# Patient Record
Sex: FEMALE | Race: WHITE | NOT HISPANIC OR LATINO | Employment: OTHER | URBAN - NONMETROPOLITAN AREA
[De-identification: names, ages, dates, MRNs, and addresses within clinical notes are randomized per-mention and may not be internally consistent; named-entity substitution may affect disease eponyms.]

---

## 2017-01-03 ENCOUNTER — OFFICE VISIT (OUTPATIENT)
Dept: INTERNAL MEDICINE | Facility: OTHER | Age: 47
End: 2017-01-03
Attending: NURSE PRACTITIONER
Payer: COMMERCIAL

## 2017-01-03 VITALS
RESPIRATION RATE: 18 BRPM | WEIGHT: 225 LBS | HEART RATE: 106 BPM | HEIGHT: 67 IN | SYSTOLIC BLOOD PRESSURE: 102 MMHG | DIASTOLIC BLOOD PRESSURE: 62 MMHG | OXYGEN SATURATION: 96 % | TEMPERATURE: 97 F | BODY MASS INDEX: 35.31 KG/M2

## 2017-01-03 DIAGNOSIS — F34.1 DYSTHYMIA: ICD-10-CM

## 2017-01-03 DIAGNOSIS — M25.50 PAIN IN JOINT, MULTIPLE SITES: Primary | ICD-10-CM

## 2017-01-03 LAB
CRP SERPL-MCNC: 7.2 MG/L (ref 0–8)
ERYTHROCYTE [SEDIMENTATION RATE] IN BLOOD BY WESTERGREN METHOD: 11 MM/H (ref 0–20)

## 2017-01-03 PROCEDURE — 86140 C-REACTIVE PROTEIN: CPT | Performed by: NURSE PRACTITIONER

## 2017-01-03 PROCEDURE — 99000 SPECIMEN HANDLING OFFICE-LAB: CPT | Performed by: NURSE PRACTITIONER

## 2017-01-03 PROCEDURE — 85652 RBC SED RATE AUTOMATED: CPT | Performed by: NURSE PRACTITIONER

## 2017-01-03 PROCEDURE — 99213 OFFICE O/P EST LOW 20 MIN: CPT | Performed by: NURSE PRACTITIONER

## 2017-01-03 PROCEDURE — 86431 RHEUMATOID FACTOR QUANT: CPT | Mod: 90 | Performed by: NURSE PRACTITIONER

## 2017-01-03 PROCEDURE — 36415 COLL VENOUS BLD VENIPUNCTURE: CPT | Performed by: NURSE PRACTITIONER

## 2017-01-03 RX ORDER — BUPROPION HCL 150 MG
TABLET,SUSTAINED-RELEASE 12 HR ORAL
Qty: 90 TABLET | Refills: 3 | Status: SHIPPED | OUTPATIENT
Start: 2017-01-03 | End: 2017-05-01

## 2017-01-03 RX ORDER — BUPROPION HCL 150 MG
150 TABLET,SUSTAINED-RELEASE 12 HR ORAL 2 TIMES DAILY
Qty: 60 TABLET
Start: 2017-01-03 | End: 2017-01-03

## 2017-01-03 ASSESSMENT — PAIN SCALES - GENERAL: PAINLEVEL: NO PAIN (0)

## 2017-01-03 NOTE — Clinical Note
[facility] [place]  [address] [facility] [address]  [phone]             January 9, 2017    Annie Fan Radha  [birth year]  [address] [facility] [address]              Dear Annie,    The results of your recent tests were normal.  Enclosed is a copy of the results.  It was a pleasure to see you at your last appointment.  Results for orders placed or performed in visit on 01/03/17   CRP, inflammation   Result Value Ref Range    CRP Inflammation 7.2 0.0 - 8.0 mg/L   ESR: Erythrocyte sedimentation rate   Result Value Ref Range    Sed Rate 11 0 - 20 mm/h   Rheumatoid factor   Result Value Ref Range    Rheumatoid Factor <20 <20 IU/mL       If you have any questions or concerns, please call myself or my nurse at [phone].      Sincerely,      Todd HEAD

## 2017-01-03 NOTE — MR AVS SNAPSHOT
"              After Visit Summary   1/3/2017    Annie Garcia    MRN: [id number]           Patient Information     Date Of Birth          1970        Visit Information        Provider Department      1/3/2017 9:00 AM Todd Torres NP [facility]        Today's Diagnoses     Pain in joint, multiple sites    -  1     Leukocytosis, unspecified type         Dysthymia           Care Instructions    1. Wellbutrin 1`50mg  -3 pills =450mg a day.            Follow-ups after your visit        Your next 10 appointments already scheduled     Jan 09, 2017  8:00 AM   Treatment with Randi Barnhart PT   [facility] ([facility] )    [address] [address] [phone]            Jan 13, 2017  8:40 AM   (Arrive by 8:20 AM)   Return Visit with Houston Aguilar MD    ORTHOPEDICS ([facility])    [address] [address] [phone]              Who to contact     If you have questions or need follow up information about today's clinic visit or your schedule please contact [facility] directly at [phone].  Normal or non-critical lab and imaging results will be communicated to you by MyChart, letter or phone within 4 business days after the clinic has received the results. If you do not hear from us within 7 days, please contact the clinic through MyChart or phone. If you have a critical or abnormal lab result, we will notify you by phone as soon as possible.  Submit refill requests through [employer] or call your pharmacy and they will forward the refill request to us. Please allow 3 business days for your refill to be completed.          Additional Information About Your Visit        Your Vitals Were     Pulse Temperature Respirations Height BMI (Body Mass Index) Pulse Oximetry    106 97  F (36.1  C) (Tympanic) 18 5' 7\" (1.702 m) 35.23 kg/m2 96%       Blood Pressure from Last 3 Encounters:   01/03/17 102/62   11/18/16 " 100/59   09/23/16 132/80    Weight from Last 3 Encounters:   01/03/17 225 lb (102.059 kg)   11/18/16 228 lb (103.42 kg)   09/23/16 230 lb (104.327 kg)              We Performed the Following     CRP, inflammation     ESR: Erythrocyte sedimentation rate     Rheumatoid factor          Today's Medication Changes          These changes are accurate as of: 1/3/17  9:49 AM.  If you have any questions, ask your nurse or doctor.               Start taking these medicines.        Dose/Directions    WELLBUTRIN  MG 12 hr tablet   Used for:  Dysthymia   Generic drug:  buPROPion   Replaces:  buPROPion 150 MG 12 hr tablet   Started by:  Todd Torres NP        Take  Wellbutrin 450mg (3 pills) a day   Quantity:  90 tablet   Refills:  3         Stop taking these medicines if you haven't already. Please contact your care team if you have questions.     buPROPion 150 MG 12 hr tablet   Commonly known as:  WELLBUTRIN SR   Replaced by:  WELLBUTRIN  MG 12 hr tablet   Stopped by:  Todd Torres NP           levofloxacin 500 MG tablet   Commonly known as:  LEVAQUIN   Stopped by:  Todd Torres NP                Where to get your medicines      [facility]  [address] [facility] [address]     Phone:  [phone]    - WELLBUTRIN  MG 12 hr tablet             Primary Care Provider Office Phone # Fax #    Todd Torres [phone]       [facility] [address] [facility] [address]        Thank you!     Thank you for choosing [facility]  for your care. Our goal is always to provide you with excellent care. Hearing back from our patients is one way we can continue to improve our services. Please take a few minutes to complete the written survey that you may receive in the mail after your visit with us. Thank you!             Your Updated Medication List - Protect others around you: Learn how to safely  use, store and throw away your medicines at www.disposemymeds.org.          This list is accurate as of: 1/3/17  9:49 AM.  Always use your most recent med list.                   Brand Name Dispense Instructions for use    acetaminophen-codeine 300-30 MG per tablet    TYLENOL #3    90 tablet    TAKE 1 TABLET BY MOUTH EVERY 4 HOURS AS NEEDED FOR MILD PAIN, TAKE FOR COUGHING PAIN.       BROVANA 15 MCG/2ML Nebu neb solution   Generic drug:  arformoterol     120 mL    USE 1 VIAL IN NEBULIZER 2 TIMES A DAY AS NEEDED       budesonide 1 MG/2ML Susp neb solution    PULMICORT    60 ampule    Take 2 mLs (1 mg) by nebulization daily Take 1 mg by nebulization daily       clotrimazole 10 MG Lozg lozenge    MYCELEX    70 each    PLACE 1 LOZENGE INSIDE CHEEK EVERY 6 HOURS AS NEEDED       DEXILANT 60 MG Cpdr CR capsule   Generic drug:  dexlansoprazole      Take 1 capsule by mouth daily as needed       diclofenac 1 % Gel topical gel    VOLTAREN    100 g    Apply 1 gram four times daily to right medial ankle using enclosed dosing card.       ERYTHROMYCIN BASE PO      Take 500 mg by mouth 3 times daily (with meals)       fluconazole 100 MG tablet    DIFLUCAN    30 tablet    TAKE 1 TABLET BY MOUTH DAILY AS NEEDED       hydrOXYzine 50 MG capsule    VISTARIL    120 capsule    Take 1-2 tabs  Every 6 hours as needed for itching.       levalbuterol 0.31 MG/3ML neb solution    XOPENEX    225 mL    Take 3 mLs (0.31 mg) by nebulization every 6 hours as needed for shortness of breath / dyspnea       nystatin 312833 UNIT/GM Powd    MYCOSTATIN    60 g    Apply 1 g topically 3 times daily as needed       PULMOZYME 1 MG/ML neb solution   Generic drug:  dornase alpha     75 mL    USE 1 VIAL IN NEBULIZER DAILY AS NEEDED       sucralfate 1 GM tablet    CARAFATE    120 tablet    Take 1 tablet (1 g) by mouth 4 times daily as needed       VALIUM PO      Take 10 mg by mouth every 6 hours as needed for anxiety       WELLBUTRIN  MG 12 hr tablet    Generic drug:  buPROPion     90 tablet    Take  Wellbutrin 450mg (3 pills) a day       zolpidem 10 MG tablet    AMBIEN    30 tablet    Take 1 tablet (10 mg) by mouth nightly as needed for sleep

## 2017-01-03 NOTE — NURSING NOTE
"Chief Complaint   Patient presents with     Recheck Medication     follow up       Initial /62 mmHg  Pulse 106  Temp(Src) 97  F (36.1  C) (Tympanic)  Resp 18  Ht 5' 7\" (1.702 m)  Wt 225 lb (102.059 kg)  BMI 35.23 kg/m2  SpO2 96% Estimated body mass index is 35.23 kg/(m^2) as calculated from the following:    Height as of this encounter: 5' 7\" (1.702 m).    Weight as of this encounter: 225 lb (102.059 kg).  BP completed using cuff size: martina Cantu      "

## 2017-01-04 LAB — RHEUMATOID FACT SER NEPH-ACNC: <20 IU/ML (ref 0–20)

## 2017-01-05 ENCOUNTER — HOSPITAL ENCOUNTER (OUTPATIENT)
Dept: PHYSICAL THERAPY | Facility: HOSPITAL | Age: 47
Setting detail: THERAPIES SERIES
End: 2017-01-05
Attending: ORTHOPAEDIC SURGERY
Payer: COMMERCIAL

## 2017-01-05 ENCOUNTER — TRANSFERRED RECORDS (OUTPATIENT)
Dept: HEALTH INFORMATION MANAGEMENT | Facility: HOSPITAL | Age: 47
End: 2017-01-05

## 2017-01-05 PROCEDURE — 97140 MANUAL THERAPY 1/> REGIONS: CPT | Mod: GP | Performed by: PHYSICAL THERAPIST

## 2017-01-05 NOTE — PROGRESS NOTES
SUBJECTIVE:  Annie Garcia, a 46 year old female scheduled an appointment to discuss the following issues:  Cytstic fibrosis-Sees Dr. Herman-Is off Levaquin and on Erythromycin.  250mg a day for 21 days.  . As preventative  Uses vest.   Pain in joint, multiple sites-sure has inflammation. Wants lab for inflammation.  Right ankle hurts Has been having ultrasound.treatments. Will be doing an Ultrasound.  Needs sticker for car-as some days can barely walk on the right ankle.    Has right groin pain -would like a nerve block.     Dysthymia Was wondering if can increase Well butrin-now that sun is hardly out-feels more sad.  Needs brand specific.  Insomnia-needs Ambien to be brand specific.       Medical, social, surgical, and family histories reviewed.    Current Outpatient Prescriptions   Medication     ERYTHROMYCIN BASE PO     WELLBUTRIN  MG 12 hr tablet     PULMOZYME 1 MG/ML nebulizer solution     diclofenac (VOLTAREN) 1 % GEL     zolpidem (AMBIEN) 10 MG tablet     DiazePAM (VALIUM PO)     acetaminophen-codeine (TYLENOL #3) 300-30 MG per tablet     fluconazole (DIFLUCAN) 100 MG tablet     sucralfate (CARAFATE) 1 GM tablet     budesonide (PULMICORT) 1 MG/2ML SUSP nebulizer solution     BROVANA 15 MCG/2ML NEBU     clotrimazole (MYCELEX) 10 MG LOZG     levalbuterol (XOPENEX) 0.31 MG/3ML nebulizer solution     hydrOXYzine (VISTARIL) 50 MG capsule     nystatin (MYCOSTATIN) 011900 UNIT/GM POWD     dexlansoprazole (DEXILANT) 60 MG CPDR     No current facility-administered medications for this visit.       ROS:  C: NEGATIVE for fever, chills, sore throat, earache  E: NEGATIVE for vision changes   R: NEGATIVE for  cough , SOB  CV: NEGATIVE for chest pain, palpitations   GI: NEGATIVE for nausea, abdominal pain, heartburn, or constipation, diarrhea.   : NEGATIVE for frequency, dysuria, or hematuria  M: POSITIVE  for significant arthralgias or myalgia-right ankle pain, right groin pain    N: NEGATIVE for  "weakness,  Paresthesias, dizziness  or headache    OBJECTIVE:  /62 mmHg  Pulse 106  Temp(Src) 97  F (36.1  C) (Tympanic)  Resp 18  Ht 5' 7\" (1.702 m)  Wt 225 lb (102.059 kg)  BMI 35.23 kg/m2  SpO2 96%  EXAM:  GENERAL APPEARANCE:  alert and no distress  EYES: EOMI,  PERRL   NECK: Supple, no lymphadenopathy, no thyromegaly, no carotid bruits, No JVD  RESP: lungs clear to auscultation anteriorly and posteriorly - no rales, rhonchi or wheezes  CV: regular rates and rhythm, normal S1 S2, no S3 or S4 and no murmur, no click or rub -  ABDOMEN:  soft, nontender, no hepatomegaly, no splenomegaly,  or masses,  bowel sounds normal  MS: No edema.       Results for orders placed or performed in visit on 01/03/17   CRP, inflammation   Result Value Ref Range    CRP Inflammation 7.2 0.0 - 8.0 mg/L   ESR: Erythrocyte sedimentation rate   Result Value Ref Range    Sed Rate 11 0 - 20 mm/h   Rheumatoid factor   Result Value Ref Range    Rheumatoid Factor <20 <20 IU/mL       :    ASSESSMENT / PLAN:  (M25.50) Pain in joint, multiple sites  (primary encounter diagnosis)  Comment:All indicators are negative.    Results for orders placed or performed in visit on 01/03/17   CRP, inflammation   Result Value Ref Range    CRP Inflammation 7.2 0.0 - 8.0 mg/L   ESR: Erythrocyte sedimentation rate   Result Value Ref Range    Sed Rate 11 0 - 20 mm/h   Rheumatoid factor   Result Value Ref Range    Rheumatoid Factor <20 <20 IU/mL       Plan: CRP, inflammation, ESR: Erythrocyte         sedimentation rate, Rheumatoid factor         (F34.1) Dysthymia  Comment: Will increase Wellburin to 450mg a day.  Wants brand specific.    Plan: WELLBUTRIN  MG 12 hr tablet,             "

## 2017-01-05 NOTE — PROGRESS NOTES
Outpatient Physical Therapy Progress Note     Patient: Annie Garcia  : 1970    Beginning/End Dates of Reporting Period:  11/3/2016 to 2017    Referring Provider: Dr Aguilar    Therapy Diagnosis: R ankle pain/post tib tendinopathy     Client Self Report: Reports that her foot is feeling 95% improved compared to when we first started working together on 11/3/2016.  She has had a long struggle with this foot issue, but is feeling that it is near resolved.  She has been able to go up and down stairs at home and at the hockey arena more easily, although she still sometimes turns sideways.  She feels her ankle is moving very well, but still feels slight stiffness/locked sensation with walking.  She notes much improvement in pain, occasional mild swelling/warmth and she will still get occasional pain along the medial aspect of her ankle, but feels this is still much better than it has been in months.  She is no longer walking with an AD, has been able to wear boots and any type of footwear she pleases without difficulty.  She rates her worst PL to be a 5-6/10 and at best 0-1/10.  Overall, she has done great in therapy the past couple of months and has an appointment with Dr Aguilar tomorrow to verify this.  She also sees a physician in [place] for an US of her lower leg today.    Objective Measurements:  Objective Measure: Ankle ROM, strength, pain, treatment, HEP  Details: R ankle ROM: DF 20, PF 42, INV 30, Ever 15 - grossly WNL and non-painful except for INV actively.  Strength: DF/PF 5/5, Ever 5-/5, INV 4/5 + pain. Weakness and pain noted with posterior tib testing and still mildly tender along tendon and muscle belly. Ligamentously she is stable and non-painful. She reports mild warmth on her medial ankle today, although I did not find it to be particularly warm.  No increased swelling or other visible deformity noted today. Ambulates well without AD in boots and without shoes.  I have  worked on myofascial release and soft tissue mobilization of her medial gastroc/soleus, release of her plantaris to allow for end range PF and I have mobilized her fibular head and calcaneus to promote normal ankle motion. We have worked on LE strengthening on the machines, posterior tib muscular re-education, stair negotiation in normal/reciprocal pattern and hip stretching and strengthening. She has done very well these past couple of weeks and I feel she is ready to DC from PT with plans to continue her HEP and get back into activities a the gym to lose weight she has gained due to her long/complex history of ailments the past couple years. She stated she will come up with a plan with Dr Aguilar and go from there     Goals:    Goal Identifier LTG 3   Goal Description Patient will be able to tolerate prolonged sitting while driving or riding without restriction from pain or tightness in R LE.    Target Date 06/24/15 (drives in and out of town frequently)   Date Met  01/05/17   Progress:     Goal Identifier LTG 4    Goal Description Patient will be able to perform community distance ambulation consistently without use of AD.    Target Date 06/10/15   Date Met   12/1/2016   Progress:     Goal Identifier short   Goal Description Client will be able to ambulate with no device 15 minutes with fucntional gaot pattern with equal heel strike.   Target Date 05/24/16   Date Met   12/1/2016   Progress:     Goal Identifier long   Goal Description Client will have pain decreased 0-2/10 with gait with community dstances,   Target Date 06/21/16   Date Met   1/5/2017   Progress:     Goal Identifier long   Goal Description Client strength anterior ankle 5/5 and ROM DF 10 degrees at ankle.   Target Date 06/21/16   Date Met      Progress: Improving to 4/5 with INV and near full all others       Progress Toward Goals:   Progress this reporting period: Patient has met most goals, except for strength, but this is expected to  continue to improve as she works on her HEP      Plan:  Other: Patient has made excellent gains the past few weeks and much of her pain has resolved.  She still has weakness that she needs to address with continued exercise.  I discussed discharging her from PT with encouragement to return to the gym and perform all daily activities normally. She indicated that she will discuss her progress with Dr Aguilar and come up with a plan.  From a PT standpoint - she has met most of her goals and is ready to DC at this time.    Discharge: Pending physician recommendation

## 2017-01-06 ENCOUNTER — OFFICE VISIT (OUTPATIENT)
Dept: ORTHOPEDICS | Facility: OTHER | Age: 47
End: 2017-01-06
Attending: ORTHOPAEDIC SURGERY
Payer: COMMERCIAL

## 2017-01-06 VITALS
SYSTOLIC BLOOD PRESSURE: 106 MMHG | RESPIRATION RATE: 18 BRPM | DIASTOLIC BLOOD PRESSURE: 70 MMHG | WEIGHT: 226 LBS | HEIGHT: 67 IN | TEMPERATURE: 97.9 F | OXYGEN SATURATION: 98 % | BODY MASS INDEX: 35.47 KG/M2 | HEART RATE: 83 BPM

## 2017-01-06 DIAGNOSIS — M66.871 NONTRAUMATIC RUPTURE OF TENDONS OF RIGHT FOOT AND ANKLE: Primary | ICD-10-CM

## 2017-01-06 PROCEDURE — 99213 OFFICE O/P EST LOW 20 MIN: CPT | Performed by: ORTHOPAEDIC SURGERY

## 2017-01-06 ASSESSMENT — PAIN SCALES - GENERAL: PAINLEVEL: NO PAIN (0)

## 2017-01-06 NOTE — PROGRESS NOTES
Chief complaint:  #1.  Bilateral flatfeet  #2.  Posterior tibial tendinitis right ankle    Subjective: This 46-year-old female with cystic fibrosis is frequently on antibiotics including fluoroquinolones, as well as periodic bursts of steroids.  She has been treated through this office for pain and tenderness and swelling along the posterior tibial tendon of the right ankle.  An MRI of 7/21/16 showed fluid around the tendon but no structural abnormality.  She has had multiple orthotics which did not provide lasting pain relief.  She has been using topical Voltaren gel as well.  She has been getting physical therapy for her ankle for several months.    Since seen last she had an ultrasound performed on her right ankle.  It was done by Dr. Barrera in [place].  He called me yesterday to discuss the results.  He said there is a 5 cm long longitudinal tear of the posterior tibial tendon beginning above the medial malleolus and ending close to the navicular.    The patient reports ongoing pain and tenderness along the path of her posterior tibial tendon, as well as local swelling.  She states that she cannot go on like this.    Her musculoskeletal and neurologic review of systems are otherwise unchanged since seen last.    Examination: Overweight but otherwise healthy-appearing female with appropriate mood and affect.  The skin around the ankle is intact.  There is mild swelling and local tenderness along the posterior tibial tendon from the medial malleolus to the navicular.  The pain is aggravated by resisted plantar flexion of the ankle, but is not provoked by motion.  Capillary refill is intact in the right foot.  Light touch sensation is intact in the toes of the right foot.    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   She asked that her therapy be extended, at least until she sees the specialist because the size components keeps muscle spasms to a minimum.  She was referred to Dr. Cid.  She'll return here as needed.

## 2017-01-06 NOTE — MR AVS SNAPSHOT
After Visit Summary   1/6/2017    Annie Garcia    MRN: [id number]           Patient Information     Date Of Birth          1970        Visit Information        Provider Department      1/6/2017 8:40 AM Houston Aguilar MD  [facility]        Today's Diagnoses     Nontraumatic rupture of tendons of right foot and ankle    -  1       Care Instructions    We will refer you to Dr Cid for surgical consideration        Follow-ups after your visit        Additional Services     ORTHOPEDICS ADULT REFERRAL       Your provider has referred you to: Dr Cid [facility] in [place]    Please be aware that coverage of these services is subject to the terms and limitations of your health insurance plan.  Call member services at your health plan with any benefit or coverage questions.      Please bring the following to your appointment:    >>   Any x-rays, CTs or MRIs which have been performed.  Contact the facility where they were done to arrange for  prior to your scheduled appointment.    >>   List of current medications   >>   This referral request   >>   Any documents/labs given to you for this referral            PHYSICAL THERAPY REFERRAL       *This therapy referral will be filtered to a centralized scheduling office at [facility] and the patient will receive a call to schedule an appointment at a [place] location most convenient for them. *     [facility] provides Physical Therapy evaluation and treatment and many specialty services across the [place] system.  If requesting a specialty program, please choose from the list below.    If you have not heard from the scheduling office within 2 business days, please call [phone] for all locations, with the exception of [facility], please call [phone].  Treatment: Evaluation & Treatment  Special Instructions/Modalities: Continue massage component of her right calf rehab, until she sees   "[facility]  Special Programs:     Please be aware that coverage of these services is subject to the terms and limitations of your health insurance plan.  Call member services at your health plan with any benefit or coverage questions.      **Note to Provider:  If you are referring outside of [place] for the therapy appointment, please list the name of the location in the  special instructions  above, print the referral and give to the patient to schedule the appointment.                  Follow-up notes from your care team     Return if symptoms worsen or fail to improve.      Your next 10 appointments already scheduled     Jan 09, 2017  8:00 AM   Treatment with Randi Barnhart, PT   [facility] ([facility] )    [address] [place] [id number]   [phone]              Who to contact     If you have questions or need follow up information about today's clinic visit or your schedule please contact  ORTHOPEDICS directly at [phone].  Normal or non-critical lab and imaging results will be communicated to you by MyChart, letter or phone within 4 business days after the clinic has received the results. If you do not hear from us within 7 days, please contact the clinic through MyChart or phone. If you have a critical or abnormal lab result, we will notify you by phone as soon as possible.  Submit refill requests through [employer] or call your pharmacy and they will forward the refill request to us. Please allow 3 business days for your refill to be completed.          Additional Information About Your Visit        Care EveryWhere ID     This is your Care EveryWhere ID. This could be used by other organizations to access your [place] medical records  [id number]        Your Vitals Were     Pulse Temperature Respirations Height BMI (Body Mass Index) Pulse Oximetry    83 97.9  F (36.6  C) (Tympanic) 18 5' 7\" (1.702 m) 35.39 kg/m2 98%       Blood Pressure from Last 3 Encounters: "   01/06/17 106/70   01/03/17 102/62   11/18/16 100/59    Weight from Last 3 Encounters:   01/06/17 226 lb (102.513 kg)   01/03/17 225 lb (102.059 kg)   11/18/16 228 lb (103.42 kg)              We Performed the Following     ORTHOPEDICS ADULT REFERRAL     PHYSICAL THERAPY REFERRAL        Primary Care Provider Office Phone # Fax #    Todd NG DAVIN Torres [phone]       [place] [facility] [place] [address]  [place] [address]        Thank you!     Thank you for choosing  [facility]  for your care. Our goal is always to provide you with excellent care. Hearing back from our patients is one way we can continue to improve our services. Please take a few minutes to complete the written survey that you may receive in the mail after your visit with us. Thank you!             Your Updated Medication List - Protect others around you: Learn how to safely use, store and throw away your medicines at www.disposemymeds.org.          This list is accurate as of: 1/6/17  8:55 AM.  Always use your most recent med list.                   Brand Name Dispense Instructions for use    acetaminophen-codeine 300-30 MG per tablet    TYLENOL #3    90 tablet    TAKE 1 TABLET BY MOUTH EVERY 4 HOURS AS NEEDED FOR MILD PAIN, TAKE FOR COUGHING PAIN.       BROVANA 15 MCG/2ML Nebu neb solution   Generic drug:  arformoterol     120 mL    USE 1 VIAL IN NEBULIZER 2 TIMES A DAY AS NEEDED       budesonide 1 MG/2ML Susp neb solution    PULMICORT    60 ampule    Take 2 mLs (1 mg) by nebulization daily Take 1 mg by nebulization daily       clotrimazole 10 MG Lozg lozenge    MYCELEX    70 each    PLACE 1 LOZENGE INSIDE CHEEK EVERY 6 HOURS AS NEEDED       DEXILANT 60 MG Cpdr CR capsule   Generic drug:  dexlansoprazole      Take 1 capsule by mouth daily as needed       diclofenac 1 % Gel topical gel    VOLTAREN    100 g    Apply 1 gram four times daily to right medial ankle using enclosed dosing card.       ERYTHROMYCIN BASE PO      Take 500 mg  by mouth 3 times daily (with meals)       fluconazole 100 MG tablet    DIFLUCAN    30 tablet    TAKE 1 TABLET BY MOUTH DAILY AS NEEDED       hydrOXYzine 50 MG capsule    VISTARIL    120 capsule    Take 1-2 tabs  Every 6 hours as needed for itching.       levalbuterol 0.31 MG/3ML neb solution    XOPENEX    225 mL    Take 3 mLs (0.31 mg) by nebulization every 6 hours as needed for shortness of breath / dyspnea       nystatin 252204 UNIT/GM Powd    MYCOSTATIN    60 g    Apply 1 g topically 3 times daily as needed       PULMOZYME 1 MG/ML neb solution   Generic drug:  dornase alpha     75 mL    USE 1 VIAL IN NEBULIZER DAILY AS NEEDED       sucralfate 1 GM tablet    CARAFATE    120 tablet    Take 1 tablet (1 g) by mouth 4 times daily as needed       VALIUM PO      Take 10 mg by mouth every 6 hours as needed for anxiety       WELLBUTRIN  MG 12 hr tablet   Generic drug:  buPROPion     90 tablet    Take  Wellbutrin 450mg (3 pills) a day       zolpidem 10 MG tablet    AMBIEN    30 tablet    Take 1 tablet (10 mg) by mouth nightly as needed for sleep

## 2017-01-06 NOTE — NURSING NOTE
"Chief Complaint   Patient presents with     Musculoskeletal Problem     right ankle pain still       Initial /70 mmHg  Pulse 83  Temp(Src) 97.9  F (36.6  C) (Tympanic)  Resp 18  Ht 5' 7\" (1.702 m)  Wt 226 lb (102.513 kg)  BMI 35.39 kg/m2  SpO2 98% Estimated body mass index is 35.39 kg/(m^2) as calculated from the following:    Height as of this encounter: 5' 7\" (1.702 m).    Weight as of this encounter: 226 lb (102.513 kg).  BP completed using cuff size: martina Madden LPN      "

## 2017-01-09 ENCOUNTER — HOSPITAL ENCOUNTER (OUTPATIENT)
Dept: PHYSICAL THERAPY | Facility: HOSPITAL | Age: 47
Setting detail: THERAPIES SERIES
End: 2017-01-09
Attending: NURSE PRACTITIONER
Payer: COMMERCIAL

## 2017-01-09 ENCOUNTER — TRANSFERRED RECORDS (OUTPATIENT)
Dept: HEALTH INFORMATION MANAGEMENT | Facility: HOSPITAL | Age: 47
End: 2017-01-09

## 2017-01-09 DIAGNOSIS — M66.871 NONTRAUMATIC RUPTURE OF TENDONS OF RIGHT FOOT AND ANKLE: Primary | ICD-10-CM

## 2017-01-09 PROCEDURE — 97140 MANUAL THERAPY 1/> REGIONS: CPT | Mod: GP | Performed by: PHYSICAL THERAPIST

## 2017-01-12 ENCOUNTER — TELEPHONE (OUTPATIENT)
Dept: INTERNAL MEDICINE | Facility: OTHER | Age: 47
End: 2017-01-12

## 2017-01-12 ENCOUNTER — HOSPITAL ENCOUNTER (OUTPATIENT)
Dept: PHYSICAL THERAPY | Facility: HOSPITAL | Age: 47
Setting detail: THERAPIES SERIES
End: 2017-01-12
Attending: ORTHOPAEDIC SURGERY
Payer: COMMERCIAL

## 2017-01-12 DIAGNOSIS — M25.571 RIGHT ANKLE PAIN, UNSPECIFIED CHRONICITY: Primary | ICD-10-CM

## 2017-01-12 PROCEDURE — 97140 MANUAL THERAPY 1/> REGIONS: CPT | Mod: GP | Performed by: PHYSICAL THERAPIST

## 2017-01-12 NOTE — TELEPHONE ENCOUNTER
8:35 AM    Reason for Call: Phone Call    Description:Pt would like MRI ordered. She needs it to be for the inside of Rt ankle. It is for a second referral to a provider in the [facility]. She will call back with the providers name.    Was an appointment offered for this call? No    Preferred method for responding to this message: Telephone Call    If we cannot reach you directly, may we leave a detailed response at the number you provided? Yes    Can this message wait until your PCP/provider returns, if unavailable today? YES, she can wait for call back until KEHINDE Torres is in on 1-13-16    Margaux Ponce

## 2017-01-18 ENCOUNTER — HOSPITAL ENCOUNTER (OUTPATIENT)
Dept: MRI IMAGING | Facility: HOSPITAL | Age: 47
Discharge: HOME | End: 2017-01-18
Attending: NURSE PRACTITIONER | Admitting: NURSE PRACTITIONER
Payer: COMMERCIAL

## 2017-01-18 DIAGNOSIS — M25.571 PAIN IN JOINT, ANKLE AND FOOT, RIGHT: Primary | ICD-10-CM

## 2017-01-18 DIAGNOSIS — M25.571 RIGHT ANKLE PAIN: ICD-10-CM

## 2017-01-18 PROCEDURE — 73721 MRI JNT OF LWR EXTRE W/O DYE: CPT | Mod: TC,RT

## 2017-01-20 ENCOUNTER — HOSPITAL ENCOUNTER (OUTPATIENT)
Dept: PHYSICAL THERAPY | Facility: HOSPITAL | Age: 47
Setting detail: THERAPIES SERIES
End: 2017-01-20
Attending: ORTHOPAEDIC SURGERY
Payer: COMMERCIAL

## 2017-01-20 PROCEDURE — 97140 MANUAL THERAPY 1/> REGIONS: CPT | Mod: GP | Performed by: PHYSICAL THERAPIST

## 2017-01-20 PROCEDURE — 97110 THERAPEUTIC EXERCISES: CPT | Mod: GP | Performed by: PHYSICAL THERAPIST

## 2017-01-30 ENCOUNTER — HOSPITAL ENCOUNTER (OUTPATIENT)
Dept: PHYSICAL THERAPY | Facility: HOSPITAL | Age: 47
Setting detail: THERAPIES SERIES
End: 2017-01-30
Attending: NURSE PRACTITIONER
Payer: COMMERCIAL

## 2017-01-30 PROCEDURE — 97140 MANUAL THERAPY 1/> REGIONS: CPT | Mod: GP | Performed by: PHYSICAL THERAPIST

## 2017-02-10 DIAGNOSIS — E84.9 CYSTIC FIBROSIS (H): Primary | ICD-10-CM

## 2017-02-14 ENCOUNTER — HOSPITAL ENCOUNTER (OUTPATIENT)
Dept: PHYSICAL THERAPY | Facility: HOSPITAL | Age: 47
Setting detail: THERAPIES SERIES
End: 2017-02-14
Attending: ORTHOPAEDIC SURGERY
Payer: COMMERCIAL

## 2017-02-14 PROCEDURE — 97140 MANUAL THERAPY 1/> REGIONS: CPT | Mod: GP | Performed by: PHYSICAL THERAPIST

## 2017-02-14 NOTE — TELEPHONE ENCOUNTER
Called to follow up with the patient.  She received a call from the refill line that her order for a wrist pulse oximeter was faxed to [facility].

## 2017-03-01 ENCOUNTER — HOSPITAL ENCOUNTER (EMERGENCY)
Facility: HOSPITAL | Age: 47
Discharge: HOME | End: 2017-03-01
Attending: PHYSICIAN ASSISTANT | Admitting: PHYSICIAN ASSISTANT
Payer: COMMERCIAL

## 2017-03-01 VITALS
HEART RATE: 106 BPM | OXYGEN SATURATION: 96 % | DIASTOLIC BLOOD PRESSURE: 65 MMHG | TEMPERATURE: 98 F | RESPIRATION RATE: 16 BRPM | SYSTOLIC BLOOD PRESSURE: 122 MMHG

## 2017-03-01 DIAGNOSIS — E84.0 CYSTIC FIBROSIS WITH PULMONARY EXACERBATION (H): ICD-10-CM

## 2017-03-01 LAB
ANION GAP SERPL CALCULATED.3IONS-SCNC: 7 MMOL/L (ref 3–14)
BASOPHILS # BLD AUTO: 0 10E9/L (ref 0–0.2)
BASOPHILS NFR BLD AUTO: 0.1 %
BUN SERPL-MCNC: 14 MG/DL (ref 7–30)
CALCIUM SERPL-MCNC: 8.7 MG/DL (ref 8.5–10.1)
CHLORIDE SERPL-SCNC: 105 MMOL/L (ref 94–109)
CO2 SERPL-SCNC: 26 MMOL/L (ref 20–32)
CREAT SERPL-MCNC: 0.99 MG/DL (ref 0.52–1.04)
DIFFERENTIAL METHOD BLD: ABNORMAL
EOSINOPHIL # BLD AUTO: 0.1 10E9/L (ref 0–0.7)
EOSINOPHIL NFR BLD AUTO: 0.4 %
ERYTHROCYTE [DISTWIDTH] IN BLOOD BY AUTOMATED COUNT: 14 % (ref 10–15)
FLUAV+FLUBV AG SPEC QL: NEGATIVE
FLUAV+FLUBV AG SPEC QL: NORMAL
GFR SERPL CREATININE-BSD FRML MDRD: 60 ML/MIN/1.7M2
GLUCOSE SERPL-MCNC: 87 MG/DL (ref 70–99)
GRAM STN SPEC: ABNORMAL
HCT VFR BLD AUTO: 39.1 % (ref 35–47)
HGB BLD-MCNC: 13.1 G/DL (ref 11.7–15.7)
IMM GRANULOCYTES # BLD: 0.1 10E9/L (ref 0–0.4)
IMM GRANULOCYTES NFR BLD: 0.5 %
LYMPHOCYTES # BLD AUTO: 1.5 10E9/L (ref 0.8–5.3)
LYMPHOCYTES NFR BLD AUTO: 8.8 %
MCH RBC QN AUTO: 27.5 PG (ref 26.5–33)
MCHC RBC AUTO-ENTMCNC: 33.5 G/DL (ref 31.5–36.5)
MCV RBC AUTO: 82 FL (ref 78–100)
MICRO REPORT STATUS: ABNORMAL
MONOCYTES # BLD AUTO: 1 10E9/L (ref 0–1.3)
MONOCYTES NFR BLD AUTO: 5.8 %
NEUTROPHILS # BLD AUTO: 14.2 10E9/L (ref 1.6–8.3)
NEUTROPHILS NFR BLD AUTO: 84.4 %
NRBC # BLD AUTO: 0 10*3/UL
NRBC BLD AUTO-RTO: 0 /100
PLATELET # BLD AUTO: 229 10E9/L (ref 150–450)
POTASSIUM SERPL-SCNC: 4.2 MMOL/L (ref 3.4–5.3)
RBC # BLD AUTO: 4.76 10E12/L (ref 3.8–5.2)
SODIUM SERPL-SCNC: 138 MMOL/L (ref 133–144)
SPECIMEN SOURCE: ABNORMAL
SPECIMEN SOURCE: NORMAL
WBC # BLD AUTO: 16.8 10E9/L (ref 4–11)

## 2017-03-01 PROCEDURE — 25000132 ZZH RX MED GY IP 250 OP 250 PS 637: Performed by: PHYSICIAN ASSISTANT

## 2017-03-01 PROCEDURE — 94640 AIRWAY INHALATION TREATMENT: CPT

## 2017-03-01 PROCEDURE — 85025 COMPLETE CBC W/AUTO DIFF WBC: CPT | Performed by: PHYSICIAN ASSISTANT

## 2017-03-01 PROCEDURE — 99284 EMERGENCY DEPT VISIT MOD MDM: CPT | Mod: 25

## 2017-03-01 PROCEDURE — 99284 EMERGENCY DEPT VISIT MOD MDM: CPT | Performed by: PHYSICIAN ASSISTANT

## 2017-03-01 PROCEDURE — 25000125 ZZHC RX 250: Performed by: PHYSICIAN ASSISTANT

## 2017-03-01 PROCEDURE — 87077 CULTURE AEROBIC IDENTIFY: CPT | Performed by: PHYSICIAN ASSISTANT

## 2017-03-01 PROCEDURE — 87181 SC STD AGAR DILUTION PER AGT: CPT | Performed by: PHYSICIAN ASSISTANT

## 2017-03-01 PROCEDURE — 87070 CULTURE OTHR SPECIMN AEROBIC: CPT | Performed by: PHYSICIAN ASSISTANT

## 2017-03-01 PROCEDURE — 87205 SMEAR GRAM STAIN: CPT | Performed by: PHYSICIAN ASSISTANT

## 2017-03-01 PROCEDURE — 87804 INFLUENZA ASSAY W/OPTIC: CPT | Mod: 59 | Performed by: FAMILY MEDICINE

## 2017-03-01 PROCEDURE — 87185 SC STD ENZYME DETCJ PER NZM: CPT | Performed by: PHYSICIAN ASSISTANT

## 2017-03-01 PROCEDURE — 71020 ZZHC CHEST TWO VIEWS, FRONT/LAT: CPT | Mod: TC

## 2017-03-01 PROCEDURE — 36415 COLL VENOUS BLD VENIPUNCTURE: CPT | Performed by: PHYSICIAN ASSISTANT

## 2017-03-01 PROCEDURE — 80048 BASIC METABOLIC PNL TOTAL CA: CPT | Performed by: PHYSICIAN ASSISTANT

## 2017-03-01 PROCEDURE — 40000275 ZZH STATISTIC RCP TIME EA 10 MIN

## 2017-03-01 RX ORDER — DOXYCYCLINE 100 MG/1
100 CAPSULE ORAL 2 TIMES DAILY
Qty: 20 CAPSULE | Refills: 0 | Status: ON HOLD | OUTPATIENT
Start: 2017-03-01 | End: 2017-03-09

## 2017-03-01 RX ORDER — PREDNISONE 20 MG/1
60 TABLET ORAL ONCE
Status: COMPLETED | OUTPATIENT
Start: 2017-03-01 | End: 2017-03-01

## 2017-03-01 RX ORDER — BUDESONIDE 0.5 MG/2ML
0.5 INHALANT ORAL ONCE
Status: COMPLETED | OUTPATIENT
Start: 2017-03-01 | End: 2017-03-01

## 2017-03-01 RX ORDER — PREDNISONE 10 MG/1
TABLET ORAL
Qty: 32 TABLET | Refills: 0 | Status: ON HOLD | OUTPATIENT
Start: 2017-03-01 | End: 2017-03-09

## 2017-03-01 RX ORDER — IPRATROPIUM BROMIDE AND ALBUTEROL SULFATE 2.5; .5 MG/3ML; MG/3ML
3 SOLUTION RESPIRATORY (INHALATION) ONCE
Status: COMPLETED | OUTPATIENT
Start: 2017-03-01 | End: 2017-03-01

## 2017-03-01 RX ADMIN — ACETAMINOPHEN AND CODEINE PHOSPHATE 1 TABLET: 300; 30 TABLET ORAL at 10:55

## 2017-03-01 RX ADMIN — IPRATROPIUM BROMIDE AND ALBUTEROL SULFATE 3 ML: .5; 3 SOLUTION RESPIRATORY (INHALATION) at 10:44

## 2017-03-01 RX ADMIN — PREDNISONE 60 MG: 20 TABLET ORAL at 11:16

## 2017-03-01 RX ADMIN — BUDESONIDE 0.5 MG: 0.5 INHALANT RESPIRATORY (INHALATION) at 10:44

## 2017-03-01 ASSESSMENT — ENCOUNTER SYMPTOMS
VOMITING: 0
LIGHT-HEADEDNESS: 1
COUGH: 1
FATIGUE: 1
APPETITE CHANGE: 1
WEAKNESS: 1
NAUSEA: 0
TROUBLE SWALLOWING: 0
SORE THROAT: 1
MUSCULOSKELETAL NEGATIVE: 1
FEVER: 0
ABDOMINAL PAIN: 0
ACTIVITY CHANGE: 1
WHEEZING: 1
DIZZINESS: 0
CHILLS: 0
SHORTNESS OF BREATH: 1

## 2017-03-01 NOTE — ED PROVIDER NOTES
"  History     Chief Complaint   Patient presents with     Cough     c/o worsening cough and fever/chills     The history is provided by the patient.     Annie Garcia is a 46 year old female who presented to the ED ambulatory for evaluation of cough and dyspnea.  Annie has a PMH of CF and has had a cough for an unknown amount of time.  I could not really pinpoint when this current exacerbation started.  Tells me that she was recently on a \"short and low dose\" course of prednisone, but I could not see any evidence of this in her chart.  She takes chronic erythromycin for her CF.  She has not taken any of her home nebs today.  Her sats on arrival were 96-97%.  Has increased sputum production and purulence.      In reviewing her previous office visits, Annie has chronic tachycardia.     I have reviewed the Medications, Allergies, Past Medical and Surgical History, and Social History in the Epic system.    Review of Systems   Constitutional: Positive for activity change, appetite change and fatigue. Negative for chills and fever.   HENT: Positive for sore throat. Negative for congestion and trouble swallowing.    Respiratory: Positive for cough, shortness of breath and wheezing.    Gastrointestinal: Negative for abdominal pain, nausea and vomiting.   Genitourinary: Negative.    Musculoskeletal: Negative.    Neurological: Positive for weakness and light-headedness. Negative for dizziness.       Physical Exam   BP: 144/72  Heart Rate: 110  Temp: 98.3  F (36.8  C)  Resp: 18  SpO2: 94 %  Physical Exam   Constitutional: She is oriented to person, place, and time. She appears well-developed and well-nourished. No distress.   Cardiovascular: Regular rhythm.    Mild tachycardia    Pulmonary/Chest: Effort normal and breath sounds normal. No respiratory distress.   No wheezing    Neurological: She is alert and oriented to person, place, and time.   Skin: Skin is warm and dry.   Psychiatric: She has a normal mood and " affect.   Nursing note and vitals reviewed.      ED Course     ED Course     Procedures        Medications   budesonide (PULMICORT) neb solution 0.5 mg (0.5 mg Nebulization Given 3/1/17 1044)   ipratropium - albuterol 0.5 mg/2.5 mg/3 mL (DUONEB) neb solution 3 mL (3 mLs Nebulization Given 3/1/17 1044)   acetaminophen-codeine (TYLENOL #3) 300-30 MG per tablet 2 tablet (1 tablet Oral Given 3/1/17 1055)   predniSONE (DELTASONE) tablet 60 mg (60 mg Oral Given 3/1/17 1116)     Results for orders placed or performed during the hospital encounter of 03/01/17 (from the past 24 hour(s))   Influenza A/B antigen   Result Value Ref [facility]    Influenza A/B Agn Specimen Nares     Influenza A Negative NEG    Influenza B  NEG     Negative   Test results must be correlated with clinical data. If necessary, results   should be confirmed by a molecular assay or viral culture.     CBC with platelets differential   Result Value Ref [facility]    WBC 16.8 (H) 4.0 - 11.0 10e9/L    RBC Count 4.76 3.8 - 5.2 10e12/L    Hemoglobin 13.1 11.7 - 15.7 g/dL    Hematocrit 39.1 35.0 - 47.0 %    MCV 82 78 - 100 fl    MCH 27.5 26.5 - 33.0 pg    MCHC 33.5 31.5 - 36.5 g/dL    RDW 14.0 10.0 - 15.0 %    Platelet Count 229 150 - 450 10e9/L    Diff Method Automated Method     % Neutrophils 84.4 %    % Lymphocytes 8.8 %    % Monocytes 5.8 %    % Eosinophils 0.4 %    % Basophils 0.1 %    % Immature Granulocytes 0.5 %    Nucleated RBCs 0 0 /100    Absolute Neutrophil 14.2 (H) 1.6 - 8.3 10e9/L    Absolute Lymphocytes 1.5 0.8 - 5.3 10e9/L    Absolute Monocytes 1.0 0.0 - 1.3 10e9/L    Absolute Eosinophils 0.1 0.0 - 0.7 10e9/L    Absolute Basophils 0.0 0.0 - 0.2 10e9/L    Abs Immature Granulocytes 0.1 0 - 0.4 10e9/L    Absolute Nucleated RBC 0.0    Basic metabolic panel   Result Value Ref [facility]    Sodium 138 133 - 144 mmol/L    Potassium 4.2 3.4 - 5.3 mmol/L    Chloride 105 94 - 109 mmol/L    Carbon Dioxide 26 20 - 32 mmol/L    Anion Gap 7 3 - 14 mmol/L    Glucose 87 70  - 99 mg/dL    Urea Nitrogen 14 7 - 30 mg/dL    Creatinine 0.99 0.52 - 1.04 mg/dL    GFR Estimate 60 (L) >60 mL/min/1.7m2    GFR Estimate If Black 73 >60 mL/min/1.7m2    Calcium 8.7 8.5 - 10.1 mg/dL   XR Chest 2 Views    Narrative    CHEST PA AND LATERAL VIEWS    COMPARISON:  Today's study is compared to prior examinations which are  dated April 18, 2016 and April 4, 2016 and chest CT which is dated  November 1, 2016.    FINDINGS:  There is some chronic lingular opacity, likely reflecting  the area of bronchial impaction and downstream atelectasis seen on the  prior chest CT.  No new remote consolidation is seen.  No effusion or  pneumothorax is present.  The cardiomediastinal silhouette is stable.    IMPRESSION:  CHRONIC LINGULAR OPACITY RELATED TO BRONCHIAL IMPACTION.  CONSIDER REPEAT CHEST CT.  CONSIDER PULMONOLOGY CONSULT.  Exam Date: Mar 01, 2017 11:27:45 AM  Author: JOEL BARRERA  This report is preliminary and transcribed          Critical Care time:  none               Labs Ordered and Resulted from Time of ED Arrival Up to the Time of Departure from the ED   CBC WITH PLATELETS DIFFERENTIAL - Abnormal; Notable for the following:        Result Value    WBC 16.8 (*)     Absolute Neutrophil 14.2 (*)     All other components within normal limits   BASIC METABOLIC PANEL - Abnormal; Notable for the following:     GFR Estimate 60 (*)     All other components within normal limits   INFLUENZA A/B ANTIGEN   SPUTUM CULTURE AEROBIC BACTERIAL   GRAM STAIN       Assessments & Plan (with Medical Decision Making)   Work-up as above.  Consistent with CF exacerbation.  We will trial steroids and doxycycline with close follow-up.  Room air sats are at baseline.  No new findings on CXR.  Has multiple home nebulizers.  Return HERE for any worsening.  Follow-up in the clinic over the next 3-5 days.  Ms. Meng Bishop voiced complete understanding and was agreeable.     I have reviewed the nursing notes.    I have reviewed  the findings, diagnosis, plan and need for follow up with the patient.    Discharge Medication List as of 3/1/2017 11:54 AM      START taking these medications    Details   predniSONE (DELTASONE) 10 MG tablet Take 4 tablets daily for 5 days,  take 2 tablets daily for 3 days, take 1 tablet daily for 3 days, take half a tablet for 3 days., Disp-32 tablet, R-0, E-Prescribe      doxycycline (VIBRAMYCIN) 100 MG capsule Take 1 capsule (100 mg) by mouth 2 times daily for 10 days, Disp-20 capsule, R-0, E-Prescribe             Final diagnoses:   Cystic fibrosis with pulmonary exacerbation (H)       3/1/2017   [facility]     Nava Granado PA-C  03/01/17 [id number]

## 2017-03-01 NOTE — ED NOTES
Patient presents to the emergency room with worsening cough and pain.  Patient states she has a history of Cystic Fibrosis and feels like this is an exacerbation.  Patient is awake/alert and interactive.  C/O feeling SOB and requesting oxygen via mask with humidification.  Gown is on.  Patient declining IV at this time.  Call light within reach.

## 2017-03-01 NOTE — ED AVS SNAPSHOT
[facility]    [address] [place] [address]    Phone:  [phone]                                       Annie Garcia   MRN: [id number]    Department:  [facility]   Date of Visit:  3/1/2017           After Visit Summary Signature Page     I have received my discharge instructions, and my questions have been answered. I have discussed any challenges I see with this plan with the nurse or doctor.    ..........................................................................................................................................  Patient/Patient Representative Signature      ..........................................................................................................................................  Patient Representative Print Name and Relationship to Patient    ..................................................               ................................................  Date                                            Time    ..........................................................................................................................................  Reviewed by Signature/Title    ...................................................              ..............................................  Date                                                            Time

## 2017-03-01 NOTE — ED AVS SNAPSHOT
[facility]    [address]    [place] [address]    Phone:  [phone]                                       Annie Garcia   MRN: [id number]    Department:  [facility]   Date of Visit:  3/1/2017           Patient Information     Date Of Birth          1970        Your diagnoses for this visit were:     Cystic fibrosis with pulmonary exacerbation (H)        You were seen by Nava Granado PA-C.      Follow-up Information     Schedule an appointment as soon as possible for a visit with Todd Torres NP.    Specialty:  Internal Medicine    Contact information:    [facility]  [address]  [place] [address] [phone]          Follow up with [facility].    Specialty:  EMERGENCY MEDICINE    Why:  If symptoms worsen    Contact information:    [address] [address] [phone]    Additional information:    From [place]: Take [address]. Turn left at [address]. Turn left at the first stoplight on [address]. At the first stop sign, take a right onto [address]. Take a left into the parking lot and continue through until you reach the North enterance of the building.       From [place]: Take [address]. Take the [address] ramp towards [place]. Turn left onto [address]. Take a slight right onto [address]. Turn left at the first stoplight on [address]. At the first stop sign, take a right onto [address]. Take a left into the parking lot and continue through until you reach the North enterance of the building.       From [place]: Take [address]. Take a right at [address]. At the first stop sign, take a right onto [address]. Take a left into the parking lot and continue through until you reach the North enterance of the building.         Discharge Instructions       I hope you start to feel better.     Your chest  x-ray looked OK today.     Your white blood cell count is slightly elevated.  This could be come a multitude of reasons.      Rest and continue your home nebs.    Please follow-up in the clinic for a recheck in the next 3-5 days.    Please return HERE for ANY worsening symptoms whatsoever.         Review of your medicines      START taking        Dose / Directions Last dose taken    doxycycline 100 MG capsule   Commonly known as:  VIBRAMYCIN   Dose:  100 mg   Quantity:  20 capsule        Take 1 capsule (100 mg) by mouth 2 times daily for 10 days   Refills:  0        predniSONE 10 MG tablet   Commonly known as:  DELTASONE   Quantity:  32 tablet        Take 4 tablets daily for 5 days,  take 2 tablets daily for 3 days, take 1 tablet daily for 3 days, take half a tablet for 3 days.   Refills:  0          Our records show that you are taking the medicines listed below. If these are incorrect, please call your family doctor or clinic.        Dose / Directions Last dose taken    acetaminophen-codeine 300-30 MG per tablet   Commonly known as:  TYLENOL #3   Quantity:  90 tablet        TAKE 1 TABLET BY MOUTH EVERY 4 HOURS AS NEEDED FOR MILD PAIN, TAKE FOR COUGHING PAIN.   Refills:  0        AMBIEN 10 MG tablet   Quantity:  30 tablet   Generic drug:  zolpidem        TAKE 1 TABLET BY MOUTH NIGHTLY AS NEEDED FOR SLEEP   Refills:  5        BROVANA 15 MCG/2ML Nebu neb solution   Quantity:  120 mL   Generic drug:  arformoterol        USE 1 VIAL IN NEBULIZER 2 TIMES A DAY AS NEEDED   Refills:  0        budesonide 1 MG/2ML Susp neb solution   Commonly known as:  PULMICORT   Dose:  1 mg   Quantity:  60 ampule        Take 2 mLs (1 mg) by nebulization daily Take 1 mg by nebulization daily   Refills:  1        clotrimazole 10 MG Lozg lozenge   Commonly known as:  MYCELEX   Quantity:  70 each        PLACE 1 LOZENGE INSIDE CHEEK EVERY 6 HOURS AS NEEDED   Refills:  0        DEXILANT 60 MG Cpdr CR capsule   Dose:  1 capsule   Generic drug:   dexlansoprazole        Take 1 capsule by mouth daily as needed   Refills:  0        diclofenac 1 % Gel topical gel   Commonly known as:  VOLTAREN   Quantity:  100 g        Apply 1 gram four times daily to right medial ankle using enclosed dosing card.   Refills:  1        ERYTHROMYCIN BASE PO   Dose:  500 mg        Take 500 mg by mouth 3 times daily (with meals)   Refills:  0        fluconazole 100 MG tablet   Commonly known as:  DIFLUCAN   Quantity:  30 tablet        TAKE 1 TABLET BY MOUTH DAILY AS NEEDED   Refills:  2        hydrOXYzine 50 MG capsule   Commonly known as:  VISTARIL   Quantity:  120 capsule        Take 1-2 tabs  Every 6 hours as needed for itching.   Refills:  0        levalbuterol 0.31 MG/3ML neb solution   Commonly known as:  XOPENEX   Dose:  1 ampule   Quantity:  225 mL        Take 3 mLs (0.31 mg) by nebulization every 6 hours as needed for shortness of breath / dyspnea   Refills:  0        nystatin 305108 UNIT/GM Powd   Commonly known as:  MYCOSTATIN   Dose:  1 g   Quantity:  60 g        Apply 1 g topically 3 times daily as needed   Refills:  1        * order for DME   Quantity:  1 unit marking on an U-100 insulin syringe        Equipment being ordered: Pulse oximeter for wrist.   Re: Cystic fibrosis   Refills:  0        * order for DME   Quantity:  1 Units        Equipment being ordered:  Wrist pulse oximeter Dx Cystic fibrosis (H)   Refills:  0        PULMOZYME 1 MG/ML neb solution   Quantity:  75 mL   Generic drug:  dornase alpha        USE 1 VIAL IN NEBULIZER DAILY AS NEEDED   Refills:  0        sucralfate 1 GM tablet   Commonly known as:  CARAFATE   Dose:  1 g   Quantity:  120 tablet        Take 1 tablet (1 g) by mouth 4 times daily as needed   Refills:  0        VALIUM PO   Dose:  10 mg        Take 10 mg by mouth every 6 hours as needed for anxiety   Refills:  0        WELLBUTRIN  MG 12 hr tablet   Quantity:  90 tablet   Generic drug:  buPROPion        Take  Wellbutrin 450mg (3  pills) a day   Refills:  3        * Notice:  This list has 2 medication(s) that are the same as other medications prescribed for you. Read the directions carefully, and ask your doctor or other care provider to review them with you.            Prescriptions were sent or printed at these locations (2 Prescriptions)                   [facility] [place] - [address]   [address] [address]    Telephone:  [phone]   Fax:  [phone]   Hours:                  E-Prescribed (2 of 2)         predniSONE (DELTASONE) 10 MG tablet               doxycycline (VIBRAMYCIN) 100 MG capsule                Procedures and tests performed during your visit     Basic metabolic panel    CBC with platelets differential    Influenza A/B antigen    XR Chest 2 Views      Orders Needing Specimen Collection     Ordered          03/01/17 1150  Sputum Culture Aerobic Bacterial - STAT, Prio: STAT, Needs to be Collected     Scheduled Task Status   03/01/17 1151 Collect Sputum Culture Aerobic Bacterial Open   Order Class:  PCU Collect                03/01/17 1150  Gram stain - STAT, Prio: STAT, Needs to be Collected     Scheduled Task Status   03/01/17 1151 Collect Gram stain Open   Order Class:  PCU Collect                  Pending Results     Date and Time Order Name Status Description    3/1/2017 1042 XR Chest 2 Views In process             Pending Culture Results     No orders found from 2/27/2017 to 3/2/2017.            Thank you for choosing [facility]       Thank you for choosing [facility] for your care. Our goal is always to provide you with excellent care. Hearing back from our patients is one way we can continue to improve our services. Please take a few minutes to complete the written survey that you may receive in the mail after you visit with us. Thank you!        Care EveryWhere ID     This is your Care EveryWhere ID. This could be used by other organizations to access your [facility] medical  records  [id number]        After Visit Summary       This is your record. Keep this with you and show to your community pharmacist(s) and doctor(s) at your next visit.

## 2017-03-01 NOTE — DISCHARGE INSTRUCTIONS
I hope you start to feel better.     Your chest x-ray looked OK today.     Your white blood cell count is slightly elevated.  This could be come a multitude of reasons.      Rest and continue your home nebs.    Please follow-up in the clinic for a recheck in the next 3-5 days.    Please return HERE for ANY worsening symptoms whatsoever.

## 2017-03-01 NOTE — PROGRESS NOTES
Chest x-ray results routed to PCP Todd Torres NP,IMPRESSION:  CHRONIC LINGULAR OPACITY RELATED TO BRONCHIAL IMPACTION. CONSIDER REPEAT CHEST CT.   Reviewed results with [facility], no new orders received. Pt to follow up with PCP in 3-5 days as per discharge instructions.

## 2017-03-01 NOTE — ED NOTES
Discharge instructions reviewed with patient, and Rx sent to [facility]. Pt verbalized understanding. Pt ambulated with a steady gait to the exit.

## 2017-03-03 ENCOUNTER — HOSPITAL ENCOUNTER (INPATIENT)
Facility: HOSPITAL | Age: 47
LOS: 5 days | Discharge: HOME | DRG: 190 | End: 2017-03-09
Attending: PHYSICIAN ASSISTANT | Admitting: INTERNAL MEDICINE
Payer: COMMERCIAL

## 2017-03-03 DIAGNOSIS — E84.0 CYSTIC FIBROSIS WITH PULMONARY EXACERBATION (H): ICD-10-CM

## 2017-03-03 DIAGNOSIS — R21 RASH AND OTHER NONSPECIFIC SKIN ERUPTION: ICD-10-CM

## 2017-03-03 DIAGNOSIS — J96.01 ACUTE RESPIRATORY FAILURE WITH HYPOXIA (H): ICD-10-CM

## 2017-03-03 DIAGNOSIS — M25.551 HIP PAIN, RIGHT: ICD-10-CM

## 2017-03-03 DIAGNOSIS — J14 PNEUMONIA OF BOTH LUNGS DUE TO HAEMOPHILUS INFLUENZAE, UNSPECIFIED PART OF LUNG (H): Primary | ICD-10-CM

## 2017-03-03 PROBLEM — J20.9 ACUTE BRONCHITIS: Status: ACTIVE | Noted: 2017-03-03

## 2017-03-03 LAB
ALBUMIN SERPL-MCNC: 3.6 G/DL (ref 3.4–5)
ALP SERPL-CCNC: 127 U/L (ref 40–150)
ALT SERPL W P-5'-P-CCNC: 37 U/L (ref 0–50)
ANION GAP SERPL CALCULATED.3IONS-SCNC: 12 MMOL/L (ref 3–14)
AST SERPL W P-5'-P-CCNC: 19 U/L (ref 0–45)
BASE DEFICIT BLDA-SCNC: 0.1 MMOL/L
BASOPHILS # BLD AUTO: 0 10E9/L (ref 0–0.2)
BASOPHILS NFR BLD AUTO: 0.2 %
BILIRUB SERPL-MCNC: 0.5 MG/DL (ref 0.2–1.3)
BUN SERPL-MCNC: 20 MG/DL (ref 7–30)
CALCIUM SERPL-MCNC: 9.5 MG/DL (ref 8.5–10.1)
CHLORIDE SERPL-SCNC: 103 MMOL/L (ref 94–109)
CO2 SERPL-SCNC: 25 MMOL/L (ref 20–32)
CREAT SERPL-MCNC: 0.97 MG/DL (ref 0.52–1.04)
DIFFERENTIAL METHOD BLD: ABNORMAL
EOSINOPHIL # BLD AUTO: 0 10E9/L (ref 0–0.7)
EOSINOPHIL NFR BLD AUTO: 0.1 %
ERYTHROCYTE [DISTWIDTH] IN BLOOD BY AUTOMATED COUNT: 13.7 % (ref 10–15)
GFR SERPL CREATININE-BSD FRML MDRD: 62 ML/MIN/1.7M2
GLUCOSE SERPL-MCNC: 100 MG/DL (ref 70–99)
GRAM STN SPEC: ABNORMAL
HCO3 BLD-SCNC: 23 MMOL/L (ref 21–28)
HCT VFR BLD AUTO: 41.6 % (ref 35–47)
HGB BLD-MCNC: 14.1 G/DL (ref 11.7–15.7)
IMM GRANULOCYTES # BLD: 0.1 10E9/L (ref 0–0.4)
IMM GRANULOCYTES NFR BLD: 0.4 %
LACTATE SERPL-SCNC: 1.3 MMOL/L (ref 0.4–2)
LACTATE SERPL-SCNC: 3.6 MMOL/L (ref 0.4–2)
LYMPHOCYTES # BLD AUTO: 1.4 10E9/L (ref 0.8–5.3)
LYMPHOCYTES NFR BLD AUTO: 5.9 %
MCH RBC QN AUTO: 27.7 PG (ref 26.5–33)
MCHC RBC AUTO-ENTMCNC: 33.9 G/DL (ref 31.5–36.5)
MCV RBC AUTO: 82 FL (ref 78–100)
MICRO REPORT STATUS: ABNORMAL
MONOCYTES # BLD AUTO: 1.5 10E9/L (ref 0–1.3)
MONOCYTES NFR BLD AUTO: 6.6 %
NEUTROPHILS # BLD AUTO: 20.3 10E9/L (ref 1.6–8.3)
NEUTROPHILS NFR BLD AUTO: 86.8 %
NRBC # BLD AUTO: 0 10*3/UL
NRBC BLD AUTO-RTO: 0 /100
O2/TOTAL GAS SETTING VFR VENT: ABNORMAL %
OXYHGB MFR BLD: 92 % (ref 92–100)
PCO2 BLD: 34 MM HG (ref 35–45)
PH BLD: 7.45 PH (ref 7.35–7.45)
PLATELET # BLD AUTO: 340 10E9/L (ref 150–450)
PO2 BLD: 62 MM HG (ref 80–105)
POTASSIUM SERPL-SCNC: 3.7 MMOL/L (ref 3.4–5.3)
PROT SERPL-MCNC: 8.5 G/DL (ref 6.8–8.8)
RBC # BLD AUTO: 5.09 10E12/L (ref 3.8–5.2)
SODIUM SERPL-SCNC: 140 MMOL/L (ref 133–144)
SPECIMEN SOURCE: ABNORMAL
WBC # BLD AUTO: 23.4 10E9/L (ref 4–11)

## 2017-03-03 PROCEDURE — 40000275 ZZH STATISTIC RCP TIME EA 10 MIN

## 2017-03-03 PROCEDURE — 36415 COLL VENOUS BLD VENIPUNCTURE: CPT | Performed by: INTERNAL MEDICINE

## 2017-03-03 PROCEDURE — 87205 SMEAR GRAM STAIN: CPT | Performed by: PHYSICIAN ASSISTANT

## 2017-03-03 PROCEDURE — 85025 COMPLETE CBC W/AUTO DIFF WBC: CPT | Performed by: FAMILY MEDICINE

## 2017-03-03 PROCEDURE — 80053 COMPREHEN METABOLIC PANEL: CPT | Performed by: FAMILY MEDICINE

## 2017-03-03 PROCEDURE — 87070 CULTURE OTHR SPECIMN AEROBIC: CPT | Performed by: PHYSICIAN ASSISTANT

## 2017-03-03 PROCEDURE — 96365 THER/PROPH/DIAG IV INF INIT: CPT

## 2017-03-03 PROCEDURE — 25000125 ZZHC RX 250: Performed by: PHYSICIAN ASSISTANT

## 2017-03-03 PROCEDURE — 99222 1ST HOSP IP/OBS MODERATE 55: CPT | Performed by: INTERNAL MEDICINE

## 2017-03-03 PROCEDURE — 96375 TX/PRO/DX INJ NEW DRUG ADDON: CPT

## 2017-03-03 PROCEDURE — 83605 ASSAY OF LACTIC ACID: CPT | Performed by: FAMILY MEDICINE

## 2017-03-03 PROCEDURE — 94640 AIRWAY INHALATION TREATMENT: CPT

## 2017-03-03 PROCEDURE — 36600 WITHDRAWAL OF ARTERIAL BLOOD: CPT

## 2017-03-03 PROCEDURE — 87040 BLOOD CULTURE FOR BACTERIA: CPT | Performed by: PHYSICIAN ASSISTANT

## 2017-03-03 PROCEDURE — 82805 BLOOD GASES W/O2 SATURATION: CPT | Performed by: PHYSICIAN ASSISTANT

## 2017-03-03 PROCEDURE — 96376 TX/PRO/DX INJ SAME DRUG ADON: CPT

## 2017-03-03 PROCEDURE — 99285 EMERGENCY DEPT VISIT HI MDM: CPT | Mod: 25

## 2017-03-03 PROCEDURE — 25000132 ZZH RX MED GY IP 250 OP 250 PS 637: Performed by: INTERNAL MEDICINE

## 2017-03-03 PROCEDURE — 25000128 H RX IP 250 OP 636: Performed by: PHYSICIAN ASSISTANT

## 2017-03-03 PROCEDURE — 36415 COLL VENOUS BLD VENIPUNCTURE: CPT | Performed by: PHYSICIAN ASSISTANT

## 2017-03-03 PROCEDURE — 25000128 H RX IP 250 OP 636: Performed by: INTERNAL MEDICINE

## 2017-03-03 PROCEDURE — 25000125 ZZHC RX 250: Performed by: INTERNAL MEDICINE

## 2017-03-03 PROCEDURE — 94640 AIRWAY INHALATION TREATMENT: CPT | Mod: 76

## 2017-03-03 PROCEDURE — 71020 ZZHC CHEST TWO VIEWS, FRONT/LAT: CPT | Mod: TC

## 2017-03-03 PROCEDURE — 99285 EMERGENCY DEPT VISIT HI MDM: CPT | Performed by: PHYSICIAN ASSISTANT

## 2017-03-03 PROCEDURE — 83605 ASSAY OF LACTIC ACID: CPT | Performed by: INTERNAL MEDICINE

## 2017-03-03 PROCEDURE — G0378 HOSPITAL OBSERVATION PER HR: HCPCS

## 2017-03-03 RX ORDER — BUPROPION HYDROCHLORIDE 150 MG/1
450 TABLET, EXTENDED RELEASE ORAL 2 TIMES DAILY
Status: DISCONTINUED | OUTPATIENT
Start: 2017-03-03 | End: 2017-03-03

## 2017-03-03 RX ORDER — BISACODYL 10 MG
10 SUPPOSITORY, RECTAL RECTAL DAILY PRN
Status: DISCONTINUED | OUTPATIENT
Start: 2017-03-03 | End: 2017-03-09 | Stop reason: HOSPADM

## 2017-03-03 RX ORDER — BUPROPION HYDROCHLORIDE 150 MG/1
450 TABLET, EXTENDED RELEASE ORAL DAILY
Status: DISCONTINUED | OUTPATIENT
Start: 2017-03-04 | End: 2017-03-09 | Stop reason: HOSPADM

## 2017-03-03 RX ORDER — HYDROXYZINE PAMOATE 50 MG/1
50 CAPSULE ORAL EVERY 6 HOURS PRN
Status: DISCONTINUED | OUTPATIENT
Start: 2017-03-03 | End: 2017-03-09 | Stop reason: HOSPADM

## 2017-03-03 RX ORDER — AMOXICILLIN 250 MG
1-2 CAPSULE ORAL 2 TIMES DAILY PRN
Status: DISCONTINUED | OUTPATIENT
Start: 2017-03-03 | End: 2017-03-09 | Stop reason: HOSPADM

## 2017-03-03 RX ORDER — LEVALBUTEROL INHALATION SOLUTION 0.63 MG/3ML
0.31 SOLUTION RESPIRATORY (INHALATION) EVERY 6 HOURS PRN
Status: DISCONTINUED | OUTPATIENT
Start: 2017-03-03 | End: 2017-03-09 | Stop reason: HOSPADM

## 2017-03-03 RX ORDER — BUDESONIDE 0.5 MG/2ML
1 INHALANT ORAL DAILY
Status: DISCONTINUED | OUTPATIENT
Start: 2017-03-04 | End: 2017-03-09 | Stop reason: HOSPADM

## 2017-03-03 RX ORDER — METHYLPREDNISOLONE SODIUM SUCCINATE 125 MG/2ML
125 INJECTION, POWDER, LYOPHILIZED, FOR SOLUTION INTRAMUSCULAR; INTRAVENOUS ONCE
Status: COMPLETED | OUTPATIENT
Start: 2017-03-03 | End: 2017-03-03

## 2017-03-03 RX ORDER — SUCRALFATE 1 G/1
1 TABLET ORAL
Status: DISCONTINUED | OUTPATIENT
Start: 2017-03-03 | End: 2017-03-09 | Stop reason: HOSPADM

## 2017-03-03 RX ORDER — IPRATROPIUM BROMIDE AND ALBUTEROL SULFATE 2.5; .5 MG/3ML; MG/3ML
3 SOLUTION RESPIRATORY (INHALATION) ONCE
Status: COMPLETED | OUTPATIENT
Start: 2017-03-03 | End: 2017-03-03

## 2017-03-03 RX ORDER — ONDANSETRON 2 MG/ML
4 INJECTION INTRAMUSCULAR; INTRAVENOUS EVERY 6 HOURS PRN
Status: DISCONTINUED | OUTPATIENT
Start: 2017-03-03 | End: 2017-03-09 | Stop reason: HOSPADM

## 2017-03-03 RX ORDER — PROCHLORPERAZINE 25 MG
25 SUPPOSITORY, RECTAL RECTAL EVERY 12 HOURS PRN
Status: DISCONTINUED | OUTPATIENT
Start: 2017-03-03 | End: 2017-03-09 | Stop reason: HOSPADM

## 2017-03-03 RX ORDER — NALOXONE HYDROCHLORIDE 0.4 MG/ML
.1-.4 INJECTION, SOLUTION INTRAMUSCULAR; INTRAVENOUS; SUBCUTANEOUS
Status: DISCONTINUED | OUTPATIENT
Start: 2017-03-03 | End: 2017-03-09 | Stop reason: HOSPADM

## 2017-03-03 RX ORDER — PREDNISONE 20 MG/1
60 TABLET ORAL DAILY
Status: DISCONTINUED | OUTPATIENT
Start: 2017-03-03 | End: 2017-03-03

## 2017-03-03 RX ORDER — ZOLPIDEM TARTRATE 10 MG/1
10 TABLET ORAL
Status: DISCONTINUED | OUTPATIENT
Start: 2017-03-03 | End: 2017-03-09 | Stop reason: HOSPADM

## 2017-03-03 RX ORDER — PROCHLORPERAZINE MALEATE 5 MG
5-10 TABLET ORAL EVERY 6 HOURS PRN
Status: DISCONTINUED | OUTPATIENT
Start: 2017-03-03 | End: 2017-03-09 | Stop reason: HOSPADM

## 2017-03-03 RX ORDER — CEFTRIAXONE SODIUM 1 G/50ML
1 INJECTION, SOLUTION INTRAVENOUS EVERY 24 HOURS
Status: DISCONTINUED | OUTPATIENT
Start: 2017-03-04 | End: 2017-03-08

## 2017-03-03 RX ORDER — ACETAMINOPHEN 325 MG/1
650 TABLET ORAL EVERY 4 HOURS PRN
Status: DISCONTINUED | OUTPATIENT
Start: 2017-03-03 | End: 2017-03-09 | Stop reason: HOSPADM

## 2017-03-03 RX ORDER — IPRATROPIUM BROMIDE AND ALBUTEROL SULFATE 2.5; .5 MG/3ML; MG/3ML
3 SOLUTION RESPIRATORY (INHALATION) EVERY 4 HOURS PRN
Status: DISCONTINUED | OUTPATIENT
Start: 2017-03-03 | End: 2017-03-09 | Stop reason: HOSPADM

## 2017-03-03 RX ORDER — CEFTRIAXONE SODIUM 2 G/50ML
2 INJECTION, SOLUTION INTRAVENOUS ONCE
Status: COMPLETED | OUTPATIENT
Start: 2017-03-03 | End: 2017-03-03

## 2017-03-03 RX ORDER — ONDANSETRON 4 MG/1
4 TABLET, ORALLY DISINTEGRATING ORAL EVERY 6 HOURS PRN
Status: DISCONTINUED | OUTPATIENT
Start: 2017-03-03 | End: 2017-03-09 | Stop reason: HOSPADM

## 2017-03-03 RX ORDER — NALOXONE HYDROCHLORIDE 0.4 MG/ML
.1-.4 INJECTION, SOLUTION INTRAMUSCULAR; INTRAVENOUS; SUBCUTANEOUS
Status: DISCONTINUED | OUTPATIENT
Start: 2017-03-03 | End: 2017-03-03

## 2017-03-03 RX ORDER — PANTOPRAZOLE SODIUM 40 MG/1
40 TABLET, DELAYED RELEASE ORAL
Status: DISCONTINUED | OUTPATIENT
Start: 2017-03-04 | End: 2017-03-09 | Stop reason: HOSPADM

## 2017-03-03 RX ORDER — METHYLPREDNISOLONE SODIUM SUCCINATE 125 MG/2ML
125 INJECTION, POWDER, LYOPHILIZED, FOR SOLUTION INTRAMUSCULAR; INTRAVENOUS EVERY 6 HOURS
Status: DISCONTINUED | OUTPATIENT
Start: 2017-03-03 | End: 2017-03-04

## 2017-03-03 RX ADMIN — IPRATROPIUM BROMIDE AND ALBUTEROL SULFATE 3 ML: .5; 3 SOLUTION RESPIRATORY (INHALATION) at 14:03

## 2017-03-03 RX ADMIN — ACETAMINOPHEN AND CODEINE PHOSPHATE 2 TABLET: 300; 30 TABLET ORAL at 17:00

## 2017-03-03 RX ADMIN — SODIUM CHLORIDE 1000 ML: 9 INJECTION, SOLUTION INTRAVENOUS at 14:02

## 2017-03-03 RX ADMIN — AZITHROMYCIN MONOHYDRATE 500 MG: 500 INJECTION, POWDER, LYOPHILIZED, FOR SOLUTION INTRAVENOUS at 15:20

## 2017-03-03 RX ADMIN — CEFTRIAXONE SODIUM 2 G: 2 INJECTION, SOLUTION INTRAVENOUS at 14:41

## 2017-03-03 RX ADMIN — IPRATROPIUM BROMIDE AND ALBUTEROL SULFATE 3 ML: .5; 3 SOLUTION RESPIRATORY (INHALATION) at 17:59

## 2017-03-03 RX ADMIN — METHYLPREDNISOLONE SODIUM SUCCINATE 125 MG: 125 INJECTION, POWDER, FOR SOLUTION INTRAMUSCULAR; INTRAVENOUS at 20:06

## 2017-03-03 RX ADMIN — ZOLPIDEM TARTRATE 10 MG: 10 TABLET, FILM COATED ORAL at 20:06

## 2017-03-03 RX ADMIN — ACETAMINOPHEN AND CODEINE PHOSPHATE 2 TABLET: 300; 30 TABLET ORAL at 23:31

## 2017-03-03 RX ADMIN — METHYLPREDNISOLONE SODIUM SUCCINATE 125 MG: 125 INJECTION, POWDER, FOR SOLUTION INTRAMUSCULAR; INTRAVENOUS at 14:02

## 2017-03-03 ASSESSMENT — ENCOUNTER SYMPTOMS
NECK PAIN: 0
NEUROLOGICAL NEGATIVE: 1
STRIDOR: 0
CHILLS: 1
SORE THROAT: 0
CHEST TIGHTNESS: 0
SHORTNESS OF BREATH: 1
COUGH: 1
BACK PAIN: 0
NECK STIFFNESS: 0
PALPITATIONS: 0
WHEEZING: 0
FEVER: 1
NAUSEA: 0
ABDOMINAL PAIN: 0
VOMITING: 0

## 2017-03-03 NOTE — PLAN OF CARE
Problem: Patient Goal: Rt Focus  Goal: 1. Patient Goal: RT Focus  Pt to discharge at baseline respiratory status.

## 2017-03-03 NOTE — ED PROVIDER NOTES
History     Chief Complaint   Patient presents with     Cough     productive, green sputum, hx of CF     Fever     last couple days     HPI  Annie Garcia is a 46 year old female with h/o CF who presents back to the ED with worsening cough, dyspnea, and fevers/chills. She was seen in the ED on 3/1/17 and prescribed doxycycline and a prednisone dose pack. She has not been able to fill the prescriptions thus far. She had prednisone at home and has been taking 60mg daily for the last few days, did not take today. Has albuterol nebulizers at home which she has been taking today. Had a fever of 100 in the ambulance in [facility] for the last two weeks her SpO2 has been low, between 80-86%. Her normal is around 96%RA.     I have reviewed the Medications, Allergies, Past Medical and Surgical History, and Social History in the Epic system.    Review of Systems   Constitutional: Positive for chills and fever.   HENT: Negative for congestion and sore throat.    Respiratory: Positive for cough and shortness of breath. Negative for chest tightness, wheezing and stridor.    Cardiovascular: Negative for chest pain, palpitations and leg swelling.   Gastrointestinal: Negative for abdominal pain, nausea and vomiting.   Genitourinary: Negative.    Musculoskeletal: Negative for back pain, neck pain and neck stiffness.   Skin: Negative.    Neurological: Negative.    All other systems reviewed and are negative.     Past Medical History:   Past Medical History   Diagnosis Date     Cystic fibrosis (H)      Depression      Tear of right acetabular labrum        Past Surgical History   Procedure Laterality Date     Breast surgery       Esophagoscopy, gastroscopy, duodenoscopy (egd), combined  1/10/2014     Procedure: COMBINED ESOPHAGOSCOPY, GASTROSCOPY, DUODENOSCOPY (EGD);  UPPER ENDOSCOPY with Biopsy;  Surgeon: Levi Hinkle MD;  Location: [facility]     Gyn surgery  2008     ablation     Orthopedic surgery  1994     left knee  arthroscopy     Orthopedic surgery  1994     right shoulder     Orthopedic surgery  2014     left hip replacement     Endoscopy upper, colonoscopy, combined N/A 11/18/2016     Procedure: COMBINED ENDOSCOPY UPPER, COLONOSCOPY;  Surgeon: Levi Hinkle MD;  Location: [facility]       Social History     Social History     Marital status:      Spouse name: N/A     Number of children: N/A     Years of education: N/A     Occupational History     Not on file.     Social History Main Topics     Smoking status: Never Smoker     Smokeless tobacco: Never Used     Alcohol use Yes      Comment: rare     Drug use: No     Sexual activity: Not on file     Other Topics Concern     Not on file     Social History Narrative       Patient's Medications   New Prescriptions    No medications on file   Previous Medications    ACETAMINOPHEN-CODEINE (TYLENOL #3) 300-30 MG PER TABLET    TAKE 1 TABLET BY MOUTH EVERY 4 HOURS AS NEEDED FOR MILD PAIN, TAKE FOR COUGHING PAIN.    AMBIEN 10 MG TABLET    TAKE 1 TABLET BY MOUTH NIGHTLY AS NEEDED FOR SLEEP    BROVANA 15 MCG/2ML NEBU    USE 1 VIAL IN NEBULIZER 2 TIMES A DAY AS NEEDED    BUDESONIDE (PULMICORT) 1 MG/2ML SUSP NEBULIZER SOLUTION    Take 2 mLs (1 mg) by nebulization daily Take 1 mg by nebulization daily    CLOTRIMAZOLE (MYCELEX) 10 MG LOZG    PLACE 1 LOZENGE INSIDE CHEEK EVERY 6 HOURS AS NEEDED    DEXLANSOPRAZOLE (DEXILANT) 60 MG CPDR    Take 1 capsule by mouth daily as needed    DIAZEPAM (VALIUM PO)    Take 10 mg by mouth every 6 hours as needed for anxiety    DOXYCYCLINE (VIBRAMYCIN) 100 MG CAPSULE    Take 1 capsule (100 mg) by mouth 2 times daily for 10 days    ERYTHROMYCIN BASE PO    Take 500 mg by mouth 3 times daily (with meals)    FLUCONAZOLE (DIFLUCAN) 100 MG TABLET    TAKE 1 TABLET BY MOUTH DAILY AS NEEDED    HYDROXYZINE (VISTARIL) 50 MG CAPSULE    Take 1-2 tabs  Every 6 hours as needed for itching.    LEVALBUTEROL (XOPENEX) 0.31 MG/3ML NEBULIZER SOLUTION    Take 3 mLs (0.31 mg) by  nebulization every 6 hours as needed for shortness of breath / dyspnea    NYSTATIN (MYCOSTATIN) 891471 UNIT/GM POWD    Apply 1 g topically 3 times daily as needed    ORDER FOR DME    Equipment being ordered: Pulse oximeter for wrist.    Re: Cystic fibrosis    ORDER FOR DME    Equipment being ordered:  Wrist pulse oximeter Dx Cystic fibrosis (H)    PREDNISONE (DELTASONE) 10 MG TABLET    Take 4 tablets daily for 5 days,  take 2 tablets daily for 3 days, take 1 tablet daily for 3 days, take half a tablet for 3 days.    PULMOZYME 1 MG/ML NEBULIZER SOLUTION    USE 1 VIAL IN NEBULIZER DAILY AS NEEDED    SUCRALFATE (CARAFATE) 1 GM TABLET    Take 1 tablet (1 g) by mouth 4 times daily as needed    WELLBUTRIN  MG 12 HR TABLET    Take  Wellbutrin 450mg (3 pills) a day   Modified Medications    No medications on file   Discontinued Medications    DICLOFENAC (VOLTAREN) 1 % GEL    Apply 1 gram four times daily to right medial ankle using enclosed dosing card.       Allergies: Sulfa drugs      Physical Exam   BP: 115/92  Pulse: 120  Heart Rate: 117  Temp: 99  F (37.2  C)  Resp: 26  SpO2: 93 %  Physical Exam   Constitutional: She is oriented to person, place, and time. She appears well-developed and well-nourished.  Non-toxic appearance. She does not have a sickly appearance. She appears ill. No distress.   HENT:   Head: Normocephalic and atraumatic.   Right Ear: External ear normal.   Left Ear: External ear normal.   Nose: Nose normal.   Mouth/Throat: Oropharynx is clear and moist. No oropharyngeal exudate.   Eyes: Conjunctivae and EOM are normal. Pupils are equal, round, and reactive to light. Right eye exhibits no discharge. Left eye exhibits no discharge. No scleral icterus.   Neck: Normal range of motion. Neck supple.   Cardiovascular: Regular rhythm, normal heart sounds and intact distal pulses.  Tachycardia present.  Exam reveals no gallop and no friction rub.    No murmur heard.  Pulmonary/Chest: Effort normal.  Tachypnea noted. No respiratory distress. She has decreased breath sounds (Throughout. ). She has no wheezes. She has no rales. She exhibits no tenderness.   Abdominal: Soft. Bowel sounds are normal. There is no tenderness.   Musculoskeletal: She exhibits no edema.   Lymphadenopathy:     She has no cervical adenopathy.   Neurological: She is alert and oriented to person, place, and time. No cranial nerve deficit. Coordination normal.   Skin: Skin is warm and dry. No rash noted. She is not diaphoretic. No erythema. No pallor.   Psychiatric: She has a normal mood and affect. Her behavior is normal. Judgment and thought content normal.   Nursing note and vitals reviewed.      ED Course     ED Course     Procedures          Labs Ordered and Resulted from Time of ED Arrival Up to the Time of Departure from the ED   COMPREHENSIVE METABOLIC PANEL - Abnormal; Notable for the following:        Result Value    Glucose 100 (*)     All other components within normal limits   CBC WITH PLATELETS DIFFERENTIAL - Abnormal; Notable for the following:     WBC 23.4 (*)     Absolute Neutrophil 20.3 (*)     Absolute Monocytes 1.5 (*)     All other components within normal limits   LACTIC ACID - Abnormal; Notable for the following:     Lactic Acid 3.6 (*)     All other components within normal limits   BLOOD GAS ARTERIAL AND OXYHGB - Abnormal; Notable for the following:     pCO2 Arterial 34 (*)     pO2 Arterial 62 (*)     All other components within normal limits   PERIPHERAL IV CATHETER   PULSE OXIMETRY NURSING   CARDIAC CONTINUOUS MONITORING   VITAL SIGNS   PULSE OXIMETRY NURSING   PERIPHERAL IV CATHETER   BLOOD CULTURE   BLOOD CULTURE   SPUTUM CULTURE AEROBIC BACTERIAL   GRAM STAIN       Assessments & Plan (with Medical Decision Making)   Annie presents with tachypnea and hypoxic at  86%RA. She was placed on 3 liters of nasal O2 which brought her SpO2 up to 93%. She is no longer tachypnic. CXR is unchanged, no obvious infiltrate.  ABG's shows PH o 7.45, pO2 low at 62.  Azithromycin 500mg and Rocephin 2g IV was started following blood cultures x 2 and sputum culture. Solumedrol 125mg IV also given as she did not take today's dose of Prednisone. She was kindly accepted for admission by Dr. Pat to the med/surg floor.     I have reviewed the nursing notes.    I have reviewed the findings, diagnosis, plan and need for follow up with the patient.    New Prescriptions    No medications on file       Final diagnoses:   Cystic fibrosis with pulmonary exacerbation (H)   Acute respiratory failure with hypoxia (H)       3/3/2017   [facility]     Sirena Montes PA-C  03/03/17 1209

## 2017-03-03 NOTE — ED NOTES
"Pt arrives via [facility]. Pt states that she has been sick for the last 2 weeks with cough, intermittent fevers and shortness of breath. Pt does have hx of cystic fibrosis. Pt states that she monitors O2 at home and normally sats are 94%, but for the last 2 weeks they have been \"between 80-89%. Pt started with productive cough this past week. Fevers at home. Pt is noted be short of breath and tachypneic. Pt into gown, monitors placed and assessment as charted.   "

## 2017-03-03 NOTE — IP AVS SNAPSHOT
[facility]    [address] [place] [address]    Phone:  [phone]    Fax:  [phone]                                       After Visit Summary   3/3/2017    Annie Garcia    MRN: [id number]           After Visit Summary Signature Page     I have received my discharge instructions, and my questions have been answered. I have discussed any challenges I see with this plan with the nurse or doctor.    ..........................................................................................................................................  Patient/Patient Representative Signature      ..........................................................................................................................................  Patient Representative Print Name and Relationship to Patient    ..................................................               ................................................  Date                                            Time    ..........................................................................................................................................  Reviewed by Signature/Title    ...................................................              ..............................................  Date                                                            Time

## 2017-03-03 NOTE — PLAN OF CARE
Problem: Patient Goal: Rt Focus  Goal: 1. Patient Goal: RT Focus  [facility] - Respiratory Clinical Assessment     Current Patient History:    Respiratory History: CF    Smoking History: none    Oxygen dependency: No,    Oxygen prescribed: N/A     3/3/2017 5:02 PM Patient Initial Assessment:     Level of Consciousness: alert , cooperative    Skin color: flushed    Lung sounds:coarse           Respirations:  Normal with easy respirations and no use of accessory muscles to breathe    Respiratory symptoms: non-productive cough    Cough/Sputum:  dry    Current oxygenation status: 93% on 4L n/c

## 2017-03-03 NOTE — IP AVS SNAPSHOT
MRN:[id number]                      After Visit Summary   3/3/2017    Annie Garcia    MRN: [id number]           Thank you!     Thank you for choosing [facility] for your care. Our goal is always to provide you with excellent care. Hearing back from our patients is one way we can continue to improve our services. Please take a few minutes to complete the written survey that you may receive in the mail after you visit with us. Thank you!        Patient Information     Date Of Birth          1970        About your hospital stay     You were admitted on:  March 3, 2017 You last received care in the:  [facility]    You were discharged on:  March 9, 2017       Who to Call     For medical emergencies, please call 911.  For non-urgent questions about your medical care, please call your primary care provider or clinic, [phone]          Attending Provider     Provider Specialty    Sirena Montes PA-C Emergency Medicine    Danita Pat MD Internal Medicine    Houston Mitchell MD Internal Medicine    Keila Junior MD Internal Medicine    Jomar Golden MD Internal Medicine    Berlin Jefferson MD Internal Medicine       Primary Care Provider Office Phone # Fax #    Todd NG DAVIN Torres [phone]       [facility] [place] [address]  [place] [address]        After Care Instructions     Activity       Your activity upon discharge: activity as tolerated            Diet       Follow this diet upon discharge: Orders Placed This Encounter      Regular Diet Adult                  Follow-up Appointments     Follow-up and recommended labs and tests        Follow up with Dr. Herman [facility] within 2 weeks  for hospital follow- up. No follow up labs or test are needed.                  Further instructions from your care team       What to expect when you have contrast    During your exam, we will inject  contrast  into your vein or artery.  (Contrast is a clear liquid with iodine in it. It shows up on X-rays.)    You may feel warm or hot. You may have a metal taste in your mouth and a slight upset stomach. You may also feel pressure near the kidneys and bladder. These effects will last about 1 to 3 minutes.    Please tell us if you have:    Sneezing     Itching    Hives     Swelling in the face    A hoarse voice    Breathing problems    Other new symptoms    Serious problems are rare.  They may include:    Irregular heartbeat     Seizures    Kidney failure              Tissue damage    Shock      Death    If you have any problems during the exam, we  will treat them right away.    When you get home    Call your hospital if you have any new symptoms in the next 2 days, like hives or swelling. (Phone numbers are at the bottom of this page.) Or call your family doctor.     If you have wheezing or trouble breathing, call 911.    Self-care  -Drink at least 4 extra glasses of water today.   This reduces the stress on your kidneys.  -Keep taking your regular medicines.    The contrast will pass out of your body in your  Urine(pee). This will happen in the next 24 hours. You  will not feel this. Your urine will not  change color.    If you have kidney problems or take metformin    Drink 4 to 8 large glasses of water for the next  2 days, if you are not on a fluid restriction.    ?If you take metformin (Glucophage or Glucovance) for diabetes, keep taking it.      ?Your kidney function tests are abnormal.  If you take Metformin, do not take it for 48 hours. Please go to your clinic for a blood test within 3 days after your exam before the restarting this medicine.     (Note to provider:please give patient prescription for lab tests.)    ?Special instructions: ***    I have read and understand the above information.    Patient Sign Here:______________________________________Date:________Time:______    Staff Sign  "Here:________________________________________Date:_______Time:______      Radiology Departments:     ?[facility]: [phone] ?[facility]: [phone]     ?[place]: [phone] ?[facility]:[phone]      ?[facility]: [phone]  ?[facility]: [phone]  ?[facility]:[phone]    ?[facility]:[phone]  ?[facility]:[phone]    You will need to schedule a follow up appointment to see Dr. Herman within 2 weeks of your discharge from the hospital. Please call [phone] to schedule your appointment.      Pending Results     No orders found from 3/1/2017 to 3/4/2017.            Statement of Approval     Ordered          03/09/17 1415  I have reviewed and agree with all the recommendations and orders detailed in this document.  EFFECTIVE NOW     Approved and electronically signed by:  Berlin Jefferson MD             Admission Information     Date & Time Provider Department Dept. Phone    3/3/2017 Berlin Jefferson MD [facility] [phone]      Your Vitals Were     Blood Pressure Pulse Temperature Respirations Height Weight    149/75 100 98.4  F (36.9  C) (Tympanic) 16 1.676 m (5' 6\") 105.9 kg (233 lb 7.5 oz)    Pulse Oximetry BMI (Body Mass Index)                94% 37.68 kg/m2          Care EveryWhere ID     This is your Care EveryWhere ID. This could be used by other organizations to access your [facility] medical records  [id number]           Review of your medicines      START taking        Dose / Directions    cefUROXime 250 MG tablet   Commonly known as:  CEFTIN   Indication:  Community Acquired Pneumonia   Used for:  Pneumonia of both lungs due to Haemophilus influenzae, unspecified part of lung (H)        Dose:  250 mg   Take 1 tablet (250 mg) by mouth 2 times daily (with meals) for 8 days   Quantity:  16 tablet   Refills:  0         CONTINUE these medicines which may have CHANGED, or have new prescriptions. If we are uncertain of the size of tablets/capsules you have at home, " strength may be listed as something that might have changed.        Dose / Directions    acetaminophen-codeine 300-30 MG per tablet   Commonly known as:  TYLENOL #3   This may have changed:  See the new instructions.   Used for:  Hip pain, right        TAKE 1 TABLET BY MOUTH EVERY 4 HOURS AS NEEDED FOR MILD PAIN, TAKE FOR COUGHING PAIN.   Quantity:  90 tablet   Refills:  0       * order for DME   This may have changed:  Another medication with the same name was added. Make sure you understand how and when to take each.   Used for:  Cystic fibrosis (H)        Equipment being ordered: Pulse oximeter for wrist.   Re: Cystic fibrosis   Quantity:  1 unit marking on an U-100 insulin syringe   Refills:  0       * order for DME   This may have changed:  Another medication with the same name was added. Make sure you understand how and when to take each.   Used for:  Cystic fibrosis (H)        Equipment being ordered:  Wrist pulse oximeter Dx Cystic fibrosis (H)   Quantity:  1 Units   Refills:  0       * order for DME   This may have changed:  You were already taking a medication with the same name, and this prescription was added. Make sure you understand how and when to take each.   Used for:  Cystic fibrosis with pulmonary exacerbation (H)        Equipment being ordered: Hand held  percussion massager-with flat head like car buffer-not with round knobs.   Quantity:  1 Units   Refills:  0       predniSONE 10 MG tablet   Commonly known as:  DELTASONE   This may have changed:  additional instructions   Used for:  Cystic fibrosis with pulmonary exacerbation (H)        Take 40 mg daily for 2 days then 30 mg daily for 3 days then 20 mg daily for 3 days then 10 mg daily for 3 days then stop   Quantity:  26 tablet   Refills:  0       * Notice:  This list has 3 medication(s) that are the same as other medications prescribed for you. Read the directions carefully, and ask your doctor or other care provider to review them with you.       CONTINUE these medicines which have NOT CHANGED        Dose / Directions    AMBIEN 10 MG tablet   Used for:  Primary insomnia   Generic drug:  zolpidem        TAKE 1 TABLET BY MOUTH NIGHTLY AS NEEDED FOR SLEEP   Quantity:  30 tablet   Refills:  5       BROVANA 15 MCG/2ML Nebu neb solution   Used for:  Chronic bronchitis, unspecified chronic bronchitis type (H)   Generic drug:  arformoterol        USE 1 VIAL IN NEBULIZER 2 TIMES A DAY AS NEEDED   Quantity:  120 mL   Refills:  0       budesonide 1 MG/2ML Susp neb solution   Commonly known as:  PULMICORT   Used for:  Bronchitis        Dose:  1 mg   Take 2 mLs (1 mg) by nebulization daily Take 1 mg by nebulization daily   Quantity:  60 ampule   Refills:  1       DEXILANT 60 MG Cpdr CR capsule   Generic drug:  dexlansoprazole        Dose:  1 capsule   Take 1 capsule by mouth daily as needed   Refills:  0       fluconazole 100 MG tablet   Commonly known as:  DIFLUCAN   Used for:  Rash and other nonspecific skin eruption        TAKE 1 TABLET BY MOUTH DAILY AS NEEDED   Quantity:  30 tablet   Refills:  0       hydrOXYzine 50 MG capsule   Commonly known as:  VISTARIL   Used for:  Itching        Take 1-2 tabs  Every 6 hours as needed for itching.   Quantity:  120 capsule   Refills:  0       levalbuterol 0.31 MG/3ML neb solution   Commonly known as:  XOPENEX        Dose:  1 ampule   Take 3 mLs (0.31 mg) by nebulization every 6 hours as needed for shortness of breath / dyspnea   Quantity:  225 mL   Refills:  0       nystatin 584761 UNIT/GM Powd   Commonly known as:  MYCOSTATIN   Used for:  Rash        Dose:  1 g   Apply 1 g topically 3 times daily as needed   Quantity:  60 g   Refills:  1       PULMOZYME 1 MG/ML neb solution   Used for:  Chronic bronchitis, unspecified chronic bronchitis type (H)   Generic drug:  dornase alpha        USE 1 VIAL IN NEBULIZER DAILY AS NEEDED   Quantity:  75 mL   Refills:  0       sucralfate 1 GM tablet   Commonly known as:  CARAFATE   Used  for:  Esophageal reflux        Dose:  1 g   Take 1 tablet (1 g) by mouth 4 times daily as needed   Quantity:  120 tablet   Refills:  0       VALIUM PO        Dose:  10 mg   Take 10 mg by mouth every 6 hours as needed for anxiety   Refills:  0       WELLBUTRIN  MG 12 hr tablet   Used for:  Dysthymia   Generic drug:  buPROPion        Take  Wellbutrin 450mg (3 pills) a day   Quantity:  90 tablet   Refills:  3         STOP taking     doxycycline 100 MG capsule   Commonly known as:  VIBRAMYCIN           ERYTHROMYCIN BASE PO                Where to get your medicines      These medications were sent to [facility] - [address]  [address] [place] [address]     Phone:  [phone]     cefUROXime 250 MG tablet    fluconazole 100 MG tablet    predniSONE 10 MG tablet         Some of these will need a paper prescription and others can be bought over the counter. Ask your nurse if you have questions.     Bring a paper prescription for each of these medications     acetaminophen-codeine 300-30 MG per tablet    order for DME                Protect others around you: Learn how to safely use, store and throw away your medicines at www.disposemymeds.org.             Medication List: This is a list of all your medications and when to take them. Check marks below indicate your daily home schedule. Keep this list as a reference.      Medications           Morning Afternoon Evening Bedtime As Needed    acetaminophen-codeine 300-30 MG per tablet   Commonly known as:  TYLENOL #3   TAKE 1 TABLET BY MOUTH EVERY 4 HOURS AS NEEDED FOR MILD PAIN, TAKE FOR COUGHING PAIN.   Last time this was given:  1 tablet on 3/9/2017  4:38 PM                                AMBIEN 10 MG tablet   TAKE 1 TABLET BY MOUTH NIGHTLY AS NEEDED FOR SLEEP   Last time this was given:  10 mg on 3/8/2017 10:26 PM   Generic drug:  zolpidem                                BROVANA 15 MCG/2ML Nebu neb solution   USE 1 VIAL IN  NEBULIZER 2 TIMES A DAY AS NEEDED   Generic drug:  arformoterol                                budesonide 1 MG/2ML Susp neb solution   Commonly known as:  PULMICORT   Take 2 mLs (1 mg) by nebulization daily Take 1 mg by nebulization daily                                cefUROXime 250 MG tablet   Commonly known as:  CEFTIN   Take 1 tablet (250 mg) by mouth 2 times daily (with meals) for 8 days   Last time this was given:  250 mg on 3/9/2017  4:38 PM                                DEXILANT 60 MG Cpdr CR capsule   Take 1 capsule by mouth daily as needed   Generic drug:  dexlansoprazole                                fluconazole 100 MG tablet   Commonly known as:  DIFLUCAN   TAKE 1 TABLET BY MOUTH DAILY AS NEEDED   Last time this was given:  100 mg on 3/9/2017  8:40 AM                                hydrOXYzine 50 MG capsule   Commonly known as:  VISTARIL   Take 1-2 tabs  Every 6 hours as needed for itching.                                levalbuterol 0.31 MG/3ML neb solution   Commonly known as:  XOPENEX   Take 3 mLs (0.31 mg) by nebulization every 6 hours as needed for shortness of breath / dyspnea                                nystatin [id number] UNIT/GM Powd   Commonly known as:  MYCOSTATIN   Apply 1 g topically 3 times daily as needed                                * order for DME   Equipment being ordered: Pulse oximeter for wrist.   Re: Cystic fibrosis                                * order for DME   Equipment being ordered:  Wrist pulse oximeter Dx Cystic fibrosis (H)                                * order for DME   Equipment being ordered: Hand held  percussion massager-with flat head like car buffer-not with round knobs.                                predniSONE 10 MG tablet   Commonly known as:  DELTASONE   Take 40 mg daily for 2 days then 30 mg daily for 3 days then 20 mg daily for 3 days then 10 mg daily for 3 days then stop   Last time this was given:  40 mg on 3/9/2017 12:15 PM                                 PULMOZYME 1 MG/ML neb solution   USE 1 VIAL IN NEBULIZER DAILY AS NEEDED   Generic drug:  dornase alpha                                sucralfate 1 GM tablet   Commonly known as:  CARAFATE   Take 1 tablet (1 g) by mouth 4 times daily as needed   Last time this was given:  1 g on 3/9/2017  4:41 PM                                VALIUM PO   Take 10 mg by mouth every 6 hours as needed for anxiety                                WELLBUTRIN  MG 12 hr tablet   Take  Wellbutrin 450mg (3 pills) a day   Last time this was given:  450 mg on 3/9/2017  8:40 AM   Generic drug:  buPROPion                                * Notice:  This list has 3 medication(s) that are the same as other medications prescribed for you. Read the directions carefully, and ask your doctor or other care provider to review them with you.              More Information        Discharge Instructions for Cystic Fibrosis  Your child has been hospitalized with cystic fibrosis. It is a hereditary disease that affects the lungs. Cystic fibrosis is an inherited long-term lung condition. It affects the body in different ways. It affects the lungs and digestive system most often. Researchers have found a defective gene that causes your body to make sticky mucus. The mucus clogs the lungs and blocks the release of enzymes from the pancreas. This may cause serious lung infections and problems with digesting and absorbing food.  There is no cure for cystic fibrosis. But there are treatments that can help your child have fewer lung infections and digestive problems and improve his or her life overall.  Preventing infection    Help keep your child s lungs clear of extra mucus. Learn how to do chest physical therapy, including postural drainage and percussion on your child to help with this. Ask your child's healthcare provider for instructions.    Remind your child to wash his or her hands often, and correctly.    He or she should use soap and water and a lot of  rubbing.    Make sure you have alcohol-based hand  when soap and water aren't available.    Teach your child to keep his or her hands away from the face. Germs often get into the nose and mouth and then into the lungs this way.    Ask your child's healthcare provider about a yearly flu shot and other vaccinations.    Avoid crowds, especially in the winter, when more people have colds and the flu.  Aiding digestion    Learn about the special dietary needs of your child. Your child may need pancreatic enzymes to help with digestion.    If prescribed, make sure your child takes pancreatic enzymes exactly as instructed.    A nutritionist or dietitian can help you and your child. Talk with your child's healthcare provider about a referral.    Some children have problems growing and gaining weight. Talk with your child's healthcare provider about nutritional supplements.  Other home care    Encourage your child to exercise and be physically active.    Your child should see his or her healthcare provider at least every 3 months.    Make sure you talk with your child about the dangers of smoking. He or she should not smoke and should stay away from others who do.  Follow-up  Make all follow-up appointments as soon as possible after leaving the hospital. Contact your child's healthcare provider sooner, if you have any questions or concerns.  Ask about a Cystic Fibrosis Center nearby. These centers specialize in the care of children and adults with cystic fibrosis. You can check the Cystic Fibrosis Foundation website: http://www.cff.org. Or call 800-FIGHT CF (749-7279).   When to call your child's healthcare provider  Call the healthcare provider right away if your child has any of the following:    Severe constipation    Severe diarrhea    Abdominal pain    Vomiting    Decreased appetite    More mucus than usual, or mucus that is bloody or dark in color    Difficulty taking part in daily activities    More tired than  usual    Fever    Worsening shortness of breath  Since each child is different, make sure you understand when to call the healthcare provider about your child's specific symptoms.     3140-4171 [employer]. [address] [place] [address], [place] [address]. All rights reserved. This information is not intended as a substitute for professional medical care. Always follow your healthcare professional's instructions.                Patient Education    Cefuroxime Axetil Oral suspension    Cefuroxime Axetil Oral tablet    Cefuroxime Sodium Solution for injection  Cefuroxime Axetil Oral tablet  What is this medicine?  CEFUROXIME (se fyoor OX eem) is a cephalosporin antibiotic. It is used to treat certain kinds of bacterial infections. It will not work for colds, flu, or other viral infections.  This medicine may be used for other purposes; ask your health care provider or pharmacist if you have questions.  What should I tell my health care provider before I take this medicine?  They need to know if you have any of these conditions:    bleeding problems    bowel disease, like colitis    kidney disease    liver disease    an unusual or allergic reaction to cefuroxime, other antibiotics or medicines, foods, dyes or preservatives    pregnant or trying to get pregnant    breast-feeding  How should I use this medicine?  Take this medicine by mouth with a full glass of water. Follow the directions on the prescription label. Do not crush or chew. This medicine works best if you take it with food. Take your medicine at regular intervals. Do not take your medicine more often than directed. Take all of your medicine as directed even if you think your are better. Do not skip doses or stop your medicine early.  Talk to your pediatrician regarding the use of this medicine in children. Special care may be needed. While this drug may be prescribed for children as young as 3 months of age for selected conditions, precautions do  apply.  Overdosage: If you think you have taken too much of this medicine contact a poison control center or emergency room at once.  NOTE: This medicine is only for you. Do not share this medicine with others.  What if I miss a dose?  If you miss a dose, take it as soon as you can. If it is almost time for your next dose, take only that dose. Do not take double or extra doses.  What may interact with this medicine?  This medicine may interact with the following medications:    antacids    birth control pills    certain medicines for infection like amikacin, gentamicin, tobramycin    diuretics    probenecid    warfarin  This list may not describe all possible interactions. Give your health care provider a list of all the medicines, herbs, non-prescription drugs, or dietary supplements you use. Also tell them if you smoke, drink alcohol, or use illegal drugs. Some items may interact with your medicine.  What should I watch for while using this medicine?  Tell your doctor or health care professional if your symptoms do not improve or if you get new symptoms.  Do not treat diarrhea with over the counter products. Contact your doctor if you have diarrhea that lasts more than 2 days or if it is severe and watery.  This medicine can interfere with some urine glucose tests. If you use such tests, talk with your health care professional.  If you are being treated for a sexually transmitted disease, avoid sexual contact until you have finished your treatment. Your sexual partner may also need treatment.  What side effects may I notice from receiving this medicine?  Side effects that you should report to your doctor or health care professional as soon as possible:    allergic reactions like skin rash, itching or hives, swelling of the face, lips, or tongue    dark urine    difficulty breathing    fever    irregular heartbeat or chest pain    redness, blistering, peeling or loosening of the skin, including inside the  mouth    seizures    unusual bleeding or bruising    unusually weak or tired    white patches or sores in the mouth  Side effects that usually do not require medical attention (report to your doctor or health care professional if they continue or are bothersome):    diarrhea    gas or heartburn    headache    nausea, vomiting    vaginal itching  This list may not describe all possible side effects. Call your doctor for medical advice about side effects. You may report side effects to FDA at 9-762-QOZ-8477.  Where should I keep my medicine?  Keep out of the reach of children.  Store at room temperature between 15 and 30 degrees C (59 and 86 degrees F). Keep container tightly closed. Protect from moisture. Throw away any unused medicine after the expiration date.  NOTE:This sheet is a summary. It may not cover all possible information. If you have questions about this medicine, talk to your doctor, pharmacist, or health care provider. Copyright  2016 [employer]

## 2017-03-03 NOTE — PLAN OF CARE
[facility] Inpatient Admission Note:    Patient admitted to 3116 /3116-1 at approximately 1530 via cart accompanied by transport tech from emergency room . Report received from Teresa RN in SBAR format at 1510 via telephone. Patient ambulated to bed via self.. Patient is alert and oriented X 3, reports pain; rates at 4 on 0-10 scale.  Patient oriented to room, unit, hourly rounding, and plan of care. Explained admission packet and patient handbook with patient bill of rights brochure. Will continue to monitor and document as needed.     Inpatient Nursing criteria listed below was met:      Health care directives status obtained and documented: Yes      Care Everywhere authorization obtained No      MRSA swab completed for patient 65 years and older: N/A      Patient identifies a surrogate decision maker: Yes If yes, who: Giovani Garcia. Refused to give me phone # but states to call him on hospital pager Contact Information:      Core Measure diagnosis present:No. If yes, state diagnosis: na       If initial lactic acid >2.0, repeat lactic acid drawn within one hour of arrival to unit: Yes. If no, state reason:       Vaccination assessment and education completed: Yes   Vaccinations received prior to admission: Pneumovax yes  Influenza(seasonal)  YES   Vaccination(s) ordered: pt up to date      Clergy visit ordered if patient requests: N/A      Skin issues/needs documented: Yes      Isolation Patient: no Education given, correct sign in place and documentation row added to PCS:  na      Fall Prevention No: Care plan updated, education given and documented, sticker and magnet in place: N/A      Care Plan initiated: Yes      Education Documented (including assessment): Yes      Patient has discharge needs : No If yes, please explain:

## 2017-03-04 PROBLEM — J18.9 PNEUMONIA DUE TO INFECTIOUS ORGANISM, UNSPECIFIED LATERALITY, UNSPECIFIED PART OF LUNG: Status: ACTIVE | Noted: 2017-03-04

## 2017-03-04 LAB
ANION GAP SERPL CALCULATED.3IONS-SCNC: 8 MMOL/L (ref 3–14)
BUN SERPL-MCNC: 16 MG/DL (ref 7–30)
CALCIUM SERPL-MCNC: 9.1 MG/DL (ref 8.5–10.1)
CHLORIDE SERPL-SCNC: 108 MMOL/L (ref 94–109)
CO2 SERPL-SCNC: 25 MMOL/L (ref 20–32)
CREAT SERPL-MCNC: 0.76 MG/DL (ref 0.52–1.04)
ERYTHROCYTE [DISTWIDTH] IN BLOOD BY AUTOMATED COUNT: 13.5 % (ref 10–15)
GFR SERPL CREATININE-BSD FRML MDRD: 81 ML/MIN/1.7M2
GLUCOSE SERPL-MCNC: 153 MG/DL (ref 70–99)
HCT VFR BLD AUTO: 38.9 % (ref 35–47)
HGB BLD-MCNC: 13 G/DL (ref 11.7–15.7)
MCH RBC QN AUTO: 27.4 PG (ref 26.5–33)
MCHC RBC AUTO-ENTMCNC: 33.4 G/DL (ref 31.5–36.5)
MCV RBC AUTO: 82 FL (ref 78–100)
PLATELET # BLD AUTO: 326 10E9/L (ref 150–450)
POTASSIUM SERPL-SCNC: 4.3 MMOL/L (ref 3.4–5.3)
RBC # BLD AUTO: 4.75 10E12/L (ref 3.8–5.2)
SODIUM SERPL-SCNC: 141 MMOL/L (ref 133–144)
WBC # BLD AUTO: 21.9 10E9/L (ref 4–11)

## 2017-03-04 PROCEDURE — 12000000 ZZH R&B MED SURG/OB

## 2017-03-04 PROCEDURE — 25000128 H RX IP 250 OP 636: Performed by: INTERNAL MEDICINE

## 2017-03-04 PROCEDURE — 25000125 ZZHC RX 250: Performed by: INTERNAL MEDICINE

## 2017-03-04 PROCEDURE — G0378 HOSPITAL OBSERVATION PER HR: HCPCS

## 2017-03-04 PROCEDURE — 25000132 ZZH RX MED GY IP 250 OP 250 PS 637: Performed by: INTERNAL MEDICINE

## 2017-03-04 PROCEDURE — 94640 AIRWAY INHALATION TREATMENT: CPT | Mod: 76

## 2017-03-04 PROCEDURE — 80048 BASIC METABOLIC PNL TOTAL CA: CPT | Performed by: INTERNAL MEDICINE

## 2017-03-04 PROCEDURE — 96375 TX/PRO/DX INJ NEW DRUG ADDON: CPT

## 2017-03-04 PROCEDURE — 94668 MNPJ CHEST WALL SBSQ: CPT

## 2017-03-04 PROCEDURE — 94669 MECHANICAL CHEST WALL OSCILL: CPT

## 2017-03-04 PROCEDURE — 94640 AIRWAY INHALATION TREATMENT: CPT

## 2017-03-04 PROCEDURE — 99233 SBSQ HOSP IP/OBS HIGH 50: CPT | Performed by: INTERNAL MEDICINE

## 2017-03-04 PROCEDURE — 25000125 ZZHC RX 250

## 2017-03-04 PROCEDURE — 40000275 ZZH STATISTIC RCP TIME EA 10 MIN

## 2017-03-04 PROCEDURE — 36415 COLL VENOUS BLD VENIPUNCTURE: CPT | Performed by: INTERNAL MEDICINE

## 2017-03-04 PROCEDURE — 96376 TX/PRO/DX INJ SAME DRUG ADON: CPT

## 2017-03-04 PROCEDURE — 94667 MNPJ CHEST WALL 1ST: CPT

## 2017-03-04 PROCEDURE — 85027 COMPLETE CBC AUTOMATED: CPT | Performed by: INTERNAL MEDICINE

## 2017-03-04 RX ORDER — BUDESONIDE 0.5 MG/2ML
INHALANT ORAL
Status: COMPLETED
Start: 2017-03-04 | End: 2017-03-04

## 2017-03-04 RX ORDER — NAPROXEN 250 MG/1
250 TABLET ORAL 2 TIMES DAILY WITH MEALS
Status: DISCONTINUED | OUTPATIENT
Start: 2017-03-04 | End: 2017-03-09 | Stop reason: HOSPADM

## 2017-03-04 RX ORDER — PREDNISONE 20 MG/1
40 TABLET ORAL DAILY
Status: DISCONTINUED | OUTPATIENT
Start: 2017-03-04 | End: 2017-03-06

## 2017-03-04 RX ORDER — BUDESONIDE 0.5 MG/2ML
INHALANT ORAL
Status: DISPENSED
Start: 2017-03-04 | End: 2017-03-04

## 2017-03-04 RX ADMIN — IPRATROPIUM BROMIDE AND ALBUTEROL SULFATE 3 ML: .5; 3 SOLUTION RESPIRATORY (INHALATION) at 07:53

## 2017-03-04 RX ADMIN — PANTOPRAZOLE SODIUM 40 MG: 40 TABLET, DELAYED RELEASE ORAL at 06:51

## 2017-03-04 RX ADMIN — METHYLPREDNISOLONE SODIUM SUCCINATE 125 MG: 125 INJECTION, POWDER, FOR SOLUTION INTRAMUSCULAR; INTRAVENOUS at 08:20

## 2017-03-04 RX ADMIN — SUCRALFATE 1 G: 1 TABLET ORAL at 16:58

## 2017-03-04 RX ADMIN — IPRATROPIUM BROMIDE AND ALBUTEROL SULFATE 3 ML: .5; 3 SOLUTION RESPIRATORY (INHALATION) at 11:15

## 2017-03-04 RX ADMIN — ACETAMINOPHEN AND CODEINE PHOSPHATE 2 TABLET: 300; 30 TABLET ORAL at 17:04

## 2017-03-04 RX ADMIN — ACETAMINOPHEN AND CODEINE PHOSPHATE 2 TABLET: 300; 30 TABLET ORAL at 12:34

## 2017-03-04 RX ADMIN — BUDESONIDE 1 MG: 0.5 INHALANT RESPIRATORY (INHALATION) at 07:59

## 2017-03-04 RX ADMIN — NAPROXEN 250 MG: 250 TABLET ORAL at 13:57

## 2017-03-04 RX ADMIN — SUCRALFATE 1 G: 1 TABLET ORAL at 11:41

## 2017-03-04 RX ADMIN — ZOLPIDEM TARTRATE 10 MG: 10 TABLET, FILM COATED ORAL at 20:51

## 2017-03-04 RX ADMIN — METHYLPREDNISOLONE SODIUM SUCCINATE 125 MG: 125 INJECTION, POWDER, FOR SOLUTION INTRAMUSCULAR; INTRAVENOUS at 13:56

## 2017-03-04 RX ADMIN — AZITHROMYCIN MONOHYDRATE 500 MG: 500 INJECTION, POWDER, LYOPHILIZED, FOR SOLUTION INTRAVENOUS at 15:06

## 2017-03-04 RX ADMIN — ACETAMINOPHEN AND CODEINE PHOSPHATE 2 TABLET: 300; 30 TABLET ORAL at 08:24

## 2017-03-04 RX ADMIN — IPRATROPIUM BROMIDE AND ALBUTEROL SULFATE 3 ML: .5; 3 SOLUTION RESPIRATORY (INHALATION) at 17:12

## 2017-03-04 RX ADMIN — CEFTRIAXONE SODIUM 1 G: 1 INJECTION, SOLUTION INTRAVENOUS at 14:40

## 2017-03-04 RX ADMIN — METHYLPREDNISOLONE SODIUM SUCCINATE 125 MG: 125 INJECTION, POWDER, FOR SOLUTION INTRAMUSCULAR; INTRAVENOUS at 02:11

## 2017-03-04 RX ADMIN — PREDNISONE 40 MG: 20 TABLET ORAL at 20:51

## 2017-03-04 RX ADMIN — BUPROPION HYDROCHLORIDE 450 MG: 150 TABLET, FILM COATED, EXTENDED RELEASE ORAL at 08:23

## 2017-03-04 RX ADMIN — SUCRALFATE 1 G: 1 TABLET ORAL at 06:50

## 2017-03-04 ASSESSMENT — PAIN DESCRIPTION - DESCRIPTORS
DESCRIPTORS: ACHING
DESCRIPTORS: ACHING

## 2017-03-04 NOTE — PLAN OF CARE
Problem: Goal Outcome Summary  Goal: Goal Outcome Summary  Outcome: No Change  Mid shift assessment reveals VSS with slight tachycardia r/t cough. Medicated with 2 tylenol 3 for c/o throat pain. Up ind in room. Monitors own O2 prn.

## 2017-03-04 NOTE — PROGRESS NOTES
[facility]    Hospitalist Progress Note    Date of Service (when I saw the patient): 03/04/2017      Assessment & Plan   Annie Garcia is a 46 year old female who was admitted on 3/3/2017.       Acute broncitis- Rx as CAP with Rocephin and Azithromycin.     Cystic Fibrosis/ Bronchiectasis with exacerbation- Uses Tylenol #3. Continue. Nebs, O2, steroids.     Leukocytosis: True infection with PNA vs steroid use. Ct to monitor CBC    HI Px with PPI for gut protection and PUD- Continue carafate as well.     Insomnia- Continue Eryhtromycin.     Anxiety/depression- Wellbutrin.         DVT Prophylaxis: Enoxaprain (Lovenox) SQ  Code Status: Full Code     Disposition: Expected discharge in 1-2 days once off O2 and back to baseline.  Houston Mitchell      Interval History   Pt had a poor night with regard to sleep. This is her [facility] admission for CF related PNA. HAd fever at home and temp here are better. No hemoptysis but cough present.     -Data reviewed today: I reviewed all new labs and imaging results over the last 24 hours. I personally reviewed no images or EKG's today.    Physical Exam   Temp: 97.5  F (36.4  C) Temp src: Tympanic BP: 124/64 Pulse: 100 Heart Rate: 91 Resp: 18 SpO2: (!) 88 % O2 Device: Nasal cannula Oxygen Delivery: 4 LPM  Vitals:    03/03/17 2059   Weight: 102.2 kg (225 lb 5 oz)     Vital Signs with Ranges  Temp:  [97.5  F (36.4  C)-99.1  F (37.3  C)] 97.5  F (36.4  C)  Pulse:  [100-120] 100  Heart Rate:  [] 91  Resp:  [18-26] 18  BP: (113-144)/(54-92) 124/64  SpO2:  [84 %-94 %] 88 %  I/O last 3 completed shifts:  In: 480 [P.O.:480]  Out: -      Awake, alert, in minimal distress due to cough but lying in bed.  HEENT: Pupils equal, conjugate. No icterus. Oral mucosa moist. No facial asymmetry.   Neck: Supple, jugular veins not elevated. Trachea midline   Chest: No chest wall movement asymmetry. Aeration globally equal but crackles appreciated in both sides but minimal use of  accessory muscles.    Cardiac: RRR. S1, S2 unremarkable. No murmurs, rubs or gallops  Abdomen: Soft. No palpation or percussion tenderness. No distention. Normoactive bowel sounds. Liver and spleen not increased in size. No bruits, masses, or pulsations.   Extremities: No lower extremity edema. Extremities warm distally.  Brisk capillary refill. No eccymoses, clubbing.   Neurologic: Mental state above. Motor 5/5 and bilaterally equal. Tone preserved. Sensation intact to light touch.    Peripheral IV 03/03/17 Left Lower forearm (Active)   Site Assessment WDL 3/4/2017  8:39 AM   Line Status Infusing 3/4/2017  8:39 AM   Phlebitis Scale 0-->no symptoms 3/4/2017  8:39 AM   Infiltration Scale 0 3/4/2017  8:39 AM   Number of days:1       Medications        budesonide         budesonide  1 mg Nebulization Daily     pantoprazole  40 mg Oral QAM AC     cefTRIAXone  1 g Intravenous Q24H     azithromycin  500 mg Intravenous Q24H     methylPREDNISolone  125 mg Intravenous Q6H     sucralfate  1 g Oral 4x Daily AC & HS     buPROPion  450 mg Oral Daily       Data     Recent Labs  Lab 03/04/17  0530 03/03/17  1330 03/01/17  1044   WBC 21.9* 23.4* 16.8*   HGB 13.0 14.1 13.1   MCV 82 82 82    340 229    140 138   POTASSIUM 4.3 3.7 4.2   CHLORIDE 108 103 105   CO2 25 25 26   BUN 16 20 14   CR 0.76 0.97 0.99   ANIONGAP 8 12 7   NALDO 9.1 9.5 8.7   * 100* 87   ALBUMIN  --  3.6  --    PROTTOTAL  --  8.5  --    BILITOTAL  --  0.5  --    ALKPHOS  --  127  --    ALT  --  37  --    AST  --  19  --        Recent Results (from the past 24 hour(s))   XR Chest 2 Views    Narrative    CHEST TWO VIEWS    REPORT: There are some patchy areas of increased density seen  laterally in the right lung and in the left lung base.  This has  worsened as compared to March 1, 2017.  Heart is normal in size.    IMPRESSION: WORSENING BILATERAL PULMONARY PARENCHYMAL OPACITIES  SUSPICIOUS FOR PNEUMONIA.  Exam Date: Mar 03, 2017 02:24:00  PM  Author: BUCKY PHOENIX  This report is final and signed

## 2017-03-04 NOTE — PLAN OF CARE
Problem: Goal Outcome Summary  Goal: Goal Outcome Summary  Outcome: No Change  A&O. VSS, except HR tachy, afebrile. O2 sats  90-91% on 4LPM. Pt reports slight sob. LS with crackles, frequent cough noted with small green colored sputum production. C/o back and chest pain from cough, Tylenol 3 administered at shift change report with other offer declined by the Pt. IVF tko to prevent IV from clotting. Administered Solu-medrol this shift. WBC decreased from 23.4 to 21.9. Pt sleep most of the shift. Independent and steady on feet in room. No falls or injuries this shift.      Face to face report given with opportunity to observe patient.    Report given to ARNAV Maravilla   3/4/2017  7:52 AM

## 2017-03-04 NOTE — PLAN OF CARE
"Problem: Goal Outcome Summary  Goal: Goal Outcome Summary  Outcome: No Change  End of shift note. VSS. Still with c/o throat pain; med with 2 tylenol 3 with stated relief. Requests \"midol\" for \"hip nerve pain\" Orders received from MD for naproxen. Given as scheduled.   Face to face report given with opportunity to observe patient.     Report given to Mayra JOSÉ.     Taiwo Griffiths   3/4/2017  4:20 PM             "

## 2017-03-04 NOTE — PLAN OF CARE
Problem: Goal Outcome Summary  Goal: Goal Outcome Summary  Outcome: No Change  Pt A&O, Max temp 99.1, tachy at 100, O2 SATS 90% on 3 LPM.  Lungs dim, crackles with frequent , productive at times cough and takes Tylenol 3 with codiene for cough.  Received Solumedrol, IV antbtx. WBC 23.4.  Up independently to BR and stable on feet. Good appetite.  Call light in reach.     Face to face report given with opportunity to observe patient.    Report given to Fawn Hall   3/3/2017  11:11 PM

## 2017-03-04 NOTE — PLAN OF CARE
Problem: Patient Goal: Rt Focus  Goal: 1. Patient Goal: RT Focus  Outcome: No Change  Pt using 4-5 L oxygen.  Three nebs and three chest physio therapy done.    Pt with coarse cough with occasional green thick secretions.

## 2017-03-04 NOTE — PLAN OF CARE
Problem: Patient Goal: Rt Focus  Goal: 1. Patient Goal: RT Focus  No nebs given this shift. Pt states that she doesn't take any nebs after 6pm and stated she did not want to be woken up through the night.

## 2017-03-04 NOTE — H&P
"[facility]    History and Physical  Hospitalist       Date of Admission:  3/3/2017  Date of Service (when I saw the patient): 03/03/17    Assessment & Plan         Acute broncitis- Rx as CAP with Rocephin and Azithromycin.     CF with exacerbation- Uses Tylenol #3. Continue. Nebs, O2, steroids.     Uses PPI for gut protection and PUD- Continue carafate as well.     Dehydration- IVFs.     Insomnia- Continue Eryhtromycin.     Anxiety/depression- Wellbutrin.     Active Problems:    Acute bronchitis    DVT Prophylaxis: Enoxaprain (Lovenox) SQ  Code Status: Full Code    Disposition: Expected discharge in 1-2 days once off O2 and back to baseline.    Keila Nugenthar    Primary Care Physician   Todd Torres    Chief Complaint   SOB    History is obtained from the patient    History of Present Illness   Annie Garcia is a 46 year old female who presents with SOB, cough, phlegm, fever and chills. She has a history of \"atypical CF\" and sees a pulmonlogist, last seen in December 2016 and was told she's doing fine. She has lung scarring and her CXR is always abnormal. She's on chronic steroids at baseline, but not on O2. Ambulatory but doesn't do stairs. She has been told that she is NOT colonized with Pseudomonas. She is on Erythromycin for ABx prophylaxis (sulfa allergy and can't take Levaquin due to allergy/intolerance).     She first started with symptoms about 3 days ago and reported to the ED and was discharged on 3/1 on Doxy and Prednisone. She never took the Doxy but did increase her home Prednisone from 10mg to 60mg daily which she does when she gets a flare of her CF. This hasn't helped her and she continues with her symptoms reporting to the ED. Her CXR is unchanged from Nov 2016. She has bronchiectasis from CF and her symptoms include a chronic cough with phlegm. Her phlegm has changed color from whitish to grayish.     Past Medical History    I have reviewed this patient's medical history and " updated it with pertinent information if needed.   Past Medical History   Diagnosis Date     Cystic fibrosis (H)      Depression      Tear of right acetabular labrum        Past Surgical History   I have reviewed this patient's surgical history and updated it with pertinent information if needed.  Past Surgical History   Procedure Laterality Date     Breast surgery       Esophagoscopy, gastroscopy, duodenoscopy (egd), combined  1/10/2014     Procedure: COMBINED ESOPHAGOSCOPY, GASTROSCOPY, DUODENOSCOPY (EGD);  UPPER ENDOSCOPY with Biopsy;  Surgeon: Levi Hinkle MD;  Location: [facility] OR     Gyn surgery  2008     ablation     Orthopedic surgery  1994     left knee arthroscopy     Orthopedic surgery  1994     right shoulder     Orthopedic surgery  2014     left hip replacement     Endoscopy upper, colonoscopy, combined N/A 11/18/2016     Procedure: COMBINED ENDOSCOPY UPPER, COLONOSCOPY;  Surgeon: Levi Hinkle MD;  Location: [facility] OR       Prior to Admission Medications   Prior to Admission Medications   Prescriptions Last Dose Informant Patient Reported? Taking?   AMBIEN 10 MG tablet 3/2/2017 at Unknown time  No Yes   Sig: TAKE 1 TABLET BY MOUTH NIGHTLY AS NEEDED FOR SLEEP   BROVANA 15 MCG/2ML NEBU 3/3/2017 at Unknown time  No Yes   Sig: USE 1 VIAL IN NEBULIZER 2 TIMES A DAY AS NEEDED   DiazePAM (VALIUM PO) More than a month at Unknown time  Yes No   Sig: Take 10 mg by mouth every 6 hours as needed for anxiety   ERYTHROMYCIN BASE PO Past Week at Unknown time  Yes Yes   Sig: Take 500 mg by mouth 3 times daily (with meals)   PULMOZYME 1 MG/ML nebulizer solution 3/3/2017 at Unknown time  No Yes   Sig: USE 1 VIAL IN NEBULIZER DAILY AS NEEDED   WELLBUTRIN  MG 12 hr tablet Past Week at Unknown time  No Yes   Sig: Take  Wellbutrin 450mg (3 pills) a day   acetaminophen-codeine (TYLENOL #3) 300-30 MG per tablet 3/3/2017 at 1215  No Yes   Sig: TAKE 1 TABLET BY MOUTH EVERY 4 HOURS AS NEEDED FOR MILD PAIN, TAKE FOR COUGHING PAIN.  "  budesonide (PULMICORT) 1 MG/2ML SUSP nebulizer solution 3/3/2017 at Unknown time  No Yes   Sig: Take 2 mLs (1 mg) by nebulization daily Take 1 mg by nebulization daily   dexlansoprazole (DEXILANT) 60 MG CPDR 3/2/2017 at Unknown time  Yes Yes   Sig: Take 1 capsule by mouth daily as needed   doxycycline (VIBRAMYCIN) 100 MG capsule   No No   Sig: Take 1 capsule (100 mg) by mouth 2 times daily for 10 days   fluconazole (DIFLUCAN) 100 MG tablet Past Week at Unknown time  No Yes   Sig: TAKE 1 TABLET BY MOUTH DAILY AS NEEDED   hydrOXYzine (VISTARIL) 50 MG capsule More than a month at Unknown time  No No   Sig: Take 1-2 tabs  Every 6 hours as needed for itching.   levalbuterol (XOPENEX) 0.31 MG/3ML nebulizer solution 3/3/2017 at Unknown time  No Yes   Sig: Take 3 mLs (0.31 mg) by nebulization every 6 hours as needed for shortness of breath / dyspnea   nystatin (MYCOSTATIN) 048224 UNIT/GM POWD Past Month at Unknown time  No Yes   Sig: Apply 1 g topically 3 times daily as needed   order for DME   No Yes   Sig: Equipment being ordered: Pulse oximeter for wrist.    Re: Cystic fibrosis   order for DME   No Yes   Sig: Equipment being ordered:  Wrist pulse oximeter Dx Cystic fibrosis (H)   predniSONE (DELTASONE) 10 MG tablet 3/3/2017 at Unknown time  No Yes   Sig: Take 4 tablets daily for 5 days,  take 2 tablets daily for 3 days, take 1 tablet daily for 3 days, take half a tablet for 3 days.   sucralfate (CARAFATE) 1 GM tablet 3/2/2017 at Unknown time  No Yes   Sig: Take 1 tablet (1 g) by mouth 4 times daily as needed      Facility-Administered Medications: None     Allergies   Allergies   Allergen Reactions     Sulfa Drugs Rash     \"gets purple dots on skin\"       Social History   I have reviewed this patient's social history and updated it with pertinent information if needed. Annie KEY Meng Garcia  reports that she has never smoked. She has never used smokeless tobacco. She reports that she drinks alcohol. She reports that " she does not use illicit drugs.    Family History   I have reviewed this patient's family history and updated it with pertinent information if needed.   Family History   Problem Relation Age of Onset     DIABETES Mother      Hypertension Mother      Thyroid Disease Mother      Thyroid Disease Sister      Dementia Sister        Review of Systems   The 10 point Review of Systems is negative other than noted in the HPI or here.     Physical Exam   Temp: 98.9  F (37.2  C) Temp src: Oral BP: 125/67 Pulse: 100 Heart Rate: 100 Resp: 20 SpO2: (!) 90 % O2 Device: Nasal cannula Oxygen Delivery: 4 LPM  Vital Signs with Ranges  Temp:  [98.4  F (36.9  C)-99.1  F (37.3  C)] 98.9  F (37.2  C)  Pulse:  [100-120] 100  Heart Rate:  [100-118] 100  Resp:  [20-26] 20  BP: (115-144)/(64-92) 125/67  SpO2:  [88 %-94 %] 90 %  0 lbs 0 oz    Constitutional: In NAD.  Eyes: Unremarkable.   HEENT: no JVD. Dry MM.  Respiratory: Poor A/E but no wheezes bilaterally.  Cardiovascular: S1 + S2. RRR. NOT tachycardic.   GI: Obese, soft, NT, ND, BS +  Skin: No acute rashes or lesions.  Musculoskeletal:No pedal edema.   Neurologic: Grossly normal.   Psychiatric: Normal affect.     Data   Data reviewed today:  I personally reviewed all labs below.     Recent Labs  Lab 03/03/17  1330 03/01/17  1044   WBC 23.4* 16.8*   HGB 14.1 13.1   MCV 82 82    229    138   POTASSIUM 3.7 4.2   CHLORIDE 103 105   CO2 25 26   BUN 20 14   CR 0.97 0.99   ANIONGAP 12 7   NALDO 9.5 8.7   * 87   ALBUMIN 3.6  --    PROTTOTAL 8.5  --    BILITOTAL 0.5  --    ALKPHOS 127  --    ALT 37  --    AST 19  --        Lactic Acid   Date Value Ref Range Status   03/03/2017 1.3 0.4 - 2.0 mmol/L Final   03/03/2017 3.6 (H) 0.4 - 2.0 mmol/L Final       Recent Results (from the past 24 hour(s))   XR Chest 2 Views    Narrative    CHEST TWO VIEWS    REPORT: There are some patchy areas of increased density seen  laterally in the right lung and in the left lung base.  This  has  worsened as compared to March 1, 2017.  Heart is normal in size.    IMPRESSION: WORSENING BILATERAL PULMONARY PARENCHYMAL OPACITIES  SUSPICIOUS FOR PNEUMONIA.  Exam Date: Mar 03, 2017 02:24:00 PM  Author: BUCKY PHOENIX  This report is final and signed

## 2017-03-05 LAB
ANION GAP SERPL CALCULATED.3IONS-SCNC: 10 MMOL/L (ref 3–14)
BASOPHILS # BLD AUTO: 0 10E9/L (ref 0–0.2)
BASOPHILS NFR BLD AUTO: 0.1 %
BUN SERPL-MCNC: 18 MG/DL (ref 7–30)
CALCIUM SERPL-MCNC: 9.1 MG/DL (ref 8.5–10.1)
CHLORIDE SERPL-SCNC: 109 MMOL/L (ref 94–109)
CO2 SERPL-SCNC: 24 MMOL/L (ref 20–32)
CREAT SERPL-MCNC: 0.8 MG/DL (ref 0.52–1.04)
DIFFERENTIAL METHOD BLD: ABNORMAL
EOSINOPHIL # BLD AUTO: 0 10E9/L (ref 0–0.7)
EOSINOPHIL NFR BLD AUTO: 0 %
ERYTHROCYTE [DISTWIDTH] IN BLOOD BY AUTOMATED COUNT: 13.8 % (ref 10–15)
GFR SERPL CREATININE-BSD FRML MDRD: 77 ML/MIN/1.7M2
GLUCOSE SERPL-MCNC: 101 MG/DL (ref 70–99)
HCT VFR BLD AUTO: 37.6 % (ref 35–47)
HGB BLD-MCNC: 12.9 G/DL (ref 11.7–15.7)
IMM GRANULOCYTES # BLD: 0.6 10E9/L (ref 0–0.4)
IMM GRANULOCYTES NFR BLD: 2.1 %
LYMPHOCYTES # BLD AUTO: 1.3 10E9/L (ref 0.8–5.3)
LYMPHOCYTES NFR BLD AUTO: 4.6 %
MCH RBC QN AUTO: 27.7 PG (ref 26.5–33)
MCHC RBC AUTO-ENTMCNC: 34.3 G/DL (ref 31.5–36.5)
MCV RBC AUTO: 81 FL (ref 78–100)
MONOCYTES # BLD AUTO: 1.6 10E9/L (ref 0–1.3)
MONOCYTES NFR BLD AUTO: 5.5 %
NEUTROPHILS # BLD AUTO: 25.4 10E9/L (ref 1.6–8.3)
NEUTROPHILS NFR BLD AUTO: 87.7 %
NRBC # BLD AUTO: 0 10*3/UL
NRBC BLD AUTO-RTO: 0 /100
PLATELET # BLD AUTO: 367 10E9/L (ref 150–450)
POTASSIUM SERPL-SCNC: 3.6 MMOL/L (ref 3.4–5.3)
RBC # BLD AUTO: 4.65 10E12/L (ref 3.8–5.2)
SODIUM SERPL-SCNC: 143 MMOL/L (ref 133–144)
WBC # BLD AUTO: 29 10E9/L (ref 4–11)

## 2017-03-05 PROCEDURE — 25000125 ZZHC RX 250: Performed by: INTERNAL MEDICINE

## 2017-03-05 PROCEDURE — 25000128 H RX IP 250 OP 636: Performed by: INTERNAL MEDICINE

## 2017-03-05 PROCEDURE — 94640 AIRWAY INHALATION TREATMENT: CPT

## 2017-03-05 PROCEDURE — 99233 SBSQ HOSP IP/OBS HIGH 50: CPT | Performed by: INTERNAL MEDICINE

## 2017-03-05 PROCEDURE — 85025 COMPLETE CBC W/AUTO DIFF WBC: CPT | Performed by: INTERNAL MEDICINE

## 2017-03-05 PROCEDURE — 94640 AIRWAY INHALATION TREATMENT: CPT | Mod: 76

## 2017-03-05 PROCEDURE — 80048 BASIC METABOLIC PNL TOTAL CA: CPT | Performed by: INTERNAL MEDICINE

## 2017-03-05 PROCEDURE — 25000132 ZZH RX MED GY IP 250 OP 250 PS 637: Performed by: INTERNAL MEDICINE

## 2017-03-05 PROCEDURE — 94669 MECHANICAL CHEST WALL OSCILL: CPT

## 2017-03-05 PROCEDURE — 12000000 ZZH R&B MED SURG/OB

## 2017-03-05 RX ORDER — POLYETHYLENE GLYCOL 3350 17 G/17G
17 POWDER, FOR SOLUTION ORAL DAILY PRN
Status: DISCONTINUED | OUTPATIENT
Start: 2017-03-05 | End: 2017-03-09 | Stop reason: HOSPADM

## 2017-03-05 RX ADMIN — IPRATROPIUM BROMIDE AND ALBUTEROL SULFATE 3 ML: .5; 3 SOLUTION RESPIRATORY (INHALATION) at 09:58

## 2017-03-05 RX ADMIN — ACETAMINOPHEN AND CODEINE PHOSPHATE 2 TABLET: 300; 30 TABLET ORAL at 00:04

## 2017-03-05 RX ADMIN — PANTOPRAZOLE SODIUM 40 MG: 40 TABLET, DELAYED RELEASE ORAL at 08:02

## 2017-03-05 RX ADMIN — ACETAMINOPHEN AND CODEINE PHOSPHATE 2 TABLET: 300; 30 TABLET ORAL at 19:25

## 2017-03-05 RX ADMIN — ZOLPIDEM TARTRATE 10 MG: 10 TABLET, FILM COATED ORAL at 21:21

## 2017-03-05 RX ADMIN — BUPROPION HYDROCHLORIDE 450 MG: 150 TABLET, FILM COATED, EXTENDED RELEASE ORAL at 08:12

## 2017-03-05 RX ADMIN — ACETAMINOPHEN AND CODEINE PHOSPHATE 2 TABLET: 300; 30 TABLET ORAL at 14:46

## 2017-03-05 RX ADMIN — NAPROXEN 250 MG: 250 TABLET ORAL at 17:10

## 2017-03-05 RX ADMIN — POLYETHYLENE GLYCOL 3350 17 G: 17 POWDER, FOR SOLUTION ORAL at 19:25

## 2017-03-05 RX ADMIN — IPRATROPIUM BROMIDE AND ALBUTEROL SULFATE 3 ML: .5; 3 SOLUTION RESPIRATORY (INHALATION) at 19:12

## 2017-03-05 RX ADMIN — CEFTRIAXONE SODIUM 1 G: 1 INJECTION, SOLUTION INTRAVENOUS at 14:49

## 2017-03-05 RX ADMIN — ACETAMINOPHEN AND CODEINE PHOSPHATE 2 TABLET: 300; 30 TABLET ORAL at 09:36

## 2017-03-05 RX ADMIN — ACETAMINOPHEN AND CODEINE PHOSPHATE 2 TABLET: 300; 30 TABLET ORAL at 23:51

## 2017-03-05 RX ADMIN — SUCRALFATE 1 G: 1 TABLET ORAL at 11:36

## 2017-03-05 RX ADMIN — PREDNISONE 40 MG: 20 TABLET ORAL at 08:12

## 2017-03-05 RX ADMIN — IPRATROPIUM BROMIDE AND ALBUTEROL SULFATE 3 ML: .5; 3 SOLUTION RESPIRATORY (INHALATION) at 14:24

## 2017-03-05 RX ADMIN — AZITHROMYCIN MONOHYDRATE 500 MG: 500 INJECTION, POWDER, LYOPHILIZED, FOR SOLUTION INTRAVENOUS at 15:45

## 2017-03-05 RX ADMIN — SUCRALFATE 1 G: 1 TABLET ORAL at 08:02

## 2017-03-05 RX ADMIN — SUCRALFATE 1 G: 1 TABLET ORAL at 17:07

## 2017-03-05 RX ADMIN — BUDESONIDE 1 MG: 0.5 INHALANT RESPIRATORY (INHALATION) at 10:04

## 2017-03-05 NOTE — PROVIDER NOTIFICATION
Notified MD of pts IV being painful and removed.  Pt does not want an IV replaced.  MD to change antbtx and solumedrol to oral with no new IV started

## 2017-03-05 NOTE — PLAN OF CARE
Problem: Goal Outcome Summary  Goal: Goal Outcome Summary  Outcome: No Change  Pt A&O x3. Pt takes tylenol #3 for throat pain and cough, with relief. She is sating 90-91% on 2.5 LPM NC and lung sounds are diminished with crackles. New IV placed and pt tolerated well. IV antibiotics continued. Pt up independently in room. Call light within reach and pt calls appropriately.      Face to face report given with opportunity to observe patient.  Report given to ARNAV Nunez.     Judith Gray  3/5/2017, 3:42 PM

## 2017-03-05 NOTE — PLAN OF CARE
Problem: Patient Goal: Rt Focus  Goal: 1. Patient Goal: RT Focus  Outcome: Improving  Pt on and off 2.5L n/c.  88% on room air.  91% on 2.5L  Chest physio therapy done x2 today for 20 mins each.  Two nebs given also.  Pt requests nebs/CPT three times per day as at home.  Encouraged cough and deep breathing.

## 2017-03-05 NOTE — PLAN OF CARE
Problem: Goal Outcome Summary  Goal: Goal Outcome Summary  Outcome: Improving  Pt VS as charted.  Weaned O2 from 5 LPM to 3 LPM with SATS 92%.  Ambulated in vazquez without O2 with SAT decrease to 86%.  Lungs coarse with crackles, receiving nebs and vest therapy. Has frequent non-productive cough and taking Tylenol 3 with codeine for relief of cough.  Infused IV antbtx with pain at IV site.  IV removed and pt requested no new IV.  MD ok'd and plans to change to oral antbtx and prednisone.  Call light in reach, makes needs known    Face to face report given with opportunity to observe patient.    Report given to Cady Hall   3/4/2017  11:45 PM

## 2017-03-05 NOTE — PROVIDER NOTIFICATION
Notified provider about pt requesting order for Midol, she does not want naproxen. She also was requesting for why lab were not drawn this AM.

## 2017-03-05 NOTE — PLAN OF CARE
Face to face report given with opportunity to observe patient.  Report given to Teresa JOSÉ.    Latonya Thomas  3/5/2017, 7:31 AM

## 2017-03-05 NOTE — PLAN OF CARE
Problem: Goal Outcome Summary  Goal: Goal Outcome Summary  Outcome: No Change  Complaints of SOB and cough with initial assessment. Chest sounds coarse.  Medicated with Tylenol 3 - 2 tabs po per pt request and slept well through the night thereafter. Wearing O2 at 2.5LNC with sats at 93%. Temp 98.5T. No IV- apparently infiltrated and refused restart. VSS.

## 2017-03-05 NOTE — PROGRESS NOTES
[facility]    Hospitalist Progress Note    Date of Service (when I saw the patient): 03/05/2017      Assessment & Plan   Annie Garcia is a 46 year old female who was admitted on 3/3/2017.       Acute broncitis- Rx as CAP with Rocephin and Azithromycin.     Cystic Fibrosis/ Bronchiectasis with exacerbation- Uses Tylenol #3. Continue. Nebs, O2, steroids.     Leukocytosis: True infection with PNA vs steroid use. Ct to monitor CBC and cultures    GI Px with PPI for gut protection and PUD- Continue carafate as well.     Anxiety/depression- Wellbutrin.         DVT Prophylaxis: Enoxaprain (Lovenox) SQ  Code Status: Full Code     Disposition: Expected discharge in 1-2 days once off O2 and back to baseline.  Houston Mitchell      Interval History   Pt lost IV access after getting IV Abx. No hemoptysis but cough present.     -Data reviewed today: I reviewed all new labs and imaging results over the last 24 hours.    Physical Exam   Temp: 97.4  F (36.3  C) Temp src: Tympanic BP: 123/57   Heart Rate: 96 Resp: 20 SpO2: (!) 90 % O2 Device: None (Room air) Oxygen Delivery: 2.5 LPM  Vitals:    03/03/17 2059   Weight: 102.2 kg (225 lb 5 oz)     Vital Signs with Ranges  Temp:  [97.3  F (36.3  C)-98.5  F (36.9  C)] 97.4  F (36.3  C)  Heart Rate:  [84-96] 96  Resp:  [18-20] 20  BP: (111-135)/(50-67) 123/57  SpO2:  [86 %-93 %] 90 %  I/O last 3 completed shifts:  In: 501 [P.O.:120; I.V.:381]  Out: -      Awake, alert, in minimal distress due to cough but lying in bed.  HEENT: Pupils equal, conjugate. No icterus. Oral mucosa moist. No facial asymmetry.   Neck: Supple, jugular veins not elevated. Trachea midline   Chest: No chest wall movement asymmetry. Aeration globally equal but crackles improving but minimal use of accessory muscles.    Cardiac: RRR. S1, S2 unremarkable. No murmurs, rubs or gallops  Abdomen: Soft. No palpation or percussion tenderness. No distention. Normoactive bowel sounds. Liver and spleen not  increased in size. No bruits, masses, or pulsations.   Extremities: No lower extremity edema. Extremities warm distally.  Brisk capillary refill. No eccymoses, clubbing.   Neurologic: Mental state above. Motor 5/5 and bilaterally equal. Tone preserved. Sensation intact to light touch.    Peripheral IV 03/05/17 Left Lower forearm (Active)   Site Assessment WDL 3/5/2017 12:00 PM   Line Status Saline locked 3/5/2017 12:00 PM   Phlebitis Scale 0-->no symptoms 3/5/2017 12:00 PM   Infiltration Scale 0 3/5/2017 12:00 PM   Number of days:0       Medications        naproxen  250 mg Oral BID w/meals     predniSONE  40 mg Oral Daily     budesonide  1 mg Nebulization Daily     pantoprazole  40 mg Oral QAM AC     cefTRIAXone  1 g Intravenous Q24H     azithromycin  500 mg Intravenous Q24H     sucralfate  1 g Oral 4x Daily AC & HS     buPROPion  450 mg Oral Daily       Data    Sputum C/S reveals gram stain of GNB in rods    Recent Labs  Lab 03/05/17  1157 03/04/17  0530 03/03/17  1330   WBC 29.0* 21.9* 23.4*   HGB 12.9 13.0 14.1   MCV 81 82 82    326 340    141 140   POTASSIUM 3.6 4.3 3.7   CHLORIDE 109 108 103   CO2 24 25 25   BUN 18 16 20   CR 0.80 0.76 0.97   ANIONGAP 10 8 12   NALDO 9.1 9.1 9.5   * 153* 100*   ALBUMIN  --   --  3.6   PROTTOTAL  --   --  8.5   BILITOTAL  --   --  0.5   ALKPHOS  --   --  127   ALT  --   --  37   AST  --   --  19       No results found for this or any previous visit (from the past 24 hour(s)).

## 2017-03-05 NOTE — PLAN OF CARE
Problem: Patient Goal: Social Work Focus  Goal: 1. Patient Goal: Social Work Focus  Met with patient, assessment completed see flowsheet.     Patient lives with her  and 3 kids. Her PCP is Todd Torres, and sees a pulmonologist (Dr. Herman) . She shared that she saw Dr. Herman two weeks ago. She does have a healthcare directive at home. She is independent with all ADLs, she is currently on oxygen but is hoping not to go home with oxygen. It was also shared that she uses [facility] and not to have generic medications.     At this time she has no concerns, will remain available for any discharge planning needs.

## 2017-03-06 LAB
ANION GAP SERPL CALCULATED.3IONS-SCNC: 8 MMOL/L (ref 3–14)
BACTERIA SPEC CULT: ABNORMAL
BASOPHILS # BLD AUTO: 0 10E9/L (ref 0–0.2)
BASOPHILS NFR BLD AUTO: 0.1 %
BUN SERPL-MCNC: 17 MG/DL (ref 7–30)
CALCIUM SERPL-MCNC: 8.7 MG/DL (ref 8.5–10.1)
CHLORIDE SERPL-SCNC: 107 MMOL/L (ref 94–109)
CO2 SERPL-SCNC: 28 MMOL/L (ref 20–32)
CREAT SERPL-MCNC: 0.86 MG/DL (ref 0.52–1.04)
DIFFERENTIAL METHOD BLD: ABNORMAL
EOSINOPHIL # BLD AUTO: 0.1 10E9/L (ref 0–0.7)
EOSINOPHIL NFR BLD AUTO: 0.3 %
ERYTHROCYTE [DISTWIDTH] IN BLOOD BY AUTOMATED COUNT: 13.9 % (ref 10–15)
GFR SERPL CREATININE-BSD FRML MDRD: 70 ML/MIN/1.7M2
GLUCOSE SERPL-MCNC: 78 MG/DL (ref 70–99)
HCT VFR BLD AUTO: 36 % (ref 35–47)
HGB BLD-MCNC: 12.2 G/DL (ref 11.7–15.7)
IMM GRANULOCYTES # BLD: 0.5 10E9/L (ref 0–0.4)
IMM GRANULOCYTES NFR BLD: 2.3 %
LYMPHOCYTES # BLD AUTO: 3.2 10E9/L (ref 0.8–5.3)
LYMPHOCYTES NFR BLD AUTO: 14 %
MCH RBC QN AUTO: 28 PG (ref 26.5–33)
MCHC RBC AUTO-ENTMCNC: 33.9 G/DL (ref 31.5–36.5)
MCV RBC AUTO: 83 FL (ref 78–100)
MICRO REPORT STATUS: ABNORMAL
MICROORGANISM SPEC CULT: ABNORMAL
MONOCYTES # BLD AUTO: 1.3 10E9/L (ref 0–1.3)
MONOCYTES NFR BLD AUTO: 5.4 %
NEUTROPHILS # BLD AUTO: 17.9 10E9/L (ref 1.6–8.3)
NEUTROPHILS NFR BLD AUTO: 77.9 %
NRBC # BLD AUTO: 0 10*3/UL
NRBC BLD AUTO-RTO: 0 /100
PLATELET # BLD AUTO: 334 10E9/L (ref 150–450)
POTASSIUM SERPL-SCNC: 3.6 MMOL/L (ref 3.4–5.3)
RBC # BLD AUTO: 4.36 10E12/L (ref 3.8–5.2)
SODIUM SERPL-SCNC: 143 MMOL/L (ref 133–144)
SPECIMEN SOURCE: ABNORMAL
WBC # BLD AUTO: 23 10E9/L (ref 4–11)

## 2017-03-06 PROCEDURE — 25000125 ZZHC RX 250: Performed by: INTERNAL MEDICINE

## 2017-03-06 PROCEDURE — 25000128 H RX IP 250 OP 636: Performed by: INTERNAL MEDICINE

## 2017-03-06 PROCEDURE — 25000132 ZZH RX MED GY IP 250 OP 250 PS 637: Performed by: INTERNAL MEDICINE

## 2017-03-06 PROCEDURE — 80048 BASIC METABOLIC PNL TOTAL CA: CPT | Performed by: INTERNAL MEDICINE

## 2017-03-06 PROCEDURE — 40000275 ZZH STATISTIC RCP TIME EA 10 MIN

## 2017-03-06 PROCEDURE — 12000000 ZZH R&B MED SURG/OB

## 2017-03-06 PROCEDURE — 36415 COLL VENOUS BLD VENIPUNCTURE: CPT | Performed by: INTERNAL MEDICINE

## 2017-03-06 PROCEDURE — 94669 MECHANICAL CHEST WALL OSCILL: CPT

## 2017-03-06 PROCEDURE — 94799 UNLISTED PULMONARY SVC/PX: CPT

## 2017-03-06 PROCEDURE — 99232 SBSQ HOSP IP/OBS MODERATE 35: CPT | Performed by: INTERNAL MEDICINE

## 2017-03-06 PROCEDURE — 71260 CT THORAX DX C+: CPT | Mod: TC

## 2017-03-06 PROCEDURE — 94640 AIRWAY INHALATION TREATMENT: CPT | Mod: 76

## 2017-03-06 PROCEDURE — 94640 AIRWAY INHALATION TREATMENT: CPT

## 2017-03-06 PROCEDURE — 85025 COMPLETE CBC W/AUTO DIFF WBC: CPT | Performed by: INTERNAL MEDICINE

## 2017-03-06 RX ORDER — IOPAMIDOL 612 MG/ML
100 INJECTION, SOLUTION INTRAVASCULAR ONCE
Status: COMPLETED | OUTPATIENT
Start: 2017-03-06 | End: 2017-03-06

## 2017-03-06 RX ORDER — LIDOCAINE HYDROCHLORIDE 10 MG/ML
INJECTION, SOLUTION EPIDURAL; INFILTRATION; INTRACAUDAL; PERINEURAL
Status: DISPENSED
Start: 2017-03-06 | End: 2017-03-07

## 2017-03-06 RX ORDER — METHYLPREDNISOLONE SODIUM SUCCINATE 125 MG/2ML
125 INJECTION, POWDER, LYOPHILIZED, FOR SOLUTION INTRAMUSCULAR; INTRAVENOUS EVERY 6 HOURS
Status: DISCONTINUED | OUTPATIENT
Start: 2017-03-06 | End: 2017-03-08

## 2017-03-06 RX ADMIN — BUDESONIDE 0.5 MG: 0.5 INHALANT RESPIRATORY (INHALATION) at 07:15

## 2017-03-06 RX ADMIN — METHYLPREDNISOLONE SODIUM SUCCINATE 125 MG: 125 INJECTION, POWDER, FOR SOLUTION INTRAMUSCULAR; INTRAVENOUS at 11:08

## 2017-03-06 RX ADMIN — IPRATROPIUM BROMIDE AND ALBUTEROL SULFATE 3 ML: .5; 3 SOLUTION RESPIRATORY (INHALATION) at 11:18

## 2017-03-06 RX ADMIN — NAPROXEN 250 MG: 250 TABLET ORAL at 17:07

## 2017-03-06 RX ADMIN — IPRATROPIUM BROMIDE AND ALBUTEROL SULFATE 3 ML: .5; 3 SOLUTION RESPIRATORY (INHALATION) at 19:18

## 2017-03-06 RX ADMIN — IPRATROPIUM BROMIDE AND ALBUTEROL SULFATE 3 ML: .5; 3 SOLUTION RESPIRATORY (INHALATION) at 07:15

## 2017-03-06 RX ADMIN — BUPROPION HYDROCHLORIDE 450 MG: 150 TABLET, FILM COATED, EXTENDED RELEASE ORAL at 09:31

## 2017-03-06 RX ADMIN — PANTOPRAZOLE SODIUM 40 MG: 40 TABLET, DELAYED RELEASE ORAL at 06:50

## 2017-03-06 RX ADMIN — ACETAMINOPHEN AND CODEINE PHOSPHATE 1 TABLET: 300; 30 TABLET ORAL at 11:18

## 2017-03-06 RX ADMIN — METHYLPREDNISOLONE SODIUM SUCCINATE 125 MG: 125 INJECTION, POWDER, FOR SOLUTION INTRAMUSCULAR; INTRAVENOUS at 15:38

## 2017-03-06 RX ADMIN — AZITHROMYCIN MONOHYDRATE 500 MG: 500 INJECTION, POWDER, LYOPHILIZED, FOR SOLUTION INTRAVENOUS at 15:37

## 2017-03-06 RX ADMIN — IPRATROPIUM BROMIDE AND ALBUTEROL SULFATE 3 ML: .5; 3 SOLUTION RESPIRATORY (INHALATION) at 15:04

## 2017-03-06 RX ADMIN — ACETAMINOPHEN AND CODEINE PHOSPHATE 1 TABLET: 300; 30 TABLET ORAL at 17:07

## 2017-03-06 RX ADMIN — SUCRALFATE 1 G: 1 TABLET ORAL at 17:07

## 2017-03-06 RX ADMIN — METHYLPREDNISOLONE SODIUM SUCCINATE 125 MG: 125 INJECTION, POWDER, FOR SOLUTION INTRAMUSCULAR; INTRAVENOUS at 22:22

## 2017-03-06 RX ADMIN — IOPAMIDOL 100 ML: 612 INJECTION, SOLUTION INTRAVASCULAR at 10:02

## 2017-03-06 RX ADMIN — NAPROXEN 250 MG: 250 TABLET ORAL at 09:31

## 2017-03-06 RX ADMIN — SUCRALFATE 1 G: 1 TABLET ORAL at 06:52

## 2017-03-06 RX ADMIN — CEFTRIAXONE SODIUM 1 G: 1 INJECTION, SOLUTION INTRAVENOUS at 14:51

## 2017-03-06 RX ADMIN — ACETAMINOPHEN AND CODEINE PHOSPHATE 2 TABLET: 300; 30 TABLET ORAL at 20:23

## 2017-03-06 RX ADMIN — ZOLPIDEM TARTRATE 10 MG: 10 TABLET, FILM COATED ORAL at 22:22

## 2017-03-06 RX ADMIN — SUCRALFATE 1 G: 1 TABLET ORAL at 11:08

## 2017-03-06 RX ADMIN — POLYETHYLENE GLYCOL 3350 17 G: 17 POWDER, FOR SOLUTION ORAL at 22:23

## 2017-03-06 RX ADMIN — ACETAMINOPHEN AND CODEINE PHOSPHATE 1 TABLET: 300; 30 TABLET ORAL at 09:30

## 2017-03-06 NOTE — PLAN OF CARE
Face to face report given with opportunity to observe patient.    Report given to ARNAV Fish   3/6/2017  4:18 PM

## 2017-03-06 NOTE — PLAN OF CARE
Problem: Patient Goal: Rt Focus  Goal: 1. Patient Goal: RT Focus  Outcome: Improving  Pt had one nebulizer this shift and 20 minutes chest physiotherapy done. SpO2 89-94% on room air. BS crackles RLL and LLL otherwise clear.

## 2017-03-06 NOTE — PLAN OF CARE
Problem: Goal Outcome Summary  Goal: Goal Outcome Summary  Outcome: Improving  Pt states feeling better than admission  Chest less congested and dyspneic. Medicated with percocet 1 tab c/o low back pain. Also used ice pack .Has been from bed to chair in which pt is presently sleeping. Wearing O2 at 2LNC with sats at 97%. Less coughing. Voided 4x throughout night. Sepsis alert popped up- temp 99.0,  /60. Lactic ordered . IVF infusing.

## 2017-03-06 NOTE — PLAN OF CARE
"Problem: Goal Outcome Summary  Goal: Goal Outcome Summary  Outcome: No Change  Pt continues to cough at beginning of shift. Requested and received Tylenol #3 tabs 2. Stated cough pain feels like spasms and cough medicine \"doesn't work for CF\" when offered. Takes O2 on and off per self- room air sat 92%. Monitors sats with own monitor. IVF at TKO. Afebrile. VSS. Up per self with steady gait.       "

## 2017-03-06 NOTE — PLAN OF CARE
Problem: Goal Outcome Summary  Goal: Goal Outcome Summary  Outcome: Improving  Pt VSS as charted.  On 2.5 LPM O2 with SATs up to 93%.  Pt ambulates in vazquez independently on room air with SATS up to 93%.  Lungs have crackles and has productive cough with sputum turning from green to brown per pt, also states secretions are thinning with vest therapy.  Pt receiving IV antbtx and Tylenol 3 with codeine for cough.  Call light in reach.     Face to face report given with opportunity to observe patient.    Report given to Cady Hall   3/5/2017  11:17 PM

## 2017-03-06 NOTE — PLAN OF CARE
Face to face report given with opportunity to observe patient.  Report given to Clary JOSÉ.    Latonya Thomas  3/6/2017, 7:48 AM

## 2017-03-06 NOTE — PROGRESS NOTES
[facility]    Hospitalist Progress Note    Date of Service (when I saw the patient): 03/06/2017      Assessment & Plan   Annie Garcia is a 46 year old female who was admitted on 3/3/2017.       Acute broncitis- Rx as CAP with Rocephin and Azithromycin. For 3 days, she is NOT GETTING BETTER. I think there is mild mucus plugging. The pt is used to a certain type of vest therapy she's not getting. Left a note for RT to discuss her needs with her so she can clear her secretions. She is also not getting manual percussion. She has underlying bronchiectasis and needs these measures. I think this is the major stumbling block in her not getting better. Will discuss in rounds as well today. D/w pt in detail that she really appreciated. I think it's also important to get a CT of her chest. 72 hours on Abxs, no fevers but no clinical improvement. Sputum culture oddly growing Proteus S to B lactam PCNs so Rocephin should work here. Penetration is the issue as is expectoration, that is the problem is mechanical.     Cystic Fibrosis/ Bronchiectasis with exacerbation- Uses Tylenol #3. Continue. Nebs, O2, steroids.     Leukocytosis: True infection with PNA vs steroid use. Ct to monitor CBC and cultures    GI Px with PPI for gut protection and PUD- Continue carafate as well.     Anxiety/depression- Wellbutrin.         DVT Prophylaxis: Enoxaprain (Lovenox) SQ  Code Status: Full Code     Disposition: Undecided. Pt not improving clinically.   Anwar Sandi      Interval History   Pt lost IV access after getting IV Abx. No hemoptysis but cough present.     -Data reviewed today: I reviewed all new labs and imaging results over the last 24 hours.    Physical Exam   Temp: 97.6  F (36.4  C) Temp src: Tympanic BP: 126/62   Heart Rate: 98 Resp: 20 SpO2: 92 % O2 Device: None (Room air) Oxygen Delivery: 2.5 LPM  Vitals:    03/03/17 2059   Weight: 102.2 kg (225 lb 5 oz)     Vital Signs with Ranges  Temp:  [97.4  F (36.3   C)-98.2  F (36.8  C)] 97.6  F (36.4  C)  Heart Rate:  [90-98] 98  Resp:  [20] 20  BP: (123-126)/(57-62) 126/62  SpO2:  [90 %-93 %] 92 %  I/O last 3 completed shifts:  In: 464 [P.O.:120; I.V.:344]  Out: -      Awake, alert, in minimal distress due to cough but lying in bed.  HEENT: Pupils equal, conjugate. No icterus. Oral mucosa moist. No facial asymmetry.   Neck: Supple, jugular veins not elevated. Trachea midline   Chest: No chest wall movement asymmetry. Bibiasilar rales.   Cardiac: RRR. S1, S2 unremarkable. No murmurs, rubs or gallops  Abdomen: Soft. No palpation or percussion tenderness. No distention. Normoactive bowel sounds. Liver and spleen not increased in size. No bruits, masses, or pulsations.   Extremities: No lower extremity edema. Extremities warm distally.  Brisk capillary refill. No eccymoses, clubbing.   Neurologic: Mental state above. Motor 5/5 and bilaterally equal. Tone preserved. Sensation intact to light touch.    Peripheral IV 03/05/17 Left Lower forearm (Active)   Site Assessment WDL 3/6/2017 12:00 AM   Line Status Infusing 3/6/2017 12:00 AM   Phlebitis Scale 0-->no symptoms 3/6/2017 12:00 AM   Infiltration Scale 0 3/6/2017 12:00 AM   Number of days:1       Medications        naproxen  250 mg Oral BID w/meals     predniSONE  40 mg Oral Daily     budesonide  1 mg Nebulization Daily     pantoprazole  40 mg Oral QAM AC     cefTRIAXone  1 g Intravenous Q24H     azithromycin  500 mg Intravenous Q24H     sucralfate  1 g Oral 4x Daily AC & HS     buPROPion  450 mg Oral Daily       Data    Sputum C/S reveals gram stain of GNB in rods    Recent Labs  Lab 03/05/17  1157 03/04/17  0530 03/03/17  1330   WBC 29.0* 21.9* 23.4*   HGB 12.9 13.0 14.1   MCV 81 82 82    326 340    141 140   POTASSIUM 3.6 4.3 3.7   CHLORIDE 109 108 103   CO2 24 25 25   BUN 18 16 20   CR 0.80 0.76 0.97   ANIONGAP 10 8 12   NALDO 9.1 9.1 9.5   * 153* 100*   ALBUMIN  --   --  3.6   PROTTOTAL  --   --  8.5    BILITOTAL  --   --  0.5   ALKPHOS  --   --  127   ALT  --   --  37   AST  --   --  19       No results found for this or any previous visit (from the past 24 hour(s)).

## 2017-03-06 NOTE — PLAN OF CARE
Problem: Goal Outcome Summary  Goal: Goal Outcome Summary  Outcome: Improving  VSS, afebrile this shift.  Has been up AMB in vazquez X2 this shift for a total of 4 laps around the third floor.  Oxygen has been at 1.5 LPM with at 91%.  Pt monitors O2 saturations and oxygen and changes oxygen prn.  Gave Tylenol #3 x2 this shift for chest and back soreness from coughing.  Had CT of chest with contrast today.  Is up in room independently.  Eating and voiding in adequate amounts.

## 2017-03-07 LAB
ANION GAP SERPL CALCULATED.3IONS-SCNC: 7 MMOL/L (ref 3–14)
BACTERIA SPEC CULT: ABNORMAL
BASOPHILS # BLD AUTO: 0 10E9/L (ref 0–0.2)
BASOPHILS NFR BLD AUTO: 0.2 %
BUN SERPL-MCNC: 12 MG/DL (ref 7–30)
CALCIUM SERPL-MCNC: 9.2 MG/DL (ref 8.5–10.1)
CHLORIDE SERPL-SCNC: 106 MMOL/L (ref 94–109)
CO2 SERPL-SCNC: 29 MMOL/L (ref 20–32)
CREAT SERPL-MCNC: 0.78 MG/DL (ref 0.52–1.04)
DIFFERENTIAL METHOD BLD: ABNORMAL
EOSINOPHIL # BLD AUTO: 0 10E9/L (ref 0–0.7)
EOSINOPHIL NFR BLD AUTO: 0 %
ERYTHROCYTE [DISTWIDTH] IN BLOOD BY AUTOMATED COUNT: 13.7 % (ref 10–15)
GFR SERPL CREATININE-BSD FRML MDRD: 79 ML/MIN/1.7M2
GLUCOSE SERPL-MCNC: 159 MG/DL (ref 70–99)
HCT VFR BLD AUTO: 35.4 % (ref 35–47)
HGB BLD-MCNC: 12.2 G/DL (ref 11.7–15.7)
IMM GRANULOCYTES # BLD: 0.6 10E9/L (ref 0–0.4)
IMM GRANULOCYTES NFR BLD: 3 %
LYMPHOCYTES # BLD AUTO: 1.4 10E9/L (ref 0.8–5.3)
LYMPHOCYTES NFR BLD AUTO: 7.3 %
MCH RBC QN AUTO: 27.9 PG (ref 26.5–33)
MCHC RBC AUTO-ENTMCNC: 34.5 G/DL (ref 31.5–36.5)
MCV RBC AUTO: 81 FL (ref 78–100)
MICRO REPORT STATUS: ABNORMAL
MONOCYTES # BLD AUTO: 0.5 10E9/L (ref 0–1.3)
MONOCYTES NFR BLD AUTO: 2.4 %
NEUTROPHILS # BLD AUTO: 16.8 10E9/L (ref 1.6–8.3)
NEUTROPHILS NFR BLD AUTO: 87.1 %
NRBC # BLD AUTO: 0 10*3/UL
NRBC BLD AUTO-RTO: 0 /100
PLATELET # BLD AUTO: 371 10E9/L (ref 150–450)
POTASSIUM SERPL-SCNC: 4.8 MMOL/L (ref 3.4–5.3)
RBC # BLD AUTO: 4.37 10E12/L (ref 3.8–5.2)
SODIUM SERPL-SCNC: 142 MMOL/L (ref 133–144)
SPECIMEN SOURCE: ABNORMAL
WBC # BLD AUTO: 19.3 10E9/L (ref 4–11)

## 2017-03-07 PROCEDURE — 25000132 ZZH RX MED GY IP 250 OP 250 PS 637: Performed by: INTERNAL MEDICINE

## 2017-03-07 PROCEDURE — 25000125 ZZHC RX 250: Performed by: INTERNAL MEDICINE

## 2017-03-07 PROCEDURE — 94668 MNPJ CHEST WALL SBSQ: CPT

## 2017-03-07 PROCEDURE — 94640 AIRWAY INHALATION TREATMENT: CPT | Mod: 76

## 2017-03-07 PROCEDURE — 85025 COMPLETE CBC W/AUTO DIFF WBC: CPT | Performed by: INTERNAL MEDICINE

## 2017-03-07 PROCEDURE — 94799 UNLISTED PULMONARY SVC/PX: CPT

## 2017-03-07 PROCEDURE — 99232 SBSQ HOSP IP/OBS MODERATE 35: CPT | Performed by: HOSPITALIST

## 2017-03-07 PROCEDURE — 40000275 ZZH STATISTIC RCP TIME EA 10 MIN

## 2017-03-07 PROCEDURE — 94640 AIRWAY INHALATION TREATMENT: CPT

## 2017-03-07 PROCEDURE — 94667 MNPJ CHEST WALL 1ST: CPT

## 2017-03-07 PROCEDURE — 80048 BASIC METABOLIC PNL TOTAL CA: CPT | Performed by: INTERNAL MEDICINE

## 2017-03-07 PROCEDURE — 12000000 ZZH R&B MED SURG/OB

## 2017-03-07 PROCEDURE — 25000128 H RX IP 250 OP 636: Performed by: INTERNAL MEDICINE

## 2017-03-07 PROCEDURE — 40000893 ZZH STATISTIC PT IP EVAL DEFER: Performed by: PHYSICAL THERAPIST

## 2017-03-07 PROCEDURE — 36415 COLL VENOUS BLD VENIPUNCTURE: CPT | Performed by: INTERNAL MEDICINE

## 2017-03-07 RX ADMIN — NAPROXEN 250 MG: 250 TABLET ORAL at 17:03

## 2017-03-07 RX ADMIN — SUCRALFATE 1 G: 1 TABLET ORAL at 06:57

## 2017-03-07 RX ADMIN — ACETAMINOPHEN AND CODEINE PHOSPHATE 2 TABLET: 300; 30 TABLET ORAL at 01:43

## 2017-03-07 RX ADMIN — IPRATROPIUM BROMIDE AND ALBUTEROL SULFATE 3 ML: .5; 3 SOLUTION RESPIRATORY (INHALATION) at 07:10

## 2017-03-07 RX ADMIN — BUPROPION HYDROCHLORIDE 450 MG: 150 TABLET, FILM COATED, EXTENDED RELEASE ORAL at 09:55

## 2017-03-07 RX ADMIN — BUDESONIDE 0.5 MG: 0.5 INHALANT RESPIRATORY (INHALATION) at 07:08

## 2017-03-07 RX ADMIN — METHYLPREDNISOLONE SODIUM SUCCINATE 125 MG: 125 INJECTION, POWDER, FOR SOLUTION INTRAMUSCULAR; INTRAVENOUS at 17:04

## 2017-03-07 RX ADMIN — SUCRALFATE 1 G: 1 TABLET ORAL at 17:03

## 2017-03-07 RX ADMIN — IPRATROPIUM BROMIDE AND ALBUTEROL SULFATE 3 ML: .5; 3 SOLUTION RESPIRATORY (INHALATION) at 19:21

## 2017-03-07 RX ADMIN — CEFTRIAXONE SODIUM 1 G: 1 INJECTION, SOLUTION INTRAVENOUS at 15:11

## 2017-03-07 RX ADMIN — PANTOPRAZOLE SODIUM 40 MG: 40 TABLET, DELAYED RELEASE ORAL at 06:57

## 2017-03-07 RX ADMIN — ACETAMINOPHEN AND CODEINE PHOSPHATE 1 TABLET: 300; 30 TABLET ORAL at 18:39

## 2017-03-07 RX ADMIN — BUDESONIDE 0.5 MG: 0.5 INHALANT RESPIRATORY (INHALATION) at 07:09

## 2017-03-07 RX ADMIN — ACETAMINOPHEN AND CODEINE PHOSPHATE 1 TABLET: 300; 30 TABLET ORAL at 09:55

## 2017-03-07 RX ADMIN — METHYLPREDNISOLONE SODIUM SUCCINATE 125 MG: 125 INJECTION, POWDER, FOR SOLUTION INTRAMUSCULAR; INTRAVENOUS at 11:30

## 2017-03-07 RX ADMIN — METHYLPREDNISOLONE SODIUM SUCCINATE 125 MG: 125 INJECTION, POWDER, FOR SOLUTION INTRAMUSCULAR; INTRAVENOUS at 22:42

## 2017-03-07 RX ADMIN — AZITHROMYCIN MONOHYDRATE 500 MG: 500 INJECTION, POWDER, LYOPHILIZED, FOR SOLUTION INTRAVENOUS at 15:49

## 2017-03-07 RX ADMIN — IPRATROPIUM BROMIDE AND ALBUTEROL SULFATE 3 ML: .5; 3 SOLUTION RESPIRATORY (INHALATION) at 15:00

## 2017-03-07 RX ADMIN — IPRATROPIUM BROMIDE AND ALBUTEROL SULFATE 3 ML: .5; 3 SOLUTION RESPIRATORY (INHALATION) at 11:03

## 2017-03-07 RX ADMIN — METHYLPREDNISOLONE SODIUM SUCCINATE 125 MG: 125 INJECTION, POWDER, FOR SOLUTION INTRAMUSCULAR; INTRAVENOUS at 04:29

## 2017-03-07 RX ADMIN — ZOLPIDEM TARTRATE 10 MG: 10 TABLET, FILM COATED ORAL at 22:42

## 2017-03-07 RX ADMIN — SUCRALFATE 1 G: 1 TABLET ORAL at 11:36

## 2017-03-07 RX ADMIN — NAPROXEN 250 MG: 250 TABLET ORAL at 09:55

## 2017-03-07 ASSESSMENT — PAIN DESCRIPTION - DESCRIPTORS: DESCRIPTORS: SORE

## 2017-03-07 NOTE — PLAN OF CARE
Problem: Goal Outcome Summary  Goal: Goal Outcome Summary  Outcome: Improving  Pt tachy, all other VSS as charted.  On 2.5 LPM O2 with SATS up to 93%, lungs are diminished.  Getting VEST therapy, nebs, IV antbtx.  Ambulates in vazquez independently and stable on feet. Gets SOB after lengthy conversations with family and friends and encouraged to voice rest at times and let family know she needs to rest.  Pt c/o IV burning and requested IV be removed, IV removed and replaced.  Taking Tylenol #3 with codeine to relieve cough and chest pain from cough.  Call light in reach.  Appetite fair.      Face to face report given with opportunity to observe patient.    Report given to Gustavo Hall   3/6/2017  11:35 PM

## 2017-03-07 NOTE — PLAN OF CARE
Problem: Goal Outcome Summary  Goal: Goal Outcome Summary  Outcome: No Change  Order received for PT for pt requesting foam roller for calf tightness.  Pt is up ambulating hallways independently and states she is independently doing her stretches and exercises.  Discussed with pt that she could have family bring in a rolling pin (which is what she has been doing at home on her own).  Discussed that we are not able to bring our equipment up from outpatient PT as it is needed daily in outpatient setting.  Spoke with pt's primary PT Randi Barnhart who agrees that pt should continue to ambulate and work on stretching in her room and follow up as needed in outpatient PT.  Discussed all this information with pt and pt in agreement to continue mobilizing on her own.  PT eval deferred as not indicated at this time.

## 2017-03-07 NOTE — PLAN OF CARE
Problem: Patient Goal: Rt Focus  Goal: 1. Patient Goal: RT Focus  Outcome: Improving  Nebs given as ordered- vest therapy done- flutter added - Pt. Wears O2 as needed BS- dim with crackles in left base

## 2017-03-07 NOTE — PROGRESS NOTES
[facility]    Hospitalist Progress Note    Date of Service (when I saw the patient): 03/07/2017      Assessment & Plan   Annie Garcia is a 46 year old female who was admitted on 3/3/2017. She is feeling better today.      Acute broncitis- Rx as CAP with Rocephin and Azithromycin. : Day #4 of Ceftriaxone and Zithromax.    She has requested for Manual PNPD and that has worked better for her, does not want to use vest.    Cystic Fibrosis/ Bronchiectasis with exacerbation- Uses Tylenol #3. Continue. Nebs, O2, steroids.     Leukocytosis: True infection with PNA vs steroid use. Ct to monitor CBC and cultures    GI Px with PPI for gut protection and PUD- Continue carafate as well.     Anxiety/depression- Wellbutrin.         DVT Prophylaxis: Enoxaprain (Lovenox) SQ  Code Status: Full Code     Disposition: Probably in 1-2 days, today is the first day that she states she feels better.    Joamr Golden    -Data reviewed today: I reviewed all new labs and imaging results over the last 24 hours.    Physical Exam   Temp: 98.5  F (36.9  C) Temp src: Tympanic BP: 123/77   Heart Rate: 89 Resp: 20 SpO2: 94 % O2 Device: None (Room air) Oxygen Delivery: 2 LPM  Vitals:    03/03/17 2059 03/07/17 0157   Weight: 102.2 kg (225 lb 5 oz) 105.9 kg (233 lb 7.5 oz)     Vital Signs with Ranges  Temp:  [97.7  F (36.5  C)-98.5  F (36.9  C)] 98.5  F (36.9  C)  Heart Rate:  [] 89  Resp:  [18-20] 20  BP: (123-145)/(65-88) 123/77  SpO2:  [93 %-96 %] 94 %  I/O last 3 completed shifts:  In: 609 [P.O.:480; I.V.:129]  Out: -      Awake, alert, in minimal distress due to cough but lying in bed.  HEENT: Pupils equal, conjugate. No icterus. Oral mucosa moist. No facial asymmetry.   Neck: Supple, jugular veins not elevated. Trachea midline   Chest: No chest wall movement asymmetry.Scattered rales mostly on the left side.  Cardiac: RRR. S1, S2 unremarkable. No murmurs, rubs or gallops  Abdomen: Soft. No palpation or percussion  tenderness. No distention. Normoactive bowel sounds. Liver and spleen not increased in size. No bruits, masses, or pulsations.   Extremities: No lower extremity edema. Extremities warm distally.  Brisk capillary refill. No eccymoses, clubbing.   Neurologic: Mental state above. Motor 5/5 and bilaterally equal. Tone preserved. Sensation intact to light touch.    Peripheral IV 03/06/17 Left Upper arm (Active)   Site Assessment WDL 3/7/2017  9:27 AM   Line Status Infusing 3/7/2017  9:27 AM   Phlebitis Scale 0-->no symptoms 3/7/2017  9:27 AM   Infiltration Scale 0 3/7/2017  9:27 AM   Number of days:1       Medications        methylPREDNISolone  125 mg Intravenous Q6H     naproxen  250 mg Oral BID w/meals     budesonide  1 mg Nebulization Daily     pantoprazole  40 mg Oral QAM AC     cefTRIAXone  1 g Intravenous Q24H     azithromycin  500 mg Intravenous Q24H     sucralfate  1 g Oral 4x Daily AC & HS     buPROPion  450 mg Oral Daily       Data    Sputum C/S reveals gram stain of GNB in rods    Recent Labs  Lab 03/07/17  0520 03/06/17  0612 03/05/17  1157  03/03/17  1330   WBC 19.3* 23.0* 29.0*  < > 23.4*   HGB 12.2 12.2 12.9  < > 14.1   MCV 81 83 81  < > 82    334 367  < > 340    143 143  < > 140   POTASSIUM 4.8 3.6 3.6  < > 3.7   CHLORIDE 106 107 109  < > 103   CO2 29 28 24  < > 25   BUN 12 17 18  < > 20   CR 0.78 0.86 0.80  < > 0.97   ANIONGAP 7 8 10  < > 12   NALDO 9.2 8.7 9.1  < > 9.5   * 78 101*  < > 100*   ALBUMIN  --   --   --   --  3.6   PROTTOTAL  --   --   --   --  8.5   BILITOTAL  --   --   --   --  0.5   ALKPHOS  --   --   --   --  127   ALT  --   --   --   --  37   AST  --   --   --   --  19   < > = values in this interval not displayed.    No results found for this or any previous visit (from the past 24 hour(s)).

## 2017-03-07 NOTE — PLAN OF CARE
Problem: Goal Outcome Summary  Goal: Goal Outcome Summary  Outcome: No Change  Pt alert and oriented. Complains of pain from coughing in chest anterior and posterior. Requests tylenol 3 x1. Pt remains on 1.5L O2 sating mid 90's, pt taking independent walks on the unit. Pt lung sounds dim and fine crackles in bases. Face to face report given with opportunity to observe patient.     Report given to Teresa Padilla   3/7/2017  8:23 AM           Problem: Infection, Risk/Actual (Adult)  Goal: Identify Related Risk Factors and Signs and Symptoms  Related risk factors and signs and symptoms are identified upon initiation of Human Response Clinical Practice Guideline (CPG)   Outcome: No Change    03/06/17 2149   Infection, Risk/Actual   Infection, Risk/Actual: Related Risk Factors chronic illness/condition   Signs and Symptoms (Infection, Risk/Actual) heart rate increase;lab value changes       Goal: Infection Prevention/Resolution  Patient will demonstrate the desired outcomes by discharge/transition of care.   Outcome: No Change    03/07/17 0819   Infection, Risk/Actual (Adult)   Infection Prevention/Resolution making progress toward outcome

## 2017-03-07 NOTE — PLAN OF CARE
Problem: Goal Outcome Summary  Goal: Goal Outcome Summary  Outcome: No Change  Pt A&O x3. Pt up independently in room. She is on and off 1.5 LPM NC throughout the day. Sating from 88-92% on room air with movement and at rest with 1.5 LPM NC sating 90-91%. She has been up ambulating in hallway throughout the shift. Lung sounds are clear and diminished. She is coughing infrequently with yellow sputum with some green spots, per pt's report. IV running TKO and IV salu medrol continued. Call light within reach throughout shift and pt calls appropriately.      Face to face report given with opportunity to observe patient.  Report given to ARNAV Nunez.     Judith Gray  3/7/2017, 3:29 PM

## 2017-03-07 NOTE — PLAN OF CARE
Problem: Patient Goal: Rt Focus  Goal: 1. Patient Goal: RT Focus  Pt. Given nebs as ordered- pt. Asked to change to percussor instead of vest- MD aware-tolerates well- flutter also used

## 2017-03-07 NOTE — PLAN OF CARE
Sepsis alert came but after increased activity induced HR.  No lactic ordered, have continued to monitor and HR decreased

## 2017-03-08 PROCEDURE — 99232 SBSQ HOSP IP/OBS MODERATE 35: CPT | Performed by: INTERNAL MEDICINE

## 2017-03-08 PROCEDURE — 94640 AIRWAY INHALATION TREATMENT: CPT | Mod: 76

## 2017-03-08 PROCEDURE — 94668 MNPJ CHEST WALL SBSQ: CPT

## 2017-03-08 PROCEDURE — 40000275 ZZH STATISTIC RCP TIME EA 10 MIN

## 2017-03-08 PROCEDURE — 25000125 ZZHC RX 250: Performed by: INTERNAL MEDICINE

## 2017-03-08 PROCEDURE — 12000000 ZZH R&B MED SURG/OB

## 2017-03-08 PROCEDURE — 25000132 ZZH RX MED GY IP 250 OP 250 PS 637: Performed by: INTERNAL MEDICINE

## 2017-03-08 PROCEDURE — 94640 AIRWAY INHALATION TREATMENT: CPT

## 2017-03-08 PROCEDURE — 25000128 H RX IP 250 OP 636: Performed by: INTERNAL MEDICINE

## 2017-03-08 PROCEDURE — 94669 MECHANICAL CHEST WALL OSCILL: CPT

## 2017-03-08 RX ORDER — PREDNISONE 20 MG/1
40 TABLET ORAL DAILY
Status: DISCONTINUED | OUTPATIENT
Start: 2017-03-09 | End: 2017-03-08

## 2017-03-08 RX ORDER — PREDNISONE 20 MG/1
40 TABLET ORAL
Status: DISCONTINUED | OUTPATIENT
Start: 2017-03-08 | End: 2017-03-09 | Stop reason: HOSPADM

## 2017-03-08 RX ORDER — CEFUROXIME AXETIL 250 MG/1
250 TABLET ORAL 2 TIMES DAILY WITH MEALS
Status: DISCONTINUED | OUTPATIENT
Start: 2017-03-08 | End: 2017-03-09 | Stop reason: HOSPADM

## 2017-03-08 RX ORDER — METHYLPREDNISOLONE SODIUM SUCCINATE 125 MG/2ML
60 INJECTION, POWDER, LYOPHILIZED, FOR SOLUTION INTRAMUSCULAR; INTRAVENOUS EVERY 12 HOURS
Status: DISCONTINUED | OUTPATIENT
Start: 2017-03-08 | End: 2017-03-08

## 2017-03-08 RX ORDER — AZITHROMYCIN 250 MG/1
250 TABLET, FILM COATED ORAL DAILY
Status: DISCONTINUED | OUTPATIENT
Start: 2017-03-08 | End: 2017-03-09 | Stop reason: HOSPADM

## 2017-03-08 RX ADMIN — IPRATROPIUM BROMIDE AND ALBUTEROL SULFATE 3 ML: .5; 3 SOLUTION RESPIRATORY (INHALATION) at 09:03

## 2017-03-08 RX ADMIN — SUCRALFATE 1 G: 1 TABLET ORAL at 07:06

## 2017-03-08 RX ADMIN — ZOLPIDEM TARTRATE 10 MG: 10 TABLET, FILM COATED ORAL at 22:26

## 2017-03-08 RX ADMIN — AZITHROMYCIN 250 MG: 250 TABLET, FILM COATED ORAL at 08:19

## 2017-03-08 RX ADMIN — POLYETHYLENE GLYCOL 3350 17 G: 17 POWDER, FOR SOLUTION ORAL at 01:34

## 2017-03-08 RX ADMIN — CEFUROXIME AXETIL 250 MG: 250 TABLET ORAL at 17:02

## 2017-03-08 RX ADMIN — METHYLPREDNISOLONE SODIUM SUCCINATE 125 MG: 125 INJECTION, POWDER, FOR SOLUTION INTRAMUSCULAR; INTRAVENOUS at 05:34

## 2017-03-08 RX ADMIN — SUCRALFATE 1 G: 1 TABLET ORAL at 17:02

## 2017-03-08 RX ADMIN — PREDNISONE 40 MG: 20 TABLET ORAL at 11:10

## 2017-03-08 RX ADMIN — ACETAMINOPHEN AND CODEINE PHOSPHATE 1 TABLET: 300; 30 TABLET ORAL at 13:57

## 2017-03-08 RX ADMIN — IPRATROPIUM BROMIDE AND ALBUTEROL SULFATE 3 ML: .5; 3 SOLUTION RESPIRATORY (INHALATION) at 20:21

## 2017-03-08 RX ADMIN — ACETAMINOPHEN AND CODEINE PHOSPHATE 1 TABLET: 300; 30 TABLET ORAL at 01:12

## 2017-03-08 RX ADMIN — SUCRALFATE 1 G: 1 TABLET ORAL at 11:10

## 2017-03-08 RX ADMIN — BUPROPION HYDROCHLORIDE 450 MG: 150 TABLET, FILM COATED, EXTENDED RELEASE ORAL at 08:19

## 2017-03-08 RX ADMIN — PANTOPRAZOLE SODIUM 40 MG: 40 TABLET, DELAYED RELEASE ORAL at 07:06

## 2017-03-08 RX ADMIN — IPRATROPIUM BROMIDE AND ALBUTEROL SULFATE 3 ML: .5; 3 SOLUTION RESPIRATORY (INHALATION) at 14:54

## 2017-03-08 RX ADMIN — ACETAMINOPHEN AND CODEINE PHOSPHATE 1 TABLET: 300; 30 TABLET ORAL at 19:56

## 2017-03-08 RX ADMIN — CEFUROXIME AXETIL 250 MG: 250 TABLET ORAL at 08:18

## 2017-03-08 RX ADMIN — BUDESONIDE 1 MG: 0.5 INHALANT RESPIRATORY (INHALATION) at 09:02

## 2017-03-08 ASSESSMENT — PAIN DESCRIPTION - DESCRIPTORS: DESCRIPTORS: SORE

## 2017-03-08 NOTE — PLAN OF CARE
Problem: Goal Outcome Summary  Goal: Goal Outcome Summary  Outcome: Improving  Pt VSS as charted, lungs are clear but dim and on 1.5 LPM O@ with SATS 93% and 88% on room air.  Pt ambulates in vazquez independently. IV antbtx infused and getting IV Solumedrol, nebs, inhalers and Percussion therapy.  Takes Tylenol 3 with Codeine for cough.  Call light in reach.     Face to face report given with opportunity to observe patient.    Report given to Silvia Hall   3/7/2017  11:59 PM

## 2017-03-08 NOTE — PROGRESS NOTES
[facility]    Hospitalist Progress Note    Date of Service (when I saw the patient):     1. Cystic Fibrosis patient with H. Influenza pneumonia:  Hospital day 5, is doing better.  No fevers  Is on Room air.  Feels alot better  Has received pummeling yesterday and able to clear more secretions. Is on Rocephin and azithromycin both which would cover the H flu.  Is still on a lot  solumedrol 125 every 6 hours.  Will changeover to oral abx today azithromycin and Ceftin.  Cut back on the solumedrol and put her on oral prednisone  Tomorrow  Continue with the pummeling and if all goes well maybe home in am .    2.  Cardiac:  Vitals stable no issues with BP or HR.    3.  Depression :  continuing her antidepressants no issues currently..    3.  Gi:  Is on Protonix and Carafate having no complaints      DVT Prophylaxis: Pneumatic Compression Devices  Code Status: Full Code    Disposition: Expected discharge in 1-2 days once on oral agents and stable pulmonary naqvi..    [place]    Interval History   Feels better this am  Pummeling helping a lot.  Is less sob  On room air most of time now.  No fevers. No GI issues  Eating fine.  Will likely be able to go home tomorrow.    -Data reviewed today: I reviewed all new labs and imaging results over the last 24 hours. I personally reviewed no images or EKG's today.    Physical Exam   Temp: 98.2  F (36.8  C) Temp src: Tympanic BP: 135/68   Heart Rate: 88 Resp: 18 SpO2: 92 % O2 Device: None (Room air) Oxygen Delivery: 1.5 LPM  Vitals:    03/03/17 2059 03/07/17 0157   Weight: 102.2 kg (225 lb 5 oz) 105.9 kg (233 lb 7.5 oz)     Vital Signs with Ranges  Temp:  [98  F (36.7  C)-98.5  F (36.9  C)] 98.2  F (36.8  C)  Heart Rate:  [88-93] 88  Resp:  [18-20] 18  BP: (123-135)/(68-77) 135/68  SpO2:  [92 %-94 %] 92 %  I/O last 3 completed shifts:  In: 600 [P.O.:600]  Out: -     Peripheral IV 03/06/17 Left Upper arm (Active)   Site Assessment WDL 3/8/2017  1:16 AM   Line Status  Infusing;Checked every 1-2 hour 3/8/2017  1:16 AM   Phlebitis Scale 0-->no symptoms 3/8/2017  1:16 AM   Infiltration Scale 0 3/8/2017  1:16 AM   Number of days:2     No line/device    Constitutional: Alert and oriented x3. No distress    HEENT: NC/AT, PERRL, EOMI, mouth moist, neck supple, no LN.     Cardiovascular: RRR. no Murmur, no  rubs, or gallops.  JVD no.  Bruits no.  Pulses 2+    Respiratory:  Rare crackle bases left> right.    Abdomen: Soft, NTND, no organomegaly. Bowel sounds present    Extremities: Warm/dry. No edema    Neuro:   Non focal, cranial nerves intact, Moves all extremities.    Medications        azithromycin  250 mg Oral Daily     cefUROXime  250 mg Oral BID w/meals     methylPREDNISolone  62.5 mg Intravenous Q12H     [START ON 3/9/2017] predniSONE  40 mg Oral Daily     naproxen  250 mg Oral BID w/meals     budesonide  1 mg Nebulization Daily     pantoprazole  40 mg Oral QAM AC     sucralfate  1 g Oral 4x Daily AC & HS     buPROPion  450 mg Oral Daily       Data     Recent Labs  Lab 03/07/17  0520 03/06/17  0612 03/05/17  1157  03/03/17  1330   WBC 19.3* 23.0* 29.0*  < > 23.4*   HGB 12.2 12.2 12.9  < > 14.1   MCV 81 83 81  < > 82    334 367  < > 340    143 143  < > 140   POTASSIUM 4.8 3.6 3.6  < > 3.7   CHLORIDE 106 107 109  < > 103   CO2 29 28 24  < > 25   BUN 12 17 18  < > 20   CR 0.78 0.86 0.80  < > 0.97   ANIONGAP 7 8 10  < > 12   NALDO 9.2 8.7 9.1  < > 9.5   * 78 101*  < > 100*   ALBUMIN  --   --   --   --  3.6   PROTTOTAL  --   --   --   --  8.5   BILITOTAL  --   --   --   --  0.5   ALKPHOS  --   --   --   --  127   ALT  --   --   --   --  37   AST  --   --   --   --  19   < > = values in this interval not displayed.  Lactic Acid   Date Value Ref Range Status   03/03/2017 1.3 0.4 - 2.0 mmol/L Final   03/03/2017 3.6 (H) 0.4 - 2.0 mmol/L Final       No results found for this or any previous visit (from the past 24 hour(s)).

## 2017-03-08 NOTE — PLAN OF CARE
Face to face report given with opportunity to observe patient.    Report given to Catie Laurent   3/8/2017  7:19 AM

## 2017-03-08 NOTE — PLAN OF CARE
Problem: Goal Outcome Summary  Goal: Goal Outcome Summary  Outcome: Improving  IV dc'd  & switched to oral  meds, BBS decreased with a dry cough ( states sm amt sl yellowish production ), RA sat = 93%, is OOB independently to ambulate in halls, commented on how hospital vest more effective than one used at home. VSS-afebrile. 10:35  dc'd OBS goals/status. 13:57 sl anxious, talking non-stop-O2 sat checked=95% on 1LNP. Given one tylenol #3 for C/O #5 coughing discomforts per pt request. Filled RX obtained from pharmacy-pt signed for them & given back to her to take home as she was very concerned they would be lost.

## 2017-03-09 VITALS
TEMPERATURE: 98.9 F | BODY MASS INDEX: 37.52 KG/M2 | WEIGHT: 233.47 LBS | SYSTOLIC BLOOD PRESSURE: 141 MMHG | HEIGHT: 66 IN | RESPIRATION RATE: 20 BRPM | DIASTOLIC BLOOD PRESSURE: 81 MMHG | HEART RATE: 100 BPM | OXYGEN SATURATION: 93 %

## 2017-03-09 DIAGNOSIS — E84.0 CYSTIC FIBROSIS WITH PULMONARY EXACERBATION (H): Primary | ICD-10-CM

## 2017-03-09 LAB
ANION GAP SERPL CALCULATED.3IONS-SCNC: 5 MMOL/L (ref 3–14)
BACTERIA SPEC CULT: NORMAL
BACTERIA SPEC CULT: NORMAL
BASOPHILS # BLD AUTO: 0 10E9/L (ref 0–0.2)
BASOPHILS NFR BLD AUTO: 0.3 %
BUN SERPL-MCNC: 19 MG/DL (ref 7–30)
CALCIUM SERPL-MCNC: 8.3 MG/DL (ref 8.5–10.1)
CHLORIDE SERPL-SCNC: 107 MMOL/L (ref 94–109)
CO2 SERPL-SCNC: 31 MMOL/L (ref 20–32)
CREAT SERPL-MCNC: 0.8 MG/DL (ref 0.52–1.04)
DIFFERENTIAL METHOD BLD: ABNORMAL
EOSINOPHIL # BLD AUTO: 0 10E9/L (ref 0–0.7)
EOSINOPHIL NFR BLD AUTO: 0.3 %
ERYTHROCYTE [DISTWIDTH] IN BLOOD BY AUTOMATED COUNT: 13.9 % (ref 10–15)
GFR SERPL CREATININE-BSD FRML MDRD: 76 ML/MIN/1.7M2
GLUCOSE SERPL-MCNC: 91 MG/DL (ref 70–99)
HCT VFR BLD AUTO: 33.8 % (ref 35–47)
HGB BLD-MCNC: 11.2 G/DL (ref 11.7–15.7)
IMM GRANULOCYTES # BLD: 0.7 10E9/L (ref 0–0.4)
IMM GRANULOCYTES NFR BLD: 4.9 %
LYMPHOCYTES # BLD AUTO: 3.1 10E9/L (ref 0.8–5.3)
LYMPHOCYTES NFR BLD AUTO: 20.6 %
Lab: NORMAL
Lab: NORMAL
MCH RBC QN AUTO: 27.4 PG (ref 26.5–33)
MCHC RBC AUTO-ENTMCNC: 33.1 G/DL (ref 31.5–36.5)
MCV RBC AUTO: 83 FL (ref 78–100)
MICRO REPORT STATUS: NORMAL
MICRO REPORT STATUS: NORMAL
MONOCYTES # BLD AUTO: 1 10E9/L (ref 0–1.3)
MONOCYTES NFR BLD AUTO: 6.5 %
NEUTROPHILS # BLD AUTO: 10.3 10E9/L (ref 1.6–8.3)
NEUTROPHILS NFR BLD AUTO: 67.4 %
NRBC # BLD AUTO: 0 10*3/UL
NRBC BLD AUTO-RTO: 0 /100
PLATELET # BLD AUTO: 316 10E9/L (ref 150–450)
POTASSIUM SERPL-SCNC: 3.8 MMOL/L (ref 3.4–5.3)
RBC # BLD AUTO: 4.09 10E12/L (ref 3.8–5.2)
SODIUM SERPL-SCNC: 143 MMOL/L (ref 133–144)
SPECIMEN SOURCE: NORMAL
SPECIMEN SOURCE: NORMAL
WBC # BLD AUTO: 15.2 10E9/L (ref 4–11)

## 2017-03-09 PROCEDURE — 99238 HOSP IP/OBS DSCHRG MGMT 30/<: CPT | Performed by: INTERNAL MEDICINE

## 2017-03-09 PROCEDURE — 94640 AIRWAY INHALATION TREATMENT: CPT

## 2017-03-09 PROCEDURE — 25000132 ZZH RX MED GY IP 250 OP 250 PS 637: Performed by: INTERNAL MEDICINE

## 2017-03-09 PROCEDURE — 40000275 ZZH STATISTIC RCP TIME EA 10 MIN

## 2017-03-09 PROCEDURE — 94640 AIRWAY INHALATION TREATMENT: CPT | Mod: 76

## 2017-03-09 PROCEDURE — 80048 BASIC METABOLIC PNL TOTAL CA: CPT | Performed by: INTERNAL MEDICINE

## 2017-03-09 PROCEDURE — 25000125 ZZHC RX 250: Performed by: INTERNAL MEDICINE

## 2017-03-09 PROCEDURE — 85025 COMPLETE CBC W/AUTO DIFF WBC: CPT | Performed by: INTERNAL MEDICINE

## 2017-03-09 PROCEDURE — 36415 COLL VENOUS BLD VENIPUNCTURE: CPT | Performed by: INTERNAL MEDICINE

## 2017-03-09 PROCEDURE — 94668 MNPJ CHEST WALL SBSQ: CPT

## 2017-03-09 RX ORDER — FLUCONAZOLE 100 MG/1
TABLET ORAL
Qty: 30 TABLET | Refills: 0 | Status: SHIPPED | OUTPATIENT
Start: 2017-03-09 | End: 2017-07-14

## 2017-03-09 RX ORDER — FLUCONAZOLE 100 MG/1
100 TABLET ORAL DAILY
Status: DISCONTINUED | OUTPATIENT
Start: 2017-03-09 | End: 2017-03-09 | Stop reason: HOSPADM

## 2017-03-09 RX ORDER — CEFUROXIME AXETIL 250 MG/1
250 TABLET ORAL 2 TIMES DAILY WITH MEALS
Qty: 16 TABLET | Refills: 0 | Status: SHIPPED | OUTPATIENT
Start: 2017-03-09 | End: 2017-03-17

## 2017-03-09 RX ORDER — PREDNISONE 10 MG/1
TABLET ORAL
Qty: 26 TABLET | Refills: 0 | Status: SHIPPED | OUTPATIENT
Start: 2017-03-09 | End: 2017-08-07

## 2017-03-09 RX ADMIN — IPRATROPIUM BROMIDE AND ALBUTEROL SULFATE 3 ML: .5; 3 SOLUTION RESPIRATORY (INHALATION) at 07:37

## 2017-03-09 RX ADMIN — CEFUROXIME AXETIL 250 MG: 250 TABLET ORAL at 16:38

## 2017-03-09 RX ADMIN — ACETAMINOPHEN AND CODEINE PHOSPHATE 1 TABLET: 300; 30 TABLET ORAL at 16:38

## 2017-03-09 RX ADMIN — SUCRALFATE 1 G: 1 TABLET ORAL at 06:52

## 2017-03-09 RX ADMIN — PREDNISONE 40 MG: 20 TABLET ORAL at 12:15

## 2017-03-09 RX ADMIN — CEFUROXIME AXETIL 250 MG: 250 TABLET ORAL at 08:40

## 2017-03-09 RX ADMIN — ACETAMINOPHEN AND CODEINE PHOSPHATE 1 TABLET: 300; 30 TABLET ORAL at 00:16

## 2017-03-09 RX ADMIN — BUDESONIDE 1 MG: 0.5 INHALANT RESPIRATORY (INHALATION) at 07:35

## 2017-03-09 RX ADMIN — ACETAMINOPHEN AND CODEINE PHOSPHATE 2 TABLET: 300; 30 TABLET ORAL at 12:18

## 2017-03-09 RX ADMIN — PANTOPRAZOLE SODIUM 40 MG: 40 TABLET, DELAYED RELEASE ORAL at 06:52

## 2017-03-09 RX ADMIN — SUCRALFATE 1 G: 1 TABLET ORAL at 12:16

## 2017-03-09 RX ADMIN — IPRATROPIUM BROMIDE AND ALBUTEROL SULFATE 3 ML: .5; 3 SOLUTION RESPIRATORY (INHALATION) at 14:04

## 2017-03-09 RX ADMIN — BUPROPION HYDROCHLORIDE 450 MG: 150 TABLET, FILM COATED, EXTENDED RELEASE ORAL at 08:40

## 2017-03-09 RX ADMIN — AZITHROMYCIN 250 MG: 250 TABLET, FILM COATED ORAL at 08:40

## 2017-03-09 RX ADMIN — SUCRALFATE 1 G: 1 TABLET ORAL at 16:41

## 2017-03-09 RX ADMIN — FLUCONAZOLE 100 MG: 100 TABLET ORAL at 08:40

## 2017-03-09 ASSESSMENT — PAIN DESCRIPTION - DESCRIPTORS: DESCRIPTORS: SORE

## 2017-03-09 NOTE — PLAN OF CARE
Face to face report given with opportunity to observe patient.    Report given to Silvia RAMÍREZ RN.    Catie Costa RN.  3/8/2017  11:13 PM

## 2017-03-09 NOTE — PLAN OF CARE
Pt wishes to make own follow up appt, Alaina to bring up pt Ceftin before DC.   Pt aware.    Face to face report given with opportunity to observe patient.    Report given to ARNAV Jamison   3/9/2017  3:23 PM

## 2017-03-09 NOTE — PLAN OF CARE
Problem: Goal Outcome Summary  Goal: Goal Outcome Summary  Outcome: Improving  Temp: 98.4  F (36.9  C) Temp src: Tympanic BP: 145/76   Heart Rate: 86 Resp: 18 SpO2: 95 % O2 Device: None (Room air)   Pt A&O x4, c/o pain with coughing. Tylenol 3 given x1 with relief. LS clear, diminished. BS active. Pt independent in room, makes needs known. Safety checks and rounding complete, call light within reach, no falls or injury this shift.     Problem: Infection, Risk/Actual (Adult)  Goal: Identify Related Risk Factors and Signs and Symptoms  Related risk factors and signs and symptoms are identified upon initiation of Human Response Clinical Practice Guideline (CPG)   Outcome: Improving    03/09/17 0546   Infection, Risk/Actual   Infection, Risk/Actual: Related Risk Factors chronic illness/condition   Signs and Symptoms (Infection, Risk/Actual) lab value changes       Goal: Infection Prevention/Resolution  Patient will demonstrate the desired outcomes by discharge/transition of care.   Outcome: Improving    03/09/17 0546   Infection, Risk/Actual (Adult)   Infection Prevention/Resolution making progress toward outcome

## 2017-03-09 NOTE — PLAN OF CARE
Problem: Goal Outcome Summary  Goal: Goal Outcome Summary  Outcome: Improving  Remains independent in activity, required one tylenol #3 for #5 coughing soreness with relief, fan on in room for comfort, VSS, BBS decreased, spouse in to visit at end of shift.

## 2017-03-09 NOTE — PLAN OF CARE
Face to face report given with opportunity to observe patient.    Report given to Deja Laurent   3/9/2017  7:08 AM

## 2017-03-09 NOTE — PROGRESS NOTES
Annie is up in her chair. Plans to discharge home around 6:00 this evening. Her  will transport. She feels they have adequate resources for necessities. Expressed some concern about insurance coverage for this stay; she was invited to call work with our patient financial counselors on this (she did not want to talk to them now/today). No other needs voiced at this time.

## 2017-03-09 NOTE — DISCHARGE SUMMARY
DATE OF ADMISSION:  03/03/2017      DATE OF DISCHARGE:  03/09/2017      ADMISSION DIAGNOSIS:  Cystic fibrosis exacerbation.      DISCHARGE DIAGNOSES:   1.  Cystic fibrosis with exacerbation.   2.  Haemophilus influenza pneumonia.   3.  Acute respiratory failure with hypoxia.      HOSPITAL COURSE:  Annie Garcia is a 46-year-old female who carries a diagnosis of atypical cystic fibrosis.  She is followed by Dr. Herman of Pulmonology.  She has been on sort of alternating rounds of antibiotic therapy, erythromycin and I believe doxycycline.  Occasional rounds of some prednisone also.  Right around 03/01 she developed sort of some symptoms, some cough and congestion, came to the ER, was put round of some steroids and some doxycycline.  Went home, over the next couple days seemingly got worse with increasing shortness of breath.  Her O2 sats at home were down in mid 80% range.  She came into our ER for evaluation.  She was hypoxic, was placed on O2 and 4 liters kept her sats above 90%.  She had no fevers.  White count was elevated at 23,000.  She had arterial blood gas done:  A pH of 7.45, pCO2 of 34, pO2 of 62, bicarbonate of 23, 92% sat on 36% FiO2.  Electrolytes were normal.  Creatinine was 0.97.  Hemoglobin was 14.1.  She was hemodynamically stable at the time.  She had blood cultures performed.  She was started on IV Solu-Medrol 125 q.4 hours and azithromycin and Rocephin for community-acquired pneumonia.  Over the next days during her hospital stay, she slowly improved initially using a vest, but then had switched over to some pummeling which really seemed to clear out her secretions a lot better in terms of her reports.  After about 4 days of high-dose steroids, she was switched over to prednisone 40 mg daily.  Her cultures done and sputum that she had done in the ER prior to admission showed Haemophilus influenza, which was sensitive to Augmentin, ceftriaxone and levofloxacin.  She has not been able to  tolerate levofloxacin recently because of some Achilles tendinitis.  So she was kept on those two, the azithromycin and Rocephin,  which she has sensitive to and she has seemed to do well.  Her white count gradually reduced as we cut down her steroids.  She was able to get off of oxygen and with the pummeling and nebs and using Tylenol #3 for cough, she did improve to the point now where she is on room air with O2 sats that have been in the 93% to 95% range.  Feels a little fatigued, had a little GI distress, but has not been back on her proton pump inhibitor and the Carafate and has been doing well.  She has had no peripheral edema.  No fevers at all.  Her white count at this point has come back down, has declined further.  Today it was down to 15,000 with hemoglobin of 11.2, blood sugar was 91.  BUN and creatinine were 19 and 0.8 respectively.  We switched her over to the oral azithromycin and oral Ceftin and she continues to do well.  She had been on oral prednisone now.  We have started her on some fluconazole also because she does easily get yeast infections.  At this point, she is up ambulating.  She feels fatigued and tired, but is actually doing very well, so she is ready for discharge today.  As I said, she did grow hemophilus influenza from her sputum.  We will be sending her home on the following medications:  If she has gotten definitely a full course of azithromycin and we will not continue it that at this point.      DISCHARGE MEDICATIONS:     1.  Ceftin 250 p.o. b.i.d. for the next 8 days.     2.  She will be on a prednisone taper.  She will take 40 mg for 2 more days, 30 mg for 3 days, 20 mg for 3 days, 10 mg for 3 days, then discontinue.     3.  Fluconazole 100 mg daily while she is on the antibiotics.     4.  She has Tylenol #3 takes 1-2 every 4 hours p.r.n. for her cough.   5.  Ambien 10 mg at bedtime p.r.n. sleep problems.   6.  Brovana nebs b.i.d.   7.  Pulmicort nebs daily.   8.  Dexilant 50  mg, takes 1 capsule daily.   9.  Hydroxyzine 1-2 tabs every 6 hours for itching.   10.  Xopenex nebs every 6 hours p.r.n. shortness of breath.     11.  Pulmozyme 1 vial daily nebulizer as needed.   12.  Carafate 1 gram 4 times a day.   13.  Valium 10 mg every 6 hours p.r.n. anxiety.    14.  Wellbutrin- mg, she takes 3 pills a day or 150 mg.     15.  While she is on the Ceftin, she will stop the doxycycline and erythromycin.        DISCHARGE INSTRUCTIONS:  I would like to have her follow up with Dr. Herman within the next 2 weeks, sooner if there are other problems, Todd Torres after she sees Dr. Herman.  She is a full code.  Activity is as tolerated.  Diet is a regular diet.         ENZO CHO MD             D: 2017 14:33   T: 2017 15:05   MT: KLEVER      Name:     DOLORES CARTWRIGHT   MRN:      [id number]        Account:        [id number]   :      1970           Admit Date:                                       Discharge Date:       Document: [id number]       cc: Todd Herman MD

## 2017-03-09 NOTE — DISCHARGE INSTRUCTIONS
What to expect when you have contrast    During your exam, we will inject  contrast  into your vein or artery. (Contrast is a clear liquid with iodine in it. It shows up on X-rays.)    You may feel warm or hot. You may have a metal taste in your mouth and a slight upset stomach. You may also feel pressure near the kidneys and bladder. These effects will last about 1 to 3 minutes.    Please tell us if you have:    Sneezing     Itching    Hives     Swelling in the face    A hoarse voice    Breathing problems    Other new symptoms    Serious problems are rare.  They may include:    Irregular heartbeat     Seizures    Kidney failure              Tissue damage    Shock      Death    If you have any problems during the exam, we  will treat them right away.    When you get home    Call your hospital if you have any new symptoms in the next 2 days, like hives or swelling. (Phone numbers are at the bottom of this page.) Or call your family doctor.     If you have wheezing or trouble breathing, call 911.    Self-care  -Drink at least 4 extra glasses of water today.   This reduces the stress on your kidneys.  -Keep taking your regular medicines.    The contrast will pass out of your body in your  Urine(pee). This will happen in the next 24 hours. You  will not feel this. Your urine will not  change color.    If you have kidney problems or take metformin    Drink 4 to 8 large glasses of water for the next  2 days, if you are not on a fluid restriction.    ?If you take metformin (Glucophage or Glucovance) for diabetes, keep taking it.      ?Your kidney function tests are abnormal.  If you take Metformin, do not take it for 48 hours. Please go to your clinic for a blood test within 3 days after your exam before the restarting this medicine.     (Note to provider:please give patient prescription for lab tests.)    ?Special instructions: ***    I have read and understand the above information.    Patient Sign  Here:______________________________________Date:________Time:______    Staff Sign Here:________________________________________Date:_______Time:______      Radiology Departments:     ?[facility]: [phone] ?[place]: [phone]     ?[place]: [phone] ?[facility]:[phone]      ?[place]: [phone]  ?[place]: [phone]  ?[place]:[phone]    ?[facility]:[phone]  ?[facility]:[phone]    You will need to schedule a follow up appointment to see Dr. Herman within 2 weeks of your discharge from the hospital. Please call [phone] to schedule your appointment.

## 2017-03-09 NOTE — PLAN OF CARE
"Problem: Pneumonia (Adult)  Goal: Signs and Symptoms of Listed Potential Problems Will be Absent or Manageable (Pneumonia)  Signs and symptoms of listed potential problems will be absent or manageable by discharge/transition of care (reference Pneumonia (Adult) CPG).   Outcome: Improving  /75  Pulse 100  Temp 98.4  F (36.9  C) (Tympanic)  Resp 16  Ht 1.676 m (5' 6\")  Wt 105.9 kg (233 lb 7.5 oz)  SpO2 94%  BMI 37.68 kg/m2     Pt alert and oriented upon initial assessment. C/O pain in chest/ribs from coughing. Satting low 90s on RA. Frequent cough, only productive post pulmonary percussion.  Lungs are dim JOCELYN. HR tachy, baseline for pt. BS active. IND in room, walking halls, tolerating well. No IV in place. Very good appetite. No falls or injuries this shift. Will continue to monitor.       "

## 2017-03-10 ENCOUNTER — TELEPHONE (OUTPATIENT)
Dept: CASE MANAGEMENT | Facility: HOSPITAL | Age: 47
End: 2017-03-10

## 2017-03-10 NOTE — PLAN OF CARE
Patient discharged at 6:00PM via wheel chair accompanied by  staff. Prescriptions sent to patients preferred pharmacy - hard -signed copy of Tylenol #3 given to pt.. All belongings sent with patient. Yes.    Discharge instructions reviewed with pt. Listed belongings gathered and returned to patient. Yes.    Patient discharged to N/A..   Report called to N/A.    Core Measures and Vaccines  Core Measures applicable during stay: Yes. If yes, state diagnosis: pneumonia.  Pneumonia and Influenza given prior to discharge, if indicated: N/A    Surgical Patient   Surgical Procedures during stay: N/A.  Did patient receive discharge instruction on wound care and recognition of infection symptoms? N/A    MISC  Follow up appointment made:  Pt wants to make own due to busy home life schedule.  Home and hospital aquired medications returned to patient: Yes  Patient reports pain was well managed at discharge: Yes-denies pain upon discharge.

## 2017-03-10 NOTE — PLAN OF CARE
Problem: Goal Outcome Summary  Goal: Goal Outcome Summary  Outcome: Adequate for Discharge Date Met:  03/09/17  States she has adequate lung function & feels safe / comfortable to go home. VSS-afebrile & sats WNL. Discharged to home via whch with belongings, RX slip & discharge papers.

## 2017-03-11 ENCOUNTER — TELEPHONE (OUTPATIENT)
Dept: CASE MANAGEMENT | Facility: HOSPITAL | Age: 47
End: 2017-03-11

## 2017-03-20 DIAGNOSIS — E84.9 CF (CYSTIC FIBROSIS) (H): Primary | ICD-10-CM

## 2017-03-20 LAB
ALBUMIN SERPL-MCNC: 3.4 G/DL (ref 3.4–5)
ALP SERPL-CCNC: 59 U/L (ref 40–150)
ALT SERPL W P-5'-P-CCNC: 30 U/L (ref 0–50)
ANION GAP SERPL CALCULATED.3IONS-SCNC: 8 MMOL/L (ref 3–14)
AST SERPL W P-5'-P-CCNC: 9 U/L (ref 0–45)
BASOPHILS # BLD AUTO: 0.1 10E9/L (ref 0–0.2)
BASOPHILS NFR BLD AUTO: 0.3 %
BILIRUB SERPL-MCNC: 0.2 MG/DL (ref 0.2–1.3)
BUN SERPL-MCNC: 16 MG/DL (ref 7–30)
CALCIUM SERPL-MCNC: 8.7 MG/DL (ref 8.5–10.1)
CHLORIDE SERPL-SCNC: 106 MMOL/L (ref 94–109)
CO2 SERPL-SCNC: 28 MMOL/L (ref 20–32)
CREAT SERPL-MCNC: 0.96 MG/DL (ref 0.52–1.04)
CRP SERPL-MCNC: <2.9 MG/L (ref 0–8)
DIFFERENTIAL METHOD BLD: ABNORMAL
EOSINOPHIL # BLD AUTO: 0.2 10E9/L (ref 0–0.7)
EOSINOPHIL NFR BLD AUTO: 1 %
ERYTHROCYTE [DISTWIDTH] IN BLOOD BY AUTOMATED COUNT: 14.5 % (ref 10–15)
ERYTHROCYTE [SEDIMENTATION RATE] IN BLOOD BY WESTERGREN METHOD: 7 MM/H (ref 0–20)
GFR SERPL CREATININE-BSD FRML MDRD: 62 ML/MIN/1.7M2
GLUCOSE SERPL-MCNC: 113 MG/DL (ref 70–99)
HCT VFR BLD AUTO: 40.4 % (ref 35–47)
HGB BLD-MCNC: 13.5 G/DL (ref 11.7–15.7)
IMM GRANULOCYTES # BLD: 0.1 10E9/L (ref 0–0.4)
IMM GRANULOCYTES NFR BLD: 0.6 %
LYMPHOCYTES # BLD AUTO: 3 10E9/L (ref 0.8–5.3)
LYMPHOCYTES NFR BLD AUTO: 19.9 %
MCH RBC QN AUTO: 28.4 PG (ref 26.5–33)
MCHC RBC AUTO-ENTMCNC: 33.4 G/DL (ref 31.5–36.5)
MCV RBC AUTO: 85 FL (ref 78–100)
MONOCYTES # BLD AUTO: 0.7 10E9/L (ref 0–1.3)
MONOCYTES NFR BLD AUTO: 4.7 %
NEUTROPHILS # BLD AUTO: 11 10E9/L (ref 1.6–8.3)
NEUTROPHILS NFR BLD AUTO: 73.5 %
NRBC # BLD AUTO: 0 10*3/UL
NRBC BLD AUTO-RTO: 0 /100
PLATELET # BLD AUTO: 281 10E9/L (ref 150–450)
POTASSIUM SERPL-SCNC: 3.7 MMOL/L (ref 3.4–5.3)
PROT SERPL-MCNC: 6.6 G/DL (ref 6.8–8.8)
RBC # BLD AUTO: 4.76 10E12/L (ref 3.8–5.2)
SODIUM SERPL-SCNC: 142 MMOL/L (ref 133–144)
WBC # BLD AUTO: 15 10E9/L (ref 4–11)

## 2017-03-20 PROCEDURE — 86140 C-REACTIVE PROTEIN: CPT | Performed by: INTERNAL MEDICINE

## 2017-03-20 PROCEDURE — 85652 RBC SED RATE AUTOMATED: CPT | Performed by: INTERNAL MEDICINE

## 2017-03-20 PROCEDURE — 85025 COMPLETE CBC W/AUTO DIFF WBC: CPT | Performed by: INTERNAL MEDICINE

## 2017-03-20 PROCEDURE — 36415 COLL VENOUS BLD VENIPUNCTURE: CPT | Performed by: INTERNAL MEDICINE

## 2017-03-20 PROCEDURE — 80053 COMPREHEN METABOLIC PANEL: CPT | Performed by: INTERNAL MEDICINE

## 2017-03-21 ENCOUNTER — TRANSFERRED RECORDS (OUTPATIENT)
Dept: HEALTH INFORMATION MANAGEMENT | Facility: HOSPITAL | Age: 47
End: 2017-03-21

## 2017-03-30 ENCOUNTER — HOSPITAL ENCOUNTER (OUTPATIENT)
Dept: PHYSICAL THERAPY | Facility: HOSPITAL | Age: 47
Setting detail: THERAPIES SERIES
End: 2017-03-30
Attending: ORTHOPAEDIC SURGERY
Payer: COMMERCIAL

## 2017-03-30 PROCEDURE — 97140 MANUAL THERAPY 1/> REGIONS: CPT | Mod: GP | Performed by: PHYSICAL THERAPIST

## 2017-04-18 ENCOUNTER — HOSPITAL ENCOUNTER (OUTPATIENT)
Dept: PHYSICAL THERAPY | Facility: HOSPITAL | Age: 47
Setting detail: THERAPIES SERIES
End: 2017-04-18
Attending: ORTHOPAEDIC SURGERY
Payer: COMMERCIAL

## 2017-04-18 DIAGNOSIS — M79.605 BILATERAL LEG PAIN: Primary | ICD-10-CM

## 2017-04-18 DIAGNOSIS — M79.604 BILATERAL LEG PAIN: Primary | ICD-10-CM

## 2017-04-18 PROCEDURE — 97140 MANUAL THERAPY 1/> REGIONS: CPT | Mod: GP | Performed by: PHYSICAL THERAPIST

## 2017-04-18 NOTE — PROGRESS NOTES
Outpatient Physical Therapy Discharge Note     Patient: Annie Garcia  : 1970    Beginning/End Dates of Reporting Period:  2017 to 2017    Referring Provider: Dr Aguilar    Therapy Diagnosis: L achilles, R ankle     Client Self Report: Annie was seen from 8am-8:25 am. She reports she saw the foot/ankle specialist who discussed her pathology and explained her condition well to her. She is planning to proceed with surgery on her R foot/ankle in August. At this time, it has been advised for her to continue methods that reduce her pain and improve her function. We discussed discharging her current episode and starting a new episode to address bilateral LE pain and tissue mobility deficits pending new orders from her PCP or referring physician. She is in agreement with this POC.    Objective Measurements:  Objective Measure: Still noted soft tissue restrictions in R calf, posterior tib tendon and muscle belly and pain and swelling into R medial ankle that has improved a lot since late last year, but is still lingering. She also has complaints of continued L achilles pain and stiffness into her calfs. We discussed getting new orders to address her current state to re-assess all aspects of her lower extremity pain on both sides. She is in agreement with this plan       Goals:    Goal Identifier LTG 3   Goal Description Patient will be able to tolerate prolonged sitting while driving or riding without restriction from pain or tightness in R LE.    Target Date 06/24/15   Date Met   (Goal not met, DC)   Progress:     Goal Identifier LTG 4    Goal Description Patient will be able to perform community distance ambulation consistently without use of AD.    Target Date 06/10/15   Date Met      Progress:     Goal Identifier short   Goal Description Client will be able to ambulate with no device 15 minutes with fucntional gaot pattern with equal heel strike.   Target Date 16   Date Met       Progress: Not met     Goal Identifier long   Goal Description Client will have pain decreased 0-2/10 with gait with community dstances,   Target Date 06/21/16   Date Met      Progress: Not met     Goal Identifier long   Goal Description Client strength anterior ankle 5/5 and ROM DF 10 degrees at ankle.   Target Date 06/21/16   Date Met      Progress:Improving       Progress Toward Goals:   Progress this reporting period: Patient has made some progress, but some of her goals are not met      Plan:  Discharge from therapy.    Discharge:    Reason for Discharge: Discharging this current episode to assess with new orders and new examination of B LEs    Equipment Issued: None    Discharge Plan: Patient to continue home program.

## 2017-04-26 ENCOUNTER — HOSPITAL ENCOUNTER (OUTPATIENT)
Dept: PHYSICAL THERAPY | Facility: HOSPITAL | Age: 47
Setting detail: THERAPIES SERIES
End: 2017-04-26
Attending: NURSE PRACTITIONER
Payer: COMMERCIAL

## 2017-04-26 PROCEDURE — 97140 MANUAL THERAPY 1/> REGIONS: CPT | Mod: GP | Performed by: PHYSICAL THERAPIST

## 2017-04-26 PROCEDURE — 97162 PT EVAL MOD COMPLEX 30 MIN: CPT | Mod: GP | Performed by: PHYSICAL THERAPIST

## 2017-04-26 NOTE — PROGRESS NOTES
04/26/17 0900   General Information   Type of Visit Initial OP Ortho PT Evaluation   Start of Care Date 04/26/17   Referring Physician Todd Torres   Patient/Family Goals Statement pain relief, good mobility and good flexibility leading up to surgery   Orders Evaluate and Treat   Date of Order 04/18/17   Insurance Type (PF 1)   Medical Diagnosis B leg pain, foot ankle pain   Surgical/Medical history reviewed Yes   Precautions/Limitations no known precautions/limitations   General Information Comments CF   Body Part(s)   Body Part(s) Ankle/Foot   Presentation and Etiology   Pertinent history of current problem (include personal factors and/or comorbidities that impact the POC) Patient has been extensively in physical therapy the past year for complaints of achilles pain, foot/ankle pain and she has been in consultation with physicians and orthopaedic surgeons on her condition. She recently saw a foot/ankle specialist in the [facility] who performed imaging and testing and indicated that surgical intervention may be necesary to address her ongoing pain/weakness/difficulty walking.  Todd Torres placed new orders for us to address her complaints as bilateral foot/ankle pain and stiffness leading up to her surgery that she anticipates having in August.  The primary goal is to decrease pain, improve function and prepare herself for her upcoming surgery   Impairments A. Pain;C. Swelling;D. Decreased ROM;E. Decreased flexibility;F. Decreased strength and endurance   Functional Limitations perform activities of daily living   Symptom Location B foot/ankles, achilles and calfs   How/Where did it occur From insidious onset   Onset date of current episode/exacerbation 04/18/17  (physician order date)   Chronicity Chronic   Pain rating (0-10 point scale) Best (/10);Worst (/10)   Best (/10) 1-2/10   Worst (/10) 8/10   Pain quality A. Sharp;B. Dull;C. Aching;D. Burning   Frequency of pain/symptoms B. Intermittent   Pain/symptoms are:  The same all the time   Pain/symptoms exacerbated by B. Walking   Pain/symptoms eased by C. Rest   Progression of symptoms since onset: Improved   Current / Previous Interventions   Diagnostic Tests: (MRIs, xrays)   Prior Level of Function   Prior Level of Function-Mobility IND   Prior Level of Function-ADLs IND   Current Level of Function   Patient role/employment history C. Homemaker   [facility]   Current equipment-Gait/Locomotion None   Current equipment-ADL None   Fall Risk Screen   Fall screen completed by PT   Per patient - Fall 2 or more times in past year? No   Per patient - Fall with injury in past year? No   Is patient a fall risk? No   System Outcome Measures   Outcome Measures (LEFS)   Ankle/Foot Objective Findings   Side (if bilateral, select both right and left) Right;Left   Observation no acute distress, mild swelling noted on R medial ankle today   Integumentary no issues   Gait/Locomotion Slightly antalgic today with overpronation and decreased time in stance on R   Foot Position In Standing pes planus   Left DF (Knee Ext) AROM lacks 2 degrees   Left PF AROM WNL + cramping in foot   Left Calcanceal Inversion AROM WNL   Left Calcaneal Eversion AROM WNL   Left Great Toe Flexion AROM min abarca   Left DF/Inversion Strength 5/5   Left DF/Eversion Strength 5-/5   Left PF/Inversion Strength 5-/5   Left PF/Eversion Strength 5-/5   Left PF Strength 5-/5   Left Gastroc (in WB) Flexibility hypo   Left Soleus (in WB) Flexibility hypo   Palpation TTP throughout B calfs, achilles, medial ankles and post tib tendons   Accessory Motion/Joint Mobility Hypo B cuboids and min 1st ray B   Right DF (Knee Ext) AROM lacks 4 degrees   Right PF AROM WNL + cramping in foot   Right Calcanceal Inversion AROM WNL   Right Calcaneal Eversion AROM WNL   Right Great Toe Flexion AROM WNL   Right DF/Inversion Strength 5/5   Right DF/Eversion Strength 5-/5   Right PF/Inversion Strength 5-/5   Right PF/Eversion  Strength 5-/5   Right PF Strength 5-/5   Right Gastroc (in WB) Flexibility hypo   Right Soleus (in WB) Flexibility hypo   Balance/ Proprioception (Single Leg Stance) Impaired B   Planned Therapy Interventions   Planned Therapy Interventions joint mobilization;manual therapy;neuromuscular re-education;ROM;strengthening;stretching   Planned Modality Interventions   Planned Modality Interventions Comments as needed   Clinical Impression   Criteria for Skilled Therapeutic Interventions Met yes, treatment indicated   PT Diagnosis B foot/ankle pain and weakness, pre-op   Influenced by the following impairments pain and weakness   Functional limitations due to impairments difficulty walking and doing stairs   Clinical Presentation Evolving/Changing   Clinical Presentation Rationale due to chronicity of symptoms and other co-morbidities impacting patient progress   Clinical Decision Making (Complexity) Moderate complexity   Therapy Frequency 1 time/week   Predicted Duration of Therapy Intervention (days/wks) up to 10 visits   Risk & Benefits of therapy have been explained Yes   Patient, Family & other staff in agreement with plan of care Yes   Clinical Impression Comments Presentation is consistent with B foot/ankle pain and weakness that would benefit from physical therapy services to prepare her for upcoming surgery   Education Assessment   Preferred Learning Style Demonstration   Barriers to Learning Emotional   ORTHO GOALS   PT Ortho Eval Goals 1;2;3   Ortho Goal 1   Goal Identifier STG 1   Goal Description Patient will demonstrate HEP stretching and rolling techniques with good form in order to make progress at home   Target Date 05/24/17   Ortho Goal 2   Goal Identifier LTG 1   Goal Description Patient will report decreased worst PL in B feet/ankles in general to 6/10 or less in order to ambulate safely   Target Date 08/30/17   Ortho Goal 3   Goal Identifier LTG 2   Goal Description Patient will ambulate up and down  stairs with normal mechanics in order to be safe at home and in the community   Target Date 08/30/17   Total Evaluation Time   Total Evaluation Time 25 eval, 25 treat

## 2017-05-01 ENCOUNTER — HOSPITAL ENCOUNTER (OUTPATIENT)
Dept: PHYSICAL THERAPY | Facility: HOSPITAL | Age: 47
Setting detail: THERAPIES SERIES
End: 2017-05-01
Attending: NURSE PRACTITIONER
Payer: COMMERCIAL

## 2017-05-01 DIAGNOSIS — F34.1 DYSTHYMIA: ICD-10-CM

## 2017-05-01 PROCEDURE — 97140 MANUAL THERAPY 1/> REGIONS: CPT | Mod: GP | Performed by: PHYSICAL THERAPIST

## 2017-05-04 ENCOUNTER — HOSPITAL ENCOUNTER (OUTPATIENT)
Dept: PHYSICAL THERAPY | Facility: HOSPITAL | Age: 47
Setting detail: THERAPIES SERIES
End: 2017-05-04
Attending: NURSE PRACTITIONER
Payer: COMMERCIAL

## 2017-05-04 PROCEDURE — 97140 MANUAL THERAPY 1/> REGIONS: CPT | Mod: GP | Performed by: PHYSICAL THERAPIST

## 2017-05-04 RX ORDER — BUPROPION HCL 150 MG
TABLET,SUSTAINED-RELEASE 12 HR ORAL
Qty: 30 TABLET | Refills: 0 | Status: SHIPPED | OUTPATIENT
Start: 2017-05-04 | End: 2017-05-05

## 2017-05-05 ENCOUNTER — TELEPHONE (OUTPATIENT)
Dept: INTERNAL MEDICINE | Facility: OTHER | Age: 47
End: 2017-05-05

## 2017-05-05 DIAGNOSIS — F34.1 DYSTHYMIA: ICD-10-CM

## 2017-05-05 RX ORDER — BUPROPION HCL 150 MG
TABLET,SUSTAINED-RELEASE 12 HR ORAL
Qty: 90 TABLET | Refills: 0 | Status: SHIPPED | OUTPATIENT
Start: 2017-05-05 | End: 2017-06-02

## 2017-05-10 ENCOUNTER — HOSPITAL ENCOUNTER (OUTPATIENT)
Dept: PHYSICAL THERAPY | Facility: HOSPITAL | Age: 47
Setting detail: THERAPIES SERIES
End: 2017-05-10
Attending: NURSE PRACTITIONER
Payer: COMMERCIAL

## 2017-05-10 PROCEDURE — 97140 MANUAL THERAPY 1/> REGIONS: CPT | Mod: GP | Performed by: PHYSICAL THERAPIST

## 2017-06-01 ENCOUNTER — HOSPITAL ENCOUNTER (OUTPATIENT)
Dept: PHYSICAL THERAPY | Facility: HOSPITAL | Age: 47
Setting detail: THERAPIES SERIES
End: 2017-06-01
Attending: NURSE PRACTITIONER
Payer: COMMERCIAL

## 2017-06-01 PROCEDURE — 97140 MANUAL THERAPY 1/> REGIONS: CPT | Mod: GP | Performed by: PHYSICAL THERAPIST

## 2017-06-02 DIAGNOSIS — F34.1 DYSTHYMIA: ICD-10-CM

## 2017-06-03 ENCOUNTER — HEALTH MAINTENANCE LETTER (OUTPATIENT)
Age: 47
End: 2017-06-03

## 2017-06-06 RX ORDER — BUPROPION HCL 150 MG
TABLET,SUSTAINED-RELEASE 12 HR ORAL
Qty: 90 TABLET | Refills: 0 | Status: SHIPPED | OUTPATIENT
Start: 2017-06-06 | End: 2017-06-28

## 2017-06-08 ENCOUNTER — HOSPITAL ENCOUNTER (OUTPATIENT)
Dept: PHYSICAL THERAPY | Facility: HOSPITAL | Age: 47
Setting detail: THERAPIES SERIES
End: 2017-06-08
Attending: NURSE PRACTITIONER
Payer: COMMERCIAL

## 2017-06-08 PROCEDURE — 97140 MANUAL THERAPY 1/> REGIONS: CPT | Mod: GP | Performed by: PHYSICAL THERAPIST

## 2017-06-27 DIAGNOSIS — F51.01 PRIMARY INSOMNIA: ICD-10-CM

## 2017-06-28 ENCOUNTER — OFFICE VISIT (OUTPATIENT)
Dept: PSYCHIATRY | Facility: OTHER | Age: 47
End: 2017-06-28
Attending: PSYCHIATRY & NEUROLOGY
Payer: COMMERCIAL

## 2017-06-28 VITALS
HEIGHT: 68 IN | SYSTOLIC BLOOD PRESSURE: 138 MMHG | WEIGHT: 240 LBS | TEMPERATURE: 97.3 F | BODY MASS INDEX: 36.37 KG/M2 | HEART RATE: 95 BPM | DIASTOLIC BLOOD PRESSURE: 72 MMHG

## 2017-06-28 DIAGNOSIS — F34.1 DYSTHYMIA: ICD-10-CM

## 2017-06-28 PROCEDURE — 99214 OFFICE O/P EST MOD 30 MIN: CPT | Performed by: PSYCHIATRY & NEUROLOGY

## 2017-06-28 RX ORDER — BUPROPION HCL 150 MG
TABLET,SUSTAINED-RELEASE 12 HR ORAL
Qty: 90 TABLET | Refills: 6 | Status: SHIPPED | OUTPATIENT
Start: 2017-06-28 | End: 2018-01-08

## 2017-06-28 RX ORDER — ZOLPIDEM TARTRATE 10 MG/1
TABLET, FILM COATED ORAL
Qty: 30 TABLET | Refills: 5 | Status: SHIPPED | OUTPATIENT
Start: 2017-06-28 | End: 2017-12-04

## 2017-06-28 ASSESSMENT — ANXIETY QUESTIONNAIRES
5. BEING SO RESTLESS THAT IT IS HARD TO SIT STILL: MORE THAN HALF THE DAYS
GAD7 TOTAL SCORE: 11
IF YOU CHECKED OFF ANY PROBLEMS ON THIS QUESTIONNAIRE, HOW DIFFICULT HAVE THESE PROBLEMS MADE IT FOR YOU TO DO YOUR WORK, TAKE CARE OF THINGS AT HOME, OR GET ALONG WITH OTHER PEOPLE: SOMEWHAT DIFFICULT
6. BECOMING EASILY ANNOYED OR IRRITABLE: MORE THAN HALF THE DAYS
2. NOT BEING ABLE TO STOP OR CONTROL WORRYING: MORE THAN HALF THE DAYS
1. FEELING NERVOUS, ANXIOUS, OR ON EDGE: SEVERAL DAYS
7. FEELING AFRAID AS IF SOMETHING AWFUL MIGHT HAPPEN: NOT AT ALL
3. WORRYING TOO MUCH ABOUT DIFFERENT THINGS: MORE THAN HALF THE DAYS

## 2017-06-28 ASSESSMENT — PAIN SCALES - GENERAL: PAINLEVEL: NO PAIN (0)

## 2017-06-28 ASSESSMENT — PATIENT HEALTH QUESTIONNAIRE - PHQ9: 5. POOR APPETITE OR OVEREATING: MORE THAN HALF THE DAYS

## 2017-06-28 NOTE — NURSING NOTE
"Chief Complaint   Patient presents with     RECHECK     Mental health.       Initial /72 (BP Location: Right arm, Patient Position: Sitting, Cuff Size: Adult Regular)  Pulse 95  Temp 97.3  F (36.3  C) (Tympanic)  Ht 5' 8\" (1.727 m)  Wt 240 lb (108.9 kg)  BMI 36.49 kg/m2 Estimated body mass index is 36.49 kg/(m^2) as calculated from the following:    Height as of this encounter: 5' 8\" (1.727 m).    Weight as of this encounter: 240 lb (108.9 kg).  Medication Reconciliation: complete     AXEL ARTHUR      "

## 2017-06-28 NOTE — PROGRESS NOTES
PSYCHIATRY CLINIC PROGRESS NOTE   20 minute medication management, more than 50% of time spent counseling patient on medications, medication side effects, symptom history and management   SUBJECTIVE / INTERIM HISTORY                                                                       .  Social- Been here for ~3 years, they moved from [place].  Children-  19 yo Rajiv, 15 Lionel and 13 Axel  Last visit fall '16:  Continue Wellbutrin  mg bid, Continue Ambien  SAMEER 10 mg HS prn  - has to have ankle surgery  - feels tradename Ambien works way better AND when sleeps better her CF sx tend to stay more in check  - Been here for ~3 years, they moved from [place]. Hard time finding new group of friends / being accepted into the area  - cystic fibrosis (wasn't diagnosed until 39)  -  Was very active including cycling and working out and these helped her deal with stress. Taught cycling class in past    SUBSTANCE USE- no issues    SYMPTOMS-  excessive worry, nervous, edgy, tense and restless. depressed mood, anhedonia, insomnia , appetite change, weight gain /loss, low energy, feeling worthless and psychomotor agitation observed by others  MEDICAL ROS-  achilles pain, hip pain (had hip replacement). Respiratory issues (CF) and gets sinus issues (CF)  MEDICAL / SURGICAL HISTORY                pregnant [if applicable]--no     Patient Active Problem List   Diagnosis     Cystic fibrosis (H)     Depression     Tear of right acetabular labrum     Dyspepsia and other specified disorders of function of stomach     GERD (gastroesophageal reflux disease)     Facial rash     Hyperlipidemia with target LDL less than 130     ACP (advance care planning)     Mixed anxiety depressive disorder     Insomnia     Osteoarthritis of hip     Acute bronchitis     Pneumonia due to infectious organism, unspecified laterality, unspecified part of lung     ALLERGY   Sulfa drugs  MEDICATIONS                                                                                              Current Outpatient Prescriptions   Medication Sig     AMBIEN 10 MG tablet TAKE 1 TABLET BY MOUTH NIGHTLY AS NEEDED FOR SLEEP     WELLBUTRIN  MG 12 hr tablet TAKE 3 TABLETS BY MOUTH ONCE A DAY     acetaminophen-codeine (TYLENOL #3) 300-30 MG per tablet TAKE 1 TABLET BY MOUTH EVERY 4 HOURS AS NEEDED FOR MILD PAIN, TAKE FOR COUGHING PAIN.     PULMOZYME 1 MG/ML neb solution USE 1 VIAL IN NEBULIZER DAILY AS NEEDED     order for DME Equipment being ordered: Hand held  percussion massager-with flat head like car buffer-not with round knobs.     fluconazole (DIFLUCAN) 100 MG tablet TAKE 1 TABLET BY MOUTH DAILY AS NEEDED     predniSONE (DELTASONE) 10 MG tablet Take 40 mg daily for 2 days then 30 mg daily for 3 days then 20 mg daily for 3 days then 10 mg daily for 3 days then stop     order for DME Equipment being ordered:  Wrist pulse oximeter Dx Cystic fibrosis (H)     order for DME Equipment being ordered: Pulse oximeter for wrist.    Re: Cystic fibrosis     DiazePAM (VALIUM PO) Take 10 mg by mouth every 6 hours as needed for anxiety     sucralfate (CARAFATE) 1 GM tablet Take 1 tablet (1 g) by mouth 4 times daily as needed     budesonide (PULMICORT) 1 MG/2ML SUSP nebulizer solution Take 2 mLs (1 mg) by nebulization daily Take 1 mg by nebulization daily     BROVANA 15 MCG/2ML NEBU USE 1 VIAL IN NEBULIZER 2 TIMES A DAY AS NEEDED     levalbuterol (XOPENEX) 0.31 MG/3ML nebulizer solution Take 3 mLs (0.31 mg) by nebulization every 6 hours as needed for shortness of breath / dyspnea     hydrOXYzine (VISTARIL) 50 MG capsule Take 1-2 tabs  Every 6 hours as needed for itching.     nystatin (MYCOSTATIN) 331659 UNIT/GM POWD Apply 1 g topically 3 times daily as needed     dexlansoprazole (DEXILANT) 60 MG CPDR Take 1 capsule by mouth daily as needed     No current facility-administered medications for this visit.        PAST MEDICATION TRIALS    Prozac (fluoxetine), Zoloft (sertraline), Lexapro  "(escitalopram) and Wellbutrin, Zyban, Aplenzin (bupropion). Prozac: heightened senses. Zoloft she felt like a zombie   no older antidepressants.   no benzos, ?? Buspar in past.   Ambien (zolpidem).no other sleep meds in past       VITALS   /72 (BP Location: Right arm, Patient Position: Sitting, Cuff Size: Adult Regular)  Pulse 95  Temp 97.3  F (36.3  C) (Tympanic)  Ht 5' 8\" (1.727 m)  Wt 240 lb (108.9 kg)  BMI 36.49 kg/m2     PHQ9                     [unfilled]  LABS                                                                                                                         Last Basic Metabolic Panel:  NA      139   7/23/2016   POTASSIUM      4.0   7/23/2016  CHLORIDE      107   7/23/2016  NALDO      8.9   7/23/2016  CO2       27   7/23/2016  BUN       14   7/23/2016  CR     0.89   7/23/2016  GLC       90   7/23/2016    Liver Function Studies -   Recent Labs   Lab Test  07/23/16   1210   PROTTOTAL  8.0   ALBUMIN  4.0   BILITOTAL  0.2   ALKPHOS  88   AST  8   ALT  21       WBC     12.9   7/23/2016  RBC     5.07   7/23/2016  HGB     14.1   7/23/2016  HCT     41.5   7/23/2016  No components found with this name: mct  MCV       82   7/23/2016  MCH     27.8   7/23/2016  MCHC     34.0   7/23/2016  RDW     14.0   7/23/2016  PLT      325   7/23/2016   MENTAL STATUS EXAM                                                                                        Alert. Oriented to person, place, and date / time. Well groomed, calm, cooperative with good eye contact. No problems with speech or psychomotor behavior. Mood was described as \"stressed\" and affect was congruent to speech content and full range. Thought process, including associations, was unremarkable and thought content was devoid of suicidal and homicidal ideation and psychotic thought. No hallucinations. Insight was good. Judgment was intact and adequate for safety. Fund of knowledge was intact. Pt demonstrates no obvious problems with " attention, concentration, language, recent or remote memory although these were not formally tested.     ASSESSMENT                                                                                                      HISTORICAL:  Initial psych note 6/7/16          NOTES:    Annie Garcia is a 46 yo , dx with CF at 39 and difficult time adjusting to living here and with her sons getting older also struggles with parental role changing. 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 Today we agreed on keeping Wellbutrin for now as she feels is helping but we are going to have her try splitting the dose 2 tabs am and 1 tab afternoon.  We will also keep Ambien for insomnia but I changed it to SAMEER as she is having issues when the formulation (generic / company it's coming from) changes -> ends up with morbid , vivid nightmares. She is sensitive to meds so any changes in future we will start out with lower doses and titrate up slowly.       TREATMENT RISK STATEMENT:  The risks, benefits, alternatives and potential adverse effects have been explained and are understood by the pt.  The pt agrees to the treatment plan with the ability to do so.   The pt knows to call the clinic for any problems or access emergency care if needed.        DIAGNOSES                 (Use of Axes system will continue, even though absent from DSM 5)         Axis I   - STEVE                 MDD, recurrent, mild to mod  Axis II  - no dx  Axis III - CF, s/p hip replacement  Axis IV-  Psychosocial Stressors include: mod  Axis V - Global Assessment of Functioning current: 60    PLAN                                                                                                                    1)  MEDICATIONS:         -- We are going to switch from 3 tabs of 150 mg all at once daily to 2 tabs  am and 1 tab afternoon.    Continue Ambien  SAMEER 10 mg HS prn    2)  THERAPY:  No Change    3)  LABS:  None    4)  PT MONITOR [call for probs]:  Worsening symptoms, SI/HI, SEs from meds    5)  REFERRALS [CD, medical, other]:  None    6)  RTC:  Sept..

## 2017-06-28 NOTE — MR AVS SNAPSHOT
"              After Visit Summary   2017    Annie Garcia    MRN: [id number]           Patient Information     Date Of Birth          1970        Visit Information        Provider Department      2017 11:00 AM Mary Marley MD [facility]        Today's Diagnoses     Dysthymia           Follow-ups after your visit        Who to contact     If you have questions or need follow up information about today's clinic visit or your schedule please contact [facility] directly at [phone].  Normal or non-critical lab and imaging results will be communicated to you by MyChart, letter or phone within 4 business days after the clinic has received the results. If you do not hear from us within 7 days, please contact the clinic through [employer] or phone. If you have a critical or abnormal lab result, we will notify you by phone as soon as possible.  Submit refill requests through [employer] or call your pharmacy and they will forward the refill request to us. Please allow 3 business days for your refill to be completed.          Additional Information About Your Visit        MyChart Information     TriggerMail lets you send messages to your doctor, view your test results, renew your prescriptions, schedule appointments and more. To sign up, go to [email or web address] . Click on \"Log in\" on the left side of the screen, which will take you to the Welcome page. Then click on \"Sign up Now\" on the right side of the page.     You will be asked to enter the access code listed below, as well as some personal information. Please follow the directions to create your username and password.     Your access code is: [email or web address]  Expires: 2017 11:52 AM     Your access code will  in 90 days. If you need help or a new code, please call your [facility] or [phone].        Care EveryWhere ID     This is your Care EveryWhere ID. This could be used by other organizations to " "access your [facility] medical records  [id number]        Your Vitals Were     Pulse Temperature Height BMI (Body Mass Index)          95 97.3  F (36.3  C) (Tympanic) 5' 8\" (1.727 m) 36.49 kg/m2         Blood Pressure from Last 3 Encounters:   06/28/17 138/72   03/09/17 141/81   03/01/17 122/65    Weight from Last 3 Encounters:   06/28/17 240 lb (108.9 kg)   03/07/17 233 lb 7.5 oz (105.9 kg)   01/06/17 226 lb (102.5 kg)              Today, you had the following     No orders found for display         Today's Medication Changes          These changes are accurate as of: 6/28/17 11:52 AM.  If you have any questions, ask your nurse or doctor.               These medicines have changed or have updated prescriptions.        Dose/Directions    WELLBUTRIN  MG 12 hr tablet   This may have changed:  See the new instructions.   Used for:  Dysthymia   Generic drug:  buPROPion   Changed by:  Mary Marley MD        Take 2 tabs am and 1 tab afternoon   Quantity:  90 tablet   Refills:  6            Where to get your medicines      These medications were sent to [facility] [place] [place]  [address]  [address] [address]     Phone:  [phone]     WELLBUTRIN  MG 12 hr tablet                Primary Care Provider Office Phone # Fax #    Todd NG DAVIN Torres [phone]       [facility] [address] [place] [address]        Equal Access to Services     [facility] : Hadii caitlin ku hadasho Soomaali, waaxda luqadaha, qaybta kaalmada adeegyada, leanna idiin hayaan adeeg kharash la'aan . So [facility] [phone].    ATENCIÓN: Si habla español, tiene a seals disposición servicios gratuitos de asistencia lingüística. Llame al [phone].    We comply with applicable federal civil rights laws and [place] laws. We do not discriminate on the basis of race, color, national origin, age, disability sex, sexual orientation or gender identity.            Thank you!     " Thank you for choosing [facility]  [facility] your [facility]. Our goal is always to provide you with excellent care. Hearing back from our patients is one way we can continue to improve our services. Please take a few minutes to complete the written survey that you may receive in the mail after your visit with us. Thank you!             Your Updated Medication List - Protect others around you: Learn how to safely use, store and throw away your medicines at www.disposemymeds.org.          This list is accurate as of: 6/28/17 11:52 AM.  Always use your most recent med list.                   Brand Name Dispense Instructions for use Diagnosis    acetaminophen-codeine 300-30 MG per tablet    TYLENOL #3    90 tablet    TAKE 1 TABLET BY MOUTH EVERY 4 HOURS AS NEEDED FOR MILD PAIN, TAKE FOR COUGHING PAIN.    Hip pain, right       AMBIEN 10 MG tablet   Generic drug:  zolpidem     30 tablet    TAKE 1 TABLET BY MOUTH NIGHTLY AS NEEDED FOR SLEEP    Primary insomnia       BROVANA 15 MCG/2ML Nebu neb solution   Generic drug:  arformoterol     120 mL    USE 1 VIAL IN NEBULIZER 2 TIMES A DAY AS NEEDED    Chronic bronchitis, unspecified chronic bronchitis type (H)       budesonide 1 MG/2ML Susp neb solution    PULMICORT    60 ampule    Take 2 mLs (1 mg) by nebulization daily Take 1 mg by nebulization daily    Bronchitis       DEXILANT 60 MG Cpdr CR capsule   Generic drug:  dexlansoprazole      Take 1 capsule by mouth daily as needed        fluconazole 100 MG tablet    DIFLUCAN    30 tablet    TAKE 1 TABLET BY MOUTH DAILY AS NEEDED    Rash and other nonspecific skin eruption       hydrOXYzine 50 MG capsule    VISTARIL    120 capsule    Take 1-2 tabs  Every 6 hours as needed for itching.    Itching       levalbuterol 0.31 MG/3ML neb solution    XOPENEX    225 mL    Take 3 mLs (0.31 mg) by nebulization every 6 hours as needed for shortness of breath / dyspnea        nystatin [id number] UNIT/GM Powd    MYCOSTATIN    60 g    Apply 1 g  topically 3 times daily as needed    Rash       * order for DME     1 unit marking on an U-100 insulin syringe    Equipment being ordered: Pulse oximeter for wrist.   Re: Cystic fibrosis    Cystic fibrosis (H)       * order for DME     1 Units    Equipment being ordered:  Wrist pulse oximeter Dx Cystic fibrosis (H)    Cystic fibrosis (H)       * order for DME     1 Units    Equipment being ordered: Hand held  percussion massager-with flat head like car buffer-not with round knobs.    Cystic fibrosis with pulmonary exacerbation (H)       predniSONE 10 MG tablet    DELTASONE    26 tablet    Take 40 mg daily for 2 days then 30 mg daily for 3 days then 20 mg daily for 3 days then 10 mg daily for 3 days then stop    Cystic fibrosis with pulmonary exacerbation (H)       PULMOZYME 1 MG/ML neb solution   Generic drug:  dornase alpha     75 mL    USE 1 VIAL IN NEBULIZER DAILY AS NEEDED    Chronic bronchitis, unspecified chronic bronchitis type (H)       sucralfate 1 GM tablet    CARAFATE    120 tablet    Take 1 tablet (1 g) by mouth 4 times daily as needed    Esophageal reflux       VALIUM PO      Take 10 mg by mouth every 6 hours as needed for anxiety        WELLBUTRIN  MG 12 hr tablet   Generic drug:  buPROPion     90 tablet    Take 2 tabs am and 1 tab afternoon    Dysthymia       * Notice:  This list has 3 medication(s) that are the same as other medications prescribed for you. Read the directions carefully, and ask your doctor or other care provider to review them with you.

## 2017-06-29 ASSESSMENT — PATIENT HEALTH QUESTIONNAIRE - PHQ9: SUM OF ALL RESPONSES TO PHQ QUESTIONS 1-9: 11

## 2017-06-29 ASSESSMENT — ANXIETY QUESTIONNAIRES: GAD7 TOTAL SCORE: 11

## 2017-07-14 DIAGNOSIS — R21 RASH AND OTHER NONSPECIFIC SKIN ERUPTION: ICD-10-CM

## 2017-07-18 RX ORDER — FLUCONAZOLE 100 MG/1
TABLET ORAL
Qty: 30 TABLET | Refills: 0 | Status: SHIPPED | OUTPATIENT
Start: 2017-07-18 | End: 2017-08-31

## 2017-07-18 NOTE — TELEPHONE ENCOUNTER
Diflucan       Last Written Prescription Date: 3/9/17  Last Fill Quantity: 30,  # refills: 0   Last Office Visit with G, P or [facility] prescribing provider: 1/3/17

## 2017-07-21 DIAGNOSIS — K21.9 ESOPHAGEAL REFLUX: ICD-10-CM

## 2017-07-21 NOTE — TELEPHONE ENCOUNTER
Carafate       Last Written Prescription Date: 12/16/2015  Last Fill Quantity: 120,  # refills: 0   Last Office Visit with G, [facility] or [facility] prescribing provider: 1/03/2017

## 2017-07-24 RX ORDER — SUCRALFATE 1 G/1
TABLET ORAL
Qty: 120 TABLET | Refills: 3 | Status: SHIPPED | OUTPATIENT
Start: 2017-07-24 | End: 2020-09-10

## 2017-07-27 ENCOUNTER — HOSPITAL ENCOUNTER (OUTPATIENT)
Dept: PHYSICAL THERAPY | Facility: HOSPITAL | Age: 47
Setting detail: THERAPIES SERIES
End: 2017-07-27
Attending: NURSE PRACTITIONER
Payer: COMMERCIAL

## 2017-07-27 PROCEDURE — 97140 MANUAL THERAPY 1/> REGIONS: CPT | Mod: GP | Performed by: PHYSICAL THERAPIST

## 2017-08-07 ENCOUNTER — OFFICE VISIT (OUTPATIENT)
Dept: INTERNAL MEDICINE | Facility: OTHER | Age: 47
End: 2017-08-07
Attending: NURSE PRACTITIONER
Payer: COMMERCIAL

## 2017-08-07 VITALS
HEIGHT: 67 IN | WEIGHT: 238 LBS | OXYGEN SATURATION: 96 % | HEART RATE: 106 BPM | RESPIRATION RATE: 20 BRPM | BODY MASS INDEX: 37.35 KG/M2 | SYSTOLIC BLOOD PRESSURE: 120 MMHG | DIASTOLIC BLOOD PRESSURE: 70 MMHG | TEMPERATURE: 98.2 F

## 2017-08-07 DIAGNOSIS — M25.551 HIP PAIN, RIGHT: ICD-10-CM

## 2017-08-07 DIAGNOSIS — R53.83 FATIGUE, UNSPECIFIED TYPE: Primary | ICD-10-CM

## 2017-08-07 PROBLEM — E66.01 MORBID OBESITY (H): Status: ACTIVE | Noted: 2017-08-07

## 2017-08-07 LAB — MAGNESIUM SERPL-MCNC: 2.3 MG/DL (ref 1.6–2.3)

## 2017-08-07 PROCEDURE — 99000 SPECIMEN HANDLING OFFICE-LAB: CPT | Performed by: NURSE PRACTITIONER

## 2017-08-07 PROCEDURE — 84443 ASSAY THYROID STIM HORMONE: CPT | Performed by: NURSE PRACTITIONER

## 2017-08-07 PROCEDURE — 83735 ASSAY OF MAGNESIUM: CPT | Performed by: NURSE PRACTITIONER

## 2017-08-07 PROCEDURE — 84207 ASSAY OF VITAMIN B-6: CPT | Mod: 90 | Performed by: NURSE PRACTITIONER

## 2017-08-07 PROCEDURE — 36415 COLL VENOUS BLD VENIPUNCTURE: CPT | Performed by: NURSE PRACTITIONER

## 2017-08-07 PROCEDURE — 84252 ASSAY OF VITAMIN B-2: CPT | Mod: 90 | Performed by: NURSE PRACTITIONER

## 2017-08-07 PROCEDURE — 84425 ASSAY OF VITAMIN B-1: CPT | Mod: 90 | Performed by: NURSE PRACTITIONER

## 2017-08-07 PROCEDURE — 82607 VITAMIN B-12: CPT | Mod: 90 | Performed by: NURSE PRACTITIONER

## 2017-08-07 PROCEDURE — 99213 OFFICE O/P EST LOW 20 MIN: CPT | Performed by: NURSE PRACTITIONER

## 2017-08-07 ASSESSMENT — ANXIETY QUESTIONNAIRES
7. FEELING AFRAID AS IF SOMETHING AWFUL MIGHT HAPPEN: SEVERAL DAYS
1. FEELING NERVOUS, ANXIOUS, OR ON EDGE: MORE THAN HALF THE DAYS
3. WORRYING TOO MUCH ABOUT DIFFERENT THINGS: MORE THAN HALF THE DAYS
2. NOT BEING ABLE TO STOP OR CONTROL WORRYING: SEVERAL DAYS
IF YOU CHECKED OFF ANY PROBLEMS ON THIS QUESTIONNAIRE, HOW DIFFICULT HAVE THESE PROBLEMS MADE IT FOR YOU TO DO YOUR WORK, TAKE CARE OF THINGS AT HOME, OR GET ALONG WITH OTHER PEOPLE: SOMEWHAT DIFFICULT
GAD7 TOTAL SCORE: 11
5. BEING SO RESTLESS THAT IT IS HARD TO SIT STILL: SEVERAL DAYS
6. BECOMING EASILY ANNOYED OR IRRITABLE: SEVERAL DAYS

## 2017-08-07 ASSESSMENT — PATIENT HEALTH QUESTIONNAIRE - PHQ9
5. POOR APPETITE OR OVEREATING: NEARLY EVERY DAY
SUM OF ALL RESPONSES TO PHQ QUESTIONS 1-9: 13

## 2017-08-07 ASSESSMENT — PAIN SCALES - GENERAL: PAINLEVEL: NO PAIN (0)

## 2017-08-07 NOTE — NURSING NOTE
"Chief Complaint   Patient presents with     Recheck Medication     refills on Tylonal #3 for her cough     Vaginal Problem     refills on Diflucan       Initial /70 (BP Location: Left arm, Patient Position: Chair, Cuff Size: Adult Large)  Pulse 106  Temp 98.2  F (36.8  C) (Tympanic)  Resp 20  Ht 5' 7\" (1.702 m)  Wt 238 lb (108 kg)  SpO2 96%  BMI 37.28 kg/m2 Estimated body mass index is 37.28 kg/(m^2) as calculated from the following:    Height as of this encounter: 5' 7\" (1.702 m).    Weight as of this encounter: 238 lb (108 kg).  Medication Reconciliation: complete   Kasie Cantu    "

## 2017-08-07 NOTE — PROGRESS NOTES
SUBJECTIVE:  Annie Garcia, a 47 year old female scheduled an appointment to discuss the following issues:  Cystic Fibrosis - Breathing controlled.now-Was hospitalized   For pneumonia in March.  Sees Dr. Herman.   On Tylenol #3 to control cough      Fatigue_ has felt very fatigued.  Would like B vitamins checked.    Left  Heel Pain-Has tear in posterior tibialis  Tendon and deltoid ligament, left ankle.   Depression: Sees Dr. Marley-On Wellbutrin   Stress; Has had a lot of stress lately in life-Has had rash reappear around lips. Is on diflucan, and has been using Bacitracin.       Medical, social, surgical, and family histories reviewed.    Current Outpatient Prescriptions   Medication     ERYTHROMYCIN BASE PO     sucralfate (CARAFATE) 1 GM tablet     fluconazole (DIFLUCAN) 100 MG tablet     AMBIEN 10 MG tablet     WELLBUTRIN  MG 12 hr tablet     acetaminophen-codeine (TYLENOL #3) 300-30 MG per tablet     PULMOZYME 1 MG/ML neb solution     budesonide (PULMICORT) 1 MG/2ML SUSP nebulizer solution     BROVANA 15 MCG/2ML NEBU     levalbuterol (XOPENEX) 0.31 MG/3ML nebulizer solution     nystatin (MYCOSTATIN) [id number] UNIT/GM POWD     dexlansoprazole (DEXILANT) 60 MG CPDR     No current facility-administered medications for this visit.        ROS:  C: NEGATIVE for fever, chills, sore throat, earache Lips very chapped, peeling.    E: NEGATIVE for vision changes   R: NEGATIVE for  cough , SOB  Has cystic fibrosis.    CV: NEGATIVE for chest pain, palpitations   GI: NEGATIVE for nausea, abdominal pain,has  Some   heartburn, or constipation, diarrhea. Takes Carafate occas.    : NEGATIVE for frequency, dysuria, or hematuria  M: NEGATIVE for significant arthralgias or myalgia  N: NEGATIVE for weakness,  Paresthesias, dizziness  or headache -Down to 2 Wellbutrin. A day       OBJECTIVE:  /70 (BP Location: Left arm, Patient Position: Chair, Cuff Size: Adult Large)  Pulse 106  Temp 98.2  F (36.8  C)  "(Tympanic)  Resp 20  Ht 5' 7\" (1.702 m)  Wt 238 lb (108 kg)  SpO2 96%  BMI 37.28 kg/m2  EXAM:  GENERAL APPEARANCE:  alert and no distress  EYES: EOMI,  PERRL  HENT:EARS: TM's pearly gray, good lite reflex, NOSE: Nares red, moist  very swollen, THROAT: Oropharynx pink  Tongue midline, no fasciculations. Lips look dry, not red today.    NECK: Supple, no lymphadenopathy, no thyromegaly, no carotid bruits, No JVD  RESP: lungs very decreased.   to auscultation anteriorly and posteriorly - no rales, rhonchi or wheezes  CV: regular rates and rhythm, normal S1 S2, no S3 or S4 and no murmur, no click or rub -  ABDOMEN:  soft, nontender, no hepatomegaly, no splenomegaly,  or masses,  bowel sounds normal  MS: No edema  NEURO: No focal deficits.       Results for orders placed or performed in visit on 08/07/17   Magnesium   Result Value Ref Range    Magnesium 2.3 1.6 - 2.3 mg/dL       ASSESSMENT / PLAN:  (R53.83) Fatigue, unspecified type  (primary encounter diagnosis)  Comment: Will check B vitamins.  Magnesium=2.3, Epic pushed TSH to future-Discussed with lab  Will get it done.    Plan: Vitamin B12, Magnesium, Vitamin B6, Vitamin B2,        TSH with free T4 reflex, Vitamin B1 whole         blood,            (M25.551) Hip pain, right  Comment: Tylenol #3 for hip pain and cough of Cystic fibrosis.    Plan: acetaminophen-codeine (TYLENOL #3) 300-30 MG         per tablet            "

## 2017-08-07 NOTE — MR AVS SNAPSHOT
"              After Visit Summary   8/7/2017    Annie Garcia    MRN: [id number]           Patient Information     Date Of Birth          1970        Visit Information        Provider Department      8/7/2017 2:00 PM Todd Torres NP [facility]        Today's Diagnoses     Fatigue, unspecified type    -  1    Hip pain, right          Care Instructions    Will check B vitamin Levels, and thyroid            Follow-ups after your visit        Future tests that were ordered for you today     Open Future Orders        Priority Expected Expires Ordered    TSH with free T4 reflex Routine  8/7/2018 8/7/2017            Who to contact     If you have questions or need follow up information about today's clinic visit or your schedule please contact [facility] directly at [phone].  Normal or non-critical lab and imaging results will be communicated to you by Cl[employer]Cas[employer], letter or phone within 4 business days after the clinic has received the results. If you do not hear from us within 7 days, please contact the clinic through Cl[employer]Casth[employer] or phone. If you have a critical or abnormal lab result, we will notify you by phone as soon as possible.  Submit refill requests through [employer] or call your pharmacy and they will forward the refill request to us. Please allow 3 business days for your refill to be completed.          Additional Information About Your Visit        [employer] Information     [employer] lets you send messages to your doctor, view your test results, renew your prescriptions, schedule appointments and more. To sign up, go to [email or web address] . Click on \"Log in\" on the left side of the screen, which will take you to the Welcome page. Then click on \"Sign up Now\" on the right side of the page.     You will be asked to enter the access code listed below, as well as some personal information. Please follow the directions to create your username and password.     Your access " "code is: [email or web address]  Expires: 2017 11:52 AM     Your access code will  in 90 days. If you need help or a new code, please call your [place] clinic or [phone].        Care EveryWhere ID     This is your Care EveryWhere ID. This could be used by other organizations to access your [place] medical records  [id number]        Your Vitals Were     Pulse Temperature Respirations Height Pulse Oximetry BMI (Body Mass Index)    106 98.2  F (36.8  C) (Tympanic) 20 5' 7\" (1.702 m) 96% 37.28 kg/m2       Blood Pressure from Last 3 Encounters:   17 120/70   17 138/72   17 141/81    Weight from Last 3 Encounters:   17 238 lb (108 kg)   17 240 lb (108.9 kg)   17 233 lb 7.5 oz (105.9 kg)              We Performed the Following     HCL VITAMIN B-1/THIAMINE     Magnesium     Vitamin B12     Vitamin B2     Vitamin B6          Today's Medication Changes          These changes are accurate as of: 17  2:57 PM.  If you have any questions, ask your nurse or doctor.               These medicines have changed or have updated prescriptions.        Dose/Directions    acetaminophen-codeine 300-30 MG per tablet   Commonly known as:  TYLENOL #3   This may have changed:  See the new instructions.   Used for:  Hip pain, right   Changed by:  Todd Torres NP        Dose:  1 tablet   Take 1 tablet by mouth every 4 hours as needed for moderate pain   Quantity:  90 tablet   Refills:  0         Stop taking these medicines if you haven't already. Please contact your care team if you have questions.     hydrOXYzine 50 MG capsule   Commonly known as:  VISTARIL   Stopped by:  Todd Torres NP           order for DME   Stopped by:  Todd Torres NP           predniSONE 10 MG tablet   Commonly known as:  DELTASONE   Stopped by:  Todd Torres NP           VALIUM PO   Stopped by:  Todd Torres NP                Where to get your medicines      Some of these will need a paper prescription and " others can be bought over the counter.  Ask your nurse if you have questions.     Bring a paper prescription for each of these medications     acetaminophen-codeine 300-30 MG per tablet                Primary Care Provider Office Phone # Fax #    Todd Torres [phone]       [facility] [place] [address]  [facility] [address]        Equal Access to Services     [facility] : Hadii aad ku hadasho Soomaali, waaxda luqadaha, qaybta kaalmada adeegyada, waxay idiin hayaan adeeg kharash la'aan ah. So [facility] [phone].    ATENCIÓN: Si habla español, tiene a seals disposición servicios gratuitos de asistencia lingüística. Llame al [phone].    We comply with applicable federal civil rights laws and [place] laws. We do not discriminate on the basis of race, color, national origin, age, disability sex, sexual orientation or gender identity.            Thank you!     Thank you for choosing [facility]  for your care. Our goal is always to provide you with excellent care. Hearing back from our patients is one way we can continue to improve our services. Please take a few minutes to complete the written survey that you may receive in the mail after your visit with us. Thank you!             Your Updated Medication List - Protect others around you: Learn how to safely use, store and throw away your medicines at www.disposemymeds.org.          This list is accurate as of: 8/7/17  2:57 PM.  Always use your most recent med list.                   Brand Name Dispense Instructions for use Diagnosis    acetaminophen-codeine 300-30 MG per tablet    TYLENOL #3    90 tablet    Take 1 tablet by mouth every 4 hours as needed for moderate pain    Hip pain, right       AMBIEN 10 MG tablet   Generic drug:  zolpidem     30 tablet    TAKE 1 TABLET BY MOUTH NIGHTLY AS NEEDED FOR SLEEP    Primary insomnia       BROVANA 15 MCG/2ML Nebu neb solution   Generic drug:  arformoterol     120 mL    USE 1 VIAL  IN NEBULIZER 2 TIMES A DAY AS NEEDED    Chronic bronchitis, unspecified chronic bronchitis type (H)       budesonide 1 MG/2ML Susp neb solution    PULMICORT    60 ampule    Take 2 mLs (1 mg) by nebulization daily Take 1 mg by nebulization daily    Bronchitis       DEXILANT 60 MG Cpdr CR capsule   Generic drug:  dexlansoprazole      Take 1 capsule by mouth daily as needed        ERYTHROMYCIN BASE PO      Take 500 mg by mouth 3 times daily (with meals) Takes 1 500 mg table TID for 21 days every other month        fluconazole 100 MG tablet    DIFLUCAN    30 tablet    TAKE 1 TABLET BY MOUTH DAILY AS NEEDED    Rash and other nonspecific skin eruption       levalbuterol 0.31 MG/3ML neb solution    XOPENEX    225 mL    Take 3 mLs (0.31 mg) by nebulization every 6 hours as needed for shortness of breath / dyspnea        nystatin 847045 UNIT/GM Powd    MYCOSTATIN    60 g    Apply 1 g topically 3 times daily as needed    Rash       PULMOZYME 1 MG/ML neb solution   Generic drug:  dornase alpha     75 mL    USE 1 VIAL IN NEBULIZER DAILY AS NEEDED    Chronic bronchitis, unspecified chronic bronchitis type (H)       sucralfate 1 GM tablet    CARAFATE    120 tablet    TAKE 1 TABLET BY MOUTH FOUR TIMES A DAY AS NEEDED    Esophageal reflux       WELLBUTRIN  MG 12 hr tablet   Generic drug:  buPROPion     90 tablet    Take 2 tabs am and 1 tab afternoon    Dysthymia

## 2017-08-08 LAB
TSH SERPL DL<=0.005 MIU/L-ACNC: 1.22 MU/L (ref 0.4–4)
VIT B12 SERPL-MCNC: 394 PG/ML (ref 193–986)

## 2017-08-08 ASSESSMENT — ANXIETY QUESTIONNAIRES: GAD7 TOTAL SCORE: 11

## 2017-08-10 LAB
VIT B1 BLD-MCNC: 157 UG/DL
VIT B2 SERPL-MCNC: 4 UG/DL

## 2017-08-11 LAB — VIT B6 SERPL-MCNC: 130.3 NG/ML

## 2017-10-12 ENCOUNTER — TRANSFERRED RECORDS (OUTPATIENT)
Dept: HEALTH INFORMATION MANAGEMENT | Facility: HOSPITAL | Age: 47
End: 2017-10-12

## 2017-10-13 NOTE — PROGRESS NOTES
Outpatient Physical Therapy Discharge Note     Patient: Annie Garcia  : 1970    Beginning/End Dates of Reporting Period:  2017 to 10/13/2017    Referring Provider: Todd Go Diagnosis: B foot/ankle pain     Client Self Report: Patient was seen from 1:35-2pm. She reports she has been doing well independently managing her pain with foam rolling and stretching on her own. She notes small bumps on her ankle and states since she got those she hasn't had as much pain, however she can't explain exactly why it may be feeling better, but she has also been wearing sandals with more of a heel and maybe that has been helpful.      Objective Measurements:  Objective Measure: R achilles and calf mobility  Details: Improved overall, however still medial gastroc trigger point noted with referral along posterior tibialis.  Overall improved gait mechanics, however she states she cannot go up and down stairs normally     Goals:  Goal Identifier STG 1   Goal Description Patient will demonstrate HEP stretching and rolling techniques with good form in order to make progress at home   Target Date 17   Date Met  17   Progress:     Goal Identifier LTG 1   Goal Description Patient will report decreased worst PL in B feet/ankles in general to 6/10 or less in order to ambulate safely   Target Date 17   Date Met   (DC - proceeding to surgery)   Progress:     Goal Identifier LTG 2   Goal Description Patient will ambulate up and down stairs with normal mechanics in order to be safe at home and in the community   Target Date 17   Date Met   (DC - proceeding to surgery)   Progress:         Progress Toward Goals:   Progress this reporting period: Patient has met some goals, but did not achieve completion of all goals      Plan:  Discharge from therapy.    Discharge:    Reason for Discharge: No further expectation of progress.    Equipment Issued: None    Discharge Plan: Patient to continue  home program.

## 2017-11-13 DIAGNOSIS — R21 RASH AND OTHER NONSPECIFIC SKIN ERUPTION: ICD-10-CM

## 2017-11-14 NOTE — TELEPHONE ENCOUNTER
Diflucan       Last Written Prescription Date: 8/31/2017  Last Fill Quantity: 90,  # refills: 0   Last Office Visit with G, [facility] or [facility] prescribing provider: 8/07/2017

## 2017-11-15 RX ORDER — FLUCONAZOLE 100 MG/1
TABLET ORAL
Qty: 90 TABLET | Refills: 1 | Status: SHIPPED | OUTPATIENT
Start: 2017-11-15 | End: 2018-04-10

## 2017-11-17 ENCOUNTER — HOSPITAL ENCOUNTER (OUTPATIENT)
Dept: CT IMAGING | Facility: HOSPITAL | Age: 47
Discharge: HOME | End: 2017-11-17
Attending: INTERNAL MEDICINE | Admitting: INTERNAL MEDICINE
Payer: COMMERCIAL

## 2017-11-17 DIAGNOSIS — E84.9 CF (CYSTIC FIBROSIS) (H): ICD-10-CM

## 2017-11-17 PROCEDURE — 71250 CT THORAX DX C-: CPT | Mod: TC

## 2017-11-28 ENCOUNTER — HOSPITAL ENCOUNTER (OUTPATIENT)
Dept: RESPIRATORY THERAPY | Facility: HOSPITAL | Age: 47
Discharge: HOME | End: 2017-11-28
Attending: INTERNAL MEDICINE | Admitting: INTERNAL MEDICINE
Payer: COMMERCIAL

## 2017-11-28 PROCEDURE — 94010 BREATHING CAPACITY TEST: CPT

## 2017-11-28 PROCEDURE — 94726 PLETHYSMOGRAPHY LUNG VOLUMES: CPT

## 2017-11-28 PROCEDURE — 94726 PLETHYSMOGRAPHY LUNG VOLUMES: CPT | Mod: 26 | Performed by: INTERNAL MEDICINE

## 2017-11-28 PROCEDURE — 94010 BREATHING CAPACITY TEST: CPT | Mod: 26 | Performed by: INTERNAL MEDICINE

## 2017-11-28 PROCEDURE — 94729 DIFFUSING CAPACITY: CPT | Mod: 26 | Performed by: INTERNAL MEDICINE

## 2017-11-28 PROCEDURE — 94729 DIFFUSING CAPACITY: CPT

## 2017-11-28 RX ORDER — ALBUTEROL SULFATE 0.83 MG/ML
2.5 SOLUTION RESPIRATORY (INHALATION)
Status: DISCONTINUED | OUTPATIENT
Start: 2017-11-28 | End: 2017-11-29 | Stop reason: HOSPADM

## 2018-01-08 ENCOUNTER — TELEPHONE (OUTPATIENT)
Dept: INTERNAL MEDICINE | Facility: OTHER | Age: 48
End: 2018-01-08

## 2018-01-08 DIAGNOSIS — F34.1 DYSTHYMIA: ICD-10-CM

## 2018-01-08 DIAGNOSIS — J01.00 SUBACUTE MAXILLARY SINUSITIS: Primary | ICD-10-CM

## 2018-01-08 NOTE — TELEPHONE ENCOUNTER
9:43 AM    Reason for Call: Phone Call    Description: Pt called and states that she would like to see if she could get a MRI test put in for her nose fungus that she has     Was an appointment offered for this call? No  If yes : Appointment type              Date    Preferred method for responding to this message: Telephone Call  What is your phone number ?    If we cannot reach you directly, may we leave a detailed response at the number you provided? Yes    Can this message wait until your PCP/provider returns, if available today? Not applicable, PCP is in     [place]

## 2018-01-09 NOTE — TELEPHONE ENCOUNTER
Annie called back this am.  She would like to have a CT scan of her sinus's, specifically ordered to look for fungal infection.  She has had issues with her sinuses x 4-6 weeks and in the past week she had noted smelly drainage. She states that she has had this type of infection in the past.

## 2018-01-09 NOTE — TELEPHONE ENCOUNTER
Can put that in -but they won't be able to tell if fungal or not-only if has sinus inflammation or tumor.  Todd

## 2018-01-10 ENCOUNTER — HOSPITAL ENCOUNTER (OUTPATIENT)
Dept: CT IMAGING | Facility: HOSPITAL | Age: 48
Discharge: HOME | End: 2018-01-10
Attending: NURSE PRACTITIONER | Admitting: NURSE PRACTITIONER
Payer: COMMERCIAL

## 2018-01-10 DIAGNOSIS — J01.00 SUBACUTE MAXILLARY SINUSITIS: ICD-10-CM

## 2018-01-10 PROCEDURE — 70487 CT MAXILLOFACIAL W/DYE: CPT | Mod: TC

## 2018-01-10 PROCEDURE — 25000128 H RX IP 250 OP 636: Performed by: RADIOLOGY

## 2018-01-10 RX ORDER — IOPAMIDOL 612 MG/ML
100 INJECTION, SOLUTION INTRAVASCULAR ONCE
Status: COMPLETED | OUTPATIENT
Start: 2018-01-10 | End: 2018-01-10

## 2018-01-10 RX ORDER — BUPROPION HCL 150 MG
TABLET,SUSTAINED-RELEASE 12 HR ORAL
Qty: 90 TABLET | Refills: 6 | Status: SHIPPED | OUTPATIENT
Start: 2018-01-10 | End: 2018-08-30

## 2018-01-10 RX ADMIN — IOPAMIDOL 100 ML: 612 INJECTION, SOLUTION INTRAVENOUS at 09:11

## 2018-01-10 NOTE — TELEPHONE ENCOUNTER
Pharmacy is questioning if this is current Wellbutrin dosing? See below for last office visit. Thank you

## 2018-01-10 NOTE — TELEPHONE ENCOUNTER
wellbutrin      Last Written Prescription Date:  6/28/17  Last Fill Quantity: 90,   # refills: 6  Last Office Visit: 8/7/17  Future Office visit:

## 2018-01-11 ENCOUNTER — TELEPHONE (OUTPATIENT)
Dept: INTERNAL MEDICINE | Facility: OTHER | Age: 48
End: 2018-01-11

## 2018-01-11 NOTE — TELEPHONE ENCOUNTER
3:34 PM    Reason for Call: Phone Call    Description: Pt called and states that she would like to see if she could get a call back on her results from her catscan     Was an appointment offered for this call? No  If yes : Appointment type              Date    Preferred method for responding to this message: Telephone Call  What is your phone number ?    If we cannot reach you directly, may we leave a detailed response at the number you provided? Yes    Can this message wait until your PCP/provider returns, if available today? Not applicable, PCP  Is in     [facility] [place]

## 2018-01-12 ENCOUNTER — TELEPHONE (OUTPATIENT)
Dept: INTERNAL MEDICINE | Facility: OTHER | Age: 48
End: 2018-01-12

## 2018-01-12 DIAGNOSIS — B49 FUNGUS INFECTION: Primary | ICD-10-CM

## 2018-01-12 RX ORDER — VORICONAZOLE 200 MG/1
200 TABLET, FILM COATED ORAL 2 TIMES DAILY
Qty: 60 TABLET | Refills: 0 | Status: SHIPPED | OUTPATIENT
Start: 2018-01-12 | End: 2018-02-02

## 2018-01-12 NOTE — TELEPHONE ENCOUNTER
1/12/2018 - received PA request from [employer] for Vfend.  Filled out form for Todd Torres to sign.  Will Fax in to Insurance.  Waiting for response.  Jocelin Ness, HIS Specialist.

## 2018-01-15 NOTE — TELEPHONE ENCOUNTER
DENIAL - 1/15/2018 - received DENIAL from [employer] for voriconazole.  Coverage criteria not met.  Criteria for coverage of voriconazole requires all of the following:  Prescription must be prescribed by or in consultation with an infectious disease doctor; Specific type of fungal infection must be identified and provided; and Depending on type of infection, trial and failure of fluconazole AND itraconazole may be required.  You may request reconsideration of this determination in writing within 180 days of 1/12/2018.  If you wish to speak with [employer]:  [phone].  Pharmacy advised.  Forms scanned to Epic.  Jocelin Ness, HIS Specialist.

## 2018-01-16 ENCOUNTER — ALLIED HEALTH/NURSE VISIT (OUTPATIENT)
Dept: PEDIATRICS | Facility: OTHER | Age: 48
End: 2018-01-16
Attending: NURSE PRACTITIONER
Payer: COMMERCIAL

## 2018-01-16 DIAGNOSIS — B49 FUNGUS INFECTION: ICD-10-CM

## 2018-01-16 DIAGNOSIS — J01.90 ACUTE INFECTION OF NASAL SINUS: Primary | ICD-10-CM

## 2018-01-16 DIAGNOSIS — R09.82 POST-NASAL DISCHARGE: Primary | ICD-10-CM

## 2018-01-16 PROCEDURE — 87102 FUNGUS ISOLATION CULTURE: CPT | Mod: 90 | Performed by: NURSE PRACTITIONER

## 2018-01-16 PROCEDURE — 87070 CULTURE OTHR SPECIMN AEROBIC: CPT | Performed by: NURSE PRACTITIONER

## 2018-01-17 ENCOUNTER — TELEPHONE (OUTPATIENT)
Dept: INTERNAL MEDICINE | Facility: OTHER | Age: 48
End: 2018-01-17

## 2018-01-17 NOTE — TELEPHONE ENCOUNTER
Pt wondering if results are back yet and if she was swabbed for the flu. Was notified not swabbed for the flu and results are not back yet. Nisha Singer LPN

## 2018-01-18 LAB
FUNGUS SPEC CULT: NORMAL
SPECIMEN SOURCE: NORMAL
SPECIMEN SOURCE: NORMAL

## 2018-01-20 LAB
BACTERIA SPEC CULT: NORMAL
SPECIMEN SOURCE: NORMAL

## 2018-01-26 DIAGNOSIS — B49 FUNGAL INFECTION: Primary | ICD-10-CM

## 2018-01-30 ENCOUNTER — TELEPHONE (OUTPATIENT)
Dept: OTOLARYNGOLOGY | Facility: OTHER | Age: 48
End: 2018-01-30

## 2018-01-30 ENCOUNTER — OFFICE VISIT (OUTPATIENT)
Dept: OTOLARYNGOLOGY | Facility: OTHER | Age: 48
End: 2018-01-30
Attending: NURSE PRACTITIONER
Payer: COMMERCIAL

## 2018-01-30 VITALS
TEMPERATURE: 97.5 F | WEIGHT: 238 LBS | BODY MASS INDEX: 37.35 KG/M2 | DIASTOLIC BLOOD PRESSURE: 74 MMHG | HEART RATE: 89 BPM | SYSTOLIC BLOOD PRESSURE: 126 MMHG | HEIGHT: 67 IN

## 2018-01-30 DIAGNOSIS — J30.2 CHRONIC SEASONAL ALLERGIC RHINITIS, UNSPECIFIED TRIGGER: ICD-10-CM

## 2018-01-30 DIAGNOSIS — B49 FUNGUS BALL: ICD-10-CM

## 2018-01-30 DIAGNOSIS — J32.0 LEFT MAXILLARY SINUSITIS: Primary | ICD-10-CM

## 2018-01-30 DIAGNOSIS — Z98.890 HISTORY OF SINUS SURGERY: ICD-10-CM

## 2018-01-30 DIAGNOSIS — E84.9 CYSTIC FIBROSIS (H): ICD-10-CM

## 2018-01-30 DIAGNOSIS — J32.9 CHRONIC SINUSITIS, UNSPECIFIED LOCATION: ICD-10-CM

## 2018-01-30 LAB
BACTERIA SPEC CULT: NORMAL
FUNGUS SPEC CULT: NORMAL
GRAM STN SPEC: NORMAL
SPECIMEN SOURCE: NORMAL

## 2018-01-30 PROCEDURE — 31231 NASAL ENDOSCOPY DX: CPT | Performed by: PHYSICIAN ASSISTANT

## 2018-01-30 PROCEDURE — 99214 OFFICE O/P EST MOD 30 MIN: CPT | Mod: 25 | Performed by: PHYSICIAN ASSISTANT

## 2018-01-30 ASSESSMENT — PAIN SCALES - GENERAL: PAINLEVEL: NO PAIN (0)

## 2018-01-30 NOTE — LETTER
2018         RE: Annie Garcia  [address] [facility] [address]        Dear Colleague,    Thank you for referring your patient, Annie Garcia, to the [facility]. Please see a copy of my visit note below.    Scope #: [id number]            Otolaryngology Consultation    Patient: Annie Garcia  : 1970    Patient presents with:  Consult: Fungal Infection; Referred by Todd Torres      HPI:  Annie Garcia is a 47 year old female seen today for sinusitis. Annie presents to ENT for concerns of Sinuitis, left. Symptoms have developed over the last 4+ weeks. Left facial pressure, drainage from maxillary into frontal ethmoid regions. She had complete Amoxil for 5 days w/ mild relief. She is on daily Doxy and increased it to BID for several days w/o significant improved.   Her PCP had her collect culture for fungal, however patient inserted a swab into her nares. Not sure where sample was collected from, etc. Culture were negative.   She has a bad smell from her nares well. CT was completed.     Annie does use Asaf med rinses daily. Declines nasal spray due to 'flower scent.'     Annie has a hx of sinusitis. Seen by Dr. Paz 7 years ago and underwent FESS. There was a right maxillary fungal sinus ball. Aspergillus was cultured. She was treated with irrigations of Tobramycin and made symptoms worse. Care was managed by ID in [place]. Since, she had no sinus complaints until recently./ She was evaluated by ENT in [place], [facility], Dr. Mcadams who recommended surgery, but is not able to arrange surgery on those days.     Patient does have CF. Extensive hx of Sinus, allergies throughout her life. Hx of SCIT.    Currently, her lungs are clear w/o concerns.   Current Outpatient Rx   Medication Sig Dispense Refill     voriconazole (VFEND) 200 MG tablet Take 1 tablet (200 mg) by mouth 2 times daily 60 tablet 0     acetaminophen-codeine (TYLENOL #3) 300-30 MG per  tablet TAKE 1 TABLET BY MOUTH EVERY 4 HOURS AS NEEDED FOR MILD PAIN, TAKE FOR COUGHING PAIN. 90 tablet 0     WELLBUTRIN  MG 12 hr tablet TAKE 2 TABLETS BY MOUTH EVERY MORNING AND 1 TABLET EVERY AFTERNOON 90 tablet 6     AMBIEN 10 MG tablet Take 1 tablet (10 mg) by mouth nightly as needed 30 tablet 5     fluconazole (DIFLUCAN) 100 MG tablet TAKE 1 TABLET BY MOUTH DAILY AS NEEDED 90 tablet 1     ERYTHROMYCIN BASE PO Take 500 mg by mouth 3 times daily (with meals) Takes 1 500 mg table TID for 21 days every other month       sucralfate (CARAFATE) 1 GM tablet TAKE 1 TABLET BY MOUTH FOUR TIMES A DAY AS NEEDED 120 tablet 3     PULMOZYME 1 MG/ML neb solution USE 1 VIAL IN NEBULIZER DAILY AS NEEDED 75 mL 0     budesonide (PULMICORT) 1 MG/2ML SUSP nebulizer solution Take 2 mLs (1 mg) by nebulization daily Take 1 mg by nebulization daily 60 ampule 1     BROVANA 15 MCG/2ML NEBU USE 1 VIAL IN NEBULIZER 2 TIMES A DAY AS NEEDED 120 mL 0     levalbuterol (XOPENEX) 0.31 MG/3ML nebulizer solution Take 3 mLs (0.31 mg) by nebulization every 6 hours as needed for shortness of breath / dyspnea 225 mL 0     nystatin (MYCOSTATIN) 554507 UNIT/GM POWD Apply 1 g topically 3 times daily as needed 60 g 1     dexlansoprazole (DEXILANT) 60 MG CPDR Take 1 capsule by mouth daily as needed         Allergies: Seasonal allergies and Sulfa drugs     Past Medical History:   Diagnosis Date     Cystic fibrosis (H)      Depression      Tear of right acetabular labrum        Past Surgical History:   Procedure Laterality Date     BREAST SURGERY       ENDOSCOPY UPPER, COLONOSCOPY, COMBINED N/A 11/18/2016    Procedure: COMBINED ENDOSCOPY UPPER, COLONOSCOPY;  Surgeon: Levi Hinkle MD;  Location: [facility] OR     ESOPHAGOSCOPY, GASTROSCOPY, DUODENOSCOPY (EGD), COMBINED  1/10/2014    Procedure: COMBINED ESOPHAGOSCOPY, GASTROSCOPY, DUODENOSCOPY (EGD);  UPPER ENDOSCOPY with Biopsy;  Surgeon: Levi Hinkle MD;  Location: [facility] OR     GYN SURGERY  2008    ablation      "ORTHOPEDIC SURGERY  1994    left knee arthroscopy     ORTHOPEDIC SURGERY  1994    right shoulder     ORTHOPEDIC SURGERY  2014    left hip replacement       ENT family history reviewed    Social History   Substance Use Topics     Smoking status: Never Smoker     Smokeless tobacco: Never Used     Alcohol use Yes      Comment: rare       Review of Systems  ROS: 10 point ROS neg other than the symptoms noted above in the HPI     Physical Exam  /74 (Cuff Size: Adult Large)  Pulse 89  Temp 97.5  F (36.4  C) (Tympanic)  Ht 5' 7\" (1.702 m)  Wt 238 lb (108 kg)  BMI 37.28 kg/m2  General - The patient is well nourished and well developed, and appears to have good nutritional status.  Alert and oriented to person and place, answers questions and cooperates with examination appropriately.   Head and Face - Normocephalic and atraumatic, with no gross asymmetry noted.  The facial nerve is intact, with strong symmetric movements.  Voice and Breathing - The patient was breathing comfortably without the use of accessory muscles. There was no wheezing, stridor, or stertor.  The patients voice was clear and strong, and had appropriate pitch and quality.  Ears -The external auditory canals are patent, the tympanic membranes are intact without effusion, retraction or mass.  Bony landmarks are intact.  Eyes - Extraocular movements intact, and the pupils were reactive to light.  Sclera were not icteric or injected, conjunctiva were pink and moist.  Mouth - Examination of the oral cavity showed pink, healthy oral mucosa. No lesions or ulcerations noted.  The tongue was mobile and midline, and the dentition were in good condition.    Throat - The walls of the oropharynx were smooth, pink, moist, symmetric, and had no lesions or ulcerations.  The tonsillar pillars and soft palate were symmetric.  The uvula was midline on elevation.    Neck - Normal midline excursion of the laryngotracheal complex during swallowing.  Full range of " motion on passive movement.  Palpation of the occipital, submental, submandibular, internal jugular chain, and supraclavicular nodes did not demonstrate any abnormal lymph nodes or masses.  Palpation of the thyroid was soft and smooth, with no nodules or goiter appreciated.  The trachea was mobile and midline.  Nose - External contour is symmetric, no gross deflection or scars.  Nasal mucosa is pink and moist with no abnormal mucus.  The septum and turbinates were evaluated: congestion.       To further evaluate the nasal cavity, I performed rigid nasal endoscopy.    I first sprayed the nasal cavity bilaterally with a mix of lidocaine and neosynephrine.  I then began on the left side using a 2.7mm, 30 degree rigid nasal endoscope.  The septum was midline.  The left middle turbinate and middle meatus were clearly visualized and edematous in appearance with a thick cloudy secretion coming from the ethmoid infundibulum.      I then turned my attention to the right side. The right middle turbinate and middle meatus were clearly visualized and  Non edematous. No polypoid changes. No secretions. Normal post ESS changes, maxillary ethmoid.     Attempted to suction/ debride left ethmoid, MM. Patient has limited tolerability to exam. Suctioning was completed, to obtain sample. Patient tolerable to scant suctioning. Limited quantitiy sent for testing.         ASSESSMENT:    ICD-10-CM    1. Left maxillary sinusitis J32.0    2. Chronic sinusitis, unspecified location J32.9    3. History of right maxillary fungal ball removal B49    4. Chronic seasonal allergic rhinitis, unspecified trigger J30.2    5. History of sinus surgery Z98.890    6. Cystic fibrosis (H) E84.9      Discussed options with patient, she had similar symptoms prior to her initial sinus surgery and wishes to pursue those options.   She will f/u with Dr. Gonzalez for repeat exam/ schedule surgery.   Start Nei med rinse BID. Use Budesonide rinse BID for 2  weeks.     Culture is pending. Limited collection today, but hopefully we will obtain reports.     She agrees with this plan.   Follow up arranged.     Consider MQT, consider trial of Qnasl? Less smell?       Karen Mccallum PA-C  [facility] [place]  [phone]      Again, thank you for allowing me to participate in the care of your patient.        Sincerely,        Karen Mccallum PA-C

## 2018-01-30 NOTE — PATIENT INSTRUCTIONS
Start Rinses.   Use Asaf med rinses. Add budesonide rinses twice a day for 2-4 weeks.   Sinus Culture is pending.   Will arrange f/u with Dr. Gonzalez.     Thank you for allowing [facility] and our ENT team to participate in your care.  If your medications are too expensive, please give the nurse a call.  We can possibly change this medication.  If you have a scheduling or an appointment question please contact [facility] Coordinator at their direct line [phone].   ALL nursing questions or concerns can be directed to your ENT nurse at: [phone] [place]

## 2018-01-30 NOTE — TELEPHONE ENCOUNTER
Fiona from Microbiology called and states that the culture that was sent over today, did not have enough of a sample to test.  This order was cancelled.

## 2018-01-30 NOTE — MR AVS SNAPSHOT
After Visit Summary   1/30/2018    Annie Garcia    MRN: [id number]           Patient Information     Date Of Birth          1970        Visit Information        Provider Department      1/30/2018 10:30 AM Karen Mccallum PA-C [facility]        Today's Diagnoses     Fungal infection          Care Instructions    Start Rinses.   Use Asaf med rinses. Add budesonide rinses twice a day for 2-4 weeks.   Sinus Culture is pending.   Will arrange f/u with Dr. Gonzalez.     Thank you for allowing [facility] Perdue and our ENT team to participate in your care.  If your medications are too expensive, please give the nurse a call.  We can possibly change this medication.  If you have a scheduling or an appointment question please contact [facility] Unit Coordinator at their direct line [phone].   ALL nursing questions or concerns can be directed to your ENT nurse at: [phone] [place]              Follow-ups after your visit        Who to contact     If you have questions or need follow up information about today's clinic visit or your schedule please contact [facility] directly at [phone].  Normal or non-critical lab and imaging results will be communicated to you by MyChart, letter or phone within 4 business days after the clinic has received the results. If you do not hear from us within 7 days, please contact the clinic through [employer] or phone. If you have a critical or abnormal lab result, we will notify you by phone as soon as possible.  Submit refill requests through [employer] or call your pharmacy and they will forward the refill request to us. Please allow 3 business days for your refill to be completed.          Additional Information About Your Visit        MyChart Information     [employer] lets you send messages to your doctor, view your test results, renew your prescriptions, schedule appointments and more. To sign up, go to [email or web address]  "Click on \"Log in\" on the left side of the screen, which will take you to the Welcome page. Then click on \"Sign up Now\" on the right side of the page.     You will be asked to enter the access code listed below, as well as some personal information. Please follow the directions to create your username and password.     Your access code is: [email or web address]  Expires: 2018  4:49 PM     Your access code will  in 90 days. If you need help or a new code, please call your [facility] clinic or [phone].        Care EveryWhere ID     This is your Care EveryWhere ID. This could be used by other organizations to access your [facility] medical records  [id number]        Your Vitals Were     Pulse Temperature Height BMI (Body Mass Index)          89 97.5  F (36.4  C) (Tympanic) 5' 7\" (1.702 m) 37.28 kg/m2         Blood Pressure from Last 3 Encounters:   18 126/74   17 120/70   17 138/72    Weight from Last 3 Encounters:   18 238 lb (108 kg)   17 238 lb (108 kg)   17 240 lb (108.9 kg)              Today, you had the following     No orders found for display       Primary Care Provider Office Phone # Fax #    Todd BERENICE Torres [phone]       [place] [facility] [place] [address]  [place] [address]        Equal Access to Services     [facility] AH: Hadii aad ku hadasho Soomaali, waaxda luqadaha, qaybta kaalmada adeegyada, waxay idiin haygibson [place] . So [facility] [phone].    ATENCIÓN: Si habla español, tiene a seals disposición servicios gratuitos de asistencia lingüística. Llame al [phone].    We comply with applicable federal civil rights laws and [place] laws. We do not discriminate on the basis of race, color, national origin, age, disability, sex, sexual orientation, or gender identity.            Thank you!     Thank you for choosing [facility]  for your care. Our goal is always to provide you with excellent care. Hearing back from " our patients is one way we can continue to improve our services. Please take a few minutes to complete the written survey that you may receive in the mail after your visit with us. Thank you!             Your Updated Medication List - Protect others around you: Learn how to safely use, store and throw away your medicines at www.disposemymeds.org.          This list is accurate as of 1/30/18 11:05 AM.  Always use your most recent med list.                   Brand Name Dispense Instructions for use Diagnosis    acetaminophen-codeine 300-30 MG per tablet    TYLENOL #3    90 tablet    TAKE 1 TABLET BY MOUTH EVERY 4 HOURS AS NEEDED FOR MILD PAIN, TAKE FOR COUGHING PAIN.    Hip pain, right       AMBIEN 10 MG tablet   Generic drug:  zolpidem     30 tablet    Take 1 tablet (10 mg) by mouth nightly as needed    Primary insomnia       BROVANA 15 MCG/2ML Nebu neb solution   Generic drug:  arformoterol     120 mL    USE 1 VIAL IN NEBULIZER 2 TIMES A DAY AS NEEDED    Chronic bronchitis, unspecified chronic bronchitis type (H)       budesonide 1 MG/2ML Susp neb solution    PULMICORT    60 ampule    Take 2 mLs (1 mg) by nebulization daily Take 1 mg by nebulization daily    Bronchitis       DEXILANT 60 MG Cpdr CR capsule   Generic drug:  dexlansoprazole      Take 1 capsule by mouth daily as needed        ERYTHROMYCIN BASE PO      Take 500 mg by mouth 3 times daily (with meals) Takes 1 500 mg table TID for 21 days every other month        fluconazole 100 MG tablet    DIFLUCAN    90 tablet    TAKE 1 TABLET BY MOUTH DAILY AS NEEDED    Rash and other nonspecific skin eruption       levalbuterol 0.31 MG/3ML neb solution    XOPENEX    225 mL    Take 3 mLs (0.31 mg) by nebulization every 6 hours as needed for shortness of breath / dyspnea        nystatin 954904 UNIT/GM Powd    MYCOSTATIN    60 g    Apply 1 g topically 3 times daily as needed    Rash       PULMOZYME 1 MG/ML neb solution   Generic drug:  dornase alpha     75 mL    USE 1  VIAL IN NEBULIZER DAILY AS NEEDED    Chronic bronchitis, unspecified chronic bronchitis type (H)       sucralfate 1 GM tablet    CARAFATE    120 tablet    TAKE 1 TABLET BY MOUTH FOUR TIMES A DAY AS NEEDED    Esophageal reflux       voriconazole 200 MG tablet    VFEND    60 tablet    Take 1 tablet (200 mg) by mouth 2 times daily    Fungus infection       WELLBUTRIN  MG 12 hr tablet   Generic drug:  buPROPion     90 tablet    TAKE 2 TABLETS BY MOUTH EVERY MORNING AND 1 TABLET EVERY AFTERNOON    Dysthymia

## 2018-01-30 NOTE — NURSING NOTE
"Chief Complaint   Patient presents with     Consult     Fungal Infection; Referred by Todd Torres       Initial /74 (Cuff Size: Adult Large)  Pulse 89  Temp 97.5  F (36.4  C) (Tympanic)  Ht 5' 7\" (1.702 m)  Wt 238 lb (108 kg)  BMI 37.28 kg/m2 Estimated body mass index is 37.28 kg/(m^2) as calculated from the following:    Height as of this encounter: 5' 7\" (1.702 m).    Weight as of this encounter: 238 lb (108 kg).  Medication Reconciliation: complete   Sarahi Bain      "

## 2018-01-30 NOTE — PROGRESS NOTES
Otolaryngology Consultation    Patient: Annie Garcia  : 1970    Patient presents with:  Consult: Fungal Infection; Referred by Todd Torres      HPI:  Annie Garcia is a 47 year old female seen today for sinusitis. Annie presents to ENT for concerns of Sinuitis, left. Symptoms have developed over the last 4+ weeks. Left facial pressure, drainage from maxillary into frontal ethmoid regions. She had complete Amoxil for 5 days w/ mild relief. She is on daily Doxy and increased it to BID for several days w/o significant improved.   Her PCP had her collect culture for fungal, however patient inserted a swab into her nares. Not sure where sample was collected from, etc. Culture were negative.   She has a bad smell from her nares well. CT was completed.     Annie does use Asaf med rinses daily. Declines nasal spray due to 'flower scent.'     Annie has a hx of sinusitis. Seen by Dr. Paz 7 years ago and underwent FESS. There was a right maxillary fungal sinus ball. Aspergillus was cultured. She was treated with irrigations of Tobramycin and made symptoms worse. Care was managed by ID in [place]. Since, she had no sinus complaints until recently./ She was evaluated by ENT in [place], [facility], Dr. Mcadams who recommended surgery, but is not able to arrange surgery on those days.     Patient does have CF. Extensive hx of Sinus, allergies throughout her life. Hx of SCIT.    Currently, her lungs are clear w/o concerns.   Current Outpatient Rx   Medication Sig Dispense Refill     voriconazole (VFEND) 200 MG tablet Take 1 tablet (200 mg) by mouth 2 times daily 60 tablet 0     acetaminophen-codeine (TYLENOL #3) 300-30 MG per tablet TAKE 1 TABLET BY MOUTH EVERY 4 HOURS AS NEEDED FOR MILD PAIN, TAKE FOR COUGHING PAIN. 90 tablet 0     WELLBUTRIN  MG 12 hr tablet TAKE 2 TABLETS BY MOUTH EVERY MORNING AND 1 TABLET EVERY AFTERNOON 90 tablet 6     AMBIEN 10 MG tablet Take 1 tablet (10 mg) by mouth nightly  as needed 30 tablet 5     fluconazole (DIFLUCAN) 100 MG tablet TAKE 1 TABLET BY MOUTH DAILY AS NEEDED 90 tablet 1     ERYTHROMYCIN BASE PO Take 500 mg by mouth 3 times daily (with meals) Takes 1 500 mg table TID for 21 days every other month       sucralfate (CARAFATE) 1 GM tablet TAKE 1 TABLET BY MOUTH FOUR TIMES A DAY AS NEEDED 120 tablet 3     PULMOZYME 1 MG/ML neb solution USE 1 VIAL IN NEBULIZER DAILY AS NEEDED 75 mL 0     budesonide (PULMICORT) 1 MG/2ML SUSP nebulizer solution Take 2 mLs (1 mg) by nebulization daily Take 1 mg by nebulization daily 60 ampule 1     BROVANA 15 MCG/2ML NEBU USE 1 VIAL IN NEBULIZER 2 TIMES A DAY AS NEEDED 120 mL 0     levalbuterol (XOPENEX) 0.31 MG/3ML nebulizer solution Take 3 mLs (0.31 mg) by nebulization every 6 hours as needed for shortness of breath / dyspnea 225 mL 0     nystatin (MYCOSTATIN) 340804 UNIT/GM POWD Apply 1 g topically 3 times daily as needed 60 g 1     dexlansoprazole (DEXILANT) 60 MG CPDR Take 1 capsule by mouth daily as needed         Allergies: Seasonal allergies and Sulfa drugs     Past Medical History:   Diagnosis Date     Cystic fibrosis (H)      Depression      Tear of right acetabular labrum        Past Surgical History:   Procedure Laterality Date     BREAST SURGERY       ENDOSCOPY UPPER, COLONOSCOPY, COMBINED N/A 11/18/2016    Procedure: COMBINED ENDOSCOPY UPPER, COLONOSCOPY;  Surgeon: Levi Hinkle MD;  Location: [facility] OR     ESOPHAGOSCOPY, GASTROSCOPY, DUODENOSCOPY (EGD), COMBINED  1/10/2014    Procedure: COMBINED ESOPHAGOSCOPY, GASTROSCOPY, DUODENOSCOPY (EGD);  UPPER ENDOSCOPY with Biopsy;  Surgeon: Levi Hinkle MD;  Location: [facility] OR     GYN SURGERY  2008    ablation     ORTHOPEDIC SURGERY  1994    left knee arthroscopy     ORTHOPEDIC SURGERY  1994    right shoulder     ORTHOPEDIC SURGERY  2014    left hip replacement       ENT family history reviewed    Social History   Substance Use Topics     Smoking status: Never Smoker     Smokeless tobacco: Never  "Used     Alcohol use Yes      Comment: rare       Review of Systems  ROS: 10 point ROS neg other than the symptoms noted above in the HPI     Physical Exam  /74 (Cuff Size: Adult Large)  Pulse 89  Temp 97.5  F (36.4  C) (Tympanic)  Ht 5' 7\" (1.702 m)  Wt 238 lb (108 kg)  BMI 37.28 kg/m2  General - The patient is well nourished and well developed, and appears to have good nutritional status.  Alert and oriented to person and place, answers questions and cooperates with examination appropriately.   Head and Face - Normocephalic and atraumatic, with no gross asymmetry noted.  The facial nerve is intact, with strong symmetric movements.  Voice and Breathing - The patient was breathing comfortably without the use of accessory muscles. There was no wheezing, stridor, or stertor.  The patients voice was clear and strong, and had appropriate pitch and quality.  Ears -The external auditory canals are patent, the tympanic membranes are intact without effusion, retraction or mass.  Bony landmarks are intact.  Eyes - Extraocular movements intact, and the pupils were reactive to light.  Sclera were not icteric or injected, conjunctiva were pink and moist.  Mouth - Examination of the oral cavity showed pink, healthy oral mucosa. No lesions or ulcerations noted.  The tongue was mobile and midline, and the dentition were in good condition.    Throat - The walls of the oropharynx were smooth, pink, moist, symmetric, and had no lesions or ulcerations.  The tonsillar pillars and soft palate were symmetric.  The uvula was midline on elevation.    Neck - Normal midline excursion of the laryngotracheal complex during swallowing.  Full range of motion on passive movement.  Palpation of the occipital, submental, submandibular, internal jugular chain, and supraclavicular nodes did not demonstrate any abnormal lymph nodes or masses.  Palpation of the thyroid was soft and smooth, with no nodules or goiter appreciated.  The trachea " was mobile and midline.  Nose - External contour is symmetric, no gross deflection or scars.  Nasal mucosa is pink and moist with no abnormal mucus.  The septum and turbinates were evaluated: congestion.       To further evaluate the nasal cavity, I performed rigid nasal endoscopy.    I first sprayed the nasal cavity bilaterally with a mix of lidocaine and neosynephrine.  I then began on the left side using a 2.7mm, 30 degree rigid nasal endoscope.  The septum was midline.  The left middle turbinate and middle meatus were clearly visualized and edematous in appearance with a thick cloudy secretion coming from the ethmoid infundibulum.      I then turned my attention to the right side. The right middle turbinate and middle meatus were clearly visualized and  Non edematous. No polypoid changes. No secretions. Normal post ESS changes, maxillary ethmoid.     Attempted to suction/ debride left ethmoid, MM. Patient has limited tolerability to exam. Suctioning was completed, to obtain sample. Patient tolerable to scant suctioning. Limited quantitiy sent for testing.         ASSESSMENT:    ICD-10-CM    1. Left maxillary sinusitis J32.0    2. Chronic sinusitis, unspecified location J32.9    3. History of right maxillary fungal ball removal B49    4. Chronic seasonal allergic rhinitis, unspecified trigger J30.2    5. History of sinus surgery Z98.890    6. Cystic fibrosis (H) E84.9      Discussed options with patient, she had similar symptoms prior to her initial sinus surgery and wishes to pursue those options.   She will f/u with Dr. Gonzalez for repeat exam/ schedule surgery.   Start Nei med rinse BID. Use Budesonide rinse BID for 2 weeks.     Culture is pending. Limited collection today, but hopefully we will obtain reports.     She agrees with this plan.   Follow up arranged.     Consider MQT, consider trial of Qnasl? Less smell?       Karen Mccallum PA-C  ENT  [facility], [place]  [phone]

## 2018-01-31 ENCOUNTER — OFFICE VISIT (OUTPATIENT)
Dept: OTOLARYNGOLOGY | Facility: OTHER | Age: 48
End: 2018-01-31
Attending: OTOLARYNGOLOGY
Payer: COMMERCIAL

## 2018-01-31 VITALS
DIASTOLIC BLOOD PRESSURE: 76 MMHG | OXYGEN SATURATION: 98 % | WEIGHT: 238 LBS | BODY MASS INDEX: 37.35 KG/M2 | SYSTOLIC BLOOD PRESSURE: 121 MMHG | HEIGHT: 67 IN | HEART RATE: 83 BPM | TEMPERATURE: 98.1 F

## 2018-01-31 DIAGNOSIS — E84.9 CYSTIC FIBROSIS (H): ICD-10-CM

## 2018-01-31 DIAGNOSIS — J34.3 NASAL TURBINATE HYPERTROPHY: ICD-10-CM

## 2018-01-31 DIAGNOSIS — B49 FUNGUS BALL: ICD-10-CM

## 2018-01-31 DIAGNOSIS — J34.2 DNS (DEVIATED NASAL SEPTUM): ICD-10-CM

## 2018-01-31 DIAGNOSIS — J32.4 CHRONIC PANSINUSITIS: Primary | ICD-10-CM

## 2018-01-31 LAB
SPECIMEN SOURCE: NORMAL
SPECIMEN SOURCE: NORMAL
YEAST SPEC QL CULT: NORMAL
YEAST SPEC QL CULT: NORMAL

## 2018-01-31 PROCEDURE — 31231 NASAL ENDOSCOPY DX: CPT | Performed by: OTOLARYNGOLOGY

## 2018-01-31 PROCEDURE — 99214 OFFICE O/P EST MOD 30 MIN: CPT | Mod: 25 | Performed by: OTOLARYNGOLOGY

## 2018-01-31 RX ORDER — PREDNISONE 20 MG/1
TABLET ORAL
Qty: 20 TABLET | Refills: 1 | Status: SHIPPED | OUTPATIENT
Start: 2018-02-18 | End: 2019-01-30

## 2018-01-31 RX ORDER — MOMETASONE FUROATE MONOHYDRATE 50 UG/1
2 SPRAY, METERED NASAL DAILY
Qty: 3 BOX | Refills: 11 | Status: SHIPPED | OUTPATIENT
Start: 2018-01-31 | End: 2021-11-04

## 2018-01-31 ASSESSMENT — PAIN SCALES - GENERAL: PAINLEVEL: NO PAIN (0)

## 2018-01-31 NOTE — LETTER
"    1/31/2018         RE: Annie Garcia  [address] [address]        Dear Colleague,    Thank you for referring your patient, Annie Garcia, to the [facility]. Please see a copy of my visit note below.    Otolaryngology Progress Note      History of Present Illness       Annie Garcia is a 47 year old female  with a history of a cystic fibrosis and a right maxillary sinus mycetoma (reportedly aspergillosis), post distant sinus surgery 7 years ago by Dr. Paz, who presents with over 1 month of chronic left facial pressure, congestion, and malodorous rhinorrhea.  She has frequent bilateral congestion.  No shortness of breath or cough.  States she had 'bone filed down' from her right sinus during the last surgery to remove the fungal ball.    She has a history of cystic fibrosis without any current lung disease, no prior difficulties with anesthesia.    She has been seen by ID in [place] in the past.  She has been treated with bid Doxy, nasal saline and prior tobramycin irrigation without improvement.   Karen saw her on 1/30 and obtained culture from purulent material left middle meatus but limited exam and she was started on budesonide irrigations.  Insufficient material for culture.   She was placed on voriconazole 200 mg by Todd Torres NP and doxycycline daily as preventative maintenance for her CF.  She is managed by Dr. Herman for CF, pneumonia in March 2017, but has been well controled since that time.     Hx of perennial allergic rhinitis post completed subcutaneous immunotherapy in past    She states she does not like her nose manipulated  Did not tolerate flonase in past due to scent    Physical Exam  /76 (BP Location: Right arm, Cuff Size: Adult Large)  Pulse 83  Temp 98.1  F (36.7  C) (Tympanic)  Ht 5' 7\" (1.702 m)  Wt 238 lb (108 kg)  SpO2 98%  BMI 37.28 kg/m2    General - The patient is well nourished and well developed, and " appears to have good nutritional status.  Alert and oriented to person and place, interactive.  Head and Face - Normocephalic and atraumatic, with no gross asymmetry noted of the contour of the facial features.  The facial nerve is intact, with strong symmetric movements.  Neck-no palpable lymphadenopathy or thyroid mass.  Trachea is midline.  Eyes - Extraocular movements intact.   Ears- External auditory canals are patent, tympanic membranes are intact without effusion or worrisome retractions   Nose - Nasal mucosa is pink and moist with no abnormal mucus.  The septum was deviated left 70% obstruction, bilateral ITH Mouth - Examination of the oral cavity shows pink, healthy, moist mucosa.  No lesions or ulceration noted.  The dentition are in good repair.  The tongue is mobile and midline.  Throat - The walls of the oropharynx were smooth, pink, moist, symmetric, and had no lesions or ulcerations.  The tonsillar pillars and soft palate were symmetric.  The uvula was midline on elevation.      To evaluate the nose and sinuses in the post operative state, I performed rigid nasal endoscopy. The LPN had previously sprayed both nares with lidocaine and neosynephrine.    I began with the LEFT side using a 0 degree rigid nasal endoscope, and then similarly examined the RIGHT side    Findings:  Inferior turbinates:  edematous  Middle turbinate and middle meatus:  LEFT purulence, no obvious polyposis, does not allow debridmement  Mucosa is edematous  Throughout left middle meatus  Right middle meatus clear  DNS left with 70% obstruction, narrows middle meatus  Nasopharynx unable to be viz  The patient tolerated the procedure well    CT sinsues dated 10/10/18 was reviewed with Annie:  Cecilia 8/24 with LEFT sided opacificaiton a/p ethmomids, osteomeatal complex , max and mucoperiosteal thickening left frontals, left sphenoid clear    Keros 3  DNS left, bilateral  inferior turbinate hypertrophy     Right antrostomy  patent  Right sinuses clear    Impression/Plan  Annie Garcia is a 47 year old female    ICD-10-CM    1. LEFT chronic pansinusitis J32.4    2. History of maxillary sinus mycetoma B49    3. DNS (deviated nasal septum) J34.2    4. Nasal turbinate hypertrophy J34.3    5. Cystic fibrosis (H) E84.9      The risks and complications of LEFT Endoscopic Sinus Surgery (ESS), septoplasty, bilateral turbinate reduction   were openly discussed.  The potential risks include bleeding, general anesthesia, infection, scar formation, need for additional surgery, septal perforation, and rarely injury to the eye with the possibility of blindness,  injury to the brain, meningitis or other intracranial complications.  Will include total left without sphenoids, will use propels  Septoplasty will be performed to better allow post op nasal steroid maintenance irrigations and post op visits as well as to improve breathing, d/w her  No indication for systemic antifungals or topical antifungals for sinusitis, d/w her today.  Maintenance CF medication with doxy and voriconazole per Todd Torres.    Appropriate treatment with hypertonic nasal saline irrigations, budesonide irrigations and nasal steroid were discussed.  She will require a prednisone taper pre and post op  Risks of oral steroid use were discussed and include psychiatric/mood changes, insomnia, stomach ulcers and potential GI bleeding, blood sugar elevation/worsening diabetes, hip/bone necrosis or bone demineralization.        Nonsurgical options were discussed including prolonged antibioitics and/or oral and topical steroids.   Sinus anatomy and sinus disease were reviewed.  CT sinus was reviewed with the patient.  All questions were answered.           Francoise Gonzalez D.O.  Otolaryngology/Head and Neck Surgery  Allergy            Again, thank you for allowing me to participate in the care of your patient.        Sincerely,        Francoise Gonzalez MD

## 2018-01-31 NOTE — PROGRESS NOTES
"Otolaryngology Progress Note      History of Present Illness       Annie Garcia is a 47 year old female  with a history of a cystic fibrosis and a right maxillary sinus mycetoma (reportedly aspergillosis), post distant sinus surgery 7 years ago by Dr. Paz, who presents with over 1 month of chronic left facial pressure, congestion, and malodorous rhinorrhea.  She has frequent bilateral congestion.  No shortness of breath or cough.  States she had 'bone filed down' from her right sinus during the last surgery to remove the fungal ball.    She has a history of cystic fibrosis without any current lung disease, no prior difficulties with anesthesia.    She has been seen by ID in [place] in the past.  She has been treated with bid Doxy, nasal saline and prior tobramycin irrigation without improvement.   Karen saw her on 1/30 and obtained culture from purulent material left middle meatus but limited exam and she was started on budesonide irrigations.  Insufficient material for culture.   She was placed on voriconazole 200 mg by Todd Torres NP and doxycycline daily as preventative maintenance for her CF.  She is managed by Dr. Herman for CF, pneumonia in March 2017, but has been well controled since that time.     Hx of perennial allergic rhinitis post completed subcutaneous immunotherapy in past    She states she does not like her nose manipulated  Did not tolerate flonase in past due to scent    Physical Exam  /76 (BP Location: Right arm, Cuff Size: Adult Large)  Pulse 83  Temp 98.1  F (36.7  C) (Tympanic)  Ht 5' 7\" (1.702 m)  Wt 238 lb (108 kg)  SpO2 98%  BMI 37.28 kg/m2    General - The patient is well nourished and well developed, and appears to have good nutritional status.  Alert and oriented to person and place, interactive.  Head and Face - Normocephalic and atraumatic, with no gross asymmetry noted of the contour of the facial features.  The facial nerve is intact, with strong " symmetric movements.  Neck-no palpable lymphadenopathy or thyroid mass.  Trachea is midline.  Eyes - Extraocular movements intact.   Ears- External auditory canals are patent, tympanic membranes are intact without effusion or worrisome retractions   Nose - Nasal mucosa is pink and moist with no abnormal mucus.  The septum was deviated left 70% obstruction, bilateral ITH Mouth - Examination of the oral cavity shows pink, healthy, moist mucosa.  No lesions or ulceration noted.  The dentition are in good repair.  The tongue is mobile and midline.  Throat - The walls of the oropharynx were smooth, pink, moist, symmetric, and had no lesions or ulcerations.  The tonsillar pillars and soft palate were symmetric.  The uvula was midline on elevation.      To evaluate the nose and sinuses in the post operative state, I performed rigid nasal endoscopy. The LPN had previously sprayed both nares with lidocaine and neosynephrine.    I began with the LEFT side using a 0 degree rigid nasal endoscope, and then similarly examined the RIGHT side    Findings:  Inferior turbinates:  edematous  Middle turbinate and middle meatus:  LEFT purulence, no obvious polyposis, does not allow debridmement  Mucosa is edematous  Throughout left middle meatus  Right middle meatus clear  DNS left with 70% obstruction, narrows middle meatus  Nasopharynx unable to be viz  The patient tolerated the procedure well    CT sinsues dated 10/10/18 was reviewed with Annie:  Cecilia 8/24 with LEFT sided opacificaiton a/p ethmomids, osteomeatal complex , max and mucoperiosteal thickening left frontals, left sphenoid clear    Keros 3  DNS left, bilateral  inferior turbinate hypertrophy     Right antrostomy patent  Right sinuses clear    Impression/Plan  Annie YESI Garcia is a 47 year old female    ICD-10-CM    1. LEFT chronic pansinusitis J32.4    2. History of maxillary sinus mycetoma B49    3. DNS (deviated nasal septum) J34.2    4. Nasal turbinate  hypertrophy J34.3    5. Cystic fibrosis (H) E84.9      The risks and complications of LEFT Endoscopic Sinus Surgery (ESS), septoplasty, bilateral turbinate reduction   were openly discussed.  The potential risks include bleeding, general anesthesia, infection, scar formation, need for additional surgery, septal perforation, and rarely injury to the eye with the possibility of blindness,  injury to the brain, meningitis or other intracranial complications.  Will include total left without sphenoids, will use propels  Septoplasty will be performed to better allow post op nasal steroid maintenance irrigations and post op visits as well as to improve breathing, d/w her  No indication for systemic antifungals or topical antifungals for sinusitis, d/w her today.  Maintenance CF medication with doxy and voriconazole per Todd Torres.    Appropriate treatment with hypertonic nasal saline irrigations, budesonide irrigations and nasal steroid were discussed.  She will require a prednisone taper pre and post op  Risks of oral steroid use were discussed and include psychiatric/mood changes, insomnia, stomach ulcers and potential GI bleeding, blood sugar elevation/worsening diabetes, hip/bone necrosis or bone demineralization.        Nonsurgical options were discussed including prolonged antibioitics and/or oral and topical steroids.   Sinus anatomy and sinus disease were reviewed.  CT sinus was reviewed with the patient.  All questions were answered.           Francoise Gonzalez D.O.  Otolaryngology/Head and Neck Surgery  Allergy

## 2018-01-31 NOTE — NURSING NOTE
"Chief Complaint   Patient presents with     RECHECK     l maxillary sinusitis, chronic sinusitis, history of fungal ball removal, chronic seasonal allergic rhinitis       Initial /76 (BP Location: Right arm, Cuff Size: Adult Large)  Pulse 83  Temp 98.1  F (36.7  C) (Tympanic)  Ht 5' 7\" (1.702 m)  Wt 238 lb (108 kg)  SpO2 98%  BMI 37.28 kg/m2 Estimated body mass index is 37.28 kg/(m^2) as calculated from the following:    Height as of this encounter: 5' 7\" (1.702 m).    Weight as of this encounter: 238 lb (108 kg).  Medication Reconciliation: shelley Marcos      "

## 2018-01-31 NOTE — PATIENT INSTRUCTIONS
Thank you for allowing Dr. Gonzalez and our ENT team to participate in your care.  If your medications are too expensive, please give the nurse a call.  We can possibly change this medication.  If you have a scheduling or an appointment question please contact [facility] our [facility] Coordinator at their direct line [phone].   ALL nursing questions or concerns can be directed to your ENT nurse at: [phone] - [facility]    Start Prednisone Taper 5 days before surgery and use 5 days after surgery  Continue Budesonide Twice Daily  Start Nasonex 2 sprays to each nostril once daily    Instructions for Sinus Surgery    Recovery - Everyone recovers differently from a general anesthetic.  Symptoms such as fatigue, nausea, light-headedness, and sometimes a low grade fever (up to 100 degrees) are not unusual.  As your body removes the anesthetic drugs from circulation, these symptoms will resolve.  Your nose will be sore after surgery, and you may even have symptoms similar to a sinus infection with headache, congestion, and pressure.  These will resolve with healing.  For several days you may experience bloody drainage from the nose, please use the drip pad as necessary for this.  If there is persistent bleeding, please call the office during business hours or the on call ENT physician after hours.  There are no diet restrictions after sinus surgery, and you can resume your home medications.      Please do not blow your nose until two weeks after surgery.  At 2 weeks you may gently blow your nose, unless otherwise indicated by Dr. Gonzalez.     Limit your activity to no strenuous activities until I see you for the first follow-up visit in approximately 2 weeks.      Medications - You were sent home with narcotic pain medication.  If you can tolerate the discomfort during your recovery by using just plain Tylenol or ibuprofen (advil), please do so.  However, do not hesitate to use the stronger pain medication if  needed.  If you were sent home with an antibiotic, it is primarily used to help the healing process.  If it causes loose bowel movements or other signs of intolerance, it is appropriate to discontinue it.  By far the most important measure you can take to speed recovery, and maximize the chances of long term success of sinus surgery is using the sinus rinses at least three time per day for the first month after surgery.       Start Amanda Med saline irrigation tonight and use at least 5 times daily.     1.  You will need to purchase 2 amanda med bottles.  Make each bottle the same way, using warm distilled water filled up to the 240 ml airam, dissolve 2 packets of salt and mix into each bottle    2.  In one bottle, add the budesonide (steroid), shake gently to dissolve, and irrigate each nostril using the entire bottle divided between nostrils.  Each day you will use two entire bottle of the budesonide solution and will remake this the next day.     3.  In the other amanda med bottle use only the saline solution, and irrigate with this at least 3 additional times daily.    Perform gentle irrigation for the first week.  Starting 1 week after surgery, you should increase the volume of amanda med saline irrigation to each nostril, continuing to use the rinses in an alternating fashion at least 5 times daily.  You cannot use too much of the amanda med saline, but limit budesonide rinses to twice daily.    At 2 weeks after surgery, you may also restart nasal steroids (flonase, nasonex, etc).        Complications - Problems related to sinus surgery almost always are detected during the operation, and special instruction will be given in that situation.  However, unexpected things can happen, and are all related to the structures around the sinus cavities.  Symptoms that should alert you to a possible problem include: severe eye pain or eye swelling, persistent heavy bleeding from the nose, and high fevers with headache and neck  pain.  Any of these symptoms should be called into my office or to the on call ENT if after hours.  The most common non-emergency complication of sinus surgery is the formation of scar tissue which can re-block the sinuses.  This is addressed below.    Follow-up -  As you have noted, there are quite a few follow-up visits after sinus surgery.  This is done to aggressively manage the most common complication of this technique, which is scar tissue blocking the sinuses.  These visits will require the examination of your nose and possibly removal of crusts of dry mucous and blood, with possible removal of early scar tissue.  Please prepare for these visits by using your sinus rinses.    If there are any questions or issues with the above, or if there are other issues that concern you, always feel free to call the clinic and I am happy to speak with you as soon as I can.    Francoise Gonzalez D.O.  Otolaryngology/Head and Neck Surgery  Allergy    [phone] office        HOW TO PREPARE-      You need to have a scheduled Pre-Op with your primary care physician within 30 days of your scheduled surgery. You should be set up with this before you leave today.       You need a friend or family member available to drive you home AND stay with you for 24 hours after you leave the hospital. You will not be allowed to drive yourself. IF you need to take a taxi or the bus you MUST have a responsible person to ride with you. YOUR PROCEDURE WILL BE CANCELLED IF YOU DO NOT HAVE A RESPONSIBLE ADULT TO DRIVE YOU HOME.       You CANNOT have anything to eat or drink after midnight the night before your surgery, including water and coffee. Your stomach needs to be completely empty. Do NOT chew gum, suck on hard candy, or smoke. You can brush your teeth the morning of surgery.       [facility] Nurs[facility] will call you  1-2 weeks prior to your surgery date at  [phone] or toll free [phone]. Please have your medication  and allergy lists ready.      Stop your aspirin or other NSAIDs(Ibuprofen, Motrin, Aleve, Celebrex, Naproxen, etc...) 7 days before your surgery.      Hospital admitting will call you the day before your surgery with your exact arrival time.       Please call your primary care physician if you should become ill within 24 hours of scheduled surgery. (ex.vomiting, diarrhea, fever)          You will need to wash the night before AND the morning of you procedure with a liquid antibacterial soap, like Dial.

## 2018-01-31 NOTE — MR AVS SNAPSHOT
After Visit Summary   1/31/2018    Annie Garcia    MRN: [id number]           Patient Information     Date Of Birth          1970        Visit Information        Provider Department      1/31/2018 1:15 PM Francoise Gonzalez MD [facility]        Today's Diagnoses     LEFT chronic pansinusitis    -  1    History of maxillary sinus mycetoma        DNS (deviated nasal septum)        Nasal turbinate hypertrophy        Cystic fibrosis (H)          Care Instructions    Thank you for allowing Dr. Gonzalez and our ENT team to participate in your care.  If your medications are too expensive, please give the nurse a call.  We can possibly change this medication.  If you have a scheduling or an appointment question please contact [facility] Coordinator at their direct line [phone].   ALL nursing questions or concerns can be directed to your ENT nurse at: [phone] - Sarahi    Start Prednisone Taper 5 days before surgery and use 5 days after surgery  Continue Budesonide Twice Daily  Start Nasonex 2 sprays to each nostril once daily    Instructions for Sinus Surgery    Recovery - Everyone recovers differently from a general anesthetic.  Symptoms such as fatigue, nausea, light-headedness, and sometimes a low grade fever (up to 100 degrees) are not unusual.  As your body removes the anesthetic drugs from circulation, these symptoms will resolve.  Your nose will be sore after surgery, and you may even have symptoms similar to a sinus infection with headache, congestion, and pressure.  These will resolve with healing.  For several days you may experience bloody drainage from the nose, please use the drip pad as necessary for this.  If there is persistent bleeding, please call the office during business hours or the on call ENT physician after hours.  There are no diet restrictions after sinus surgery, and you can resume your home medications.      Please do not blow  your nose until two weeks after surgery.  At 2 weeks you may gently blow your nose, unless otherwise indicated by Dr. Gonzalez.     Limit your activity to no strenuous activities until I see you for the first follow-up visit in approximately 2 weeks.      Medications - You were sent home with narcotic pain medication.  If you can tolerate the discomfort during your recovery by using just plain Tylenol or ibuprofen (advil), please do so.  However, do not hesitate to use the stronger pain medication if needed.  If you were sent home with an antibiotic, it is primarily used to help the healing process.  If it causes loose bowel movements or other signs of intolerance, it is appropriate to discontinue it.  By far the most important measure you can take to speed recovery, and maximize the chances of long term success of sinus surgery is using the sinus rinses at least three time per day for the first month after surgery.       Start Amanda Med saline irrigation tonight and use at least 5 times daily.     1.  You will need to purchase 2 amanda med bottles.  Make each bottle the same way, using warm distilled water filled up to the 240 ml airam, dissolve 2 packets of salt and mix into each bottle    2.  In one bottle, add the budesonide (steroid), shake gently to dissolve, and irrigate each nostril using the entire bottle divided between nostrils.  Each day you will use two entire bottle of the budesonide solution and will remake this the next day.     3.  In the other amanda med bottle use only the saline solution, and irrigate with this at least 3 additional times daily.    Perform gentle irrigation for the first week.  Starting 1 week after surgery, you should increase the volume of amanda med saline irrigation to each nostril, continuing to use the rinses in an alternating fashion at least 5 times daily.  You cannot use too much of the amanda med saline, but limit budesonide rinses to twice daily.    At 2 weeks after surgery,  you may also restart nasal steroids (flonase, nasonex, etc).        Complications - Problems related to sinus surgery almost always are detected during the operation, and special instruction will be given in that situation.  However, unexpected things can happen, and are all related to the structures around the sinus cavities.  Symptoms that should alert you to a possible problem include: severe eye pain or eye swelling, persistent heavy bleeding from the nose, and high fevers with headache and neck pain.  Any of these symptoms should be called into my office or to the on call ENT if after hours.  The most common non-emergency complication of sinus surgery is the formation of scar tissue which can re-block the sinuses.  This is addressed below.    Follow-up -  As you have noted, there are quite a few follow-up visits after sinus surgery.  This is done to aggressively manage the most common complication of this technique, which is scar tissue blocking the sinuses.  These visits will require the examination of your nose and possibly removal of crusts of dry mucous and blood, with possible removal of early scar tissue.  Please prepare for these visits by using your sinus rinses.    If there are any questions or issues with the above, or if there are other issues that concern you, always feel free to call the clinic and I am happy to speak with you as soon as I can.    Francoise Gonzalez D.O.  Otolaryngology/Head and Neck Surgery  Allergy    [phone] office        HOW TO PREPARE-      You need to have a scheduled Pre-Op with your primary care physician within 30 days of your scheduled surgery. You should be set up with this before you leave today.       You need a friend or family member available to drive you home AND stay with you for 24 hours after you leave the hospital. You will not be allowed to drive yourself. IF you need to take a taxi or the bus you MUST have a responsible person to ride with you. YOUR  PROCEDURE WILL BE CANCELLED IF YOU DO NOT HAVE A RESPONSIBLE ADULT TO DRIVE YOU HOME.       You CANNOT have anything to eat or drink after midnight the night before your surgery, including water and coffee. Your stomach needs to be completely empty. Do NOT chew gum, suck on hard candy, or smoke. You can brush your teeth the morning of surgery.       [facility] will call you  1-2 weeks prior to your surgery date at  [phone] or toll free [phone]. Please have your medication and allergy lists ready.      Stop your aspirin or other NSAIDs(Ibuprofen, Motrin, Aleve, Celebrex, Naproxen, etc...) 7 days before your surgery.      Hospital admitting will call you the day before your surgery with your exact arrival time.       Please call your primary care physician if you should become ill within 24 hours of scheduled surgery. (ex.vomiting, diarrhea, fever)          You will need to wash the night before AND the morning of you procedure with a liquid antibacterial soap, like Dial.           Follow-ups after your visit        Who to contact     If you have questions or need follow up information about today's clinic visit or your schedule please contact [facility] directly at [phone].  Normal or non-critical lab and imaging results will be communicated to you by MyChart, letter or phone within 4 business days after the clinic has received the results. If you do not hear from us within 7 days, please contact the clinic through MyChart or phone. If you have a critical or abnormal lab result, we will notify you by phone as soon as possible.  Submit refill requests through [employer] or call your pharmacy and they will forward the refill request to us. Please allow 3 business days for your refill to be completed.          Additional Information About Your Visit        [employer] Information     [employer] lets you send messages to your doctor, view your test results, renew your prescriptions,  "schedule appointments and more. To sign up, go to [email or web address] . Click on \"Log in\" on the left side of the screen, which will take you to the Welcome page. Then click on \"Sign up Now\" on the right side of the page.     You will be asked to enter the access code listed below, as well as some personal information. Please follow the directions to create your username and password.     Your access code is: [email or web address]  Expires: 2018  4:49 PM     Your access code will  in 90 days. If you need help or a new code, please call your [facility] clinic or [phone].        Care EveryWhere ID     This is your Care EveryWhere ID. This could be used by other organizations to access your [facility] medical records  [id number]        Your Vitals Were     Pulse Temperature Height Pulse Oximetry BMI (Body Mass Index)       83 98.1  F (36.7  C) (Tympanic) 5' 7\" (1.702 m) 98% 37.28 kg/m2        Blood Pressure from Last 3 Encounters:   18 121/76   18 126/74   17 120/70    Weight from Last 3 Encounters:   18 238 lb (108 kg)   18 238 lb (108 kg)   17 238 lb (108 kg)              Today, you had the following     No orders found for display       Primary Care Provider Office Phone # Fax #    Todd Torres [phone]       [place] [facility] [place] [address]  [place] [address]        Equal Access to Services     [facility]: Hadii aad ku hadasho Soomaali, waaxda luqadaha, qaybta kaalmada adeegyada, leanna langford . So [facility] [phone].    ATENCIÓN: Si habla español, tiene a seals disposición servicios gratuitos de asistencia lingüística. Llame al [phone].    We comply with applicable federal civil rights laws and [place] laws. We do not discriminate on the basis of race, color, national origin, age, disability, sex, sexual orientation, or gender identity.            Thank you!     Thank you for choosing [facility] " [place]  for your care. Our goal is always to provide you with excellent care. Hearing back from our patients is one way we can continue to improve our services. Please take a few minutes to complete the written survey that you may receive in the mail after your visit with us. Thank you!             Your Updated Medication List - Protect others around you: Learn how to safely use, store and throw away your medicines at www.disposemymeds.org.          This list is accurate as of 1/31/18  1:45 PM.  Always use your most recent med list.                   Brand Name Dispense Instructions for use Diagnosis    acetaminophen-codeine 300-30 MG per tablet    TYLENOL #3    90 tablet    TAKE 1 TABLET BY MOUTH EVERY 4 HOURS AS NEEDED FOR MILD PAIN, TAKE FOR COUGHING PAIN.    Hip pain, right       AMBIEN 10 MG tablet   Generic drug:  zolpidem     30 tablet    Take 1 tablet (10 mg) by mouth nightly as needed    Primary insomnia       BROVANA 15 MCG/2ML Nebu neb solution   Generic drug:  arformoterol     120 mL    USE 1 VIAL IN NEBULIZER 2 TIMES A DAY AS NEEDED    Chronic bronchitis, unspecified chronic bronchitis type (H)       budesonide 1 MG/2ML Susp neb solution    PULMICORT    60 ampule    Take 2 mLs (1 mg) by nebulization daily Take 1 mg by nebulization daily    Bronchitis       DEXILANT 60 MG Cpdr CR capsule   Generic drug:  dexlansoprazole      Take 1 capsule by mouth daily as needed        ERYTHROMYCIN BASE PO      Take 500 mg by mouth 3 times daily (with meals) Takes 1 500 mg table TID for 21 days every other month        fluconazole 100 MG tablet    DIFLUCAN    90 tablet    TAKE 1 TABLET BY MOUTH DAILY AS NEEDED    Rash and other nonspecific skin eruption       levalbuterol 0.31 MG/3ML neb solution    XOPENEX    225 mL    Take 3 mLs (0.31 mg) by nebulization every 6 hours as needed for shortness of breath / dyspnea        nystatin 061060 UNIT/GM Powd    MYCOSTATIN    60 g    Apply 1 g topically 3 times daily as needed     Rash       PULMOZYME 1 MG/ML neb solution   Generic drug:  dornase alpha     75 mL    USE 1 VIAL IN NEBULIZER DAILY AS NEEDED    Chronic bronchitis, unspecified chronic bronchitis type (H)       sucralfate 1 GM tablet    CARAFATE    120 tablet    TAKE 1 TABLET BY MOUTH FOUR TIMES A DAY AS NEEDED    Esophageal reflux       voriconazole 200 MG tablet    VFEND    60 tablet    Take 1 tablet (200 mg) by mouth 2 times daily    Fungus infection       WELLBUTRIN  MG 12 hr tablet   Generic drug:  buPROPion     90 tablet    TAKE 2 TABLETS BY MOUTH EVERY MORNING AND 1 TABLET EVERY AFTERNOON    Dysthymia

## 2018-02-02 ENCOUNTER — OFFICE VISIT (OUTPATIENT)
Dept: INTERNAL MEDICINE | Facility: OTHER | Age: 48
End: 2018-02-02
Attending: NURSE PRACTITIONER
Payer: COMMERCIAL

## 2018-02-02 ENCOUNTER — RADIANT APPOINTMENT (OUTPATIENT)
Dept: GENERAL RADIOLOGY | Facility: OTHER | Age: 48
End: 2018-02-02
Attending: NURSE PRACTITIONER
Payer: COMMERCIAL

## 2018-02-02 VITALS
DIASTOLIC BLOOD PRESSURE: 72 MMHG | BODY MASS INDEX: 37.67 KG/M2 | TEMPERATURE: 98.7 F | RESPIRATION RATE: 20 BRPM | OXYGEN SATURATION: 98 % | HEIGHT: 67 IN | SYSTOLIC BLOOD PRESSURE: 118 MMHG | WEIGHT: 240 LBS | HEART RATE: 95 BPM

## 2018-02-02 DIAGNOSIS — K21.9 GASTROESOPHAGEAL REFLUX DISEASE WITHOUT ESOPHAGITIS: ICD-10-CM

## 2018-02-02 DIAGNOSIS — Z01.818 PRE-OPERATIVE GENERAL PHYSICAL EXAMINATION: Primary | ICD-10-CM

## 2018-02-02 DIAGNOSIS — G47.00 PERSISTENT INSOMNIA: ICD-10-CM

## 2018-02-02 DIAGNOSIS — E84.9 CYSTIC FIBROSIS (H): ICD-10-CM

## 2018-02-02 DIAGNOSIS — Z01.818 PRE-OPERATIVE GENERAL PHYSICAL EXAMINATION: ICD-10-CM

## 2018-02-02 LAB
ALBUMIN SERPL-MCNC: 3.7 G/DL (ref 3.4–5)
ALP SERPL-CCNC: 78 U/L (ref 40–150)
ALT SERPL W P-5'-P-CCNC: 29 U/L (ref 0–50)
ANION GAP SERPL CALCULATED.3IONS-SCNC: 8 MMOL/L (ref 3–14)
AST SERPL W P-5'-P-CCNC: 11 U/L (ref 0–45)
BASOPHILS # BLD AUTO: 0 10E9/L (ref 0–0.2)
BASOPHILS NFR BLD AUTO: 0.3 %
BILIRUB SERPL-MCNC: 0.2 MG/DL (ref 0.2–1.3)
BUN SERPL-MCNC: 15 MG/DL (ref 7–30)
CALCIUM SERPL-MCNC: 8.7 MG/DL (ref 8.5–10.1)
CHLORIDE SERPL-SCNC: 106 MMOL/L (ref 94–109)
CO2 SERPL-SCNC: 27 MMOL/L (ref 20–32)
CREAT SERPL-MCNC: 1.21 MG/DL (ref 0.52–1.04)
DIFFERENTIAL METHOD BLD: ABNORMAL
EOSINOPHIL # BLD AUTO: 0.2 10E9/L (ref 0–0.7)
EOSINOPHIL NFR BLD AUTO: 1.5 %
ERYTHROCYTE [DISTWIDTH] IN BLOOD BY AUTOMATED COUNT: 14.2 % (ref 10–15)
GFR SERPL CREATININE-BSD FRML MDRD: 47 ML/MIN/1.7M2
GLUCOSE SERPL-MCNC: 102 MG/DL (ref 70–99)
HCT VFR BLD AUTO: 40.6 % (ref 35–47)
HGB BLD-MCNC: 13.5 G/DL (ref 11.7–15.7)
IMM GRANULOCYTES # BLD: 0.1 10E9/L (ref 0–0.4)
IMM GRANULOCYTES NFR BLD: 0.4 %
LYMPHOCYTES # BLD AUTO: 2.4 10E9/L (ref 0.8–5.3)
LYMPHOCYTES NFR BLD AUTO: 19.5 %
MCH RBC QN AUTO: 27.4 PG (ref 26.5–33)
MCHC RBC AUTO-ENTMCNC: 33.3 G/DL (ref 31.5–36.5)
MCV RBC AUTO: 83 FL (ref 78–100)
MONOCYTES # BLD AUTO: 0.8 10E9/L (ref 0–1.3)
MONOCYTES NFR BLD AUTO: 6.2 %
NEUTROPHILS # BLD AUTO: 8.9 10E9/L (ref 1.6–8.3)
NEUTROPHILS NFR BLD AUTO: 72.1 %
NRBC # BLD AUTO: 0 10*3/UL
NRBC BLD AUTO-RTO: 0 /100
PLATELET # BLD AUTO: 292 10E9/L (ref 150–450)
POTASSIUM SERPL-SCNC: 4.1 MMOL/L (ref 3.4–5.3)
PROT SERPL-MCNC: 7.4 G/DL (ref 6.8–8.8)
RBC # BLD AUTO: 4.92 10E12/L (ref 3.8–5.2)
SODIUM SERPL-SCNC: 141 MMOL/L (ref 133–144)
WBC # BLD AUTO: 12.3 10E9/L (ref 4–11)

## 2018-02-02 PROCEDURE — 85025 COMPLETE CBC W/AUTO DIFF WBC: CPT | Performed by: NURSE PRACTITIONER

## 2018-02-02 PROCEDURE — 71046 X-RAY EXAM CHEST 2 VIEWS: CPT | Mod: TC

## 2018-02-02 PROCEDURE — 80053 COMPREHEN METABOLIC PANEL: CPT | Performed by: NURSE PRACTITIONER

## 2018-02-02 PROCEDURE — 36415 COLL VENOUS BLD VENIPUNCTURE: CPT | Performed by: NURSE PRACTITIONER

## 2018-02-02 PROCEDURE — 99214 OFFICE O/P EST MOD 30 MIN: CPT | Mod: 25 | Performed by: NURSE PRACTITIONER

## 2018-02-02 PROCEDURE — 93000 ELECTROCARDIOGRAM COMPLETE: CPT | Performed by: INTERNAL MEDICINE

## 2018-02-02 ASSESSMENT — ANXIETY QUESTIONNAIRES
1. FEELING NERVOUS, ANXIOUS, OR ON EDGE: SEVERAL DAYS
IF YOU CHECKED OFF ANY PROBLEMS ON THIS QUESTIONNAIRE, HOW DIFFICULT HAVE THESE PROBLEMS MADE IT FOR YOU TO DO YOUR WORK, TAKE CARE OF THINGS AT HOME, OR GET ALONG WITH OTHER PEOPLE: NOT DIFFICULT AT ALL
3. WORRYING TOO MUCH ABOUT DIFFERENT THINGS: SEVERAL DAYS
2. NOT BEING ABLE TO STOP OR CONTROL WORRYING: SEVERAL DAYS
5. BEING SO RESTLESS THAT IT IS HARD TO SIT STILL: MORE THAN HALF THE DAYS
6. BECOMING EASILY ANNOYED OR IRRITABLE: MORE THAN HALF THE DAYS

## 2018-02-02 ASSESSMENT — PATIENT HEALTH QUESTIONNAIRE - PHQ9: 5. POOR APPETITE OR OVEREATING: MORE THAN HALF THE DAYS

## 2018-02-02 ASSESSMENT — PAIN SCALES - GENERAL: PAINLEVEL: NO PAIN (0)

## 2018-02-02 NOTE — PROGRESS NOTES
"CC:  Pre-operative Evaluation for left Endoscopic sinus surgery, Septoplasty, and Bilateral Turbinate Reduction.   , with Dr. Gonzalez  , at [facility]  , on 2/21/18. .    History of Present Illness: has ongoing turbinate swelling and odor from nares.      Past Medical History:   Diagnosis Date     Cystic fibrosis (H)      Depression      Tear of right acetabular labrum        Past Surgical History:   Procedure Laterality Date     BREAST SURGERY       ENDOSCOPY UPPER, COLONOSCOPY, COMBINED N/A 11/18/2016    Procedure: COMBINED ENDOSCOPY UPPER, COLONOSCOPY;  Surgeon: Levi Hinkle MD;  Location: [facility] OR     ESOPHAGOSCOPY, GASTROSCOPY, DUODENOSCOPY (EGD), COMBINED  1/10/2014    Procedure: COMBINED ESOPHAGOSCOPY, GASTROSCOPY, DUODENOSCOPY (EGD);  UPPER ENDOSCOPY with Biopsy;  Surgeon: Levi Hinkle MD;  Location: [facility] OR     GYN SURGERY  2008    ablation     ORTHOPEDIC SURGERY  1994    left knee arthroscopy     ORTHOPEDIC SURGERY  1994    right shoulder     ORTHOPEDIC SURGERY  2014    left hip replacement       Current Outpatient Prescriptions   Medication     [START ON 2/18/2018] predniSONE (DELTASONE) 20 MG tablet     mometasone (NASONEX) 50 MCG/ACT spray     acetaminophen-codeine (TYLENOL #3) 300-30 MG per tablet     WELLBUTRIN  MG 12 hr tablet     AMBIEN 10 MG tablet     fluconazole (DIFLUCAN) 100 MG tablet     ERYTHROMYCIN BASE PO     sucralfate (CARAFATE) 1 GM tablet     PULMOZYME 1 MG/ML neb solution     budesonide (PULMICORT) 1 MG/2ML SUSP nebulizer solution     BROVANA 15 MCG/2ML NEBU     levalbuterol (XOPENEX) 0.31 MG/3ML nebulizer solution     nystatin (MYCOSTATIN) 439675 UNIT/GM POWD     dexlansoprazole (DEXILANT) 60 MG CPDR     No current facility-administered medications for this visit.        Allergies   Allergen Reactions     Seasonal Allergies Itching     Sulfa Drugs Rash     \"gets purple dots on skin\"       Family History   Problem Relation Age of Onset     DIABETES Mother      Hypertension Mother      " "Thyroid Disease Mother      Thyroid Disease Sister      Dementia Sister        Social History     Social History     Marital status:      Spouse name: N/A     Number of children: N/A     Years of education: N/A     Occupational History     Not on file.     Social History Main Topics     Smoking status: Never Smoker     Smokeless tobacco: Never Used     Alcohol use Yes      Comment: rare     Drug use: No     Sexual activity: Not on file     Other Topics Concern     Not on file     Social History Narrative       Review Of Systems  Skin: negative for, rash, itching  Eyes: negative for, visual blurring, eye pain  Ears/Nose/Throat: EARS: no pain or discharge, Nose: no runny nose or no real   bloody nose, +postnasal drip. Throat. No sore throat, difficulty chewing or swallowing. Teeth firm in gums   Respiratory: No shortness of breath, dyspnea on exertion, cough, or hemoptysis   Hx Cystic Fibrosis.  Sees Dr. Herman    Cardiovascular: negative for, palpitations and chest pain  Gastrointestinal: negative for, nausea, heartburn, abdominal pain, constipation and diarrhea  Genitourinary: negative for, dysuria, frequency and urgency  Musculoskeletal: negative for, back pain, neck pain and joint pain  ,Right   foot pain. Has tendon rupture.     Neurologic: negative for  headaches, dizziness  Psychiatric: negative for, depression, + anxiety.  On Wellbutrin SR   150mg   Hematologic/Lymphatic/Immunologic: negative for, allergies, chills and fever  Endocrine: negative for, cold intolerance, heat intolerance and night sweats      Exam:  /72 (BP Location: Left arm, Patient Position: Chair, Cuff Size: Adult Large)  Pulse 95  Temp 98.7  F (37.1  C) (Tympanic)  Resp 20  Ht 5' 7\" (1.702 m)  Wt 240 lb (108.9 kg)  SpO2 98%  BMI 37.59 kg/m2    Constitutional:  alert and no distress  Head: Normocephalic. No masses, lesions, tenderness or abnormalities. Eyes: PERRLA, EOMI,   CN 2-12 intact.   ENT: EARS: bilateral TM's " pearly gray, good lite reflex. NOSE: Nares red, nonswollen, no exudate. Throat: Oropharynx pink, no exudates. Tongue midline. No fasciculations.  no neck nodes or sinus tenderness.  NECK: supple, no lymphadenopathy, no thyromegaly, no carotid bruits. Trachea midline. No JVD  Cardiovascular: S1S2, no S3, S4  Regular Rhythm  .  Point of Maximum Impulse  normal. No lifts, heaves, or thrills.  No murmurs, clicks, gallops, or rub. No Jugular venous distention    Respiratory: Good diaphragmatic excursion. Lungs clear anteriorly and posteriorly.  Gastrointestinal: Abdomen soft, non-tender. BS normal. No masses, no organomegaly  : Deferred  Musculoskeletal: extremities- no gross deformities noted, gait normal and normal muscle tone  Skin: no suspicious lesions or rashes  Neurologic:  Reflexes normal and symmetric. Sensation grossly WNL.  Psychiatric: mentation appears normal and affect normal/bright  Hematologic/Lymphatic/Immunologic: normal ant/post cervical, supraclavicular and inguinal nodes    Labs:  Results for orders placed or performed in visit on 02/02/18   CBC with platelets and differential   Result Value Ref Range    WBC 12.3 (H) 4.0 - 11.0 10e9/L    RBC Count 4.92 3.8 - 5.2 10e12/L    Hemoglobin 13.5 11.7 - 15.7 g/dL    Hematocrit 40.6 35.0 - 47.0 %    MCV 83 78 - 100 fl    MCH 27.4 26.5 - 33.0 pg    MCHC 33.3 31.5 - 36.5 g/dL    RDW 14.2 10.0 - 15.0 %    Platelet Count 292 150 - 450 10e9/L    Diff Method Automated Method     % Neutrophils 72.1 %    % Lymphocytes 19.5 %    % Monocytes 6.2 %    % Eosinophils 1.5 %    % Basophils 0.3 %    % Immature Granulocytes 0.4 %    Nucleated RBCs 0 0 /100    Absolute Neutrophil 8.9 (H) 1.6 - 8.3 10e9/L    Absolute Lymphocytes 2.4 0.8 - 5.3 10e9/L    Absolute Monocytes 0.8 0.0 - 1.3 10e9/L    Absolute Eosinophils 0.2 0.0 - 0.7 10e9/L    Absolute Basophils 0.0 0.0 - 0.2 10e9/L    Abs Immature Granulocytes 0.1 0 - 0.4 10e9/L    Absolute Nucleated RBC 0.0    Comprehensive  metabolic panel (BMP + Alb, Alk Phos, ALT, AST, Total. Bili, TP)   Result Value Ref Range    Sodium 141 133 - 144 mmol/L    Potassium 4.1 3.4 - 5.3 mmol/L    Chloride 106 94 - 109 mmol/L    Carbon Dioxide 27 20 - 32 mmol/L    Anion Gap 8 3 - 14 mmol/L    Glucose 102 (H) 70 - 99 mg/dL    Urea Nitrogen 15 7 - 30 mg/dL    Creatinine 1.21 (H) 0.52 - 1.04 mg/dL    GFR Estimate 47 (L) >60 mL/min/1.7m2    GFR Estimate If Black 57 (L) >60 mL/min/1.7m2    Calcium 8.7 8.5 - 10.1 mg/dL    Bilirubin Total 0.2 0.2 - 1.3 mg/dL    Albumin 3.7 3.4 - 5.0 g/dL    Protein Total 7.4 6.8 - 8.8 g/dL    Alkaline Phosphatase 78 40 - 150 U/L    ALT 29 0 - 50 U/L    AST 11 0 - 45 U/L            EKG Interpretation:        Symptoms at time of EKG: None   Rhythm: Normal sinus   Rate: Normal  Comparison to prior: Unchanged  Clinical Impression: no acute changes    XRAY:  Chest 2 views:Study Result   PROCEDURE:  XR CHEST 2 VW     HISTORY:  preop Hx cystic fibrosis; Pre-operative general physical  examination.      COMPARISON:  3/3/2017, CT chest 11/17/2017     FINDINGS:   The cardiac silhouette is normal in size. The pulmonary vasculature is  normal.  There is mild bronchiectasis in both lungs, similar to prior  studies, consistent with history of cystic fibrosis. No acute  infiltrates are seen. No pleural effusion or pneumothorax.         IMPRESSION:  No acute change.       JOSE CHAO MD         Assessment:  Pre-operative evaluation    Plan:   Preop History and Physical for Left Endoscopic Sinus Surgery, Septoplasty, Bilateral Turbinate reduction.  ,  with Dr. Gonzalez , at [facility], on 2/21/18. Will Use Nebulizers  AM of surgery, and hold any ibuprofen and Naprosyn  til after surgery.  Has had no problems with anesthesia in past. Is cleared cardiac wise, and respiratory wise for anesthesia.     ASSESSMENT / PLAN:  (Z01.818) Pre-operative general physical examination  (primary encounter diagnosis)  Comment:WBC slightly elevated, as is  absolute neutrophils.  Has been on steroid course.  Alternates every other month with Azithromycin course.    WBC   Date Value Ref Range Status   02/02/2018 12.3 (H) 4.0 - 11.0 10e9/L Final   ]    Hemoglobin   Date Value Ref Range Status   02/02/2018 13.5 11.7 - 15.7 g/dL Final   ]  Potassium   Date Value Ref Range Status   02/02/2018 4.1 3.4 - 5.3 mmol/L Final   ]    Plan: EKG 12-lead complete w/read - (Clinic         Performed), XR CHEST 2 VW (Clinic Performed),         CBC with platelets and differential,         Comprehensive metabolic panel (BMP + Alb, Alk         Phos, ALT, AST, Total. Bili, TP)           (E84.9) Cystic fibrosis (H)  Comment:Uses Nebulizers Brovana 2 times daily,  Pulmicort 1 time daily  , Xopenex as needed. Pulmozyme Daily as needed.  Uses Tylenol #3 for cough.    Plan: Take Inhalers AM of surgery.      (K21.9) Gastroesophageal reflux disease without esophagitis  Comment:On Dexilant  60mg   Creatinine   Date Value Ref Range Status   02/02/2018 1.21 (H) 0.52 - 1.04 mg/dL Final   ]  Plan: Will followup on creatinine. Make sure drinking 6 glasses water a day.        (G47.00) Persistent insomnia  Comment:Takes Ambien 10mg    Plan: Sabino.         Todd Torres CNP

## 2018-02-02 NOTE — MR AVS SNAPSHOT
After Visit Summary   2/2/2018    Annie Garcia    MRN: [id number]           Patient Information     Date Of Birth          1970        Visit Information        Provider Department      2/2/2018 4:00 PM Todd Torres NP [facility]        Today's Diagnoses     Pre-operative general physical examination    -  1      Care Instructions    1. Do nebs AM of surgery.   .2 Hold any ibuprfen and Naprosyn.            Follow-ups after your visit        Your next 10 appointments already scheduled     Feb 21, 2018   Procedure with Francoise Gonzalez MD   [facility] ([facility] )    [address] [place] [id number] [phone]              Future tests that were ordered for you today     Open Future Orders        Priority Expected Expires Ordered    XR CHEST 2 VW (Clinic Performed) Routine 2/2/2018 2/2/2019 2/2/2018            Who to contact     If you have questions or need follow up information about today's clinic visit or your schedule please contact [facility] directly at [phone].  Normal or non-critical lab and imaging results will be communicated to you by MyChart, letter or phone within 4 business days after the clinic has received the results. If you do not hear from us within 7 days, please contact the clinic through MyChart or phone. If you have a critical or abnormal lab result, we will notify you by phone as soon as possible.  Submit refill requests through [employer] or call your pharmacy and they will forward the refill request to us. Please allow 3 business days for your refill to be completed.          Additional Information About Your Visit        Care EveryWhere ID     This is your Care EveryWhere ID. This could be used by other organizations to access your [place] medical records  [id number]        Your Vitals Were     Pulse Temperature Respirations Height Pulse Oximetry BMI (Body Mass Index)    95 98.7  F (37.1  " C) (Tympanic) 20 5' 7\" (1.702 m) 98% 37.59 kg/m2       Blood Pressure from Last 3 Encounters:   02/02/18 118/72   01/31/18 121/76   01/30/18 126/74    Weight from Last 3 Encounters:   02/02/18 240 lb (108.9 kg)   01/31/18 238 lb (108 kg)   01/30/18 238 lb (108 kg)              We Performed the Following     CBC with platelets and differential     Comprehensive metabolic panel (BMP + Alb, Alk Phos, ALT, AST, Total. Bili, TP)     EKG 12-lead complete w/read - (Clinic Performed)          Today's Medication Changes          These changes are accurate as of 2/2/18  4:29 PM.  If you have any questions, ask your nurse or doctor.               Stop taking these medicines if you haven't already. Please contact your care team if you have questions.     voriconazole 200 MG tablet   Commonly known as:  VFEND   Stopped by:  Todd Torres NP                    Primary Care Provider Office Phone # Fax #    Todd Torres [phone]       [facility] [place] [address] [facility] [address]        Equal Access to Services     [facility]: Hadii aad ku hadasho Soomaali, waaxda luqadaha, qaybta kaalmada adeegyada, waxay barney barreran celeste nunez. So [facility] [phone].    ATENCIÓN: Si habla español, tiene a seals disposición servicios gratuitos de asistencia lingüística. [facility] [phone].    We comply with applicable federal civil rights laws and [place] laws. We do not discriminate on the basis of race, color, national origin, age, disability, sex, sexual orientation, or gender identity.            Thank you!     Thank you for choosing [facility]  for your care. Our goal is always to provide you with excellent care. Hearing back from our patients is one way we can continue to improve our services. Please take a few minutes to complete the written survey that you may receive in the mail after your visit with us. Thank you!             Your Updated Medication List - Protect " others around you: Learn how to safely use, store and throw away your medicines at www.disposemymeds.org.          This list is accurate as of 2/2/18  4:29 PM.  Always use your most recent med list.                   Brand Name Dispense Instructions for use Diagnosis    acetaminophen-codeine 300-30 MG per tablet    TYLENOL #3    90 tablet    TAKE 1 TABLET BY MOUTH EVERY 4 HOURS AS NEEDED FOR MILD PAIN, TAKE FOR COUGHING PAIN.    Hip pain, right       AMBIEN 10 MG tablet   Generic drug:  zolpidem     30 tablet    Take 1 tablet (10 mg) by mouth nightly as needed    Primary insomnia       BROVANA 15 MCG/2ML Nebu neb solution   Generic drug:  arformoterol     120 mL    USE 1 VIAL IN NEBULIZER 2 TIMES A DAY AS NEEDED    Chronic bronchitis, unspecified chronic bronchitis type (H)       budesonide 1 MG/2ML Susp neb solution    PULMICORT    60 ampule    Take 2 mLs (1 mg) by nebulization daily Take 1 mg by nebulization daily    Bronchitis       DEXILANT 60 MG Cpdr CR capsule   Generic drug:  dexlansoprazole      Take 1 capsule by mouth daily as needed        ERYTHROMYCIN BASE PO      Take 500 mg by mouth 3 times daily (with meals) Takes 1 500 mg table TID for 21 days every other month        fluconazole 100 MG tablet    DIFLUCAN    90 tablet    TAKE 1 TABLET BY MOUTH DAILY AS NEEDED    Rash and other nonspecific skin eruption       levalbuterol 0.31 MG/3ML neb solution    XOPENEX    225 mL    Take 3 mLs (0.31 mg) by nebulization every 6 hours as needed for shortness of breath / dyspnea        mometasone 50 MCG/ACT spray    NASONEX    3 Box    Spray 2 sprays into both nostrils daily    Chronic pansinusitis       nystatin 292675 UNIT/GM Powd    MYCOSTATIN    60 g    Apply 1 g topically 3 times daily as needed    Rash       predniSONE 20 MG tablet   Start taking on:  2/18/2018    DELTASONE    20 tablet    Take 3 tabs after breakfast 2/18 and 2/19, 2 tabs 2/20-2/24, 1 tab 2/24-2/26    Chronic pansinusitis       PULMOZYME 1 MG/ML  neb solution   Generic drug:  dornase alpha     75 mL    USE 1 VIAL IN NEBULIZER DAILY AS NEEDED    Chronic bronchitis, unspecified chronic bronchitis type (H)       sucralfate 1 GM tablet    CARAFATE    120 tablet    TAKE 1 TABLET BY MOUTH FOUR TIMES A DAY AS NEEDED    Esophageal reflux       WELLBUTRIN  MG 12 hr tablet   Generic drug:  buPROPion     90 tablet    TAKE 2 TABLETS BY MOUTH EVERY MORNING AND 1 TABLET EVERY AFTERNOON    Dysthymia

## 2018-02-02 NOTE — NURSING NOTE
"Chief Complaint   Patient presents with     Pre-Op Exam     The patient is here for cardiac clearance for Septoplasty,Endoscopic Sinus Surgery with Dr. Gonzalez on 2/21/2018 at [facility]       Initial /72 (BP Location: Left arm, Patient Position: Chair, Cuff Size: Adult Large)  Pulse 95  Temp 98.7  F (37.1  C) (Tympanic)  Resp 20  Ht 5' 7\" (1.702 m)  Wt 240 lb (108.9 kg)  SpO2 98%  BMI 37.59 kg/m2 Estimated body mass index is 37.59 kg/(m^2) as calculated from the following:    Height as of this encounter: 5' 7\" (1.702 m).    Weight as of this encounter: 240 lb (108.9 kg).  Medication Reconciliation: complete   Kasie Cantu    "

## 2018-02-03 ASSESSMENT — PATIENT HEALTH QUESTIONNAIRE - PHQ9: SUM OF ALL RESPONSES TO PHQ QUESTIONS 1-9: 11

## 2018-02-05 DIAGNOSIS — R79.89 ELEVATED SERUM CREATININE: Primary | ICD-10-CM

## 2018-02-06 NOTE — H&P (VIEW-ONLY)
"CC:  Pre-operative Evaluation for left Endoscopic sinus surgery, Septoplasty, and Bilateral Turbinate Reduction.   , with Dr. Gonzalez  , at [facility]  , on 2/21/18. .    History of Present Illness: has ongoing turbinate swelling and odor from nares.      Past Medical History:   Diagnosis Date     Cystic fibrosis (H)      Depression      Tear of right acetabular labrum        Past Surgical History:   Procedure Laterality Date     BREAST SURGERY       ENDOSCOPY UPPER, COLONOSCOPY, COMBINED N/A 11/18/2016    Procedure: COMBINED ENDOSCOPY UPPER, COLONOSCOPY;  Surgeon: Levi Hinkle MD;  Location: [facility]     ESOPHAGOSCOPY, GASTROSCOPY, DUODENOSCOPY (EGD), COMBINED  1/10/2014    Procedure: COMBINED ESOPHAGOSCOPY, GASTROSCOPY, DUODENOSCOPY (EGD);  UPPER ENDOSCOPY with Biopsy;  Surgeon: Levi Hinkle MD;  Location: [facility]     GYN SURGERY  2008    ablation     ORTHOPEDIC SURGERY  1994    left knee arthroscopy     ORTHOPEDIC SURGERY  1994    right shoulder     ORTHOPEDIC SURGERY  2014    left hip replacement       Current Outpatient Prescriptions   Medication     [START ON 2/18/2018] predniSONE (DELTASONE) 20 MG tablet     mometasone (NASONEX) 50 MCG/ACT spray     acetaminophen-codeine (TYLENOL #3) 300-30 MG per tablet     WELLBUTRIN  MG 12 hr tablet     AMBIEN 10 MG tablet     fluconazole (DIFLUCAN) 100 MG tablet     ERYTHROMYCIN BASE PO     sucralfate (CARAFATE) 1 GM tablet     PULMOZYME 1 MG/ML neb solution     budesonide (PULMICORT) 1 MG/2ML SUSP nebulizer solution     BROVANA 15 MCG/2ML NEBU     levalbuterol (XOPENEX) 0.31 MG/3ML nebulizer solution     nystatin (MYCOSTATIN) 780435 UNIT/GM POWD     dexlansoprazole (DEXILANT) 60 MG CPDR     No current facility-administered medications for this visit.        Allergies   Allergen Reactions     Seasonal Allergies Itching     Sulfa Drugs Rash     \"gets purple dots on skin\"       Family History   Problem Relation Age of Onset     DIABETES Mother      Hypertension Mother      " "Thyroid Disease Mother      Thyroid Disease Sister      Dementia Sister        Social History     Social History     Marital status:      Spouse name: N/A     Number of children: N/A     Years of education: N/A     Occupational History     Not on file.     Social History Main Topics     Smoking status: Never Smoker     Smokeless tobacco: Never Used     Alcohol use Yes      Comment: rare     Drug use: No     Sexual activity: Not on file     Other Topics Concern     Not on file     Social History Narrative       Review Of Systems  Skin: negative for, rash, itching  Eyes: negative for, visual blurring, eye pain  Ears/Nose/Throat: EARS: no pain or discharge, Nose: no runny nose or no real   bloody nose, +postnasal drip. Throat. No sore throat, difficulty chewing or swallowing. Teeth firm in gums   Respiratory: No shortness of breath, dyspnea on exertion, cough, or hemoptysis   Hx Cystic Fibrosis.  Sees Dr. Herman    Cardiovascular: negative for, palpitations and chest pain  Gastrointestinal: negative for, nausea, heartburn, abdominal pain, constipation and diarrhea  Genitourinary: negative for, dysuria, frequency and urgency  Musculoskeletal: negative for, back pain, neck pain and joint pain  ,Right   foot pain. Has tendon rupture.     Neurologic: negative for  headaches, dizziness  Psychiatric: negative for, depression, + anxiety.  On Wellbutrin SR   150mg   Hematologic/Lymphatic/Immunologic: negative for, allergies, chills and fever  Endocrine: negative for, cold intolerance, heat intolerance and night sweats      Exam:  /72 (BP Location: Left arm, Patient Position: Chair, Cuff Size: Adult Large)  Pulse 95  Temp 98.7  F (37.1  C) (Tympanic)  Resp 20  Ht 5' 7\" (1.702 m)  Wt 240 lb (108.9 kg)  SpO2 98%  BMI 37.59 kg/m2    Constitutional:  alert and no distress  Head: Normocephalic. No masses, lesions, tenderness or abnormalities. Eyes: PERRLA, EOMI,   CN 2-12 intact.   ENT: EARS: bilateral TM's " pearly gray, good lite reflex. NOSE: Nares red, nonswollen, no exudate. Throat: Oropharynx pink, no exudates. Tongue midline. No fasciculations.  no neck nodes or sinus tenderness.  NECK: supple, no lymphadenopathy, no thyromegaly, no carotid bruits. Trachea midline. No JVD  Cardiovascular: S1S2, no S3, S4  Regular Rhythm  .  Point of Maximum Impulse  normal. No lifts, heaves, or thrills.  No murmurs, clicks, gallops, or rub. No Jugular venous distention    Respiratory: Good diaphragmatic excursion. Lungs clear anteriorly and posteriorly.  Gastrointestinal: Abdomen soft, non-tender. BS normal. No masses, no organomegaly  : Deferred  Musculoskeletal: extremities- no gross deformities noted, gait normal and normal muscle tone  Skin: no suspicious lesions or rashes  Neurologic:  Reflexes normal and symmetric. Sensation grossly WNL.  Psychiatric: mentation appears normal and affect normal/bright  Hematologic/Lymphatic/Immunologic: normal ant/post cervical, supraclavicular and inguinal nodes    Labs:  Results for orders placed or performed in visit on 02/02/18   CBC with platelets and differential   Result Value Ref Range    WBC 12.3 (H) 4.0 - 11.0 10e9/L    RBC Count 4.92 3.8 - 5.2 10e12/L    Hemoglobin 13.5 11.7 - 15.7 g/dL    Hematocrit 40.6 35.0 - 47.0 %    MCV 83 78 - 100 fl    MCH 27.4 26.5 - 33.0 pg    MCHC 33.3 31.5 - 36.5 g/dL    RDW 14.2 10.0 - 15.0 %    Platelet Count 292 150 - 450 10e9/L    Diff Method Automated Method     % Neutrophils 72.1 %    % Lymphocytes 19.5 %    % Monocytes 6.2 %    % Eosinophils 1.5 %    % Basophils 0.3 %    % Immature Granulocytes 0.4 %    Nucleated RBCs 0 0 /100    Absolute Neutrophil 8.9 (H) 1.6 - 8.3 10e9/L    Absolute Lymphocytes 2.4 0.8 - 5.3 10e9/L    Absolute Monocytes 0.8 0.0 - 1.3 10e9/L    Absolute Eosinophils 0.2 0.0 - 0.7 10e9/L    Absolute Basophils 0.0 0.0 - 0.2 10e9/L    Abs Immature Granulocytes 0.1 0 - 0.4 10e9/L    Absolute Nucleated RBC 0.0    Comprehensive  metabolic panel (BMP + Alb, Alk Phos, ALT, AST, Total. Bili, TP)   Result Value Ref Range    Sodium 141 133 - 144 mmol/L    Potassium 4.1 3.4 - 5.3 mmol/L    Chloride 106 94 - 109 mmol/L    Carbon Dioxide 27 20 - 32 mmol/L    Anion Gap 8 3 - 14 mmol/L    Glucose 102 (H) 70 - 99 mg/dL    Urea Nitrogen 15 7 - 30 mg/dL    Creatinine 1.21 (H) 0.52 - 1.04 mg/dL    GFR Estimate 47 (L) >60 mL/min/1.7m2    GFR Estimate If Black 57 (L) >60 mL/min/1.7m2    Calcium 8.7 8.5 - 10.1 mg/dL    Bilirubin Total 0.2 0.2 - 1.3 mg/dL    Albumin 3.7 3.4 - 5.0 g/dL    Protein Total 7.4 6.8 - 8.8 g/dL    Alkaline Phosphatase 78 40 - 150 U/L    ALT 29 0 - 50 U/L    AST 11 0 - 45 U/L            EKG Interpretation:        Symptoms at time of EKG: None   Rhythm: Normal sinus   Rate: Normal  Comparison to prior: Unchanged  Clinical Impression: no acute changes    XRAY:  Chest 2 views:Study Result   PROCEDURE:  XR CHEST 2 VW     HISTORY:  preop Hx cystic fibrosis; Pre-operative general physical  examination.      COMPARISON:  3/3/2017, CT chest 11/17/2017     FINDINGS:   The cardiac silhouette is normal in size. The pulmonary vasculature is  normal.  There is mild bronchiectasis in both lungs, similar to prior  studies, consistent with history of cystic fibrosis. No acute  infiltrates are seen. No pleural effusion or pneumothorax.         IMPRESSION:  No acute change.       JOSE CHAO MD         Assessment:  Pre-operative evaluation    Plan:   Preop History and Physical for Left Endoscopic Sinus Surgery, Septoplasty, Bilateral Turbinate reduction.  ,  with Dr. Gonzalez , at [facility], on 2/21/18. Will Use Nebulizers  AM of surgery, and hold any ibuprofen and Naprosyn  til after surgery.  Has had no problems with anesthesia in past. Is cleared cardiac wise, and respiratory wise for anesthesia.     ASSESSMENT / PLAN:  (Z01.818) Pre-operative general physical examination  (primary encounter diagnosis)  Comment:WBC slightly elevated, as is  absolute neutrophils.  Has been on steroid course.  Alternates every other month with Azithromycin course.    WBC   Date Value Ref Range Status   02/02/2018 12.3 (H) 4.0 - 11.0 10e9/L Final   ]    Hemoglobin   Date Value Ref Range Status   02/02/2018 13.5 11.7 - 15.7 g/dL Final   ]  Potassium   Date Value Ref Range Status   02/02/2018 4.1 3.4 - 5.3 mmol/L Final   ]    Plan: EKG 12-lead complete w/read - (Clinic         Performed), XR CHEST 2 VW (Clinic Performed),         CBC with platelets and differential,         Comprehensive metabolic panel (BMP + Alb, Alk         Phos, ALT, AST, Total. Bili, TP)           (E84.9) Cystic fibrosis (H)  Comment:Uses Nebulizers Brovana 2 times daily,  Pulmicort 1 time daily  , Xopenex as needed. Pulmozyme Daily as needed.  Uses Tylenol #3 for cough.    Plan: Take Inhalers AM of surgery.      (K21.9) Gastroesophageal reflux disease without esophagitis  Comment:On Dexilant  60mg   Creatinine   Date Value Ref Range Status   02/02/2018 1.21 (H) 0.52 - 1.04 mg/dL Final   ]  Plan: Will followup on creatinine. Make sure drinking 6 glasses water a day.        (G47.00) Persistent insomnia  Comment:Takes Ambien 10mg    Plan: Sabino.         Todd Torres CNP

## 2018-02-16 DIAGNOSIS — R79.89 ELEVATED SERUM CREATININE: ICD-10-CM

## 2018-02-16 LAB
ALBUMIN SERPL-MCNC: 4.1 G/DL (ref 3.4–5)
ALP SERPL-CCNC: 89 U/L (ref 40–150)
ALT SERPL W P-5'-P-CCNC: 28 U/L (ref 0–50)
ANION GAP SERPL CALCULATED.3IONS-SCNC: 10 MMOL/L (ref 3–14)
AST SERPL W P-5'-P-CCNC: 13 U/L (ref 0–45)
BILIRUB SERPL-MCNC: 0.2 MG/DL (ref 0.2–1.3)
BUN SERPL-MCNC: 18 MG/DL (ref 7–30)
CALCIUM SERPL-MCNC: 9.5 MG/DL (ref 8.5–10.1)
CHLORIDE SERPL-SCNC: 103 MMOL/L (ref 94–109)
CO2 SERPL-SCNC: 25 MMOL/L (ref 20–32)
CREAT SERPL-MCNC: 1.19 MG/DL (ref 0.52–1.04)
GFR SERPL CREATININE-BSD FRML MDRD: 48 ML/MIN/1.7M2
GLUCOSE SERPL-MCNC: 111 MG/DL (ref 70–99)
POTASSIUM SERPL-SCNC: 4.3 MMOL/L (ref 3.4–5.3)
PROT SERPL-MCNC: 8.4 G/DL (ref 6.8–8.8)
SODIUM SERPL-SCNC: 138 MMOL/L (ref 133–144)

## 2018-02-16 PROCEDURE — 80053 COMPREHEN METABOLIC PANEL: CPT | Performed by: NURSE PRACTITIONER

## 2018-02-16 PROCEDURE — 36415 COLL VENOUS BLD VENIPUNCTURE: CPT | Performed by: NURSE PRACTITIONER

## 2018-02-16 RX ORDER — ACETYLCYSTEINE 200 MG/ML
SOLUTION ORAL; RESPIRATORY (INHALATION) EVERY 8 HOURS
COMMUNITY

## 2018-02-16 RX ORDER — CLOTRIMAZOLE 10 MG/1
LOZENGE ORAL
COMMUNITY
End: 2019-05-14

## 2018-02-16 RX ORDER — DOXYCYCLINE HYCLATE 100 MG
100 TABLET ORAL 2 TIMES DAILY
COMMUNITY
End: 2019-04-09 | Stop reason: SINTOL

## 2018-02-16 NOTE — TELEPHONE ENCOUNTER
Called the patient. She stated that she is resistant to Erythromycin and that she is taking Doxycycline on a daily basis. Patient will take the Doxy until surgery date.

## 2018-02-16 NOTE — TELEPHONE ENCOUNTER
Tried to call Annie-Can you call her and see if on Erythromycin this month. If not have her take til her surgery. Todd

## 2018-02-16 NOTE — TELEPHONE ENCOUNTER
----- Message from Francoise Gonzalez MD sent at 2/13/2018  2:41 PM CST -----  Regarding: pre op abx  Hi Todd  Does Annie require preop abx (sinus surgery) due to CF?   If so, Karen please order so she receives them in SDS .  Her surgery is on 2/21.   Thanks!  Francoise

## 2018-02-21 ENCOUNTER — ANESTHESIA (OUTPATIENT)
Dept: SURGERY | Facility: HOSPITAL | Age: 48
End: 2018-02-21
Payer: COMMERCIAL

## 2018-02-21 ENCOUNTER — ANESTHESIA EVENT (OUTPATIENT)
Dept: SURGERY | Facility: HOSPITAL | Age: 48
End: 2018-02-21
Payer: COMMERCIAL

## 2018-02-21 ENCOUNTER — APPOINTMENT (OUTPATIENT)
Dept: CT IMAGING | Facility: HOSPITAL | Age: 48
End: 2018-02-21
Attending: OTOLARYNGOLOGY
Payer: COMMERCIAL

## 2018-02-21 ENCOUNTER — HOSPITAL ENCOUNTER (OUTPATIENT)
Facility: HOSPITAL | Age: 48
Discharge: HOME | End: 2018-02-21
Attending: OTOLARYNGOLOGY | Admitting: OTOLARYNGOLOGY
Payer: COMMERCIAL

## 2018-02-21 ENCOUNTER — APPOINTMENT (OUTPATIENT)
Dept: LAB | Facility: HOSPITAL | Age: 48
End: 2018-02-21
Attending: OTOLARYNGOLOGY
Payer: COMMERCIAL

## 2018-02-21 VITALS
RESPIRATION RATE: 15 BRPM | TEMPERATURE: 97.2 F | WEIGHT: 240 LBS | SYSTOLIC BLOOD PRESSURE: 140 MMHG | HEIGHT: 67 IN | OXYGEN SATURATION: 95 % | HEART RATE: 92 BPM | BODY MASS INDEX: 37.67 KG/M2 | DIASTOLIC BLOOD PRESSURE: 74 MMHG

## 2018-02-21 DIAGNOSIS — Z98.890 S/P FESS (FUNCTIONAL ENDOSCOPIC SINUS SURGERY): Primary | ICD-10-CM

## 2018-02-21 LAB
BACTERIA SPEC CULT: NORMAL
BACTERIA SPEC CULT: NORMAL
SPECIMEN SOURCE: NORMAL

## 2018-02-21 PROCEDURE — 25000125 ZZHC RX 250: Performed by: ANESTHESIOLOGY

## 2018-02-21 PROCEDURE — 87186 SC STD MICRODIL/AGAR DIL: CPT | Performed by: OTOLARYNGOLOGY

## 2018-02-21 PROCEDURE — 01999 UNLISTED ANES PROCEDURE: CPT | Performed by: NURSE ANESTHETIST, CERTIFIED REGISTERED

## 2018-02-21 PROCEDURE — 88304 TISSUE EXAM BY PATHOLOGIST: CPT | Mod: TC | Performed by: OTOLARYNGOLOGY

## 2018-02-21 PROCEDURE — 27210995 ZZH RX 272: Performed by: OTOLARYNGOLOGY

## 2018-02-21 PROCEDURE — 30520 REPAIR OF NASAL SEPTUM: CPT | Performed by: OTOLARYNGOLOGY

## 2018-02-21 PROCEDURE — 87205 SMEAR GRAM STAIN: CPT | Performed by: OTOLARYNGOLOGY

## 2018-02-21 PROCEDURE — 87070 CULTURE OTHR SPECIMN AEROBIC: CPT | Performed by: OTOLARYNGOLOGY

## 2018-02-21 PROCEDURE — 27210794 ZZH OR GENERAL SUPPLY STERILE: Performed by: OTOLARYNGOLOGY

## 2018-02-21 PROCEDURE — 70486 CT MAXILLOFACIAL W/O DYE: CPT | Mod: TC

## 2018-02-21 PROCEDURE — 36000063 ZZH SURGERY LEVEL 4 EA 15 ADDTL MIN: Performed by: OTOLARYNGOLOGY

## 2018-02-21 PROCEDURE — 71000014 ZZH RECOVERY PHASE 1 LEVEL 2 FIRST HR: Performed by: OTOLARYNGOLOGY

## 2018-02-21 PROCEDURE — 31253 NSL/SINS NDSC TOTAL: CPT | Performed by: OTOLARYNGOLOGY

## 2018-02-21 PROCEDURE — 31255 NSL/SINS NDSC W/TOT ETHMDCT: CPT | Performed by: ANESTHESIOLOGY

## 2018-02-21 PROCEDURE — 25000128 H RX IP 250 OP 636: Performed by: OTOLARYNGOLOGY

## 2018-02-21 PROCEDURE — 25000125 ZZHC RX 250: Performed by: NURSE ANESTHETIST, CERTIFIED REGISTERED

## 2018-02-21 PROCEDURE — 25000128 H RX IP 250 OP 636: Performed by: ANESTHESIOLOGY

## 2018-02-21 PROCEDURE — 37000009 ZZH ANESTHESIA TECHNICAL FEE, EACH ADDTL 15 MIN: Performed by: OTOLARYNGOLOGY

## 2018-02-21 PROCEDURE — 87075 CULTR BACTERIA EXCEPT BLOOD: CPT | Performed by: OTOLARYNGOLOGY

## 2018-02-21 PROCEDURE — 87102 FUNGUS ISOLATION CULTURE: CPT | Performed by: OTOLARYNGOLOGY

## 2018-02-21 PROCEDURE — 25000132 ZZH RX MED GY IP 250 OP 250 PS 637: Performed by: OTOLARYNGOLOGY

## 2018-02-21 PROCEDURE — 71000027 ZZH RECOVERY PHASE 2 EACH 15 MINS: Performed by: OTOLARYNGOLOGY

## 2018-02-21 PROCEDURE — 31267 ENDOSCOPY MAXILLARY SINUS: CPT | Performed by: OTOLARYNGOLOGY

## 2018-02-21 PROCEDURE — 25000125 ZZHC RX 250: Performed by: OTOLARYNGOLOGY

## 2018-02-21 PROCEDURE — 71000015 ZZH RECOVERY PHASE 1 LEVEL 2 EA ADDTL HR: Performed by: OTOLARYNGOLOGY

## 2018-02-21 PROCEDURE — 61782 SCAN PROC CRANIAL EXTRA: CPT | Performed by: OTOLARYNGOLOGY

## 2018-02-21 PROCEDURE — 87077 CULTURE AEROBIC IDENTIFY: CPT | Performed by: OTOLARYNGOLOGY

## 2018-02-21 PROCEDURE — 40000306 ZZH STATISTIC PRE PROC ASSESS II: Performed by: OTOLARYNGOLOGY

## 2018-02-21 PROCEDURE — 36000093 ZZH SURGERY LEVEL 4 1ST 30 MIN: Performed by: OTOLARYNGOLOGY

## 2018-02-21 PROCEDURE — 27110028 ZZH OR GENERAL SUPPLY NON-STERILE: Performed by: OTOLARYNGOLOGY

## 2018-02-21 PROCEDURE — C1726 CATH, BAL DIL, NON-VASCULAR: HCPCS | Performed by: OTOLARYNGOLOGY

## 2018-02-21 PROCEDURE — 37000008 ZZH ANESTHESIA TECHNICAL FEE, 1ST 30 MIN: Performed by: OTOLARYNGOLOGY

## 2018-02-21 PROCEDURE — 25000128 H RX IP 250 OP 636: Performed by: NURSE ANESTHETIST, CERTIFIED REGISTERED

## 2018-02-21 PROCEDURE — S1090 MOMETASONE SINUS IMPLANT: HCPCS | Performed by: OTOLARYNGOLOGY

## 2018-02-21 PROCEDURE — 30930 THER FX NASAL INF TURBINATE: CPT | Performed by: OTOLARYNGOLOGY

## 2018-02-21 DEVICE — PROPEL-CONTOUR DISSOLVABLE: Type: IMPLANTABLE DEVICE | Site: SINUS | Status: FUNCTIONAL

## 2018-02-21 DEVICE — PROPEL-LARGE DISSOLVABLE: Type: IMPLANTABLE DEVICE | Site: SINUS | Status: FUNCTIONAL

## 2018-02-21 RX ORDER — OXYCODONE HYDROCHLORIDE 5 MG/1
5 TABLET ORAL EVERY 4 HOURS PRN
Status: DISCONTINUED | OUTPATIENT
Start: 2018-02-21 | End: 2018-02-21 | Stop reason: HOSPADM

## 2018-02-21 RX ORDER — ALBUTEROL SULFATE 0.83 MG/ML
2.5 SOLUTION RESPIRATORY (INHALATION) EVERY 4 HOURS PRN
Status: DISCONTINUED | OUTPATIENT
Start: 2018-02-21 | End: 2018-02-21 | Stop reason: HOSPADM

## 2018-02-21 RX ORDER — HYDROMORPHONE HYDROCHLORIDE 1 MG/ML
.3-.5 INJECTION, SOLUTION INTRAMUSCULAR; INTRAVENOUS; SUBCUTANEOUS EVERY 10 MIN PRN
Status: DISCONTINUED | OUTPATIENT
Start: 2018-02-21 | End: 2018-02-21 | Stop reason: HOSPADM

## 2018-02-21 RX ORDER — PROMETHAZINE HYDROCHLORIDE 25 MG/ML
12.5 INJECTION, SOLUTION INTRAMUSCULAR; INTRAVENOUS
Status: DISCONTINUED | OUTPATIENT
Start: 2018-02-21 | End: 2018-02-21 | Stop reason: HOSPADM

## 2018-02-21 RX ORDER — PROPOFOL 10 MG/ML
INJECTION, EMULSION INTRAVENOUS PRN
Status: DISCONTINUED | OUTPATIENT
Start: 2018-02-21 | End: 2018-02-21

## 2018-02-21 RX ORDER — LABETALOL HYDROCHLORIDE 5 MG/ML
10 INJECTION, SOLUTION INTRAVENOUS
Status: DISCONTINUED | OUTPATIENT
Start: 2018-02-21 | End: 2018-02-21 | Stop reason: HOSPADM

## 2018-02-21 RX ORDER — HYDROCODONE BITARTRATE AND ACETAMINOPHEN 7.5; 325 MG/1; MG/1
TABLET ORAL
Status: DISCONTINUED
Start: 2018-02-21 | End: 2018-02-21 | Stop reason: HOSPADM

## 2018-02-21 RX ORDER — ONDANSETRON 2 MG/ML
INJECTION INTRAMUSCULAR; INTRAVENOUS PRN
Status: DISCONTINUED | OUTPATIENT
Start: 2018-02-21 | End: 2018-02-21

## 2018-02-21 RX ORDER — FENTANYL CITRATE 50 UG/ML
25-50 INJECTION, SOLUTION INTRAMUSCULAR; INTRAVENOUS
Status: DISCONTINUED | OUTPATIENT
Start: 2018-02-21 | End: 2018-02-21 | Stop reason: HOSPADM

## 2018-02-21 RX ORDER — OXYMETAZOLINE HYDROCHLORIDE 0.05 G/100ML
3 SPRAY NASAL
Status: COMPLETED | OUTPATIENT
Start: 2018-02-21 | End: 2018-02-21

## 2018-02-21 RX ORDER — ONDANSETRON 2 MG/ML
4 INJECTION INTRAMUSCULAR; INTRAVENOUS EVERY 30 MIN PRN
Status: DISCONTINUED | OUTPATIENT
Start: 2018-02-21 | End: 2018-02-21 | Stop reason: HOSPADM

## 2018-02-21 RX ORDER — LIDOCAINE HYDROCHLORIDE 20 MG/ML
INJECTION, SOLUTION INFILTRATION; PERINEURAL PRN
Status: DISCONTINUED | OUTPATIENT
Start: 2018-02-21 | End: 2018-02-21

## 2018-02-21 RX ORDER — SCOLOPAMINE TRANSDERMAL SYSTEM 1 MG/1
1 PATCH, EXTENDED RELEASE TRANSDERMAL ONCE
Status: COMPLETED | OUTPATIENT
Start: 2018-02-21 | End: 2018-02-21

## 2018-02-21 RX ORDER — SODIUM CHLORIDE, SODIUM LACTATE, POTASSIUM CHLORIDE, CALCIUM CHLORIDE 600; 310; 30; 20 MG/100ML; MG/100ML; MG/100ML; MG/100ML
INJECTION, SOLUTION INTRAVENOUS CONTINUOUS
Status: DISCONTINUED | OUTPATIENT
Start: 2018-02-21 | End: 2018-02-21 | Stop reason: HOSPADM

## 2018-02-21 RX ORDER — ONDANSETRON 4 MG/1
4 TABLET, ORALLY DISINTEGRATING ORAL EVERY 30 MIN PRN
Status: DISCONTINUED | OUTPATIENT
Start: 2018-02-21 | End: 2018-02-21 | Stop reason: HOSPADM

## 2018-02-21 RX ORDER — LIDOCAINE HYDROCHLORIDE AND EPINEPHRINE 10; 10 MG/ML; UG/ML
INJECTION, SOLUTION INFILTRATION; PERINEURAL PRN
Status: DISCONTINUED | OUTPATIENT
Start: 2018-02-21 | End: 2018-02-21 | Stop reason: HOSPADM

## 2018-02-21 RX ORDER — HYDROCODONE BITARTRATE AND ACETAMINOPHEN 7.5; 325 MG/1; MG/1
1 TABLET ORAL EVERY 6 HOURS PRN
Qty: 10 TABLET | Refills: 0 | Status: SHIPPED | OUTPATIENT
Start: 2018-02-21 | End: 2019-01-30

## 2018-02-21 RX ORDER — HYDRALAZINE HYDROCHLORIDE 20 MG/ML
2.5-5 INJECTION INTRAMUSCULAR; INTRAVENOUS EVERY 10 MIN PRN
Status: DISCONTINUED | OUTPATIENT
Start: 2018-02-21 | End: 2018-02-21 | Stop reason: HOSPADM

## 2018-02-21 RX ORDER — ALBUTEROL SULFATE 0.83 MG/ML
2.5 SOLUTION RESPIRATORY (INHALATION) ONCE
Status: DISCONTINUED | OUTPATIENT
Start: 2018-02-21 | End: 2018-02-21 | Stop reason: HOSPADM

## 2018-02-21 RX ORDER — PREDNISONE 20 MG/1
TABLET ORAL
Qty: 6 TABLET | Refills: 1 | Status: SHIPPED | OUTPATIENT
Start: 2018-02-21 | End: 2018-03-12

## 2018-02-21 RX ORDER — FENTANYL CITRATE 50 UG/ML
INJECTION, SOLUTION INTRAMUSCULAR; INTRAVENOUS PRN
Status: DISCONTINUED | OUTPATIENT
Start: 2018-02-21 | End: 2018-02-21

## 2018-02-21 RX ORDER — DEXAMETHASONE SODIUM PHOSPHATE 10 MG/ML
INJECTION, SOLUTION INTRAMUSCULAR; INTRAVENOUS PRN
Status: DISCONTINUED | OUTPATIENT
Start: 2018-02-21 | End: 2018-02-21

## 2018-02-21 RX ORDER — MEPERIDINE HYDROCHLORIDE 25 MG/ML
12.5 INJECTION INTRAMUSCULAR; INTRAVENOUS; SUBCUTANEOUS
Status: DISCONTINUED | OUTPATIENT
Start: 2018-02-21 | End: 2018-02-21 | Stop reason: HOSPADM

## 2018-02-21 RX ORDER — DEXAMETHASONE SODIUM PHOSPHATE 4 MG/ML
4 INJECTION, SOLUTION INTRA-ARTICULAR; INTRALESIONAL; INTRAMUSCULAR; INTRAVENOUS; SOFT TISSUE EVERY 10 MIN PRN
Status: DISCONTINUED | OUTPATIENT
Start: 2018-02-21 | End: 2018-02-21 | Stop reason: HOSPADM

## 2018-02-21 RX ORDER — HYDROCODONE BITARTRATE AND ACETAMINOPHEN 7.5; 325 MG/1; MG/1
1 TABLET ORAL EVERY 6 HOURS PRN
Status: DISCONTINUED | OUTPATIENT
Start: 2018-02-21 | End: 2018-02-21 | Stop reason: HOSPADM

## 2018-02-21 RX ORDER — NALOXONE HYDROCHLORIDE 0.4 MG/ML
.1-.4 INJECTION, SOLUTION INTRAMUSCULAR; INTRAVENOUS; SUBCUTANEOUS
Status: DISCONTINUED | OUTPATIENT
Start: 2018-02-21 | End: 2018-02-21 | Stop reason: HOSPADM

## 2018-02-21 RX ORDER — KETOROLAC TROMETHAMINE 30 MG/ML
30 INJECTION, SOLUTION INTRAMUSCULAR; INTRAVENOUS EVERY 6 HOURS PRN
Status: DISCONTINUED | OUTPATIENT
Start: 2018-02-21 | End: 2018-02-21 | Stop reason: HOSPADM

## 2018-02-21 RX ADMIN — OXYMETAZOLINE HYDROCHLORIDE 3 SPRAY: 5 SPRAY NASAL at 11:04

## 2018-02-21 RX ADMIN — PROPOFOL 200 MG: 10 INJECTION, EMULSION INTRAVENOUS at 12:47

## 2018-02-21 RX ADMIN — OXYMETAZOLINE HYDROCHLORIDE 3 SPRAY: 5 SPRAY NASAL at 10:43

## 2018-02-21 RX ADMIN — OXYMETAZOLINE HYDROCHLORIDE 3 SPRAY: 5 SPRAY NASAL at 11:18

## 2018-02-21 RX ADMIN — DEXAMETHASONE SODIUM PHOSPHATE 12 MG: 10 INJECTION, SOLUTION INTRAMUSCULAR; INTRAVENOUS at 12:50

## 2018-02-21 RX ADMIN — LIDOCAINE HYDROCHLORIDE 40 MG: 20 INJECTION, SOLUTION INFILTRATION; PERINEURAL at 12:47

## 2018-02-21 RX ADMIN — SCOPALAMINE 1 PATCH: 1 PATCH, EXTENDED RELEASE TRANSDERMAL at 10:43

## 2018-02-21 RX ADMIN — SODIUM CHLORIDE, POTASSIUM CHLORIDE, SODIUM LACTATE AND CALCIUM CHLORIDE: 600; 310; 30; 20 INJECTION, SOLUTION INTRAVENOUS at 13:24

## 2018-02-21 RX ADMIN — SODIUM CHLORIDE, POTASSIUM CHLORIDE, SODIUM LACTATE AND CALCIUM CHLORIDE: 600; 310; 30; 20 INJECTION, SOLUTION INTRAVENOUS at 10:43

## 2018-02-21 RX ADMIN — SODIUM CHLORIDE, POTASSIUM CHLORIDE, SODIUM LACTATE AND CALCIUM CHLORIDE: 600; 310; 30; 20 INJECTION, SOLUTION INTRAVENOUS at 11:30

## 2018-02-21 RX ADMIN — FENTANYL CITRATE 100 MCG: 50 INJECTION, SOLUTION INTRAMUSCULAR; INTRAVENOUS at 12:43

## 2018-02-21 RX ADMIN — ONDANSETRON 4 MG: 2 INJECTION INTRAMUSCULAR; INTRAVENOUS at 14:27

## 2018-02-21 RX ADMIN — FENTANYL CITRATE 50 MCG: 50 INJECTION, SOLUTION INTRAMUSCULAR; INTRAVENOUS at 13:17

## 2018-02-21 RX ADMIN — FENTANYL CITRATE 50 MCG: 50 INJECTION, SOLUTION INTRAMUSCULAR; INTRAVENOUS at 13:07

## 2018-02-21 RX ADMIN — FENTANYL CITRATE 100 MCG: 50 INJECTION, SOLUTION INTRAMUSCULAR; INTRAVENOUS at 12:45

## 2018-02-21 RX ADMIN — HYDROCODONE BITARTRATE AND ACETAMINOPHEN 1 TABLET: 7.5; 325 TABLET ORAL at 16:29

## 2018-02-21 RX ADMIN — ROCURONIUM BROMIDE 50 MG: 10 INJECTION INTRAVENOUS at 12:47

## 2018-02-21 RX ADMIN — FENTANYL CITRATE 50 MCG: 50 INJECTION, SOLUTION INTRAMUSCULAR; INTRAVENOUS at 15:15

## 2018-02-21 NOTE — BRIEF OP NOTE
Brief Operative Note     Pre-op Diagnosis: left chronic pansinusitis, deviated nasal septum, turbinate hypertrophy  Post-op Diagnosis:  same  Procedure:  1.  Left total ethmoidectomy  2.  Left frontal sinusotomy  3.  Left maxillary antrostomy with tissue removal  4.  Septoplasty with cartilage reinsertion  5 . Bilateral submucosal reduction inferior turbinates  Procedures performed using urturn navigation  Surgeon:  Francoise Gonzalez D.O.  Anesthesia:  General endotracheal  EBL:  Minimal, <5ml  Findings: inspissated thick purulent secretions left maxillary and anterior ethmoids  Severe polypoid mucosal degeneration throughout the left maxillary sinus, mild polypoid changes to the left anterior ethmoids  Right maxillary and anterior ethmoid cavity clear    Complications:  none  Condition:  stable     Full dictation # [id number]

## 2018-02-21 NOTE — IP AVS SNAPSHOT
[address] [address]    Phone:  [phone]                                       After Visit Summary   2/21/2018    Annie Garcia    MRN: [id number]           After Visit Summary Signature Page     I have received my discharge instructions, and my questions have been answered. I have discussed any challenges I see with this plan with the nurse or doctor.    ..........................................................................................................................................  Patient/Patient Representative Signature      ..........................................................................................................................................  Patient Representative Print Name and Relationship to Patient    ..................................................               ................................................  Date                                            Time    ..........................................................................................................................................  Reviewed by Signature/Title    ...................................................              ..............................................  Date                                                            Time

## 2018-02-21 NOTE — ANESTHESIA PREPROCEDURE EVALUATION
Anesthesia Evaluation     . Pt has had prior anesthetic.     History of anesthetic complications   - PONV        ROS/MED HX    ENT/Pulmonary:     (+)MYRANDA risk factors (BMI: 37.67) obese, allergic rhinitis, other ENT- NASAL TURBINATE HYPERTROPHY, DNS, LEFT CHRONIC PANSINUSITIS, , cystic fibrosis . .   (-) Other pulmonary disease   Neurologic:  - neg neurologic ROS     Cardiovascular:  - neg cardiovascular ROS   (+) ----. : . . . :. . Previous cardiac testing date:results:date: results:ECG reviewed date:2/2/2018 results:NSR@69, OWN date: results:          METS/Exercise Tolerance:     Hematologic:  - neg hematologic  ROS       Musculoskeletal:   (+) , , other musculoskeletal- TMJ      GI/Hepatic:     (+) GERD       Renal/Genitourinary:     (+) chronic renal disease (Stage 3 (moderate)), type: CRI,       Endo:     (+) Obesity, .      Psychiatric:     (+) psychiatric history depression      Infectious Disease:  - neg infectious disease ROS       Malignancy:      - no malignancy   Other:    (+) No chance of pregnancy                    Physical Exam  Normal systems: dental    Airway   Mallampati: IV  TM distance: >3 FB  Neck ROM: full  Comment: Limited jaw opening     Dental     Cardiovascular   Rhythm and rate: regular and normal      Pulmonary    breath sounds clear to auscultation                    Anesthesia Plan      History & Physical Review  History and physical reviewed and following examination; no interval change.    ASA Status:  4 .    NPO Status:  > 8 hours    Plan for General and ETT with Intravenous and Propofol induction. Maintenance will be Balanced.    PONV prophylaxis:  Ondansetron (or other 5HT-3), Scopolamine patch and Dexamethasone or Solumedrol  -22 Modifier: Substantial additional work required secondary to Airway/Field avoidance at 180 degrees  Will provide hypotensive anesthesia per surgeon's request      Postoperative Care  Postoperative pain management:  Oral pain medications and IV  analgesics.      Consents  Anesthetic plan, risks, benefits and alternatives discussed with:  Patient..                          .

## 2018-02-21 NOTE — DISCHARGE INSTRUCTIONS
Remove the scopolamine patch behind your left  ear after 24 hours after application.   After removing the patch, wash your hands and the area behind your ear thoroughly with soap and water.   The patch will still contain some medicine after use.   To avoid accidental contact or ingestion by children or pets, fold the used patch in half with the sticky side together and throw away in the trash out of the reach of children and pets.         Post-Anesthesia Patient Instructions    IMMEDIATELY FOLLOWING SURGERY:  Do not drive or operate machinery for the first twenty four hours after surgery.  Do not make any important decisions for twenty four hours after surgery or while taking narcotic pain medications or sedatives.  If you develop intractable nausea and vomiting or a severe headache please notify your doctor immediately.    FOLLOW-UP:  Please make an appointment with your surgeon as instructed. You do not need to follow up with anesthesia unless specifically instructed to do so.    WOUND CARE INSTRUCTIONS (if applicable):  Keep a dry clean dressing on the anesthesia/puncture wound site if there is drainage.  Once the wound has quit draining you may leave it open to air.  Generally you should leave the bandage intact for twenty four hours unless there is drainage.  If the epidural site drains for more than 36-48 hours please call the anesthesia department.    QUESTIONS?:  Please feel free to call your physician or the hospital  if you have any questions, and they will be happy to assist you.   Instructions for Sinus Surgery    Recovery - Everyone recovers differently from a general anesthetic.  Symptoms such as fatigue, nausea, light-headedness, and sometimes a low grade fever (up to 100 degrees) are not unusual.  As your body removes the anesthetic drugs from circulation, these symptoms will resolve.  Your nose will be sore after surgery, and you may even have symptoms similar to a sinus infection with  headache, congestion, and pressure.  These will resolve with healing.  For several days you may experience bloody drainage from the nose, please use the drip pad as necessary for this.  If there is persistent bleeding, please call the office during business hours or the on call ENT physician after hours.  There are no diet restrictions after sinus surgery, and you can resume your home medications.      Please do not blow your nose until two weeks after surgery.  At 2 weeks you may gently blow your nose, unless otherwise indicated by Dr. Gonzalez.     Limit your activity to no strenuous activities until I see you for the first follow-up visit in approximately 2 weeks.      Medications - You were sent home with narcotic pain medication.  If you can tolerate the discomfort during your recovery by using just plain Tylenol or ibuprofen (advil), please do so.  However, do not hesitate to use the stronger pain medication if needed.  If you were sent home with an antibiotic, it is primarily used to help the healing process.  If it causes loose bowel movements or other signs of intolerance, it is appropriate to discontinue it.  By far the most important measure you can take to speed recovery, and maximize the chances of long term success of sinus surgery is using the sinus rinses at least three time per day for the first month after surgery.       Start Amanda Med saline irrigation tonight and use at least 5 times daily.     1.  You will need to purchase 2 amanda med bottles.  Make each bottle the same way, using warm distilled water filled up to the 240 ml airam, dissolve 2 packets of salt and mix into each bottle    2.  In one bottle, add the budesonide (steroid), shake gently to dissolve, and irrigate each nostril using the entire bottle divided between nostrils.  Each day you will use two entire bottle of the budesonide solution and will remake this the next day.     3.  In the other amanda med bottle use only the saline  solution, and irrigate with this at least 3 additional times daily.    Perform gentle irrigation for the first week.  Starting 1 week after surgery, you should increase the volume of amanda med saline irrigation to each nostril, continuing to use the rinses in an alternating fashion at least 5 times daily.  You cannot use too much of the amanda med saline, but limit budesonide rinses to twice daily.    At 2 weeks after surgery, you may also restart nasal steroids (flonase, nasonex, etc).        Complications - Problems related to sinus surgery almost always are detected during the operation, and special instruction will be given in that situation.  However, unexpected things can happen, and are all related to the structures around the sinus cavities.  Symptoms that should alert you to a possible problem include: severe eye pain or eye swelling, persistent heavy bleeding from the nose, and high fevers with headache and neck pain.  Any of these symptoms should be called into my office or to the on call ENT if after hours.  The most common non-emergency complication of sinus surgery is the formation of scar tissue which can re-block the sinuses.  This is addressed below.    Follow-up -  As you have noted, there are quite a few follow-up visits after sinus surgery.  This is done to aggressively manage the most common complication of this technique, which is scar tissue blocking the sinuses.  These visits will require the examination of your nose and possibly removal of crusts of dry mucous and blood, with possible removal of early scar tissue.  Please prepare for these visits by using your sinus rinses.    See Karen Mccallum ENT PA in 2 weeks, see Dr. Gonzalez in 4 weeks.     If there are any questions or issues with the above, or if there are other issues that concern you, always feel free to call the clinic and I am happy to speak with you as soon as I can.    Francoise Gonzalez D.O.  Otolaryngology/Head and Neck  Surgery  Allergy    [phone] office

## 2018-02-21 NOTE — IP AVS SNAPSHOT
MRN:[id number]                      After Visit Summary   2/21/2018    Annie Garcia    MRN: [id number]           Thank you!     Thank you for choosing [facility] for your care. Our goal is always to provide you with excellent care. Hearing back from our patients is one way we can continue to improve our services. Please take a few minutes to complete the written survey that you may receive in the mail after you visit with us. Thank you!        Patient Information     Date Of Birth          1970        About your hospital stay     You were admitted on:  February 21, 2018 You last received care in the:  [facility]    You were discharged on:  February 21, 2018       Who to Call     For medical emergencies, please call 911.  For non-urgent questions about your medical care, please call your primary care provider or clinic, [phone]  For questions related to your surgery, please call your surgery clinic        Attending Provider     Provider Specialty    Francoise Gonzalez MD Otolaryngology       Primary Care Provider Office Phone # Fax #    Todd Torres [phone]      Your next 10 appointments already scheduled     Feb 28, 2018  8:15 AM CST   (Arrive by 8:00 AM)   Post Op with Karen Mccallum PA-C   [facility] ([facility] )    [address] [place] [address] [place] [address]   [phone]              Further instructions from your care team       Remove the scopolamine patch behind your left  ear after 24 hours after application.   After removing the patch, wash your hands and the area behind your ear thoroughly with soap and water.   The patch will still contain some medicine after use.   To avoid accidental contact or ingestion by children or pets, fold the used patch in half with the sticky side together and throw away in the trash out of the reach of children and pets.         Post-Anesthesia Patient Instructions    IMMEDIATELY FOLLOWING  SURGERY:  Do not drive or operate machinery for the first twenty four hours after surgery.  Do not make any important decisions for twenty four hours after surgery or while taking narcotic pain medications or sedatives.  If you develop intractable nausea and vomiting or a severe headache please notify your doctor immediately.    FOLLOW-UP:  Please make an appointment with your surgeon as instructed. You do not need to follow up with anesthesia unless specifically instructed to do so.    WOUND CARE INSTRUCTIONS (if applicable):  Keep a dry clean dressing on the anesthesia/puncture wound site if there is drainage.  Once the wound has quit draining you may leave it open to air.  Generally you should leave the bandage intact for twenty four hours unless there is drainage.  If the epidural site drains for more than 36-48 hours please call the anesthesia department.    QUESTIONS?:  Please feel free to call your physician or the hospital  if you have any questions, and they will be happy to assist you.   Instructions for Sinus Surgery    Recovery - Everyone recovers differently from a general anesthetic.  Symptoms such as fatigue, nausea, light-headedness, and sometimes a low grade fever (up to 100 degrees) are not unusual.  As your body removes the anesthetic drugs from circulation, these symptoms will resolve.  Your nose will be sore after surgery, and you may even have symptoms similar to a sinus infection with headache, congestion, and pressure.  These will resolve with healing.  For several days you may experience bloody drainage from the nose, please use the drip pad as necessary for this.  If there is persistent bleeding, please call the office during business hours or the on call ENT physician after hours.  There are no diet restrictions after sinus surgery, and you can resume your home medications.      Please do not blow your nose until two weeks after surgery.  At 2 weeks you may gently blow your nose,  unless otherwise indicated by Dr. Gonzalez.     Limit your activity to no strenuous activities until I see you for the first follow-up visit in approximately 2 weeks.      Medications - You were sent home with narcotic pain medication.  If you can tolerate the discomfort during your recovery by using just plain Tylenol or ibuprofen (advil), please do so.  However, do not hesitate to use the stronger pain medication if needed.  If you were sent home with an antibiotic, it is primarily used to help the healing process.  If it causes loose bowel movements or other signs of intolerance, it is appropriate to discontinue it.  By far the most important measure you can take to speed recovery, and maximize the chances of long term success of sinus surgery is using the sinus rinses at least three time per day for the first month after surgery.       Start Amanda Med saline irrigation tonight and use at least 5 times daily.     1.  You will need to purchase 2 amanda med bottles.  Make each bottle the same way, using warm distilled water filled up to the 240 ml airam, dissolve 2 packets of salt and mix into each bottle    2.  In one bottle, add the budesonide (steroid), shake gently to dissolve, and irrigate each nostril using the entire bottle divided between nostrils.  Each day you will use two entire bottle of the budesonide solution and will remake this the next day.     3.  In the other amanda med bottle use only the saline solution, and irrigate with this at least 3 additional times daily.    Perform gentle irrigation for the first week.  Starting 1 week after surgery, you should increase the volume of amanda med saline irrigation to each nostril, continuing to use the rinses in an alternating fashion at least 5 times daily.  You cannot use too much of the amanda med saline, but limit budesonide rinses to twice daily.    At 2 weeks after surgery, you may also restart nasal steroids (flonase, nasonex, etc).        Complications -  Problems related to sinus surgery almost always are detected during the operation, and special instruction will be given in that situation.  However, unexpected things can happen, and are all related to the structures around the sinus cavities.  Symptoms that should alert you to a possible problem include: severe eye pain or eye swelling, persistent heavy bleeding from the nose, and high fevers with headache and neck pain.  Any of these symptoms should be called into my office or to the on call ENT if after hours.  The most common non-emergency complication of sinus surgery is the formation of scar tissue which can re-block the sinuses.  This is addressed below.    Follow-up -  As you have noted, there are quite a few follow-up visits after sinus surgery.  This is done to aggressively manage the most common complication of this technique, which is scar tissue blocking the sinuses.  These visits will require the examination of your nose and possibly removal of crusts of dry mucous and blood, with possible removal of early scar tissue.  Please prepare for these visits by using your sinus rinses.    See ALBINO Perdue in 2 weeks, see Dr. Gonzalez in 4 weeks.     If there are any questions or issues with the above, or if there are other issues that concern you, always feel free to call the clinic and I am happy to speak with you as soon as I can.    Francoise Gonzalez D.O.  Otolaryngology/Head and Neck Surgery  Allergy    [phone] office        Pending Results     Date and Time Order Name Status Description    2/21/2018 1357 Gram stain In process     2/21/2018 1357 Fungus Culture, non-blood In process     2/21/2018 1357 Anaerobic bacterial culture In process     2/21/2018 1351 Sinus Culture Aerobic Bacterial In process             Admission Information     Date & Time Provider Department Dept. Phone    2/21/2018 Francoise Gonzalez MD [facility] [phone]      Your Vitals Were     Blood Pressure Pulse  "Temperature Respirations Height Weight    121/94 92 97.2  F (36.2  C) (Temporal) 16 1.702 m (5' 7\") 108.9 kg (240 lb)    Pulse Oximetry BMI (Body Mass Index)                96% 37.59 kg/m2          Care EveryWhere ID     This is your Care EveryWhere ID. This could be used by other organizations to access your [facility] medical records  [id number]        Equal Access to Services     ANTONINO SANCHEZ : Hadii caitlin ku hadstanleyo Soomaali, waaxda luqadaha, qaybta kaalmada adeegyada, leanna corley brucekevin alonso nate laKayodegibson . So [facility] [phone].    ATENCIÓN: Si habla jailene, tiene a seals disposición servicios gratuitos de asistencia lingüística. Oliviaame al [phone].    We comply with applicable federal civil rights laws and [place] laws. We do not discriminate on the basis of race, color, national origin, age, disability, sex, sexual orientation, or gender identity.               Review of your medicines      START taking        Dose / Directions    HYDROcodone-acetaminophen 7.5-325 MG per tablet   Commonly known as:  NORCO   Used for:  S/P FESS (functional endoscopic sinus surgery)        Dose:  1 tablet   Take 1 tablet by mouth every 6 hours as needed for moderate to severe pain   Quantity:  10 tablet   Refills:  0         CONTINUE these medicines which may have CHANGED, or have new prescriptions. If we are uncertain of the size of tablets/capsules you have at home, strength may be listed as something that might have changed.        Dose / Directions    * predniSONE 20 MG tablet   Commonly known as:  DELTASONE   This may have changed:  Another medication with the same name was added. Make sure you understand how and when to take each.   Used for:  Chronic pansinusitis        Take 3 tabs after breakfast 2/18 and 2/19, 2 tabs 2/20-2/24, 1 tab 2/24-2/26   Quantity:  20 tablet   Refills:  1       * predniSONE 20 MG tablet   Commonly known as:  DELTASONE   This may have changed:  You were already taking a medication with the " same name, and this prescription was added. Make sure you understand how and when to take each.   Used for:  S/P FESS (functional endoscopic sinus surgery)        1 tab after breakfast for 5 days, 1/2 tab after breakfast for 2 days, stop   Quantity:  6 tablet   Refills:  1       * Notice:  This list has 2 medication(s) that are the same as other medications prescribed for you. Read the directions carefully, and ask your doctor or other care provider to review them with you.      CONTINUE these medicines which have NOT CHANGED        Dose / Directions    acetaminophen-codeine 300-30 MG per tablet   Commonly known as:  TYLENOL #3   Used for:  Hip pain, right        TAKE 1 TABLET BY MOUTH EVERY 4 HOURS AS NEEDED FOR MILD PAIN, TAKE FOR COUGHING PAIN.   Quantity:  90 tablet   Refills:  0       acetylcysteine 20 % nebulizer solution   Commonly known as:  MUCOMYST        Take by nebulization every 8 hours 5-10 ml inhalation every 8 hours   Refills:  0       AMBIEN 10 MG tablet   Used for:  Primary insomnia   Generic drug:  zolpidem        Dose:  10 mg   Take 1 tablet (10 mg) by mouth nightly as needed   Quantity:  30 tablet   Refills:  5       BROVANA 15 MCG/2ML Nebu neb solution   Used for:  Chronic bronchitis, unspecified chronic bronchitis type (H)   Generic drug:  arformoterol        USE 1 VIAL IN NEBULIZER 2 TIMES A DAY AS NEEDED   Quantity:  120 mL   Refills:  0       budesonide 1 MG/2ML Susp neb solution   Commonly known as:  PULMICORT   Used for:  Bronchitis        Dose:  1 mg   Take 2 mLs (1 mg) by nebulization daily Take 1 mg by nebulization daily   Quantity:  60 ampule   Refills:  1       clotrimazole 10 MG behzad        1 behzad Mouth/Throat 3 times a day   Refills:  0       DEXILANT 60 MG Cpdr CR capsule   Generic drug:  dexlansoprazole        Dose:  1 capsule   Take 1 capsule by mouth daily as needed   Refills:  0       doxycycline 100 MG tablet   Commonly known as:  VIBRA-TABS        Dose:  100 mg   Take  100 mg by mouth 2 times daily Takes one tablet daily prophylaxis.   Refills:  0       fluconazole 100 MG tablet   Commonly known as:  DIFLUCAN   Used for:  Rash and other nonspecific skin eruption        TAKE 1 TABLET BY MOUTH DAILY AS NEEDED   Quantity:  90 tablet   Refills:  1       levalbuterol 0.31 MG/3ML neb solution   Commonly known as:  XOPENEX        Dose:  1 ampule   Take 3 mLs (0.31 mg) by nebulization every 6 hours as needed for shortness of breath / dyspnea   Quantity:  225 mL   Refills:  0       mometasone 50 MCG/ACT spray   Commonly known as:  NASONEX   Used for:  Chronic pansinusitis        Dose:  2 spray   Spray 2 sprays into both nostrils daily   Quantity:  3 Box   Refills:  11       nystatin 605694 UNIT/GM Powd   Commonly known as:  MYCOSTATIN   Used for:  Rash        Dose:  1 g   Apply 1 g topically 3 times daily as needed   Quantity:  60 g   Refills:  1       PULMOZYME 1 MG/ML neb solution   Used for:  Chronic bronchitis, unspecified chronic bronchitis type (H)   Generic drug:  dornase alpha        USE 1 VIAL IN NEBULIZER DAILY AS NEEDED   Quantity:  75 mL   Refills:  0       sucralfate 1 GM tablet   Commonly known as:  CARAFATE   Used for:  Esophageal reflux        TAKE 1 TABLET BY MOUTH FOUR TIMES A DAY AS NEEDED   Quantity:  120 tablet   Refills:  3       WELLBUTRIN  MG 12 hr tablet   Used for:  Dysthymia   Generic drug:  buPROPion        TAKE 2 TABLETS BY MOUTH EVERY MORNING AND 1 TABLET EVERY AFTERNOON   Quantity:  90 tablet   Refills:  6            Where to get your medicines      These medications were sent to [facility] - [address]  [address] [address]     Phone:  [phone]     predniSONE 20 MG tablet         Some of these will need a paper prescription and others can be bought over the counter. Ask your nurse if you have questions.     Bring a paper prescription for each of these medications     HYDROcodone-acetaminophen 7.5-325  MG per tablet                Protect others around you: Learn how to safely use, store and throw away your medicines at www.disposemymeds.org.        ANTIBIOTIC INSTRUCTION     You've Been Prescribed an Antibiotic - Now What?  Your healthcare team thinks that you or your loved one might have an infection. Some infections can be treated with antibiotics, which are powerful, life-saving drugs. Like all medications, antibiotics have side effects and should only be used when necessary. There are some important things you should know about your antibiotic treatment.      Your healthcare team may run tests before you start taking an antibiotic.    Your team may take samples (e.g., from your blood, urine or other areas) to run tests to look for bacteria. These test can be important to determine if you need an antibiotic at all and, if you do, which antibiotic will work best.      Within a few days, your healthcare team might change or even stop your antibiotic.    Your team may start you on an antibiotic while they are working to find out what is making you sick.    Your team might change your antibiotic because test results show that a different antibiotic would be better to treat your infection.    In some cases, once your team has more information, they learn that you do not need an antibiotic at all. They may find out that you don't have an infection, or that the antibiotic you're taking won't work against your infection. For example, an infection caused by a virus can't be treated with antibiotics. Staying on an antibiotic when you don't need it is more likely to be harmful than helpful.      You may experience side effects from your antibiotic.    Like all medications, antibiotics have side effects. Some of these can be serious.    Let you healthcare team know if you have any known allergies when you are admitted to the hospital.    One significant side effect of nearly all antibiotics is the risk of severe and  sometimes deadly diarrhea caused by Clostridium difficile (C. Difficile). This occurs when a person takes antibiotics because some good germs are destroyed. Antibiotic use allows C. diificile to take over, putting patients at high risk for this serious infection.    As a patient or caregiver, it is important to understand your or your loved one's antibiotic treatment. It is especially important for caregivers to speak up when patients can't speak for themselves. Here are some important questions to ask your healthcare team.    What infection is this antibiotic treating and how do you know I have that infection?    What side effects might occur from this antibiotic?    How long will I need to take this antibiotic?    Is it safe to take this antibiotic with other medications or supplements (e.g., vitamins) that I am taking?     Are there any special directions I need to know about taking this antibiotic? For example, should I take it with food?    How will I be monitored to know whether my infection is responding to the antibiotic?    What tests may help to make sure the right antibiotic is prescribed for me?      Information provided by:  www.cdc.gov/getsmart  U.S. Department of Health and Human Services  Centers for disease Control and Prevention  National Center for Emerging and Zoonotic Infectious Diseases  Division of Healthcare Quality Promotion        Information about OPIOIDS     PRESCRIPTION OPIOIDS: WHAT YOU NEED TO KNOW    Prescription opioids can be used to help relieve moderate to severe pain and are often prescribed following a surgery or injury, or for certain health conditions. These medications can be an important part of treatment but also come with serious risks. It is important to work with your health care provider to make sure you are getting the safest, most effective care.    WHAT ARE THE RISKS AND SIDE EFFECTS OF OPIOID USE?  Prescription opioids carry serious risks of addiction and overdose,  especially with prolonged use. An opioid overdose, often marked by slowed breathing can cause sudden death. The use of prescription opioids can have a number of side effects as well, even when taken as directed:      Tolerance - meaning you might need to take more of a medication for the same pain relief    Physical dependence - meaning you have symptoms of withdrawal when a medication is stopped    Increased sensitivity to pain    Constipation    Nausea, vomiting, and dry mouth    Sleepiness and dizziness    Confusion    Depression    Low levels of testosterone that can result in lower sex drive, energy, and strength    Itching and sweating    RISKS ARE GREATER WITH:    History of drug misuse, substance use disorder, or overdose    Mental health conditions (such as depression or anxiety)    Sleep apnea    Older age (65 years or older)    Pregnancy    Avoid alcohol while taking prescription opioids.   Also, unless specifically advised by your health care provider, medications to avoid include:    Benzodiazepines (such as Xanax or Valium)    Muscle relaxants (such as Soma or Flexeril)    Hypnotics (such as Ambien or Lunesta)    Other prescription opioids    KNOW YOUR OPTIONS:  Talk to your health care provider about ways to manage your pain that do not involve prescription opioids. Some of these options may actually work better and have fewer risks and side effects:    Pain relievers such as acetaminophen, ibuprofen, and naproxen    Some medications that are also used for depression or seizures    Physical therapy and exercise    Cognitive behavioral therapy, a psychological, goal-directed approach, in which patients learn how to modify physical, behavioral, and emotional triggers of pain and stress    IF YOU ARE PRESCRIBED OPIOIDS FOR PAIN:    Never take opioids in greater amounts or more often than prescribed    Follow up with your primary health care provider and work together to create a plan on how to manage  your pain.    Talk about ways to help manage your pain that do not involve prescription opioids    Talk about all concerns and side effects    Help prevent misuse and abuse    Never sell or share prescription opioids    Never use another person's prescription opioids    Store prescription opioids in a secure place and out of reach of others (this may include visitors, children, friends, and family)    Visit www.cdc.gov/drugoverdose to learn about risks of opioid abuse and overdose    If you believe you may be struggling with addiction, tell your health care provider and ask for guidance or call [facility]'s National Helpline at 7-639-152-HELP    LEARN MORE / www.cdc.gov/drugoverdose/prescribing/guideline.html    Safely dispose of unused prescription opioids: Find your local drug take-back programs and more information about the importance of safe disposal at www.doseofreality.[place].gov             Medication List: This is a list of all your medications and when to take them. Check marks below indicate your daily home schedule. Keep this list as a reference.      Medications           Morning Afternoon Evening Bedtime As Needed    acetaminophen-codeine 300-30 MG per tablet   Commonly known as:  TYLENOL #3   TAKE 1 TABLET BY MOUTH EVERY 4 HOURS AS NEEDED FOR MILD PAIN, TAKE FOR COUGHING PAIN.                                acetylcysteine 20 % nebulizer solution   Commonly known as:  MUCOMYST   Take by nebulization every 8 hours 5-10 ml inhalation every 8 hours                                AMBIEN 10 MG tablet   Take 1 tablet (10 mg) by mouth nightly as needed   Generic drug:  zolpidem                                BROVANA 15 MCG/2ML Nebu neb solution   USE 1 VIAL IN NEBULIZER 2 TIMES A DAY AS NEEDED   Generic drug:  arformoterol                                budesonide 1 MG/2ML Susp neb solution   Commonly known as:  PULMICORT   Take 2 mLs (1 mg) by nebulization daily Take 1 mg by nebulization daily                                 clotrimazole 10 MG behzad   1 behzad Mouth/Throat 3 times a day                                DEXILANT 60 MG Cpdr CR capsule   Take 1 capsule by mouth daily as needed   Generic drug:  dexlansoprazole                                doxycycline 100 MG tablet   Commonly known as:  VIBRA-TABS   Take 100 mg by mouth 2 times daily Takes one tablet daily prophylaxis.                                fluconazole 100 MG tablet   Commonly known as:  DIFLUCAN   TAKE 1 TABLET BY MOUTH DAILY AS NEEDED                                HYDROcodone-acetaminophen 7.5-325 MG per tablet   Commonly known as:  NORCO   Take 1 tablet by mouth every 6 hours as needed for moderate to severe pain                                levalbuterol 0.31 MG/3ML neb solution   Commonly known as:  XOPENEX   Take 3 mLs (0.31 mg) by nebulization every 6 hours as needed for shortness of breath / dyspnea                                mometasone 50 MCG/ACT spray   Commonly known as:  NASONEX   Spray 2 sprays into both nostrils daily                                nystatin [id number] UNIT/GM Powd   Commonly known as:  MYCOSTATIN   Apply 1 g topically 3 times daily as needed                                * predniSONE 20 MG tablet   Commonly known as:  DELTASONE   Take 3 tabs after breakfast 2/18 and 2/19, 2 tabs 2/20-2/24, 1 tab 2/24-2/26                                * predniSONE 20 MG tablet   Commonly known as:  DELTASONE   1 tab after breakfast for 5 days, 1/2 tab after breakfast for 2 days, stop                                PULMOZYME 1 MG/ML neb solution   USE 1 VIAL IN NEBULIZER DAILY AS NEEDED   Generic drug:  dornase alpha                                sucralfate 1 GM tablet   Commonly known as:  CARAFATE   TAKE 1 TABLET BY MOUTH FOUR TIMES A DAY AS NEEDED                                WELLBUTRIN  MG 12 hr tablet   TAKE 2 TABLETS BY MOUTH EVERY MORNING AND 1 TABLET EVERY AFTERNOON   Generic drug:  buPROPion                                 * Notice:  This list has 2 medication(s) that are the same as other medications prescribed for you. Read the directions carefully, and ask your doctor or other care provider to review them with you.

## 2018-02-21 NOTE — OP NOTE
Procedure Date: 02/21/2018      PREOPERATIVE DIAGNOSES:   1.  Left chronic pansinusitis.   2.  Deviated nasal septum.   3.  Turbinate hypertrophy.      POSTOPERATIVE DIAGNOSES:   1.  Left chronic pansinusitis.   2.  Deviated nasal septum.   3.  Turbinate hypertrophy.      PROCEDURES:   1.  Left total ethmoidectomy.   2.  Left frontal sinusotomy.   3.  Left maxillary antrostomy with tissue removal.   4.  Septoplasty with cartilage reinsertion.   5.  Bilateral submucosal reduction inferior turbinates.   6.  Procedures performed using [employer] navigation.        SURGEON:  Francoise Gonzalez DO      ANESTHESIA:  General endotracheal.      ESTIMATED BLOOD LOSS:  Minimal, less than 5 mL.      COMPLICATIONS:  None.      SURGICAL FINDINGS:   1.  Inspissated thick purulent secretions in the left maxillary and anterior ethmoid sinuses sent for culture.   2.  Severe polypoid mucosal degeneration throughout the left maxillary sinus with mild polypoid changes of the left anterior ethmoid.   3.  Right maxillary and anterior ethmoid cavity were clear.      DESCRIPTION OF PROCEDURE:  After a surgical consent was obtained, the patient was brought back to the operating room, laid in a comfortable and supine position.  She was administered general anesthetic by a member of Anesthesia.  The table turned 180 degrees.  The [employer] navigation system was calibrated and found to be in normal working condition.   The patient's head was placed in the 45-degree reverse Trendelenburg position.  The lateral nasal wall on the left was anesthetized with 1% lidocaine with 1:100,000 of epinephrine as well as the septum and inferior turbinates bilaterally.  Cocaine pledgets were placed.  A timeout was taken.  The patient had been draped in normal sterile fashion.  The cocaine pledgets were removed.  Attention was first turned to the septoplasty.  A columellar clamp and double ball retractor were placed.  A right hemitransfixion incision was  made.  A [place] was used to elevate a mucoperichondrial flap over the caudal septum.  Tenotomy scissors were used to separate the caudal septum from the premaxilla.  A left anterior/ inferior and anterior/posterior tunnel was elevated.  Evidence of prior septoplasty was evident.  Preserving over 1.5 cm of intact dorsal caudal strut, an incision was made in the posterior quadrangular cartilage, the contralateral mucoperichondrial and periosteal flaps were elevated.  Next, the obstructing superior portion of the perpendicular plate of the ethmoid and the bony septum were removed with a V chisel swivel blade and Blakesley forceps.  The removed cartilage was morcellized and placed back in the midline pocket.  A spur along the floor was removed with a D knife and Blakesley.  The mucosal flaps were reapproximated.  The septum is intact and midline.  After I ensured that the morcellized cartilage was in the midline position, the mucoperichondrial flaps were secured with a running 4-0 Vicryl horizontal mattress suture.  The hemitransfixion incision was closed with 4-0 chromic in interrupted fashion.        Attention was turned to the turbinate reduction.  The Coblation turbinate blade was placed in the left inferior turbinate.  Coblation at a setting of 5 was used to perform submucosal reduction first at the proximal and then the distal demarcation sites with each site held 10 seconds.  Hemostasis was achieved with the coagulation setting.  The turbinate was outfractured using a [place].  This was done bilaterally with good lateralization of the turbinates.  Next, a 30-degree endoscope was placed.  I examined the right sinuses.  The right maxillary antrostomy is patent and healthy.  There is no purulence.  The mucosa is flat and healthy.  The right ethmoid cavity is clear.  Attention was then turned to the left side.  The left maxillary sinus was completely filled with inspissated purulent secretions.  These were removed  with suction and collected on a low T-tube for culture.  The former antrostomy was narrowed by mucosal edema and polypoid degeneration of the mucosa.  The orbit was identified.  I dissected along the lamina papyracea removing polypoid tissue with the microdebrider blade.  I enlarged the antrostomy similarly removing polypoid tissue with Blakesley forceps.  The sinus was copiously irrigated with Ancef saline and is patent.  There is severe polypoid mucosal degeneration throughout the maxillary sinus, but there is no purulence or obstructing polyps.  I then continued dissecting along the lamina papyracea, using [employer] navigation for verification of anatomy.  The anterior ethmoid had been partially opened in prior surgery.  There are purulent secretions which were similarly suctioned and debrided for culture.  I dissected along the ground lamella and penetrated the ground lamella inferior and medial with a straight suction.  I used a 4 mm microdebrider blade after identifying the skull base and operated from posterior to inferior removing inspissated secretions and bony septations.  The posterior ethmoid cavity mucosa was healthy.  The anterior ethmoid cavity mucosa was polypoid.  I removed the anterior superior ethmoid cells with upbiting Sandra.  I opened the Agger nasi cell in a similar fashion.  The frontal recess is mildly polypoid.  The Relieva guide and guidewire was placed into the frontal recess.  The guidewire was placed into the left frontal sinus.  The balloon was threaded over the guidewire, and the balloon was inflated to 12 mm atmospheric pressure.   The inflation was held for 10-15 seconds and released.  Three serial dilations were performed from superior to inferior.  The balloon was deflated.  The sinus was irrigated with Ancef saline.  The ethmoid sinuses and maxillary sinuses were similarly irrigated.  Hemostasis was adequate.  The ethmoid cavity is clear.  The maxillary sinus was clear.  A  contour implant was placed into the left frontal, ethmoid and maxillary sinus.  No packing was necessary.  Suction cautery at a setting of 10 was used to achieve hemostasis along the inferior turbinate on the right side and at a setting of 20 along oozing at the posterior edge of the inferior turbinate.  Hemostasis was adequate.  The patient was handed back over to Anesthesia, was awakened and brought to the recovery room in stable condition.         JONI PANIAGUA DO             D: 2018   T: 2018   MT: MD      Name:     DOLORES CARTWRIGHT   MRN:      [id number]        Account:        [id number]   :      1970           Procedure Date: 2018      Document: [id number]       cc: Todd Torres NP

## 2018-02-21 NOTE — ANESTHESIA CARE TRANSFER NOTE
Patient: Annie Garcia    Procedure(s):  LEFT ENDOSCOPIC SINUS SURGERY, SEPTOPLASTY, BILATERAL TURBINATE REDUCTION, PROPEL IMPLANTS - Wound Class: II-Clean Contaminated   - Wound Class: II-Clean Contaminated    Diagnosis: CYSTIC FIBROSIS, NASAL TURBINATE HYPERTROPHY, DNS, LEFT CHRONIC PANSINUSITIS  Diagnosis Additional Information: No value filed.    Anesthesia Type:   General, ETT     Note:  Airway :Face Mask  Patient transferred to:PACU  Handoff Report: Identifed the Patient, Identified the Reponsible Provider, Reviewed the pertinent medical history, Discussed the surgical course, Reviewed Intra-OP anesthesia mangement and issues during anesthesia, Set expectations for post-procedure period and Allowed opportunity for questions and acknowledgement of understanding      Vitals: (Last set prior to Anesthesia Care Transfer)    CRNA VITALS  2/21/2018 1417 - 2/21/2018 1517      2/21/2018             Resp Rate (set): 8                Electronically Signed By: VALENTINO Hernandez CRNA  February 21, 2018  3:23 PM

## 2018-02-21 NOTE — OR NURSING
Pateint discharged to home.  Noah score 19. Pain level 2/10.  Discharged from unit via wheelchair .

## 2018-02-22 LAB
GRAM STN SPEC: ABNORMAL
GRAM STN SPEC: ABNORMAL
SPECIMEN SOURCE: ABNORMAL

## 2018-02-22 NOTE — ANESTHESIA POSTPROCEDURE EVALUATION
Patient: Annie Garcia    Procedure(s):  LEFT ENDOSCOPIC SINUS SURGERY, SEPTOPLASTY, BILATERAL TURBINATE REDUCTION, PROPEL IMPLANTS - Wound Class: II-Clean Contaminated   - Wound Class: II-Clean Contaminated    Diagnosis:CYSTIC FIBROSIS, NASAL TURBINATE HYPERTROPHY, DNS, LEFT CHRONIC PANSINUSITIS  Diagnosis Additional Information: No value filed.    Anesthesia Type:  General, ETT    Note:  Anesthesia Post Evaluation    Patient location during evaluation: PACU, Phase 2 and Bedside  Patient participation: Able to fully participate in evaluation  Level of consciousness: awake and alert  Pain management: adequate  Airway patency: patent  Cardiovascular status: acceptable  Respiratory status: acceptable  Hydration status: stable  PONV: none     Anesthetic complications: None          Last vitals:  Vitals:    02/21/18 1615 02/21/18 1630 02/21/18 1645   BP: 138/83 132/92 140/74   Pulse:      Resp:      Temp:      SpO2: 94% 95% 95%         Electronically Signed By: David Steiner MD  February 22, 2018  9:35 AM

## 2018-02-23 DIAGNOSIS — J32.4 CHRONIC PANSINUSITIS: Primary | ICD-10-CM

## 2018-02-23 LAB
BACTERIA SPEC CULT: ABNORMAL
SPECIMEN SOURCE: ABNORMAL

## 2018-02-23 RX ORDER — SULFAMETHOXAZOLE/TRIMETHOPRIM 800-160 MG
1 TABLET ORAL 2 TIMES DAILY
Qty: 28 TABLET | Refills: 0 | Status: SHIPPED | OUTPATIENT
Start: 2018-02-23 | End: 2018-03-07

## 2018-02-24 LAB
BACTERIA SPEC CULT: NORMAL
SPECIMEN SOURCE: NORMAL

## 2018-02-26 LAB — COPATH REPORT: NORMAL

## 2018-02-28 ENCOUNTER — OFFICE VISIT (OUTPATIENT)
Dept: OTOLARYNGOLOGY | Facility: OTHER | Age: 48
End: 2018-02-28
Attending: PHYSICIAN ASSISTANT
Payer: COMMERCIAL

## 2018-02-28 VITALS
SYSTOLIC BLOOD PRESSURE: 136 MMHG | HEIGHT: 67 IN | TEMPERATURE: 98.6 F | HEART RATE: 86 BPM | BODY MASS INDEX: 37.67 KG/M2 | DIASTOLIC BLOOD PRESSURE: 74 MMHG | WEIGHT: 240 LBS

## 2018-02-28 DIAGNOSIS — J32.4 CHRONIC PANSINUSITIS: ICD-10-CM

## 2018-02-28 DIAGNOSIS — B49 FUNGUS BALL: ICD-10-CM

## 2018-02-28 DIAGNOSIS — Z98.890 S/P FESS (FUNCTIONAL ENDOSCOPIC SINUS SURGERY): Primary | ICD-10-CM

## 2018-02-28 PROCEDURE — 31237 NSL/SINS NDSC SURG BX POLYPC: CPT | Mod: 79 | Performed by: PHYSICIAN ASSISTANT

## 2018-02-28 PROCEDURE — 99024 POSTOP FOLLOW-UP VISIT: CPT | Performed by: PHYSICIAN ASSISTANT

## 2018-02-28 ASSESSMENT — PAIN SCALES - GENERAL: PAINLEVEL: MILD PAIN (2)

## 2018-02-28 NOTE — NURSING NOTE
"Scope #: [id number]      Chief Complaint   Patient presents with     Surgical Followup     S/P FESS, Septo, TR on 2/21/18       Initial /74 (Cuff Size: Adult Large)  Pulse 86  Temp 98.6  F (37  C) (Tympanic)  Ht 5' 7\" (1.702 m)  Wt 240 lb (108.9 kg)  BMI 37.59 kg/m2 Estimated body mass index is 37.59 kg/(m^2) as calculated from the following:    Height as of this encounter: 5' 7\" (1.702 m).    Weight as of this encounter: 240 lb (108.9 kg).  Medication Reconciliation: complete   Sarahi Bain      "

## 2018-02-28 NOTE — PROGRESS NOTES
"Chief Complaint   Patient presents with     Surgical Followup     S/P FESS, Septo, TR on 2/21/18     [facility] - Annie Garcia is here for their first postoperative visit, status post endoscopic sinus surgery (with septoplasty and turbinate reduction) performed on 2/21/18.  There was the expected amount of congestion which has now mostly resolved, and no bleeding has occurred.  The generalized facial pressure has been resolving, and there have been no fevers or chills since surgery. The sinus rinses are continuing three times per day, and are being tolerated well.  No visual changes.      PROCEDURES:   1.  Left total ethmoidectomy.   2.  Left frontal sinusotomy.   3.  Left maxillary antrostomy with tissue removal.   4.  Septoplasty with cartilage reinsertion.   5.  Bilateral submucosal reduction inferior turbinates.   6.  Procedures performed using [employer] navigation.         SURGEON:  Francoise Gonzalez DO       ANESTHESIA:  General endotracheal.       ESTIMATED BLOOD LOSS:  Minimal, less than 5 mL.       COMPLICATIONS:  None.   Culture reviewed-  Heavy Growth SA. She was on Doxy for maintenance therapy. However, culture shows resistance to Tetra. She was started on po Bactrim. Informed her to use benadryl PRN rashes.   Hx of tendon rupture with use of Levaquin.     Past Medical History:   Diagnosis Date     Cystic fibrosis (H)      Depression      Tear of right acetabular labrum         Allergies   Allergen Reactions     Seasonal Allergies Itching     Sulfa Drugs Rash     \"gets purple dots on skin\"     Current Outpatient Prescriptions   Medication     sulfamethoxazole-trimethoprim (BACTRIM DS/SEPTRA DS) 800-160 MG per tablet     predniSONE (DELTASONE) 20 MG tablet     clotrimazole 10 MG behzad     acetylcysteine (MUCOMYST) 20 % nebulizer solution     doxycycline (VIBRA-TABS) 100 MG tablet     mometasone (NASONEX) 50 MCG/ACT spray     acetaminophen-codeine (TYLENOL #3) 300-30 MG per tablet     WELLBUTRIN SR " "150 MG 12 hr tablet     AMBIEN 10 MG tablet     fluconazole (DIFLUCAN) 100 MG tablet     sucralfate (CARAFATE) 1 GM tablet     PULMOZYME 1 MG/ML neb solution     budesonide (PULMICORT) 1 MG/2ML SUSP nebulizer solution     BROVANA 15 MCG/2ML NEBU     levalbuterol (XOPENEX) 0.31 MG/3ML nebulizer solution     nystatin (MYCOSTATIN) 826612 UNIT/GM POWD     dexlansoprazole (DEXILANT) 60 MG CPDR     No current facility-administered medications for this visit.       ROS: 10 point ROS neg other than the symptoms noted above in the HPI.  /74 (Cuff Size: Adult Large)  Pulse 86  Temp 98.6  F (37  C) (Tympanic)  Ht 5' 7\" (1.702 m)  Wt 240 lb (108.9 kg)  BMI 37.59 kg/m2      General - The patient is awake and alert, and answers questions appropriately during the history and physical.  The vocal quality is hypernasal, but there is no dyspnea or stridor noted.  Eyes - The EOMI, there is no conjuncitval or scleral injection.  Pupils are equally round and reactive to light.  Oral - The oral mucosa is pink and moist.  The tongue is mobile and midline on protrusion, no edema noted.  Nasal - The nasal examination was done with a rigid nasal endoscope today for the purposes of bilateral endoscopically assisted debridement of the sinuses.  I began by spraying both sides with lidocaine and neosynephrine. I began on the left side.  The middle meatus was noted to obstructed with a dark crust, this was gently dislodged from the lateral wall with a number 7 suction, I was then able to visualize the left maxillary sinusotomy, it is healing well and open.  No purulence noted.  I then moved further back, and debrided some dark crusts. Limited exam. Patient tolerated majority of removal. Small clot remaining along MT/ MM. I turned my attention to the right side.  Once again some dark crust was dislodged from IT site. Right nares is clear. Did trim with iris debris from sutures. Sutures do remain intact.   Bialterally, no early synechiae " or touch points were noted.      ASSESSMENT:    ICD-10-CM    1. S/P FESS (functional endoscopic sinus surgery) Z98.890    2. LEFT chronic pansinusitis J32.4    3. History of maxillary sinus mycetoma B49           Annie Garcia is status post endoscopic nasal surgery and does not show any signs of complications  Dr. Gonzalez will see patient in the next few weeks.   Continue with twice a day Budesonide rinses.   Continue with Asaf med rinse 3 times daily.   Complete oral Bactrim. Use Benadryl as needed for rash.   Culture was heavy growth Staph. A.     Follow up with Dr. Gonzalez for repeat exam. Take oral lortab before appointment, have a .     Karen Mccallum PA-C  ENT  [facility], [place]  [phone]

## 2018-02-28 NOTE — PATIENT INSTRUCTIONS
Continue with twice a day Budesonide rinses.   Continue with Asaf med rinse 3 times daily.   Complete oral Bactrim. Use Benadryl as needed for rash.   Culture was heavy growth Staph. A- Treated with Bactrim.     Follow up with Dr. Gonzalez for repeat exam in 2-3 weeks. Take oral lortab before appointment, have a .     Thank you for allowing [facility] and our ENT team to participate in your care.  If your medications are too expensive, please give the nurse a call.  We can possibly change this medication.  If you have a scheduling or an appointment question please contact [facility] Coordinator at their direct line [phone].   ALL nursing questions or concerns can be directed to your ENT nurse at: [phone] [place]

## 2018-02-28 NOTE — LETTER
"    2/28/2018         RE: Annie Garcia  [address] [address]        Dear Colleague,    Thank you for referring your patient, Annie Garcia, to the [facility]. Please see a copy of my visit note below.    Chief Complaint   Patient presents with     Surgical Followup     S/P FESS, Septo, TR on 2/21/18     HPI - Annie Garcia is here for their first postoperative visit, status post endoscopic sinus surgery (with septoplasty and turbinate reduction) performed on 2/21/18.  There was the expected amount of congestion which has now mostly resolved, and no bleeding has occurred.  The generalized facial pressure has been resolving, and there have been no fevers or chills since surgery. The sinus rinses are continuing three times per day, and are being tolerated well.  No visual changes.      PROCEDURES:   1.  Left total ethmoidectomy.   2.  Left frontal sinusotomy.   3.  Left maxillary antrostomy with tissue removal.   4.  Septoplasty with cartilage reinsertion.   5.  Bilateral submucosal reduction inferior turbinates.   6.  Procedures performed using [employer] navigation.         SURGEON:  Francoise Gonzalez DO       ANESTHESIA:  General endotracheal.       ESTIMATED BLOOD LOSS:  Minimal, less than 5 mL.       COMPLICATIONS:  None.    Culture reviewed-  Heavy Growth SA. She was on Doxy for maintenance therapy. However, culture shows resistance to Tetra. She was started on po Bactrim. Informed her to use benadryl PRN rashes.   Hx of tendon rupture with use of Levaquin.     Past Medical History:   Diagnosis Date     Cystic fibrosis (H)      Depression      Tear of right acetabular labrum         Allergies   Allergen Reactions     Seasonal Allergies Itching     Sulfa Drugs Rash     \"gets purple dots on skin\"     Current Outpatient Prescriptions   Medication     sulfamethoxazole-trimethoprim (BACTRIM DS/SEPTRA DS) 800-160 MG per tablet     predniSONE (DELTASONE) 20 MG " "tablet     clotrimazole 10 MG behzad     acetylcysteine (MUCOMYST) 20 % nebulizer solution     doxycycline (VIBRA-TABS) 100 MG tablet     mometasone (NASONEX) 50 MCG/ACT spray     acetaminophen-codeine (TYLENOL #3) 300-30 MG per tablet     WELLBUTRIN  MG 12 hr tablet     AMBIEN 10 MG tablet     fluconazole (DIFLUCAN) 100 MG tablet     sucralfate (CARAFATE) 1 GM tablet     PULMOZYME 1 MG/ML neb solution     budesonide (PULMICORT) 1 MG/2ML SUSP nebulizer solution     BROVANA 15 MCG/2ML NEBU     levalbuterol (XOPENEX) 0.31 MG/3ML nebulizer solution     nystatin (MYCOSTATIN) 996935 UNIT/GM POWD     dexlansoprazole (DEXILANT) 60 MG CPDR     No current facility-administered medications for this visit.       ROS: 10 point ROS neg other than the symptoms noted above in the HPI.  /74 (Cuff Size: Adult Large)  Pulse 86  Temp 98.6  F (37  C) (Tympanic)  Ht 5' 7\" (1.702 m)  Wt 240 lb (108.9 kg)  BMI 37.59 kg/m2      General - The patient is awake and alert, and answers questions appropriately during the history and physical.  The vocal quality is hypernasal, but there is no dyspnea or stridor noted.  Eyes - The EOMI, there is no conjuncitval or scleral injection.  Pupils are equally round and reactive to light.  Oral - The oral mucosa is pink and moist.  The tongue is mobile and midline on protrusion, no edema noted.  Nasal - The nasal examination was done with a rigid nasal endoscope today for the purposes of bilateral endoscopically assisted debridement of the sinuses.  I began by spraying both sides with lidocaine and neosynephrine. I began on the left side.  The middle meatus was noted to obstructed with a dark crust, this was gently dislodged from the lateral wall with a number 7 suction, I was then able to visualize the left maxillary sinusotomy, it is healing well and open.  No purulence noted.  I then moved further back, and debrided some dark crusts. Limited exam. Patient tolerated majority of removal. " Small clot remaining along MT/ MM. I turned my attention to the right side.  Once again some dark crust was dislodged from IT site. Right nares is clear. Did trim with iris debris from sutures. Sutures do remain intact.   Bialterally, no early synechiae or touch points were noted.      ASSESSMENT:    ICD-10-CM    1. S/P FESS (functional endoscopic sinus surgery) Z98.890    2. LEFT chronic pansinusitis J32.4    3. History of maxillary sinus mycetoma B49           Annie Garcia is status post endoscopic nasal surgery and does not show any signs of complications  Dr. Gonzalez will see patient in the next few weeks.   Continue with twice a day Budesonide rinses.   Continue with Asaf med rinse 3 times daily.   Complete oral Bactrim. Use Benadryl as needed for rash.   Culture was heavy growth Staph. A.     Follow up with Dr. Gonzalez for repeat exam. Take oral lortab before appointment, have a .     Karen Mccallum PA-C  ENT  [facility]  [phone]      Again, thank you for allowing me to participate in the care of your patient.        Sincerely,        Karen Mccallum PA-C

## 2018-03-07 ENCOUNTER — TELEPHONE (OUTPATIENT)
Dept: OTOLARYNGOLOGY | Facility: OTHER | Age: 48
End: 2018-03-07

## 2018-03-07 DIAGNOSIS — J32.4 CHRONIC PANSINUSITIS: ICD-10-CM

## 2018-03-07 RX ORDER — SULFAMETHOXAZOLE/TRIMETHOPRIM 800-160 MG
1 TABLET ORAL 2 TIMES DAILY
Qty: 10 TABLET | Refills: 0 | Status: SHIPPED | OUTPATIENT
Start: 2018-03-07 | End: 2019-01-30

## 2018-03-07 NOTE — TELEPHONE ENCOUNTER
This patient calls today with c/o yellow, thick nasal drainage. She also has a foul odor coming from nose. She is using medications and rinses as prescribed, but is wondering if she should be placed on more antibiotics due to her CF. She does have an appointment on Monday with Dr. Gonzalez. Please advise.

## 2018-03-07 NOTE — TELEPHONE ENCOUNTER
Normal post op symptoms. Smell is common. Due to her CF, will extend her Bactrim for an additional 5 days. Maintain appointment for suctioning/ debridement in office. SANTI

## 2018-03-12 ENCOUNTER — OFFICE VISIT (OUTPATIENT)
Dept: OTOLARYNGOLOGY | Facility: OTHER | Age: 48
End: 2018-03-12
Attending: OTOLARYNGOLOGY
Payer: COMMERCIAL

## 2018-03-12 VITALS
BODY MASS INDEX: 37.67 KG/M2 | HEIGHT: 67 IN | DIASTOLIC BLOOD PRESSURE: 68 MMHG | HEART RATE: 93 BPM | WEIGHT: 240 LBS | TEMPERATURE: 97.3 F | OXYGEN SATURATION: 98 % | SYSTOLIC BLOOD PRESSURE: 130 MMHG | RESPIRATION RATE: 20 BRPM

## 2018-03-12 DIAGNOSIS — E84.9 CYSTIC FIBROSIS (H): ICD-10-CM

## 2018-03-12 DIAGNOSIS — Z98.890 S/P FESS (FUNCTIONAL ENDOSCOPIC SINUS SURGERY): Primary | ICD-10-CM

## 2018-03-12 DIAGNOSIS — D72.10 EOSINOPHILIA: ICD-10-CM

## 2018-03-12 PROCEDURE — 99024 POSTOP FOLLOW-UP VISIT: CPT | Performed by: OTOLARYNGOLOGY

## 2018-03-12 PROCEDURE — 31237 NSL/SINS NDSC SURG BX POLYPC: CPT | Mod: 79 | Performed by: OTOLARYNGOLOGY

## 2018-03-12 ASSESSMENT — PAIN SCALES - GENERAL: PAINLEVEL: MILD PAIN (2)

## 2018-03-12 NOTE — LETTER
"    3/12/2018         RE: Annie Garcia  [address] [facility] [address]        Dear Colleague,    Thank you for referring your patient, Annie Garcia, to the [facility]. Please see a copy of my visit note below.    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 She was on Doxy for maintenance therapy. However, culture shows resistance to Tetra. She was started on po Bactrim. Informed her to use benadryl PRN rashes.  She tolerated bactrim without rash  Hx of tendon rupture with use of Levaquin.     No wheezing, no shortness of breath      Physical Exam  /68  Pulse 93  Temp 97.3  F (36.3  C) (Tympanic)  Resp 20  Ht 5' 7\" (1.702 m)  Wt 240 lb (108.9 kg)  SpO2 98%  BMI 37.59 kg/m2  General - The patient is well nourished and well developed, and appears to have good nutritional status.  Alert and oriented to person and place, interactive.  Very nervous, hot urticaria from situational anxiety which resolved after procedure.   Head and Face - Normocephalic and atraumatic, with no gross asymmetry noted of the contour of the facial features.  The facial nerve is intact, with strong symmetric movements.  Neck-no palpable lymphadenopathy or thyroid mass.  Trachea is midline.  Eyes - Extraocular movements intact.   Nose - Nasal mucosa is pink and moist with thick post surgical secretions  Mouth - " Examination of the oral cavity shows pink, healthy, moist mucosa.  No lesions or ulceration noted.  The dentition are in good repair.  The tongue is mobile and midline.  Throat - The walls of the oropharynx were smooth, pink, moist, symmetric, and had no lesions or ulcerations.  The tonsillar pillars and soft palate were symmetric.  The uvula was midline on elevation.      To evaluate the nose and sinuses in the post operative state, I performed rigid nasal endoscopy. The LPN had previously sprayed both nares with lidocaine and neosynephrine.    I began with the LEFT side using a 0 degree rigid nasal endoscope, and then similarly examined the RIGHT side    Findings:  Inferior turbinates: Reduced bilaterally I divided secretions from the inferior meatus and overlying the turbinates bilaterally  Middle turbinate and middle meatus:  Clear on right  LEFT: #8 and 10 suction as well as Blakesleys were used to remove secretions and the propel implants no purulence, no polyposis, no synechiae  Antrostomy widely patent on the left   Ethmoid cavity clear  Mucosa is currently healthy throughout without polyps nor polypoid degeneration    Impression/Plan  Annie Garcia is a 47 year old female  1.  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 cystic fibrosis      Follow up with GWEN Perdue in 1 month  Follow up with Dr. Gonzalez in 3-4 months  Continue all current medications  Continue Budesonide Rinses  Pathology was discussed she is at risk of recurrent sinusitis due to her cystic fibrosis with a ciliary disease as well as eosinophilia however her sinuses are now open and with chronic use of nasal saline irrigations which she will do control her disease medically  Annie is thankful      Francoise Gonzalez D.O.  Otolaryngology/Head and Neck Surgery  Allergy            Again, thank you for allowing me to participate in  the care of your patient.        Sincerely,        Francoise Gonzalez MD

## 2018-03-12 NOTE — PROGRESS NOTES
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 She was on Doxy for maintenance therapy. However, culture shows resistance to Tetra. She was started on po Bactrim. Informed her to use benadryl PRN rashes.  She tolerated bactrim without rash  Hx of tendon rupture with use of Levaquin.     No wheezing, no shortness of breath      Physical Exam  /68  Pulse 93  Temp 97.3  F (36.3  C) (Tympanic)  Resp 20  Ht 5' 7\" (1.702 m)  Wt 240 lb (108.9 kg)  SpO2 98%  BMI 37.59 kg/m2  General - The patient is well nourished and well developed, and appears to have good nutritional status.  Alert and oriented to person and place, interactive.  Very nervous, hot urticaria from situational anxiety which resolved after procedure.   Head and Face - Normocephalic and atraumatic, with no gross asymmetry noted of the contour of the facial features.  The facial nerve is intact, with strong symmetric movements.  Neck-no palpable lymphadenopathy or thyroid mass.  Trachea is midline.  Eyes - Extraocular movements intact.   Nose - Nasal mucosa is pink and moist with thick post surgical secretions  Mouth - Examination of the oral cavity shows pink, healthy, moist mucosa.  No lesions or ulceration noted.  The dentition are in good repair.  The tongue is mobile and midline.  Throat - The walls of the oropharynx were smooth, pink, moist, symmetric, and had no lesions " or ulcerations.  The tonsillar pillars and soft palate were symmetric.  The uvula was midline on elevation.      To evaluate the nose and sinuses in the post operative state, I performed rigid nasal endoscopy. The LPN had previously sprayed both nares with lidocaine and neosynephrine.    I began with the LEFT side using a 0 degree rigid nasal endoscope, and then similarly examined the RIGHT side    Findings:  Inferior turbinates: Reduced bilaterally I divided secretions from the inferior meatus and overlying the turbinates bilaterally  Middle turbinate and middle meatus:  Clear on right  LEFT: #8 and 10 suction as well as Blakesleys were used to remove secretions and the propel implants no purulence, no polyposis, no synechiae  Antrostomy widely patent on the left   Ethmoid cavity clear  Mucosa is currently healthy throughout without polyps nor polypoid degeneration    Impression/Plan  Annie Garcia is a 47 year old female  1.  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 cystic fibrosis      Follow up with GWEN Perdue in 1 month  Follow up with Dr. Gonzalez in 3-4 months  Continue all current medications  Continue Budesonide Rinses  Pathology was discussed she is at risk of recurrent sinusitis due to her cystic fibrosis with a ciliary disease as well as eosinophilia however her sinuses are now open and with chronic use of nasal saline irrigations which she will do control her disease medically  Annie is thankful      Francoise Gonzalez D.O.  Otolaryngology/Head and Neck Surgery  Allergy

## 2018-03-12 NOTE — PATIENT INSTRUCTIONS
Thank you for allowing Dr. Gonzalez and our ENT team to participate in your care.  If your medications are too expensive, please give the nurse a call.  We can possibly change this medication.  If you have a scheduling or an appointment question please contact [facility] Coordinator at their direct line [phone].   ALL nursing questions or concerns can be directed to your ENT nurse at: [phone] - Sarahi    Follow up with GWEN Perdue in 1 month  Follow up with Dr. Gonzalez in 3-4 months  Continue all current medications  Continue Budesonide Rinses

## 2018-03-12 NOTE — NURSING NOTE
"Chief Complaint   Patient presents with     Surgical Followup     S/P FESS, [place], TR 02/21/2018       Initial /68  Pulse 93  Temp 97.3  F (36.3  C) (Tympanic)  Resp 20  Ht 5' 7\" (1.702 m)  Wt 240 lb (108.9 kg)  SpO2 98%  BMI 37.59 kg/m2 Estimated body mass index is 37.59 kg/(m^2) as calculated from the following:    Height as of this encounter: 5' 7\" (1.702 m).    Weight as of this encounter: 240 lb (108.9 kg).  Medication Reconciliation: complete   Judith Bain      "

## 2018-03-12 NOTE — MR AVS SNAPSHOT
After Visit Summary   3/12/2018    Annie Garcia    MRN: [id number]           Patient Information     Date Of Birth          1970        Visit Information        Provider Department      3/12/2018 3:30 PM Francoise Gonzalez MD [facility]        Care Instructions    Thank you for allowing Dr. Gonzalez and our ENT team to participate in your care.  If your medications are too expensive, please give the nurse a call.  We can possibly change this medication.  If you have a scheduling or an appointment question please contact [facility] Unit Coordinator at their direct line [phone].   ALL nursing questions or concerns can be directed to your ENT nurse at: [phone] - Sarahi    Follow up with GWEN Perdue in 1 month  Follow up with Dr. Gonzalez in 3-4 months  Continue all current medications  Continue Budesonide Rinses          Follow-ups after your visit        Follow-up notes from your care team     Return in about 4 weeks (around 4/9/2018).      Who to contact     If you have questions or need follow up information about today's clinic visit or your schedule please contact [facility] directly at [phone].  Normal or non-critical lab and imaging results will be communicated to you by MyChart, letter or phone within 4 business days after the clinic has received the results. If you do not hear from us within 7 days, please contact the clinic through MyChart or phone. If you have a critical or abnormal lab result, we will notify you by phone as soon as possible.  Submit refill requests through [employer] or call your pharmacy and they will forward the refill request to us. Please allow 3 business days for your refill to be completed.          Additional Information About Your Visit        Care EveryWhere ID     This is your Care EveryWhere ID. This could be used by other organizations to access your [place] medical records  [id number]       "  Your Vitals Were     Pulse Temperature Respirations Height Pulse Oximetry BMI (Body Mass Index)    93 97.3  F (36.3  C) (Tympanic) 20 5' 7\" (1.702 m) 98% 37.59 kg/m2       Blood Pressure from Last 3 Encounters:   03/12/18 130/68   02/28/18 136/74   02/21/18 140/74    Weight from Last 3 Encounters:   03/12/18 240 lb (108.9 kg)   02/28/18 240 lb (108.9 kg)   02/21/18 240 lb (108.9 kg)              Today, you had the following     No orders found for display         Today's Medication Changes          These changes are accurate as of 3/12/18  3:57 PM.  If you have any questions, ask your nurse or doctor.               Stop taking these medicines if you haven't already. Please contact your care team if you have questions.     predniSONE 20 MG tablet   Commonly known as:  DELTASONE   Stopped by:  Francoise Gonzalez MD                    Primary Care Provider Office Phone # Fax #    Todd Torres [phone]       [facility] [address] [facility] [address]        Equal Access to Services     [facility] AH: Hadii aad ku hadasho Soomaali, waaxda luqadaha, qaybta kaalmada adeegyada, waxay maricelin hayaan celeste sullivan lamarcelina ah. So [facility] [phone].    ATENCIÓN: Si habla español, tiene a seals disposición servicios gratuitos de asistencia lingüística. Llame al [phone].    We comply with applicable federal civil rights laws and [place] laws. We do not discriminate on the basis of race, color, national origin, age, disability, sex, sexual orientation, or gender identity.            Thank you!     Thank you for choosing [facility]  for your care. Our goal is always to provide you with excellent care. Hearing back from our patients is one way we can continue to improve our services. Please take a few minutes to complete the written survey that you may receive in the mail after your visit with us. Thank you!             Your Updated Medication List - Protect others around " you: Learn how to safely use, store and throw away your medicines at www.disposemymeds.org.          This list is accurate as of 3/12/18  3:57 PM.  Always use your most recent med list.                   Brand Name Dispense Instructions for use Diagnosis    acetaminophen-codeine 300-30 MG per tablet    TYLENOL #3    90 tablet    TAKE 1 TABLET BY MOUTH EVERY 4 HOURS AS NEEDED FOR MILD PAIN, TAKE FOR COUGHING PAIN.    Hip pain, right       acetylcysteine 20 % nebulizer solution    MUCOMYST     Take by nebulization every 8 hours 5-10 ml inhalation every 8 hours        AMBIEN 10 MG tablet   Generic drug:  zolpidem     30 tablet    Take 1 tablet (10 mg) by mouth nightly as needed    Primary insomnia       BROVANA 15 MCG/2ML Nebu neb solution   Generic drug:  arformoterol     120 mL    USE 1 VIAL IN NEBULIZER 2 TIMES A DAY AS NEEDED    Chronic bronchitis, unspecified chronic bronchitis type (H)       budesonide 1 MG/2ML Susp neb solution    PULMICORT    60 ampule    Take 2 mLs (1 mg) by nebulization daily Take 1 mg by nebulization daily    Bronchitis       clotrimazole 10 MG behzad      1 behzad Mouth/Throat 3 times a day        DEXILANT 60 MG Cpdr CR capsule   Generic drug:  dexlansoprazole      Take 1 capsule by mouth daily as needed        doxycycline 100 MG tablet    VIBRA-TABS     Take 100 mg by mouth 2 times daily Takes one tablet daily prophylaxis.        fluconazole 100 MG tablet    DIFLUCAN    90 tablet    TAKE 1 TABLET BY MOUTH DAILY AS NEEDED    Rash and other nonspecific skin eruption       levalbuterol 0.31 MG/3ML neb solution    XOPENEX    225 mL    Take 3 mLs (0.31 mg) by nebulization every 6 hours as needed for shortness of breath / dyspnea        mometasone 50 MCG/ACT spray    NASONEX    3 Box    Spray 2 sprays into both nostrils daily    Chronic pansinusitis       nystatin 189439 UNIT/GM Powd    MYCOSTATIN    60 g    Apply 1 g topically 3 times daily as needed    Rash       PULMOZYME 1 MG/ML neb solution    Generic drug:  dornase alpha     75 mL    USE 1 VIAL IN NEBULIZER DAILY AS NEEDED    Chronic bronchitis, unspecified chronic bronchitis type (H)       sucralfate 1 GM tablet    CARAFATE    120 tablet    TAKE 1 TABLET BY MOUTH FOUR TIMES A DAY AS NEEDED    Esophageal reflux       sulfamethoxazole-trimethoprim 800-160 MG per tablet    BACTRIM DS/SEPTRA DS    10 tablet    Take 1 tablet by mouth 2 times daily for 5 days Take benadryl if rash develops    Chronic pansinusitis       WELLBUTRIN  MG 12 hr tablet   Generic drug:  buPROPion     90 tablet    TAKE 2 TABLETS BY MOUTH EVERY MORNING AND 1 TABLET EVERY AFTERNOON    Dysthymia

## 2018-03-22 LAB
FUNGUS SPEC CULT: NORMAL
SPECIMEN SOURCE: NORMAL

## 2018-03-26 ENCOUNTER — TELEPHONE (OUTPATIENT)
Dept: FAMILY MEDICINE | Facility: OTHER | Age: 48
End: 2018-03-26

## 2018-03-26 NOTE — TELEPHONE ENCOUNTER
Patient states it is personal and would like Dr Fatuma Philip to call her back please at [phone].  Thank you

## 2018-04-02 NOTE — TELEPHONE ENCOUNTER
Please call the patient, let her know I have been on vacation the last week and see what this is about. She isn't my patient so if it is something related to the clinic Dorie Max can also contact her. Otherwise I won't be back until April 3.

## 2018-04-03 ENCOUNTER — OFFICE VISIT (OUTPATIENT)
Dept: OTOLARYNGOLOGY | Facility: OTHER | Age: 48
End: 2018-04-03
Attending: PHYSICIAN ASSISTANT
Payer: COMMERCIAL

## 2018-04-03 VITALS
BODY MASS INDEX: 37.67 KG/M2 | WEIGHT: 240 LBS | SYSTOLIC BLOOD PRESSURE: 128 MMHG | HEIGHT: 67 IN | TEMPERATURE: 97.2 F | RESPIRATION RATE: 16 BRPM | OXYGEN SATURATION: 97 % | HEART RATE: 98 BPM | DIASTOLIC BLOOD PRESSURE: 72 MMHG

## 2018-04-03 DIAGNOSIS — D72.10 EOSINOPHILIA: ICD-10-CM

## 2018-04-03 DIAGNOSIS — Z98.890 S/P FESS (FUNCTIONAL ENDOSCOPIC SINUS SURGERY): Primary | ICD-10-CM

## 2018-04-03 DIAGNOSIS — J32.4 CHRONIC PANSINUSITIS: ICD-10-CM

## 2018-04-03 DIAGNOSIS — E84.9 CYSTIC FIBROSIS (H): ICD-10-CM

## 2018-04-03 LAB
GRAM STN SPEC: ABNORMAL
GRAM STN SPEC: ABNORMAL
SPECIMEN SOURCE: ABNORMAL

## 2018-04-03 PROCEDURE — 87077 CULTURE AEROBIC IDENTIFY: CPT | Performed by: PHYSICIAN ASSISTANT

## 2018-04-03 PROCEDURE — 87070 CULTURE OTHR SPECIMN AEROBIC: CPT | Performed by: PHYSICIAN ASSISTANT

## 2018-04-03 PROCEDURE — 99000 SPECIMEN HANDLING OFFICE-LAB: CPT | Performed by: PHYSICIAN ASSISTANT

## 2018-04-03 PROCEDURE — 31237 NSL/SINS NDSC SURG BX POLYPC: CPT | Mod: 79 | Performed by: PHYSICIAN ASSISTANT

## 2018-04-03 PROCEDURE — 99024 POSTOP FOLLOW-UP VISIT: CPT | Performed by: PHYSICIAN ASSISTANT

## 2018-04-03 PROCEDURE — 87102 FUNGUS ISOLATION CULTURE: CPT | Mod: 90 | Performed by: PHYSICIAN ASSISTANT

## 2018-04-03 PROCEDURE — 87186 SC STD MICRODIL/AGAR DIL: CPT | Performed by: PHYSICIAN ASSISTANT

## 2018-04-03 PROCEDURE — 87205 SMEAR GRAM STAIN: CPT | Performed by: PHYSICIAN ASSISTANT

## 2018-04-03 RX ORDER — SULFAMETHOXAZOLE/TRIMETHOPRIM 800-160 MG
1 TABLET ORAL 2 TIMES DAILY
Qty: 20 TABLET | Refills: 0 | Status: SHIPPED | OUTPATIENT
Start: 2018-04-03 | End: 2018-04-05

## 2018-04-03 ASSESSMENT — PAIN SCALES - GENERAL: PAINLEVEL: MILD PAIN (2)

## 2018-04-03 NOTE — NURSING NOTE
"Scope number [id number].  Chief Complaint   Patient presents with     Surgical Followup     S/P FESS, septo 02/21/2018. Last seen 03/12/2018 by KF. Having some breathing problems in the last week.        Initial /72  Pulse 98  Temp 97.2  F (36.2  C)  Resp 16  Ht 5' 7\" (1.702 m)  Wt 240 lb (108.9 kg)  SpO2 97%  BMI 37.59 kg/m2 Estimated body mass index is 37.59 kg/(m^2) as calculated from the following:    Height as of this encounter: 5' 7\" (1.702 m).    Weight as of this encounter: 240 lb (108.9 kg).  Medication Reconciliation: complete   Judith Bain      "

## 2018-04-03 NOTE — PATIENT INSTRUCTIONS
Maintain rinses.   Continue with Budesonide rinse twice a day.   Continue with Nasonex 2 sprays to each nostrils daily  Start bactrim twice a day for 10 days.   Culture completed. Will call with results  F/u recheck in 1 week. If no improvement- may need visit with Dr. Gonzalez for possible revision/ debridement.     Thank you for allowing GWEN Perdue and our ENT team to participate in your care.  If your medications are too expensive, please give the nurse a call.  We can possibly change this medication.  If you have a scheduling or an appointment question please contact [facility] Health Unit Coordinator at their direct line [phone].   ALL nursing questions or concerns can be directed to your ENT nurse at: [phone] [place]

## 2018-04-03 NOTE — MR AVS SNAPSHOT
After Visit Summary   4/3/2018    Annie Garcia    MRN: [id number]           Patient Information     Date Of Birth          1970        Visit Information        Provider Department      4/3/2018 9:45 AM Karen Mccallum PA-C [facility] [place]        Today's Diagnoses     S/P FESS (functional endoscopic sinus surgery)    -  1    Cystic fibrosis (H)        Eosinophilia        Chronic pansinusitis          Care Instructions    Maintain rinses.   Continue with Budesonide rinse twice a day.   Continue with Nasonex 2 sprays to each nostrils daily  Start bactrim twice a day for 10 days.   Culture completed. Will call with results  F/u recheck in 1 week. If no improvement- may need visit with Dr. Gonzalez for possible revision/ debridement.     Thank you for allowing [facility] and our ENT team to participate in your care.  If your medications are too expensive, please give the nurse a call.  We can possibly change this medication.  If you have a scheduling or an appointment question please contact [facility] Unit Coordinator at their direct line [phone].   ALL nursing questions or concerns can be directed to your ENT nurse at: [phone] [facility]               Follow-ups after your visit        Your next 10 appointments already scheduled     Apr 10, 2018  9:00 AM CDT   (Arrive by 8:45 AM)   SHORT with Todd Torres NP   [facility] ([facility] )    [address] [place] [address]   [phone]            Apr 11, 2018  8:15 AM CDT   (Arrive by 8:00 AM)   Return Visit with Karen Mccallum PA-C   [facility] ([facility] )    [address] [place] [address]   [phone]            Jul 12, 2018 11:00 AM CDT   (Arrive by 10:45 AM)   Return Visit with Francoise Gonzalez MD   [facility] ([facility] )    [address] [place] [address]   [phone]              Who to  "contact     If you have questions or need follow up information about today's clinic visit or your schedule please contact [facility] directly at [phone].  Normal or non-critical lab and imaging results will be communicated to you by MyChart, letter or phone within 4 business days after the clinic has received the results. If you do not hear from us within 7 days, please contact the clinic through MyChart or phone. If you have a critical or abnormal lab result, we will notify you by phone as soon as possible.  Submit refill requests through [employer] or call your pharmacy and they will forward the refill request to us. Please allow 3 business days for your refill to be completed.          Additional Information About Your Visit        Care EveryWhere ID     This is your Care EveryWhere ID. This could be used by other organizations to access your [facility] medical records  [id number]        Your Vitals Were     Pulse Temperature Respirations Height Pulse Oximetry BMI (Body Mass Index)    98 97.2  F (36.2  C) 16 5' 7\" (1.702 m) 97% 37.59 kg/m2       Blood Pressure from Last 3 Encounters:   04/03/18 128/72   03/12/18 130/68   02/28/18 136/74    Weight from Last 3 Encounters:   04/03/18 240 lb (108.9 kg)   03/12/18 240 lb (108.9 kg)   02/28/18 240 lb (108.9 kg)              Today, you had the following     No orders found for display         Today's Medication Changes          These changes are accurate as of 4/3/18 10:06 AM.  If you have any questions, ask your nurse or doctor.               Start taking these medicines.        Dose/Directions    sulfamethoxazole-trimethoprim 800-160 MG per tablet   Commonly known as:  BACTRIM DS/SEPTRA DS   Used for:  S/P FESS (functional endoscopic sinus surgery), Chronic pansinusitis   Started by:  Karen Mccallum PA-C        Dose:  1 tablet   Take 1 tablet by mouth 2 times daily   Quantity:  20 tablet   Refills:  0            Where to get your medicines      These " medications were sent to [facility] - [place], [place] - [address]  [address], [facility] [place] [address]     Phone:  [phone]     sulfamethoxazole-trimethoprim 800-160 MG per tablet                Primary Care Provider Office Phone # Fax #    Todd Torres [phone]       [facility] [address]  [facility] [place] [address]        Equal Access to Services     [facility] : Hadii aad ku hadasho Soomaali, waaxda luqadaha, qaybta kaalmada adeegyada, waxay idiin hayaan adeeg kharash la'aan . So [facility] [phone].    ATENCIÓN: Si habla español, tiene a seals disposición servicios gratuitos de asistencia lingüística. [facility] [phone].    We comply with applicable federal civil rights laws and [place] laws. We do not discriminate on the basis of race, color, national origin, age, disability, sex, sexual orientation, or gender identity.            Thank you!     Thank you for choosing [facility]  for your care. Our goal is always to provide you with excellent care. Hearing back from our patients is one way we can continue to improve our services. Please take a few minutes to complete the written survey that you may receive in the mail after your visit with us. Thank you!             Your Updated Medication List - Protect others around you: Learn how to safely use, store and throw away your medicines at www.disposemymeds.org.          This list is accurate as of 4/3/18 10:06 AM.  Always use your most recent med list.                   Brand Name Dispense Instructions for use Diagnosis    acetaminophen-codeine 300-30 MG per tablet    TYLENOL #3    90 tablet    TAKE 1 TABLET BY MOUTH EVERY 4 HOURS AS NEEDED FOR MILD PAIN, TAKE FOR COUGHING PAIN.    Hip pain, right       acetylcysteine 20 % nebulizer solution    MUCOMYST     Take by nebulization every 8 hours 5-10 ml inhalation every 8 hours        AMBIEN 10 MG tablet   Generic drug:  zolpidem     30 tablet    Take  1 tablet (10 mg) by mouth nightly as needed    Primary insomnia       BROVANA 15 MCG/2ML Nebu neb solution   Generic drug:  arformoterol     120 mL    USE 1 VIAL IN NEBULIZER 2 TIMES A DAY AS NEEDED    Chronic bronchitis, unspecified chronic bronchitis type (H)       budesonide 1 MG/2ML Susp neb solution    PULMICORT    60 ampule    Take 2 mLs (1 mg) by nebulization daily Take 1 mg by nebulization daily    Bronchitis       clotrimazole 10 MG behzad      1 behzad Mouth/Throat 3 times a day        DEXILANT 60 MG Cpdr CR capsule   Generic drug:  dexlansoprazole      Take 1 capsule by mouth daily as needed        doxycycline 100 MG tablet    VIBRA-TABS     Take 100 mg by mouth 2 times daily Takes one tablet daily prophylaxis.        fluconazole 100 MG tablet    DIFLUCAN    90 tablet    TAKE 1 TABLET BY MOUTH DAILY AS NEEDED    Rash and other nonspecific skin eruption       levalbuterol 0.31 MG/3ML neb solution    XOPENEX    225 mL    Take 3 mLs (0.31 mg) by nebulization every 6 hours as needed for shortness of breath / dyspnea        mometasone 50 MCG/ACT spray    NASONEX    3 Box    Spray 2 sprays into both nostrils daily    Chronic pansinusitis       nystatin 207737 UNIT/GM Powd    MYCOSTATIN    60 g    Apply 1 g topically 3 times daily as needed    Rash       PULMOZYME 1 MG/ML neb solution   Generic drug:  dornase alpha     75 mL    USE 1 VIAL IN NEBULIZER DAILY AS NEEDED    Chronic bronchitis, unspecified chronic bronchitis type (H)       sucralfate 1 GM tablet    CARAFATE    120 tablet    TAKE 1 TABLET BY MOUTH FOUR TIMES A DAY AS NEEDED    Esophageal reflux       sulfamethoxazole-trimethoprim 800-160 MG per tablet    BACTRIM DS/SEPTRA DS    20 tablet    Take 1 tablet by mouth 2 times daily    S/P FESS (functional endoscopic sinus surgery), Chronic pansinusitis       WELLBUTRIN  MG 12 hr tablet   Generic drug:  buPROPion     90 tablet    TAKE 2 TABLETS BY MOUTH EVERY MORNING AND 1 TABLET EVERY AFTERNOON     Dysthymia

## 2018-04-03 NOTE — PROGRESS NOTES
Chief Complaint   Patient presents with     Surgical Followup     S/P FESS, septo 02/21/2018. Last seen 03/12/2018 by KF. Having some breathing problems in the last week.      Annie returns for repeat exam. She was last seen on 3/12/18 by Dr. Gonzalez, at that visit Propels removed. She noted improvement after. However, she has noticed increase in symptoms the last week. Increase in nasal congestion, dryness, pressure. She has been using rinses 4+ times daily without relief. She does have some relief, but short term. Using Nasonex, amanda med and Budesonide rinse.   FINAL DIAGNOSIS:   Sinuses, FESS   - Inflammatory nasal polyps with approximately 100 eosinophils/hpf   - Severe chronic sinusitis      She has been using her budesonide irrigations     She still notices a malodorous scent from both nare     Heavy Growth SA. She was on Doxy for maintenance therapy. However, culture shows resistance to Tetra. She was started on po Bactrim. Informed her to use benadryl PRN rashes.  She tolerated bactrim without rash  Hx of tendon rupture with use of Levaquin.      No wheezing, no shortness of breath       Past Medical History:   Diagnosis Date     Cystic fibrosis (H)      Depression      Tear of right acetabular labrum         Allergies   Allergen Reactions     Seasonal Allergies Itching     Current Outpatient Prescriptions   Medication     PULMOZYME 1 MG/ML neb solution     clotrimazole 10 MG behzad     acetylcysteine (MUCOMYST) 20 % nebulizer solution     doxycycline (VIBRA-TABS) 100 MG tablet     mometasone (NASONEX) 50 MCG/ACT spray     acetaminophen-codeine (TYLENOL #3) 300-30 MG per tablet     WELLBUTRIN  MG 12 hr tablet     AMBIEN 10 MG tablet     fluconazole (DIFLUCAN) 100 MG tablet     sucralfate (CARAFATE) 1 GM tablet     budesonide (PULMICORT) 1 MG/2ML SUSP nebulizer solution     BROVANA 15 MCG/2ML NEBU     levalbuterol (XOPENEX) 0.31 MG/3ML nebulizer solution     nystatin (MYCOSTATIN) 904631 UNIT/GM  "POWD     dexlansoprazole (DEXILANT) 60 MG CPDR     No current facility-administered medications for this visit.       ROS: 10 point ROS neg other than the symptoms noted above in the HPI.  /72  Pulse 98  Temp 97.2  F (36.2  C)  Resp 16  Ht 5' 7\" (1.702 m)  Wt 240 lb (108.9 kg)  SpO2 97%  BMI 37.59 kg/m2  General - The patient is well nourished and well developed, and appears to have good nutritional status.  Alert and oriented to person and place, interactive.  Very nervous, hot urticaria from situational anxiety which resolved after procedure.   Head and Face - Normocephalic and atraumatic, with no gross asymmetry noted of the contour of the facial features.  The facial nerve is intact, with strong symmetric movements.  Neck-no palpable lymphadenopathy or thyroid mass.  Trachea is midline.  Eyes - Extraocular movements intact.   Nose - Nasal mucosa is pink and moist with thick post surgical secretions  Mouth - Examination of the oral cavity shows pink, healthy, moist mucosa.  No lesions or ulceration noted.  The dentition are in good repair.  The tongue is mobile and midline.  Throat - The walls of the oropharynx were smooth, pink, moist, symmetric, and had no lesions or ulcerations.  The tonsillar pillars and soft palate were symmetric.  The uvula was midline on elevation.       To evaluate the nose and sinuses in the post operative state, I performed rigid nasal endoscopy. I sprayed both nares with lidocaine and neosynephrine.     I began with the LEFT side using a 0 degree rigid nasal endoscope, and then similarly examined the RIGHT side     Findings:  Inferior turbinates: Reduced bilaterally I divided secretions from the inferior meatus and overlying the turbinates bilaterally  Middle turbinate and middle meatus:  Clear on right  LEFT: #7 and 10 suction as well as Blakesleys were used to remove secretions and dried crusting. Her entire left nares was obstructed w/secretions. Culture completed. "   Antrostomy widely patent on the left   Ethmoid cavity clear after above. Culture obtained.   Mucosa is currently healthy throughout. Scant polypoid tissue changes along anterior ethmoid.       ASSESSMENT:    ICD-10-CM    1. S/P FESS (functional endoscopic sinus surgery) Z98.890 sulfamethoxazole-trimethoprim (BACTRIM DS/SEPTRA DS) 800-160 MG per tablet   2. Cystic fibrosis (H) E84.9    3. Eosinophilia D72.1 Sinus Culture Aerobic Bacterial     Fungus Culture, non-blood     Gram stain   4. Chronic pansinusitis J32.4 sulfamethoxazole-trimethoprim (BACTRIM DS/SEPTRA DS) 800-160 MG per tablet     Sinus Culture Aerobic Bacterial     Fungus Culture, non-blood     Gram stain       Maintain rinses.   Continue with Budesonide rinse twice a day.   Continue with Nasonex 2 sprays to each nostrils daily  Start bactrim twice a day for 10 days.   Culture completed. Will call with results  F/u recheck in 1 week. If no improvement- may need visit with Dr. Gonzalez for possible revision/ debridement.   Pathology was discussed she is at risk of recurrent sinusitis due to her cystic fibrosis with a ciliary disease as well as eosinophilia however her sinuses are now open and with chronic use of nasal saline irrigations which she will do control her disease medically  Annie is thankful and breathing better after cleaning.   Return in 1 week. If no significant improvement, she may require exam under sedation for repeat debridement.     Karen Mccallum PA-C  ENT  [facility], [place]  [phone]

## 2018-04-03 NOTE — TELEPHONE ENCOUNTER
I did call patient back and she wasn't made aware that you were on vacation last week, I did apologize for that as we would've taken care of the phone call sooner, she was understanding. States this isn't related to her health or nothing she just wants to speak with you about a personal issue.

## 2018-04-03 NOTE — LETTER
4/3/2018         RE: Annie Garcia  [address] [address]        Dear Colleague,    Thank you for referring your patient, Annie Garcia, to the [facility]. Please see a copy of my visit note below.    Chief Complaint   Patient presents with     Surgical Followup     S/P FESS, septo 02/21/2018. Last seen 03/12/2018 by KF. Having some breathing problems in the last week.      Annie returns for repeat exam. She was last seen on 3/12/18 by Dr. Gonzalez, at that visit Propels removed. She noted improvement after. However, she has noticed increase in symptoms the last week. Increase in nasal congestion, dryness, pressure. She has been using rinses 4+ times daily without relief. She does have some relief, but short term. Using Nasonex, amanda med and Budesonide rinse.   FINAL DIAGNOSIS:   Sinuses, FESS   - Inflammatory nasal polyps with approximately 100 eosinophils/hpf   - Severe chronic sinusitis      She has been using her budesonide irrigations     She still notices a malodorous scent from both nare     Heavy Growth SA. She was on Doxy for maintenance therapy. However, culture shows resistance to Tetra. She was started on po Bactrim. Informed her to use benadryl PRN rashes.  She tolerated bactrim without rash  Hx of tendon rupture with use of Levaquin.      No wheezing, no shortness of breath       Past Medical History:   Diagnosis Date     Cystic fibrosis (H)      Depression      Tear of right acetabular labrum         Allergies   Allergen Reactions     Seasonal Allergies Itching     Current Outpatient Prescriptions   Medication     PULMOZYME 1 MG/ML neb solution     clotrimazole 10 MG behzad     acetylcysteine (MUCOMYST) 20 % nebulizer solution     doxycycline (VIBRA-TABS) 100 MG tablet     mometasone (NASONEX) 50 MCG/ACT spray     acetaminophen-codeine (TYLENOL #3) 300-30 MG per tablet     WELLBUTRIN  MG 12 hr tablet     AMBIEN 10 MG tablet     fluconazole  "(DIFLUCAN) 100 MG tablet     sucralfate (CARAFATE) 1 GM tablet     budesonide (PULMICORT) 1 MG/2ML SUSP nebulizer solution     BROVANA 15 MCG/2ML NEBU     levalbuterol (XOPENEX) 0.31 MG/3ML nebulizer solution     nystatin (MYCOSTATIN) 478629 UNIT/GM POWD     dexlansoprazole (DEXILANT) 60 MG CPDR     No current facility-administered medications for this visit.       ROS: 10 point ROS neg other than the symptoms noted above in the HPI.  /72  Pulse 98  Temp 97.2  F (36.2  C)  Resp 16  Ht 5' 7\" (1.702 m)  Wt 240 lb (108.9 kg)  SpO2 97%  BMI 37.59 kg/m2  General - The patient is well nourished and well developed, and appears to have good nutritional status.  Alert and oriented to person and place, interactive.  Very nervous, hot urticaria from situational anxiety which resolved after procedure.   Head and Face - Normocephalic and atraumatic, with no gross asymmetry noted of the contour of the facial features.  The facial nerve is intact, with strong symmetric movements.  Neck-no palpable lymphadenopathy or thyroid mass.  Trachea is midline.  Eyes - Extraocular movements intact.   Nose - Nasal mucosa is pink and moist with thick post surgical secretions  Mouth - Examination of the oral cavity shows pink, healthy, moist mucosa.  No lesions or ulceration noted.  The dentition are in good repair.  The tongue is mobile and midline.  Throat - The walls of the oropharynx were smooth, pink, moist, symmetric, and had no lesions or ulcerations.  The tonsillar pillars and soft palate were symmetric.  The uvula was midline on elevation.       To evaluate the nose and sinuses in the post operative state, I performed rigid nasal endoscopy. I sprayed both nares with lidocaine and neosynephrine.     I began with the LEFT side using a 0 degree rigid nasal endoscope, and then similarly examined the RIGHT side     Findings:  Inferior turbinates: Reduced bilaterally I divided secretions from the inferior meatus and overlying " the turbinates bilaterally  Middle turbinate and middle meatus:  Clear on right  LEFT: #7 and 10 suction as well as Blakesleys were used to remove secretions and dried crusting. Her entire left nares was obstructed w/secretions. Culture completed.   Antrostomy widely patent on the left   Ethmoid cavity clear after above. Culture obtained.   Mucosa is currently healthy throughout. Scant polypoid tissue changes along anterior ethmoid.       ASSESSMENT:    ICD-10-CM    1. S/P FESS (functional endoscopic sinus surgery) Z98.890 sulfamethoxazole-trimethoprim (BACTRIM DS/SEPTRA DS) 800-160 MG per tablet   2. Cystic fibrosis (H) E84.9    3. Eosinophilia D72.1 Sinus Culture Aerobic Bacterial     Fungus Culture, non-blood     Gram stain   4. Chronic pansinusitis J32.4 sulfamethoxazole-trimethoprim (BACTRIM DS/SEPTRA DS) 800-160 MG per tablet     Sinus Culture Aerobic Bacterial     Fungus Culture, non-blood     Gram stain       Maintain rinses.   Continue with Budesonide rinse twice a day.   Continue with Nasonex 2 sprays to each nostrils daily  Start bactrim twice a day for 10 days.   Culture completed. Will call with results  F/u recheck in 1 week. If no improvement- may need visit with Dr. Gonzalez for possible revision/ debridement.   Pathology was discussed she is at risk of recurrent sinusitis due to her cystic fibrosis with a ciliary disease as well as eosinophilia however her sinuses are now open and with chronic use of nasal saline irrigations which she will do control her disease medically  Annie is thankful and breathing better after cleaning.   Return in 1 week. If no significant improvement, she may require exam under sedation for repeat debridement.     Karen Mccallum PA-C  ENT  [facility], [place]  [phone]      Again, thank you for allowing me to participate in the care of your patient.        Sincerely,        Karen Mccallum PA-C

## 2018-04-05 LAB
BACTERIA SPEC CULT: ABNORMAL
SPECIMEN SOURCE: ABNORMAL

## 2018-04-05 RX ORDER — SULFAMETHOXAZOLE/TRIMETHOPRIM 800-160 MG
1 TABLET ORAL 2 TIMES DAILY
Qty: 10 TABLET | Refills: 0 | Status: SHIPPED | OUTPATIENT
Start: 2018-04-05 | End: 2018-04-06

## 2018-04-06 DIAGNOSIS — Z98.890 S/P FESS (FUNCTIONAL ENDOSCOPIC SINUS SURGERY): ICD-10-CM

## 2018-04-06 DIAGNOSIS — J32.4 CHRONIC PANSINUSITIS: ICD-10-CM

## 2018-04-08 RX ORDER — SULFAMETHOXAZOLE/TRIMETHOPRIM 800-160 MG
1 TABLET ORAL 2 TIMES DAILY
Qty: 60 TABLET | Refills: 0 | Status: SHIPPED | OUTPATIENT
Start: 2018-04-08 | End: 2018-05-15

## 2018-04-10 ENCOUNTER — OFFICE VISIT (OUTPATIENT)
Dept: INTERNAL MEDICINE | Facility: OTHER | Age: 48
End: 2018-04-10
Attending: NURSE PRACTITIONER
Payer: COMMERCIAL

## 2018-04-10 VITALS
HEART RATE: 95 BPM | RESPIRATION RATE: 18 BRPM | SYSTOLIC BLOOD PRESSURE: 120 MMHG | DIASTOLIC BLOOD PRESSURE: 60 MMHG | TEMPERATURE: 97.2 F | HEIGHT: 67 IN | OXYGEN SATURATION: 99 % | WEIGHT: 242 LBS | BODY MASS INDEX: 37.98 KG/M2

## 2018-04-10 DIAGNOSIS — E84.9 CYSTIC FIBROSIS (H): Primary | ICD-10-CM

## 2018-04-10 DIAGNOSIS — J01.90 ACUTE SINUSITIS TREATED WITH ANTIBIOTICS IN THE PAST 60 DAYS: ICD-10-CM

## 2018-04-10 DIAGNOSIS — E66.01 MORBID OBESITY (H): ICD-10-CM

## 2018-04-10 PROCEDURE — 99214 OFFICE O/P EST MOD 30 MIN: CPT | Performed by: NURSE PRACTITIONER

## 2018-04-10 ASSESSMENT — PAIN SCALES - GENERAL: PAINLEVEL: NO PAIN (0)

## 2018-04-10 ASSESSMENT — ANXIETY QUESTIONNAIRES
IF YOU CHECKED OFF ANY PROBLEMS ON THIS QUESTIONNAIRE, HOW DIFFICULT HAVE THESE PROBLEMS MADE IT FOR YOU TO DO YOUR WORK, TAKE CARE OF THINGS AT HOME, OR GET ALONG WITH OTHER PEOPLE: SOMEWHAT DIFFICULT
5. BEING SO RESTLESS THAT IT IS HARD TO SIT STILL: MORE THAN HALF THE DAYS
6. BECOMING EASILY ANNOYED OR IRRITABLE: SEVERAL DAYS
GAD7 TOTAL SCORE: 15
7. FEELING AFRAID AS IF SOMETHING AWFUL MIGHT HAPPEN: NOT AT ALL
1. FEELING NERVOUS, ANXIOUS, OR ON EDGE: NEARLY EVERY DAY
3. WORRYING TOO MUCH ABOUT DIFFERENT THINGS: NEARLY EVERY DAY
2. NOT BEING ABLE TO STOP OR CONTROL WORRYING: NEARLY EVERY DAY

## 2018-04-10 ASSESSMENT — PATIENT HEALTH QUESTIONNAIRE - PHQ9: 5. POOR APPETITE OR OVEREATING: NEARLY EVERY DAY

## 2018-04-10 NOTE — PROGRESS NOTES
SUBJECTIVE:  Annie Garcia, a 47 year old female scheduled an appointment to discuss the following issues:  Sinusitis:  Is on extended  Course of Bactrim  For Staph Aureus. Dr. Herman  Told patient.  can do Doxycycline with the Bactrim   Is tolerating Sulfa product. (? Hx of allergy) Has small HA at base of neck.   Cystic Fibrosis. On Doxycycline , Pulmozyne , Pulmicort ,Xopenex.  And Tylenol #3 Sees Dr. Herman.    Obesity: Going to Weight loss clinic at [facility].   Emotional Concerns: Discussed some private concerns.     Medical, social, surgical, and family histories reviewed.    Current Outpatient Prescriptions   Medication     sulfamethoxazole-trimethoprim (BACTRIM DS/SEPTRA DS) 800-160 MG per tablet     PULMOZYME 1 MG/ML neb solution     clotrimazole 10 MG behzad     acetylcysteine (MUCOMYST) 20 % nebulizer solution     doxycycline (VIBRA-TABS) 100 MG tablet     mometasone (NASONEX) 50 MCG/ACT spray     acetaminophen-codeine (TYLENOL #3) 300-30 MG per tablet     WELLBUTRIN  MG 12 hr tablet     AMBIEN 10 MG tablet     fluconazole (DIFLUCAN) 100 MG tablet     sucralfate (CARAFATE) 1 GM tablet     budesonide (PULMICORT) 1 MG/2ML SUSP nebulizer solution     BROVANA 15 MCG/2ML NEBU     levalbuterol (XOPENEX) 0.31 MG/3ML nebulizer solution     nystatin (MYCOSTATIN) 290988 UNIT/GM POWD     dexlansoprazole (DEXILANT) 60 MG CPDR     No current facility-administered medications for this visit.        ROS:  CONSTITUTIONAL:  negative for  fevers, chills, malaise and weight loss  EYES:  negative for  blurred vision and eye discharge  EARS/NOSE/THROAT:  negative for  ear drainage, earaches,slight  nasal congestion ,  Slight  sore throat ,   No difficulty swallowing  SKIN:  negative for rash and skin lesion(s)  RESPIRATORY:  negative for cough, dyspnea and wheezing  CARDIOVASCULAR:  negative for  chest pain, palpitations, edema  GASTROINTESTINAL:  negative for nausea, vomiting, reflux,   abdominal pain  "  diarrhea, constipation   MUSCULOSKELETAL: POSITIVE  for  myalgias, arthralgias,-ongoing hip and ankle pains.    URINARY:  negative for frequency, dysuria, nocturia and hesitancy  NEUROLOGICAL:  negative for headaches, dizziness, memory problems, weakness and numbness    OBJECTIVE:  /60 (BP Location: Left arm, Patient Position: Chair, Cuff Size: Adult Large)  Pulse 95  Temp 97.2  F (36.2  C) (Tympanic)  Resp 18  Ht 5' 7\" (1.702 m)  Wt 242 lb (109.8 kg)  SpO2 99%  BMI 37.9 kg/m2  EXAM:  GENERAL APPEARANCE:  alert and no distress  EYES: EOMI,  PERRL  HENT:EARS: TM's pearly gray, good lite reflex, NOSE: Nares red, moist nonswollen, THROAT: Oropharynx pink  Tongue midline, no fasciculations.   NECK: Supple, no lymphadenopathy, no thyromegaly, no carotid bruits, No JVD  RESP: lungs clear to auscultation anteriorly and posteriorly - no rales, rhonchi or wheezes  CV: regular rates and rhythm, normal S1 S2, no S3 or S4 and no murmur, no click or rub -  ABDOMEN:  soft, nontender, no hepatomegaly, no splenomegaly,  or masses,  bowel sounds normal  MS: No edema  NEURO:No focal deficits, CN 2-12 intact.         Results for orders placed or performed in visit on 04/03/18   Sinus Culture Aerobic Bacterial   Result Value Ref Range    Specimen Description Sinus     Culture Micro Heavy growth  Staphylococcus aureus   (A)        Susceptibility    Staphylococcus aureus - MIRACLE     CLINDAMYCIN >=8 Resistant ug/mL     ERYTHROMYCIN >=8 Resistant ug/mL     GENTAMICIN <=0.5 Sensitive ug/mL     OXACILLIN <=0.25 Sensitive ug/mL     PENICILLIN >=0.5 Resistant ug/mL     TETRACYCLINE >=16 Resistant ug/mL     Trimethoprim/Sulfa <=0.5/9.5 Sensitive ug/mL     VANCOMYCIN <=0.5 Sensitive ug/mL   Fungus Culture, non-blood   Result Value Ref Range    Specimen Description Sinus     Special Requests Specimen received on swab     Culture Micro Culture negative after 1 week    Gram stain   Result Value Ref Range    Specimen Description Sinus  "    Gram Stain Many  PMNs seen       Gram Stain Many  Gram positive cocci   (A)      Lab Results   Component Value Date    WBC 8.5 04/11/2018     Lab Results   Component Value Date    RBC 4.91 04/11/2018     Lab Results   Component Value Date    HGB 13.8 04/11/2018     Lab Results   Component Value Date    HCT 40.8 04/11/2018     No components found for: MCT  Lab Results   Component Value Date    MCV 83 04/11/2018     Lab Results   Component Value Date    MCH 28.1 04/11/2018     Lab Results   Component Value Date    MCHC 33.8 04/11/2018     Lab Results   Component Value Date    RDW 14.5 04/11/2018     Lab Results   Component Value Date     04/11/2018     Recent Labs   Lab Test  04/11/18   0843  02/16/18   1524   NA  139  138   POTASSIUM  4.1  4.3   CHLORIDE  105  103   CO2  23  25   ANIONGAP  11  10   GLC  107*  111*   BUN  14  18   CR  1.16*  1.19*   NALDO  8.8  9.5       ASSESSMENT / PLAN:  (E84.9) Cystic fibrosis (H)  (primary encounter diagnosis)  Comment: On Brovana, Pulmicort,  Pulmozyme,  Doxycycline  And Tylenol #3 for cough.  Sees Dr. Herman.    Plan: Continue same.      (E66.01) Morbid obesity (H)  Comment:Patient is working with opendorse for possible bypass.  Will refer to Nutritional Counseling that is part of the prerequisites.    Plan: NUTRITION REFERRAL          (J01.90) Acute sinusitis treated with antibiotics in the past 60 days  Comment:Patient will continue Doxycycline per Dr. Herman,  Bactrim extended to 8 more weeks.  So will have total of 10 weeks Bactrim. Discussed did not have MRSA.  Sees Karen HIDALGO in ENT.    WBC   Date Value Ref Range Status   04/11/2018 8.5 4.0 - 11.0 10e9/L Final   ]    Plan: Comprehensive metabolic panel, CBC with         platelets and differential,

## 2018-04-10 NOTE — NURSING NOTE
"Chief Complaint   Patient presents with     Recheck Medication     basic follow up       Initial /60 (BP Location: Left arm, Patient Position: Chair, Cuff Size: Adult Large)  Pulse 95  Temp 97.2  F (36.2  C) (Tympanic)  Resp 18  Ht 5' 7\" (1.702 m)  Wt 242 lb (109.8 kg)  SpO2 99%  BMI 37.9 kg/m2 Estimated body mass index is 37.9 kg/(m^2) as calculated from the following:    Height as of this encounter: 5' 7\" (1.702 m).    Weight as of this encounter: 242 lb (109.8 kg).  Medication Reconciliation: complete   Kasie Cantu    "

## 2018-04-10 NOTE — MR AVS SNAPSHOT
After Visit Summary   4/10/2018    Annie Garcia    MRN: [id number]           Patient Information     Date Of Birth          1970        Visit Information        Provider Department      4/10/2018 9:00 AM Todd Torres NP [place] [facility]        Today's Diagnoses     Morbid obesity (H)    -  1    Acute sinusitis treated with antibiotics in the past 60 days          Care Instructions    1.  Referral to Nutritional Counseling.   2. Will to Bactrim for 8 weeks.            Follow-ups after your visit        Additional Services     NUTRITION REFERRAL       Your provider has referred you to:  Nutritional Counseling.      Please be aware that coverage of these services is subject to the terms and limitations of your health insurance plan.  Call member services at your health plan with any benefit or coverage questions.      Please bring the following with you to your appointment:    (1) This referral request  (2) Any documents given to you regarding this referral  (3) Any specific questions you have about diet and/or food choices                  Your next 10 appointments already scheduled     Apr 11, 2018  8:15 AM CDT   (Arrive by 8:00 AM)   Return Visit with Karen Mccallum PA-C   [facility] ([facility] )    [address]  [place] [facility] [address]   [phone]            Jul 12, 2018 11:00 AM CDT   (Arrive by 10:45 AM)   Return Visit with Francoise Gonzalez MD   [facility] ([facility] )    [address]  [place] [facility] [address]   [phone]              Who to contact     If you have questions or need follow up information about today's clinic visit or your schedule please contact [facility] directly at [phone].  Normal or non-critical lab and imaging results will be communicated to you by MyChart, letter or phone within 4 business days after the clinic has received the results. If you do not hear  "from us within 7 days, please contact the clinic through [employer] or phone. If you have a critical or abnormal lab result, we will notify you by phone as soon as possible.  Submit refill requests through [employer] or call your pharmacy and they will forward the refill request to us. Please allow 3 business days for your refill to be completed.          Additional Information About Your Visit        Care EveryWhere ID     This is your Care EveryWhere ID. This could be used by other organizations to access your [place] medical records  [id number]        Your Vitals Were     Pulse Temperature Respirations Height Pulse Oximetry BMI (Body Mass Index)    95 97.2  F (36.2  C) (Tympanic) 18 5' 7\" (1.702 m) 99% 37.9 kg/m2       Blood Pressure from Last 3 Encounters:   04/10/18 120/60   04/03/18 128/72   03/12/18 130/68    Weight from Last 3 Encounters:   04/10/18 242 lb (109.8 kg)   04/03/18 240 lb (108.9 kg)   03/12/18 240 lb (108.9 kg)              We Performed the Following     CBC with platelets and differential     Comprehensive metabolic panel (BMP + Alb, Alk Phos, ALT, AST, Total. Bili, TP)     NUTRITION REFERRAL        Primary Care Provider Office Phone # Fax #    Todd Torres [phone]       [facility] [place] [address] [address]        Equal Access to Services     [facility] : Hadii aad ku hadasho Soomaali, waaxda luqadaha, qaybta kaalmada adeegyada, leanna connellyin hayshahabn celeste langford . So [facility] [phone].    ATENCIÓN: Si habla español, tiene a seals disposición servicios gratuitos de asistencia lingüística. Llame al [phone].    We comply with applicable federal civil rights laws and [place] laws. We do not discriminate on the basis of race, color, national origin, age, disability, sex, sexual orientation, or gender identity.            Thank you!     Thank you for choosing [facility]  for your care. Our goal is always to provide you with " excellent care. Hearing back from our patients is one way we can continue to improve our services. Please take a few minutes to complete the written survey that you may receive in the mail after your visit with us. Thank you!             Your Updated Medication List - Protect others around you: Learn how to safely use, store and throw away your medicines at www.disposemymeds.org.          This list is accurate as of 4/10/18  9:45 AM.  Always use your most recent med list.                   Brand Name Dispense Instructions for use Diagnosis    acetaminophen-codeine 300-30 MG per tablet    TYLENOL #3    90 tablet    TAKE 1 TABLET BY MOUTH EVERY 4 HOURS AS NEEDED FOR MILD PAIN, TAKE FOR COUGHING PAIN.    Hip pain, right       acetylcysteine 20 % nebulizer solution    MUCOMYST     Take by nebulization every 8 hours 5-10 ml inhalation every 8 hours        AMBIEN 10 MG tablet   Generic drug:  zolpidem     30 tablet    Take 1 tablet (10 mg) by mouth nightly as needed    Primary insomnia       BROVANA 15 MCG/2ML Nebu neb solution   Generic drug:  arformoterol     120 mL    USE 1 VIAL IN NEBULIZER 2 TIMES A DAY AS NEEDED    Chronic bronchitis, unspecified chronic bronchitis type (H)       budesonide 1 MG/2ML Susp neb solution    PULMICORT    60 ampule    Take 2 mLs (1 mg) by nebulization daily Take 1 mg by nebulization daily    Bronchitis       clotrimazole 10 MG behzad      1 behzad Mouth/Throat 3 times a day        DEXILANT 60 MG Cpdr CR capsule   Generic drug:  dexlansoprazole      Take 1 capsule by mouth daily as needed        doxycycline 100 MG tablet    VIBRA-TABS     Take 100 mg by mouth 2 times daily Takes one tablet daily prophylaxis.        fluconazole 100 MG tablet    DIFLUCAN    90 tablet    TAKE 1 TABLET BY MOUTH DAILY AS NEEDED    Rash and other nonspecific skin eruption       levalbuterol 0.31 MG/3ML neb solution    XOPENEX    225 mL    Take 3 mLs (0.31 mg) by nebulization every 6 hours as needed for shortness  of breath / dyspnea        mometasone 50 MCG/ACT spray    NASONEX    3 Box    Spray 2 sprays into both nostrils daily    Chronic pansinusitis       nystatin 011293 UNIT/GM Powd    MYCOSTATIN    60 g    Apply 1 g topically 3 times daily as needed    Rash       PULMOZYME 1 MG/ML neb solution   Generic drug:  dornase alpha     75 mL    USE 1 VIAL IN NEBULIZER DAILY AS NEEDED    Chronic bronchitis, unspecified chronic bronchitis type (H)       sucralfate 1 GM tablet    CARAFATE    120 tablet    TAKE 1 TABLET BY MOUTH FOUR TIMES A DAY AS NEEDED    Esophageal reflux       sulfamethoxazole-trimethoprim 800-160 MG per tablet    BACTRIM DS/SEPTRA DS    60 tablet    Take 1 tablet by mouth 2 times daily    S/P FESS (functional endoscopic sinus surgery), Chronic pansinusitis       WELLBUTRIN  MG 12 hr tablet   Generic drug:  buPROPion     90 tablet    TAKE 2 TABLETS BY MOUTH EVERY MORNING AND 1 TABLET EVERY AFTERNOON    Dysthymia

## 2018-04-11 ENCOUNTER — OFFICE VISIT (OUTPATIENT)
Dept: OTOLARYNGOLOGY | Facility: OTHER | Age: 48
End: 2018-04-11
Attending: PHYSICIAN ASSISTANT
Payer: COMMERCIAL

## 2018-04-11 VITALS
DIASTOLIC BLOOD PRESSURE: 66 MMHG | HEIGHT: 67 IN | TEMPERATURE: 98.5 F | BODY MASS INDEX: 37.98 KG/M2 | SYSTOLIC BLOOD PRESSURE: 116 MMHG | OXYGEN SATURATION: 99 % | HEART RATE: 99 BPM | WEIGHT: 242 LBS

## 2018-04-11 DIAGNOSIS — Z98.890 S/P FESS (FUNCTIONAL ENDOSCOPIC SINUS SURGERY): Primary | ICD-10-CM

## 2018-04-11 DIAGNOSIS — B49 FUNGUS BALL: ICD-10-CM

## 2018-04-11 DIAGNOSIS — J32.4 CHRONIC PANSINUSITIS: ICD-10-CM

## 2018-04-11 DIAGNOSIS — E84.9 CYSTIC FIBROSIS (H): ICD-10-CM

## 2018-04-11 DIAGNOSIS — J01.90 ACUTE SINUSITIS TREATED WITH ANTIBIOTICS IN THE PAST 60 DAYS: ICD-10-CM

## 2018-04-11 DIAGNOSIS — D72.10 EOSINOPHILIA: ICD-10-CM

## 2018-04-11 LAB
ALBUMIN SERPL-MCNC: 3.7 G/DL (ref 3.4–5)
ALP SERPL-CCNC: 86 U/L (ref 40–150)
ALT SERPL W P-5'-P-CCNC: 20 U/L (ref 0–50)
ANION GAP SERPL CALCULATED.3IONS-SCNC: 11 MMOL/L (ref 3–14)
AST SERPL W P-5'-P-CCNC: 11 U/L (ref 0–45)
BASOPHILS # BLD AUTO: 0.1 10E9/L (ref 0–0.2)
BASOPHILS NFR BLD AUTO: 0.9 %
BILIRUB SERPL-MCNC: 0.2 MG/DL (ref 0.2–1.3)
BUN SERPL-MCNC: 14 MG/DL (ref 7–30)
CALCIUM SERPL-MCNC: 8.8 MG/DL (ref 8.5–10.1)
CHLORIDE SERPL-SCNC: 105 MMOL/L (ref 94–109)
CO2 SERPL-SCNC: 23 MMOL/L (ref 20–32)
CREAT SERPL-MCNC: 1.16 MG/DL (ref 0.52–1.04)
DIFFERENTIAL METHOD BLD: NORMAL
EOSINOPHIL # BLD AUTO: 0.2 10E9/L (ref 0–0.7)
EOSINOPHIL NFR BLD AUTO: 1.8 %
ERYTHROCYTE [DISTWIDTH] IN BLOOD BY AUTOMATED COUNT: 14.5 % (ref 10–15)
GFR SERPL CREATININE-BSD FRML MDRD: 50 ML/MIN/1.7M2
GLUCOSE SERPL-MCNC: 107 MG/DL (ref 70–99)
HCT VFR BLD AUTO: 40.8 % (ref 35–47)
HGB BLD-MCNC: 13.8 G/DL (ref 11.7–15.7)
IMM GRANULOCYTES # BLD: 0 10E9/L (ref 0–0.4)
IMM GRANULOCYTES NFR BLD: 0.5 %
LYMPHOCYTES # BLD AUTO: 1.5 10E9/L (ref 0.8–5.3)
LYMPHOCYTES NFR BLD AUTO: 17.7 %
MCH RBC QN AUTO: 28.1 PG (ref 26.5–33)
MCHC RBC AUTO-ENTMCNC: 33.8 G/DL (ref 31.5–36.5)
MCV RBC AUTO: 83 FL (ref 78–100)
MONOCYTES # BLD AUTO: 0.6 10E9/L (ref 0–1.3)
MONOCYTES NFR BLD AUTO: 6.6 %
NEUTROPHILS # BLD AUTO: 6.2 10E9/L (ref 1.6–8.3)
NEUTROPHILS NFR BLD AUTO: 72.5 %
NRBC # BLD AUTO: 0 10*3/UL
NRBC BLD AUTO-RTO: 0 /100
PLATELET # BLD AUTO: 300 10E9/L (ref 150–450)
POTASSIUM SERPL-SCNC: 4.1 MMOL/L (ref 3.4–5.3)
PROT SERPL-MCNC: 7.4 G/DL (ref 6.8–8.8)
RBC # BLD AUTO: 4.91 10E12/L (ref 3.8–5.2)
SODIUM SERPL-SCNC: 139 MMOL/L (ref 133–144)
WBC # BLD AUTO: 8.5 10E9/L (ref 4–11)

## 2018-04-11 PROCEDURE — 36415 COLL VENOUS BLD VENIPUNCTURE: CPT | Performed by: PHYSICIAN ASSISTANT

## 2018-04-11 PROCEDURE — 80053 COMPREHEN METABOLIC PANEL: CPT | Performed by: PHYSICIAN ASSISTANT

## 2018-04-11 PROCEDURE — 85025 COMPLETE CBC W/AUTO DIFF WBC: CPT | Performed by: PHYSICIAN ASSISTANT

## 2018-04-11 PROCEDURE — 31237 NSL/SINS NDSC SURG BX POLYPC: CPT | Performed by: PHYSICIAN ASSISTANT

## 2018-04-11 PROCEDURE — 99024 POSTOP FOLLOW-UP VISIT: CPT | Performed by: PHYSICIAN ASSISTANT

## 2018-04-11 ASSESSMENT — PAIN SCALES - GENERAL: PAINLEVEL: NO PAIN (1)

## 2018-04-11 ASSESSMENT — PATIENT HEALTH QUESTIONNAIRE - PHQ9: SUM OF ALL RESPONSES TO PHQ QUESTIONS 1-9: 12

## 2018-04-11 ASSESSMENT — ANXIETY QUESTIONNAIRES: GAD7 TOTAL SCORE: 15

## 2018-04-11 NOTE — PROGRESS NOTES
Chief Complaint   Patient presents with     Surgical Followup     S/P FESS, Septoplasty, Turbinate Reduction on 2/21/18       Patient returns for recheck of her sinuses. She was last seen on 4/3/18 and was debrided L>R sinuses. She had some improvement. However, she has CF as well as chronic sinusitis despite surgical intervention.   Annie has continued sinus congestion.   I spoke to her PCP and Pulmonologist (her pulmonologist was out of office) and was verfifired she may stop her Doxy while on Bactrim for her sinusitis. However, she did speak to Dr. Herman who informed her to stay on doxy while completing Bactrim. She recently saw her PCP yesterday, who is planning on extending her treatment for sinusitis for 6-8 weeks. She does have Rx for 30 day pending at pharmacy.   Cultures were positive again for Heavy growth Staph A, susceptible to Sulfa. She has tolerated Sulfa medications w/o concerns.   She has noted improvement since her last visit, smell has been lessening.   She has noted more drainage, at this time.     FINAL DIAGNOSIS:   Sinuses, FESS   - Inflammatory nasal polyps with approximately 100 eosinophils/hpf   - Severe chronic sinusitis       She has been using her budesonide irrigations      She still notices a malodorous scent from both nare      Heavy Growth SA. She was on Doxy for maintenance therapy. However, culture shows resistance to Tetra. She was started on po Bactrim. Informed her to use benadryl PRN rashes.  She tolerated bactrim without rash  Hx of tendon rupture with use of Levaquin.       No wheezing, no shortness of breath    STAPHYLOCOCCUS AUREUS   Antibiotic Interpretation Sensitivity Unit Method Status   CLINDAMYCIN Resistant >=8 ug/mL MIRACLE Final   ERYTHROMYCIN Resistant >=8 ug/mL MIRACLE Final   GENTAMICIN Sensitive <=0.5 ug/mL MIRACLE Final   OXACILLIN Sensitive <=0.25 ug/mL MIRACLE Final   PENICILLIN Resistant >=0.5 ug/mL MIRACLE Final   TETRACYCLINE Resistant >=16 ug/mL MIRACLE Final  "  Trimethoprim/Sulfa Sensitive <=0.5/9.5 ug/mL MIRACLE Final   VANCOMYCIN Sensitive             Past Medical History:   Diagnosis Date     Cystic fibrosis (H)      Depression      Tear of right acetabular labrum         Allergies   Allergen Reactions     Seasonal Allergies Itching     Current Outpatient Prescriptions   Medication     sulfamethoxazole-trimethoprim (BACTRIM DS/SEPTRA DS) 800-160 MG per tablet     PULMOZYME 1 MG/ML neb solution     clotrimazole 10 MG behzad     acetylcysteine (MUCOMYST) 20 % nebulizer solution     doxycycline (VIBRA-TABS) 100 MG tablet     mometasone (NASONEX) 50 MCG/ACT spray     acetaminophen-codeine (TYLENOL #3) 300-30 MG per tablet     WELLBUTRIN  MG 12 hr tablet     AMBIEN 10 MG tablet     fluconazole (DIFLUCAN) 100 MG tablet     sucralfate (CARAFATE) 1 GM tablet     budesonide (PULMICORT) 1 MG/2ML SUSP nebulizer solution     BROVANA 15 MCG/2ML NEBU     levalbuterol (XOPENEX) 0.31 MG/3ML nebulizer solution     nystatin (MYCOSTATIN) 919212 UNIT/GM POWD     dexlansoprazole (DEXILANT) 60 MG CPDR     No current facility-administered medications for this visit.       ROS: 10 point ROS neg other than the symptoms noted above in the HPI.    /66 (Cuff Size: Adult Large)  Pulse 99  Temp 98.5  F (36.9  C) (Tympanic)  Ht 5' 7\" (1.702 m)  Wt 242 lb (109.8 kg)  SpO2 99%  BMI 37.9 kg/m2    General - The patient is well nourished and well developed, and appears to have good nutritional status.  Alert and oriented to person and place, interactive.  Very nervous, hot urticaria from situational anxiety which resolved after procedure.   Head and Face - Normocephalic and atraumatic, with no gross asymmetry noted of the contour of the facial features.  The facial nerve is intact, with strong symmetric movements.  Neck-no palpable lymphadenopathy or thyroid mass.  Trachea is midline.  Eyes - Extraocular movements intact.   Nose - Nasal mucosa is pink and moist with thick post surgical " secretions  Mouth - Examination of the oral cavity shows pink, healthy, moist mucosa.  No lesions or ulceration noted.  The dentition are in good repair.  The tongue is mobile and midline.  Throat - The walls of the oropharynx were smooth, pink, moist, symmetric, and had no lesions or ulcerations.  The tonsillar pillars and soft palate were symmetric.  The uvula was midline on elevation.        To evaluate the nose and sinuses in the post operative state, I performed rigid nasal endoscopy. I sprayed both nares with lidocaine and neosynephrine.      I began with the LEFT side using a 0 degree rigid nasal endoscope, and then similarly examined the RIGHT side      Findings:  Inferior turbinates: Reduced bilaterally I divided secretions from the inferior meatus and overlying the turbinates bilaterally I trimmed anterior septal sutures due to debris.   Middle turbinate and middle meatus:  Right with #7 suctioning. Mucosa appears healthy.   LEFT: #7 and 10 suction as well as Blakes[place] were used to remove secretions and dried crusting. Left side improved from last visit. Suctioning completed.   Antrostomy widely patent on the left   Ethmoid cavity clear after above.   Mucosa is currently healthy throughout. Scant polypoid tissue changes along anterior ethmoid.     ASSESSMENT:        ICD-10-CM    1. S/P FESS (functional endoscopic sinus surgery) Z98.890    2. Chronic pansinusitis J32.4    3. Cystic fibrosis (H) E84.9    4. Eosinophilia D72.1    5. History of maxillary sinus mycetoma B49    6. Acute sinusitis treated with antibiotics in the past 60 days J01.90 Comprehensive metabolic panel     CBC with platelets and differential     Continue with current medications.   Maintain rinses. Continue with Nasal spray.   Bactrim was extended- complete full course.   Maintain Doxy as directed by PCP.    Return to ENT with Dr. Gonzalez for 2 weeks  Patient has been managing with her pulmonologist and PCP for CF as well as  sinusitis.   Bactrim will be completed for 6-8 weeks per orders.         Karen Mccallum PA-C  [facility], [place]  [phone]

## 2018-04-11 NOTE — LETTER
4/11/2018         RE: Annie Garcia  [address] [facility] [address]        Dear Colleague,    Thank you for referring your patient, Annie Garcia, to the [facility]. Please see a copy of my visit note below.    Chief Complaint   Patient presents with     Surgical Followup     S/P FESS, Septoplasty, Turbinate Reduction on 2/21/18       Patient returns for recheck of her sinuses. She was last seen on 4/3/18 and was debrided L>R sinuses. She had some improvement. However, she has CF as well as chronic sinusitis despite surgical intervention.   Annie has continued sinus congestion.   I spoke to her PCP and Pulmonologist (her pulmonologist was out of office) and was verfifired she may stop her Doxy while on Bactrim for her sinusitis. However, she did speak to Dr. Herman who informed her to stay on doxy while completing Bactrim. She recently saw her PCP yesterday, who is planning on extending her treatment for sinusitis for 6-8 weeks. She does have Rx for 30 day pending at pharmacy.   Cultures were positive again for Heavy growth Staph A, susceptible to Sulfa. She has tolerated Sulfa medications w/o concerns.   She has noted improvement since her last visit, smell has been lessening.   She has noted more drainage, at this time.     FINAL DIAGNOSIS:   Sinuses, FESS   - Inflammatory nasal polyps with approximately 100 eosinophils/hpf   - Severe chronic sinusitis       She has been using her budesonide irrigations      She still notices a malodorous scent from both nare      Heavy Growth SA. She was on Doxy for maintenance therapy. However, culture shows resistance to Tetra. She was started on po Bactrim. Informed her to use benadryl PRN rashes.  She tolerated bactrim without rash  Hx of tendon rupture with use of Levaquin.       No wheezing, no shortness of breath    STAPHYLOCOCCUS AUREUS   Antibiotic Interpretation Sensitivity Unit Method Status   CLINDAMYCIN Resistant >=8  "ug/mL MIRACLE Final   ERYTHROMYCIN Resistant >=8 ug/mL MIRACLE Final   GENTAMICIN Sensitive <=0.5 ug/mL MIRACLE Final   OXACILLIN Sensitive <=0.25 ug/mL MIRACLE Final   PENICILLIN Resistant >=0.5 ug/mL MIRACLE Final   TETRACYCLINE Resistant >=16 ug/mL MIRACLE Final   Trimethoprim/Sulfa Sensitive <=0.5/9.5 ug/mL MIRACLE Final   VANCOMYCIN Sensitive             Past Medical History:   Diagnosis Date     Cystic fibrosis (H)      Depression      Tear of right acetabular labrum         Allergies   Allergen Reactions     Seasonal Allergies Itching     Current Outpatient Prescriptions   Medication     sulfamethoxazole-trimethoprim (BACTRIM DS/SEPTRA DS) 800-160 MG per tablet     PULMOZYME 1 MG/ML neb solution     clotrimazole 10 MG behzad     acetylcysteine (MUCOMYST) 20 % nebulizer solution     doxycycline (VIBRA-TABS) 100 MG tablet     mometasone (NASONEX) 50 MCG/ACT spray     acetaminophen-codeine (TYLENOL #3) 300-30 MG per tablet     WELLBUTRIN  MG 12 hr tablet     AMBIEN 10 MG tablet     fluconazole (DIFLUCAN) 100 MG tablet     sucralfate (CARAFATE) 1 GM tablet     budesonide (PULMICORT) 1 MG/2ML SUSP nebulizer solution     BROVANA 15 MCG/2ML NEBU     levalbuterol (XOPENEX) 0.31 MG/3ML nebulizer solution     nystatin (MYCOSTATIN) 140847 UNIT/GM POWD     dexlansoprazole (DEXILANT) 60 MG CPDR     No current facility-administered medications for this visit.       ROS: 10 point ROS neg other than the symptoms noted above in the HPI.    /66 (Cuff Size: Adult Large)  Pulse 99  Temp 98.5  F (36.9  C) (Tympanic)  Ht 5' 7\" (1.702 m)  Wt 242 lb (109.8 kg)  SpO2 99%  BMI 37.9 kg/m2    General - The patient is well nourished and well developed, and appears to have good nutritional status.  Alert and oriented to person and place, interactive.  Very nervous, hot urticaria from situational anxiety which resolved after procedure.   Head and Face - Normocephalic and atraumatic, with no gross asymmetry noted of the contour of the facial " features.  The facial nerve is intact, with strong symmetric movements.  Neck-no palpable lymphadenopathy or thyroid mass.  Trachea is midline.  Eyes - Extraocular movements intact.   Nose - Nasal mucosa is pink and moist with thick post surgical secretions  Mouth - Examination of the oral cavity shows pink, healthy, moist mucosa.  No lesions or ulceration noted.  The dentition are in good repair.  The tongue is mobile and midline.  Throat - The walls of the oropharynx were smooth, pink, moist, symmetric, and had no lesions or ulcerations.  The tonsillar pillars and soft palate were symmetric.  The uvula was midline on elevation.        To evaluate the nose and sinuses in the post operative state, I performed rigid nasal endoscopy. I sprayed both nares with lidocaine and neosynephrine.      I began with the LEFT side using a 0 degree rigid nasal endoscope, and then similarly examined the RIGHT side      Findings:  Inferior turbinates: Reduced bilaterally I divided secretions from the inferior meatus and overlying the turbinates bilaterally I trimmed anterior septal sutures due to debris.   Middle turbinate and middle meatus:  Right with #7 suctioning. Mucosa appears healthy.   LEFT: #7 and 10 suction as well as Blakesleys were used to remove secretions and dried crusting. Left side improved from last visit. Suctioning completed.   Antrostomy widely patent on the left   Ethmoid cavity clear after above.   Mucosa is currently healthy throughout. Scant polypoid tissue changes along anterior ethmoid.     ASSESSMENT:        ICD-10-CM    1. S/P FESS (functional endoscopic sinus surgery) Z98.890    2. Chronic pansinusitis J32.4    3. Cystic fibrosis (H) E84.9    4. Eosinophilia D72.1    5. History of maxillary sinus mycetoma B49    6. Acute sinusitis treated with antibiotics in the past 60 days J01.90 Comprehensive metabolic panel     CBC with platelets and differential     Continue with current medications.   Maintain  rinses. Continue with Nasal spray.   Bactrim was extended- complete full course.   Maintain Doxy as directed by PCP.    Return to ENT with Dr. Gonzalez for 2 weeks  Patient has been managing with her pulmonologist and PCP for CF as well as sinusitis.   Bactrim will be completed for 6-8 weeks per orders.         Karen Mccallum PA-C  ENT  [facility]  [phone]      Again, thank you for allowing me to participate in the care of your patient.        Sincerely,        Karen Mccallum PA-C

## 2018-04-11 NOTE — PATIENT INSTRUCTIONS
Continue with current medications.   Maintain rinses. Continue with Nasal spray.   Bactrim was extended- complete full course.   Maintain Doxy as directed by PCP.    Return to ENT with Dr. Gonzalez for 2 weeks.     Thank you for allowing GWEN Perdue and our ENT team to participate in your care.  If your medications are too expensive, please give the nurse a call.  We can possibly change this medication.  If you have a scheduling or an appointment question please contact [facility] Unit Coordinator at their direct line [phone].   ALL nursing questions or concerns can be directed to your ENT nurse at: [phone] [place]

## 2018-04-11 NOTE — MR AVS SNAPSHOT
After Visit Summary   4/11/2018    Annie Garcia    MRN: [id number]           Patient Information     Date Of Birth          1970        Visit Information        Provider Department      4/11/2018 8:15 AM Karen Mccallum PA-C [facility]        Today's Diagnoses     S/P FESS (functional endoscopic sinus surgery)    -  1    Chronic pansinusitis        Cystic fibrosis (H)        Eosinophilia        History of maxillary sinus mycetoma          Care Instructions    Continue with current medications.   Maintain rinses. Continue with Nasal spray.   Bactrim was extended- complete full course.   Maintain Doxy as directed by PCP.    Return to ENT with Dr. Gonzalez for 2 weeks.     Thank you for allowing GWEN Perdue and our ENT team to participate in your care.  If your medications are too expensive, please give the nurse a call.  We can possibly change this medication.  If you have a scheduling or an appointment question please contact [facility] Unit Coordinator at their direct line [phone].   ALL nursing questions or concerns can be directed to your ENT nurse at: [phone] [place]                Follow-ups after your visit        Your next 10 appointments already scheduled     Jul 12, 2018 11:00 AM CDT   (Arrive by 10:45 AM)   Return Visit with MD Duran Cuelloview [place] ([facility] )    [address] [place] [address]   [phone]              Future tests that were ordered for you today     Open Future Orders        Priority Expected Expires Ordered    Comprehensive metabolic panel Routine  4/10/2019 4/10/2018    CBC with platelets and differential Routine  4/10/2019 4/10/2018            Who to contact     If you have questions or need follow up information about today's clinic visit or your schedule please contact [facility] directly at [phone].  Normal or non-critical lab and imaging results will  "be communicated to you by MyChart, letter or phone within 4 business days after the clinic has received the results. If you do not hear from us within 7 days, please contact the clinic through MyChart or phone. If you have a critical or abnormal lab result, we will notify you by phone as soon as possible.  Submit refill requests through [employer] or call your pharmacy and they will forward the refill request to us. Please allow 3 business days for your refill to be completed.          Additional Information About Your Visit        Care EveryWhere ID     This is your Care EveryWhere ID. This could be used by other organizations to access your [facility] medical records  [id number]        Your Vitals Were     Pulse Temperature Height Pulse Oximetry BMI (Body Mass Index)       99 98.5  F (36.9  C) (Tympanic) 5' 7\" (1.702 m) 99% 37.9 kg/m2        Blood Pressure from Last 3 Encounters:   04/11/18 116/66   04/10/18 120/60   04/03/18 128/72    Weight from Last 3 Encounters:   04/11/18 242 lb (109.8 kg)   04/10/18 242 lb (109.8 kg)   04/03/18 240 lb (108.9 kg)              Today, you had the following     No orders found for display       Primary Care Provider Office Phone # Fax #    Todd Torres [phone]       [facility] [address]  [facility] [address]        Equal Access to Services     [facility] : Hadii aad ku hadasho Soomaali, waaxda luqadaha, qaybta kaalmada adeegyada, waxay maricelin hayaan celeste langford . So [facility] [phone].    ATENCIÓN: Si habla español, tiene a seals disposición servicios gratuitos de asistencia lingüística. Ryland al [phone].    We comply with applicable federal civil rights laws and [place] laws. We do not discriminate on the basis of race, color, national origin, age, disability, sex, sexual orientation, or gender identity.            Thank you!     Thank you for choosing [facility]  for your care. Our goal is always to provide you " with excellent care. Hearing back from our patients is one way we can continue to improve our services. Please take a few minutes to complete the written survey that you may receive in the mail after your visit with us. Thank you!             Your Updated Medication List - Protect others around you: Learn how to safely use, store and throw away your medicines at www.disposemymeds.org.          This list is accurate as of 4/11/18  8:33 AM.  Always use your most recent med list.                   Brand Name Dispense Instructions for use Diagnosis    acetaminophen-codeine 300-30 MG per tablet    TYLENOL #3    90 tablet    TAKE 1 TABLET BY MOUTH EVERY 4 HOURS AS NEEDED FOR MILD PAIN, TAKE FOR COUGHING PAIN.    Hip pain, right       acetylcysteine 20 % nebulizer solution    MUCOMYST     Take by nebulization every 8 hours 5-10 ml inhalation every 8 hours        AMBIEN 10 MG tablet   Generic drug:  zolpidem     30 tablet    Take 1 tablet (10 mg) by mouth nightly as needed    Primary insomnia       BROVANA 15 MCG/2ML Nebu neb solution   Generic drug:  arformoterol     120 mL    USE 1 VIAL IN NEBULIZER 2 TIMES A DAY AS NEEDED    Chronic bronchitis, unspecified chronic bronchitis type (H)       budesonide 1 MG/2ML Susp neb solution    PULMICORT    60 ampule    Take 2 mLs (1 mg) by nebulization daily Take 1 mg by nebulization daily    Bronchitis       clotrimazole 10 MG behzad      1 behzad Mouth/Throat 3 times a day        DEXILANT 60 MG Cpdr CR capsule   Generic drug:  dexlansoprazole      Take 1 capsule by mouth daily as needed        doxycycline 100 MG tablet    VIBRA-TABS     Take 100 mg by mouth 2 times daily Takes one tablet daily prophylaxis.        fluconazole 100 MG tablet    DIFLUCAN    90 tablet    TAKE 1 TABLET BY MOUTH DAILY AS NEEDED    Rash and other nonspecific skin eruption       levalbuterol 0.31 MG/3ML neb solution    XOPENEX    225 mL    Take 3 mLs (0.31 mg) by nebulization every 6 hours as needed for  shortness of breath / dyspnea        mometasone 50 MCG/ACT spray    NASONEX    3 Box    Spray 2 sprays into both nostrils daily    Chronic pansinusitis       nystatin 242393 UNIT/GM Powd    MYCOSTATIN    60 g    Apply 1 g topically 3 times daily as needed    Rash       PULMOZYME 1 MG/ML neb solution   Generic drug:  dornase alpha     75 mL    USE 1 VIAL IN NEBULIZER DAILY AS NEEDED    Chronic bronchitis, unspecified chronic bronchitis type (H)       sucralfate 1 GM tablet    CARAFATE    120 tablet    TAKE 1 TABLET BY MOUTH FOUR TIMES A DAY AS NEEDED    Esophageal reflux       sulfamethoxazole-trimethoprim 800-160 MG per tablet    BACTRIM DS/SEPTRA DS    60 tablet    Take 1 tablet by mouth 2 times daily    S/P FESS (functional endoscopic sinus surgery), Chronic pansinusitis       WELLBUTRIN  MG 12 hr tablet   Generic drug:  buPROPion     90 tablet    TAKE 2 TABLETS BY MOUTH EVERY MORNING AND 1 TABLET EVERY AFTERNOON    Dysthymia

## 2018-04-11 NOTE — NURSING NOTE
"Chief Complaint   Patient presents with     Surgical Followup     S/P FESS, Septoplasty, Turbinate Reduction on 2/21/18       Initial /66 (Cuff Size: Adult Large)  Pulse 99  Temp 98.5  F (36.9  C) (Tympanic)  Ht 5' 7\" (1.702 m)  Wt 242 lb (109.8 kg)  SpO2 99%  BMI 37.9 kg/m2 Estimated body mass index is 37.9 kg/(m^2) as calculated from the following:    Height as of this encounter: 5' 7\" (1.702 m).    Weight as of this encounter: 242 lb (109.8 kg).  Medication Reconciliation: complete   Sarahi Bain      "

## 2018-04-12 ENCOUNTER — HOSPITAL ENCOUNTER (OUTPATIENT)
Dept: NUTRITION | Facility: HOSPITAL | Age: 48
Discharge: HOME | End: 2018-04-12
Attending: NURSE PRACTITIONER | Admitting: NURSE PRACTITIONER
Payer: COMMERCIAL

## 2018-04-12 VITALS — HEIGHT: 67 IN | WEIGHT: 245.6 LBS | BODY MASS INDEX: 38.55 KG/M2

## 2018-04-12 DIAGNOSIS — R79.89 ELEVATED SERUM CREATININE: Primary | ICD-10-CM

## 2018-04-12 PROCEDURE — 97802 MEDICAL NUTRITION INDIV IN: CPT | Performed by: DIETITIAN, REGISTERED

## 2018-04-12 NOTE — PROGRESS NOTES
"[facility]  Medical Nutrition Therapy    Visit Type: Initial Assessment    Annie Garcia referred for MNT related to Obesity      Nutrition Assessment:  Anthropometrics  Height: .  170.2 cm (5' 7\") BMI:    38.55   Weight:  111.4 kg (245 lb 9.6 oz) BSA:  2.29   IBW (kg):  Female: 61.8         Nutrition History  Annie has a history of cystic fibrosis diagnosed in adulthood. Pt has gained about 25lbs in the last year. She believes her medications play a role in her weight gain. Her normal eating pattern is to eat at least 3 meals a day and grazes in between. Sometimes she feels like she cannot eat enough food to satisfy her stomach, but has found out that low sodium is likely causing this feeling. Lately she has had a lot of stress in he life and was not eating as much as normal and has lost about 10lbs. This is atypical as she normally eats when she feels stressed. Annie feels like she has low or high blood sugar at times, as she will experience blurry vision but then it will improve. She is thinking about having the gastric sleeve surgery and she has spoken with her doctor about this.     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 Exercise used to be her stress relief. Since, she has had a hip replacement and now has a ruptured tendon in her foot. She lives a very busy life driving her children around with little time for herself. She tries to be active as much as she can. Goes " swimming and is connected with cardiac rehab to exercise there.      Nutrition Prescription  Energy:   1250-1350kcal (mifflin st jeor minus 500kcal for weight loss)    Protein:   60-75g (1-2.2 g/kg of IBW)               Nutrition Diagnosis:  Obesity related to excess energy intake and possible medication side effect as evidenced by BMI 38.47.    Nutrition Intervention:   Educated pt on the following:  Reviewed general healthy diet - focus on whole foods (fruits, veggies, whole grains, lean protein and low fat dairy)  Weight loss tips- 3 meals a day or small frequent meals, mindful eating, portions size, meal planning, eating with emotions  Brief conversation about diet before and after weight loss surgery- will review more information next session  Finding time to exercise with busy schedule, and finding time for herself to relax and reduce stress    Nutrition Goals:   1. Serving of vegetables a day  2. 30 minutes of activity 3x a week  3. 1-2 lb weight loss per week    Nutrition Follow Up / Monitoring:   Diet recall, weight, labs and activity    Nutrition Recommendations:  1200 kcal diet and daily physical activity.    Patient to follow-up with RD in 1 month.  Patient has RD contact information to call/email if needed.

## 2018-04-26 DIAGNOSIS — R79.89 ELEVATED SERUM CREATININE: ICD-10-CM

## 2018-04-26 LAB
ALBUMIN SERPL-MCNC: 4.1 G/DL (ref 3.4–5)
ALP SERPL-CCNC: 73 U/L (ref 40–150)
ALT SERPL W P-5'-P-CCNC: 33 U/L (ref 0–50)
ANION GAP SERPL CALCULATED.3IONS-SCNC: 7 MMOL/L (ref 3–14)
AST SERPL W P-5'-P-CCNC: 30 U/L (ref 0–45)
BILIRUB SERPL-MCNC: 0.3 MG/DL (ref 0.2–1.3)
BUN SERPL-MCNC: 11 MG/DL (ref 7–30)
CALCIUM SERPL-MCNC: 9.1 MG/DL (ref 8.5–10.1)
CHLORIDE SERPL-SCNC: 105 MMOL/L (ref 94–109)
CO2 SERPL-SCNC: 27 MMOL/L (ref 20–32)
CREAT SERPL-MCNC: 1.16 MG/DL (ref 0.52–1.04)
GFR SERPL CREATININE-BSD FRML MDRD: 50 ML/MIN/1.7M2
GLUCOSE SERPL-MCNC: 111 MG/DL (ref 70–99)
POTASSIUM SERPL-SCNC: 4.5 MMOL/L (ref 3.4–5.3)
PROT SERPL-MCNC: 7.2 G/DL (ref 6.8–8.8)
SODIUM SERPL-SCNC: 139 MMOL/L (ref 133–144)

## 2018-04-26 PROCEDURE — 36415 COLL VENOUS BLD VENIPUNCTURE: CPT | Performed by: NURSE PRACTITIONER

## 2018-04-26 PROCEDURE — 80053 COMPREHEN METABOLIC PANEL: CPT | Performed by: NURSE PRACTITIONER

## 2018-04-27 ENCOUNTER — OFFICE VISIT (OUTPATIENT)
Dept: OTOLARYNGOLOGY | Facility: OTHER | Age: 48
End: 2018-04-27
Attending: OTOLARYNGOLOGY
Payer: COMMERCIAL

## 2018-04-27 VITALS
WEIGHT: 245 LBS | TEMPERATURE: 97.9 F | BODY MASS INDEX: 38.45 KG/M2 | HEART RATE: 96 BPM | SYSTOLIC BLOOD PRESSURE: 120 MMHG | DIASTOLIC BLOOD PRESSURE: 74 MMHG | HEIGHT: 67 IN | OXYGEN SATURATION: 99 %

## 2018-04-27 DIAGNOSIS — J32.4 CHRONIC PANSINUSITIS: Primary | ICD-10-CM

## 2018-04-27 DIAGNOSIS — E84.9 CYSTIC FIBROSIS (H): ICD-10-CM

## 2018-04-27 DIAGNOSIS — Z98.890 S/P FESS (FUNCTIONAL ENDOSCOPIC SINUS SURGERY): ICD-10-CM

## 2018-04-27 PROCEDURE — 31237 NSL/SINS NDSC SURG BX POLYPC: CPT | Mod: 79 | Performed by: OTOLARYNGOLOGY

## 2018-04-27 PROCEDURE — 99024 POSTOP FOLLOW-UP VISIT: CPT | Performed by: OTOLARYNGOLOGY

## 2018-04-27 ASSESSMENT — PAIN SCALES - GENERAL: PAINLEVEL: NO PAIN (1)

## 2018-04-27 NOTE — PATIENT INSTRUCTIONS
Thank you for allowing Dr. Gonzalez and our ENT team to participate in your care.  If your medications are too expensive, please give the nurse a call.  We can possibly change this medication.  If you have a scheduling or an appointment question please contact [facility] Coordinator at their direct line [phone].   ALL nursing questions or concerns can be directed to your ENT nurse at: [phone] - Sarahi    Continue Budesonide Rinses Twice Daily  Continue Nasonex  Continue Bactrim  Use Aquaphor to nose for 2-3 days  You will have some bleeding from that left side  Follow up with GWEN Perdue in 2 weeks    Budesonide nasal saline irrigation per instructions:  -Obtain Asaf Med Sinus rinse over the counter.    -Use warm distilled water and 2 packets of the salt solution that comes with the bottle, dissolve in bottle up to the 240 mL airam.  -Add 1 vial of budesonide.  -Irrigate each side of your nose leaning over the sink, using 1/3 to 1/2 the volume of the bottle in each nostril every irrigation.  Irrigate 2 times daily.  -If additional rinses are needed/recommended, you may use the plan Asaf Med Sinus irrigation without the use of added budesonide.

## 2018-04-27 NOTE — LETTER
"    4/27/2018         RE: Annie Garcia  [address] [facility] [address]        Dear Colleague,    Thank you for referring your patient, Annie Garcia, to the [facility]. Please see a copy of my visit note below.    Otolaryngology Progress Note      History of Present Illness   Patient presents with:  Surgical Followup: S/p FEss, Septo, TR- 2/21/18      Annie Garcia is a 47 year old female  With a history of cystic fibrosis and eosinophilic chronic recurrent sinusitis status post endoscopic sinus surgery on 2/21/2018.  She is using her budesonide irrigations and is restarted Nasonex.  She completed her culture directed Bactrim as well as her daily doxycycline  Her Bactrim is been extended for 30 days  She has been feeling better breathing out of her nose is improved she still notices a strange foul odor from her nose which is intermittent but present most days  No fever, no cough, no wheezing    PROCEDURES:   1.  Left total ethmoidectomy.   2.  Left frontal sinusotomy.   3.  Left maxillary antrostomy with tissue removal.   4.  Septoplasty with cartilage reinsertion.   5.  Bilateral submucosal reduction inferior turbinates.   6.  Procedures performed using [employer] navigation.       Cultures were positive again for Heavy growth Staph A, susceptible to Sulfa. She has tolerated Sulfa medications w/o concerns.   FINAL DIAGNOSIS:   Sinuses, FESS   - Inflammatory nasal polyps with approximately 100 eosinophils/hpf   - Severe chronic sinusitis     /74 (BP Location: Left arm, Patient Position: Chair, Cuff Size: Adult Regular)  Pulse 96  Temp 97.9  F (36.6  C) (Tympanic)  Ht 5' 7\" (1.702 m)  Wt 245 lb (111.1 kg)  SpO2 99%  BMI 38.37 kg/m2  General - The patient is well nourished and well developed, and appears to have good nutritional status.  Alert and oriented to person and place, interactive.  Head and Face - Normocephalic and atraumatic, with no gross asymmetry " noted of the contour of the facial features.  The facial nerve is intact, with strong symmetric movements.  Eyes - Extraocular movements intact.   Nose - Nasal mucosa is pink and moist with no abnormal mucus.  The septum was grossly midline and non-obstructive, turbinates of normal size and position.  Small pinpoint anterior perforation , synechiae bands from left inferior turbinate to the septum      To evaluate the nose and sinuses in the post operative state, I performed rigid nasal endoscopy. The LPN had previously sprayed both nares with lidocaine and neosynephrine.    I began with the LEFT side using a 0 degree rigid nasal endoscope, and then similarly examined the RIGHT side  The left septum inferior turbinate was also anesthetized with 1% lidocaine with 1-100,000 epinephrine    Findings:  Inferior turbinates: Reduced synechiae band on the left was  with a curved iris silver nitrate was applied for scant using Aquaphor was placed at the end of the endoscopy  Middle turbinate and middle meatus: Left middle meatus and antrostomy with thick dried discharge with which was debrided with a band and suction, no purulence, no polyposis, no synechiae.  Maxillary mucosa is healthy ethmoid cavity is healthy  Portion of the frontal recess able to be visualized in office is healthy and without discharge  The sinuses were irrigated with saline bilaterally and gently suctioned    She does show me some bright green sputum which she feels in the back of her throat  This is not visualized at all in the sinuses    Impression/Plan  Annie Garcia is a 47 year old female    ICD-10-CM    1. Chronic eosinophilic sinusitis J32.4     improved   2. Cystic fibrosis (H) E84.9    3. S/P FESS (functional endoscopic sinus surgery) Z98.890      Annie come back for drug-eluting steroid stents in the near future, this was discussed with her, this is an important part of maintenance medication for her sinuses to avoid  systemic steroid use     Sputum she shows me does appear to be consistent with Pseudomonas if she continues to notice this sputum this should be cultured as I have never cultured pseudomonas in her sinuses, and I do not see record of systemic Pseudomonas culture positivity.  She states she has never had a positive Pseudomonas culture from her lungs are elsewhere.    She should contact Todd rodriguez regarding recent lab workup and see Karen rodriguez in 2 weeks me within the next month to month and a half sooner if necessary.  Continue budesonide irrigations at home continue Bactrim which is been extended a month and will likely need to be extended to months we will decide at her follow-up with Karen.  Overall she is breathing better out of her nose,  her energy level is improved  She is thankful    Continue Nasonex use    Francoise Gonzalez D.O.  Otolaryngology/Head and Neck Surgery  Allergy            Again, thank you for allowing me to participate in the care of your patient.        Sincerely,        Francoise Gonzalez MD

## 2018-04-27 NOTE — NURSING NOTE
"Chief Complaint   Patient presents with     Surgical Followup     S/p FEss, Septo, TR- 2/21/18       Initial /74 (BP Location: Left arm, Patient Position: Chair, Cuff Size: Adult Regular)  Pulse 96  Temp 97.9  F (36.6  C) (Tympanic)  Ht 5' 7\" (1.702 m)  Wt 245 lb (111.1 kg)  SpO2 99%  BMI 38.37 kg/m2 Estimated body mass index is 38.37 kg/(m^2) as calculated from the following:    Height as of this encounter: 5' 7\" (1.702 m).    Weight as of this encounter: 245 lb (111.1 kg).  Medication Reconciliation: complete     Kim Jon LPN    Scope: 1204DZ  "

## 2018-04-27 NOTE — MR AVS SNAPSHOT
After Visit Summary   4/27/2018    Annie Garcia    MRN: [id number]           Patient Information     Date Of Birth          1970        Visit Information        Provider Department      4/27/2018 9:15 AM Francoise Gonzalez MD [facility] [place]        Care Instructions    Thank you for allowing Dr. Gonzalez and our ENT team to participate in your care.  If your medications are too expensive, please give the nurse a call.  We can possibly change this medication.  If you have a scheduling or an appointment question please contact [facility] Unit Coordinator at their direct line [phone].   ALL nursing questions or concerns can be directed to your ENT nurse at: [phone] - Sarahi    Continue Budesonide Rinses Twice Daily  Continue Nasonex  Continue Bactrim  Use Aquaphor to nose for 2-3 days  You will have some bleeding from that left side  Follow up with GWEN Perdue in 2 weeks    Budesonide nasal saline irrigation per instructions:  -Obtain Asaf Med Sinus rinse over the counter.    -Use warm distilled water and 2 packets of the salt solution that comes with the bottle, dissolve in bottle up to the 240 mL airam.  -Add 1 vial of budesonide.  -Irrigate each side of your nose leaning over the sink, using 1/3 to 1/2 the volume of the bottle in each nostril every irrigation.  Irrigate 2 times daily.  -If additional rinses are needed/recommended, you may use the plan Asaf Med Sinus irrigation without the use of added budesonide.             Follow-ups after your visit        Follow-up notes from your care team     Return in about 2 weeks (around 5/11/2018).      Your next 10 appointments already scheduled     Jul 12, 2018 11:00 AM CDT   (Arrive by 10:45 AM)   Return Visit with Francoise Gonzalez MD   [facility] [place] ([facility] [place] )    [address] [place] [address]   [phone]              Who to contact     If you have questions or  "need follow up information about today's clinic visit or your schedule please contact [facility] directly at [phone].  Normal or non-critical lab and imaging results will be communicated to you by MyChart, letter or phone within 4 business days after the clinic has received the results. If you do not hear from us within 7 days, please contact the clinic through MyChart or phone. If you have a critical or abnormal lab result, we will notify you by phone as soon as possible.  Submit refill requests through [employer] or call your pharmacy and they will forward the refill request to us. Please allow 3 business days for your refill to be completed.          Additional Information About Your Visit        Care EveryWhere ID     This is your Care EveryWhere ID. This could be used by other organizations to access your [place] medical records  [id number]        Your Vitals Were     Pulse Temperature Height Pulse Oximetry BMI (Body Mass Index)       96 97.9  F (36.6  C) (Tympanic) 5' 7\" (1.702 m) 99% 38.37 kg/m2        Blood Pressure from Last 3 Encounters:   04/27/18 120/74   04/11/18 116/66   04/10/18 120/60    Weight from Last 3 Encounters:   04/27/18 245 lb (111.1 kg)   04/12/18 245 lb 9.6 oz (111.4 kg)   04/11/18 242 lb (109.8 kg)              Today, you had the following     No orders found for display       Primary Care Provider Office Phone # Fax #    Todd Torres [phone]       [facility] [place] [address]  [place] [address]        Equal Access to Services     [facility]: Hadii aad fransisco hadasho Soomaali, waaxda luqadaha, qaybta kaalmada asif, leanna langford . So [facility] [phone].    ATENCIÓN: Si habla español, tiene a seals disposición servicios gratuitos de asistencia lingüística. Llame al [phone].    We comply with applicable federal civil rights laws and [place] laws. We do not discriminate on the basis of race, color, " national origin, age, disability, sex, sexual orientation, or gender identity.            Thank you!     Thank you for choosing [facility]  for your care. Our goal is always to provide you with excellent care. Hearing back from our patients is one way we can continue to improve our services. Please take a few minutes to complete the written survey that you may receive in the mail after your visit with us. Thank you!             Your Updated Medication List - Protect others around you: Learn how to safely use, store and throw away your medicines at www.disposemymeds.org.          This list is accurate as of 4/27/18  9:58 AM.  Always use your most recent med list.                   Brand Name Dispense Instructions for use Diagnosis    acetaminophen-codeine 300-30 MG per tablet    TYLENOL #3    90 tablet    TAKE 1 TABLET BY MOUTH EVERY 4 HOURS AS NEEDED FOR MILD PAIN, TAKE FOR COUGHING PAIN.    Hip pain, right       acetylcysteine 20 % nebulizer solution    MUCOMYST     Take by nebulization every 8 hours 5-10 ml inhalation every 8 hours        AMBIEN 10 MG tablet   Generic drug:  zolpidem     30 tablet    Take 1 tablet (10 mg) by mouth nightly as needed    Primary insomnia       BROVANA 15 MCG/2ML Nebu neb solution   Generic drug:  arformoterol     120 mL    USE 1 VIAL IN NEBULIZER 2 TIMES A DAY AS NEEDED    Chronic bronchitis, unspecified chronic bronchitis type (H)       budesonide 1 MG/2ML Susp neb solution    PULMICORT    60 ampule    Take 2 mLs (1 mg) by nebulization daily Take 1 mg by nebulization daily    Bronchitis       clotrimazole 10 MG behzad      1 behzad Mouth/Throat 3 times a day        DEXILANT 60 MG Cpdr CR capsule   Generic drug:  dexlansoprazole      Take 1 capsule by mouth daily as needed        doxycycline 100 MG tablet    VIBRA-TABS     Take 100 mg by mouth 2 times daily Takes one tablet daily prophylaxis.        fluconazole 100 MG tablet    DIFLUCAN    90 tablet    TAKE 1 TABLET BY  MOUTH DAILY AS NEEDED    Rash and other nonspecific skin eruption       levalbuterol 0.31 MG/3ML neb solution    XOPENEX    225 mL    Take 3 mLs (0.31 mg) by nebulization every 6 hours as needed for shortness of breath / dyspnea        mometasone 50 MCG/ACT spray    NASONEX    3 Box    Spray 2 sprays into both nostrils daily    Chronic pansinusitis       nystatin 262827 UNIT/GM Powd    MYCOSTATIN    60 g    Apply 1 g topically 3 times daily as needed    Rash       PULMOZYME 1 MG/ML neb solution   Generic drug:  dornase alpha     75 mL    USE 1 VIAL IN NEBULIZER DAILY AS NEEDED    Chronic bronchitis, unspecified chronic bronchitis type (H)       sucralfate 1 GM tablet    CARAFATE    120 tablet    TAKE 1 TABLET BY MOUTH FOUR TIMES A DAY AS NEEDED    Esophageal reflux       sulfamethoxazole-trimethoprim 800-160 MG per tablet    BACTRIM DS/SEPTRA DS    60 tablet    Take 1 tablet by mouth 2 times daily    S/P FESS (functional endoscopic sinus surgery), Chronic pansinusitis       WELLBUTRIN  MG 12 hr tablet   Generic drug:  buPROPion     90 tablet    TAKE 2 TABLETS BY MOUTH EVERY MORNING AND 1 TABLET EVERY AFTERNOON    Dysthymia

## 2018-04-27 NOTE — PROGRESS NOTES
"Otolaryngology Progress Note      History of Present Illness   Patient presents with:  Surgical Followup: S/p FEss, Septo, TR- 2/21/18      Annie Garcia is a 47 year old female  With a history of cystic fibrosis and eosinophilic chronic recurrent sinusitis status post endoscopic sinus surgery on 2/21/2018.  She is using her budesonide irrigations and is restarted Nasonex.  She completed her culture directed Bactrim as well as her daily doxycycline  Her Bactrim is been extended for 30 days  She has been feeling better breathing out of her nose is improved she still notices a strange foul odor from her nose which is intermittent but present most days  No fever, no cough, no wheezing    PROCEDURES:   1.  Left total ethmoidectomy.   2.  Left frontal sinusotomy.   3.  Left maxillary antrostomy with tissue removal.   4.  Septoplasty with cartilage reinsertion.   5.  Bilateral submucosal reduction inferior turbinates.   6.  Procedures performed using [employer] navigation.       Cultures were positive again for Heavy growth Staph A, susceptible to Sulfa. She has tolerated Sulfa medications w/o concerns.   FINAL DIAGNOSIS:   Sinuses, FESS   - Inflammatory nasal polyps with approximately 100 eosinophils/hpf   - Severe chronic sinusitis     /74 (BP Location: Left arm, Patient Position: Chair, Cuff Size: Adult Regular)  Pulse 96  Temp 97.9  F (36.6  C) (Tympanic)  Ht 5' 7\" (1.702 m)  Wt 245 lb (111.1 kg)  SpO2 99%  BMI 38.37 kg/m2  General - The patient is well nourished and well developed, and appears to have good nutritional status.  Alert and oriented to person and place, interactive.  Head and Face - Normocephalic and atraumatic, with no gross asymmetry noted of the contour of the facial features.  The facial nerve is intact, with strong symmetric movements.  Eyes - Extraocular movements intact.   Nose - Nasal mucosa is pink and moist with no abnormal mucus.  The septum was grossly midline and " non-obstructive, turbinates of normal size and position.  Small pinpoint anterior perforation , synechiae bands from left inferior turbinate to the septum      To evaluate the nose and sinuses in the post operative state, I performed rigid nasal endoscopy. The LPN had previously sprayed both nares with lidocaine and neosynephrine.    I began with the LEFT side using a 0 degree rigid nasal endoscope, and then similarly examined the RIGHT side  The left septum inferior turbinate was also anesthetized with 1% lidocaine with 1-100,000 epinephrine    Findings:  Inferior turbinates: Reduced synechiae band on the left was  with a curved iris silver nitrate was applied for scant using Aquaphor was placed at the end of the endoscopy  Middle turbinate and middle meatus: Left middle meatus and antrostomy with thick dried discharge with which was debrided with a band and suction, no purulence, no polyposis, no synechiae.  Maxillary mucosa is healthy ethmoid cavity is healthy  Portion of the frontal recess able to be visualized in office is healthy and without discharge  The sinuses were irrigated with saline bilaterally and gently suctioned    She does show me some bright green sputum which she feels in the back of her throat  This is not visualized at all in the sinuses    Impression/Plan  Annie Garcia is a 47 year old female    ICD-10-CM    1. Chronic eosinophilic sinusitis J32.4     improved   2. Cystic fibrosis (H) E84.9    3. S/P FESS (functional endoscopic sinus surgery) Z98.890      Annie come back for drug-eluting steroid stents in the near future, this was discussed with her, this is an important part of maintenance medication for her sinuses to avoid systemic steroid use     Sputum she shows me does appear to be consistent with Pseudomonas if she continues to notice this sputum this should be cultured as I have never cultured pseudomonas in her sinuses, and I do not see record of systemic  Pseudomonas culture positivity.  She states she has never had a positive Pseudomonas culture from her lungs are elsewhere.    She should contact Todd rodriguez regarding recent lab workup and see Karen rodriguez in 2 weeks me within the next month to month and a half sooner if necessary.  Continue budesonide irrigations at home continue Bactrim which is been extended a month and will likely need to be extended to months we will decide at her follow-up with Karen.  Overall she is breathing better out of her nose,  her energy level is improved  She is thankful    Continue Nasonex use    Francoise Gonzalez D.O.  Otolaryngology/Head and Neck Surgery  Allergy

## 2018-05-01 LAB
FUNGUS SPEC CULT: NORMAL
Lab: NORMAL
SPECIMEN SOURCE: NORMAL

## 2018-05-14 DIAGNOSIS — R79.89 ELEVATED SERUM CREATININE: ICD-10-CM

## 2018-05-14 LAB
ALBUMIN SERPL-MCNC: 4 G/DL (ref 3.4–5)
ALP SERPL-CCNC: 84 U/L (ref 40–150)
ALT SERPL W P-5'-P-CCNC: 24 U/L (ref 0–50)
ANION GAP SERPL CALCULATED.3IONS-SCNC: 6 MMOL/L (ref 3–14)
AST SERPL W P-5'-P-CCNC: 12 U/L (ref 0–45)
BILIRUB SERPL-MCNC: 0.2 MG/DL (ref 0.2–1.3)
BUN SERPL-MCNC: 12 MG/DL (ref 7–30)
CALCIUM SERPL-MCNC: 9.1 MG/DL (ref 8.5–10.1)
CHLORIDE SERPL-SCNC: 106 MMOL/L (ref 94–109)
CO2 SERPL-SCNC: 26 MMOL/L (ref 20–32)
CREAT SERPL-MCNC: 1.09 MG/DL (ref 0.52–1.04)
GFR SERPL CREATININE-BSD FRML MDRD: 53 ML/MIN/1.7M2
GLUCOSE SERPL-MCNC: 121 MG/DL (ref 70–99)
POTASSIUM SERPL-SCNC: 4.6 MMOL/L (ref 3.4–5.3)
PROT SERPL-MCNC: 7.5 G/DL (ref 6.8–8.8)
SODIUM SERPL-SCNC: 138 MMOL/L (ref 133–144)

## 2018-05-14 PROCEDURE — 36415 COLL VENOUS BLD VENIPUNCTURE: CPT | Performed by: NURSE PRACTITIONER

## 2018-05-14 PROCEDURE — 80053 COMPREHEN METABOLIC PANEL: CPT | Performed by: NURSE PRACTITIONER

## 2018-05-15 ENCOUNTER — OFFICE VISIT (OUTPATIENT)
Dept: OTOLARYNGOLOGY | Facility: OTHER | Age: 48
End: 2018-05-15
Attending: PHYSICIAN ASSISTANT
Payer: COMMERCIAL

## 2018-05-15 VITALS
DIASTOLIC BLOOD PRESSURE: 66 MMHG | HEIGHT: 67 IN | TEMPERATURE: 96.7 F | HEART RATE: 86 BPM | WEIGHT: 240 LBS | SYSTOLIC BLOOD PRESSURE: 110 MMHG | BODY MASS INDEX: 37.67 KG/M2

## 2018-05-15 DIAGNOSIS — E84.9 CYSTIC FIBROSIS (H): ICD-10-CM

## 2018-05-15 DIAGNOSIS — J32.4 CHRONIC PANSINUSITIS: ICD-10-CM

## 2018-05-15 DIAGNOSIS — J32.4 CHRONIC PANSINUSITIS: Primary | ICD-10-CM

## 2018-05-15 DIAGNOSIS — J01.90 ACUTE SINUSITIS TREATED WITH ANTIBIOTICS IN THE PAST 60 DAYS: ICD-10-CM

## 2018-05-15 DIAGNOSIS — B49 FUNGUS BALL: ICD-10-CM

## 2018-05-15 DIAGNOSIS — Z98.890 S/P FESS (FUNCTIONAL ENDOSCOPIC SINUS SURGERY): ICD-10-CM

## 2018-05-15 PROCEDURE — 31237 NSL/SINS NDSC SURG BX POLYPC: CPT | Performed by: PHYSICIAN ASSISTANT

## 2018-05-15 PROCEDURE — 99213 OFFICE O/P EST LOW 20 MIN: CPT | Mod: 25 | Performed by: PHYSICIAN ASSISTANT

## 2018-05-15 RX ORDER — SULFAMETHOXAZOLE/TRIMETHOPRIM 800-160 MG
1 TABLET ORAL 2 TIMES DAILY
Qty: 60 TABLET | Refills: 0 | Status: SHIPPED | OUTPATIENT
Start: 2018-05-15 | End: 2018-07-12

## 2018-05-15 ASSESSMENT — PAIN SCALES - GENERAL: PAINLEVEL: NO PAIN (0)

## 2018-05-15 NOTE — LETTER
5/15/2018         RE: Annie Garcia  [address] [address]        Dear Colleague,    Thank you for referring your patient, Annie Garcia, to the [facility]. Please see a copy of my visit note below.      Chief Complaint   Patient presents with     Surgical Followup     Pt is s/p fess, septoplasty, and TR on 2/21/18.  Pt feels like her sinuses are infected.     Annie Garcia is a 47 year old female  With a history of cystic fibrosis and eosinophilic chronic recurrent sinusitis status post endoscopic sinus surgery on 2/21/2018.  She is using her budesonide irrigations and nasonex.   She completed her culture directed Bactrim as well as her daily doxycycline. Bactrim was extended for 1 month due to her CF and continued sinus concerns. She completed her Bactrim last week. She has noticed the smell has worsened over the last few days. She did see Dr. Douglass last week and discussed continued Bactrim 6-8 weeks with monitoring CMP every 2 weeks. She has been drinking lots of water.     No fever, no cough, no wheezing     PROCEDURES:   1.  Left total ethmoidectomy.   2.  Left frontal sinusotomy.   3.  Left maxillary antrostomy with tissue removal.   4.  Septoplasty with cartilage reinsertion.   5.  Bilateral submucosal reduction inferior turbinates.   6.  Procedures performed using [employer] navigation.        Cultures were positive again for Heavy growth Staph A, susceptible to Sulfa. She has tolerated Sulfa medications w/o concerns.   FINAL DIAGNOSIS:   Sinuses, FESS   - Inflammatory nasal polyps with approximately 100 eosinophils/hpf   - Severe chronic sinusitis        Past Medical History:   Diagnosis Date     Cystic fibrosis (H)      Depression      Tear of right acetabular labrum         Allergies   Allergen Reactions     Seasonal Allergies Itching     Current Outpatient Prescriptions   Medication     acetaminophen-codeine (TYLENOL #3) 300-30 MG per  "tablet     acetylcysteine (MUCOMYST) 20 % nebulizer solution     AMBIEN 10 MG tablet     BROVANA 15 MCG/2ML NEBU     budesonide (PULMICORT) 1 MG/2ML SUSP nebulizer solution     clotrimazole 10 MG behzad     dexlansoprazole (DEXILANT) 60 MG CPDR     doxycycline (VIBRA-TABS) 100 MG tablet     fluconazole (DIFLUCAN) 100 MG tablet     levalbuterol (XOPENEX) 0.31 MG/3ML nebulizer solution     mometasone (NASONEX) 50 MCG/ACT spray     nystatin (MYCOSTATIN) 494385 UNIT/GM POWD     PULMOZYME 1 MG/ML neb solution     sucralfate (CARAFATE) 1 GM tablet     sulfamethoxazole-trimethoprim (BACTRIM DS/SEPTRA DS) 800-160 MG per tablet     WELLBUTRIN  MG 12 hr tablet     No current facility-administered medications for this visit.       ROS: 10 point ROS neg other than the symptoms noted above in the HPI.  /66 (BP Location: Right arm, Cuff Size: Adult Large)  Pulse 86  Temp 96.7  F (35.9  C) (Tympanic)  Ht 5' 7\" (1.702 m)  Wt 240 lb (108.9 kg)  BMI 37.59 kg/m2  General - The patient is well nourished and well developed, and appears to have good nutritional status.  Alert and oriented to person and place, interactive.  Head and Face - Normocephalic and atraumatic, with no gross asymmetry noted of the contour of the facial features.  The facial nerve is intact, with strong symmetric movements.  Eyes - Extraocular movements intact.   Nose - Nasal mucosa is pink and moist with no abnormal mucus.  The septum was grossly midline and non-obstructive, turbinates of normal size and position.  Small pinpoint anterior perforation.      To evaluate the nose and sinuses in the post operative state, I performed rigid nasal endoscopy. The LPN had previously sprayed both nares with lidocaine and neosynephrine.     I began with the LEFT side using a 0 degree rigid nasal endoscope, and then similarly examined the RIGHT side       Findings:  Inferior turbinates: Reduced.   Middle turbinate and middle meatus: Left middle meatus and " antrostomy with thick dried discharge with which was debrided with suction and alligtor,  no polyposis, no synechiae.  Maxillary mucosa is healthy ethmoid cavity is healthy. Her sinuses were once again full of debris, LEFT. Right is clear.   Portion of the frontal recess able to be visualized in office is healthy and scant drainage from Left ethmoid.        ASSESSMENT:    ICD-10-CM    1. Chronic eosinophilic sinusitis J32.4    2. Cystic fibrosis (H) E84.9    3. S/P FESS (functional endoscopic sinus surgery) Z98.890 sulfamethoxazole-trimethoprim (BACTRIM DS/SEPTRA DS) 800-160 MG per tablet   4. History of maxillary sinus mycetoma B49    5. Acute sinusitis treated with antibiotics in the past 60 days J01.90    6. Chronic pansinusitis J32.4 sulfamethoxazole-trimethoprim (BACTRIM DS/SEPTRA DS) 800-160 MG per tablet         Due to her CF as well as recommendations from pulmonary, extend bactrim for an additional 6-8 weeks.   Sent in Rx for 4 weeks. She will need to push fluids. Recheck Cr in 2 weeks.   She will f/u w/ Dr. Gonzalez for recheck  Continue with rinse, Budesonide rinse  Daily Nasonex use.     Karen Mccallum PA-C  ENT  [facility], [place]  [phone]      Again, thank you for allowing me to participate in the care of your patient.        Sincerely,        Karen Mccallum PA-C

## 2018-05-15 NOTE — NURSING NOTE
"Chief Complaint   Patient presents with     Surgical Followup     Pt is s/p fess, septoplasty, and TR on 2/21/18.  Pt feels like her sinuses are infected.       Initial /66 (BP Location: Right arm, Cuff Size: Adult Large)  Pulse 86  Temp 96.7  F (35.9  C) (Tympanic)  Ht 5' 7\" (1.702 m)  Wt 240 lb (108.9 kg)  BMI 37.59 kg/m2 Estimated body mass index is 37.59 kg/(m^2) as calculated from the following:    Height as of this encounter: 5' 7\" (1.702 m).    Weight as of this encounter: 240 lb (108.9 kg).  Medication Reconciliation: complete    Juana Toth LPN    "

## 2018-05-15 NOTE — PROGRESS NOTES
Chief Complaint   Patient presents with     Surgical Followup     Pt is s/p fess, septoplasty, and TR on 2/21/18.  Pt feels like her sinuses are infected.     Annie Garcia is a 47 year old female  With a history of cystic fibrosis and eosinophilic chronic recurrent sinusitis status post endoscopic sinus surgery on 2/21/2018.  She is using her budesonide irrigations and nasonex.   She completed her culture directed Bactrim as well as her daily doxycycline. Bactrim was extended for 1 month due to her CF and continued sinus concerns. She completed her Bactrim last week. She has noticed the smell has worsened over the last few days. She did see Dr. Douglass last week and discussed continued Bactrim 6-8 weeks with monitoring CMP every 2 weeks. She has been drinking lots of water.     No fever, no cough, no wheezing     PROCEDURES:   1.  Left total ethmoidectomy.   2.  Left frontal sinusotomy.   3.  Left maxillary antrostomy with tissue removal.   4.  Septoplasty with cartilage reinsertion.   5.  Bilateral submucosal reduction inferior turbinates.   6.  Procedures performed using [employer] navigation.        Cultures were positive again for Heavy growth Staph A, susceptible to Sulfa. She has tolerated Sulfa medications w/o concerns.   FINAL DIAGNOSIS:   Sinuses, FESS   - Inflammatory nasal polyps with approximately 100 eosinophils/hpf   - Severe chronic sinusitis        Past Medical History:   Diagnosis Date     Cystic fibrosis (H)      Depression      Tear of right acetabular labrum         Allergies   Allergen Reactions     Seasonal Allergies Itching     Current Outpatient Prescriptions   Medication     acetaminophen-codeine (TYLENOL #3) 300-30 MG per tablet     acetylcysteine (MUCOMYST) 20 % nebulizer solution     AMBIEN 10 MG tablet     BROVANA 15 MCG/2ML NEBU     budesonide (PULMICORT) 1 MG/2ML SUSP nebulizer solution     clotrimazole 10 MG behzad     dexlansoprazole (DEXILANT) 60 MG CPDR      "doxycycline (VIBRA-TABS) 100 MG tablet     fluconazole (DIFLUCAN) 100 MG tablet     levalbuterol (XOPENEX) 0.31 MG/3ML nebulizer solution     mometasone (NASONEX) 50 MCG/ACT spray     nystatin (MYCOSTATIN) 546779 UNIT/GM POWD     PULMOZYME 1 MG/ML neb solution     sucralfate (CARAFATE) 1 GM tablet     sulfamethoxazole-trimethoprim (BACTRIM DS/SEPTRA DS) 800-160 MG per tablet     WELLBUTRIN  MG 12 hr tablet     No current facility-administered medications for this visit.       ROS: 10 point ROS neg other than the symptoms noted above in the HPI.  /66 (BP Location: Right arm, Cuff Size: Adult Large)  Pulse 86  Temp 96.7  F (35.9  C) (Tympanic)  Ht 5' 7\" (1.702 m)  Wt 240 lb (108.9 kg)  BMI 37.59 kg/m2  General - The patient is well nourished and well developed, and appears to have good nutritional status.  Alert and oriented to person and place, interactive.  Head and Face - Normocephalic and atraumatic, with no gross asymmetry noted of the contour of the facial features.  The facial nerve is intact, with strong symmetric movements.  Eyes - Extraocular movements intact.   Nose - Nasal mucosa is pink and moist with no abnormal mucus.  The septum was grossly midline and non-obstructive, turbinates of normal size and position.  Small pinpoint anterior perforation.      To evaluate the nose and sinuses in the post operative state, I performed rigid nasal endoscopy. The LPN had previously sprayed both nares with lidocaine and neosynephrine.     I began with the LEFT side using a 0 degree rigid nasal endoscope, and then similarly examined the RIGHT side       Findings:  Inferior turbinates: Reduced.   Middle turbinate and middle meatus: Left middle meatus and antrostomy with thick dried discharge with which was debrided with suction and alligtor,  no polyposis, no synechiae.  Maxillary mucosa is healthy ethmoid cavity is healthy. Her sinuses were once again full of debris, LEFT. Right is clear.   Portion of " the frontal recess able to be visualized in office is healthy and scant drainage from Left ethmoid.        ASSESSMENT:    ICD-10-CM    1. Chronic eosinophilic sinusitis J32.4    2. Cystic fibrosis (H) E84.9    3. S/P FESS (functional endoscopic sinus surgery) Z98.890 sulfamethoxazole-trimethoprim (BACTRIM DS/SEPTRA DS) 800-160 MG per tablet   4. History of maxillary sinus mycetoma B49    5. Acute sinusitis treated with antibiotics in the past 60 days J01.90    6. Chronic pansinusitis J32.4 sulfamethoxazole-trimethoprim (BACTRIM DS/SEPTRA DS) 800-160 MG per tablet         Due to her CF as well as recommendations from pulmonary, extend bactrim for an additional 6-8 weeks.   Sent in Rx for 4 weeks. She will need to push fluids. Recheck Cr in 2 weeks.   She will f/u w/ Dr. Gonzalez for recheck  Continue with rinse, Budesonide rinse  Daily Nasonex use.     Karen Mccallum PA-C  ENT  [facility], [place]  [phone]

## 2018-05-15 NOTE — MR AVS SNAPSHOT
After Visit Summary   5/15/2018    Annie Garcia    MRN: [id number]           Patient Information     Date Of Birth          1970        Visit Information        Provider Department      5/15/2018 8:45 AM Karen Mccallum PA-C [facility]        Today's Diagnoses     Chronic eosinophilic sinusitis    -  1    Cystic fibrosis (H)        S/P FESS (functional endoscopic sinus surgery)        History of maxillary sinus mycetoma        Acute sinusitis treated with antibiotics in the past 60 days        Chronic pansinusitis          Care Instructions    Continue w/ Rinses.   Maintain daily Nasonex.   Start bactrim twice a day for 4 weeks  Repeat Blood work/ labs in 2 weeks.   Return to ENT for recheck in 4 weeks w/ Dr. Gonzalez    Thank you for allowing GWEN Perdue and our ENT team to participate in your care.  If your medications are too expensive, please give the nurse a call.  We can possibly change this medication.  If you have a scheduling or an appointment question please contact [facility] Unit Coordinator at their direct line [phone].   ALL nursing questions or concerns can be directed to your ENT nurse at: [phone] [place]               Follow-ups after your visit        Your next 10 appointments already scheduled     Jul 12, 2018 11:00 AM CDT   (Arrive by 10:45 AM)   Return Visit with Francoise Gonzalez MD   [place] [facility] [place] ([facility] )    [address] [place] [address] [phone]              Who to contact     If you have questions or need follow up information about today's clinic visit or your schedule please contact [facility] directly at [phone].  Normal or non-critical lab and imaging results will be communicated to you by MyChart, letter or phone within 4 business days after the clinic has received the results. If you do not hear from us within 7 days, please contact the clinic through  [email or web address] or phone. If you have a critical or abnormal lab result, we will notify you by phone as soon as possible.  Submit refill requests through Musement or call your pharmacy and they will forward the refill request to us. Please allow 3 business days for your refill to be completed.          Additional Information About Your Visit        [email or web address] Information     Musement lets you send messages to your doctor, view your test results, renew your prescriptions, schedule appointments and more. To sign up, go to [email or web address] . Click on \"Log in\" on the left side of the screen, which will take you to the Welcome page. Then click on \"Sign up Now\" on the right side of the page.     You will be asked to enter the access code listed below, as well as some personal information. Please follow the directions to create your username and password.     Your access code is: [email or web address]  Expires: 2018  9:12 AM     Your access code will  in 90 days. If you need help or a new code, please call your [place] clinic or [phone].        Care EveryWhere ID     This is your Care EveryWhere ID. This could be used by other organizations to access your [place] medical records  [id number]        Your Vitals Were     Pulse Temperature Height BMI (Body Mass Index)          86 96.7  F (35.9  C) (Tympanic) 5' 7\" (1.702 m) 37.59 kg/m2         Blood Pressure from Last 3 Encounters:   05/15/18 110/66   18 120/74   18 116/66    Weight from Last 3 Encounters:   05/15/18 240 lb (108.9 kg)   18 245 lb (111.1 kg)   18 245 lb 9.6 oz (111.4 kg)              Today, you had the following     No orders found for display         Where to get your medicines      These medications were sent to [facility] - [address]  [address] [address]     Phone:  [phone]     sulfamethoxazole-trimethoprim 800-160 MG per tablet          Primary Care Provider Office Phone # " Fax #    Todd Torres [phone] [address] [facility] [address]        Equal Access to Services     ANTONINO SANCHEZ : Zack caitlin moody susana Sotrino, wamelvada luqadaha, qaybta kaalmada asif, leanna sullivan lalexykevin nunez. So [facility] [phone].    ATENCIÓN: Si habla español, tiene a seals disposición servicios gratuitos de asistencia lingüística. Llame al [phone].    We comply with applicable federal civil rights laws and [place] laws. We do not discriminate on the basis of race, color, national origin, age, disability, sex, sexual orientation, or gender identity.            Thank you!     Thank you for choosing [facility]  for your care. Our goal is always to provide you with excellent care. Hearing back from our patients is one way we can continue to improve our services. Please take a few minutes to complete the written survey that you may receive in the mail after your visit with us. Thank you!             Your Updated Medication List - Protect others around you: Learn how to safely use, store and throw away your medicines at www.disposemymeds.org.          This list is accurate as of 5/15/18  9:12 AM.  Always use your most recent med list.                   Brand Name Dispense Instructions for use Diagnosis    acetaminophen-codeine 300-30 MG per tablet    TYLENOL #3    90 tablet    TAKE 1 TABLET BY MOUTH EVERY 4 HOURS AS NEEDED FOR MILD PAIN, TAKE FOR COUGHING PAIN.    Hip pain, right       acetylcysteine 20 % nebulizer solution    MUCOMYST     Take by nebulization every 8 hours 5-10 ml inhalation every 8 hours        AMBIEN 10 MG tablet   Generic drug:  zolpidem     30 tablet    Take 1 tablet (10 mg) by mouth nightly as needed    Primary insomnia       BROVANA 15 MCG/2ML Nebu neb solution   Generic drug:  arformoterol     120 mL    USE 1 VIAL IN NEBULIZER 2 TIMES A DAY AS NEEDED    Chronic bronchitis, unspecified chronic bronchitis type (H)        budesonide 1 MG/2ML Susp neb solution    PULMICORT    60 ampule    Take 2 mLs (1 mg) by nebulization daily Take 1 mg by nebulization daily    Bronchitis       clotrimazole 10 MG behzad      1 behzad Mouth/Throat 3 times a day        DEXILANT 60 MG Cpdr CR capsule   Generic drug:  dexlansoprazole      Take 1 capsule by mouth daily as needed        doxycycline 100 MG tablet    VIBRA-TABS     Take 100 mg by mouth 2 times daily Takes one tablet daily prophylaxis.        fluconazole 100 MG tablet    DIFLUCAN    90 tablet    TAKE 1 TABLET BY MOUTH DAILY AS NEEDED    Rash and other nonspecific skin eruption       levalbuterol 0.31 MG/3ML neb solution    XOPENEX    225 mL    Take 3 mLs (0.31 mg) by nebulization every 6 hours as needed for shortness of breath / dyspnea        mometasone 50 MCG/ACT spray    NASONEX    3 Box    Spray 2 sprays into both nostrils daily    Chronic pansinusitis       nystatin 796388 UNIT/GM Powd    MYCOSTATIN    60 g    Apply 1 g topically 3 times daily as needed    Rash       PULMOZYME 1 MG/ML neb solution   Generic drug:  dornase alpha     75 mL    USE 1 VIAL IN NEBULIZER DAILY AS NEEDED    Chronic bronchitis, unspecified chronic bronchitis type (H)       sucralfate 1 GM tablet    CARAFATE    120 tablet    TAKE 1 TABLET BY MOUTH FOUR TIMES A DAY AS NEEDED    Esophageal reflux       sulfamethoxazole-trimethoprim 800-160 MG per tablet    BACTRIM DS/SEPTRA DS    60 tablet    Take 1 tablet by mouth 2 times daily    S/P FESS (functional endoscopic sinus surgery), Chronic pansinusitis       WELLBUTRIN  MG 12 hr tablet   Generic drug:  buPROPion     90 tablet    TAKE 2 TABLETS BY MOUTH EVERY MORNING AND 1 TABLET EVERY AFTERNOON    Dysthymia

## 2018-05-15 NOTE — PATIENT INSTRUCTIONS
Continue w/ Rinses.   Maintain daily Nasonex.   Start bactrim twice a day for 4 weeks  Repeat Blood work/ labs in 2 weeks.   Return to ENT for recheck in 4 weeks w/ Dr. Gonzalez    Thank you for allowing GWEN Perdue and our ENT team to participate in your care.  If your medications are too expensive, please give the nurse a call.  We can possibly change this medication.  If you have a scheduling or an appointment question please contact [facility] Unit Coordinator at their direct line [phone].   ALL nursing questions or concerns can be directed to your ENT nurse at: [phone] [place]

## 2018-05-22 ENCOUNTER — OFFICE VISIT (OUTPATIENT)
Dept: OTOLARYNGOLOGY | Facility: OTHER | Age: 48
End: 2018-05-22
Attending: NURSE PRACTITIONER
Payer: COMMERCIAL

## 2018-05-22 VITALS
TEMPERATURE: 97.4 F | HEART RATE: 95 BPM | WEIGHT: 240 LBS | SYSTOLIC BLOOD PRESSURE: 110 MMHG | HEIGHT: 67 IN | DIASTOLIC BLOOD PRESSURE: 80 MMHG | BODY MASS INDEX: 37.67 KG/M2

## 2018-05-22 DIAGNOSIS — E84.9 CYSTIC FIBROSIS (H): ICD-10-CM

## 2018-05-22 DIAGNOSIS — Z98.890 S/P FESS (FUNCTIONAL ENDOSCOPIC SINUS SURGERY): Primary | ICD-10-CM

## 2018-05-22 DIAGNOSIS — R04.0 EPISTAXIS: ICD-10-CM

## 2018-05-22 DIAGNOSIS — J32.4 CHRONIC PANSINUSITIS: ICD-10-CM

## 2018-05-22 PROCEDURE — 31238 NSL/SINS NDSC SRG NSL HEMRRG: CPT | Mod: 79 | Performed by: NURSE PRACTITIONER

## 2018-05-22 PROCEDURE — 99024 POSTOP FOLLOW-UP VISIT: CPT | Performed by: NURSE PRACTITIONER

## 2018-05-22 ASSESSMENT — PAIN SCALES - GENERAL: PAINLEVEL: NO PAIN (0)

## 2018-05-22 NOTE — PATIENT INSTRUCTIONS
Hold Nasonex and budesonide x 2 weeks.    To prevent epistaxis:   Before bed apply aquaphor ointment to inside both nostrils.  No bending, straining or lifting over 10 pounds.    If any bleeding, use Afrin and seek medical care if uncontrolled.     Follow up in 2 weeks, sooner as needed.       Thank you for allowing Fawn Pressley CNP and our ENT team to participate in your care.  If your medications are too expensive, please give the nurse a call.  We can possibly change this medication.  If you have a scheduling or an appointment question please contact [facility] Unit Coordinator at their direct line [phone].   ALL nursing questions or concerns can be directed to your ENT nurse at: [phone]- Aleksandar

## 2018-05-22 NOTE — LETTER
5/22/2018         RE: Annie Garcia  [address] [address]        Dear Colleague,    Thank you for referring your patient, Annie Garcia, to the [facility]. Please see a copy of my visit note below.    Otolaryngology Progress Note           Chief Complaint:     Patient presents with:  Epistaxis: Pt is s/p fess, septoplasty, and TR on 2/21/18.  She has been having bloody noses.  Pt had a bloody nose on Thursday that lasted 3 hours and then had another last night that lasted 1 hour.         History of Present Illness:     Annie Garcia is a 47 year old female, s/p endoscopic sinus surgery on 2/21/18. She has been following up post operatively for multiple visits with Dr. Gonzalez and Karen Mccallum, with plans to f/u again with Dr. Gonzalez 7/12/18. She is on long term oral Bactrim, given her history of chronic sinusitis.    Annie reports she has been experiencing epistaxis. On Thursday, she had epistaxis that lasted 3 hours and last night, had another one that lasted 1 hour. Seems it is more so her left side. Thursday she had so much bleeding that she noted it came from her other side and down the back of her throat.  She used pressure and cold compresses. No trauma to the nose recently. No bleeding/clotting disorders. She is doing the budesonide nasal saline irrigation a few times a day. Continues to use nasal steroid as directed. No fevers or sinus pressure/pain.     She continues on BID Bactrim.     Tells me that she will not let me me do any suctioning in her nose.     Pre-op Diagnosis: left chronic pansinusitis, deviated nasal septum, turbinate hypertrophy  Post-op Diagnosis:  same  Procedure:  1.  Left total ethmoidectomy  2.  Left frontal sinusotomy  3.  Left maxillary antrostomy with tissue removal  4.  Septoplasty with cartilage reinsertion  5 . Bilateral submucosal reduction inferior turbinates  Procedures performed using [email or web address]  "navigation  Surgeon:  Francoise Gonzalez D.O.  Anesthesia:  General endotracheal  EBL:  Minimal, <5ml  Findings: inspissated thick purulent secretions left maxillary and anterior ethmoids  Severe polypoid mucosal degeneration throughout the left maxillary sinus, mild polypoid changes to the left anterior ethmoids  Right maxillary and anterior ethmoid cavity clear            Review of Systems:     See HPI         Physical Exam:     /80 (BP Location: Right arm, Cuff Size: Adult Large)  Pulse 95  Temp 97.4  F (36.3  C) (Tympanic)  Ht 5' 7\" (1.702 m)  Wt 240 lb (108.9 kg)  BMI 37.59 kg/m2    General - The patient is well nourished and well developed, and appears to have good nutritional status.  Alert and oriented to person and place, interactive.  Head and Face - Normocephalic and atraumatic, with no gross asymmetry noted of the contour of the facial features.  The facial nerve is intact, with strong symmetric movements.  Neck-no palpable lymphadenopathy or thyroid mass.  Trachea is midline.  Eyes - Extraocular movements intact.   Ears- External ears normal. Canals clear. Right tympanic membrane intact without effusion, worrisome retraction or mass. Left tympanic membrane intact without effusion, worrisome retraction or mass.    Nose - Nasal mucosa is pink and moist with no abnormal mucus.  See below  Mouth - Examination of the oral cavity shows pink, healthy, moist mucosa. Dentition in good condition.  No lesions or ulceration noted. The tongue is mobile and midline.    Throat - The walls of the oropharynx were smooth, pink, moist, symmetric, and had no lesions or ulcerations.  The tonsillar pillars and soft palate were symmetric.  The uvula was midline on elevation.      To evaluate the nose and sinuses, I performed rigid nasal endoscopy. I sprayed both nares with lidocaine and neosynephrine.     I began with the LEFT side using a 0 degree rigid nasal endoscope, and then similarly examined the RIGHT " side     FINDINGS:  Septum: left anterior septum with multiple vessel prominence, no active bleeding  Inferior turbinates: reduced  Middle turbinate and middle meatus: Antrostomies open. Crusted bloody debris at the lateran and superior middle turbinate with no active bleeding. No purulence, no polyposis, no synechiae.  Mucosa:  Healthy throughout without polyps or polypoid degeneration.  Nasopharynx: Defers examination      Procedure:  Endoscopic Nasal Cautery and anterior- Options were explained to the patient regarding conservative measures versus nasal cautery in the clinic today.  The patient wished to proceed with cautery of only the left anterior septum.  Risks including infection, further bleeding, possible need for surgery, septal perforation were discussed and the patient consented.  I anesthetized the nose with topical lidocaine and neosynepherine.   I then applied silver nitrate to the vessels, starting distally, and working my way back to the vessels  point of entry onto the nasal mucosa.  After completion, I placed a piece of surgicel on site cauterized.The patient tolerated the procedure well.         Assessment and Plan:       ICD-10-CM    1. S/P FESS (functional endoscopic sinus surgery) Z98.890    2. Epistaxis R04.0    3. Cystic fibrosis (H) E84.9    4. Chronic eosinophilic sinusitis J32.4      Patient did not tolerate rigid nasal endoscopy and deferred any treatment to the middle turbinate.    I did cauterize the left anterior septum with silver nitrate and applied Surgicel to area.    Did did discuss that bleeding could continue if site is coming from the left middle meatus, which is most likely the cause of some of the bleeding. She verbalizes understanding and still refuses to let me examine/debris/cauterize area. States she would rather go back to surgery if this is the area that needs cauterization.    For 2 weeks, hold budesonide and nasonex. Continue Asaf Med sinus irrigation 3-4 times  daily. Aquaphor to both nares AM and PM.  Avoid heavy lifting/straining/repetative bending over.    Use PRN Afrin if epistaxis and seek medical care if uncontrolled.    Follow up in 2 weeks, sooner as needed.     Fawn Pressley NP  ENT  [facility] [place]  [phone]      Again, thank you for allowing me to participate in the care of your patient.        Sincerely,        Fawn Pressley, APRN CNP

## 2018-05-22 NOTE — MR AVS SNAPSHOT
After Visit Summary   5/22/2018    Annie Garcia    MRN: [id number]           Patient Information     Date Of Birth          1970        Visit Information        Provider Department      5/22/2018 8:15 AM Fawn Pressley APRN CNP [facility] [place]        Care Instructions    Hold Nasonex and budesonide x 2 weeks.    To prevent epistaxis:   Before bed apply aquaphor ointment to inside both nostrils.  No bending, straining or lifting over 10 pounds.    If any bleeding, use Afrin and seek medical care if uncontrolled.     Follow up in 2 weeks, sooner as needed.       Thank you for allowing Fawn Pressley CNP and our ENT team to participate in your care.  If your medications are too expensive, please give the nurse a call.  We can possibly change this medication.  If you have a scheduling or an appointment question please contact [facility] Unit Coordinator at their direct line [phone].   ALL nursing questions or concerns can be directed to your ENT nurse at: [phone]- Aleksandar                Follow-ups after your visit        Follow-up notes from your care team     Return in about 2 weeks (around 6/5/2018) for f/u epistaxis.      Your next 10 appointments already scheduled     Jul 12, 2018 11:00 AM CDT   (Arrive by 10:45 AM)   Return Visit with Francoise Gonzalez MD   [facility] [place] ([facility] )    [address] [place] [address]   [phone]              Who to contact     If you have questions or need follow up information about today's clinic visit or your schedule please contact [facility] directly at [phone].  Normal or non-critical lab and imaging results will be communicated to you by MyChart, letter or phone within 4 business days after the clinic has received the results. If you do not hear from us within 7 days, please contact the clinic through MyChart or phone. If you have a critical or abnormal  "lab result, we will notify you by phone as soon as possible.  Submit refill requests through aScentias or call your pharmacy and they will forward the refill request to us. Please allow 3 business days for your refill to be completed.          Additional Information About Your Visit        MyChart Information     aScentias lets you send messages to your doctor, view your test results, renew your prescriptions, schedule appointments and more. To sign up, go to [email or web address] . Click on \"Log in\" on the left side of the screen, which will take you to the Welcome page. Then click on \"Sign up Now\" on the right side of the page.     You will be asked to enter the access code listed below, as well as some personal information. Please follow the directions to create your username and password.     Your access code is: [email or web address]  Expires: 2018  9:12 AM     Your access code will  in 90 days. If you need help or a new code, please call your [place] clinic or [phone].        Care EveryWhere ID     This is your Care EveryWhere ID. This could be used by other organizations to access your [place] medical records  [id number]        Your Vitals Were     Pulse Temperature Height BMI (Body Mass Index)          95 97.4  F (36.3  C) (Tympanic) 5' 7\" (1.702 m) 37.59 kg/m2         Blood Pressure from Last 3 Encounters:   18 110/80   05/15/18 110/66   18 120/74    Weight from Last 3 Encounters:   18 240 lb (108.9 kg)   05/15/18 240 lb (108.9 kg)   18 245 lb (111.1 kg)              Today, you had the following     No orders found for display       Primary Care Provider Office Phone # Fax #    Todd Torres [phone] [address] [address]        Equal Access to Services     [facility] DANIEL : Zack Hastings, wamelvada jose r, qaybta kaalleanna nye. [facility] [phone].    ATENCIÓN: Si habla " español, tiene a seals disposición servicios gratuitos de asistencia lingüística. Ryland valenzuela [phone].    We comply with applicable federal civil rights laws and [place] laws. We do not discriminate on the basis of race, color, national origin, age, disability, sex, sexual orientation, or gender identity.            Thank you!     [facility] you [facility]  [facility]. Our goal is always to provide you with excellent care. Hearing back from our patients is one way we can continue to improve our services. Please take a few minutes to complete the written survey that you may receive in the mail after your visit with us. Thank you!             Your Updated Medication List - Protect others around you: Learn how to safely use, store and throw away your medicines at www.disposemymeds.org.          This list is accurate as of 5/22/18  8:44 AM.  Always use your most recent med list.                   Brand Name Dispense Instructions for use Diagnosis    acetaminophen-codeine 300-30 MG per tablet    TYLENOL #3    90 tablet    TAKE 1 TABLET BY MOUTH EVERY 4 HOURS AS NEEDED FOR MILD PAIN, TAKE FOR COUGHING PAIN.    Hip pain, right       acetylcysteine 20 % nebulizer solution    MUCOMYST     Take by nebulization every 8 hours 5-10 ml inhalation every 8 hours        AMBIEN 10 MG tablet   Generic drug:  zolpidem     30 tablet    Take 1 tablet (10 mg) by mouth nightly as needed    Primary insomnia       BROVANA 15 MCG/2ML Nebu neb solution   Generic drug:  arformoterol     120 mL    USE 1 VIAL IN NEBULIZER 2 TIMES A DAY AS NEEDED    Chronic bronchitis, unspecified chronic bronchitis type (H)       budesonide 1 MG/2ML Susp neb solution    PULMICORT    60 ampule    Take 2 mLs (1 mg) by nebulization daily Take 1 mg by nebulization daily    Bronchitis       clotrimazole 10 MG behzad      1 behzad Mouth/Throat 3 times a day        DEXILANT 60 MG Cpdr CR capsule   Generic drug:  dexlansoprazole      Take 1  capsule by mouth daily as needed        doxycycline 100 MG tablet    VIBRA-TABS     Take 100 mg by mouth 2 times daily Takes one tablet daily prophylaxis.        fluconazole 100 MG tablet    DIFLUCAN    90 tablet    TAKE 1 TABLET BY MOUTH DAILY AS NEEDED    Rash and other nonspecific skin eruption       levalbuterol 0.31 MG/3ML neb solution    XOPENEX    225 mL    Take 3 mLs (0.31 mg) by nebulization every 6 hours as needed for shortness of breath / dyspnea        mometasone 50 MCG/ACT spray    NASONEX    3 Box    Spray 2 sprays into both nostrils daily    Chronic pansinusitis       nystatin [id number] UNIT/GM Powd    MYCOSTATIN    60 g    Apply 1 g topically 3 times daily as needed    Rash       PULMOZYME 1 MG/ML neb solution   Generic drug:  dornase alpha     75 mL    USE 1 VIAL IN NEBULIZER DAILY AS NEEDED    Chronic bronchitis, unspecified chronic bronchitis type (H)       sucralfate 1 GM tablet    CARAFATE    120 tablet    TAKE 1 TABLET BY MOUTH FOUR TIMES A DAY AS NEEDED    Esophageal reflux       sulfamethoxazole-trimethoprim 800-160 MG per tablet    BACTRIM DS/SEPTRA DS    60 tablet    Take 1 tablet by mouth 2 times daily    S/P FESS (functional endoscopic sinus surgery), Chronic pansinusitis       WELLBUTRIN  MG 12 hr tablet   Generic drug:  buPROPion     90 tablet    TAKE 2 TABLETS BY MOUTH EVERY MORNING AND 1 TABLET EVERY AFTERNOON    Dysthymia

## 2018-05-22 NOTE — PROGRESS NOTES
Otolaryngology Progress Note           Chief Complaint:     Patient presents with:  Epistaxis: Pt is s/p fess, septoplasty, and TR on 2/21/18.  She has been having bloody noses.  Pt had a bloody nose on Thursday that lasted 3 hours and then had another last night that lasted 1 hour.         History of Present Illness:     Annie Garcia is a 47 year old female, s/p endoscopic sinus surgery on 2/21/18. She has been following up post operatively for multiple visits with Dr. Gonzalez and Karen Mccallum, with plans to f/u again with Dr. Gonzalez 7/12/18. She is on long term oral Bactrim, given her history of chronic sinusitis.    Annie reports she has been experiencing epistaxis. On Thursday, she had epistaxis that lasted 3 hours and last night, had another one that lasted 1 hour. Seems it is more so her left side. Thursday she had so much bleeding that she noted it came from her other side and down the back of her throat.  She used pressure and cold compresses. No trauma to the nose recently. No bleeding/clotting disorders. She is doing the budesonide nasal saline irrigation a few times a day. Continues to use nasal steroid as directed. No fevers or sinus pressure/pain.     She continues on BID Bactrim.     Tells me that she will not let me me do any suctioning in her nose.     Pre-op Diagnosis: left chronic pansinusitis, deviated nasal septum, turbinate hypertrophy  Post-op Diagnosis:  same  Procedure:  1.  Left total ethmoidectomy  2.  Left frontal sinusotomy  3.  Left maxillary antrostomy with tissue removal  4.  Septoplasty with cartilage reinsertion  5 . Bilateral submucosal reduction inferior turbinates  Procedures performed using [employer] navigation  Surgeon:  Francoise Gonzalez D.O.  Anesthesia:  General endotracheal  EBL:  Minimal, <5ml  Findings: inspissated thick purulent secretions left maxillary and anterior ethmoids  Severe polypoid mucosal degeneration throughout the left maxillary  "sinus, mild polypoid changes to the left anterior ethmoids  Right maxillary and anterior ethmoid cavity clear            Review of Systems:     See HPI         Physical Exam:     /80 (BP Location: Right arm, Cuff Size: Adult Large)  Pulse 95  Temp 97.4  F (36.3  C) (Tympanic)  Ht 5' 7\" (1.702 m)  Wt 240 lb (108.9 kg)  BMI 37.59 kg/m2    General - The patient is well nourished and well developed, and appears to have good nutritional status.  Alert and oriented to person and place, interactive.  Head and Face - Normocephalic and atraumatic, with no gross asymmetry noted of the contour of the facial features.  The facial nerve is intact, with strong symmetric movements.  Neck-no palpable lymphadenopathy or thyroid mass.  Trachea is midline.  Eyes - Extraocular movements intact.   Ears- External ears normal. Canals clear. Right tympanic membrane intact without effusion, worrisome retraction or mass. Left tympanic membrane intact without effusion, worrisome retraction or mass.    Nose - Nasal mucosa is pink and moist with no abnormal mucus.  See below  Mouth - Examination of the oral cavity shows pink, healthy, moist mucosa. Dentition in good condition.  No lesions or ulceration noted. The tongue is mobile and midline.    Throat - The walls of the oropharynx were smooth, pink, moist, symmetric, and had no lesions or ulcerations.  The tonsillar pillars and soft palate were symmetric.  The uvula was midline on elevation.      To evaluate the nose and sinuses, I performed rigid nasal endoscopy. I sprayed both nares with lidocaine and neosynephrine.     I began with the LEFT side using a 0 degree rigid nasal endoscope, and then similarly examined the RIGHT side     FINDINGS:  Septum: left anterior septum with multiple vessel prominence, no active bleeding  Inferior turbinates: reduced  Middle turbinate and middle meatus: Antrostomies open. Crusted bloody debris at the lateran and superior middle turbinate with no " active bleeding. No purulence, no polyposis, no synechiae.  Mucosa:  Healthy throughout without polyps or polypoid degeneration.  Nasopharynx: Defers examination      Procedure:  Endoscopic Nasal Cautery and anterior- Options were explained to the patient regarding conservative measures versus nasal cautery in the clinic today.  The patient wished to proceed with cautery of only the left anterior septum.  Risks including infection, further bleeding, possible need for surgery, septal perforation were discussed and the patient consented.  I anesthetized the nose with topical lidocaine and neosynepherine.   I then applied silver nitrate to the vessels, starting distally, and working my way back to the vessels  point of entry onto the nasal mucosa.  After completion, I placed a piece of surgicel on site cauterized.The patient tolerated the procedure well.         Assessment and Plan:       ICD-10-CM    1. S/P FESS (functional endoscopic sinus surgery) Z98.890    2. Epistaxis R04.0    3. Cystic fibrosis (H) E84.9    4. Chronic eosinophilic sinusitis J32.4      Patient did not tolerate rigid nasal endoscopy and deferred any treatment to the middle turbinate.    I did cauterize the left anterior septum with silver nitrate and applied Surgicel to area.    Did did discuss that bleeding could continue if site is coming from the left middle meatus, which is most likely the cause of some of the bleeding. She verbalizes understanding and still refuses to let me examine/debris/cauterize area. States she would rather go back to surgery if this is the area that needs cauterization.    For 2 weeks, hold budesonide and nasonex. Continue Asaf Med sinus irrigation 3-4 times daily. Aquaphor to both nares AM and PM.  Avoid heavy lifting/straining/repetative bending over.    Use PRN Afrin if epistaxis and seek medical care if uncontrolled.    Follow up in 2 weeks, sooner as needed.     Fawn Pressley NP  ENT  [facility],  [place]  [phone]

## 2018-05-22 NOTE — NURSING NOTE
"Chief Complaint   Patient presents with     Epistaxis     Pt is s/p fess, septoplasty, and TR on 2/21/18.  She has been having bloody noses.  Pt had a bloody nose on Thursday that lasted 3 hours and then had another last night that lasted 1 hour.       Initial /80 (BP Location: Right arm, Cuff Size: Adult Large)  Pulse 95  Temp 97.4  F (36.3  C) (Tympanic)  Ht 5' 7\" (1.702 m)  Wt 240 lb (108.9 kg)  BMI 37.59 kg/m2 Estimated body mass index is 37.59 kg/(m^2) as calculated from the following:    Height as of this encounter: 5' 7\" (1.702 m).    Weight as of this encounter: 240 lb (108.9 kg).  Medication Reconciliation: complete    Juana Toth LPN    "

## 2018-06-01 DIAGNOSIS — R79.89 ELEVATED SERUM CREATININE: ICD-10-CM

## 2018-06-01 LAB
ALBUMIN SERPL-MCNC: 3.8 G/DL (ref 3.4–5)
ALP SERPL-CCNC: 77 U/L (ref 40–150)
ALT SERPL W P-5'-P-CCNC: 25 U/L (ref 0–50)
ANION GAP SERPL CALCULATED.3IONS-SCNC: 6 MMOL/L (ref 3–14)
AST SERPL W P-5'-P-CCNC: 12 U/L (ref 0–45)
BILIRUB SERPL-MCNC: 0.3 MG/DL (ref 0.2–1.3)
BUN SERPL-MCNC: 11 MG/DL (ref 7–30)
CALCIUM SERPL-MCNC: 8.7 MG/DL (ref 8.5–10.1)
CHLORIDE SERPL-SCNC: 107 MMOL/L (ref 94–109)
CO2 SERPL-SCNC: 26 MMOL/L (ref 20–32)
CREAT SERPL-MCNC: 1.07 MG/DL (ref 0.52–1.04)
GFR SERPL CREATININE-BSD FRML MDRD: 55 ML/MIN/1.7M2
GLUCOSE SERPL-MCNC: 127 MG/DL (ref 70–99)
POTASSIUM SERPL-SCNC: 4.3 MMOL/L (ref 3.4–5.3)
PROT SERPL-MCNC: 7.2 G/DL (ref 6.8–8.8)
SODIUM SERPL-SCNC: 139 MMOL/L (ref 133–144)

## 2018-06-01 PROCEDURE — 80053 COMPREHEN METABOLIC PANEL: CPT | Performed by: NURSE PRACTITIONER

## 2018-06-01 PROCEDURE — 36415 COLL VENOUS BLD VENIPUNCTURE: CPT | Performed by: NURSE PRACTITIONER

## 2018-06-05 ENCOUNTER — TRANSFERRED RECORDS (OUTPATIENT)
Dept: HEALTH INFORMATION MANAGEMENT | Facility: CLINIC | Age: 48
End: 2018-06-05

## 2018-06-06 ENCOUNTER — OFFICE VISIT (OUTPATIENT)
Dept: OTOLARYNGOLOGY | Facility: OTHER | Age: 48
End: 2018-06-06
Attending: NURSE PRACTITIONER
Payer: COMMERCIAL

## 2018-06-06 VITALS
TEMPERATURE: 98 F | HEIGHT: 67 IN | WEIGHT: 240 LBS | DIASTOLIC BLOOD PRESSURE: 64 MMHG | OXYGEN SATURATION: 98 % | BODY MASS INDEX: 37.67 KG/M2 | SYSTOLIC BLOOD PRESSURE: 108 MMHG | HEART RATE: 82 BPM

## 2018-06-06 DIAGNOSIS — Z98.890 S/P FESS (FUNCTIONAL ENDOSCOPIC SINUS SURGERY): Primary | ICD-10-CM

## 2018-06-06 DIAGNOSIS — J34.89 NASAL SEPTAL PERFORATION: ICD-10-CM

## 2018-06-06 DIAGNOSIS — J32.4 CHRONIC PANSINUSITIS: ICD-10-CM

## 2018-06-06 DIAGNOSIS — J34.3 NASAL TURBINATE HYPERTROPHY: ICD-10-CM

## 2018-06-06 DIAGNOSIS — J30.2 CHRONIC SEASONAL ALLERGIC RHINITIS, UNSPECIFIED TRIGGER: ICD-10-CM

## 2018-06-06 DIAGNOSIS — E84.9 CYSTIC FIBROSIS (H): ICD-10-CM

## 2018-06-06 DIAGNOSIS — R09.81 NASAL CONGESTION: ICD-10-CM

## 2018-06-06 DIAGNOSIS — Z87.898 HISTORY OF EPISTAXIS: ICD-10-CM

## 2018-06-06 PROCEDURE — 99213 OFFICE O/P EST LOW 20 MIN: CPT | Mod: 25 | Performed by: NURSE PRACTITIONER

## 2018-06-06 PROCEDURE — 31237 NSL/SINS NDSC SURG BX POLYPC: CPT | Performed by: NURSE PRACTITIONER

## 2018-06-06 ASSESSMENT — PAIN SCALES - GENERAL: PAINLEVEL: NO PAIN (0)

## 2018-06-06 NOTE — PROGRESS NOTES
Otolaryngology Progress Note           Chief Complaint:     Patient presents with:  RECHECK: f/u epistaxis, FESS-2/21/18         History of Present Illness:     Annie Garcia is a 47 year old female, history of chronic eosinophilic sinusitis, s/p FESS, septoplasty and TR  on 2/21/18. She had multiple postoperative follow ups for this, but had seen me on 5/22/18 for concerns regarding left sided epistaxis (see note). She would stop with cold compresses and pressure. At times, noted blood down the back of her throat. No history of bleeding/clotting disorders. Had been continuing the budesonide nasal saline irrigation and Nasonex. When I saw her, she reported she would not let me do much in her nose. Limited rigid nasal endoscopy showed left anterior septum with multiple prominent vessels, which she let me cauterize with silver nitrate. The left middle turbinate had some crusted bloody debris at the lateral/superior aspect, but she would not let me remove for further evaluation, explaining that this could be another site of bleeding. I had her hold the budesonide and nasonex x 2 weeks, while continuing regular Asaf Med BID/PRN along with Aquaphor at night.    History of cystic fibrosis, in which she tells me is why she is on daily/chronic use of bactrim and doxycycline, more so to prevent acute sinusitis she states.    Today Annie reports she will have occasional blood noted from her left side when she blows her nose, but denies any dripping of blood. She will be able to pull out some dried blood from the left side occasionally. She currently denies sinus pain/pressure/fever.     She reports that since her surgery, more so since I cauterized her with silver nitrate last time I saw her, she has had limited taste and decreased smell. Prior to surgery, she could not taste anything. She is concerned that everything smells like cigarette smoke, while she is actually never exposed to it. She has been feeling more  "nasal congestion since I had her stop the Nasonex and budesonide, following the occurrence of epistaxis..   She did use the nasonex and budesonide twice in the past 2 weeks, felt improvement and noted no further epistaxis following use of these. .     In the last week, she has had a little more pressure right maxillary sinus cavity area.    Dr. Gonzalez has recommended she return in the future for drug-eluting steroid stents, given her history.    Pre-op Diagnosis: left chronic pansinusitis, deviated nasal septum, turbinate hypertrophy  Post-op Diagnosis:  same  Procedure:  1.  Left total ethmoidectomy  2.  Left frontal sinusotomy  3.  Left maxillary antrostomy with tissue removal  4.  Septoplasty with cartilage reinsertion  5 . Bilateral submucosal reduction inferior turbinates  Procedures performed using [employer] navigation  Surgeon:  Francoise Gonzalez D.O.  Anesthesia:  General endotracheal  EBL:  Minimal, <5ml  Findings: inspissated thick purulent secretions left maxillary and anterior ethmoids  Severe polypoid mucosal degeneration throughout the left maxillary sinus, mild polypoid changes to the left anterior ethmoids  Right maxillary and anterior ethmoid cavity clear         Review of Systems:     See HPI         Physical Exam:   /64 (BP Location: Right arm, Patient Position: Chair, Cuff Size: Adult Regular)  Pulse 82  Temp 98  F (36.7  C) (Tympanic)  Ht 5' 7\" (1.702 m)  Wt 240 lb (108.9 kg)  SpO2 98%  BMI 37.59 kg/m2    General - The patient is well nourished and well developed, and appears to have good nutritional status.  Alert and oriented to person and place, interactive.  Head and Face - Normocephalic and atraumatic, with no gross asymmetry noted of the contour of the facial features.  The facial nerve is intact, with strong symmetric movements.  Neck-no palpable lymphadenopathy or thyroid mass.  Trachea is midline.  Eyes - Extraocular movements intact.   Ears- External ears " normal. Canals clear. Right tympanic membrane intact without effusion, worrisome retraction or mass. Left tympanic membrane intact without effusion, worrisome retraction or mass.    Nose - External nasal anatomy symmetric. See below for rigid nasal endoscopy.  Mouth - Examination of the oral cavity shows pink, healthy, moist mucosa. Dentition in good condition.  No lesions or ulceration noted. The tongue is mobile and midline.    Throat - The walls of the oropharynx were smooth, pink, moist, symmetric, and had no lesions or ulcerations.  The tonsillar pillars and soft palate were symmetric.  The uvula was midline on elevation.     To evaluate the nose and sinuses in the post operative state, I performed rigid nasal endoscopy. Both nares sprayed with lidocaine and neosynephrine.    I began with the LEFT side using a 0 degree rigid nasal endoscope, and then similarly examined the RIGHT side    Findings:  Septum: Left anterior septum prior cautery site healing well with no active bleeding. Right anterior septum with prominent vessels, no active oozing. She defers cautery. There is small septal perforation.   Inferior turbinates: Reduced  Middle turbinate and middle meatus: Right side with lateral MT polyp, no purulence. Left side with polyp at the ethmoid/spehnoid recess. Left MT edematous. Scant/dry crusted debris posterior middle meatus left side, defers removal.  Nosynechiae  Mucosa is more erythematous and edematous left side.  Limited examination due to discomfort.          Assessment and Plan:       ICD-10-CM    1. S/P FESS (functional endoscopic sinus surgery) Z98.890    2. History of epistaxis Z87.898    3. Cystic fibrosis (H) E84.9    4. Chronic eosinophilic sinusitis J32.4    5. Nasal turbinate hypertrophy J34.3    6. Chronic seasonal allergic rhinitis, unspecified trigger J30.2    7. Nasal septal perforation J34.89    8. Nasal congestion R09.81      Minimal nasal bleeding, more so with nose blowing.  She  defers cautery of the right anterior septum  Increased nasal congestion, now with polyps bilateral middle meatus. Likely cause of increase in nasal symptoms.  No active signs of acute sinusitis.    I did offer antibiotic nasal saline irrigation, but she deferred, wanting to continue on her doxycycline and bactrim as directed.    Will have her restart her Nasonex daily as directed, along with budesonide nasal saline irrigation BID and regular Asaf Med sinus rinse BID/PRN.     If any further epistaxis, notify us in ENT.  Patient wants to try this plan for the next month and keep her follow up with Dr. Gonzalez next month.    Encouraged her to f/u as needed.  She agrees to, and is happy with plan of care.    Fawn Pressley NP  ENT  [facility]  [phone]

## 2018-06-06 NOTE — PATIENT INSTRUCTIONS
May resume Budesonide nasal saline rinses twice daily with regular Asaf Med rinses 1-2 times daily.  Resume Nasonex daily as directed.    If any nose bleeding, call ENT.    Keep upcoming follow up with Dr. Gonzalez.      Thank you for allowing Fawn Pressley CNP and our ENT team to participate in your care.  If your medications are too expensive, please give the nurse a call.  We can possibly change this medication.  If you have a scheduling or an appointment question please contact [facility] Coordinator at their direct line [phone].   ALL nursing questions or concerns can be directed to your ENT nurse at: [phone]- Aleksandar

## 2018-06-06 NOTE — NURSING NOTE
"Chief Complaint   Patient presents with     RECHECK     f/u epistaxis, FESS-2/21/18       Initial /64 (BP Location: Right arm, Patient Position: Chair, Cuff Size: Adult Regular)  Pulse 82  Temp 98  F (36.7  C) (Tympanic)  Ht 5' 7\" (1.702 m)  Wt 240 lb (108.9 kg)  SpO2 98%  BMI 37.59 kg/m2 Estimated body mass index is 37.59 kg/(m^2) as calculated from the following:    Height as of this encounter: 5' 7\" (1.702 m).    Weight as of this encounter: 240 lb (108.9 kg).  Medication Reconciliation: complete    Kim Jon LPN    "

## 2018-06-06 NOTE — MR AVS SNAPSHOT
After Visit Summary   6/6/2018    Annie Garcia    MRN: [id number]           Patient Information     Date Of Birth          1970        Visit Information        Provider Department      6/6/2018 2:30 PM Fawn Pressley APRN CNP [facility] [place]        Care Instructions    May resume Budesonide nasal saline rinses twice daily with regular Asaf Med rinses 1-2 times daily.  Resume Nasonex daily as directed.    If any nose bleeding, call ENT.    Keep upcoming follow up with Dr. Gonzalez.      Thank you for allowing Fawn Pressley CNP and our ENT team to participate in your care.  If your medications are too expensive, please give the nurse a call.  We can possibly change this medication.  If you have a scheduling or an appointment question please contact [facility] Coordinator at their direct line [phone].   ALL nursing questions or concerns can be directed to your ENT nurse at: [phone]- [employer]            Follow-ups after your visit        Follow-up notes from your care team     Return if symptoms worsen or fail to improve.      Your next 10 appointments already scheduled     Jul 12, 2018 11:00 AM CDT   (Arrive by 10:45 AM)   Return Visit with Francoise Gonzalez MD   [facility] [place] ([facility] )    [address]  [place] [address]   [phone]              Who to contact     If you have questions or need follow up information about today's clinic visit or your schedule please contact [facility] directly at [phone].  Normal or non-critical lab and imaging results will be communicated to you by MyChart, letter or phone within 4 business days after the clinic has received the results. If you do not hear from us within 7 days, please contact the clinic through MyChart or phone. If you have a critical or abnormal lab result, we will notify you by phone as soon as possible.  Submit refill requests through  "MyChart or call your pharmacy and they will forward the refill request to us. Please allow 3 business days for your refill to be completed.          Additional Information About Your Visit        MyChart Information     [employer] lets you send messages to your doctor, view your test results, renew your prescriptions, schedule appointments and more. To sign up, go to [email or web address] . Click on \"Log in\" on the left side of the screen, which will take you to the Welcome page. Then click on \"Sign up Now\" on the right side of the page.     You will be asked to enter the access code listed below, as well as some personal information. Please follow the directions to create your username and password.     Your access code is: [email or web address]  Expires: 2018  9:12 AM     Your access code will  in 90 days. If you need help or a new code, please call your [place] clinic or [phone].        Care EveryWhere ID     This is your Care EveryWhere ID. This could be used by other organizations to access your [place] medical records  [id number]        Your Vitals Were     Pulse Temperature Height Pulse Oximetry BMI (Body Mass Index)       82 98  F (36.7  C) (Tympanic) 5' 7\" (1.702 m) 98% 37.59 kg/m2        Blood Pressure from Last 3 Encounters:   18 108/64   18 110/80   05/15/18 110/66    Weight from Last 3 Encounters:   18 240 lb (108.9 kg)   18 240 lb (108.9 kg)   05/15/18 240 lb (108.9 kg)              Today, you had the following     No orders found for display       Primary Care Provider Office Phone # Fax #    Todd Torres [phone] [address] [id number]        Equal Access to Services     ANTONINO SANCHEZ : Zack Hastings, jael odell, leanna cotter. [facility] [phone].    ATENCIÓN: Si habla español, tiene a seals disposición servicios gratuitos de asistencia lingüística. " Ryland valenzuela [phone].    We comply with applicable federal civil rights laws and [place] laws. We do not discriminate on the basis of race, color, national origin, age, disability, sex, sexual orientation, or gender identity.            Thank you!     Thank you for choosing [facility]  for your care. Our goal is always to provide you with excellent care. Hearing back from our patients is one way we can continue to improve our services. Please take a few minutes to complete the written survey that you may receive in the mail after your visit with us. Thank you!             Your Updated Medication List - Protect others around you: Learn how to safely use, store and throw away your medicines at www.disposemymeds.org.          This list is accurate as of 6/6/18  2:53 PM.  Always use your most recent med list.                   Brand Name Dispense Instructions for use Diagnosis    acetaminophen-codeine 300-30 MG per tablet    TYLENOL #3    90 tablet    TAKE 1 TABLET BY MOUTH EVERY 4 HOURS AS NEEDED FOR MILD PAIN, TAKE FOR COUGHING PAIN.    Hip pain, right       acetylcysteine 20 % nebulizer solution    MUCOMYST     Take by nebulization every 8 hours 5-10 ml inhalation every 8 hours        AMBIEN 10 MG tablet   Generic drug:  zolpidem     30 tablet    Take 1 tablet (10 mg) by mouth nightly as needed    Primary insomnia       BROVANA 15 MCG/2ML Nebu neb solution   Generic drug:  arformoterol     120 mL    USE 1 VIAL IN NEBULIZER 2 TIMES A DAY AS NEEDED    Chronic bronchitis, unspecified chronic bronchitis type (H)       budesonide 1 MG/2ML Susp neb solution    PULMICORT    60 ampule    Take 2 mLs (1 mg) by nebulization daily Take 1 mg by nebulization daily    Bronchitis       clotrimazole 10 MG behzad      1 behzad Mouth/Throat 3 times a day        DEXILANT 60 MG Cpdr CR capsule   Generic drug:  dexlansoprazole      Take 1 capsule by mouth daily as needed        doxycycline 100 MG tablet    VIBRA-TABS      Take 100 mg by mouth 2 times daily Takes one tablet daily prophylaxis.        fluconazole 100 MG tablet    DIFLUCAN    90 tablet    TAKE 1 TABLET BY MOUTH DAILY AS NEEDED    Rash and other nonspecific skin eruption       levalbuterol 0.31 MG/3ML neb solution    XOPENEX    225 mL    Take 3 mLs (0.31 mg) by nebulization every 6 hours as needed for shortness of breath / dyspnea        mometasone 50 MCG/ACT spray    NASONEX    3 Box    Spray 2 sprays into both nostrils daily    Chronic pansinusitis       nystatin 271056 UNIT/GM Powd    MYCOSTATIN    60 g    Apply 1 g topically 3 times daily as needed    Rash       PULMOZYME 1 MG/ML neb solution   Generic drug:  dornase alpha     75 mL    USE 1 VIAL IN NEBULIZER DAILY AS NEEDED    Chronic bronchitis, unspecified chronic bronchitis type (H)       sucralfate 1 GM tablet    CARAFATE    120 tablet    TAKE 1 TABLET BY MOUTH FOUR TIMES A DAY AS NEEDED    Esophageal reflux       sulfamethoxazole-trimethoprim 800-160 MG per tablet    BACTRIM DS/SEPTRA DS    60 tablet    Take 1 tablet by mouth 2 times daily    S/P FESS (functional endoscopic sinus surgery), Chronic pansinusitis       WELLBUTRIN  MG 12 hr tablet   Generic drug:  buPROPion     90 tablet    TAKE 2 TABLETS BY MOUTH EVERY MORNING AND 1 TABLET EVERY AFTERNOON    Dysthymia

## 2018-06-06 NOTE — LETTER
6/6/2018         RE: Annie Garcia  [address] [facility] [address]        Dear Colleague,    Thank you for referring your patient, Annie Garcia, to the [facility]. Please see a copy of my visit note below.    Otolaryngology Progress Note           Chief Complaint:     Patient presents with:  RECHECK: f/u epistaxis, FESS-2/21/18         History of Present Illness:     Annie Garcia is a 47 year old female, history of chronic eosinophilic sinusitis, s/p FESS, septoplasty and TR  on 2/21/18. She had multiple postoperative follow ups for this, but had seen me on 5/22/18 for concerns regarding left sided epistaxis (see note). She would stop with cold compresses and pressure. At times, noted blood down the back of her throat. No history of bleeding/clotting disorders. Had been continuing the budesonide nasal saline irrigation and Nasonex. When I saw her, she reported she would not let me do much in her nose. Limited rigid nasal endoscopy showed left anterior septum with multiple prominent vessels, which she let me cauterize with silver nitrate. The left middle turbinate had some crusted bloody debris at the lateral/superior aspect, but she would not let me remove for further evaluation, explaining that this could be another site of bleeding. I had her hold the budesonide and nasonex x 2 weeks, while continuing regular Asaf Med BID/PRN along with Aquaphor at night.    History of cystic fibrosis, in which she tells me is why she is on daily/chronic use of bactrim and doxycycline, more so to prevent acute sinusitis she states.    Today Annie reports she will have occasional blood noted from her left side when she blows her nose, but denies any dripping of blood. She will be able to pull out some dried blood from the left side occasionally. She currently denies sinus pain/pressure/fever.     She reports that since her surgery, more so since I cauterized her with silver  "nitrate last time I saw her, she has had limited taste and decreased smell. Prior to surgery, she could not taste anything. She is concerned that everything smells like cigarette smoke, while she is actually never exposed to it. She has been feeling more nasal congestion since I had her stop the Nasonex and budesonide, following the occurrence of epistaxis..   She did use the nasonex and budesonide twice in the past 2 weeks, felt improvement and noted no further epistaxis following use of these. .     In the last week, she has had a little more pressure right maxillary sinus cavity area.    Dr. Gonzalez has recommended she return in the future for drug-eluting steroid stents, given her history.    Pre-op Diagnosis: left chronic pansinusitis, deviated nasal septum, turbinate hypertrophy  Post-op Diagnosis:  same  Procedure:  1.  Left total ethmoidectomy  2.  Left frontal sinusotomy  3.  Left maxillary antrostomy with tissue removal  4.  Septoplasty with cartilage reinsertion  5 . Bilateral submucosal reduction inferior turbinates  Procedures performed using [employer] navigation  Surgeon:  Francoise Gonzalez D.O.  Anesthesia:  General endotracheal  EBL:  Minimal, <5ml  Findings: inspissated thick purulent secretions left maxillary and anterior ethmoids  Severe polypoid mucosal degeneration throughout the left maxillary sinus, mild polypoid changes to the left anterior ethmoids  Right maxillary and anterior ethmoid cavity clear         Review of Systems:     See HPI         Physical Exam:   /64 (BP Location: Right arm, Patient Position: Chair, Cuff Size: Adult Regular)  Pulse 82  Temp 98  F (36.7  C) (Tympanic)  Ht 5' 7\" (1.702 m)  Wt 240 lb (108.9 kg)  SpO2 98%  BMI 37.59 kg/m2    General - The patient is well nourished and well developed, and appears to have good nutritional status.  Alert and oriented to person and place, interactive.  Head and Face - Normocephalic and atraumatic, with no gross " asymmetry noted of the contour of the facial features.  The facial nerve is intact, with strong symmetric movements.  Neck-no palpable lymphadenopathy or thyroid mass.  Trachea is midline.  Eyes - Extraocular movements intact.   Ears- External ears normal. Canals clear. Right tympanic membrane intact without effusion, worrisome retraction or mass. Left tympanic membrane intact without effusion, worrisome retraction or mass.    Nose - External nasal anatomy symmetric. See below for rigid nasal endoscopy.  Mouth - Examination of the oral cavity shows pink, healthy, moist mucosa. Dentition in good condition.  No lesions or ulceration noted. The tongue is mobile and midline.    Throat - The walls of the oropharynx were smooth, pink, moist, symmetric, and had no lesions or ulcerations.  The tonsillar pillars and soft palate were symmetric.  The uvula was midline on elevation.     To evaluate the nose and sinuses in the post operative state, I performed rigid nasal endoscopy. Both nares sprayed with lidocaine and neosynephrine.    I began with the LEFT side using a 0 degree rigid nasal endoscope, and then similarly examined the RIGHT side    Findings:  Septum: Left anterior septum prior cautery site healing well with no active bleeding. Right anterior septum with prominent vessels, no active oozing. She defers cautery. There is small septal perforation.   Inferior turbinates: Reduced  Middle turbinate and middle meatus: Right side with lateral MT polyp, no purulence. Left side with polyp at the ethmoid/spehnoid recess. Left MT edematous. Scant/dry crusted debris posterior middle meatus left side, defers removal.  Nosynechiae  Mucosa is more erythematous and edematous left side.  Limited examination due to discomfort.          Assessment and Plan:       ICD-10-CM    1. S/P FESS (functional endoscopic sinus surgery) Z98.890    2. History of epistaxis Z87.898    3. Cystic fibrosis (H) E84.9    4. Chronic eosinophilic  sinusitis J32.4    5. Nasal turbinate hypertrophy J34.3    6. Chronic seasonal allergic rhinitis, unspecified trigger J30.2    7. Nasal septal perforation J34.89    8. Nasal congestion R09.81      Minimal nasal bleeding, more so with nose blowing.  She defers cautery of the right anterior septum  Increased nasal congestion, now with polyps bilateral middle meatus. Likely cause of increase in nasal symptoms.  No active signs of acute sinusitis.    I did offer antibiotic nasal saline irrigation, but she deferred, wanting to continue on her doxycycline and bactrim as directed.    Will have her restart her Nasonex daily as directed, along with budesonide nasal saline irrigation BID and regular Asaf Med sinus rinse BID/PRN.     If any further epistaxis, notify us in ENT.  Patient wants to try this plan for the next month and keep her follow up with Dr. Gonzalez next month.    Encouraged her to f/u as needed.  She agrees to, and is happy with plan of care.    Fawn Pressley, NP  ENT  [facility]  [phone]      Again, thank you for allowing me to participate in the care of your patient.        Sincerely,        Fawn Pressley, APRN CNP

## 2018-06-15 DIAGNOSIS — R79.89 ELEVATED SERUM CREATININE: ICD-10-CM

## 2018-06-15 LAB
ALBUMIN SERPL-MCNC: 3.8 G/DL (ref 3.4–5)
ALP SERPL-CCNC: 82 U/L (ref 40–150)
ALT SERPL W P-5'-P-CCNC: 28 U/L (ref 0–50)
ANION GAP SERPL CALCULATED.3IONS-SCNC: 8 MMOL/L (ref 3–14)
AST SERPL W P-5'-P-CCNC: 16 U/L (ref 0–45)
BILIRUB SERPL-MCNC: 0.1 MG/DL (ref 0.2–1.3)
BUN SERPL-MCNC: 10 MG/DL (ref 7–30)
CALCIUM SERPL-MCNC: 8.8 MG/DL (ref 8.5–10.1)
CHLORIDE SERPL-SCNC: 107 MMOL/L (ref 94–109)
CO2 SERPL-SCNC: 26 MMOL/L (ref 20–32)
CREAT SERPL-MCNC: 1.04 MG/DL (ref 0.52–1.04)
GFR SERPL CREATININE-BSD FRML MDRD: 56 ML/MIN/1.7M2
GLUCOSE SERPL-MCNC: 100 MG/DL (ref 70–99)
POTASSIUM SERPL-SCNC: 4 MMOL/L (ref 3.4–5.3)
PROT SERPL-MCNC: 7.1 G/DL (ref 6.8–8.8)
SODIUM SERPL-SCNC: 141 MMOL/L (ref 133–144)

## 2018-06-15 PROCEDURE — 80053 COMPREHEN METABOLIC PANEL: CPT | Performed by: NURSE PRACTITIONER

## 2018-06-15 PROCEDURE — 36415 COLL VENOUS BLD VENIPUNCTURE: CPT | Performed by: NURSE PRACTITIONER

## 2018-06-18 DIAGNOSIS — F51.01 PRIMARY INSOMNIA: ICD-10-CM

## 2018-06-19 RX ORDER — ZOLPIDEM TARTRATE 10 MG/1
TABLET, FILM COATED ORAL
Qty: 30 TABLET | Refills: 3 | Status: SHIPPED | OUTPATIENT
Start: 2018-06-19 | End: 2018-10-16

## 2018-06-22 DIAGNOSIS — R79.89 ELEVATED SERUM CREATININE: ICD-10-CM

## 2018-06-22 LAB
ALBUMIN SERPL-MCNC: 4.1 G/DL (ref 3.4–5)
ALP SERPL-CCNC: 85 U/L (ref 40–150)
ALT SERPL W P-5'-P-CCNC: 26 U/L (ref 0–50)
ANION GAP SERPL CALCULATED.3IONS-SCNC: 7 MMOL/L (ref 3–14)
AST SERPL W P-5'-P-CCNC: 14 U/L (ref 0–45)
BILIRUB SERPL-MCNC: 0.2 MG/DL (ref 0.2–1.3)
BUN SERPL-MCNC: 18 MG/DL (ref 7–30)
CALCIUM SERPL-MCNC: 9 MG/DL (ref 8.5–10.1)
CHLORIDE SERPL-SCNC: 103 MMOL/L (ref 94–109)
CO2 SERPL-SCNC: 26 MMOL/L (ref 20–32)
CREAT SERPL-MCNC: 1.16 MG/DL (ref 0.52–1.04)
GFR SERPL CREATININE-BSD FRML MDRD: 50 ML/MIN/1.7M2
GLUCOSE SERPL-MCNC: 96 MG/DL (ref 70–99)
POTASSIUM SERPL-SCNC: 4.3 MMOL/L (ref 3.4–5.3)
PROT SERPL-MCNC: 7.4 G/DL (ref 6.8–8.8)
SODIUM SERPL-SCNC: 136 MMOL/L (ref 133–144)

## 2018-06-22 PROCEDURE — 36415 COLL VENOUS BLD VENIPUNCTURE: CPT | Performed by: NURSE PRACTITIONER

## 2018-06-22 PROCEDURE — 80053 COMPREHEN METABOLIC PANEL: CPT | Performed by: NURSE PRACTITIONER

## 2018-06-25 ENCOUNTER — TELEPHONE (OUTPATIENT)
Dept: INTERNAL MEDICINE | Facility: OTHER | Age: 48
End: 2018-06-25

## 2018-06-25 DIAGNOSIS — R79.89 ELEVATED SERUM CREATININE: Primary | ICD-10-CM

## 2018-06-25 NOTE — TELEPHONE ENCOUNTER
Patient notified on lab. Patient states stopped bactrim on 6/22/18. Patient states also drinking lots of water every day. Lab pended for patient to come in and have lab done in 2 weeks for recheck. Please advise.  Paulina Greer, CMA

## 2018-07-09 DIAGNOSIS — R79.89 ELEVATED SERUM CREATININE: ICD-10-CM

## 2018-07-09 LAB
ALBUMIN SERPL-MCNC: 4.1 G/DL (ref 3.4–5)
ALP SERPL-CCNC: 94 U/L (ref 40–150)
ALT SERPL W P-5'-P-CCNC: 28 U/L (ref 0–50)
ANION GAP SERPL CALCULATED.3IONS-SCNC: 9 MMOL/L (ref 3–14)
AST SERPL W P-5'-P-CCNC: 16 U/L (ref 0–45)
BILIRUB SERPL-MCNC: 0.2 MG/DL (ref 0.2–1.3)
BUN SERPL-MCNC: 13 MG/DL (ref 7–30)
CALCIUM SERPL-MCNC: 9.2 MG/DL (ref 8.5–10.1)
CHLORIDE SERPL-SCNC: 106 MMOL/L (ref 94–109)
CO2 SERPL-SCNC: 25 MMOL/L (ref 20–32)
CREAT SERPL-MCNC: 0.99 MG/DL (ref 0.52–1.04)
GFR SERPL CREATININE-BSD FRML MDRD: 60 ML/MIN/1.7M2
GLUCOSE SERPL-MCNC: 120 MG/DL (ref 70–99)
POTASSIUM SERPL-SCNC: 3.9 MMOL/L (ref 3.4–5.3)
PROT SERPL-MCNC: 7.6 G/DL (ref 6.8–8.8)
SODIUM SERPL-SCNC: 140 MMOL/L (ref 133–144)

## 2018-07-09 PROCEDURE — 80053 COMPREHEN METABOLIC PANEL: CPT | Performed by: NURSE PRACTITIONER

## 2018-07-09 PROCEDURE — 36415 COLL VENOUS BLD VENIPUNCTURE: CPT | Performed by: NURSE PRACTITIONER

## 2018-07-12 ENCOUNTER — OFFICE VISIT (OUTPATIENT)
Dept: OTOLARYNGOLOGY | Facility: OTHER | Age: 48
End: 2018-07-12
Attending: OTOLARYNGOLOGY
Payer: COMMERCIAL

## 2018-07-12 VITALS
WEIGHT: 240 LBS | TEMPERATURE: 97.4 F | OXYGEN SATURATION: 97 % | DIASTOLIC BLOOD PRESSURE: 76 MMHG | SYSTOLIC BLOOD PRESSURE: 110 MMHG | HEIGHT: 67 IN | HEART RATE: 93 BPM | BODY MASS INDEX: 37.67 KG/M2

## 2018-07-12 DIAGNOSIS — Z98.890 S/P FESS (FUNCTIONAL ENDOSCOPIC SINUS SURGERY): ICD-10-CM

## 2018-07-12 DIAGNOSIS — J32.4 CHRONIC PANSINUSITIS: ICD-10-CM

## 2018-07-12 DIAGNOSIS — E84.9 CYSTIC FIBROSIS (H): Primary | ICD-10-CM

## 2018-07-12 PROCEDURE — 31237 NSL/SINS NDSC SURG BX POLYPC: CPT | Performed by: OTOLARYNGOLOGY

## 2018-07-12 PROCEDURE — 99215 OFFICE O/P EST HI 40 MIN: CPT | Mod: 25 | Performed by: OTOLARYNGOLOGY

## 2018-07-12 RX ORDER — SULFAMETHOXAZOLE/TRIMETHOPRIM 800-160 MG
1 TABLET ORAL 2 TIMES DAILY
Qty: 30 TABLET | Refills: 1 | Status: SHIPPED | OUTPATIENT
Start: 2018-07-12 | End: 2018-08-20

## 2018-07-12 ASSESSMENT — PAIN SCALES - GENERAL: PAINLEVEL: NO PAIN (0)

## 2018-07-12 NOTE — NURSING NOTE
"Chief Complaint   Patient presents with     RECHECK     sinus check-- ANNA HUMMEL, TR-2/21/18       Initial /76 (BP Location: Left arm, Patient Position: Chair, Cuff Size: Adult Large)  Pulse 93  Temp 97.4  F (36.3  C) (Tympanic)  Ht 5' 7\" (1.702 m)  Wt 240 lb (108.9 kg)  SpO2 97%  BMI 37.59 kg/m2 Estimated body mass index is 37.59 kg/(m^2) as calculated from the following:    Height as of this encounter: 5' 7\" (1.702 m).    Weight as of this encounter: 240 lb (108.9 kg).  Medication Reconciliation: complete    Kim Jon LPN    "

## 2018-07-12 NOTE — MR AVS SNAPSHOT
After Visit Summary   7/12/2018    Annie Garcia    MRN: [id number]           Patient Information     Date Of Birth          1970        Visit Information        Provider Department      7/12/2018 11:00 AM Francoise Gonzalez MD [facility]        Care Instructions    Thank you for allowing Dr. Gonzalez and our ENT team to participate in your care.  If your medications are too expensive, please give the nurse a call.  We can possibly change this medication.  If you have a scheduling or an appointment question please contact [facility] Unit Coordinator at their direct line [phone].   ALL nursing questions or concerns can be directed to your ENT nurse at: [phone] - Sarahi    Continue the Bactrim as prescribed  Continue Nasonex  Use Aquaphor Ointment as needed  Continue Budesonide Rinses  Follow up with Dr. Gonzalez in 1 month    Budesonide nasal saline irrigation per instructions:  -Obtain Asaf Med Sinus rinse over the counter.    -Use warm distilled water and 2 packets of the salt solution that comes with the bottle, dissolve in bottle up to the 240 mL airam.  -Add 1 vial of budesonide.  -Irrigate each side of your nose leaning over the sink, using 1/3 to 1/2 the volume of the bottle in each nostril every irrigation.  Irrigate 2 times daily.  -If additional rinses are needed/recommended, you may use the plan Asaf Med Sinus irrigation without the use of added budesonide.             Follow-ups after your visit        Follow-up notes from your care team     Return in about 4 weeks (around 8/9/2018).      Who to contact     If you have questions or need follow up information about today's clinic visit or your schedule please contact [facility] directly at [phone].  Normal or non-critical lab and imaging results will be communicated to you by MyChart, letter or phone within 4 business days after the clinic has received the results. If you  "do not hear from us within 7 days, please contact the clinic through [employer] or phone. If you have a critical or abnormal lab result, we will notify you by phone as soon as possible.  Submit refill requests through [employer] or call your pharmacy and they will forward the refill request to us. Please allow 3 business days for your refill to be completed.          Additional Information About Your Visit        Care EveryWhere ID     This is your Care EveryWhere ID. This could be used by other organizations to access your [facility] medical records  [id number]        Your Vitals Were     Pulse Temperature Height Pulse Oximetry BMI (Body Mass Index)       93 97.4  F (36.3  C) (Tympanic) 5' 7\" (1.702 m) 97% 37.59 kg/m2        Blood Pressure from Last 3 Encounters:   07/12/18 110/76   06/06/18 108/64   05/22/18 110/80    Weight from Last 3 Encounters:   07/12/18 240 lb (108.9 kg)   06/06/18 240 lb (108.9 kg)   05/22/18 240 lb (108.9 kg)              Today, you had the following     No orders found for display       Primary Care Provider Office Phone # Fax #    Todd Torres [phone]       [address] [place] [address]        Equal Access to Services     ANTONINO SANCHEZ : Hadii caitlin moody hadasho Soomaali, waaxda luqadaha, qaybta kaalmada adeegyada, waxay barney langford . So [facility] [phone].    ATENCIÓN: Si habla español, tiene a seals disposición servicios gratuitos de asistencia lingüística. ame al [phone].    We comply with applicable federal civil rights laws and [place] laws. We do not discriminate on the basis of race, color, national origin, age, disability, sex, sexual orientation, or gender identity.            Thank you!     Thank you for choosing [facility]  for your care. Our goal is always to provide you with excellent care. Hearing back from our patients is one way we can continue to improve our services. Please take a few minutes to complete " the written survey that you may receive in the mail after your visit with us. Thank you!             Your Updated Medication List - Protect others around you: Learn how to safely use, store and throw away your medicines at www.disposemymeds.org.          This list is accurate as of 7/12/18 11:27 AM.  Always use your most recent med list.                   Brand Name Dispense Instructions for use Diagnosis    acetaminophen-codeine 300-30 MG per tablet    TYLENOL #3    90 tablet    TAKE 1 TABLET BY MOUTH EVERY 4 HOURS AS NEEDED FOR MILD PAIN, TAKE FOR COUGHING PAIN.    Hip pain, right       acetylcysteine 20 % nebulizer solution    MUCOMYST     Take by nebulization every 8 hours 5-10 ml inhalation every 8 hours        AMBIEN 10 MG tablet   Generic drug:  zolpidem     30 tablet    TAKE 1 TABLET BY MOUTH NIGHTLY AS NEEDED FOR SLEEP    Primary insomnia       BROVANA 15 MCG/2ML Nebu neb solution   Generic drug:  arformoterol     120 mL    USE 1 VIAL IN NEBULIZER 2 TIMES A DAY AS NEEDED    Chronic bronchitis, unspecified chronic bronchitis type (H)       budesonide 1 MG/2ML Susp neb solution    PULMICORT    60 ampule    Take 2 mLs (1 mg) by nebulization daily Take 1 mg by nebulization daily    Bronchitis       clotrimazole 10 MG behzad      1 behzad Mouth/Throat 3 times a day        DEXILANT 60 MG Cpdr CR capsule   Generic drug:  dexlansoprazole      Take 1 capsule by mouth daily as needed        doxycycline 100 MG tablet    VIBRA-TABS     Take 100 mg by mouth 2 times daily Takes one tablet daily prophylaxis.        fluconazole 100 MG tablet    DIFLUCAN    90 tablet    TAKE 1 TABLET BY MOUTH DAILY AS NEEDED    Rash and other nonspecific skin eruption       levalbuterol 0.31 MG/3ML neb solution    XOPENEX    225 mL    Take 3 mLs (0.31 mg) by nebulization every 6 hours as needed for shortness of breath / dyspnea        mometasone 50 MCG/ACT spray    NASONEX    3 Box    Spray 2 sprays into both nostrils daily    Chronic  pansinusitis       nystatin [id number] UNIT/GM Powd    MYCOSTATIN    60 g    Apply 1 g topically 3 times daily as needed    Rash       PULMOZYME 1 MG/ML neb solution   Generic drug:  dornase alpha     75 mL    USE 1 VIAL IN NEBULIZER DAILY AS NEEDED    Chronic bronchitis, unspecified chronic bronchitis type (H)       sucralfate 1 GM tablet    CARAFATE    120 tablet    TAKE 1 TABLET BY MOUTH FOUR TIMES A DAY AS NEEDED    Esophageal reflux       sulfamethoxazole-trimethoprim 800-160 MG per tablet    BACTRIM DS/SEPTRA DS    60 tablet    Take 1 tablet by mouth 2 times daily    S/P FESS (functional endoscopic sinus surgery), Chronic pansinusitis       WELLBUTRIN  MG 12 hr tablet   Generic drug:  buPROPion     90 tablet    TAKE 2 TABLETS BY MOUTH EVERY MORNING AND 1 TABLET EVERY AFTERNOON    Dysthymia

## 2018-07-12 NOTE — PROGRESS NOTES
"Otolaryngology Progress Note      History of Present Illness   Patient presents with:  RECHECK: sinus check-- FESS SEPTO, TR-2/21/18      Annie Garcia is a 48 year old female with a history of cystic fibrosis and eosinophilic chronic recurrent sinusitis status post endoscopic sinus surgery on 2/21/2018 presents for surveillance today.    She has had some left epistaxis and is seeing Fawn Pressley for this.  She is on budesonide irrigations twice a day and Nasonex     She is on oral doxycycline for CF prophylaxis and Karen started her on Bactrim on 5/15/2018.  She feels that since she has been on Bactrim (culture directed against Staph Aureus) her sinuses overall feel improved but she notes more of postnasal drainage.  She continues to have parosmia smelling smoke constantly where there is no smoke.  She has taste disturbance food just does not taste as good.      There is no wheezing or shortness of breath     No fever, no cough    She is using her budesonide irrigations and is restarted Nasonex.    Her right sinuses were clear at her surgery with me in 2/21       PROCEDURES:   1.  Left total ethmoidectomy.   2.  Left frontal sinusotomy.   3.  Left maxillary antrostomy with tissue removal.   4.  Septoplasty with cartilage reinsertion.   5.  Bilateral submucosal reduction inferior turbinates.   6.  Procedures performed using [employer] navigation.        Cultures were positive again for Heavy growth Staph A, susceptible to Sulfa. She has tolerated Sulfa medications w/o concerns.   FINAL DIAGNOSIS:   Sinuses, FESS   - Inflammatory nasal polyps with approximately 100 eosinophils/hpf   - Severe chronic sinusitis     Physical Exam  /76 (BP Location: Left arm, Patient Position: Chair, Cuff Size: Adult Large)  Pulse 93  Temp 97.4  F (36.3  C) (Tympanic)  Ht 5' 7\" (1.702 m)  Wt 240 lb (108.9 kg)  SpO2 97%  BMI 37.59 kg/m2  General - The patient is well nourished and well developed, and appears to have " good nutritional status.  Alert and oriented to person and place, interactive.   Head and Face - Normocephalic and atraumatic, with no gross asymmetry noted of the contour of the facial features.  The facial nerve is intact, with strong symmetric movements.  No wheezing, no chest retractions  Neck-no palpable lymphadenopathy or thyroid mass.  Trachea is midline.  Eyes - Extraocular movements intact.   Ears- External auditory canals are patent, tympanic membranes are intact without effusion or worrisome retractions   Nose - Nasal mucosa is pink and moist with no abnormal mucus.  The septum was grossly midline and non-obstructive, turbinates of normal size and position.   Mouth - Examination of the oral cavity shows pink, healthy, moist mucosa.  No lesions or ulceration noted.  The dentition are in good repair.  The tongue is mobile and midline.  Throat - The walls of the oropharynx were smooth, pink, moist, symmetric, and had no lesions or ulcerations.  The tonsillar pillars and soft palate were symmetric.  The uvula was midline on elevation.        To evaluate the nose and sinuses in the post operative state, I performed rigid nasal endoscopy. The LPN had previously sprayed both nares with lidocaine and neosynephrine.    I began with the LEFT side using a 0 degree rigid nasal endoscope, and then similarly examined the RIGHT side    Findings:  Inferior turbinates:  Mild edema  Middle turbinate and middle meatus: Bright green viscous thick secretions suctioned debrided from the maxillary sinuses bilaterally with a curved suction  Antrostomy patent with some polypoid degeneration mucosa of antrum and a non obstructive polyp adjacent to the remaining right uncinate  Ethmoid cavity clear  Bilateral maxillary sinus irrigation performed  She tolerated this all well aside from standard associated procedural anxiety which has been improving    Impression/Plan  Annie A Damonseda Garcia is a 48 year old female    ICD-10-CM    1.  Cystic fibrosis (H) E84.9    2. S/P FESS (functional endoscopic sinus surgery) Z98.890    3. Chronic eosinophilic sinusitis J32.4      I do want her to stay on her Bactrim to cover staph until I see her back in 1 month.  Consider tobramycin and office irrigations.  I do not think she would tolerate an office steroid eluding stent placement and she agrees.  I told mom because she is actually doing better with sinus debridements in the office but she feels she has a lot of anxiety.  I have encouraged Annie did try to take ibuprofen before she comes in for her next debridement as her epistaxis has improved.  Continue her prophylactic doxycycline    Continue budesonide irrigations bid and Nasonex.  Correct use was reinforced particularly with the budesonide irrigations I do want this solution to be as warm as possible.    If there is no improvement follow-up I would order a CAT scan and perform a sinus exam with debridement under anesthesia she may need additional surgery performed based on the CAT scan she agrees with the plan and will contact us sooner if there are any other concerns    She did not tolerate removal of the propel implants so I would have to decide if these are refusing at her next surgery.    Total exam time was over 40 minutes with over 25 minutes of this time spent in direct patient education, counseling and coordination of care, review of prior surgeries and micro reports.  We discussed the importance of high flow nasal saline use to prevent nasal secretion stasis, the correct use of nasal steroids    Francoise Gonzalez D.O.  Otolaryngology/Head and Neck Surgery  Allergy

## 2018-07-12 NOTE — PATIENT INSTRUCTIONS
Thank you for allowing Dr. Gonzalez and our ENT team to participate in your care.  If your medications are too expensive, please give the nurse a call.  We can possibly change this medication.  If you have a scheduling or an appointment question please contact [facility] Coord[facility] at their direct line [phone].   ALL nursing questions or concerns can be directed to your ENT nurse at: [phone] - Sarahi    Continue the Bactrim as prescribed  Continue Nasonex  Use Aquaphor Ointment as needed  Continue Budesonide Rinses  Follow up with Dr. Gonzalez in 1 month    Budesonide nasal saline irrigation per instructions:  -Obtain Asaf Med Sinus rinse over the counter.    -Use warm distilled water and 2 packets of the salt solution that comes with the bottle, dissolve in bottle up to the 240 mL airam.  -Add 1 vial of budesonide.  -Irrigate each side of your nose leaning over the sink, using 1/3 to 1/2 the volume of the bottle in each nostril every irrigation.  Irrigate 2 times daily.  -If additional rinses are needed/recommended, you may use the plan Asaf Med Sinus irrigation without the use of added budesonide.

## 2018-08-20 ENCOUNTER — OFFICE VISIT (OUTPATIENT)
Dept: OTOLARYNGOLOGY | Facility: OTHER | Age: 48
End: 2018-08-20
Attending: OTOLARYNGOLOGY
Payer: COMMERCIAL

## 2018-08-20 VITALS
HEIGHT: 67 IN | BODY MASS INDEX: 37.67 KG/M2 | OXYGEN SATURATION: 98 % | DIASTOLIC BLOOD PRESSURE: 74 MMHG | SYSTOLIC BLOOD PRESSURE: 116 MMHG | HEART RATE: 88 BPM | TEMPERATURE: 99.1 F | WEIGHT: 240 LBS

## 2018-08-20 DIAGNOSIS — E84.9 CYSTIC FIBROSIS (H): Primary | ICD-10-CM

## 2018-08-20 DIAGNOSIS — J34.89 NASAL SEPTAL PERFORATION: ICD-10-CM

## 2018-08-20 DIAGNOSIS — J32.4 CHRONIC PANSINUSITIS: ICD-10-CM

## 2018-08-20 DIAGNOSIS — Z98.890 S/P FESS (FUNCTIONAL ENDOSCOPIC SINUS SURGERY): ICD-10-CM

## 2018-08-20 LAB
CREAT SERPL-MCNC: 1.02 MG/DL (ref 0.52–1.04)
GFR SERPL CREATININE-BSD FRML MDRD: 58 ML/MIN/1.7M2

## 2018-08-20 PROCEDURE — 87186 SC STD MICRODIL/AGAR DIL: CPT | Performed by: OTOLARYNGOLOGY

## 2018-08-20 PROCEDURE — 99215 OFFICE O/P EST HI 40 MIN: CPT | Mod: 25 | Performed by: OTOLARYNGOLOGY

## 2018-08-20 PROCEDURE — 31237 NSL/SINS NDSC SURG BX POLYPC: CPT | Performed by: OTOLARYNGOLOGY

## 2018-08-20 PROCEDURE — 99000 SPECIMEN HANDLING OFFICE-LAB: CPT | Performed by: OTOLARYNGOLOGY

## 2018-08-20 PROCEDURE — 36415 COLL VENOUS BLD VENIPUNCTURE: CPT | Performed by: OTOLARYNGOLOGY

## 2018-08-20 PROCEDURE — 87070 CULTURE OTHR SPECIMN AEROBIC: CPT | Performed by: OTOLARYNGOLOGY

## 2018-08-20 PROCEDURE — 82565 ASSAY OF CREATININE: CPT | Performed by: OTOLARYNGOLOGY

## 2018-08-20 PROCEDURE — 87077 CULTURE AEROBIC IDENTIFY: CPT | Performed by: OTOLARYNGOLOGY

## 2018-08-20 PROCEDURE — 87107 FUNGI IDENTIFICATION MOLD: CPT | Mod: 90 | Performed by: OTOLARYNGOLOGY

## 2018-08-20 PROCEDURE — 87102 FUNGUS ISOLATION CULTURE: CPT | Mod: 90 | Performed by: OTOLARYNGOLOGY

## 2018-08-20 RX ORDER — SULFAMETHOXAZOLE/TRIMETHOPRIM 800-160 MG
1 TABLET ORAL 2 TIMES DAILY
Qty: 60 TABLET | Refills: 3 | Status: SHIPPED | OUTPATIENT
Start: 2018-08-20 | End: 2018-10-08

## 2018-08-20 ASSESSMENT — PAIN SCALES - GENERAL: PAINLEVEL: NO PAIN (0)

## 2018-08-20 NOTE — MR AVS SNAPSHOT
After Visit Summary   8/20/2018    Annie Garcia    MRN: [id number]           Patient Information     Date Of Birth          1970        Visit Information        Provider Department      8/20/2018 2:30 PM Francoise Gonzalez MD [facility] [place]        Today's Diagnoses     Cystic fibrosis (H)    -  1    Chronic pansinusitis        Nasal septal perforation        S/P FESS (functional endoscopic sinus surgery)          Care Instructions    Thank you for allowing Dr. Gonzalez and our ENT team to participate in your care.  If your medications are too expensive, please give the nurse a call.  We can possibly change this medication.  If you have a scheduling or an appointment question please contact [facility] Unit Coordinator at their direct line [phone].   ALL nursing questions or concerns can be directed to your ENT nurse at: [phone] [facility] [place]    Continue Budesonide Irrigations  Re-Start Oral Bactrim  Complete Creatinine Lab today and in 1 week  Follow up in surgery      Budesonide nasal saline irrigation per instructions:  -Obtain Asaf Med Sinus rinse over the counter.    -Use warm distilled water and 2 packets of the salt solution that comes with the bottle, dissolve in bottle up to the 240 mL airam.  -Add 1 vial of budesonide.  -Irrigate each side of your nose leaning over the sink, using 1/3 to 1/2 the volume of the bottle in each nostril every irrigation.  Irrigate 2 times daily.  -If additional rinses are needed/recommended, you may use the plan Asaf Med Sinus irrigation without the use of added budesonide.       Instructions for Sinus Surgery    Recovery - Everyone recovers differently from a general anesthetic.  Symptoms such as fatigue, nausea, light-headedness, and sometimes a low grade fever (up to 100 degrees) are not unusual.  As your body removes the anesthetic drugs from circulation, these symptoms will resolve.  Your nose will be sore after  surgery, and you may even have symptoms similar to a sinus infection with headache, congestion, and pressure.  These will resolve with healing.  For several days you may experience bloody drainage from the nose, please use the drip pad as necessary for this.  If there is persistent bleeding, please call the office during business hours or the on call ENT physician after hours.  There are no diet restrictions after sinus surgery, and you can resume your home medications.      Please do not blow your nose until two weeks after surgery.  At 2 weeks you may gently blow your nose, unless otherwise indicated by Dr. Gonzalez.     Limit your activity to no strenuous activities until I see you for the first follow-up visit in approximately 2 weeks.      Medications - You were sent home with narcotic pain medication.  If you can tolerate the discomfort during your recovery by using just plain Tylenol or ibuprofen (advil), please do so.  However, do not hesitate to use the stronger pain medication if needed.  If you were sent home with an antibiotic, it is primarily used to help the healing process.  If it causes loose bowel movements or other signs of intolerance, it is appropriate to discontinue it.  By far the most important measure you can take to speed recovery, and maximize the chances of long term success of sinus surgery is using the sinus rinses at least three time per day for the first month after surgery.       Start Amanda Med saline irrigation tomorrow and use at least 5 times daily.     I recommend 2 amanda med bottles, one to add the budesonide to and irrigate with the budesonide rinse twice a day for 2 months.    In the other amanda med bottle use only the saline solution, and irrigate with this at least 3 additional times daily.    Perform gentle irrigation for the first week.  Starting 1 week after surgery, you should increase the volume of amanda med saline irrigation to each nostril, continuing to use the rinses  in an alternating fashion at least 5 times daily.  You cannot use too much of the amanda med saline, but please limit budesonide rinses to twice daily.    At 2 weeks after surgery, you may also restart nasal steroids (flonase, nasonex, etc).        Complications - Problems related to sinus surgery almost always are detected during the operation, and special instruction will be given in that situation.  However, unexpected things can happen, and are all related to the structures around the sinus cavities.  Symptoms that should alert you to a possible problem include: severe eye pain or eye swelling, persistent heavy bleeding from the nose, and high fevers with headache and neck pain.  Any of these symptoms should be called into my office or to the on call ENT if after hours.  The most common non-emergency complication of sinus surgery is the formation of scar tissue which can re-block the sinuses.  This is addressed below.    Follow-up -  As you have noted, there are quite a few follow-up visits after sinus surgery.  This is done to aggressively manage the most common complication of this technique, which is scar tissue blocking the sinuses.  These visits will require the examination of your nose and possibly removal of crusts of dry mucous and blood, with possible removal of early scar tissue.  Please prepare for these visits by using your sinus rinses.    If there are any questions or issues with the above, or if there are other issues that concern you, always feel free to call the clinic and I am happy to speak with you as soon as I can.    Francoise Gonzalez D.O.  Otolaryngology/Head and Neck Surgery  Allergy    [phone] office            HOW TO PREPARE-      You need to have a scheduled Pre-Op with your primary care physician within 30 days of your scheduled surgery. You should be set up with this before you leave today.       You need a friend or family member available to drive you home AND stay with  you for 24 hours after you leave the hospital. You will not be allowed to drive yourself. IF you need to take a taxi or the bus you MUST have a responsible person to ride with you. YOUR PROCEDURE WILL BE CANCELLED IF YOU DO NOT HAVE A RESPONSIBLE ADULT TO DRIVE YOU HOME.       You CANNOT have anything to eat or drink after midnight the night before your surgery, including water and coffee. Your stomach needs to be completely empty. Do NOT chew gum, suck on hard candy, or smoke. You can brush your teeth the morning of surgery.       The Surgery Education Nurses will call you  1-2 weeks prior to your surgery date at  [phone] or toll free [phone]. Please have your medication and allergy lists ready.      Stop your aspirin or other NSAIDs(Ibuprofen, Motrin, Aleve, Celebrex, Naproxen, etc...) 7 days before your surgery.      Hospital admitting will call you the day before your surgery with your exact arrival time.       Please call your primary care physician if you should become ill within 24 hours of scheduled surgery. (ex.vomiting, diarrhea, fever)          You will need to wash the night before AND the morning of you procedure with a liquid antibacterial soap, like Dial.           Follow-ups after your visit        Your next 10 appointments already scheduled     Aug 30, 2018  4:00 PM CDT   (Arrive by 3:45 PM)   Pre-Op physical with Todd Torres NP   [facility] [place] ([facility] [place] )    [address]  [place] [address]   [phone]            Sep 12, 2018  3:45 PM CDT   (Arrive by 3:30 PM)   Post Op with Karen Mccallum PA-C   [facility] [place] ([facility] [place] )    [address]  [place] [address]   [phone]            Oct 08, 2018  2:15 PM CDT   (Arrive by 2:00 PM)   Post Op with Francoise Gonzalez MD   [facility] ([facility] [place] )    [address]  [place] [address]   [phone]              Future tests  "that were ordered for you today     Open Future Orders        Priority Expected Expires Ordered    Creatinine Routine 8/27/2018 8/20/2019 8/20/2018            Who to contact     If you have questions or need follow up information about today's clinic visit or your schedule please contact [facility] directly at [phone].  Normal or non-critical lab and imaging results will be communicated to you by MyChart, letter or phone within 4 business days after the clinic has received the results. If you do not hear from us within 7 days, please contact the clinic through MyChart or phone. If you have a critical or abnormal lab result, we will notify you by phone as soon as possible.  Submit refill requests through [employer] or call your pharmacy and they will forward the refill request to us. Please allow 3 business days for your refill to be completed.          Additional Information About Your Visit        Care EveryWhere ID     This is your Care EveryWhere ID. This could be used by other organizations to access your [facility] medical records  [id number]        Your Vitals Were     Pulse Temperature Height Pulse Oximetry BMI (Body Mass Index)       88 99.1  F (37.3  C) (Tympanic) 5' 7\" (1.702 m) 98% 37.59 kg/m2        Blood Pressure from Last 3 Encounters:   08/20/18 116/74   07/12/18 110/76   06/06/18 108/64    Weight from Last 3 Encounters:   08/20/18 240 lb (108.9 kg)   07/12/18 240 lb (108.9 kg)   06/06/18 240 lb (108.9 kg)              We Performed the Following     Creatinine     Fungus Culture, non-blood     Sinus Culture Aerobic Bacterial          Where to get your medicines      These medications were sent to [facility] - [address]  [address] [address]     Phone:  [phone]     sulfamethoxazole-trimethoprim 800-160 MG per tablet          Primary Care Provider Office Phone # Fax #    Todd Torres [phone] [address] " [facility] [address]        Equal Access to Services     [facility] DANIEL : Hadii caitlin moody susana Sotrino, waaxda luqadaha, qaybta kaalmaleanna munoz. So [facility] [phone].    ATENCIÓN: Si habla español, tiene a seals disposición servicios gratuitos de asistencia lingüística. Llame al [phone].    We comply with applicable federal civil rights laws and [place] laws. We do not discriminate on the basis of race, color, national origin, age, disability, sex, sexual orientation, or gender identity.            Thank you!     Thank you for choosing [facility]  for your care. Our goal is always to provide you with excellent care. Hearing back from our patients is one way we can continue to improve our services. Please take a few minutes to complete the written survey that you may receive in the mail after your visit with us. Thank you!             Your Updated Medication List - Protect others around you: Learn how to safely use, store and throw away your medicines at www.disposemymeds.org.          This list is accurate as of 8/20/18  3:19 PM.  Always use your most recent med list.                   Brand Name Dispense Instructions for use Diagnosis    acetaminophen-codeine 300-30 MG per tablet    TYLENOL #3    90 tablet    TAKE 1 TABLET BY MOUTH EVERY 4 HOURS AS NEEDED FOR MILD PAIN, TAKE FOR COUGHING PAIN.    Hip pain, right       acetylcysteine 20 % nebulizer solution    MUCOMYST     Take by nebulization every 8 hours 5-10 ml inhalation every 8 hours        AMBIEN 10 MG tablet   Generic drug:  zolpidem     30 tablet    TAKE 1 TABLET BY MOUTH NIGHTLY AS NEEDED FOR SLEEP    Primary insomnia       BROVANA 15 MCG/2ML Nebu neb solution   Generic drug:  arformoterol     120 mL    USE 1 VIAL IN NEBULIZER 2 TIMES A DAY AS NEEDED    Chronic bronchitis, unspecified chronic bronchitis type (H)       budesonide 1 MG/2ML Susp neb solution    PULMICORT    60 ampule    Take 2  mLs (1 mg) by nebulization daily Take 1 mg by nebulization daily    Bronchitis       clotrimazole 10 MG behzad      1 behzad Mouth/Throat 3 times a day        DEXILANT 60 MG Cpdr CR capsule   Generic drug:  dexlansoprazole      Take 1 capsule by mouth daily as needed        doxycycline 100 MG tablet    VIBRA-TABS     Take 100 mg by mouth 2 times daily Takes one tablet daily prophylaxis.        fluconazole 100 MG tablet    DIFLUCAN    90 tablet    TAKE 1 TABLET BY MOUTH DAILY AS NEEDED    Rash and other nonspecific skin eruption       levalbuterol 0.31 MG/3ML neb solution    XOPENEX    225 mL    Take 3 mLs (0.31 mg) by nebulization every 6 hours as needed for shortness of breath / dyspnea        mometasone 50 MCG/ACT spray    NASONEX    3 Box    Spray 2 sprays into both nostrils daily    Chronic pansinusitis       nystatin 357247 UNIT/GM Powd    MYCOSTATIN    60 g    Apply 1 g topically 3 times daily as needed    Rash       PREDNISONE PO           PULMOZYME 1 MG/ML neb solution   Generic drug:  dornase alpha     75 mL    USE 1 VIAL IN NEBULIZER DAILY AS NEEDED    Chronic bronchitis, unspecified chronic bronchitis type (H)       sucralfate 1 GM tablet    CARAFATE    120 tablet    TAKE 1 TABLET BY MOUTH FOUR TIMES A DAY AS NEEDED    Esophageal reflux       sulfamethoxazole-trimethoprim 800-160 MG per tablet    BACTRIM DS/SEPTRA DS    60 tablet    Take 1 tablet by mouth 2 times daily    S/P FESS (functional endoscopic sinus surgery), Chronic pansinusitis       WELLBUTRIN  MG 12 hr tablet   Generic drug:  buPROPion     90 tablet    TAKE 2 TABLETS BY MOUTH EVERY MORNING AND 1 TABLET EVERY AFTERNOON    Dysthymia

## 2018-08-20 NOTE — PROGRESS NOTES
Otolaryngology Progress Note      History of Present Illness       Annie Garcia is a 48 year old female   with a history of cystic fibrosis and eosinophilic chronic recurrent sinusitis status post endoscopic sinus surgery on 2/21/2018 presents for surveillance today.    She is on oral doxycycline for cystic fibrosis prophylaxis.  She noted improvement on Bactrim postoperatively which was started on based on staph aureus sinus cultures by us post operatively.    Annie has had copious thick postnasal discharge and congestion.  States she feels like she is just not getting enough oxygen.  She does not have wheezing or shortness of breath; no productive cough.     She notes occasional whistling with nose blowing, no chronic epistaxis.     Her right sinuses were clear at her surgery with me in 2/21 over the last follow-up    At her last visit with me bilateral Bright green viscous thick secretions were noted , clinically c/w Pseudomonas but she has never cultured P.A., and states she never recalls a single PA culture.  Therefore she was continued on Bactrim and is presenting back today    She is continuing her budesonide irrigations but does not feel any benefit    She reports she was recently placed on oral prednisone by pulmonology and their goal is to limit her oral prednisone bursts to 3x a year.         PROCEDURES:   1.  Left total ethmoidectomy.   2.  Left frontal sinusotomy.   3.  Left maxillary antrostomy with tissue removal.   4.  Septoplasty with cartilage reinsertion.   5.  Bilateral submucosal reduction inferior turbinates.   6.  Procedures performed using Caarbon navigation.         Cultures were positive again for Heavy growth Staph A, susceptible to Sulfa. She has tolerated Sulfa medications w/o concerns.   FINAL DIAGNOSIS:   Sinuses, FESS   - Inflammatory nasal polyps with approximately 100 eosinophils/hpf   - Severe chronic sinusitis     Physical Exam  /74 (BP Location: Right arm,  "Patient Position: Chair, Cuff Size: Adult Large)  Pulse 88  Temp 99.1  F (37.3  C) (Tympanic)  Ht 5' 7\" (1.702 m)  Wt 240 lb (108.9 kg)  SpO2 98%  BMI 37.59 kg/m2    General - The patient is well nourished and well developed, and appears to have good nutritional status.  Alert and oriented to person and place, interactive.  Anxious  Lungs-clear to auscultation  Nose - Nasal mucosa is pink and moist with no abnormal mucus.  The septum was grossly midline and non-obstructive, turbinates of normal size and position.  No polyps noted on examination.  Small 2-3 mm anterior septal perforation.   Mouth - Examination of the oral cavity shows pink, healthy, moist mucosa.  No lesions or ulceration noted.  The dentition are in good repair.  The tongue is mobile and midline.  Throat - The walls of the oropharynx were smooth, pink, moist, symmetric, and had no visible postnasal purulent drainage    To evaluate the nose and sinuses in the post operative state, I performed rigid nasal endoscopy. The LPN had previously sprayed both nares with lidocaine and neosynephrine.     I began with the LEFT side using a 0 degree rigid nasal endoscope, and then similarly examined the RIGHT side     Findings:  Inferior turbinates:  Mild edema  Middle turbinate and middle meatus:  green viscous thick secretions and cast like material suctioned debrided from the maxillary sinuses bilaterally with a curved suction and blakesly  Antrostomy patent with some polypoid degeneration mucosa of antrum but copious secretions coating bilateral max sinus mucosa, she did not tolerate complete debridement.  Sinus irrigation deferred as she states this is an awful feeling and I plan on sedated debridement.  Ethmoid cavity clear.    Impression/Plan  Annie Garcia is a 48 year old female    ICD-10-CM    1. Cystic fibrosis (H) E84.9 Sinus Culture Aerobic Bacterial     Fungus Culture, non-blood     Creatinine     Creatinine     SURGERY ORDER   2. " Chronic pansinusitis J32.4 Sinus Culture Aerobic Bacterial     Fungus Culture, non-blood     Creatinine     Creatinine     sulfamethoxazole-trimethoprim (BACTRIM DS/SEPTRA DS) 800-160 MG per tablet     SURGERY ORDER   3. Nasal septal perforation J34.89 SURGERY ORDER   4. S/P FESS (functional endoscopic sinus surgery) Z98.890 sulfamethoxazole-trimethoprim (BACTRIM DS/SEPTRA DS) 800-160 MG per tablet     SURGERY ORDER       Will draw Creatinine baseline before starting oral Bactrim  I recommend sinus exam under anesthesia    I discussed the option of whether or not to use mometasone sinus implants again, and I discussed the recent 3 month trial that did show significant improvement in nasal obstruction and congestion in patients with polyps.  However with cystic fibrosis she will have  recalcitrant disease.  She does feel she had 3-4 months of clear sinuses and would like to use the propels again.  I will plan on using bilateral maxillary contours.     I also messaged Dr. Soria for her opinion on nebulized/topical antibotics    Addendum:  Annie called and does not want to proceed with closure small septal perforation.      The risks and complications of sinus exam under anesthesia were openly discussed.  The potential risks include bleeding, general anesthesia, infection, scar formation, need for additional surgery, stable or enlarging septal perforation, and rarely injury to the eye with the possibility of blindness,  injury to the brain, meningitis or other intracranial complications.  Nonsurgical options were discussed including prolonged antibioitics and/or oral and topical steroids.   Sinus anatomy and sinus disease were reviewed.  CT sinus was reviewed with the patient.  All questions were answered.     Total exam time was over 40 minutes with over 25 minutes of this time spent in direct patient education, counseling and coordination of care.  We discussed sinus anatomy and surgery, options in topical abx and  oral bactrim      Francoise Gonzalez D.O.  Otolaryngology/Head and Neck Surgery  Allergy

## 2018-08-20 NOTE — LETTER
8/20/2018         RE: Annie Garcia  [address] [facility] [address]        Dear Colleague,    Thank you for referring your patient, Annie Garcia, to the [facility] [place]. Please see a copy of my visit note below.    Otolaryngology Progress Note      History of Present Illness       Annie Garcia is a 48 year old female   with a history of cystic fibrosis and eosinophilic chronic recurrent sinusitis status post endoscopic sinus surgery on 2/21/2018 presents for surveillance today.    She is on oral doxycycline for cystic fibrosis prophylaxis.  She noted improvement on Bactrim postoperatively which was started on based on staph aureus sinus cultures by us post operatively.    Annie has had copious thick postnasal discharge and congestion.  States she feels like she is just not getting enough oxygen.  She does not have wheezing or shortness of breath; no productive cough.     She notes occasional whistling with nose blowing, no chronic epistaxis.     Her right sinuses were clear at her surgery with me in 2/21 over the last follow-up    At her last visit with me bilateral Bright green viscous thick secretions were noted , clinically c/w Pseudomonas but she has never cultured P.A., and states she never recalls a single PA culture.  Therefore she was continued on Bactrim and is presenting back today    She is continuing her budesonide irrigations but does not feel any benefit    She reports she was recently placed on oral prednisone by pulmonology and their goal is to limit her oral prednisone bursts to 3x a year.         PROCEDURES:   1.  Left total ethmoidectomy.   2.  Left frontal sinusotomy.   3.  Left maxillary antrostomy with tissue removal.   4.  Septoplasty with cartilage reinsertion.   5.  Bilateral submucosal reduction inferior turbinates.   6.  Procedures performed using Kipt[employer] navigation.         Cultures were positive again for Heavy growth Staph A,  "susceptible to Sulfa. She has tolerated Sulfa medications w/o concerns.   FINAL DIAGNOSIS:   Sinuses, FESS   - Inflammatory nasal polyps with approximately 100 eosinophils/hpf   - Severe chronic sinusitis     Physical Exam  /74 (BP Location: Right arm, Patient Position: Chair, Cuff Size: Adult Large)  Pulse 88  Temp 99.1  F (37.3  C) (Tympanic)  Ht 5' 7\" (1.702 m)  Wt 240 lb (108.9 kg)  SpO2 98%  BMI 37.59 kg/m2    General - The patient is well nourished and well developed, and appears to have good nutritional status.  Alert and oriented to person and place, interactive.  Anxious  Lungs-clear to auscultation  Nose - Nasal mucosa is pink and moist with no abnormal mucus.  The septum was grossly midline and non-obstructive, turbinates of normal size and position.  No polyps noted on examination.  Small 2-3 mm anterior septal perforation.   Mouth - Examination of the oral cavity shows pink, healthy, moist mucosa.  No lesions or ulceration noted.  The dentition are in good repair.  The tongue is mobile and midline.  Throat - The walls of the oropharynx were smooth, pink, moist, symmetric, and had no visible postnasal purulent drainage    To evaluate the nose and sinuses in the post operative state, I performed rigid nasal endoscopy. The LPN had previously sprayed both nares with lidocaine and neosynephrine.     I began with the LEFT side using a 0 degree rigid nasal endoscope, and then similarly examined the RIGHT side     Findings:  Inferior turbinates:  Mild edema  Middle turbinate and middle meatus:  green viscous thick secretions and cast like material suctioned debrided from the maxillary sinuses bilaterally with a curved suction and blakesly  Antrostomy patent with some polypoid degeneration mucosa of antrum but copious secretions coating bilateral max sinus mucosa, she did not tolerate complete debridement.  Sinus irrigation deferred as she states this is an awful feeling and I plan on sedated " debridement.  Ethmoid cavity clear.    Impression/Plan  Annie Garcia is a 48 year old female    ICD-10-CM    1. Cystic fibrosis (H) E84.9 Sinus Culture Aerobic Bacterial     Fungus Culture, non-blood     Creatinine     Creatinine     SURGERY ORDER   2. Chronic pansinusitis J32.4 Sinus Culture Aerobic Bacterial     Fungus Culture, non-blood     Creatinine     Creatinine     sulfamethoxazole-trimethoprim (BACTRIM DS/SEPTRA DS) 800-160 MG per tablet     SURGERY ORDER   3. Nasal septal perforation J34.89 SURGERY ORDER   4. S/P FESS (functional endoscopic sinus surgery) Z98.890 sulfamethoxazole-trimethoprim (BACTRIM DS/SEPTRA DS) 800-160 MG per tablet     SURGERY ORDER       Will draw Creatinine baseline before starting oral Bactrim  I recommend sinus exam under anesthesia    I discussed the option of whether or not to use mometasone sinus implants again, and I discussed the recent 3 month trial that did show significant improvement in nasal obstruction and congestion in patients with polyps.  However with cystic fibrosis she will have  recalcitrant disease.  She does feel she had 3-4 months of clear sinuses and would like to use the propels again.  I will plan on using bilateral maxillary contours.     I also messaged Dr. Soria for her opinion on nebulized/topical antibotics    The risks and complications of sinus exam under anesthesia with closure septal perforation were openly discussed.  The potential risks include bleeding, general anesthesia, infection, scar formation, need for additional surgery, stable or enlarging septal perforation, and rarely injury to the eye with the possibility of blindness,  injury to the brain, meningitis or other intracranial complications.  Nonsurgical options were discussed including prolonged antibioitics and/or oral and topical steroids.   Sinus anatomy and sinus disease were reviewed.  CT sinus was reviewed with the patient.  All questions were answered.     Total exam  time was over 40 minutes with over 25 minutes of this time spent in direct patient education, counseling and coordination of care.  We discussed sinus anatomy and surgery, options in topical abx and oral bactrim        Francoise Gonzalez D.O.  Otolaryngology/Head and Neck Surgery  Allergy            Again, thank you for allowing me to participate in the care of your patient.        Sincerely,        Francoise Gonzalez MD

## 2018-08-20 NOTE — NURSING NOTE
"Chief Complaint   Patient presents with     RECHECK     sinus check- [place], 2/21/18       Initial /74 (BP Location: Right arm, Patient Position: Chair, Cuff Size: Adult Large)  Pulse 88  Temp 99.1  F (37.3  C) (Tympanic)  Ht 5' 7\" (1.702 m)  Wt 240 lb (108.9 kg)  SpO2 98%  BMI 37.59 kg/m2 Estimated body mass index is 37.59 kg/(m^2) as calculated from the following:    Height as of this encounter: 5' 7\" (1.702 m).    Weight as of this encounter: 240 lb (108.9 kg).  Medication Reconciliation: complete    Kim Jon LPN    "

## 2018-08-20 NOTE — PATIENT INSTRUCTIONS
Thank you for allowing Dr. Gonzalez and our ENT team to participate in your care.  If your medications are too expensive, please give the nurse a call.  We can possibly change this medication.  If you have a scheduling or an appointment question please contact [facility] Coordinator at their direct line [phone].   ALL nursing questions or concerns can be directed to your ENT nurse at: [phone] [facility] [place]    Continue Budesonide Irrigations  Re-Start Oral Bactrim  Complete Creatinine Lab today and in 1 week  Follow up in surgery      Budesonide nasal saline irrigation per instructions:  -Obtain Asaf Med Sinus rinse over the counter.    -Use warm distilled water and 2 packets of the salt solution that comes with the bottle, dissolve in bottle up to the 240 mL airam.  -Add 1 vial of budesonide.  -Irrigate each side of your nose leaning over the sink, using 1/3 to 1/2 the volume of the bottle in each nostril every irrigation.  Irrigate 2 times daily.  -If additional rinses are needed/recommended, you may use the plan Asaf Med Sinus irrigation without the use of added budesonide.       Instructions for Sinus Surgery    Recovery - Everyone recovers differently from a general anesthetic.  Symptoms such as fatigue, nausea, light-headedness, and sometimes a low grade fever (up to 100 degrees) are not unusual.  As your body removes the anesthetic drugs from circulation, these symptoms will resolve.  Your nose will be sore after surgery, and you may even have symptoms similar to a sinus infection with headache, congestion, and pressure.  These will resolve with healing.  For several days you may experience bloody drainage from the nose, please use the drip pad as necessary for this.  If there is persistent bleeding, please call the office during business hours or the on call ENT physician after hours.  There are no diet restrictions after sinus surgery, and you can resume your home medications.      Please  do not blow your nose until two weeks after surgery.  At 2 weeks you may gently blow your nose, unless otherwise indicated by Dr. Gonzalez.     Limit your activity to no strenuous activities until I see you for the first follow-up visit in approximately 2 weeks.      Medications - You were sent home with narcotic pain medication.  If you can tolerate the discomfort during your recovery by using just plain Tylenol or ibuprofen (advil), please do so.  However, do not hesitate to use the stronger pain medication if needed.  If you were sent home with an antibiotic, it is primarily used to help the healing process.  If it causes loose bowel movements or other signs of intolerance, it is appropriate to discontinue it.  By far the most important measure you can take to speed recovery, and maximize the chances of long term success of sinus surgery is using the sinus rinses at least three time per day for the first month after surgery.       Start Amanda Med saline irrigation tomorrow and use at least 5 times daily.     I recommend 2 amanda med bottles, one to add the budesonide to and irrigate with the budesonide rinse twice a day for 2 months.    In the other amanda med bottle use only the saline solution, and irrigate with this at least 3 additional times daily.    Perform gentle irrigation for the first week.  Starting 1 week after surgery, you should increase the volume of amanda med saline irrigation to each nostril, continuing to use the rinses in an alternating fashion at least 5 times daily.  You cannot use too much of the amanda med saline, but please limit budesonide rinses to twice daily.    At 2 weeks after surgery, you may also restart nasal steroids (flonase, nasonex, etc).        Complications - Problems related to sinus surgery almost always are detected during the operation, and special instruction will be given in that situation.  However, unexpected things can happen, and are all related to the structures  around the sinus cavities.  Symptoms that should alert you to a possible problem include: severe eye pain or eye swelling, persistent heavy bleeding from the nose, and high fevers with headache and neck pain.  Any of these symptoms should be called into my office or to the on call ENT if after hours.  The most common non-emergency complication of sinus surgery is the formation of scar tissue which can re-block the sinuses.  This is addressed below.    Follow-up -  As you have noted, there are quite a few follow-up visits after sinus surgery.  This is done to aggressively manage the most common complication of this technique, which is scar tissue blocking the sinuses.  These visits will require the examination of your nose and possibly removal of crusts of dry mucous and blood, with possible removal of early scar tissue.  Please prepare for these visits by using your sinus rinses.    If there are any questions or issues with the above, or if there are other issues that concern you, always feel free to call the clinic and I am happy to speak with you as soon as I can.    Francoise Gonzalez D.O.  Otolaryngology/Head and Neck Surgery  Allergy    [phone] office            HOW TO PREPARE-      You need to have a scheduled Pre-Op with your primary care physician within 30 days of your scheduled surgery. You should be set up with this before you leave today.       You need a friend or family member available to drive you home AND stay with you for 24 hours after you leave the hospital. You will not be allowed to drive yourself. IF you need to take a taxi or the bus you MUST have a responsible person to ride with you. YOUR PROCEDURE WILL BE CANCELLED IF YOU DO NOT HAVE A RESPONSIBLE ADULT TO DRIVE YOU HOME.       You CANNOT have anything to eat or drink after midnight the night before your surgery, including water and coffee. Your stomach needs to be completely empty. Do NOT chew gum, suck on hard candy, or smoke.  You can brush your teeth the morning of surgery.       The Surgery Education Nurses will call you  1-2 weeks prior to your surgery date at  [phone] or toll free [phone]. Please have your medication and allergy lists ready.      Stop your aspirin or other NSAIDs(Ibuprofen, Motrin, Aleve, Celebrex, Naproxen, etc...) 7 days before your surgery.      Hospital admitting will call you the day before your surgery with your exact arrival time.       Please call your primary care physician if you should become ill within 24 hours of scheduled surgery. (ex.vomiting, diarrhea, fever)          You will need to wash the night before AND the morning of you procedure with a liquid antibacterial soap, like Dial.

## 2018-08-21 ENCOUNTER — TELEPHONE (OUTPATIENT)
Dept: OTOLARYNGOLOGY | Facility: OTHER | Age: 48
End: 2018-08-21

## 2018-08-21 NOTE — TELEPHONE ENCOUNTER
This patient calls today stating that she does not want to undergo the septal perforation closure during his upcoming surgery. She does only want to do the sinus exam. MEDARDO

## 2018-08-22 DIAGNOSIS — J32.0 CHRONIC MAXILLARY SINUSITIS: Primary | ICD-10-CM

## 2018-08-23 LAB
BACTERIA SPEC CULT: ABNORMAL
BACTERIA SPEC CULT: ABNORMAL
SPECIMEN SOURCE: ABNORMAL

## 2018-08-29 DIAGNOSIS — E84.9 CYSTIC FIBROSIS (H): ICD-10-CM

## 2018-08-29 DIAGNOSIS — J32.4 CHRONIC PANSINUSITIS: ICD-10-CM

## 2018-08-29 LAB
CREAT SERPL-MCNC: 1.27 MG/DL (ref 0.52–1.04)
GFR SERPL CREATININE-BSD FRML MDRD: 45 ML/MIN/1.7M2

## 2018-08-29 PROCEDURE — 36415 COLL VENOUS BLD VENIPUNCTURE: CPT | Performed by: OTOLARYNGOLOGY

## 2018-08-29 PROCEDURE — 82565 ASSAY OF CREATININE: CPT | Performed by: OTOLARYNGOLOGY

## 2018-08-30 ENCOUNTER — OFFICE VISIT (OUTPATIENT)
Dept: INTERNAL MEDICINE | Facility: OTHER | Age: 48
End: 2018-08-30
Attending: OTOLARYNGOLOGY
Payer: COMMERCIAL

## 2018-08-30 VITALS
HEART RATE: 103 BPM | HEIGHT: 67 IN | RESPIRATION RATE: 18 BRPM | WEIGHT: 214 LBS | DIASTOLIC BLOOD PRESSURE: 62 MMHG | SYSTOLIC BLOOD PRESSURE: 110 MMHG | OXYGEN SATURATION: 98 % | BODY MASS INDEX: 33.59 KG/M2 | TEMPERATURE: 97.1 F

## 2018-08-30 DIAGNOSIS — Z01.818 PREOP GENERAL PHYSICAL EXAM: Primary | ICD-10-CM

## 2018-08-30 DIAGNOSIS — K21.9 GASTROESOPHAGEAL REFLUX DISEASE WITHOUT ESOPHAGITIS: ICD-10-CM

## 2018-08-30 DIAGNOSIS — E84.9 CYSTIC FIBROSIS (H): ICD-10-CM

## 2018-08-30 DIAGNOSIS — R79.89 ELEVATED SERUM CREATININE: ICD-10-CM

## 2018-08-30 DIAGNOSIS — F34.1 DYSTHYMIA: ICD-10-CM

## 2018-08-30 LAB
ALBUMIN SERPL-MCNC: 4.1 G/DL (ref 3.4–5)
ALP SERPL-CCNC: 91 U/L (ref 40–150)
ALT SERPL W P-5'-P-CCNC: 28 U/L (ref 0–50)
ANION GAP SERPL CALCULATED.3IONS-SCNC: 8 MMOL/L (ref 3–14)
AST SERPL W P-5'-P-CCNC: 15 U/L (ref 0–45)
BASOPHILS # BLD AUTO: 0 10E9/L (ref 0–0.2)
BASOPHILS NFR BLD AUTO: 0.4 %
BILIRUB SERPL-MCNC: 0.2 MG/DL (ref 0.2–1.3)
BUN SERPL-MCNC: 16 MG/DL (ref 7–30)
CALCIUM SERPL-MCNC: 8.9 MG/DL (ref 8.5–10.1)
CHLORIDE SERPL-SCNC: 103 MMOL/L (ref 94–109)
CO2 SERPL-SCNC: 26 MMOL/L (ref 20–32)
CREAT SERPL-MCNC: 1.24 MG/DL (ref 0.52–1.04)
DIFFERENTIAL METHOD BLD: NORMAL
EOSINOPHIL # BLD AUTO: 0.1 10E9/L (ref 0–0.7)
EOSINOPHIL NFR BLD AUTO: 1.3 %
ERYTHROCYTE [DISTWIDTH] IN BLOOD BY AUTOMATED COUNT: 13.5 % (ref 10–15)
GFR SERPL CREATININE-BSD FRML MDRD: 46 ML/MIN/1.7M2
GLUCOSE SERPL-MCNC: 95 MG/DL (ref 70–99)
HCT VFR BLD AUTO: 40.1 % (ref 35–47)
HGB BLD-MCNC: 13.9 G/DL (ref 11.7–15.7)
IMM GRANULOCYTES # BLD: 0 10E9/L (ref 0–0.4)
IMM GRANULOCYTES NFR BLD: 0.4 %
LYMPHOCYTES # BLD AUTO: 1.8 10E9/L (ref 0.8–5.3)
LYMPHOCYTES NFR BLD AUTO: 17.5 %
MCH RBC QN AUTO: 28.8 PG (ref 26.5–33)
MCHC RBC AUTO-ENTMCNC: 34.7 G/DL (ref 31.5–36.5)
MCV RBC AUTO: 83 FL (ref 78–100)
MONOCYTES # BLD AUTO: 0.5 10E9/L (ref 0–1.3)
MONOCYTES NFR BLD AUTO: 5.2 %
NEUTROPHILS # BLD AUTO: 7.6 10E9/L (ref 1.6–8.3)
NEUTROPHILS NFR BLD AUTO: 75.2 %
NRBC # BLD AUTO: 0 10*3/UL
NRBC BLD AUTO-RTO: 0 /100
PLATELET # BLD AUTO: 291 10E9/L (ref 150–450)
POTASSIUM SERPL-SCNC: 4.1 MMOL/L (ref 3.4–5.3)
PROT SERPL-MCNC: 7.4 G/DL (ref 6.8–8.8)
RBC # BLD AUTO: 4.82 10E12/L (ref 3.8–5.2)
SODIUM SERPL-SCNC: 137 MMOL/L (ref 133–144)
WBC # BLD AUTO: 10.1 10E9/L (ref 4–11)

## 2018-08-30 PROCEDURE — 99214 OFFICE O/P EST MOD 30 MIN: CPT | Performed by: NURSE PRACTITIONER

## 2018-08-30 PROCEDURE — 85025 COMPLETE CBC W/AUTO DIFF WBC: CPT | Performed by: NURSE PRACTITIONER

## 2018-08-30 PROCEDURE — 36415 COLL VENOUS BLD VENIPUNCTURE: CPT | Performed by: NURSE PRACTITIONER

## 2018-08-30 PROCEDURE — 80053 COMPREHEN METABOLIC PANEL: CPT | Performed by: NURSE PRACTITIONER

## 2018-08-30 RX ORDER — DEXLANSOPRAZOLE 60 MG/1
60 CAPSULE, DELAYED RELEASE ORAL DAILY PRN
Qty: 30 CAPSULE | Refills: 3 | Status: SHIPPED | OUTPATIENT
Start: 2018-08-30 | End: 2020-09-10

## 2018-08-30 RX ORDER — BUPROPION HCL 150 MG
TABLET,SUSTAINED-RELEASE 12 HR ORAL
Qty: 90 TABLET | Refills: 6 | Status: SHIPPED | OUTPATIENT
Start: 2018-08-30 | End: 2019-03-27

## 2018-08-30 ASSESSMENT — ENCOUNTER SYMPTOMS
HEADACHES: 0
NERVOUS/ANXIOUS: 0
SINUS PRESSURE: 0
WEAKNESS: 0
DYSPHORIC MOOD: 0
COUGH: 0
ABDOMINAL PAIN: 0
MYALGIAS: 1
TROUBLE SWALLOWING: 0
SORE THROAT: 0
FATIGUE: 0
SLEEP DISTURBANCE: 0
DIARRHEA: 0
BACK PAIN: 0
CONSTIPATION: 0
NUMBNESS: 0
PALPITATIONS: 0
VOMITING: 0
DIZZINESS: 0
FEVER: 0
ARTHRALGIAS: 1
NECK PAIN: 0
SHORTNESS OF BREATH: 0
RHINORRHEA: 0
NAUSEA: 0
DIFFICULTY URINATING: 0
SINUS PAIN: 0

## 2018-08-30 ASSESSMENT — PAIN SCALES - GENERAL: PAINLEVEL: NO PAIN (0)

## 2018-08-30 NOTE — PATIENT INSTRUCTIONS
1. Pulmicort and mucomyst  AM of surgery.    2. Hold all other meds.    Before Your Surgery      Call your surgeon if there is any change in your health. This includes signs of a cold or flu (such as a sore throat, runny nose, cough, rash or fever).    Do not smoke, drink alcohol or take over the counter medicine (unless your surgeon or primary care doctor tells you to) for the 24 hours before and after surgery.    If you take prescribed drugs: Follow your doctor s orders about which medicines to take and which to stop until after surgery.    Eating and drinking prior to surgery: follow the instructions from your surgeon    Take a shower or bath the night before surgery. Use the soap your surgeon gave you to gently clean your skin. If you do not have soap from your surgeon, use your regular soap. Do not shave or scrub the surgery site.  Wear clean pajamas and have clean sheets on your bed.

## 2018-08-30 NOTE — MR AVS SNAPSHOT
After Visit Summary   8/30/2018    Annie Garcia    MRN: [id number]           Patient Information     Date Of Birth          1970        Visit Information        Provider Department      8/30/2018 4:00 PM Todd Torres NP [facility]        Today's Diagnoses     Preop general physical exam    -  1    Gastroesophageal reflux disease without esophagitis        Hip pain, right        Dysthymia          Care Instructions    1. Pulmicort and mucomyst  AM of surgery.    2. Hold all other meds.    Before Your Surgery      Call your surgeon if there is any change in your health. This includes signs of a cold or flu (such as a sore throat, runny nose, cough, rash or fever).    Do not smoke, drink alcohol or take over the counter medicine (unless your surgeon or primary care doctor tells you to) for the 24 hours before and after surgery.    If you take prescribed drugs: Follow your doctor s orders about which medicines to take and which to stop until after surgery.    Eating and drinking prior to surgery: follow the instructions from your surgeon    Take a shower or bath the night before surgery. Use the soap your surgeon gave you to gently clean your skin. If you do not have soap from your surgeon, use your regular soap. Do not shave or scrub the surgery site.  Wear clean pajamas and have clean sheets on your bed.           Follow-ups after your visit        Your next 10 appointments already scheduled     Sep 05, 2018   Procedure with Francoise Gonzalez MD   [facility] ([facility] )    [address] [place] [id number]   [phone]            Sep 12, 2018  3:45 PM CDT   (Arrive by 3:30 PM)   Post Op with Karen Mccallum PA-C   [facility] ([facility] )    [address]  [facility] [address]   [phone]            Oct 08, 2018  2:15 PM CDT   (Arrive by 2:00 PM)   Post Op with Francoise Gonzalez MD   [facility]  [facility]    [address] [place] [address]   [phone]              Who to contact     If you have questions or need follow up information about today's clinic visit or your schedule please contact [facility] directly at [phone].  Normal or non-critical lab and imaging results will be communicated to you by MyChart, letter or phone within 4 business days after the clinic has received the results. If you do not hear from us within 7 days, please contact the clinic through MyChart or phone. If you have a critical or abnormal lab result, we will notify you by phone as soon as possible.  Submit refill requests through [employer] or call your pharmacy and they will forward the refill request to us. Please allow 3 business days for your refill to be completed.          Additional Information About Your Visit        Care EveryWhere ID     This is your Care EveryWhere ID. This could be used by other organizations to access your [facility] medical records  [id number]        Your Vitals Were     Pulse Temperature Respirations Height Pulse Oximetry BMI (Body Mass Index)    103 97.1  F (36.2  C) (Tympanic) 18 5' 7\" (1.702 m) 98% 33.52 kg/m2       Blood Pressure from Last 3 Encounters:   08/30/18 110/62   08/20/18 116/74   07/12/18 110/76    Weight from Last 3 Encounters:   08/30/18 214 lb (97.1 kg)   08/20/18 240 lb (108.9 kg)   07/12/18 240 lb (108.9 kg)              We Performed the Following     CBC with platelets and differential     Comprehensive metabolic panel (BMP + Alb, Alk Phos, ALT, AST, Total. Bili, TP)          Today's Medication Changes          These changes are accurate as of 8/30/18  4:38 PM.  If you have any questions, ask your nurse or doctor.               Stop taking these medicines if you haven't already. Please contact your care team if you have questions.     nystatin [id number] UNIT/GM Powd   Commonly known as:  MYCOSTATIN   Stopped by:  Brian, " Todd NG NP                Where to get your medicines      These medications were sent to [facility] - [place], [place] - [address]  [address] [place] [address]     Phone:  [phone]     dexlansoprazole 60 MG Cpdr CR capsule    WELLBUTRIN  MG 12 hr tablet         Some of these will need a paper prescription and others can be bought over the counter.  Ask your nurse if you have questions.     Bring a paper prescription for each of these medications     acetaminophen-codeine 300-30 MG per tablet               Information about OPIOIDS     PRESCRIPTION OPIOIDS: WHAT YOU NEED TO KNOW   We gave you an opioid (narcotic) pain medicine. It is important to manage your pain, but opioids are not always the best choice. You should first try all the other options your care team gave you. Take this medicine for as short a time (and as few doses) as possible.    Some activities can increase your pain, such as bandage changes or therapy sessions. It may help to take your pain medicine 30 to 60 minutes before these activities. Reduce your stress by getting enough sleep, working on hobbies you enjoy and practicing relaxation or meditation. Talk to your care team about ways to manage your pain beyond prescription opioids.    These medicines have risks:    DO NOT drive when on new or higher doses of pain medicine. These medicines can affect your alertness and reaction times, and you could be arrested for driving under the influence (DUI). If you need to use opioids long-term, talk to your care team about driving.    DO NOT operate heavy machinery    DO NOT do any other dangerous activities while taking these medicines.    DO NOT drink any alcohol while taking these medicines.     If the opioid prescribed includes acetaminophen, DO NOT take with any other medicines that contain acetaminophen. Read all labels carefully. Look for the word  acetaminophen  or  Tylenol.  Ask your pharmacist if you have  questions or are unsure.    You can get addicted to pain medicines, especially if you have a history of addiction (chemical, alcohol or substance dependence). Talk to your care team about ways to reduce this risk.    All opioids tend to cause constipation. Drink plenty of water and eat foods that have a lot of fiber, such as fruits, vegetables, prune juice, apple juice and high-fiber cereal. Take a laxative (Miralax, milk of magnesia, Colace, Senna) if you don t move your bowels at least every other day. Other side effects include upset stomach, sleepiness, dizziness, throwing up, tolerance (needing more of the medicine to have the same effect), physical dependence and slowed breathing.    Store your pills in a secure place, locked if possible. We will not replace any lost or stolen medicine. If you don t finish your medicine, please throw away (dispose) as directed by your pharmacist. The [place] [employer] has more information about safe disposal: [email or web address].[email or web address]-medications         Primary Care Provider Office Phone # Fax #    Todd Torres [phone]       [address] [place], [place] [id number]        Equal Access to Services     ANTONINO SANCHEZ : Zack Hastings, wamelvada jose r, qatiata kaalmada asif, leanna nunez. So [facility] [phone].    ATENCIÓN: Si habla español, tiene a seals disposición servicios gratuitos de asistencia lingüística. Ryland al [phone].    We comply with applicable federal civil rights laws and [place] laws. We do not discriminate on the basis of race, color, national origin, age, disability, sex, sexual orientation, or gender identity.            Thank you!     Thank you for choosing [facility]  for your care. Our goal is always to provide you with excellent care. Hearing back from our patients is one way we can continue to improve our services.  Please take a few minutes to complete the written survey that you may receive in the mail after your visit with us. Thank you!             Your Updated Medication List - Protect others around you: Learn how to safely use, store and throw away your medicines at www.disposemymeds.org.          This list is accurate as of 8/30/18  4:38 PM.  Always use your most recent med list.                   Brand Name Dispense Instructions for use Diagnosis    acetaminophen-codeine 300-30 MG per tablet    TYLENOL #3    90 tablet    TAKE 1 TABLET BY MOUTH EVERY 4 HOURS AS NEEDED FOR MILD PAIN, TAKE FOR COUGHING PAIN.    Hip pain, right       acetylcysteine 20 % nebulizer solution    MUCOMYST     Take by nebulization every 8 hours 5-10 ml inhalation every 8 hours        AMBIEN 10 MG tablet   Generic drug:  zolpidem     30 tablet    TAKE 1 TABLET BY MOUTH NIGHTLY AS NEEDED FOR SLEEP    Primary insomnia       BROVANA 15 MCG/2ML Nebu neb solution   Generic drug:  arformoterol     120 mL    USE 1 VIAL IN NEBULIZER 2 TIMES A DAY AS NEEDED    Chronic bronchitis, unspecified chronic bronchitis type (H)       budesonide 1 MG/2ML Susp neb solution    PULMICORT    60 ampule    Take 2 mLs (1 mg) by nebulization daily Take 1 mg by nebulization daily    Bronchitis       clotrimazole 10 MG behzad      1 behzad Mouth/Throat 3 times a day        COMPOUNDED NON-CONTROLLED SUBSTANCE - PHARMACY TO MIX COMPOUNDED MEDICATION    CMPD RX    4 Bottle    Irrigate bilateral nares with 240 ML Twice Daily for 4 Weeks.    Chronic maxillary sinusitis       dexlansoprazole 60 MG Cpdr CR capsule    DEXILANT    30 capsule    Take 1 capsule (60 mg) by mouth daily as needed    Gastroesophageal reflux disease without esophagitis       doxycycline 100 MG tablet    VIBRA-TABS     Take 100 mg by mouth 2 times daily Takes one tablet daily prophylaxis.        fluconazole 100 MG tablet    DIFLUCAN    90 tablet    TAKE 1 TABLET BY MOUTH DAILY AS NEEDED    Rash and other  nonspecific skin eruption       levalbuterol 0.31 MG/3ML neb solution    XOPENEX    225 mL    Take 3 mLs (0.31 mg) by nebulization every 6 hours as needed for shortness of breath / dyspnea        mometasone 50 MCG/ACT spray    NASONEX    3 Box    Spray 2 sprays into both nostrils daily    Chronic pansinusitis       PULMOZYME 1 MG/ML neb solution   Generic drug:  dornase alpha     75 mL    USE 1 VIAL IN NEBULIZER DAILY AS NEEDED    Chronic bronchitis, unspecified chronic bronchitis type (H)       sucralfate 1 GM tablet    CARAFATE    120 tablet    TAKE 1 TABLET BY MOUTH FOUR TIMES A DAY AS NEEDED    Esophageal reflux       sulfamethoxazole-trimethoprim 800-160 MG per tablet    BACTRIM DS/SEPTRA DS    60 tablet    Take 1 tablet by mouth 2 times daily    S/P FESS (functional endoscopic sinus surgery), Chronic pansinusitis       WELLBUTRIN  MG 12 hr tablet   Generic drug:  buPROPion     90 tablet    TAKE 2 TABLETS BY MOUTH EVERY MORNING AND 1 TABLET EVERY AFTERNOON    Dysthymia

## 2018-08-30 NOTE — PROGRESS NOTES
[facility] [place]  [address]  [place] [address]  [phone]  Dept: [phone]    PRE-OP EVALUATION:  Today's date: 2018    Annie Garcia (: 1970) presents for pre-operative evaluation assessment as requested by Dr. Gonzalez.  She requires evaluation and anesthesia risk assessment prior to undergoing surgery/procedure for treatment of sinus exam under anesthesia .    Proposed Surgery/ Procedure: Sinus exam under anesthesia  Date of Surgery/ Procedure: 2018  Time of Surgery/ Procedure: to be determined  Hospital/Surgical Facility: [facility]  Primary Physician: Todd Torres  Type of Anesthesia Anticipated: to be determined    Patient has a Health Care Directive or Living Will:  YES at home    1. NO - Do you have a history of heart attack, stroke, stent, bypass or surgery on an artery in the head, neck, heart or legs?  2. NO - Do you ever have any pain or discomfort in your chest?  3. NO - Do you have a history of  Heart Failure?  4. NO - Are you troubled by shortness of breath when: walking on the level, up a slight hill or at night?  5. NO - Do you currently have a cold, bronchitis or other respiratory infection?  6. NO - Do you have a cough, shortness of breath or wheezing?  7. NO - Do you sometimes get pains in the calves of your legs when you walk?  8. NO - Do you or anyone in your family have previous history of blood clots?  9. NO - Do you or does anyone in your family have a serious bleeding problem such as prolonged bleeding following surgeries or cuts?  10. NO - Have you ever had problems with anemia or been told to take iron pills?  11. NO - Have you had any abnormal blood loss such as black, tarry or bloody stools, or abnormal vaginal bleeding?  12. NO - Have you ever had a blood transfusion?  13. NO - Have you or any of your relatives ever had problems with anesthesia?  14. NO - Do you have sleep apnea, excessive snoring or daytime drowsiness?  15.  NO - Do you have any prosthetic heart valves?  16. NO - Do you have prosthetic joints?  17. NO - Is there any chance that you may be pregnant?      HPI:     HPI related to upcoming procedure:Fungal infection in sinus.            MEDICAL HISTORY:     Patient Active Problem List    Diagnosis Date Noted     Chronic eosinophilic sinusitis 04/27/2018     Priority: Medium     improved       Eosinophilia 03/12/2018     Priority: Medium     S/P FESS (functional endoscopic sinus surgery) 03/12/2018     Priority: Medium     Morbid obesity (H) 08/07/2017     Priority: Medium     Pneumonia due to infectious organism, unspecified laterality, unspecified part of lung 03/04/2017     Priority: Medium     Acute bronchitis 03/03/2017     Priority: Medium     ACP (advance care planning) 06/10/2016     Priority: Medium     Advance Care Planning 6/10/2016: ACP Review of Chart / Resources Provided:  Reviewed chart for advance care plan.  Annie KEY Meng Radha has been provided information and resources to begin or update their advance care plan.  Added by Vidhya Cabrera             Hyperlipidemia with target LDL less than 130 02/25/2015     Priority: Medium     Diagnosis updated by automated process. Provider to review and confirm.       Facial rash 01/23/2015     Priority: Medium     Mixed anxiety depressive disorder 05/09/2014     Priority: Medium     Insomnia 05/09/2014     Priority: Medium     Osteoarthritis of hip 05/09/2014     Priority: Medium     Dyspepsia and other specified disorders of function of stomach 01/10/2014     Priority: Medium     GERD (gastroesophageal reflux disease) 01/10/2014     Priority: Medium     Cystic fibrosis (H)      Priority: Medium     Depression      Priority: Medium     Tear of right acetabular labrum      Priority: Medium      Past Medical History:   Diagnosis Date     Cystic fibrosis (H)      Depression      Tear of right acetabular labrum      Past Surgical History:   Procedure Laterality Date      BREAST SURGERY       ENDOSCOPY UPPER, COLONOSCOPY, COMBINED N/A 11/18/2016    Procedure: COMBINED ENDOSCOPY UPPER, COLONOSCOPY;  Surgeon: Levi Hinkle MD;  Location: [facility]     ESOPHAGOSCOPY, GASTROSCOPY, DUODENOSCOPY (EGD), COMBINED  1/10/2014    Procedure: COMBINED ESOPHAGOSCOPY, GASTROSCOPY, DUODENOSCOPY (EGD);  UPPER ENDOSCOPY with Biopsy;  Surgeon: Levi Hinkle MD;  Location: [facility]     GYN SURGERY  2008    ablation     ORTHOPEDIC SURGERY  1994    left knee arthroscopy     ORTHOPEDIC SURGERY  1994    right shoulder     ORTHOPEDIC SURGERY  2014    left hip replacement     SEPTOPLASTY, ENDOSCOPIC SINUS SURGERY, COMBINED Left 2/21/2018    Procedure: COMBINED SEPTOPLASTY, ENDOSCOPIC SINUS SURGERY;  LEFT ENDOSCOPIC SINUS SURGERY, SEPTOPLASTY, BILATERAL TURBINATE REDUCTION, PROPEL IMPLANTS;  Surgeon: Francoise Gonzalez MD;  Location: [facility] OR     TURBINOPLASTY Bilateral 2/21/2018    Procedure: TURBINOPLASTY;;  Surgeon: Francoise Gonzalez MD;  Location: [facility]     Current Outpatient Prescriptions   Medication Sig Dispense Refill     acetaminophen-codeine (TYLENOL #3) 300-30 MG per tablet TAKE 1 TABLET BY MOUTH EVERY 4 HOURS AS NEEDED FOR MILD PAIN, TAKE FOR COUGHING PAIN. 90 tablet 0     acetylcysteine (MUCOMYST) 20 % nebulizer solution Take by nebulization every 8 hours 5-10 ml inhalation every 8 hours       AMBIEN 10 MG tablet TAKE 1 TABLET BY MOUTH NIGHTLY AS NEEDED FOR SLEEP 30 tablet 3     BROVANA 15 MCG/2ML NEBU USE 1 VIAL IN NEBULIZER 2 TIMES A DAY AS NEEDED 120 mL 0     budesonide (PULMICORT) 1 MG/2ML SUSP nebulizer solution Take 2 mLs (1 mg) by nebulization daily Take 1 mg by nebulization daily 60 ampule 1     clotrimazole 10 MG behzad 1 behzad Mouth/Throat 3 times a day       COMPOUNDED NON-CONTROLLED SUBSTANCE (CMPD RX) - PHARMACY TO MIX COMPOUNDED MEDICATION Irrigate bilateral nares with 240 ML Twice Daily for 4 Weeks. 4 Bottle 11     dexlansoprazole (DEXILANT) 60 MG CPDR Take 1 capsule by mouth  daily as needed       doxycycline (VIBRA-TABS) 100 MG tablet Take 100 mg by mouth 2 times daily Takes one tablet daily prophylaxis.       fluconazole (DIFLUCAN) 100 MG tablet TAKE 1 TABLET BY MOUTH DAILY AS NEEDED 90 tablet 0     levalbuterol (XOPENEX) 0.31 MG/3ML nebulizer solution Take 3 mLs (0.31 mg) by nebulization every 6 hours as needed for shortness of breath / dyspnea 225 mL 0     mometasone (NASONEX) 50 MCG/ACT spray Spray 2 sprays into both nostrils daily 3 Box 11     PULMOZYME 1 MG/ML neb solution USE 1 VIAL IN NEBULIZER DAILY AS NEEDED 75 mL 0     sucralfate (CARAFATE) 1 GM tablet TAKE 1 TABLET BY MOUTH FOUR TIMES A DAY AS NEEDED 120 tablet 3     sulfamethoxazole-trimethoprim (BACTRIM DS/SEPTRA DS) 800-160 MG per tablet Take 1 tablet by mouth 2 times daily 60 tablet 3     WELLBUTRIN  MG 12 hr tablet TAKE 2 TABLETS BY MOUTH EVERY MORNING AND 1 TABLET EVERY AFTERNOON 90 tablet 6     OTC products: None, except as noted above    Allergies   Allergen Reactions     Seasonal Allergies Itching      Latex Allergy: NO    Social History   Substance Use Topics     Smoking status: Never Smoker     Smokeless tobacco: Never Used     Alcohol use Yes      Comment: rare     History   Drug Use No       REVIEW OF SYSTEMS:     Review of Systems   Constitutional: Negative for fatigue and fever.   HENT: Positive for congestion and postnasal drip. Negative for dental problem, ear pain, nosebleeds, rhinorrhea, sinus pain, sinus pressure, sore throat, tinnitus and trouble swallowing.    Respiratory: Negative for cough and shortness of breath.    Cardiovascular: Negative for chest pain, palpitations and leg swelling.   Gastrointestinal: Negative for abdominal pain, constipation, diarrhea, nausea and vomiting.   Genitourinary: Negative for difficulty urinating.   Musculoskeletal: Positive for arthralgias and myalgias. Negative for back pain and neck pain.        Right ankle. Pain.    Skin: Negative for rash.   Neurological:  "Negative for dizziness, weakness, numbness and headaches.   Psychiatric/Behavioral: Negative for dysphoric mood and sleep disturbance. The patient is not nervous/anxious.      EXAM:   /62  Pulse 103  Temp 97.1  F (36.2  C) (Tympanic)  Resp 18  Ht 5' 7\" (1.702 m)  Wt 214 lb (97.1 kg)  SpO2 98%  BMI 33.52 kg/m2  Physical Exam   Constitutional: She is oriented to person, place, and time.   HENT:   Nose: Nose normal.   Mouth/Throat: Oropharynx is clear and moist.   Bilateral TM's slightly transluscent with poor light reflex.     Eyes: Conjunctivae and EOM are normal. Pupils are equal, round, and reactive to light.   Neck: Normal range of motion. Neck supple. No JVD present. No thyromegaly present.   Cardiovascular: Normal rate, regular rhythm, normal heart sounds and intact distal pulses.    No murmur heard.  Pulmonary/Chest: Effort normal and breath sounds normal.   Abdominal: Soft. Bowel sounds are normal. She exhibits no mass. There is no tenderness.   Musculoskeletal: Normal range of motion. She exhibits no edema.   Lymphadenopathy:     She has no cervical adenopathy.   Neurological: She is alert and oriented to person, place, and time. She has normal reflexes. She displays normal reflexes. No cranial nerve deficit.   Skin: Skin is warm and dry.   Psychiatric: She has a normal mood and affect. Her behavior is normal. Judgment and thought content normal.           DIAGNOSTICS:     Results for orders placed or performed in visit on 08/30/18   CBC with platelets and differential   Result Value Ref Range    WBC 10.1 4.0 - 11.0 10e9/L    RBC Count 4.82 3.8 - 5.2 10e12/L    Hemoglobin 13.9 11.7 - 15.7 g/dL    Hematocrit 40.1 35.0 - 47.0 %    MCV 83 78 - 100 fl    MCH 28.8 26.5 - 33.0 pg    MCHC 34.7 31.5 - 36.5 g/dL    RDW 13.5 10.0 - 15.0 %    Platelet Count 291 150 - 450 10e9/L    Diff Method Automated Method     % Neutrophils 75.2 %    % Lymphocytes 17.5 %    % Monocytes 5.2 %    % Eosinophils 1.3 %    % " Basophils 0.4 %    % Immature Granulocytes 0.4 %    Nucleated RBCs 0 0 /100    Absolute Neutrophil 7.6 1.6 - 8.3 10e9/L    Absolute Lymphocytes 1.8 0.8 - 5.3 10e9/L    Absolute Monocytes 0.5 0.0 - 1.3 10e9/L    Absolute Eosinophils 0.1 0.0 - 0.7 10e9/L    Absolute Basophils 0.0 0.0 - 0.2 10e9/L    Abs Immature Granulocytes 0.0 0 - 0.4 10e9/L    Absolute Nucleated RBC 0.0    Comprehensive metabolic panel (BMP + Alb, Alk Phos, ALT, AST, Total. Bili, TP)   Result Value Ref Range    Sodium 137 133 - 144 mmol/L    Potassium 4.1 3.4 - 5.3 mmol/L    Chloride 103 94 - 109 mmol/L    Carbon Dioxide 26 20 - 32 mmol/L    Anion Gap 8 3 - 14 mmol/L    Glucose 95 70 - 99 mg/dL    Urea Nitrogen 16 7 - 30 mg/dL    Creatinine 1.24 (H) 0.52 - 1.04 mg/dL    GFR Estimate 46 (L) >60 mL/min/1.7m2    GFR Estimate If Black 56 (L) >60 mL/min/1.7m2    Calcium 8.9 8.5 - 10.1 mg/dL    Bilirubin Total 0.2 0.2 - 1.3 mg/dL    Albumin 4.1 3.4 - 5.0 g/dL    Protein Total 7.4 6.8 - 8.8 g/dL    Alkaline Phosphatase 91 40 - 150 U/L    ALT 28 0 - 50 U/L    AST 15 0 - 45 U/L       Recent Labs   Lab Test  08/29/18   1437  08/20/18   1534  07/09/18   1625  06/22/18   1442   04/11/18   0843   02/02/18   1633   10/12/15   0817  06/02/14 05/28/14 09/17/13   1042   HGB   --    --    --    --    --   13.8   --   13.5   < >   --    < >   --    --    < >  14.1   PLT   --    --    --    --    --   300   --   292   < >   --    < >   --    --    < >  319   INR   --    --    --    --    --    --    --    --    --    --    --   1.9*  1.4*   < >   --    NA   --    --   140  136   < >  139   < >  141   < >   --    < >   --    --    < >  140   POTASSIUM   --    --   3.9  4.3   < >  4.1   < >  4.1   < >   --    < >   --    --    < >  4.2   CR  1.27*  1.02  0.99  1.16*   < >  1.16*   < >  1.21*   < >   --    < >   --    --    < >  0.99   A1C   --    --    --    --    --    --    --    --    --   5.6   --    --    --    --   5.2    < > = values in this interval not  displayed.        IMPRESSION:   Surgery:  Sinus exam under anesthesia with Dr. Gonzalez, at [facility] on  9/5/18    The proposed surgical procedure is considered LOW risk.    REVISED CARDIAC RISK INDEX  The patient has the following serious cardiovascular risks for perioperative complications such as (MI, PE, VFib and 3  AV Block):  No serious cardiac risks  INTERPRETATION: 0 risks: Class I (very low risk - 0.4% complication rate)    The patient has the following additional risks for perioperative complications:  Has Cystic Fibrosis    Has elevated creatinine while on Bactrim.      ICD-10-CM    1. Preop general physical exam Z01.818    ASSESSMENT / PLAN:  (Z01.818) Preop general physical exam  (primary encounter diagnosis)  Comment:   Hemoglobin   Date Value Ref Range Status   08/30/2018 13.9 11.7 - 15.7 g/dL Final   ]  Potassium   Date Value Ref Range Status   08/30/2018 4.1 3.4 - 5.3 mmol/L Final       Plan: CBC with platelets and differential,         Comprehensive metabolic panel (BMP + Alb, Alk         Phos, ALT, AST, Total. Bili, TP)       (E84.9) Cystic fibrosis (H)  Comment Lungs are clear today.  :On Brovana 2 times a day if needed,  Pulmicort daily,  Xopenex as needed ,  Pulmozyme as needed,  Mucomyst  q 8hr   Sees Dr. Herman , Pulmonologist.      Plan:Take Pulmocort and Mucomyst AM of surgery.      (R79.89) Elevated serum creatinine  Comment: Is on Bactrim BID for sinus problems, has affected her creatinine.Started another course.   Discussed with patient needs to drink 6-8 glasses water a day especially when on this medicine.   Creatinine   Date Value Ref Range Status   08/30/2018 1.24 (H) 0.52 - 1.04 mg/dL Final       Plan: Has ongoing  Creatinine.  checks while on Bactrim      (K21.9) Gastroesophageal reflux disease without esophagitis  Comment: On Dexilant  60mg    Plan: dexlansoprazole (DEXILANT) 60 MG CPDR CR         capsule       (F34.1) Dysthymia  Comment: On wellbutrin SR 300mg a day.  States  is working well for her.    Plan: WELLBUTRIN  MG 12 hr tablet              RECOMMENDATIONS:     Medications: Take Pulmicort and Mucomyst AM of surgery.    Hold all other meds AM of surgery.    Drink 6-8 glasses of water a day in general. .    APPROVAL GIVEN to proceed with proposed procedure, without further diagnostic evaluation       Signed Electronically by: Todd Torres NP    Copy of this evaluation report is provided to requesting physician.    [facility] Preop Guidelines    Revised Cardiac Risk Index

## 2018-08-30 NOTE — NURSING NOTE
"Chief Complaint   Patient presents with     Pre-Op Exam     Sinus exam under anesthesia       Initial /62  Pulse 103  Temp 97.1  F (36.2  C) (Tympanic)  Resp 18  Ht 5' 7\" (1.702 m)  Wt 214 lb (97.1 kg)  SpO2 98%  BMI 33.52 kg/m2 Estimated body mass index is 33.52 kg/(m^2) as calculated from the following:    Height as of this encounter: 5' 7\" (1.702 m).    Weight as of this encounter: 214 lb (97.1 kg).  Medication Reconciliation: complete    Kasie Cantu LPN  "

## 2018-09-04 ENCOUNTER — ANESTHESIA EVENT (OUTPATIENT)
Dept: SURGERY | Facility: HOSPITAL | Age: 48
End: 2018-09-04
Payer: COMMERCIAL

## 2018-09-04 NOTE — ANESTHESIA PREPROCEDURE EVALUATION
Anesthesia Evaluation     . Pt has had prior anesthetic. Type: General    History of anesthetic complications   - PONV        ROS/MED HX    ENT/Pulmonary:     (+)MYRANDA risk factors obese, allergic rhinitis, , cystic fibrosis chronic productive cough, last hospitalized 2 1/2 years ago, PRN nebulizer. Other pulmonary disease sinusitis, TMJ s/p surgery.   (-) recent URI   Neurologic:     (+)migraines,     Cardiovascular:     (+) ----. : . . . :. . Previous cardiac testing date:results:date: results:ECG reviewed date:2/2/18 results:nsr date: results:          METS/Exercise Tolerance:  >4 METS   Hematologic:  - neg hematologic  ROS       Musculoskeletal:   (+) , , other musculoskeletal- hx right shoulder dislocation treated with staples, hx right hip replacement      GI/Hepatic:     (+) GERD (asymptomatic today)       Renal/Genitourinary:     (+) chronic renal disease (r/t sulfa antibiotic per pt), type: CRI, Pt does not require dialysis, Pt has no history of transplant,       Endo:     (+) Obesity (BMI 38.4), .      Psychiatric:     (+) psychiatric history depression      Infectious Disease:  - neg infectious disease ROS       Malignancy:      - no malignancy   Other: Comment: Pt has amenorrhea s/p ablation 13 years ago  PRN Tylenol 3 for cough, does not take daily   (+) No chance of pregnancy C-spine cleared: N/A, no H/O Chronic Pain,H/O chronic opiod use , no other significant disability                    Physical Exam  Normal systems: cardiovascular, pulmonary and dental    Airway   Mallampati: III  TM distance: >3 FB  Neck ROM: full    Dental     Cardiovascular   Rhythm and rate: regular and normal      Pulmonary    breath sounds clear to auscultation                    Anesthesia Plan      History & Physical Review      ASA Status:  3 .    NPO Status:  > 8 hours    Plan for General and ETT with Intravenous and Propofol induction. Maintenance will be TIVA.    PONV prophylaxis:  Ondansetron (or other 5HT-3) and  Dexamethasone or Solumedrol  H&P 8/30    Discussed risks and benefits with patient for general anesthesia including sore throat, nausea, vomiting, aspiration, dental damage, loss of airway, CV complications, stroke, MI, death. Pt wishes to proceed.         Postoperative Care  Postoperative pain management:  IV analgesics.      Consents  Anesthetic plan, risks, benefits and alternatives discussed with:  Patient.  Use of blood products discussed: Yes.   Use of blood products discussed with Patient.  Consented to blood products.  .                          .

## 2018-09-05 ENCOUNTER — ANESTHESIA (OUTPATIENT)
Dept: SURGERY | Facility: HOSPITAL | Age: 48
End: 2018-09-05
Payer: COMMERCIAL

## 2018-09-05 ENCOUNTER — HOSPITAL ENCOUNTER (OUTPATIENT)
Facility: HOSPITAL | Age: 48
Discharge: HOME | End: 2018-09-05
Attending: OTOLARYNGOLOGY | Admitting: OTOLARYNGOLOGY
Payer: COMMERCIAL

## 2018-09-05 ENCOUNTER — SURGERY (OUTPATIENT)
Age: 48
End: 2018-09-05

## 2018-09-05 VITALS
TEMPERATURE: 98 F | HEIGHT: 67 IN | WEIGHT: 245 LBS | DIASTOLIC BLOOD PRESSURE: 86 MMHG | BODY MASS INDEX: 38.45 KG/M2 | RESPIRATION RATE: 18 BRPM | SYSTOLIC BLOOD PRESSURE: 130 MMHG | OXYGEN SATURATION: 93 %

## 2018-09-05 DIAGNOSIS — Z98.890 S/P FESS (FUNCTIONAL ENDOSCOPIC SINUS SURGERY): Primary | ICD-10-CM

## 2018-09-05 PROCEDURE — 31267 ENDOSCOPY MAXILLARY SINUS: CPT | Mod: 50 | Performed by: OTOLARYNGOLOGY

## 2018-09-05 PROCEDURE — S1090 MOMETASONE SINUS IMPLANT: HCPCS | Performed by: OTOLARYNGOLOGY

## 2018-09-05 PROCEDURE — 25000128 H RX IP 250 OP 636: Performed by: NURSE ANESTHETIST, CERTIFIED REGISTERED

## 2018-09-05 PROCEDURE — 37000009 ZZH ANESTHESIA TECHNICAL FEE, EACH ADDTL 15 MIN: Performed by: OTOLARYNGOLOGY

## 2018-09-05 PROCEDURE — 27110028 ZZH OR GENERAL SUPPLY NON-STERILE: Performed by: OTOLARYNGOLOGY

## 2018-09-05 PROCEDURE — 27210794 ZZH OR GENERAL SUPPLY STERILE: Performed by: OTOLARYNGOLOGY

## 2018-09-05 PROCEDURE — 25000125 ZZHC RX 250: Performed by: NURSE ANESTHETIST, CERTIFIED REGISTERED

## 2018-09-05 PROCEDURE — 25000125 ZZHC RX 250

## 2018-09-05 PROCEDURE — 25000132 ZZH RX MED GY IP 250 OP 250 PS 637: Performed by: OTOLARYNGOLOGY

## 2018-09-05 PROCEDURE — 71000014 ZZH RECOVERY PHASE 1 LEVEL 2 FIRST HR: Performed by: OTOLARYNGOLOGY

## 2018-09-05 PROCEDURE — 71000027 ZZH RECOVERY PHASE 2 EACH 15 MINS: Performed by: OTOLARYNGOLOGY

## 2018-09-05 PROCEDURE — 36000058 ZZH SURGERY LEVEL 3 EA 15 ADDTL MIN: Performed by: OTOLARYNGOLOGY

## 2018-09-05 PROCEDURE — 25000128 H RX IP 250 OP 636

## 2018-09-05 PROCEDURE — 31276 NSL/SINS NDSC FRNT TISS RMVL: CPT | Mod: 50 | Performed by: OTOLARYNGOLOGY

## 2018-09-05 PROCEDURE — 37000008 ZZH ANESTHESIA TECHNICAL FEE, 1ST 30 MIN: Performed by: OTOLARYNGOLOGY

## 2018-09-05 PROCEDURE — 25000125 ZZHC RX 250: Performed by: OTOLARYNGOLOGY

## 2018-09-05 PROCEDURE — 40000306 ZZH STATISTIC PRE PROC ASSESS II: Performed by: OTOLARYNGOLOGY

## 2018-09-05 PROCEDURE — 01999 UNLISTED ANES PROCEDURE: CPT | Performed by: NURSE ANESTHETIST, CERTIFIED REGISTERED

## 2018-09-05 PROCEDURE — 31257 NSL/SINS NDSC TOT W/SPHENDT: CPT | Mod: 50 | Performed by: OTOLARYNGOLOGY

## 2018-09-05 PROCEDURE — 88304 TISSUE EXAM BY PATHOLOGIST: CPT | Mod: TC | Performed by: OTOLARYNGOLOGY

## 2018-09-05 PROCEDURE — 36000056 ZZH SURGERY LEVEL 3 1ST 30 MIN: Performed by: OTOLARYNGOLOGY

## 2018-09-05 PROCEDURE — 25000566 ZZH SEVOFLURANE, EA 15 MIN: Performed by: NURSE ANESTHETIST, CERTIFIED REGISTERED

## 2018-09-05 DEVICE — PROPEL-MINI DISSOLVABLE: Type: IMPLANTABLE DEVICE | Site: SINUS | Status: FUNCTIONAL

## 2018-09-05 RX ORDER — NALOXONE HYDROCHLORIDE 0.4 MG/ML
.1-.4 INJECTION, SOLUTION INTRAMUSCULAR; INTRAVENOUS; SUBCUTANEOUS
Status: DISCONTINUED | OUTPATIENT
Start: 2018-09-05 | End: 2018-09-05 | Stop reason: HOSPADM

## 2018-09-05 RX ORDER — HYDROCODONE BITARTRATE AND ACETAMINOPHEN 7.5; 325 MG/1; MG/1
1 TABLET ORAL EVERY 6 HOURS PRN
Qty: 15 TABLET | Refills: 0 | Status: SHIPPED | OUTPATIENT
Start: 2018-09-05 | End: 2019-01-30

## 2018-09-05 RX ORDER — SODIUM CHLORIDE, SODIUM LACTATE, POTASSIUM CHLORIDE, CALCIUM CHLORIDE 600; 310; 30; 20 MG/100ML; MG/100ML; MG/100ML; MG/100ML
INJECTION, SOLUTION INTRAVENOUS CONTINUOUS
Status: DISCONTINUED | OUTPATIENT
Start: 2018-09-05 | End: 2018-09-05 | Stop reason: HOSPADM

## 2018-09-05 RX ORDER — SCOLOPAMINE TRANSDERMAL SYSTEM 1 MG/1
1 PATCH, EXTENDED RELEASE TRANSDERMAL ONCE
Status: COMPLETED | OUTPATIENT
Start: 2018-09-05 | End: 2018-09-05

## 2018-09-05 RX ORDER — DEXAMETHASONE SODIUM PHOSPHATE 10 MG/ML
INJECTION, SOLUTION INTRAMUSCULAR; INTRAVENOUS PRN
Status: DISCONTINUED | OUTPATIENT
Start: 2018-09-05 | End: 2018-09-05

## 2018-09-05 RX ORDER — ONDANSETRON 2 MG/ML
INJECTION INTRAMUSCULAR; INTRAVENOUS PRN
Status: DISCONTINUED | OUTPATIENT
Start: 2018-09-05 | End: 2018-09-05

## 2018-09-05 RX ORDER — LIDOCAINE 40 MG/G
CREAM TOPICAL
Status: DISCONTINUED | OUTPATIENT
Start: 2018-09-05 | End: 2018-09-05 | Stop reason: HOSPADM

## 2018-09-05 RX ORDER — LIDOCAINE HYDROCHLORIDE AND EPINEPHRINE 10; 10 MG/ML; UG/ML
INJECTION, SOLUTION INFILTRATION; PERINEURAL PRN
Status: DISCONTINUED | OUTPATIENT
Start: 2018-09-05 | End: 2018-09-05 | Stop reason: HOSPADM

## 2018-09-05 RX ORDER — SCOLOPAMINE TRANSDERMAL SYSTEM 1 MG/1
PATCH, EXTENDED RELEASE TRANSDERMAL
Status: DISCONTINUED
Start: 2018-09-05 | End: 2018-09-05 | Stop reason: HOSPADM

## 2018-09-05 RX ORDER — HYDROCODONE BITARTRATE AND ACETAMINOPHEN 7.5; 325 MG/1; MG/1
1 TABLET ORAL
Status: COMPLETED | OUTPATIENT
Start: 2018-09-05 | End: 2018-09-05

## 2018-09-05 RX ORDER — MEPERIDINE HYDROCHLORIDE 50 MG/ML
12.5 INJECTION INTRAMUSCULAR; INTRAVENOUS; SUBCUTANEOUS
Status: DISCONTINUED | OUTPATIENT
Start: 2018-09-05 | End: 2018-09-05 | Stop reason: HOSPADM

## 2018-09-05 RX ORDER — LABETALOL HYDROCHLORIDE 5 MG/ML
10 INJECTION, SOLUTION INTRAVENOUS
Status: DISCONTINUED | OUTPATIENT
Start: 2018-09-05 | End: 2018-09-05 | Stop reason: HOSPADM

## 2018-09-05 RX ORDER — FENTANYL CITRATE 50 UG/ML
INJECTION, SOLUTION INTRAMUSCULAR; INTRAVENOUS PRN
Status: DISCONTINUED | OUTPATIENT
Start: 2018-09-05 | End: 2018-09-05

## 2018-09-05 RX ORDER — ONDANSETRON 2 MG/ML
4 INJECTION INTRAMUSCULAR; INTRAVENOUS EVERY 30 MIN PRN
Status: DISCONTINUED | OUTPATIENT
Start: 2018-09-05 | End: 2018-09-05 | Stop reason: HOSPADM

## 2018-09-05 RX ORDER — FENTANYL CITRATE 50 UG/ML
25-50 INJECTION, SOLUTION INTRAMUSCULAR; INTRAVENOUS EVERY 5 MIN PRN
Status: DISCONTINUED | OUTPATIENT
Start: 2018-09-05 | End: 2018-09-05 | Stop reason: HOSPADM

## 2018-09-05 RX ORDER — ONDANSETRON 4 MG/1
4 TABLET, ORALLY DISINTEGRATING ORAL EVERY 30 MIN PRN
Status: DISCONTINUED | OUTPATIENT
Start: 2018-09-05 | End: 2018-09-05 | Stop reason: HOSPADM

## 2018-09-05 RX ORDER — FENTANYL CITRATE 50 UG/ML
25-50 INJECTION, SOLUTION INTRAMUSCULAR; INTRAVENOUS
Status: DISCONTINUED | OUTPATIENT
Start: 2018-09-05 | End: 2018-09-05 | Stop reason: HOSPADM

## 2018-09-05 RX ORDER — OXYMETAZOLINE HYDROCHLORIDE 0.05 G/100ML
3 SPRAY NASAL
Status: COMPLETED | OUTPATIENT
Start: 2018-09-05 | End: 2018-09-05

## 2018-09-05 RX ORDER — PROPOFOL 10 MG/ML
INJECTION, EMULSION INTRAVENOUS PRN
Status: DISCONTINUED | OUTPATIENT
Start: 2018-09-05 | End: 2018-09-05

## 2018-09-05 RX ORDER — LIDOCAINE HYDROCHLORIDE 20 MG/ML
INJECTION, SOLUTION INFILTRATION; PERINEURAL PRN
Status: DISCONTINUED | OUTPATIENT
Start: 2018-09-05 | End: 2018-09-05

## 2018-09-05 RX ADMIN — SODIUM CHLORIDE, POTASSIUM CHLORIDE, SODIUM LACTATE AND CALCIUM CHLORIDE: 600; 310; 30; 20 INJECTION, SOLUTION INTRAVENOUS at 11:00

## 2018-09-05 RX ADMIN — FENTANYL CITRATE 50 MCG: 50 INJECTION, SOLUTION INTRAMUSCULAR; INTRAVENOUS at 13:07

## 2018-09-05 RX ADMIN — ONDANSETRON 4 MG: 2 INJECTION INTRAMUSCULAR; INTRAVENOUS at 13:21

## 2018-09-05 RX ADMIN — LIDOCAINE HYDROCHLORIDE 40 MG: 20 INJECTION, SOLUTION INFILTRATION; PERINEURAL at 13:07

## 2018-09-05 RX ADMIN — LIDOCAINE HYDROCHLORIDE,EPINEPHRINE BITARTRATE 6 ML: 10; .01 INJECTION, SOLUTION INFILTRATION; PERINEURAL at 13:26

## 2018-09-05 RX ADMIN — FENTANYL CITRATE 50 MCG: 50 INJECTION, SOLUTION INTRAMUSCULAR; INTRAVENOUS at 14:16

## 2018-09-05 RX ADMIN — OXYMETAZOLINE HYDROCHLORIDE 3 SPRAY: 5 SPRAY NASAL at 10:58

## 2018-09-05 RX ADMIN — DEXAMETHASONE SODIUM PHOSPHATE 12 MG: 10 INJECTION, SOLUTION INTRAMUSCULAR; INTRAVENOUS at 13:22

## 2018-09-05 RX ADMIN — FENTANYL CITRATE 50 MCG: 50 INJECTION INTRAMUSCULAR; INTRAVENOUS at 15:15

## 2018-09-05 RX ADMIN — FENTANYL CITRATE 25 MCG: 50 INJECTION INTRAMUSCULAR; INTRAVENOUS at 15:35

## 2018-09-05 RX ADMIN — ROCURONIUM BROMIDE 5 MG: 10 INJECTION INTRAVENOUS at 13:07

## 2018-09-05 RX ADMIN — PROPOFOL 200 MG: 10 INJECTION, EMULSION INTRAVENOUS at 13:07

## 2018-09-05 RX ADMIN — HYDROCODONE BITARTRATE AND ACETAMINOPHEN 1 TABLET: 7.5; 325 TABLET ORAL at 16:09

## 2018-09-05 RX ADMIN — OXYMETAZOLINE HYDROCHLORIDE 3 SPRAY: 5 SPRAY NASAL at 11:19

## 2018-09-05 RX ADMIN — FENTANYL CITRATE 50 MCG: 50 INJECTION, SOLUTION INTRAMUSCULAR; INTRAVENOUS at 13:49

## 2018-09-05 RX ADMIN — SCOPALAMINE 1 PATCH: 1 PATCH, EXTENDED RELEASE TRANSDERMAL at 11:07

## 2018-09-05 RX ADMIN — MIDAZOLAM 2 MG: 1 INJECTION INTRAMUSCULAR; INTRAVENOUS at 12:55

## 2018-09-05 RX ADMIN — PROPOFOL 50 MG: 10 INJECTION, EMULSION INTRAVENOUS at 14:16

## 2018-09-05 RX ADMIN — COCAINE HYDROCHLORIDE 4 ML: 40 SOLUTION TOPICAL at 13:43

## 2018-09-05 RX ADMIN — SODIUM CHLORIDE, POTASSIUM CHLORIDE, SODIUM LACTATE AND CALCIUM CHLORIDE: 600; 310; 30; 20 INJECTION, SOLUTION INTRAVENOUS at 13:48

## 2018-09-05 RX ADMIN — FENTANYL CITRATE 25 MCG: 50 INJECTION INTRAMUSCULAR; INTRAVENOUS at 15:21

## 2018-09-05 RX ADMIN — OXYMETAZOLINE HYDROCHLORIDE 3 SPRAY: 5 SPRAY NASAL at 11:07

## 2018-09-05 RX ADMIN — Medication 100 MG: at 13:07

## 2018-09-05 NOTE — DISCHARGE INSTRUCTIONS
Post-Anesthesia Patient Instructions    IMMEDIATELY FOLLOWING SURGERY:  Do not drive or operate machinery for the first twenty four hours after surgery.  Do not make any important decisions for twenty four hours after surgery or while taking narcotic pain medications or sedatives.  If you develop intractable nausea and vomiting or a severe headache please notify your doctor immediately.    FOLLOW-UP:  Please make an appointment with your surgeon as instructed. You do not need to follow up with anesthesia unless specifically instructed to do so.    WOUND CARE INSTRUCTIONS (if applicable):  Keep a dry clean dressing on the anesthesia/puncture wound site if there is drainage.  Once the wound has quit draining you may leave it open to air.  Generally you should leave the bandage intact for twenty four hours unless there is drainage.  If the epidural site drains for more than 36-48 hours please call the anesthesia department.    QUESTIONS?:  Please feel free to call your physician or the hospital  if you have any questions, and they will be happy to assist you.   Instructions for Sinus Surgery    FINDINGS:  Polypoid mucosa maxillary sinuses.  Minimal purulent material bilateral maxillary sinuses      Recovery - Everyone recovers differently from a general anesthetic.  Symptoms such as fatigue, nausea, light-headedness, and sometimes a low grade fever (up to 100 degrees) are not unusual.  As your body removes the anesthetic drugs from circulation, these symptoms will resolve.  Your nose will be sore after surgery, and you may even have symptoms similar to a sinus infection with headache, congestion, and pressure.  These will resolve with healing.  For several days you may experience bloody drainage from the nose, please use the drip pad as necessary for this.  If there is persistent bleeding, please call the office during business hours or the on call ENT physician after hours.  There are no diet restrictions  after sinus surgery, and you can resume your home medications.      Please do not blow your nose until one week after surgery.       Limit your activity to no strenuous activities until I see you for the first follow-up visit in approximately 2 weeks.      Medications - You were sent home with narcotic pain medication.  If you can tolerate the discomfort during your recovery by using just plain Tylenol or ibuprofen (advil), please do so.  However, do not hesitate to use the stronger pain medication if needed.    Continue oral Bactrim and Doxy    Continue bactroban sinus irrigation 2-3 times daily    Continue budesonide sinus irrigations 2 times a day        Follow-up -  See Karen Mccallum ENT PA in 2 weeks.  See Dr. Gonzalez in 1 month.    If there are any questions or issues with the above, or if there are other issues that concern you, always feel free to call the clinic and I am happy to speak with you as soon as I can.    Francoise Gonzalez D.O.  Otolaryngology/Head and Neck Surgery  Allergy    [phone] office      Remove the scopolamine patch behind your left ear after 24 hours after application.   After removing the patch, wash your hands and the area behind your ear thoroughly with soap and water.   The patch will still contain some medicine after use.   To avoid accidental contact or ingestion by children or pets, fold the used patch in half with the sticky side together and throw away in the trash out of the reach of children and pets.

## 2018-09-05 NOTE — IP AVS SNAPSHOT
[facility]    [address] [address]    Phone:  [phone]                                       After Visit Summary   9/5/2018    Annie Garcia    MRN: [id number]           After Visit Summary Signature Page     I have received my discharge instructions, and my questions have been answered. I have discussed any challenges I see with this plan with the nurse or doctor.    ..........................................................................................................................................  Patient/Patient Representative Signature      ..........................................................................................................................................  Patient Representative Print Name and Relationship to Patient    ..................................................               ................................................  Date                                            Time    ..........................................................................................................................................  Reviewed by Signature/Title    ...................................................              ..............................................  Date                                                            Time          22EPIC Rev 08/18

## 2018-09-05 NOTE — OP NOTE
Otolaryngology Operative Note     Pre-op Diagnosis: Chronic pansinusitis, cystic fibrosis  Post-op Diagnosis:  same  Procedures:  1.  Bilateral total ethmoidectomy  2.  Bilateral sphenoidotomy  3.  Right frontal sinusotomy  4.  Bilateral maxillary sinus irrigation with polyp removal   Surgeon:  Francoise Gonzalez D.O.  Anesthesia:  General endotracheal  EBL:  <10 ml  Findings: Polypoid mucosa of the maxillary sinuses bilaterally, scant mucopurulence bilateral maxillary sinuses,  stenotic right frontal recess.  Mildly polypoid ethmoid sinus mucosa  Complications:  none  Condition:  stable     Description of the Procedure  After surgical consent was obtained patient was brought back to the operative room and laid in a comfortable and supine position.  She was administered general anesthetic by member of anesthesia.  The table was turned 180 .  The Results Scorecard navigation system was calibrated and found to be in normal working condition.  A timeout was taken.  Cocaine pledgets were placed.  These were removed after several minutes.  I then used 1% lidocaine with  1-100,000 epinephrine to anesthetize the lateral nasal wall and the inferior turbinates.  The patient was draped in the normal fashion.  I first used a 30  endoscope to examine the left sinuses.  There is a patent maxillary antrostomy with diffusely polypoid mucosa and at the inferior wall of the maxillary sinus the mucosa is becoming severely edematous.  I used the Results Scorecard hydro-debrider to copiously irrigate the maxillary sinus.  I used a maxillary sinus forceps to remove the grossly diseased polypoid mucosa preserving as much mucosa as possible.   I used a 70  scope to ensure the entire is sinus is clear of any purulence.  I then irrigated the sinus with Bactroban saline irrigation.    I then turned my attention to the ethmoid cavity she is post total ethmoidectomy but there are a few polypoid ethmoid cells remaining.  I identified the lamina and the  skull base and work from a posterior to anterior direction using Blakesley forceps  to remove additional septations and polypoid mucosa.   I entered the sphenoid sinus  posterior inferior and medial within the ethmoid sinus.  The sphenoid and the mucosa is healthy.  I then similarly irrigated the ethmoid and sphenoid cavity with Bactroban saline.  The left frontal recess and the frontal sinus was ensured to be patent with a curved suction and a 70  scope.  The frontal sinus was irrigated with Bactroban saline.  Hemostasis is adequate.  I placed a propel implant into the left maxillary sinus.    I then turned my attention to the right side .  The middle turbinate was polypoid on the right so he did move a portion of the middle turbinate with the microdebrider blade hemostasis is achieved with suction cautery.  I similarly removed polypoid mucosa and hydrodebrided the right maxillary sinus.  I am removed additional septations and polypoid mucosa from the anterior ethmoid cavity on the right which had surgical changes consistent with prior partial ethmoidectomy.  The lamina was preserved throughout.     I identified and preserved the skull base.  I entered the ground lamella inferior medial and using the [employer] microdebrider blade I removed polypoid mucosa and septations within the posterior ethmoid cavity.  The sphenoid sinus was entered inferior and medial within the ethmoid cavity the sphenoid sinus mucosa is flattened healthy    Next I used the [employer] navigation balloon to identify the stenotic right frontal recess the balloon was advanced under navigation to within the right frontal sinus and dilated.  I performed 2 serial dilations from cephalad to caudal until the frontal sinus is widely patent.  Most of the sinus procedures were performed using the 30  endoscope but I did ensure that the maxillary sinus and both frontal sinuses were patent using a 70  endoscope.      I similarly placed a propel implant  into the right maxillary sinus.      I irrigated the sinuses throughout with Bactroban saline and gently suctioned the sinuses photos were taken throughout the procedure.  Hemostasis is adequate.  No packing was used.  The patient was then handed back over to anesthesia awakened and brought to the recovery room in stable condition having tolerated the procedure well

## 2018-09-05 NOTE — OR NURSING
Steady on feet.Some H/A continues.Denies nausea.Patient and responsible adult given discharge instructions with no questions regarding instructions. Noah score 19*. Pain level 2/10.  Discharged from unit via walking. Patient discharged to home with .

## 2018-09-05 NOTE — IP AVS SNAPSHOT
MRN:[id number]                      After Visit Summary   9/5/2018    Annie Garcia    MRN: [id number]           Thank you!     Thank you for choosing [facility] for your care. Our goal is always to provide you with excellent care. Hearing back from our patients is one way we can continue to improve our services. Please take a few minutes to complete the written survey that you may receive in the mail after you visit with us. Thank you!        Patient Information     Date Of Birth          1970        About your hospital stay     You were admitted on:  September 5, 2018 You last received care in the:  [facility] Preop/Phase II    You were discharged on:  September 5, 2018       Who to Call     For medical emergencies, please call 911.  For non-urgent questions about your medical care, please call your primary care provider or clinic, [phone]  For questions related to your surgery, please call your surgery clinic        Attending Provider     Provider Specialty    Francoise Gonzalez MD Otolaryngology       Primary Care Provider Office Phone # Fax #    Todd Torres [phone]      Your next 10 appointments already scheduled     Sep 12, 2018  3:45 PM CDT   (Arrive by 3:30 PM)   Post Op with Karen Mccallum PA-C   [facility] [place] ([facility] [place] )    [address]  [place] [address]   [phone]            Oct 08, 2018  2:15 PM CDT   (Arrive by 2:00 PM)   Post Op with Francoise Gonzalez MD   [facility] [place] ([facility] [place] )    [address]  [place] [address]   [phone]              Further instructions from your care team           Post-Anesthesia Patient Instructions    IMMEDIATELY FOLLOWING SURGERY:  Do not drive or operate machinery for the first twenty four hours after surgery.  Do not make any important decisions for twenty four hours after surgery or while taking narcotic pain medications or  sedatives.  If you develop intractable nausea and vomiting or a severe headache please notify your doctor immediately.    FOLLOW-UP:  Please make an appointment with your surgeon as instructed. You do not need to follow up with anesthesia unless specifically instructed to do so.    WOUND CARE INSTRUCTIONS (if applicable):  Keep a dry clean dressing on the anesthesia/puncture wound site if there is drainage.  Once the wound has quit draining you may leave it open to air.  Generally you should leave the bandage intact for twenty four hours unless there is drainage.  If the epidural site drains for more than 36-48 hours please call the anesthesia department.    QUESTIONS?:  Please feel free to call your physician or the hospital  if you have any questions, and they will be happy to assist you.   Instructions for Sinus Surgery    FINDINGS:  Polypoid mucosa maxillary sinuses.  Minimal purulent material bilateral maxillary sinuses      Recovery - Everyone recovers differently from a general anesthetic.  Symptoms such as fatigue, nausea, light-headedness, and sometimes a low grade fever (up to 100 degrees) are not unusual.  As your body removes the anesthetic drugs from circulation, these symptoms will resolve.  Your nose will be sore after surgery, and you may even have symptoms similar to a sinus infection with headache, congestion, and pressure.  These will resolve with healing.  For several days you may experience bloody drainage from the nose, please use the drip pad as necessary for this.  If there is persistent bleeding, please call the office during business hours or the on call ENT physician after hours.  There are no diet restrictions after sinus surgery, and you can resume your home medications.      Please do not blow your nose until one week after surgery.       Limit your activity to no strenuous activities until I see you for the first follow-up visit in approximately 2 weeks.      Medications - You  "were sent home with narcotic pain medication.  If you can tolerate the discomfort during your recovery by using just plain Tylenol or ibuprofen (advil), please do so.  However, do not hesitate to use the stronger pain medication if needed.    Continue oral Bactrim and Doxy    Continue bactroban sinus irrigation 2-3 times daily    Continue budesonide sinus irrigations 2 times a day        Follow-up -  See ALBINO Perdue in 2 weeks.  See Dr. Gonzalez in 1 month.    If there are any questions or issues with the above, or if there are other issues that concern you, always feel free to call the clinic and I am happy to speak with you as soon as I can.    Francoise Gonzalez D.O.  Otolaryngology/Head and Neck Surgery  Allergy    [phone] office      Remove the scopolamine patch behind your left ear after 24 hours after application.   After removing the patch, wash your hands and the area behind your ear thoroughly with soap and water.   The patch will still contain some medicine after use.   To avoid accidental contact or ingestion by children or pets, fold the used patch in half with the sticky side together and throw away in the trash out of the reach of children and pets.                 Pending Results     Date and Time Order Name Status Description    9/5/2018 1342 Surgical pathology exam In process             Admission Information     Date & Time Provider Department Dept. Phone    9/5/2018 Francoise Gonzalez MD [facility] Preop/Phase [phone]      Your Vitals Were     Blood Pressure Temperature Respirations Height Weight Pulse Oximetry    136/77 98  F (36.7  C) (Temporal) 18 1.702 m (5' 7\") 111.1 kg (245 lb) 92%    BMI (Body Mass Index)                   38.37 kg/m2           Care EveryWhere ID     This is your Care EveryWhere ID. This could be used by other organizations to access your [facility] medical records  [id number]        Equal Access to Services     ANTONINO TELLES: Zack meza " Sotrino, wamelvada luqadaha, qaybta kaalmada asif, leanna nunez. [facility] [phone].    ATENCIÓN: Si enrico dent, tiene a seals disposición servicios gratuitos de asistencia lingüística. Ryland al [phone].    We comply with applicable federal civil rights laws and [place] laws. We do not discriminate on the basis of race, color, national origin, age, disability, sex, sexual orientation, or gender identity.               Review of your medicines      START taking        Dose / Directions    HYDROcodone-acetaminophen 7.5-325 MG per tablet   Commonly known as:  NORCO   Used for:  S/P FESS (functional endoscopic sinus surgery)        Dose:  1 tablet   Take 1 tablet by mouth every 6 hours as needed for severe pain   Quantity:  15 tablet   Refills:  0         CONTINUE these medicines which have NOT CHANGED        Dose / Directions    acetaminophen-codeine 300-30 MG per tablet   Commonly known as:  TYLENOL #3        TAKE 1 TABLET BY MOUTH EVERY 4 HOURS AS NEEDED FOR MILD PAIN, TAKE FOR COUGHING PAIN.   Quantity:  90 tablet   Refills:  0       acetylcysteine 20 % nebulizer solution   Commonly known as:  MUCOMYST        Take by nebulization every 8 hours 5-10 ml inhalation every 8 hours   Refills:  0       AMBIEN 10 MG tablet   Used for:  Primary insomnia   Generic drug:  zolpidem        TAKE 1 TABLET BY MOUTH NIGHTLY AS NEEDED FOR SLEEP   Quantity:  30 tablet   Refills:  3       BROVANA 15 MCG/2ML Nebu neb solution   Used for:  Chronic bronchitis, unspecified chronic bronchitis type (H)   Generic drug:  arformoterol        USE 1 VIAL IN NEBULIZER 2 TIMES A DAY AS NEEDED   Quantity:  120 mL   Refills:  0       budesonide 1 MG/2ML Susp neb solution   Commonly known as:  PULMICORT   Used for:  Bronchitis        Dose:  1 mg   Take 2 mLs (1 mg) by nebulization daily Take 1 mg by nebulization daily   Quantity:  60 ampule   Refills:  1       clotrimazole 10 MG behzad        1 behzad  Mouth/Throat 3 times a day   Refills:  0       COMPOUNDED NON-CONTROLLED SUBSTANCE - PHARMACY TO MIX COMPOUNDED MEDICATION   Commonly known as:  CMPD RX   Used for:  Chronic maxillary sinusitis        Irrigate bilateral nares with 240 ML Twice Daily for 4 Weeks.   Quantity:  4 Bottle   Refills:  11       dexlansoprazole 60 MG Cpdr CR capsule   Commonly known as:  DEXILANT   Used for:  Gastroesophageal reflux disease without esophagitis        Dose:  60 mg   Take 1 capsule (60 mg) by mouth daily as needed   Quantity:  30 capsule   Refills:  3       doxycycline 100 MG tablet   Commonly known as:  VIBRA-TABS        Dose:  100 mg   Take 100 mg by mouth 2 times daily Takes one tablet daily prophylaxis.   Refills:  0       fluconazole 100 MG tablet   Commonly known as:  DIFLUCAN   Used for:  Rash and other nonspecific skin eruption        TAKE 1 TABLET BY MOUTH DAILY AS NEEDED   Quantity:  90 tablet   Refills:  0       levalbuterol 0.31 MG/3ML neb solution   Commonly known as:  XOPENEX        Dose:  1 ampule   Take 3 mLs (0.31 mg) by nebulization every 6 hours as needed for shortness of breath / dyspnea   Quantity:  225 mL   Refills:  0       mometasone 50 MCG/ACT spray   Commonly known as:  NASONEX   Used for:  Chronic pansinusitis        Dose:  2 spray   Spray 2 sprays into both nostrils daily   Quantity:  3 Box   Refills:  11       PULMOZYME 1 MG/ML neb solution   Used for:  Chronic bronchitis, unspecified chronic bronchitis type (H)   Generic drug:  dornase alpha        USE 1 VIAL IN NEBULIZER DAILY AS NEEDED   Quantity:  75 mL   Refills:  0       sucralfate 1 GM tablet   Commonly known as:  CARAFATE   Used for:  Esophageal reflux        TAKE 1 TABLET BY MOUTH FOUR TIMES A DAY AS NEEDED   Quantity:  120 tablet   Refills:  3       sulfamethoxazole-trimethoprim 800-160 MG per tablet   Commonly known as:  BACTRIM DS/SEPTRA DS   Used for:  S/P FESS (functional endoscopic sinus surgery), Chronic pansinusitis        Dose:   1 tablet   Take 1 tablet by mouth 2 times daily   Quantity:  60 tablet   Refills:  3       WELLBUTRIN  MG 12 hr tablet   Used for:  Dysthymia   Generic drug:  buPROPion        TAKE 2 TABLETS BY MOUTH EVERY MORNING AND 1 TABLET EVERY AFTERNOON   Quantity:  90 tablet   Refills:  6            Where to get your medicines      Some of these will need a paper prescription and others can be bought over the counter. Ask your nurse if you have questions.     Bring a paper prescription for each of these medications     HYDROcodone-acetaminophen 7.5-325 MG per tablet                Protect others around you: Learn how to safely use, store and throw away your medicines at www.disposemymeds.org.        Information about OPIOIDS     PRESCRIPTION OPIOIDS: WHAT YOU NEED TO KNOW   We gave you an opioid (narcotic) pain medicine. It is important to manage your pain, but opioids are not always the best choice. You should first try all the other options your care team gave you. Take this medicine for as short a time (and as few doses) as possible.    Some activities can increase your pain, such as bandage changes or therapy sessions. It may help to take your pain medicine 30 to 60 minutes before these activities. Reduce your stress by getting enough sleep, working on hobbies you enjoy and practicing relaxation or meditation. Talk to your care team about ways to manage your pain beyond prescription opioids.    These medicines have risks:    DO NOT drive when on new or higher doses of pain medicine. These medicines can affect your alertness and reaction times, and you could be arrested for driving under the influence (DUI). If you need to use opioids long-term, talk to your care team about driving.    DO NOT operate heavy machinery    DO NOT do any other dangerous activities while taking these medicines.    DO NOT drink any alcohol while taking these medicines.     If the opioid prescribed includes acetaminophen, DO NOT take with  any other medicines that contain acetaminophen. Read all labels carefully. Look for the word  acetaminophen  or  Tylenol.  Ask your pharmacist if you have questions or are unsure.    You can get addicted to pain medicines, especially if you have a history of addiction (chemical, alcohol or substance dependence). Talk to your care team about ways to reduce this risk.    All opioids tend to cause constipation. Drink plenty of water and eat foods that have a lot of fiber, such as fruits, vegetables, prune juice, apple juice and high-fiber cereal. Take a laxative (Miralax, milk of magnesia, Colace, Senna) if you don t move your bowels at least every other day. Other side effects include upset stomach, sleepiness, dizziness, throwing up, tolerance (needing more of the medicine to have the same effect), physical dependence and slowed breathing.    Store your pills in a secure place, locked if possible. We will not replace any lost or stolen medicine. If you don t finish your medicine, please throw away (dispose) as directed by your pharmacist. The [place] Pollution Control Agency has more information about safe disposal: https://[email or web address].us/living-green/managing-unwanted-medications             Medication List: This is a list of all your medications and when to take them. Check marks below indicate your daily home schedule. Keep this list as a reference.      Medications           Morning Afternoon Evening Bedtime As Needed    acetaminophen-codeine 300-30 MG per tablet   Commonly known as:  TYLENOL #3   TAKE 1 TABLET BY MOUTH EVERY 4 HOURS AS NEEDED FOR MILD PAIN, TAKE FOR COUGHING PAIN.                                acetylcysteine 20 % nebulizer solution   Commonly known as:  MUCOMYST   Take by nebulization every 8 hours 5-10 ml inhalation every 8 hours                                AMBIEN 10 MG tablet   TAKE 1 TABLET BY MOUTH NIGHTLY AS NEEDED FOR SLEEP   Generic drug:  zolpidem                                 BROVANA 15 MCG/2ML Nebu neb solution   USE 1 VIAL IN NEBULIZER 2 TIMES A DAY AS NEEDED   Generic drug:  arformoterol                                budesonide 1 MG/2ML Susp neb solution   Commonly known as:  PULMICORT   Take 2 mLs (1 mg) by nebulization daily Take 1 mg by nebulization daily                                clotrimazole 10 MG behzad   1 behzad Mouth/Throat 3 times a day                                COMPOUNDED NON-CONTROLLED SUBSTANCE - PHARMACY TO MIX COMPOUNDED MEDICATION   Commonly known as:  CMPD RX   Irrigate bilateral nares with 240 ML Twice Daily for 4 Weeks.                                dexlansoprazole 60 MG Cpdr CR capsule   Commonly known as:  DEXILANT   Take 1 capsule (60 mg) by mouth daily as needed                                doxycycline 100 MG tablet   Commonly known as:  VIBRA-TABS   Take 100 mg by mouth 2 times daily Takes one tablet daily prophylaxis.                                fluconazole 100 MG tablet   Commonly known as:  DIFLUCAN   TAKE 1 TABLET BY MOUTH DAILY AS NEEDED                                HYDROcodone-acetaminophen 7.5-325 MG per tablet   Commonly known as:  NORCO   Take 1 tablet by mouth every 6 hours as needed for severe pain   Last time this was given:  1 tablet on 9/5/2018  4:09 PM                                levalbuterol 0.31 MG/3ML neb solution   Commonly known as:  XOPENEX   Take 3 mLs (0.31 mg) by nebulization every 6 hours as needed for shortness of breath / dyspnea                                mometasone 50 MCG/ACT spray   Commonly known as:  NASONEX   Spray 2 sprays into both nostrils daily                                PULMOZYME 1 MG/ML neb solution   USE 1 VIAL IN NEBULIZER DAILY AS NEEDED   Generic drug:  dornase alpha                                sucralfate 1 GM tablet   Commonly known as:  CARAFATE   TAKE 1 TABLET BY MOUTH FOUR TIMES A DAY AS NEEDED                                sulfamethoxazole-trimethoprim 800-160 MG per tablet    Commonly known as:  BACTRIM DS/SEPTRA DS   Take 1 tablet by mouth 2 times daily                                WELLBUTRIN  MG 12 hr tablet   TAKE 2 TABLETS BY MOUTH EVERY MORNING AND 1 TABLET EVERY AFTERNOON   Generic drug:  buPROPion

## 2018-09-05 NOTE — ANESTHESIA CARE TRANSFER NOTE
Patient: Annie Garcia    Procedure(s):  SINUS EXAM UNDER ANESTHESIA, Endoscopic Sinus Surgery - Wound Class: II-Clean Contaminated   - Wound Class: II-Clean Contaminated    Diagnosis: CYSTIC FIBROSIS, CHRONIC PANSINUSITIS, NASAL SEPTAL PERFORATION S/P FESS  Diagnosis Additional Information: No value filed.    Anesthesia Type:   General, ETT     Note:  Airway :Face Mask  Patient transferred to:PACU  Handoff Report: Identifed the Patient, Identified the Reponsible Provider, Reviewed the pertinent medical history, Discussed the surgical course, Reviewed Intra-OP anesthesia mangement and issues during anesthesia, Set expectations for post-procedure period and Allowed opportunity for questions and acknowledgement of understanding      Vitals: (Last set prior to Anesthesia Care Transfer)    CRNA VITALS  9/5/2018 1406 - 9/5/2018 1442      9/5/2018             Pulse: 92    SpO2: 96 %                Electronically Signed By: VALENTINO Valencia CRNA  September 5, 2018  2:42 PM

## 2018-09-05 NOTE — OR NURSING
Small to moderate amt nasal drainage with ambulation.Voided in BR.Alert.States feels like woke up faster last time.

## 2018-09-06 NOTE — ANESTHESIA POSTPROCEDURE EVALUATION
Patient: Annie Garcia    Procedure(s):  SINUS EXAM UNDER ANESTHESIA, Endoscopic Sinus Surgery - Wound Class: II-Clean Contaminated   - Wound Class: II-Clean Contaminated    Diagnosis:CYSTIC FIBROSIS, CHRONIC PANSINUSITIS, NASAL SEPTAL PERFORATION S/P FESS  Diagnosis Additional Information: No value filed.    Anesthesia Type:  General, ETT    Note:  Anesthesia Post Evaluation    Patient participation: Able to fully participate in evaluation  Level of consciousness: awake  Pain management: adequate  Airway patency: patent  Cardiovascular status: acceptable  Respiratory status: acceptable  Hydration status: acceptable  PONV: none     Anesthetic complications: None          Last vitals:  Vitals:    09/05/18 1630 09/05/18 1645 09/05/18 1700   BP: 133/67 125/92 130/86   Resp: 18 18 18   Temp:      SpO2: 92% 93%          Electronically Signed By: VALENTINO Valencia CRNA  September 6, 2018  12:11 PM

## 2018-09-07 LAB — COPATH REPORT: NORMAL

## 2018-09-12 ENCOUNTER — OFFICE VISIT (OUTPATIENT)
Dept: OTOLARYNGOLOGY | Facility: OTHER | Age: 48
End: 2018-09-12
Attending: PHYSICIAN ASSISTANT
Payer: COMMERCIAL

## 2018-09-12 VITALS
DIASTOLIC BLOOD PRESSURE: 82 MMHG | HEIGHT: 67 IN | BODY MASS INDEX: 38.45 KG/M2 | OXYGEN SATURATION: 99 % | HEART RATE: 87 BPM | WEIGHT: 245 LBS | SYSTOLIC BLOOD PRESSURE: 126 MMHG | TEMPERATURE: 98.4 F

## 2018-09-12 DIAGNOSIS — J34.89 NASAL SEPTAL PERFORATION: ICD-10-CM

## 2018-09-12 DIAGNOSIS — Z98.890 S/P FESS (FUNCTIONAL ENDOSCOPIC SINUS SURGERY): ICD-10-CM

## 2018-09-12 DIAGNOSIS — R79.89 ELEVATED SERUM CREATININE: Primary | ICD-10-CM

## 2018-09-12 DIAGNOSIS — E84.9 CYSTIC FIBROSIS (H): ICD-10-CM

## 2018-09-12 DIAGNOSIS — J32.4 CHRONIC PANSINUSITIS: Primary | ICD-10-CM

## 2018-09-12 LAB
ANION GAP SERPL CALCULATED.3IONS-SCNC: 6 MMOL/L (ref 3–14)
BUN SERPL-MCNC: 14 MG/DL (ref 7–30)
CALCIUM SERPL-MCNC: 9 MG/DL (ref 8.5–10.1)
CHLORIDE SERPL-SCNC: 106 MMOL/L (ref 94–109)
CO2 SERPL-SCNC: 24 MMOL/L (ref 20–32)
CREAT SERPL-MCNC: 1.11 MG/DL (ref 0.52–1.04)
GFR SERPL CREATININE-BSD FRML MDRD: 52 ML/MIN/1.7M2
GLUCOSE SERPL-MCNC: 110 MG/DL (ref 70–99)
POTASSIUM SERPL-SCNC: 4.2 MMOL/L (ref 3.4–5.3)
SODIUM SERPL-SCNC: 136 MMOL/L (ref 133–144)

## 2018-09-12 PROCEDURE — 99213 OFFICE O/P EST LOW 20 MIN: CPT | Mod: 25 | Performed by: PHYSICIAN ASSISTANT

## 2018-09-12 PROCEDURE — 80048 BASIC METABOLIC PNL TOTAL CA: CPT | Performed by: INTERNAL MEDICINE

## 2018-09-12 PROCEDURE — 36415 COLL VENOUS BLD VENIPUNCTURE: CPT | Performed by: INTERNAL MEDICINE

## 2018-09-12 PROCEDURE — 31231 NASAL ENDOSCOPY DX: CPT | Performed by: PHYSICIAN ASSISTANT

## 2018-09-12 RX ORDER — HYDROCODONE BITARTRATE AND ACETAMINOPHEN 5; 325 MG/1; MG/1
1 TABLET ORAL EVERY 4 HOURS PRN
Qty: 18 TABLET | Refills: 0 | Status: SHIPPED | OUTPATIENT
Start: 2018-09-12 | End: 2019-12-10

## 2018-09-12 ASSESSMENT — PAIN SCALES - GENERAL: PAINLEVEL: MILD PAIN (2)

## 2018-09-12 NOTE — MR AVS SNAPSHOT
After Visit Summary   9/12/2018    Annie Garcia    MRN: [id number]           Patient Information     Date Of Birth          1970        Visit Information        Provider Department      9/12/2018 3:45 PM Karen Mccallum PA-C [facility]        Care Instructions    Continue with Bactroban rinses twice a day  Increase amanda med rinse 2-3 times daily  Propels remain in place- bilateral maxillary sinuses.     Continue with postoperative instructions  Follow up with Dr. Gonzalez    Thank you for allowing GWEN Perdue and our ENT team to participate in your care.  If your medications are too expensive, please give the nurse a call.  We can possibly change this medication.  If you have a scheduling or an appointment question please contact [facility] Unit Coordinator at their direct line [phone].   ALL nursing questions or concerns can be directed to your ENT nurse at: [phone] [facility]              Follow-ups after your visit        Your next 10 appointments already scheduled     Oct 08, 2018  2:15 PM CDT   (Arrive by 2:00 PM)   Post Op with Francoise Gonzalez MD   [facility] ([facility] )    [address]  [facility] [address]   [phone]              Who to contact     If you have questions or need follow up information about today's clinic visit or your schedule please contact [facility] directly at [phone].  Normal or non-critical lab and imaging results will be communicated to you by MyChart, letter or phone within 4 business days after the clinic has received the results. If you do not hear from us within 7 days, please contact the clinic through MyChart or phone. If you have a critical or abnormal lab result, we will notify you by phone as soon as possible.  Submit refill requests through [employer] or call your pharmacy and they will forward the refill request to us. Please allow 3 business days for your  "refill to be completed.          Additional Information About Your Visit        Care EveryWhere ID     This is your Care EveryWhere ID. This could be used by other organizations to access your [place] medical records  [id number]        Your Vitals Were     Pulse Temperature Height Pulse Oximetry BMI (Body Mass Index)       87 98.4  F (36.9  C) (Tympanic) 5' 7\" (1.702 m) 99% 38.37 kg/m2        Blood Pressure from Last 3 Encounters:   09/12/18 126/82   09/05/18 130/86   08/30/18 110/62    Weight from Last 3 Encounters:   09/12/18 245 lb (111.1 kg)   09/05/18 245 lb (111.1 kg)   08/30/18 214 lb (97.1 kg)              Today, you had the following     No orders found for display       Primary Care Provider Office Phone # Fax #    Todd Torres [phone]       [address] [place] [address]        Equal Access to Services     ANTONINO SANCHEZ : Hadii aad ku hadasho Soomaali, waaxda luqadaha, qaybta kaalmada adeegyada, waxay idiin hayshahabn celeste [place] . So [facility] [phone].    ATENCIÓN: Si habla español, tiene a seals disposición servicios gratuitos de asistencia lingüística. Llame al [phone].    We comply with applicable federal civil rights laws and [place] laws. We do not discriminate on the basis of race, color, national origin, age, disability, sex, sexual orientation, or gender identity.            Thank you!     Thank you for choosing [facility]  for your care. Our goal is always to provide you with excellent care. Hearing back from our patients is one way we can continue to improve our services. Please take a few minutes to complete the written survey that you may receive in the mail after your visit with us. Thank you!             Your Updated Medication List - Protect others around you: Learn how to safely use, store and throw away your medicines at www.disposemymeds.org.          This list is accurate as of 9/12/18  4:01 PM.  Always use your most recent med " list.                   Brand Name Dispense Instructions for use Diagnosis    acetaminophen-codeine 300-30 MG per tablet    TYLENOL #3    90 tablet    TAKE 1 TABLET BY MOUTH EVERY 4 HOURS AS NEEDED FOR MILD PAIN, TAKE FOR COUGHING PAIN.        acetylcysteine 20 % nebulizer solution    MUCOMYST     Take by nebulization every 8 hours 5-10 ml inhalation every 8 hours        AMBIEN 10 MG tablet   Generic drug:  zolpidem     30 tablet    TAKE 1 TABLET BY MOUTH NIGHTLY AS NEEDED FOR SLEEP    Primary insomnia       BROVANA 15 MCG/2ML Nebu neb solution   Generic drug:  arformoterol     120 mL    USE 1 VIAL IN NEBULIZER 2 TIMES A DAY AS NEEDED    Chronic bronchitis, unspecified chronic bronchitis type (H)       budesonide 1 MG/2ML Susp neb solution    PULMICORT    60 ampule    Take 2 mLs (1 mg) by nebulization daily Take 1 mg by nebulization daily    Bronchitis       clotrimazole 10 MG behzad      1 behzad Mouth/Throat 3 times a day        COMPOUNDED NON-CONTROLLED SUBSTANCE - PHARMACY TO MIX COMPOUNDED MEDICATION    CMPD RX    4 Bottle    Irrigate bilateral nares with 240 ML Twice Daily for 4 Weeks.    Chronic maxillary sinusitis       dexlansoprazole 60 MG Cpdr CR capsule    DEXILANT    30 capsule    Take 1 capsule (60 mg) by mouth daily as needed    Gastroesophageal reflux disease without esophagitis       doxycycline 100 MG tablet    VIBRA-TABS     Take 100 mg by mouth 2 times daily Takes one tablet daily prophylaxis.        fluconazole 100 MG tablet    DIFLUCAN    90 tablet    TAKE 1 TABLET BY MOUTH DAILY AS NEEDED    Rash and other nonspecific skin eruption       levalbuterol 0.31 MG/3ML neb solution    XOPENEX    225 mL    Take 3 mLs (0.31 mg) by nebulization every 6 hours as needed for shortness of breath / dyspnea        mometasone 50 MCG/ACT spray    NASONEX    3 Box    Spray 2 sprays into both nostrils daily    Chronic pansinusitis       PULMOZYME 1 MG/ML neb solution   Generic drug:  dornase alpha     75 mL     USE 1 VIAL IN NEBULIZER DAILY AS NEEDED    Chronic bronchitis, unspecified chronic bronchitis type (H)       sucralfate 1 GM tablet    CARAFATE    120 tablet    TAKE 1 TABLET BY MOUTH FOUR TIMES A DAY AS NEEDED    Esophageal reflux       sulfamethoxazole-trimethoprim 800-160 MG per tablet    BACTRIM DS/SEPTRA DS    60 tablet    Take 1 tablet by mouth 2 times daily    S/P FESS (functional endoscopic sinus surgery), Chronic pansinusitis       WELLBUTRIN  MG 12 hr tablet   Generic drug:  buPROPion     90 tablet    TAKE 2 TABLETS BY MOUTH EVERY MORNING AND 1 TABLET EVERY AFTERNOON    Dysthymia

## 2018-09-12 NOTE — PATIENT INSTRUCTIONS
Continue with Bactroban rinses twice a day  Increase amanda med rinse 2-3 times daily  Propels remain in place- bilateral maxillary sinuses.     Continue with postoperative instructions  Follow up with Dr. Gonzalez    Thank you for allowing GWEN Perdue and our ENT team to participate in your care.  If your medications are too expensive, please give the nurse a call.  We can possibly change this medication.  If you have a scheduling or an appointment question please contact [facility] Unit Coordinator at their direct line [phone].   ALL nursing questions or concerns can be directed to your ENT nurse at: [phone] danny

## 2018-09-12 NOTE — PROGRESS NOTES
"Chief Complaint   Patient presents with     Surgical Followup     S/P FESS, Sinus Exam Under Anesthesia on 9/5/18       Annie presents for her one week S/P FESS. She has been doing fair. Annie has developed dentalgia again similar to her initial FESS here.   Pain related to her dental is controlled with Lortab. She has since felt continued facial pain intermittently along right upper frontal and lateral orbit. This is intermittently and lasts for short while. No facial weakness. Good facial movements with exam.   Annie has been taking her Bactrim BID and maintain Bactroban rinses.   Currently using Asaf med rinse 1 time daily.         Procedures:  1.  Bilateral total ethmoidectomy  2.  Bilateral sphenoidotomy  3.  Right frontal sinusotomy  4.  Bilateral maxillary sinus irrigation with polyp removal   Surgeon:  Francoise Gonzalez D.O.  Anesthesia:  General endotracheal  EBL:  <10 ml  Findings: Polypoid mucosa of the maxillary sinuses bilaterally, scant mucopurulence bilateral maxillary sinuses,  stenotic right frontal recess.  Mildly polypoid ethmoid sinus mucosa  Complications:  none  Condition:  stable   Past Medical History:   Diagnosis Date     Cystic fibrosis (H)      Depression      Tear of right acetabular labrum         Allergies   Allergen Reactions     Seasonal Allergies Itching   .med   ROS: 10 point ROS neg other than the symptoms noted above in the HPI.  /82 (Cuff Size: Adult Large)  Pulse 87  Temp 98.4  F (36.9  C) (Tympanic)  Ht 5' 7\" (1.702 m)  Wt 245 lb (111.1 kg)  SpO2 99%  BMI 38.37 kg/m2  General - The patient is well nourished and well developed, and appears to have good nutritional status.  Alert and oriented to person and place, interactive.  Anxious    Nose - Nasal mucosa is pink and moist with no abnormal mucus.  The septum was grossly midline and non-obstructive, turbinates of normal size and position.  No polyps noted on examination.  Small 2-3 mm anterior septal " perforation.   Mouth - Examination of the oral cavity shows pink, healthy, moist mucosa.  No lesions or ulceration noted.  The dentition are in good repair.  The tongue is mobile and midline.  Throat - The walls of the oropharynx were smooth, pink, moist, symmetric, and had no visible postnasal purulent drainage  Uvula has mild edema. No ulceration.   To evaluate the nose and sinuses in the post operative state, I performed rigid nasal endoscopy. The LPN had previously sprayed both nares with lidocaine and neosynephrine.    I began with the LEFT side using a 0 degree rigid nasal endoscope, and then similarly examined the RIGHT side    Findings:  Inferior turbinates: Non edematous.   Middle turbinate and middle meatus:  No purulence, no polyposis, no synechiae  Antrostomy appear patent w/ Propels sitting at antrum.   Ethmoid cavity- clot . Did not remove.  Mucosa is overall without polypoid changes. Scant clots covering portion of tissue.       ASSESSMENT:    ICD-10-CM    1. Chronic pansinusitis J32.4 HYDROcodone-acetaminophen (NORCO) 5-325 MG per tablet   2. S/P FESS (functional endoscopic sinus surgery) Z98.890 HYDROcodone-acetaminophen (NORCO) 5-325 MG per tablet   3. Cystic fibrosis (H) E84.9    4. Nasal septal perforation J34.89        Continue with Bactroban rinses twice a day  Increase amanda med rinse 2-3 times daily  Propels remain in place- bilateral maxillary sinuses.     Continue with postoperative instructions  Follow up with Dr. Gonzalez  Refill of Lortab provided.   Patient's BMP is pending. Cr. 1.11    Karen Mccallum PA-C  ENT  [facility], [place]  [phone]

## 2018-09-12 NOTE — LETTER
"    9/12/2018         RE: Annie Garcia  [address] [address]        Dear Colleague,    Thank you for referring your patient, Annie Garcia, to the [facility]. Please see a copy of my visit note below.    Chief Complaint   Patient presents with     Surgical Followup     S/P FESS, Sinus Exam Under Anesthesia on 9/5/18       Annie presents for her one week S/P FESS. She has been doing fair. Annie has developed dentalgia again similar to her initial FESS here.   Pain related to her dental is controlled with Lortab. She has since felt continued facial pain intermittently along right upper frontal and lateral orbit. This is intermittently and lasts for short while. No facial weakness. Good facial movements with exam.   Annie has been taking her Bactrim BID and maintain Bactroban rinses.   Currently using Asaf med rinse 1 time daily.         Procedures:  1.  Bilateral total ethmoidectomy  2.  Bilateral sphenoidotomy  3.  Right frontal sinusotomy  4.  Bilateral maxillary sinus irrigation with polyp removal   Surgeon:  Francoise Gonzalez D.O.  Anesthesia:  General endotracheal  EBL:  <10 ml  Findings: Polypoid mucosa of the maxillary sinuses bilaterally, scant mucopurulence bilateral maxillary sinuses,  stenotic right frontal recess.  Mildly polypoid ethmoid sinus mucosa  Complications:  none  Condition:  stable    Past Medical History:   Diagnosis Date     Cystic fibrosis (H)      Depression      Tear of right acetabular labrum         Allergies   Allergen Reactions     Seasonal Allergies Itching   .med   ROS: 10 point ROS neg other than the symptoms noted above in the HPI.  /82 (Cuff Size: Adult Large)  Pulse 87  Temp 98.4  F (36.9  C) (Tympanic)  Ht 5' 7\" (1.702 m)  Wt 245 lb (111.1 kg)  SpO2 99%  BMI 38.37 kg/m2  General - The patient is well nourished and well developed, and appears to have good nutritional status.  Alert and oriented to person and " place, interactive.  Anxious    Nose - Nasal mucosa is pink and moist with no abnormal mucus.  The septum was grossly midline and non-obstructive, turbinates of normal size and position.  No polyps noted on examination.  Small 2-3 mm anterior septal perforation.   Mouth - Examination of the oral cavity shows pink, healthy, moist mucosa.  No lesions or ulceration noted.  The dentition are in good repair.  The tongue is mobile and midline.  Throat - The walls of the oropharynx were smooth, pink, moist, symmetric, and had no visible postnasal purulent drainage  Uvula has mild edema. No ulceration.   To evaluate the nose and sinuses in the post operative state, I performed rigid nasal endoscopy. The LPN had previously sprayed both nares with lidocaine and neosynephrine.    I began with the LEFT side using a 0 degree rigid nasal endoscope, and then similarly examined the RIGHT side    Findings:  Inferior turbinates: Non edematous.   Middle turbinate and middle meatus:  No purulence, no polyposis, no synechiae  Antrostomy appear patent w/ Propels sitting at antrum.   Ethmoid cavity- clot . Did not remove.  Mucosa is overall without polypoid changes. Scant clots covering portion of tissue.       ASSESSMENT:    ICD-10-CM    1. Chronic pansinusitis J32.4 HYDROcodone-acetaminophen (NORCO) 5-325 MG per tablet   2. S/P FESS (functional endoscopic sinus surgery) Z98.890 HYDROcodone-acetaminophen (NORCO) 5-325 MG per tablet   3. Cystic fibrosis (H) E84.9    4. Nasal septal perforation J34.89        Continue with Bactroban rinses twice a day  Increase amanda med rinse 2-3 times daily  Propels remain in place- bilateral maxillary sinuses.     Continue with postoperative instructions  Follow up with Dr. Gonzalez  Refill of Lortab provided.   Patient's BMP is pending. Cr. 1.11    Karen Mccallum PA-C  ENT  [facility], [place]  [phone]        Again, thank you for allowing me to participate in the care of your patient.         Sincerely,        Karen Mccallum PA-C

## 2018-09-12 NOTE — NURSING NOTE
"Chief Complaint   Patient presents with     Surgical Followup     S/P FESS, Sinus Exam Under Anesthesia on 9/5/18       Initial /82 (Cuff Size: Adult Large)  Pulse 87  Temp 98.4  F (36.9  C) (Tympanic)  Ht 5' 7\" (1.702 m)  Wt 245 lb (111.1 kg)  SpO2 99%  BMI 38.37 kg/m2 Estimated body mass index is 38.37 kg/(m^2) as calculated from the following:    Height as of this encounter: 5' 7\" (1.702 m).    Weight as of this encounter: 245 lb (111.1 kg).  Medication Reconciliation: complete    Sarahi Bain LPN    "

## 2018-09-17 LAB
FUNGUS SPEC CULT: ABNORMAL
FUNGUS SPEC CULT: ABNORMAL
SPECIMEN SOURCE: ABNORMAL

## 2018-09-19 DIAGNOSIS — E84.9 CF (CYSTIC FIBROSIS) (H): ICD-10-CM

## 2018-09-19 DIAGNOSIS — J32.4 CHRONIC PANSINUSITIS: Primary | ICD-10-CM

## 2018-09-19 PROCEDURE — 36415 COLL VENOUS BLD VENIPUNCTURE: CPT | Performed by: OTOLARYNGOLOGY

## 2018-09-19 PROCEDURE — 82565 ASSAY OF CREATININE: CPT | Performed by: OTOLARYNGOLOGY

## 2018-09-20 LAB
CREAT SERPL-MCNC: 1.05 MG/DL (ref 0.52–1.04)
GFR SERPL CREATININE-BSD FRML MDRD: 56 ML/MIN/1.7M2

## 2018-10-02 LAB
ALBUMIN SERPL-MCNC: 3.8 G/DL (ref 3.4–5)
ALP SERPL-CCNC: 94 U/L (ref 40–150)
ALT SERPL W P-5'-P-CCNC: 21 U/L (ref 0–50)
ANION GAP SERPL CALCULATED.3IONS-SCNC: 10 MMOL/L (ref 3–14)
AST SERPL W P-5'-P-CCNC: 12 U/L (ref 0–45)
BILIRUB SERPL-MCNC: 0.2 MG/DL (ref 0.2–1.3)
BUN SERPL-MCNC: 9 MG/DL (ref 7–30)
CALCIUM SERPL-MCNC: 8.6 MG/DL (ref 8.5–10.1)
CHLORIDE SERPL-SCNC: 103 MMOL/L (ref 94–109)
CO2 SERPL-SCNC: 25 MMOL/L (ref 20–32)
CREAT SERPL-MCNC: 0.97 MG/DL (ref 0.52–1.04)
GFR SERPL CREATININE-BSD FRML MDRD: 61 ML/MIN/1.7M2
GLUCOSE SERPL-MCNC: 82 MG/DL (ref 70–99)
POTASSIUM SERPL-SCNC: 4 MMOL/L (ref 3.4–5.3)
PROT SERPL-MCNC: 7.1 G/DL (ref 6.8–8.8)
SODIUM SERPL-SCNC: 138 MMOL/L (ref 133–144)

## 2018-10-02 PROCEDURE — 80053 COMPREHEN METABOLIC PANEL: CPT | Performed by: NURSE PRACTITIONER

## 2018-10-02 PROCEDURE — 36415 COLL VENOUS BLD VENIPUNCTURE: CPT | Performed by: NURSE PRACTITIONER

## 2018-10-08 ENCOUNTER — APPOINTMENT (OUTPATIENT)
Dept: LAB | Facility: OTHER | Age: 48
End: 2018-10-08
Attending: NURSE PRACTITIONER
Payer: COMMERCIAL

## 2018-10-08 ENCOUNTER — OFFICE VISIT (OUTPATIENT)
Dept: OTOLARYNGOLOGY | Facility: OTHER | Age: 48
End: 2018-10-08
Attending: OTOLARYNGOLOGY
Payer: COMMERCIAL

## 2018-10-08 VITALS
WEIGHT: 240 LBS | HEIGHT: 68 IN | DIASTOLIC BLOOD PRESSURE: 78 MMHG | BODY MASS INDEX: 36.37 KG/M2 | SYSTOLIC BLOOD PRESSURE: 134 MMHG | HEART RATE: 91 BPM | TEMPERATURE: 98.4 F

## 2018-10-08 DIAGNOSIS — E84.9 CF (CYSTIC FIBROSIS) (H): ICD-10-CM

## 2018-10-08 DIAGNOSIS — R79.89 ELEVATED SERUM CREATININE: Primary | ICD-10-CM

## 2018-10-08 DIAGNOSIS — Z98.890 S/P FESS (FUNCTIONAL ENDOSCOPIC SINUS SURGERY): ICD-10-CM

## 2018-10-08 DIAGNOSIS — J32.4 CHRONIC PANSINUSITIS: Primary | ICD-10-CM

## 2018-10-08 LAB
ALBUMIN SERPL-MCNC: 4 G/DL (ref 3.4–5)
ALP SERPL-CCNC: 96 U/L (ref 40–150)
ALT SERPL W P-5'-P-CCNC: 23 U/L (ref 0–50)
ANION GAP SERPL CALCULATED.3IONS-SCNC: 7 MMOL/L (ref 3–14)
AST SERPL W P-5'-P-CCNC: 12 U/L (ref 0–45)
BILIRUB SERPL-MCNC: 0.2 MG/DL (ref 0.2–1.3)
BUN SERPL-MCNC: 11 MG/DL (ref 7–30)
CALCIUM SERPL-MCNC: 9 MG/DL (ref 8.5–10.1)
CHLORIDE SERPL-SCNC: 104 MMOL/L (ref 94–109)
CO2 SERPL-SCNC: 27 MMOL/L (ref 20–32)
CREAT SERPL-MCNC: 1.04 MG/DL (ref 0.52–1.04)
GFR SERPL CREATININE-BSD FRML MDRD: 56 ML/MIN/1.7M2
GLUCOSE SERPL-MCNC: 107 MG/DL (ref 70–99)
POTASSIUM SERPL-SCNC: 3.7 MMOL/L (ref 3.4–5.3)
PROT SERPL-MCNC: 7.6 G/DL (ref 6.8–8.8)
SODIUM SERPL-SCNC: 138 MMOL/L (ref 133–144)

## 2018-10-08 PROCEDURE — 31237 NSL/SINS NDSC SURG BX POLYPC: CPT | Performed by: OTOLARYNGOLOGY

## 2018-10-08 PROCEDURE — 99213 OFFICE O/P EST LOW 20 MIN: CPT | Mod: 25 | Performed by: OTOLARYNGOLOGY

## 2018-10-08 PROCEDURE — 80053 COMPREHEN METABOLIC PANEL: CPT | Performed by: NURSE PRACTITIONER

## 2018-10-08 PROCEDURE — 36415 COLL VENOUS BLD VENIPUNCTURE: CPT | Performed by: NURSE PRACTITIONER

## 2018-10-08 RX ORDER — SULFAMETHOXAZOLE/TRIMETHOPRIM 800-160 MG
1 TABLET ORAL 2 TIMES DAILY
Qty: 60 TABLET | Refills: 11 | Status: SHIPPED | OUTPATIENT
Start: 2018-10-08 | End: 2019-12-12

## 2018-10-08 RX ORDER — BUDESONIDE 0.5 MG/2ML
INHALANT ORAL
Qty: 3 BOX | Refills: 11 | Status: SHIPPED | OUTPATIENT
Start: 2018-10-08 | End: 2018-10-08

## 2018-10-08 RX ORDER — BUDESONIDE 0.5 MG/2ML
INHALANT ORAL
Qty: 3 BOX | Refills: 11 | Status: SHIPPED | OUTPATIENT
Start: 2018-10-08 | End: 2020-03-13

## 2018-10-08 ASSESSMENT — PAIN SCALES - GENERAL: PAINLEVEL: NO PAIN (0)

## 2018-10-08 NOTE — PROGRESS NOTES
"Otolaryngology Progress Note      History of Present Illness   Patient presents with:  Surgical Followup: Pt is s/p fess on 9/5/18.  Pt is really congested today.  Pt finished her Bactrim and now feels like the infection is back.      Annie Garcia is a 48 year old female with cystic fibrosis and chronic pansinusitis presents for a follow up sinus exam    She is compliant with her Bactroban sinus irrigations bid  She has not restarted her budesonide irrigations    Recently flew and had Right otalgia upon descent      she recently completed her Bactrim, and since stopping treatment has noted increased congestion She denies  thick nasal discharge but has noted the onset of a foul smell as well as right maxillary presuure and right dentalgia  No fever  No wheezing   Prophylactic doxy 100 mg bid    Procedures:  1.  Bilateral total ethmoidectomy  2.  Bilateral sphenoidotomy  3.  Right frontal sinusotomy  4.  Bilateral maxillary sinus irrigation with polyp removal   Surgeon:  Francoise Gonzalez D.O.  Anesthesia:  General endotracheal  EBL:  <10 ml  Findings: Polypoid mucosa of the maxillary sinuses bilaterally, scant mucopurulence bilateral maxillary sinuses,  stenotic right frontal recess.  Mildly polypoid ethmoid sinus mucosa  Complications:  none  Condition:  stable   Propel placed into bilateral maxillary sinuses      Physical Exam  /78 (BP Location: Left arm, Cuff Size: Adult Large)  Pulse 91  Temp 98.4  F (36.9  C) (Tympanic)  Ht 5' 8\" (1.727 m)  Wt 240 lb (108.9 kg)  BMI 36.49 kg/m2  General - The patient is well nourished and well developed, and appears to have good nutritional status.  Alert and oriented to person and place, interactive.  Head and Face - Normocephalic and atraumatic, with no gross asymmetry noted of the contour of the facial features.  The facial nerve is intact, with strong symmetric movements.  Eyes - Extraocular movements intact.   Ears- External auditory canals are " patent, tympanic membranes are intact without effusion or worrisome retractions   Nose - Nasal mucosa is pink and moist with no abnormal mucus.  The septum was grossly midline and non-obstructive, turbinates of normal size and position.  No polyps, masses, or purulence noted on examination.  Mouth - Examination of the oral cavity shows pink, healthy, moist mucosa.  No lesions or ulceration noted.  The dentition are in good repair.  The tongue is mobile and midline.      To evaluate the nose and sinuses in the post operative state, I performed rigid nasal endoscopy. The LPN had previously sprayed both nares with lidocaine and neosynephrine.    I began with the LEFT side using a 0 degree rigid nasal endoscope, and then similarly examined the RIGHT side    Findings:  Inferior turbinates:  lateralized  Middle turbinate and middle meatus:   debrided of normal postoperative secretions and scant old clots of the 12 and 10 Luna  Antrostomy right antrostomy debrided with a [place]ie Jeanley straight and curved suction to remove the right propel in its entirety.  The maxillary antrostomy is patent there are polypoid changes to the mucosa no worrisome thick discharge.  The ethmoid cavity is clear and healthy    On the left side she did not tolerate removal of the propel with a maxillary antrostomy in the left is patent there is no purulent or abnormal thick discharge .  The visible portion of the left ethmoid cavity is clear    Mucosa is healthy throughout without polyps nor polypoid degeneration    Debridement did take at least 10 or 15 minutes.  She is tolerating offered office debridement spelled at each visit but does need several breaks    Impression/Plan  Annie Garcia is a 48 year old female    ICD-10-CM    1. Chronic pansinusitis J32.4 sulfamethoxazole-trimethoprim (BACTRIM DS/SEPTRA DS) 800-160 MG per tablet     budesonide (PULMICORT) 0.5 MG/2ML neb solution     DISCONTINUED: budesonide (PULMICORT) 0.5 MG/2ML neb  solution   2. CF (cystic fibrosis) (H) E84.9 sulfamethoxazole-trimethoprim (BACTRIM DS/SEPTRA DS) 800-160 MG per tablet     budesonide (PULMICORT) 0.5 MG/2ML neb solution     DISCONTINUED: budesonide (PULMICORT) 0.5 MG/2ML neb solution   3. S/P FESS (functional endoscopic sinus surgery) Z98.890            Use Budesonide Rinses Once Daily  Use Bactroban Rinses Twice Daily  Start Oral Bactrim for 1 year  Monitor GFR per Todd Torres  Follow up with GWEN Perdue in 2 weeks    The Bactroban irrigations have controlled her secretions she does need to start budesonide which she has not yet started after surgery          Francoise Gonzalez D.O.  Otolaryngology/Head and Neck Surgery  Allergy

## 2018-10-08 NOTE — MR AVS SNAPSHOT
After Visit Summary   10/8/2018    Annie Garcia    MRN: [id number]           Patient Information     Date Of Birth          1970        Visit Information        Provider Department      10/8/2018 2:15 PM Francoise Gonzalez MD [facility]        Care Instructions    Thank you for allowing Dr. Gonzalez and our ENT team to participate in your care.  If your medications are too expensive, please give the nurse a call.  We can possibly change this medication.  If you have a scheduling or an appointment question please contact our Health Unit Coordinator at their direct line [phone].   ALL nursing questions or concerns can be directed to your ENT nurse at: [phone] - Sarahi    Use Budesonide Rinses Once Daily  Use Bactroban Rinses Twice Daily  Start Oral Bactrim for 1 year  Follow up with GWEN Perdue in 2 weeks    Budesonide nasal saline irrigation per instructions:  -Obtain Asaf Med Sinus rinse over the counter.    -Use warm distilled water and 2 packets of the salt solution that comes with the bottle, dissolve in bottle up to the 240 mL airam.  -Add 1 vial of budesonide.  -Irrigate each side of your nose leaning over the sink, using 1/3 to 1/2 the volume of the bottle in each nostril every irrigation.  Irrigate 2 times daily.  -If additional rinses are needed/recommended, you may use the plan Asaf Med Sinus irrigation without the use of added budesonide.             Follow-ups after your visit        Follow-up notes from your care team     Return in about 3 weeks (around 10/29/2018).      Who to contact     If you have questions or need follow up information about today's clinic visit or your schedule please contact [facility] directly at [phone].  Normal or non-critical lab and imaging results will be communicated to you by MyChart, letter or phone within 4 business days after the clinic has received the results. If you do not  "hear from us within 7 days, please contact the clinic through [employer] or phone. If you have a critical or abnormal lab result, we will notify you by phone as soon as possible.  Submit refill requests through [employer] or call your pharmacy and they will forward the refill request to us. Please allow 3 business days for your refill to be completed.          Additional Information About Your Visit        Care EveryWhere ID     This is your Care EveryWhere ID. This could be used by other organizations to access your [facility] medical records  [id number]        Your Vitals Were     Pulse Temperature Height BMI (Body Mass Index)          91 98.4  F (36.9  C) (Tympanic) 5' 8\" (1.727 m) 36.49 kg/m2         Blood Pressure from Last 3 Encounters:   10/08/18 134/78   09/12/18 126/82   09/05/18 130/86    Weight from Last 3 Encounters:   10/08/18 240 lb (108.9 kg)   09/12/18 245 lb (111.1 kg)   09/05/18 245 lb (111.1 kg)              Today, you had the following     No orders found for display       Primary Care Provider Office Phone # Fax #    Todd Torres, DAVIN [phone]       [address] [place] [address]        Equal Access to Services     ANTONINO SANCHEZ AH: Hadii caitlin ku hadasho Soomaali, waaxda luqadaha, qaybta kaalmada adeegyada, waxay barney langford . So [facility] [phone].    ATENCIÓN: Si habla español, tiene a seals disposición servicios gratuitos de asistencia lingüística. Llame al [phone].    We comply with applicable federal civil rights laws and [place] laws. We do not discriminate on the basis of race, color, national origin, age, disability, sex, sexual orientation, or gender identity.            Thank you!     Thank you for choosing [facility]  for your care. Our goal is always to provide you with excellent care. Hearing back from our patients is one way we can continue to improve our services. Please take a few minutes to complete the written " survey that you may receive in the mail after your visit with us. Thank you!             Your Updated Medication List - Protect others around you: Learn how to safely use, store and throw away your medicines at www.disposemymeds.org.          This list is accurate as of 10/8/18  3:38 PM.  Always use your most recent med list.                   Brand Name Dispense Instructions for use Diagnosis    acetaminophen-codeine 300-30 MG per tablet    TYLENOL #3    90 tablet    TAKE 1 TABLET BY MOUTH EVERY 4 HOURS AS NEEDED FOR MILD PAIN, TAKE FOR COUGHING PAIN.        acetylcysteine 20 % nebulizer solution    MUCOMYST     Take by nebulization every 8 hours 5-10 ml inhalation every 8 hours        AMBIEN 10 MG tablet   Generic drug:  zolpidem     30 tablet    TAKE 1 TABLET BY MOUTH NIGHTLY AS NEEDED FOR SLEEP    Primary insomnia       BROVANA 15 MCG/2ML Nebu neb solution   Generic drug:  arformoterol     120 mL    USE 1 VIAL IN NEBULIZER 2 TIMES A DAY AS NEEDED    Chronic bronchitis, unspecified chronic bronchitis type (H)       budesonide 1 MG/2ML Susp neb solution    PULMICORT    60 ampule    Take 2 mLs (1 mg) by nebulization daily Take 1 mg by nebulization daily    Bronchitis       clotrimazole 10 MG behzad      1 behzad Mouth/Throat 3 times a day        COMPOUNDED NON-CONTROLLED SUBSTANCE - PHARMACY TO MIX COMPOUNDED MEDICATION    CMPD RX    4 Bottle    Irrigate bilateral nares with 240 ML Twice Daily for 4 Weeks.    Chronic maxillary sinusitis       dexlansoprazole 60 MG Cpdr CR capsule    DEXILANT    30 capsule    Take 1 capsule (60 mg) by mouth daily as needed    Gastroesophageal reflux disease without esophagitis       doxycycline 100 MG tablet    VIBRA-TABS     Take 100 mg by mouth 2 times daily Takes one tablet daily prophylaxis.        fluconazole 100 MG tablet    DIFLUCAN    90 tablet    TAKE 1 TABLET BY MOUTH DAILY AS NEEDED    Rash and other nonspecific skin eruption       HYDROcodone-acetaminophen 5-325 MG per  tablet    NORCO    18 tablet    Take 1 tablet by mouth every 4 hours as needed for pain    Chronic pansinusitis, S/P FESS (functional endoscopic sinus surgery)       levalbuterol 0.31 MG/3ML neb solution    XOPENEX    225 mL    Take 3 mLs (0.31 mg) by nebulization every 6 hours as needed for shortness of breath / dyspnea        mometasone 50 MCG/ACT spray    NASONEX    3 Box    Spray 2 sprays into both nostrils daily    Chronic pansinusitis       PULMOZYME 1 MG/ML neb solution   Generic drug:  dornase alpha     75 mL    USE 1 VIAL IN NEBULIZER DAILY AS NEEDED    Chronic bronchitis, unspecified chronic bronchitis type (H)       sucralfate 1 GM tablet    CARAFATE    120 tablet    TAKE 1 TABLET BY MOUTH FOUR TIMES A DAY AS NEEDED    Esophageal reflux       WELLBUTRIN  MG 12 hr tablet   Generic drug:  buPROPion     90 tablet    TAKE 2 TABLETS BY MOUTH EVERY MORNING AND 1 TABLET EVERY AFTERNOON    Dysthymia

## 2018-10-08 NOTE — LETTER
"    10/8/2018         RE: Annie Garcia  [address] [facility] [address]        Dear Colleague,    Thank you for referring your patient, Annie Garcia, to the [facility]. Please see a copy of my visit note below.    Otolaryngology Progress Note      History of Present Illness   Patient presents with:  Surgical Followup: Pt is s/p fess on 9/5/18.  Pt is really congested today.  Pt finished her Bactrim and now feels like the infection is back.      Annie Garcia is a 48 year old female with cystic fibrosis and chronic pansinusitis presents for a follow up sinus exam    She is compliant with her Bactroban sinus irrigations bid  She has not restarted her budesonide irrigations    Recently flew and had Right otalgia upon descent      she recently completed her Bactrim, and since stopping treatment has noted increased congestion She denies  thick nasal discharge but has noted the onset of a foul smell as well as right maxillary presuure and right dentalgia  No fever  No wheezing   Prophylactic doxy 100 mg bid    Procedures:  1.  Bilateral total ethmoidectomy  2.  Bilateral sphenoidotomy  3.  Right frontal sinusotomy  4.  Bilateral maxillary sinus irrigation with polyp removal   Surgeon:  Francoise Gonzalez D.O.  Anesthesia:  General endotracheal  EBL:  <10 ml  Findings: Polypoid mucosa of the maxillary sinuses bilaterally, scant mucopurulence bilateral maxillary sinuses,  stenotic right frontal recess.  Mildly polypoid ethmoid sinus mucosa  Complications:  none  Condition:  stable   Propel placed into bilateral maxillary sinuses      Physical Exam  /78 (BP Location: Left arm, Cuff Size: Adult Large)  Pulse 91  Temp 98.4  F (36.9  C) (Tympanic)  Ht 5' 8\" (1.727 m)  Wt 240 lb (108.9 kg)  BMI 36.49 kg/m2  General - The patient is well nourished and well developed, and appears to have good nutritional status.  Alert and oriented to person and place, " interactive.  Head and Face - Normocephalic and atraumatic, with no gross asymmetry noted of the contour of the facial features.  The facial nerve is intact, with strong symmetric movements.  Eyes - Extraocular movements intact.   Ears- External auditory canals are patent, tympanic membranes are intact without effusion or worrisome retractions   Nose - Nasal mucosa is pink and moist with no abnormal mucus.  The septum was grossly midline and non-obstructive, turbinates of normal size and position.  No polyps, masses, or purulence noted on examination.  Mouth - Examination of the oral cavity shows pink, healthy, moist mucosa.  No lesions or ulceration noted.  The dentition are in good repair.  The tongue is mobile and midline.      To evaluate the nose and sinuses in the post operative state, I performed rigid nasal endoscopy. The LPN had previously sprayed both nares with lidocaine and neosynephrine.    I began with the LEFT side using a 0 degree rigid nasal endoscope, and then similarly examined the RIGHT side    Findings:  Inferior turbinates:  lateralized  Middle turbinate and middle meatus:   debrided of normal postoperative secretions and scant old clots of the 12 and 10 Luna  Antrostomy right antrostomy debrided with a [employer] straight and curved suction to remove the right propel in its entirety.  The maxillary antrostomy is patent there are polypoid changes to the mucosa no worrisome thick discharge.  The ethmoid cavity is clear and healthy    On the left side she did not tolerate removal of the propel with a maxillary antrostomy in the left is patent there is no purulent or abnormal thick discharge .  The visible portion of the left ethmoid cavity is clear    Mucosa is healthy throughout without polyps nor polypoid degeneration    Debridement did take at least 10 or 15 minutes.  She is tolerating offered office debridement spelled at each visit but does need several breaks    Impression/Plan  Annie KEY  Meng Garcia is a 48 year old female    ICD-10-CM    1. Chronic pansinusitis J32.4 sulfamethoxazole-trimethoprim (BACTRIM DS/SEPTRA DS) 800-160 MG per tablet     budesonide (PULMICORT) 0.5 MG/2ML neb solution     DISCONTINUED: budesonide (PULMICORT) 0.5 MG/2ML neb solution   2. CF (cystic fibrosis) (H) E84.9 sulfamethoxazole-trimethoprim (BACTRIM DS/SEPTRA DS) 800-160 MG per tablet     budesonide (PULMICORT) 0.5 MG/2ML neb solution     DISCONTINUED: budesonide (PULMICORT) 0.5 MG/2ML neb solution   3. S/P FESS (functional endoscopic sinus surgery) Z98.890            Use Budesonide Rinses Once Daily  Use Bactroban Rinses Twice Daily  Start Oral Bactrim for 1 year  Monitor GFR per Todd Torres  Follow up with GWEN Perdue in 2 weeks    The Bactroban irrigations have controlled her secretions she does need to start budesonide which she has not yet started after surgery          Francoise Gonzalez D.O.  Otolaryngology/Head and Neck Surgery  Allergy            Again, thank you for allowing me to participate in the care of your patient.        Sincerely,        Francoise Gonzalez MD

## 2018-10-08 NOTE — NURSING NOTE
"Chief Complaint   Patient presents with     Surgical Followup     Pt is s/p fess on 9/5/18.  Pt is really congested today.  Pt finished her Bactrim and now feels like the infection is back.       Initial /78 (BP Location: Left arm, Cuff Size: Adult Large)  Pulse 91  Temp 98.4  F (36.9  C) (Tympanic)  Ht 5' 8\" (1.727 m)  Wt 240 lb (108.9 kg)  BMI 36.49 kg/m2 Estimated body mass index is 36.49 kg/(m^2) as calculated from the following:    Height as of this encounter: 5' 8\" (1.727 m).    Weight as of this encounter: 240 lb (108.9 kg).  Medication Reconciliation: complete    Juana Toth LPN'    "

## 2018-10-08 NOTE — PATIENT INSTRUCTIONS
Thank you for allowing Dr. Gonzalez and our ENT team to participate in your care.  If your medications are too expensive, please give the nurse a call.  We can possibly change this medication.  If you have a scheduling or an appointment question please contact our Health Unit Coordinator at their direct line [phone].   ALL nursing questions or concerns can be directed to your ENT nurse at: [phone] Tg [place]    Use Budesonide Rinses Once Daily  Use Bactroban Rinses Twice Daily  Start Oral Bactrim for 1 year  Follow up with GWEN Perdue in 2 weeks    Budesonide nasal saline irrigation per instructions:  -Obtain Asaf Med Sinus rinse over the counter.    -Use warm distilled water and 2 packets of the salt solution that comes with the bottle, dissolve in bottle up to the 240 mL airam.  -Add 1 vial of budesonide.  -Irrigate each side of your nose leaning over the sink, using 1/3 to 1/2 the volume of the bottle in each nostril every irrigation.  Irrigate 2 times daily.  -If additional rinses are needed/recommended, you may use the plan Asaf Med Sinus irrigation without the use of added budesonide.

## 2018-10-16 DIAGNOSIS — F51.01 PRIMARY INSOMNIA: ICD-10-CM

## 2018-10-17 RX ORDER — ZOLPIDEM TARTRATE 10 MG/1
TABLET, FILM COATED ORAL
Qty: 30 TABLET | Refills: 3 | Status: SHIPPED | OUTPATIENT
Start: 2018-10-17 | End: 2019-02-21

## 2018-10-17 NOTE — TELEPHONE ENCOUNTER
Ambien  Last Written Prescription Date:  6/19/18  Last Fill Qty:  30, # Refills:  3  Last Office Visit:  6/28/17

## 2018-10-19 DIAGNOSIS — R21 RASH AND OTHER NONSPECIFIC SKIN ERUPTION: ICD-10-CM

## 2018-10-19 RX ORDER — FLUCONAZOLE 100 MG/1
TABLET ORAL
Qty: 90 TABLET | Refills: 0 | Status: SHIPPED | OUTPATIENT
Start: 2018-10-19 | End: 2019-01-19

## 2018-10-19 NOTE — TELEPHONE ENCOUNTER
fluconazole (DIFLUCAN) 100 MG tablet      Last Written Prescription Date:  4/12/18  Last Fill Quantity: 90,   # refills: 0  Last Office Visit: 8/30/18  Future Office visit:    Next 5 appointments (look out 90 days)     Oct 31, 2018  2:45 PM CDT   (Arrive by 2:30 PM)   [facility] with Karen Mccallum PA-C   [facility]    [address]  [place] [address]   [phone]                   Routing refill request to provider for review/approval because:   Antifungal Agents Failed  fluconazole (DIFLUCAN) 100 MG tablet [Pharmacy Med Name: FLUCONAZOLE 100 MG TABLET]       Rerun Protocol (10/19/2018 8:02 AM)        Not Fluconazole or Terconazole        If oral Fluconazole or Terconazole, may refill if indicated in progress notes.              I don't see that this has bene addressed in any notes in the last year. Please advise. Thank you!

## 2018-10-31 ENCOUNTER — OFFICE VISIT (OUTPATIENT)
Dept: OTOLARYNGOLOGY | Facility: OTHER | Age: 48
End: 2018-10-31
Attending: PHYSICIAN ASSISTANT
Payer: COMMERCIAL

## 2018-10-31 VITALS
TEMPERATURE: 97.4 F | HEART RATE: 75 BPM | BODY MASS INDEX: 36.49 KG/M2 | DIASTOLIC BLOOD PRESSURE: 66 MMHG | WEIGHT: 240 LBS | OXYGEN SATURATION: 99 % | SYSTOLIC BLOOD PRESSURE: 114 MMHG

## 2018-10-31 DIAGNOSIS — J32.4 CHRONIC PANSINUSITIS: Primary | ICD-10-CM

## 2018-10-31 DIAGNOSIS — E84.9 CF (CYSTIC FIBROSIS) (H): ICD-10-CM

## 2018-10-31 DIAGNOSIS — J34.89 NASAL SEPTAL PERFORATION: ICD-10-CM

## 2018-10-31 DIAGNOSIS — Z98.890 S/P FESS (FUNCTIONAL ENDOSCOPIC SINUS SURGERY): ICD-10-CM

## 2018-10-31 PROCEDURE — 31237 NSL/SINS NDSC SURG BX POLYPC: CPT | Mod: 79 | Performed by: PHYSICIAN ASSISTANT

## 2018-10-31 PROCEDURE — 99024 POSTOP FOLLOW-UP VISIT: CPT | Performed by: PHYSICIAN ASSISTANT

## 2018-10-31 ASSESSMENT — PAIN SCALES - GENERAL: PAINLEVEL: NO PAIN (0)

## 2018-10-31 NOTE — PROGRESS NOTES
Chief Complaint   Patient presents with     Follow Up For     chronic pansinusitis, s/p sinus surg-9/5/18   Annie Garcia is a 48 year old female with cystic fibrosis and chronic pansinusitis presents for a follow up sinus exam     She is compliant with her Bactroban sinus irrigations bid  She has used her Budesonide rinses daily.      She denies  thick nasal discharge and feels increase in congestion with certain rinses   No fever  No wheezing   Prophylactic doxy 100 mg bid     Procedures:  1.  Bilateral total ethmoidectomy  2.  Bilateral sphenoidotomy  3.  Right frontal sinusotomy  4.  Bilateral maxillary sinus irrigation with polyp removal   Surgeon:  Francoise Gonzalez D.O.  Anesthesia:  General endotracheal  EBL:  <10 ml  Findings: Polypoid mucosa of the maxillary sinuses bilaterally, scant mucopurulence bilateral maxillary sinuses,  stenotic right frontal recess.  Mildly polypoid ethmoid sinus mucosa  Complications:  none  Condition:  stable   Propel placed into bilateral maxillary sinuses              Past Medical History:   Diagnosis Date     Cystic fibrosis (H)      Depression      Tear of right acetabular labrum         Allergies   Allergen Reactions     Seasonal Allergies Itching     Current Outpatient Prescriptions   Medication     acetaminophen-codeine (TYLENOL #3) 300-30 MG per tablet     acetylcysteine (MUCOMYST) 20 % nebulizer solution     AMBIEN 10 MG tablet     BROVANA 15 MCG/2ML NEBU     budesonide (PULMICORT) 0.5 MG/2ML neb solution     budesonide (PULMICORT) 1 MG/2ML SUSP nebulizer solution     clotrimazole 10 MG behzad     COMPOUNDED NON-CONTROLLED SUBSTANCE (CMPD RX) - PHARMACY TO MIX COMPOUNDED MEDICATION     dexlansoprazole (DEXILANT) 60 MG CPDR CR capsule     doxycycline (VIBRA-TABS) 100 MG tablet     fluconazole (DIFLUCAN) 100 MG tablet     HYDROcodone-acetaminophen (NORCO) 5-325 MG per tablet     levalbuterol (XOPENEX) 0.31 MG/3ML nebulizer solution     mometasone (NASONEX)  50 MCG/ACT spray     PULMOZYME 1 MG/ML neb solution     sucralfate (CARAFATE) 1 GM tablet     sulfamethoxazole-trimethoprim (BACTRIM DS/SEPTRA DS) 800-160 MG per tablet     WELLBUTRIN  MG 12 hr tablet     No current facility-administered medications for this visit.       ROS: 10 point ROS neg other than the symptoms noted above in the HPI.  /66 (BP Location: Left arm, Patient Position: Chair, Cuff Size: Adult Large)  Pulse 75  Temp 97.4  F (36.3  C) (Tympanic)  Wt 108.9 kg (240 lb)  SpO2 99%  BMI 36.49 kg/m2  General - The patient is well nourished and well developed, and appears to have good nutritional status.  Alert and oriented to person and place, interactive.  Head and Face - Normocephalic and atraumatic, with no gross asymmetry noted of the contour of the facial features.  The facial nerve is intact, with strong symmetric movements.  Eyes - Extraocular movements intact.   Ears- External auditory canals are patent, tympanic membranes are intact without effusion or worrisome retractions   Nose - Nasal mucosa is pink and moist with no abnormal mucus.  The septum was grossly midline and non-obstructive, turbinates of normal size and position.  No polyps, masses, or purulence noted on examination.  Mouth - Examination of the oral cavity shows pink, healthy, moist mucosa.  No lesions or ulceration noted.  The dentition are in good repair.  The tongue is mobile and midline.   To evaluate the nose and sinuses in the post operative state, I performed rigid nasal endoscopy. The LPN had previously sprayed both nares with lidocaine and neosynephrine.     I began with the LEFT side using a 0 degree rigid nasal endoscope, and then similarly examined the RIGHT side     Findings:  Inferior turbinates: reduced.   Middle turbinate and middle meatus:   debrided of normal postoperative secretions of #7 suction.   Antrostomy right antrostomy debrided with a cupped forceps and suction to remove the debris.  The  maxillary antrostomy is patent there are polypoid changes to the mucosa no worrisome thick discharge.  The ethmoid cavity is clear and healthy    On the left side she tolerated removal of Propel- maxillary antrostomy in the left is patent there is no purulent or abnormal thick discharge .  The visible portion of the left ethmoid cavity is clear     Mucosa is healthy throughout without polyps nor polypoid degeneration     Debridement did take at least 10 or 15 minutes.  She is tolerating offered office debridement spelled at each visit but does need several breaks    ASSESSMENT:    ICD-10-CM    1. Chronic pansinusitis J32.4    2. CF (cystic fibrosis) (H) E84.9    3. S/P FESS (functional endoscopic sinus surgery) Z98.890    4. Nasal septal perforation J34.89      Her sinuses/ nose look great. I only viz polypoid changes to right max.     The Bactroban irrigations have controlled her secretions  she does need to continue  budesonide daily.       Return in 4-6 weeks  Maintain bactrim and follow CR/ GFR with Todd.       Karen Mccallum PA-C  ENT  [facility] [place]  [phone]

## 2018-10-31 NOTE — NURSING NOTE
"Chief Complaint   Patient presents with     Follow Up For     chronic pansinusitis, s/p sinus surg-9/5/18       Initial /66 (BP Location: Left arm, Patient Position: Chair, Cuff Size: Adult Large)  Pulse 75  Temp 97.4  F (36.3  C) (Tympanic)  Wt 108.9 kg (240 lb)  SpO2 99%  BMI 36.49 kg/m2 Estimated body mass index is 36.49 kg/(m^2) as calculated from the following:    Height as of 10/8/18: 1.727 m (5' 8\").    Weight as of this encounter: 108.9 kg (240 lb).  Medication Reconciliation: complete    Kim Jon LPN    "

## 2018-10-31 NOTE — MR AVS SNAPSHOT
After Visit Summary   10/31/2018    Annie Garcia    MRN: [id number]           Patient Information     Date Of Birth          1970        Visit Information        Provider Department      10/31/2018 2:45 PM Karen Mccallum PA-C [facility]        Care Instructions    Sinuses/ Nose appear well.   Continue with Bactroban rinses.   Use Budesonide daily.   Continue with Bactrim.   Monitor kidney functions with Todd.   Return in 4-6 weeks for recheck    Thank you for allowing GWEN Perdue and our ENT team to participate in your care.  If your medications are too expensive, please give the nurse a call.  We can possibly change this medication.  If you have a scheduling or an appointment question please contact our Health Unit Coordinator at their direct line [phone].   ALL nursing questions or concerns can be directed to your ENT nurse at: [phone] [place]              Follow-ups after your visit        Who to contact     If you have questions or need follow up information about today's clinic visit or your schedule please contact [facility] directly at [phone].  Normal or non-critical lab and imaging results will be communicated to you by MyChart, letter or phone within 4 business days after the clinic has received the results. If you do not hear from us within 7 days, please contact the clinic through MyChart or phone. If you have a critical or abnormal lab result, we will notify you by phone as soon as possible.  Submit refill requests through [employer] or call your pharmacy and they will forward the refill request to us. Please allow 3 business days for your refill to be completed.          Additional Information About Your Visit        Care EveryWhere ID     This is your Care EveryWhere ID. This could be used by other organizations to access your [place] medical records  [id number]        Your Vitals Were     Pulse Temperature Pulse  Oximetry BMI (Body Mass Index)          75 97.4  F (36.3  C) (Tympanic) 99% 36.49 kg/m2         Blood Pressure from Last 3 Encounters:   10/31/18 114/66   10/08/18 134/78   09/12/18 126/82    Weight from Last 3 Encounters:   10/31/18 108.9 kg (240 lb)   10/08/18 108.9 kg (240 lb)   09/12/18 111.1 kg (245 lb)              Today, you had the following     No orders found for display       Primary Care Provider Office Phone # Fax #    Todd Torres [phone] [address] [address]        Equal Access to Services     ANTONINO SANCHEZ : Zack Hastings, wamisty odell, qaybta kaalmada celesteyacullen, leanna nunez. So [facility] [phone].    ATENCIÓN: Si habla español, tiene a seals disposición servicios gratuitos de asistencia lingüística. Llame al [phone].    We comply with applicable federal civil rights laws and [place] laws. We do not discriminate on the basis of race, color, national origin, age, disability, sex, sexual orientation, or gender identity.            Thank you!     Thank you for choosing [facility]  for your care. Our goal is always to provide you with excellent care. Hearing back from our patients is one way we can continue to improve our services. Please take a few minutes to complete the written survey that you may receive in the mail after your visit with us. Thank you!             Your Updated Medication List - Protect others around you: Learn how to safely use, store and throw away your medicines at www.disposemymeds.org.          This list is accurate as of 10/31/18  3:16 PM.  Always use your most recent med list.                   Brand Name Dispense Instructions for use Diagnosis    acetaminophen-codeine 300-30 MG per tablet    TYLENOL #3    90 tablet    TAKE 1 TABLET BY MOUTH EVERY 4 HOURS AS NEEDED FOR MILD PAIN, TAKE FOR COUGHING PAIN.        acetylcysteine 20 % nebulizer solution     MUCOMYST     Take by nebulization every 8 hours 5-10 ml inhalation every 8 hours        AMBIEN 10 MG tablet   Generic drug:  zolpidem     30 tablet    TAKE 1 TABLET BY MOUTH NIGHTLY AS NEEDED FOR SLEEP    Primary insomnia       BROVANA 15 MCG/2ML Nebu neb solution   Generic drug:  arformoterol     120 mL    USE 1 VIAL IN NEBULIZER 2 TIMES A DAY AS NEEDED    Chronic bronchitis, unspecified chronic bronchitis type (H)       * budesonide 1 MG/2ML Susp neb solution    PULMICORT    60 ampule    Take 2 mLs (1 mg) by nebulization daily Take 1 mg by nebulization daily    Bronchitis       * budesonide 0.5 MG/2ML neb solution    PULMICORT    3 Box    Squirt entire vial into previously made amanda med saline bottle, mix, and irrigate each nostril until entire bottle empty.  Do this once a day    Chronic pansinusitis, CF (cystic fibrosis) (H)       clotrimazole 10 MG behzad      1 behzad Mouth/Throat 3 times a day        COMPOUNDED NON-CONTROLLED SUBSTANCE - PHARMACY TO MIX COMPOUNDED MEDICATION    CMPD RX    4 Bottle    Irrigate bilateral nares with 240 ML Twice Daily for 4 Weeks.    Chronic maxillary sinusitis       dexlansoprazole 60 MG Cpdr CR capsule    DEXILANT    30 capsule    Take 1 capsule (60 mg) by mouth daily as needed    Gastroesophageal reflux disease without esophagitis       doxycycline 100 MG tablet    VIBRA-TABS     Take 100 mg by mouth 2 times daily Takes one tablet daily prophylaxis.        fluconazole 100 MG tablet    DIFLUCAN    90 tablet    TAKE 1 TABLET BY MOUTH DAILY AS NEEDED    Rash and other nonspecific skin eruption       HYDROcodone-acetaminophen 5-325 MG per tablet    NORCO    18 tablet    Take 1 tablet by mouth every 4 hours as needed for pain    Chronic pansinusitis, S/P FESS (functional endoscopic sinus surgery)       levalbuterol 0.31 MG/3ML neb solution    XOPENEX    225 mL    Take 3 mLs (0.31 mg) by nebulization every 6 hours as needed for shortness of breath / dyspnea        mometasone 50  MCG/ACT spray    NASONEX    3 Box    Spray 2 sprays into both nostrils daily    Chronic pansinusitis       PULMOZYME 1 MG/ML neb solution   Generic drug:  dornase alpha     75 mL    USE 1 VIAL IN NEBULIZER DAILY AS NEEDED    Chronic bronchitis, unspecified chronic bronchitis type (H)       sucralfate 1 GM tablet    CARAFATE    120 tablet    TAKE 1 TABLET BY MOUTH FOUR TIMES A DAY AS NEEDED    Esophageal reflux       sulfamethoxazole-trimethoprim 800-160 MG per tablet    BACTRIM DS/SEPTRA DS    60 tablet    Take 1 tablet by mouth 2 times daily    Chronic pansinusitis, CF (cystic fibrosis) (H)       WELLBUTRIN  MG 12 hr tablet   Generic drug:  buPROPion     90 tablet    TAKE 2 TABLETS BY MOUTH EVERY MORNING AND 1 TABLET EVERY AFTERNOON    Dysthymia       * Notice:  This list has 2 medication(s) that are the same as other medications prescribed for you. Read the directions carefully, and ask your doctor or other care provider to review them with you.

## 2018-10-31 NOTE — LETTER
10/31/2018         RE: Annie Garcia  [address] [address]        Dear Colleague,    Thank you for referring your patient, Annie Garcia, to the [facility]. Please see a copy of my visit note below.    Chief Complaint   Patient presents with     Follow Up For     chronic pansinusitis, s/p sinus surg-9/5/18   Annie Garcia is a 48 year old female with cystic fibrosis and chronic pansinusitis presents for a follow up sinus exam     She is compliant with her Bactroban sinus irrigations bid  She has used her Budesonide rinses daily.      She denies  thick nasal discharge and feels increase in congestion with certain rinses   No fever  No wheezing   Prophylactic doxy 100 mg bid     Procedures:  1.  Bilateral total ethmoidectomy  2.  Bilateral sphenoidotomy  3.  Right frontal sinusotomy  4.  Bilateral maxillary sinus irrigation with polyp removal   Surgeon:  Francoise Gonzalez D.O.  Anesthesia:  General endotracheal  EBL:  <10 ml  Findings: Polypoid mucosa of the maxillary sinuses bilaterally, scant mucopurulence bilateral maxillary sinuses,  stenotic right frontal recess.  Mildly polypoid ethmoid sinus mucosa  Complications:  none  Condition:  stable   Propel placed into bilateral maxillary sinuses              Past Medical History:   Diagnosis Date     Cystic fibrosis (H)      Depression      Tear of right acetabular labrum         Allergies   Allergen Reactions     Seasonal Allergies Itching     Current Outpatient Prescriptions   Medication     acetaminophen-codeine (TYLENOL #3) 300-30 MG per tablet     acetylcysteine (MUCOMYST) 20 % nebulizer solution     AMBIEN 10 MG tablet     BROVANA 15 MCG/2ML NEBU     budesonide (PULMICORT) 0.5 MG/2ML neb solution     budesonide (PULMICORT) 1 MG/2ML SUSP nebulizer solution     clotrimazole 10 MG behzad     COMPOUNDED NON-CONTROLLED SUBSTANCE (CMPD RX) - PHARMACY TO MIX COMPOUNDED MEDICATION      dexlansoprazole (DEXILANT) 60 MG CPDR CR capsule     doxycycline (VIBRA-TABS) 100 MG tablet     fluconazole (DIFLUCAN) 100 MG tablet     HYDROcodone-acetaminophen (NORCO) 5-325 MG per tablet     levalbuterol (XOPENEX) 0.31 MG/3ML nebulizer solution     mometasone (NASONEX) 50 MCG/ACT spray     PULMOZYME 1 MG/ML neb solution     sucralfate (CARAFATE) 1 GM tablet     sulfamethoxazole-trimethoprim (BACTRIM DS/SEPTRA DS) 800-160 MG per tablet     WELLBUTRIN  MG 12 hr tablet     No current facility-administered medications for this visit.       ROS: 10 point ROS neg other than the symptoms noted above in the HPI.  /66 (BP Location: Left arm, Patient Position: Chair, Cuff Size: Adult Large)  Pulse 75  Temp 97.4  F (36.3  C) (Tympanic)  Wt 108.9 kg (240 lb)  SpO2 99%  BMI 36.49 kg/m2  General - The patient is well nourished and well developed, and appears to have good nutritional status.  Alert and oriented to person and place, interactive.  Head and Face - Normocephalic and atraumatic, with no gross asymmetry noted of the contour of the facial features.  The facial nerve is intact, with strong symmetric movements.  Eyes - Extraocular movements intact.   Ears- External auditory canals are patent, tympanic membranes are intact without effusion or worrisome retractions   Nose - Nasal mucosa is pink and moist with no abnormal mucus.  The septum was grossly midline and non-obstructive, turbinates of normal size and position.  No polyps, masses, or purulence noted on examination.  Mouth - Examination of the oral cavity shows pink, healthy, moist mucosa.  No lesions or ulceration noted.  The dentition are in good repair.  The tongue is mobile and midline.   To evaluate the nose and sinuses in the post operative state, I performed rigid nasal endoscopy. The LPN had previously sprayed both nares with lidocaine and neosynephrine.     I began with the LEFT side using a 0 degree rigid nasal endoscope, and then  similarly examined the RIGHT side     Findings:  Inferior turbinates: reduced.   Middle turbinate and middle meatus:   debrided of normal postoperative secretions of #7 suction.   Antrostomy right antrostomy debrided with a cupped forceps and suction to remove the debris.  The maxillary antrostomy is patent there are polypoid changes to the mucosa no worrisome thick discharge.  The ethmoid cavity is clear and healthy    On the left side she tolerated removal of Propel- maxillary antrostomy in the left is patent there is no purulent or abnormal thick discharge .  The visible portion of the left ethmoid cavity is clear     Mucosa is healthy throughout without polyps nor polypoid degeneration     Debridement did take at least 10 or 15 minutes.  She is tolerating offered office debridement spelled at each visit but does need several breaks    ASSESSMENT:    ICD-10-CM    1. Chronic pansinusitis J32.4    2. CF (cystic fibrosis) (H) E84.9    3. S/P FESS (functional endoscopic sinus surgery) Z98.890    4. Nasal septal perforation J34.89      Her sinuses/ nose look great. I only viz polypoid changes to right max.     The Bactroban irrigations have controlled her secretions  she does need to continue  budesonide daily.       Return in 4-6 weeks  Maintain bactrim and follow CR/ GFR with Todd.       Karen Mccallum PA-C  ENT  [facility]  [phone]         Again, thank you for allowing me to participate in the care of your patient.        Sincerely,        Karen Mccallum PA-C

## 2018-11-19 ENCOUNTER — HOSPITAL ENCOUNTER (OUTPATIENT)
Dept: CT IMAGING | Facility: HOSPITAL | Age: 48
Discharge: HOME | End: 2018-11-19
Attending: INTERNAL MEDICINE | Admitting: INTERNAL MEDICINE
Payer: COMMERCIAL

## 2018-11-19 DIAGNOSIS — E84.9 CYSTIC FIBROSIS (H): ICD-10-CM

## 2018-11-19 PROCEDURE — 71250 CT THORAX DX C-: CPT | Mod: TC

## 2018-11-20 ENCOUNTER — TELEPHONE (OUTPATIENT)
Dept: INTERNAL MEDICINE | Facility: OTHER | Age: 48
End: 2018-11-20

## 2018-11-20 NOTE — TELEPHONE ENCOUNTER
The patient called stating that Dr. Pederson ordered a CT scan of her chest and she did not get the results.  She is convinced that's she has pneumonia. She said that you are the only one that she can think of to ask for results of the chest CT scan .

## 2018-11-26 ENCOUNTER — OFFICE VISIT (OUTPATIENT)
Dept: OTOLARYNGOLOGY | Facility: OTHER | Age: 48
End: 2018-11-26
Attending: OTOLARYNGOLOGY
Payer: COMMERCIAL

## 2018-11-26 VITALS
DIASTOLIC BLOOD PRESSURE: 78 MMHG | BODY MASS INDEX: 36.37 KG/M2 | OXYGEN SATURATION: 98 % | TEMPERATURE: 98.2 F | HEART RATE: 91 BPM | HEIGHT: 68 IN | WEIGHT: 240 LBS | SYSTOLIC BLOOD PRESSURE: 120 MMHG

## 2018-11-26 DIAGNOSIS — E84.9 CF (CYSTIC FIBROSIS) (H): ICD-10-CM

## 2018-11-26 DIAGNOSIS — J32.4 CHRONIC PANSINUSITIS: Primary | ICD-10-CM

## 2018-11-26 DIAGNOSIS — L71.0 PERIORAL DERMATITIS: ICD-10-CM

## 2018-11-26 DIAGNOSIS — Z98.890 S/P FESS (FUNCTIONAL ENDOSCOPIC SINUS SURGERY): ICD-10-CM

## 2018-11-26 LAB
ALBUMIN SERPL-MCNC: 3.9 G/DL (ref 3.4–5)
ALP SERPL-CCNC: 91 U/L (ref 40–150)
ALT SERPL W P-5'-P-CCNC: 24 U/L (ref 0–50)
ANION GAP SERPL CALCULATED.3IONS-SCNC: 7 MMOL/L (ref 3–14)
AST SERPL W P-5'-P-CCNC: 16 U/L (ref 0–45)
BILIRUB SERPL-MCNC: 0.2 MG/DL (ref 0.2–1.3)
BUN SERPL-MCNC: 15 MG/DL (ref 7–30)
CALCIUM SERPL-MCNC: 9.5 MG/DL (ref 8.5–10.1)
CHLORIDE SERPL-SCNC: 105 MMOL/L (ref 94–109)
CO2 SERPL-SCNC: 27 MMOL/L (ref 20–32)
CREAT SERPL-MCNC: 0.96 MG/DL (ref 0.52–1.04)
GFR SERPL CREATININE-BSD FRML MDRD: 62 ML/MIN/1.7M2
GLUCOSE SERPL-MCNC: 79 MG/DL (ref 70–99)
POTASSIUM SERPL-SCNC: 4.2 MMOL/L (ref 3.4–5.3)
PROT SERPL-MCNC: 7.4 G/DL (ref 6.8–8.8)
SODIUM SERPL-SCNC: 139 MMOL/L (ref 133–144)

## 2018-11-26 PROCEDURE — 80053 COMPREHEN METABOLIC PANEL: CPT | Performed by: NURSE PRACTITIONER

## 2018-11-26 PROCEDURE — 36415 COLL VENOUS BLD VENIPUNCTURE: CPT | Performed by: NURSE PRACTITIONER

## 2018-11-26 PROCEDURE — 99213 OFFICE O/P EST LOW 20 MIN: CPT | Mod: 25 | Performed by: OTOLARYNGOLOGY

## 2018-11-26 PROCEDURE — 31231 NASAL ENDOSCOPY DX: CPT | Performed by: OTOLARYNGOLOGY

## 2018-11-26 ASSESSMENT — PAIN SCALES - GENERAL: PAINLEVEL: NO PAIN (0)

## 2018-11-26 NOTE — NURSING NOTE
"Chief Complaint   Patient presents with     Follow Up For     chronic pansinusitis, nasal septal perforation       Initial /78 (BP Location: Right arm, Patient Position: Sitting, Cuff Size: Adult Large)  Pulse 91  Temp 98.2  F (36.8  C) (Tympanic)  Ht 1.727 m (5' 8\")  Wt 108.9 kg (240 lb)  SpO2 98%  BMI 36.49 kg/m2 Estimated body mass index is 36.49 kg/(m^2) as calculated from the following:    Height as of this encounter: 1.727 m (5' 8\").    Weight as of this encounter: 108.9 kg (240 lb).  Medication Reconciliation: complete    Fatuma Tucker LPN  "

## 2018-11-26 NOTE — MR AVS SNAPSHOT
After Visit Summary   11/26/2018    Annie Garcia    MRN: [id number]           Patient Information     Date Of Birth          1970        Visit Information        Provider Department      11/26/2018 1:15 PM Francoise Gonzalez MD [facility]        Today's Diagnoses     Chronic pansinusitis    -  1    S/P FESS (functional endoscopic sinus surgery)          Care Instructions    Thank you for allowing Dr. Gonzalez and our ENT team to participate in your care.  If your medications are too expensive, please give the nurse a call.  We can possibly change this medication.  If you have a scheduling or an appointment question please contact our Health Unit Coordinator at their direct line [phone].   ALL nursing questions or concerns can be directed to your ENT nurse at: [phone] - Sarahi    Continue Budesonide Rinses Twice Daily  Follow up with Dr. Gonzalez in 6 months      Budesonide nasal saline irrigation per instructions:  -Obtain Asaf Med Sinus rinse over the counter.    -Use warm distilled water and 2 packets of the salt solution that comes with the bottle, dissolve in bottle up to the 240 mL airam.  -Add 1 vial of budesonide.  -Irrigate each side of your nose leaning over the sink, using 1/3 to 1/2 the volume of the bottle in each nostril every irrigation.  Irrigate 2 times daily.  -If additional rinses are needed/recommended, you may use the plan Asaf Med Sinus irrigation without the use of added budesonide.             Follow-ups after your visit        Follow-up notes from your care team     Return in about 6 months (around 5/26/2019).      Your next 10 appointments already scheduled     Nov 27, 2018  4:15 PM CST   Pulmonary Function with [facility]   [facility] ([facility] )    [address] [place] [id number] [phone]           No Inhalers for 6 hours prior to test No Smoking 2 hours prior to test     "          Who to contact     If you have questions or need follow up information about today's clinic visit or your schedule please contact [facility] directly at [phone].  Normal or non-critical lab and imaging results will be communicated to you by MyChart, letter or phone within 4 business days after the clinic has received the results. If you do not hear from us within 7 days, please contact the clinic through MyChart or phone. If you have a critical or abnormal lab result, we will notify you by phone as soon as possible.  Submit refill requests through [employer] or call your pharmacy and they will forward the refill request to us. Please allow 3 business days for your refill to be completed.          Additional Information About Your Visit        Care EveryWhere ID     This is your Care EveryWhere ID. This could be used by other organizations to access your [place] medical records  [id number]        Your Vitals Were     Pulse Temperature Height Pulse Oximetry BMI (Body Mass Index)       91 98.2  F (36.8  C) (Tympanic) 1.727 m (5' 8\") 98% 36.49 kg/m2        Blood Pressure from Last 3 Encounters:   11/26/18 120/78   10/31/18 114/66   10/08/18 134/78    Weight from Last 3 Encounters:   11/26/18 108.9 kg (240 lb)   10/31/18 108.9 kg (240 lb)   10/08/18 108.9 kg (240 lb)              Today, you had the following     No orders found for display       Primary Care Provider Office Phone # Fax #    Todd Torres [phone]       [facility] [address] [facility] [address]        Equal Access to Services     [facility] AH: Hadii aad ku hadasho Soomaali, waaxda luqadaha, qaybta kaalmada catherineegyacullen, leanna nunez. So [facility] [phone].    ATENCIÓN: Si habla español, tiene a seals disposición servicios gratuitos de asistencia lingüística. Llame al [phone].    We comply with applicable federal civil rights laws and [place] laws. We do not discriminate " on the basis of race, color, national origin, age, disability, sex, sexual orientation, or gender identity.            Thank you!     Thank you for choosing [facility]  for your care. Our goal is always to provide you with excellent care. Hearing back from our patients is one way we can continue to improve our services. Please take a few minutes to complete the written survey that you may receive in the mail after your visit with us. Thank you!             Your Updated Medication List - Protect others around you: Learn how to safely use, store and throw away your medicines at www.disposemymeds.org.          This list is accurate as of 11/26/18  1:41 PM.  Always use your most recent med list.                   Brand Name Dispense Instructions for use Diagnosis    acetaminophen-codeine 300-30 MG tablet    TYLENOL #3    90 tablet    TAKE 1 TABLET BY MOUTH EVERY 4 HOURS AS NEEDED FOR MILD PAIN, TAKE FOR COUGHING PAIN.    Cough       acetylcysteine 20 % nebulizer solution    MUCOMYST     Take by nebulization every 8 hours 5-10 ml inhalation every 8 hours        AMBIEN 10 MG tablet   Generic drug:  zolpidem     30 tablet    TAKE 1 TABLET BY MOUTH NIGHTLY AS NEEDED FOR SLEEP    Primary insomnia       BROVANA 15 MCG/2ML Nebu neb solution   Generic drug:  arformoterol     120 mL    USE 1 VIAL IN NEBULIZER 2 TIMES A DAY AS NEEDED    Chronic bronchitis, unspecified chronic bronchitis type (H)       * budesonide 1 MG/2ML neb solution    PULMICORT    60 ampule    Take 2 mLs (1 mg) by nebulization daily Take 1 mg by nebulization daily    Bronchitis       * budesonide 0.5 MG/2ML neb solution    PULMICORT    3 Box    Squirt entire vial into previously made amanda med saline bottle, mix, and irrigate each nostril until entire bottle empty.  Do this once a day    Chronic pansinusitis, CF (cystic fibrosis) (H)       clotrimazole 10 MG behzad      1 behzad Mouth/Throat 3 times a day        COMPOUNDED NON-CONTROLLED  SUBSTANCE - PHARMACY TO MIX COMPOUNDED MEDICATION    CMPD RX    4 Bottle    Irrigate bilateral nares with 240 ML Twice Daily for 4 Weeks.    Chronic maxillary sinusitis       dexlansoprazole 60 MG Cpdr CR capsule    DEXILANT    30 capsule    Take 1 capsule (60 mg) by mouth daily as needed    Gastroesophageal reflux disease without esophagitis       doxycycline hyclate 100 MG tablet    VIBRA-TABS     Take 100 mg by mouth 2 times daily Takes one tablet daily prophylaxis.        fluconazole 100 MG tablet    DIFLUCAN    90 tablet    TAKE 1 TABLET BY MOUTH DAILY AS NEEDED    Rash and other nonspecific skin eruption       HYDROcodone-acetaminophen 5-325 MG tablet    NORCO    18 tablet    Take 1 tablet by mouth every 4 hours as needed for pain    Chronic pansinusitis, S/P FESS (functional endoscopic sinus surgery)       levalbuterol 0.31 MG/3ML neb solution    XOPENEX    225 mL    Take 3 mLs (0.31 mg) by nebulization every 6 hours as needed for shortness of breath / dyspnea        mometasone 50 MCG/ACT spray    NASONEX    3 Box    Spray 2 sprays into both nostrils daily    Chronic pansinusitis       PULMOZYME 1 MG/ML neb solution   Generic drug:  dornase alpha     75 mL    USE 1 VIAL IN NEBULIZER DAILY AS NEEDED    Chronic bronchitis, unspecified chronic bronchitis type (H)       sucralfate 1 GM tablet    CARAFATE    120 tablet    TAKE 1 TABLET BY MOUTH FOUR TIMES A DAY AS NEEDED    Esophageal reflux       sulfamethoxazole-trimethoprim 800-160 MG per tablet    BACTRIM DS/SEPTRA DS    60 tablet    Take 1 tablet by mouth 2 times daily    Chronic pansinusitis, CF (cystic fibrosis) (H)       WELLBUTRIN  MG 12 hr tablet   Generic drug:  buPROPion     90 tablet    TAKE 2 TABLETS BY MOUTH EVERY MORNING AND 1 TABLET EVERY AFTERNOON    Dysthymia       * Notice:  This list has 2 medication(s) that are the same as other medications prescribed for you. Read the directions carefully, and ask your doctor or other care provider to  review them with you.

## 2018-11-26 NOTE — PATIENT INSTRUCTIONS
Thank you for allowing Dr. Gonzalez and our ENT team to participate in your care.  If your medications are too expensive, please give the nurse a call.  We can possibly change this medication.  If you have a scheduling or an appointment question please contact our Health Unit Coordinator at their direct line [phone].   ALL nursing questions or concerns can be directed to your ENT nurse at: [phone] - Sarahi    Continue Budesonide Rinses Twice Daily  Follow up with Dr. Gonzalez in 6 months      Budesonide nasal saline irrigation per instructions:  -Obtain Asaf Med Sinus rinse over the counter.    -Use warm distilled water and 2 packets of the salt solution that comes with the bottle, dissolve in bottle up to the 240 mL airam.  -Add 1 vial of budesonide.  -Irrigate each side of your nose leaning over the sink, using 1/3 to 1/2 the volume of the bottle in each nostril every irrigation.  Irrigate 2 times daily.  -If additional rinses are needed/recommended, you may use the plan Asaf Med Sinus irrigation without the use of added budesonide.

## 2018-11-26 NOTE — LETTER
11/26/2018         RE: Annie Garcia  [address] [facility] [id number]        Dear Colleague,    Thank you for referring your patient, Annie Garcia, to the [facility]. Please see a copy of my visit note below.    Otolaryngology Progress Note      History of Present Illness   Patient presents with:  Follow Up For: chronic pansinusitis, nasal septal perforation      Annie Garcia is a 48 year old female   Presents for follow-up of chronic pansinusitis and cystic fibrosis.  She is using her budesonide irrigations daily and Bactroban sinus irrigations twice daily she has been on prophylaxis docusate doxycycline 100 mg twice daily.  I did also keep her on Bactrim 800 mg twice a day.  She had been on her Bactrim regularly for the past 2-3 months but stopped her Bactrim and doxycycline 2 weeks ago for a scheduled break and will restart her antibiotics tomorrow.    Since I last saw her in the original plan was to keep her on this longer term 6 months to a year, as her sinus symptoms exacerbate once we stop her Bactrim.    Over the past 2 weeks she developed shortness of breath and wheezing.  Chest x-ray was obtained which was negative a CT chest was then ordered which revealed bronchiectasis and mucous plugging.  This was discussed with her.  She is seeing Dr. Herman in 2 weeks and has pulmonary function testing tomorrow  She has also noticed a perioral rash.  She had this developed in a similar fashion about 2 years ago.  There is no tongue or oral mucosal swelling.    In general her skin feels itchy.  She takes hydroxyzine as needed.  Her skin has been hypersensitive in the past during  flares of her cystic fibrosis       Procedures performed 9/5  Final path <50 eos/hpf    1.  Bilateral total ethmoidectomy  2.  Bilateral sphenoidotomy  3.  Right frontal sinusotomy  4.  Bilateral maxillary sinus irrigation with polyp removal     Physical Exam  Ht 1.727 m (5'  "8\")  Wt 108.9 kg (240 lb)  BMI 36.49 kg/m2  General - The patient is well nourished and well developed, and appears to have good nutritional status.  Alert and oriented to person and place, interactive.  Head and Face - Normocephalic and atraumatic, with no gross asymmetry noted of the contour of the facial features.  The facial nerve is intact, with strong symmetric movements.  Skin and Lips-perioral erythema without excoriations ulcer or vascular lesions.  This affects the entire lip skin.    There is also a small patch of the right lobule with mild erythema similar in appearance to the skin  Neck-no palpable lymphadenopathy or thyroid mass.  Trachea is midline.  Eyes - Extraocular movements intact.   Nose - Nasal mucosa is pink and moist with no abnormal mucus.  The septum was grossly midline and non-obstructive, turbinates of normal size and position.  No polyps, masses, or purulence noted on examination.  Mouth - Examination of the oral cavity shows pink, healthy, moist mucosa.  No lesions or ulceration noted.  The dentition are in good repair.  The tongue is mobile and midline.  Throat - The walls of the oropharynx were smooth, pink, moist, symmetric, and had no lesions or ulcerations.  The tonsillar pillars and soft palate were symmetric.  The uvula was midline on elevation.    Lungs-no wheezing or rhonchi no stridor.  Some decreased breath sounds in the lung bases bilaterally    To evaluate the nose and sinuses in the post operative state, I performed rigid nasal endoscopy. The LPN had previously sprayed both nares with lidocaine and neosynephrine.    I began with the LEFT side using a 0 degree rigid nasal endoscope, and then similarly examined the RIGHT side    Findings:  Inferior turbinates:  Non edematous  Middle turbinate and middle meatus:  No purulence, no polyposis, no synechiae  Antrostomy patent bilaterally  Ethmoid cavity are clear bilaterally   Mucosa is  healthy throughout without polyps nor " polypoid degeneration      Impression/Plan  Annie Garcia is a 48 year old female    ICD-10-CM    1. Chronic pansinusitis J32.4     resolved   2. h/o FESS (functional endoscopic sinus surgery) Z98.890    3. CF (cystic fibrosis) (H) E84.9    4. Perioral dermatitis L71.0          Continue budesonide irrigations and bactroban irrigations  Continue Nasonex  Take daily antihistamine for dermatitis.    She states she has hydroxyzine at home this can be sedating which she understands finally she is going to start swimming and I advised her not to swim until her skin clears.          Restart bactrim and doxy tomorrow   do not put anything on the skin around her lips aside from Vaseline once or twice a day try not to touch her skin.  Use of gentle skin cleansers such as Cetaphil.  Stop current use of any other topical cosmetics or creams    Follow-up as scheduled with Dr. Herman her sinuses look excellent    6-month follow-up sooner with any concerns    Labs to be followed by Todd Gonzalez D.O.  Otolaryngology/Head and Neck Surgery  Allergy            Again, thank you for allowing me to participate in the care of your patient.        Sincerely,        Francoise Gonzalez MD

## 2018-11-26 NOTE — PROGRESS NOTES
"Otolaryngology Progress Note      History of Present Illness   Patient presents with:  Follow Up For: chronic pansinusitis, nasal septal perforation      Annie Garcia is a 48 year old female   Presents for follow-up of chronic pansinusitis and cystic fibrosis.  She is using her budesonide irrigations daily and Bactroban sinus irrigations twice daily she has been on prophylaxis docusate doxycycline 100 mg twice daily.  I did also keep her on Bactrim 800 mg twice a day.  She had been on her Bactrim regularly for the past 2-3 months but stopped her Bactrim and doxycycline 2 weeks ago for a scheduled break and will restart her antibiotics tomorrow.    Since I last saw her in the original plan was to keep her on this longer term 6 months to a year, as her sinus symptoms exacerbate once we stop her Bactrim.    Over the past 2 weeks she developed shortness of breath and wheezing.  Chest x-ray was obtained which was negative a CT chest was then ordered which revealed bronchiectasis and mucous plugging.  This was discussed with her.  She is seeing Dr. Herman in 2 weeks and has pulmonary function testing tomorrow  She has also noticed a perioral rash.  She had this developed in a similar fashion about 2 years ago.  There is no tongue or oral mucosal swelling.    In general her skin feels itchy.  She takes hydroxyzine as needed.  Her skin has been hypersensitive in the past during  flares of her cystic fibrosis       Procedures performed 9/5  Final path <50 eos/hpf    1.  Bilateral total ethmoidectomy  2.  Bilateral sphenoidotomy  3.  Right frontal sinusotomy  4.  Bilateral maxillary sinus irrigation with polyp removal     Physical Exam  Ht 1.727 m (5' 8\")  Wt 108.9 kg (240 lb)  BMI 36.49 kg/m2  General - The patient is well nourished and well developed, and appears to have good nutritional status.  Alert and oriented to person and place, interactive.  Head and Face - Normocephalic and atraumatic, with no gross " asymmetry noted of the contour of the facial features.  The facial nerve is intact, with strong symmetric movements.  Skin and Lips-perioral erythema without excoriations ulcer or vascular lesions.  This affects the entire lip skin.    There is also a small patch of the right lobule with mild erythema similar in appearance to the skin  Neck-no palpable lymphadenopathy or thyroid mass.  Trachea is midline.  Eyes - Extraocular movements intact.   Nose - Nasal mucosa is pink and moist with no abnormal mucus.  The septum was grossly midline and non-obstructive, turbinates of normal size and position.  No polyps, masses, or purulence noted on examination.  Mouth - Examination of the oral cavity shows pink, healthy, moist mucosa.  No lesions or ulceration noted.  The dentition are in good repair.  The tongue is mobile and midline.  Throat - The walls of the oropharynx were smooth, pink, moist, symmetric, and had no lesions or ulcerations.  The tonsillar pillars and soft palate were symmetric.  The uvula was midline on elevation.    Lungs-no wheezing or rhonchi no stridor.  Some decreased breath sounds in the lung bases bilaterally    To evaluate the nose and sinuses in the post operative state, I performed rigid nasal endoscopy. The LPN had previously sprayed both nares with lidocaine and neosynephrine.    I began with the LEFT side using a 0 degree rigid nasal endoscope, and then similarly examined the RIGHT side    Findings:  Inferior turbinates:  Non edematous  Middle turbinate and middle meatus:  No purulence, no polyposis, no synechiae  Antrostomy patent bilaterally  Ethmoid cavity are clear bilaterally   Mucosa is  healthy throughout without polyps nor polypoid degeneration      Impression/Plan  Annieliane Garcia is a 48 year old female    ICD-10-CM    1. Chronic pansinusitis J32.4     resolved   2. h/o FESS (functional endoscopic sinus surgery) Z98.890    3. CF (cystic fibrosis) (H) E84.9    4. Perioral  dermatitis L71.0          Continue budesonide irrigations and bactroban irrigations  Continue Nasonex  Take daily antihistamine for dermatitis.    She states she has hydroxyzine at home this can be sedating which she understands finally she is going to start swimming and I advised her not to swim until her skin clears.          Restart bactrim and doxy tomorrow   do not put anything on the skin around her lips aside from Vaseline once or twice a day try not to touch her skin.  Use of gentle skin cleansers such as Cetaphil.  Stop current use of any other topical cosmetics or creams    Follow-up as scheduled with Dr. Herman her sinuses look excellent    6-month follow-up sooner with any concerns    Labs to be followed by Todd Gonzalez D.O.  Otolaryngology/Head and Neck Surgery  Allergy

## 2018-11-27 ENCOUNTER — HOSPITAL ENCOUNTER (OUTPATIENT)
Dept: RESPIRATORY THERAPY | Facility: HOSPITAL | Age: 48
Discharge: HOME | End: 2018-11-27
Attending: NURSE PRACTITIONER | Admitting: INTERNAL MEDICINE
Payer: COMMERCIAL

## 2018-11-27 PROCEDURE — 94010 BREATHING CAPACITY TEST: CPT | Mod: 26 | Performed by: INTERNAL MEDICINE

## 2018-11-27 PROCEDURE — 94010 BREATHING CAPACITY TEST: CPT

## 2018-11-27 PROCEDURE — 94726 PLETHYSMOGRAPHY LUNG VOLUMES: CPT

## 2018-11-27 PROCEDURE — 94729 DIFFUSING CAPACITY: CPT | Mod: 26 | Performed by: INTERNAL MEDICINE

## 2018-11-27 PROCEDURE — 94729 DIFFUSING CAPACITY: CPT

## 2018-11-27 PROCEDURE — 94726 PLETHYSMOGRAPHY LUNG VOLUMES: CPT | Mod: 26 | Performed by: INTERNAL MEDICINE

## 2018-11-27 RX ORDER — ALBUTEROL SULFATE 0.83 MG/ML
2.5 SOLUTION RESPIRATORY (INHALATION)
Status: DISCONTINUED | OUTPATIENT
Start: 2018-11-27 | End: 2018-11-28 | Stop reason: HOSPADM

## 2018-12-11 LAB
ALBUMIN SERPL-MCNC: 3.8 G/DL (ref 3.4–5)
ALP SERPL-CCNC: 85 U/L (ref 40–150)
ALT SERPL W P-5'-P-CCNC: 24 U/L (ref 0–50)
ANION GAP SERPL CALCULATED.3IONS-SCNC: 7 MMOL/L (ref 3–14)
AST SERPL W P-5'-P-CCNC: 14 U/L (ref 0–45)
BILIRUB SERPL-MCNC: 0.2 MG/DL (ref 0.2–1.3)
BUN SERPL-MCNC: 16 MG/DL (ref 7–30)
CALCIUM SERPL-MCNC: 9 MG/DL (ref 8.5–10.1)
CHLORIDE SERPL-SCNC: 102 MMOL/L (ref 94–109)
CO2 SERPL-SCNC: 25 MMOL/L (ref 20–32)
CREAT SERPL-MCNC: 1.19 MG/DL (ref 0.52–1.04)
GFR SERPL CREATININE-BSD FRML MDRD: 48 ML/MIN/1.7M2
GLUCOSE SERPL-MCNC: 88 MG/DL (ref 70–99)
POTASSIUM SERPL-SCNC: 3.9 MMOL/L (ref 3.4–5.3)
PROT SERPL-MCNC: 7.1 G/DL (ref 6.8–8.8)
SODIUM SERPL-SCNC: 134 MMOL/L (ref 133–144)

## 2018-12-11 PROCEDURE — 80053 COMPREHEN METABOLIC PANEL: CPT | Performed by: NURSE PRACTITIONER

## 2018-12-11 PROCEDURE — 36415 COLL VENOUS BLD VENIPUNCTURE: CPT | Performed by: NURSE PRACTITIONER

## 2019-01-30 ENCOUNTER — OFFICE VISIT (OUTPATIENT)
Dept: PSYCHIATRY | Facility: OTHER | Age: 49
End: 2019-01-30
Attending: PSYCHIATRY & NEUROLOGY
Payer: COMMERCIAL

## 2019-01-30 VITALS
TEMPERATURE: 96 F | SYSTOLIC BLOOD PRESSURE: 138 MMHG | WEIGHT: 218 LBS | BODY MASS INDEX: 33.15 KG/M2 | HEART RATE: 98 BPM | DIASTOLIC BLOOD PRESSURE: 86 MMHG

## 2019-01-30 DIAGNOSIS — F41.1 GAD (GENERALIZED ANXIETY DISORDER): Primary | ICD-10-CM

## 2019-01-30 PROCEDURE — 99213 OFFICE O/P EST LOW 20 MIN: CPT | Performed by: PSYCHIATRY & NEUROLOGY

## 2019-01-30 RX ORDER — HYDROXYZINE PAMOATE 50 MG/1
50 CAPSULE ORAL EVERY 4 HOURS PRN
Qty: 150 CAPSULE | Refills: 3 | Status: SHIPPED | OUTPATIENT
Start: 2019-01-30 | End: 2019-02-25

## 2019-01-30 ASSESSMENT — ANXIETY QUESTIONNAIRES
7. FEELING AFRAID AS IF SOMETHING AWFUL MIGHT HAPPEN: SEVERAL DAYS
1. FEELING NERVOUS, ANXIOUS, OR ON EDGE: MORE THAN HALF THE DAYS
6. BECOMING EASILY ANNOYED OR IRRITABLE: SEVERAL DAYS
3. WORRYING TOO MUCH ABOUT DIFFERENT THINGS: MORE THAN HALF THE DAYS
GAD7 TOTAL SCORE: 12
2. NOT BEING ABLE TO STOP OR CONTROL WORRYING: NEARLY EVERY DAY
5. BEING SO RESTLESS THAT IT IS HARD TO SIT STILL: SEVERAL DAYS

## 2019-01-30 ASSESSMENT — PAIN SCALES - GENERAL: PAINLEVEL: NO PAIN (0)

## 2019-01-30 ASSESSMENT — PATIENT HEALTH QUESTIONNAIRE - PHQ9
SUM OF ALL RESPONSES TO PHQ QUESTIONS 1-9: 14
5. POOR APPETITE OR OVEREATING: MORE THAN HALF THE DAYS

## 2019-01-30 NOTE — NURSING NOTE
"Chief Complaint   Patient presents with     RECHECK     Depression, anxiety.       Initial /86 (BP Location: Right arm, Patient Position: Sitting, Cuff Size: Adult Regular)   Pulse 98   Temp 96  F (35.6  C) (Tympanic)   Wt 98.9 kg (218 lb)   BMI 33.15 kg/m   Estimated body mass index is 33.15 kg/m  as calculated from the following:    Height as of 11/26/18: 1.727 m (5' 8\").    Weight as of this encounter: 98.9 kg (218 lb).  Medication Reconciliation: complete    AXEL ARTHUR LPN    "

## 2019-01-30 NOTE — PROGRESS NOTES
"  PSYCHIATRY CLINIC PROGRESS NOTE   20 minute medication management, more than 50% of time spent counseling patient on medications, medication side effects, symptom history and management   SUBJECTIVE / INTERIM HISTORY                                                                       .  Social- Been here for ~3 years, they moved from [place].  Children-  20 yo Rajiv, 17 Lionel and 15 Axel  Last visit summer 2017:  We are going to switch from 3 tabs of 150 mg all at once daily to 2 tabs am and 1 tab afternoon.    Continue Ambien  SAMEER 10 mg HS prn  - Rajiv is in [place] and going for engineering. Going well thus far and likes it there.  Axel was there this year for hockey too  - respiratory going little better \"on an upswing\"  - sinus issues  - relationship issues  - Wellbutrin not sure if the change did much in terms of changing day  - still needs to get ankle surgery. Ruptured tendon (perhaps from Levaquin)  - feels tradename Ambien works way better AND when sleeps better her CF sx tend to stay more in check  - Been here for ~3 years, they moved from [place]. Hard time finding new group of friends / being accepted into the area   - cystic fibrosis (wasn't diagnosed until 39)  -  Was very active including cycling and working out and these helped her deal with stress. Taught cycling class in past    SUBSTANCE USE- no issues    SYMPTOMS-  excessive worry, nervous, edgy, tense and restless. depressed mood, anhedonia, insomnia , appetite change, weight gain /loss, low energy, feeling worthless and psychomotor agitation observed by others  MEDICAL ROS-  achilles pain, hip pain (had hip replacement). Respiratory issues (CF) and gets sinus issues (CF)  MEDICAL / SURGICAL HISTORY                pregnant [if applicable]--no     Patient Active Problem List   Diagnosis     Cystic fibrosis (H)     Depression     Tear of right acetabular labrum     Dyspepsia and other specified disorders of function of stomach     GERD " (gastroesophageal reflux disease)     Facial rash     Hyperlipidemia with target LDL less than 130     Mixed anxiety depressive disorder     Insomnia     Osteoarthritis of hip     Acute bronchitis     Pneumonia due to infectious organism, unspecified laterality, unspecified part of lung     Morbid obesity (H)     Eosinophilia     S/P FESS (functional endoscopic sinus surgery)     Chronic eosinophilic sinusitis     ALLERGY   Seasonal allergies  MEDICATIONS                                                                                             Current Outpatient Medications   Medication Sig     acetaminophen-codeine (TYLENOL #3) 300-30 MG per tablet TAKE 1 TABLET BY MOUTH EVERY 4 HOURS AS NEEDED FOR MILD PAIN, TAKE FOR COUGHING PAIN.     acetaminophen-codeine (TYLENOL #3) 300-30 MG tablet TAKE 1 TABLET BY MOUTH EVERY 4 HOURS AS NEEDED FOR MILD PAIN. TAKE FOR COUGHING PAIN     acetylcysteine (MUCOMYST) 20 % nebulizer solution Take by nebulization every 8 hours 5-10 ml inhalation every 8 hours     AMBIEN 10 MG tablet TAKE 1 TABLET BY MOUTH NIGHTLY AS NEEDED FOR SLEEP     BROVANA 15 MCG/2ML NEBU USE 1 VIAL IN NEBULIZER 2 TIMES A DAY AS NEEDED     budesonide (PULMICORT) 0.5 MG/2ML neb solution Squirt entire vial into previously made amanda med saline bottle, mix, and irrigate each nostril until entire bottle empty.  Do this once a day     budesonide (PULMICORT) 1 MG/2ML SUSP nebulizer solution Take 2 mLs (1 mg) by nebulization daily Take 1 mg by nebulization daily     clotrimazole 10 MG behzad 1 behzad Mouth/Throat 3 times a day     COMPOUNDED NON-CONTROLLED SUBSTANCE (CMPD RX) - PHARMACY TO MIX COMPOUNDED MEDICATION Irrigate bilateral nares with 240 ML Twice Daily for 4 Weeks.     dexlansoprazole (DEXILANT) 60 MG CPDR CR capsule Take 1 capsule (60 mg) by mouth daily as needed     doxycycline (VIBRA-TABS) 100 MG tablet Take 100 mg by mouth 2 times daily Takes one tablet daily prophylaxis.     fluconazole (DIFLUCAN) 100  MG tablet TAKE 1 TABLET BY MOUTH DAILY AS NEEDED     HYDROcodone-acetaminophen (NORCO) 5-325 MG per tablet Take 1 tablet by mouth every 4 hours as needed for pain     levalbuterol (XOPENEX) 0.31 MG/3ML nebulizer solution Take 3 mLs (0.31 mg) by nebulization every 6 hours as needed for shortness of breath / dyspnea     mometasone (NASONEX) 50 MCG/ACT spray Spray 2 sprays into both nostrils daily     PULMOZYME 1 MG/ML neb solution USE 1 VIAL IN NEBULIZER DAILY AS NEEDED     sucralfate (CARAFATE) 1 GM tablet TAKE 1 TABLET BY MOUTH FOUR TIMES A DAY AS NEEDED     sulfamethoxazole-trimethoprim (BACTRIM DS/SEPTRA DS) 800-160 MG per tablet Take 1 tablet by mouth 2 times daily     WELLBUTRIN  MG 12 hr tablet TAKE 2 TABLETS BY MOUTH EVERY MORNING AND 1 TABLET EVERY AFTERNOON     No current facility-administered medications for this visit.        PAST MEDICATION TRIALS    Prozac (fluoxetine), Zoloft (sertraline), Lexapro (escitalopram) and Wellbutrin, Zyban, Aplenzin (bupropion). Prozac: heightened senses. Zoloft she felt like a zombie   no older antidepressants.   no benzos, ?? Buspar in past.   Ambien (zolpidem).no other sleep meds in past       VITALS   /86 (BP Location: Right arm, Patient Position: Sitting, Cuff Size: Adult Regular)   Pulse 98   Temp 96  F (35.6  C) (Tympanic)   Wt 98.9 kg (218 lb)   BMI 33.15 kg/m       PHQ9                     [unfilled]  LABS                                                                                                                           Last Comprehensive Metabolic Panel:  Sodium   Date Value Ref Range Status   12/11/2018 134 133 - 144 mmol/L Final     Potassium   Date Value Ref Range Status   12/11/2018 3.9 3.4 - 5.3 mmol/L Final     Chloride   Date Value Ref Range Status   12/11/2018 102 94 - 109 mmol/L Final     Carbon Dioxide   Date Value Ref Range Status   12/11/2018 25 20 - 32 mmol/L Final     Anion Gap   Date Value Ref Range Status   12/11/2018 7 3  "- 14 mmol/L Final     Glucose   Date Value Ref Range Status   12/11/2018 88 70 - 99 mg/dL Final     Urea Nitrogen   Date Value Ref Range Status   12/11/2018 16 7 - 30 mg/dL Final     Creatinine   Date Value Ref Range Status   12/11/2018 1.19 (H) 0.52 - 1.04 mg/dL Final     GFR Estimate   Date Value Ref Range Status   12/11/2018 48 (L) >60 mL/min/1.7m2 Final     Comment:     Non  GFR Calc     Calcium   Date Value Ref Range Status   12/11/2018 9.0 8.5 - 10.1 mg/dL Final     CBC RESULTS:   Recent Labs   Lab Test 08/30/18  1650   WBC 10.1   RBC 4.82   HGB 13.9   HCT 40.1   MCV 83   MCH 28.8   MCHC 34.7   RDW 13.5        Liver Function Studies -   Recent Labs   Lab Test 12/11/18  1452   PROTTOTAL 7.1   ALBUMIN 3.8   BILITOTAL 0.2   ALKPHOS 85   AST 14   ALT 24           MENTAL STATUS EXAM                                                                                        Alert. Oriented to person, place, and date / time. Well groomed, calm, cooperative with good eye contact. No problems with speech or psychomotor behavior. Mood was described as \"horrible\" and affect was congruent to speech content and full range. Thought process, including associations, was unremarkable and thought content was devoid of suicidal and homicidal ideation and psychotic thought. No hallucinations. Insight was good. Judgment was intact and adequate for safety. Fund of knowledge was intact. Pt demonstrates no obvious problems with attention, concentration, language, recent or remote memory although these were not formally tested.     ASSESSMENT                                                                                                      HISTORICAL:  Initial psych note 6/7/16          NOTES:    Annie Garcia is a 47 yo , dx with CF at 39 and difficult time adjusting to living here and with her sons getting older also struggles with parental role changing. 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 Today we agreed on keeping Wellbutrin for now as she feels is helping but we are going to have her try splitting the dose 2 tabs am and 1 tab afternoon.  We will stick with Ambien for insomnia but I changed it to SAMEER as she has issues when formulation (generic / company it's coming from) changes -> ends up with morbid , vivid nightmares. She is sensitive to meds so any changes in future we will start out with lower doses and titrate up slowly. Today we agreed on keeping meds as are except we are adding hydroxyzine as a prn of which she has taken in past and does help with anxiety.      TREATMENT RISK STATEMENT:  The risks, benefits, alternatives and potential adverse effects have been explained and are understood by the pt.  The pt agrees to the treatment plan with the ability to do so.   The pt knows to call the clinic for any problems or access emergency care if needed.        DIAGNOSES                    Adjustment disorder with mixed anxiety and depressed mood  STEVE  MDD, recurrent, mod        PLAN                                                                                                                    1)  MEDICATIONS:         --Continue Wellbutrin XL 2 tabs am and 1 tab afternoon.    Continue Ambien  SAMEER 10 mg HS prn. Hydroxyzine 50 mg mg every 4 hours as needed insomnia    2)  THERAPY:  No Change    3)  LABS:  None    4)  PT MONITOR [call for probs]:  Worsening symptoms, SI/HI, SEs from meds    5)  REFERRALS [CD, medical, other]:  None    6)  RTC:  3-4 weeks

## 2019-01-31 ASSESSMENT — ANXIETY QUESTIONNAIRES: GAD7 TOTAL SCORE: 12

## 2019-02-08 NOTE — PROGRESS NOTES
"Otolaryngology Progress Note      History of Present Illness   Patient presents with:  RECHECK: Follow Up Chronic Pansinusitis, History of FESS      Annie YESI Garcia is a 48 year old female with cystic fibrosis who presents for follow-up of chronic pansinusitis.  Hx of Allergic Fungal Sinusitis (AFS) with alternaria isolated on 8/20/18     She is using her budesonide irrigations daily and Bactroban sinus irrigations twice daily     she had been on prophylactic doxycycline 100 mg twice daily.  I did also keep her on Bactrim 800 mg twice a day.  She had been on her Bactrim regularly for the past 2-3 months but at last visit in november she had stopped her Bactrim and doxycycline 2 weeks ago for a scheduled break     She has stopped taking both the doxy and Bactrim   Perioral dermatitis was under good controlled, flared yesterday      Increased life stressors .  Her  recently told her he didn't love her any longer and moved out of the house and is living with another woman.      In general her skin is hypersensitive to during her cystic fibrosis flares, but she denies SOB or cough, no wheezing    I diagnosed her with perioral dermatitis encouraged her to not put anything on her skin aside from Vaseline she is instructed in gentle skin cleansers such as Cetaphil and to stop using any other topical cosmetics chapsticks or creams       Procedures performed 9/5  Final path <50 eos/hpf     1.  Bilateral total ethmoidectomy  2.  Bilateral sphenoidotomy  3.  Right frontal sinusotomy  4.  Bilateral maxillary sinus irrigation with polyp removal       Physical Exam  /76 (BP Location: Right arm, Patient Position: Sitting, Cuff Size: Adult Large)   Pulse 86   Temp 97.4  F (36.3  C) (Tympanic)   Ht 1.727 m (5' 8\")   Wt 98.9 kg (218 lb)   SpO2 98%   BMI 33.15 kg/m      General - The patient is well nourished and well developed, and appears to have good nutritional status.  Alert and oriented to person and " place, interactive.  Head and Face - Normocephalic and atraumatic, with no gross asymmetry noted of the contour of the facial features.  The facial nerve is intact, with strong symmetric movements.  Neck-no palpable lymphadenopathy or thyroid mass.  Trachea is midline.  Eyes - Extraocular movements intact.   Ears- External auditory canals are patent, tympanic membranes are intact without effusion or worrisome retractions   Nose - Nasal mucosa is pink and moist with no abnormal mucus.  The septum was grossly midline and non-obstructive, turbinates of normal size and position.  No polyps, masses, or purulence noted on examination.  Mouth - perioral erythema, mild, around red lips and lateral oral commissure, no ulcer or mass.  Examination of the oral cavity shows pink, healthy, moist mucosa.  No lesions or ulceration noted.  The dentition are in good repair.  The tongue is mobile and midline.  Throat - The walls of the oropharynx were smooth, pink, moist, symmetric, and had no lesions or ulcerations.  The tonsillar pillars and soft palate were symmetric.  The uvula was midline on elevation.    Lungs-no wheezing or rhonchi no stridor.      To evaluate the nose and sinuses in the post operative state, I performed rigid nasal endoscopy. The LPN had previously sprayed both nares with lidocaine and neosynephrine.     I began with the LEFT side using a 0 degree rigid nasal endoscope, and then similarly examined the RIGHT side     Findings:  Inferior turbinates:  Non edematous  Middle turbinate and middle meatus:  No purulence, no polyposis, no synechiae  Antrostomy patent bilaterally  Small amount of thin greenish secretions debrided from the middle meatus on the left  Ethmoid cavity are clear bilaterally   Mucosa is  healthy throughout without polyps nor polypoid degeneration          Impression/Plan  Annie YESI Garcia is a 48 year old female    ICD-10-CM    1. Chronic pansinusitis J32.4    2. Cystic fibrosis (H)  E84.9    3. Allergic fungal sinusitis, not active J30.89     B49     alternaria isolated on 8/20/18     4. Perioral dermatitis L71.0      Annie's sinuses look excellent  Luckily she is still sleeping and controlling her life stress as much as possible during this difficult time.    Use Budesonide Rinses Twice Daily  Continue nasal bactroban irrigations    Continue Nasonex as prescribed  Follow up with Dr. Gonzalez in 4 months      Budesonide nasal saline irrigation per instructions:  -Obtain Asaf Med Sinus rinse over the counter.    -Use warm distilled water and 2 packets of the salt solution that comes with the bottle, dissolve in bottle up to the 240 mL airam.  -Add 1 vial of budesonide.  -Irrigate each side of your nose leaning over the sink, using 1/3 to 1/2 the volume of the bottle in each nostril every irrigation.  Irrigate 2 times daily.  -If additional rinses are needed/recommended, you may use the plan Asaf Med Sinus irrigation without the use of added budesonide.     Francoise Gonzalez D.O.  Otolaryngology/Head and Neck Surgery  Allergy

## 2019-02-11 ENCOUNTER — OFFICE VISIT (OUTPATIENT)
Dept: OTOLARYNGOLOGY | Facility: OTHER | Age: 49
End: 2019-02-11
Attending: OTOLARYNGOLOGY
Payer: COMMERCIAL

## 2019-02-11 VITALS
BODY MASS INDEX: 33.04 KG/M2 | HEIGHT: 68 IN | SYSTOLIC BLOOD PRESSURE: 118 MMHG | HEART RATE: 86 BPM | WEIGHT: 218 LBS | OXYGEN SATURATION: 98 % | TEMPERATURE: 97.4 F | DIASTOLIC BLOOD PRESSURE: 76 MMHG

## 2019-02-11 DIAGNOSIS — J30.89 ALLERGIC FUNGAL SINUSITIS: ICD-10-CM

## 2019-02-11 DIAGNOSIS — J32.4 CHRONIC PANSINUSITIS: Primary | ICD-10-CM

## 2019-02-11 DIAGNOSIS — E84.9 CYSTIC FIBROSIS (H): ICD-10-CM

## 2019-02-11 DIAGNOSIS — L71.0 PERIORAL DERMATITIS: ICD-10-CM

## 2019-02-11 DIAGNOSIS — B49 ALLERGIC FUNGAL SINUSITIS: ICD-10-CM

## 2019-02-11 PROCEDURE — 31237 NSL/SINS NDSC SURG BX POLYPC: CPT | Performed by: OTOLARYNGOLOGY

## 2019-02-11 PROCEDURE — 99213 OFFICE O/P EST LOW 20 MIN: CPT | Mod: 25 | Performed by: OTOLARYNGOLOGY

## 2019-02-11 ASSESSMENT — MIFFLIN-ST. JEOR: SCORE: 1662.34

## 2019-02-11 ASSESSMENT — PAIN SCALES - GENERAL: PAINLEVEL: NO PAIN (0)

## 2019-02-11 NOTE — PATIENT INSTRUCTIONS
Thank you for allowing Dr. Gonzalez and our ENT team to participate in your care.  If your medications are too expensive, please give the nurse a call.  We can possibly change this medication.  If you have a scheduling or an appointment question please contact our Health Unit Coordinator at their direct line [phone].   ALL nursing questions or concerns can be directed to your ENT nurse at: [phone] - Sarahi    Use Budesonide Rinses Twice Daily  Continue Nasonex as prescribed  Follow up with Dr. Gonzalez in 4 months      Budesonide nasal saline irrigation per instructions:  -Obtain Asaf Med Sinus rinse over the counter.    -Use warm distilled water and 2 packets of the salt solution that comes with the bottle, dissolve in bottle up to the 240 mL airam.  -Add 1 vial of budesonide.  -Irrigate each side of your nose leaning over the sink, using 1/3 to 1/2 the volume of the bottle in each nostril every irrigation.  Irrigate 2 times daily.  -If additional rinses are needed/recommended, you may use the plan Asaf Med Sinus irrigation without the use of added budesonide.

## 2019-02-11 NOTE — NURSING NOTE
"Chief Complaint   Patient presents with     RECHECK     Follow Up Chronic Pansinusitis, History of FESS       Initial /76 (BP Location: Right arm, Patient Position: Sitting, Cuff Size: Adult Large)   Pulse 86   Temp 97.4  F (36.3  C) (Tympanic)   Ht 1.727 m (5' 8\")   Wt 98.9 kg (218 lb)   SpO2 98%   BMI 33.15 kg/m   Estimated body mass index is 33.15 kg/m  as calculated from the following:    Height as of this encounter: 1.727 m (5' 8\").    Weight as of this encounter: 98.9 kg (218 lb).  Medication Reconciliation: complete    Fatuma Tucker LPN  "

## 2019-02-11 NOTE — LETTER
2/11/2019         RE: Annie Garcia  [address] [place] [address]        Dear Colleague,    Thank you for referring your patient, Annie Garcia, to the [facility]. Please see a copy of my visit note below.    Otolaryngology Progress Note      History of Present Illness   Patient presents with:  RECHECK: Follow Up Chronic Pansinusitis, History of FESS      Annie Garcia is a 48 year old female with cystic fibrosis who presents for follow-up of chronic pansinusitis.  Hx of Allergic Fungal Sinusitis (AFS) with alternaria isolated on 8/20/18     She  is using her budesonide irrigations daily and Bactroban sinus irrigations twice daily     she had been on prophylactic doxycycline 100 mg twice daily.  I did also keep her on Bactrim 800 mg twice a day.  She had been on her Bactrim regularly for the past 2-3 months but at last visit in november she had stopped her Bactrim and doxycycline 2 weeks ago for a scheduled break     She has stopped taking both the doxy and Bactrim   Perioral dermatitis was under good controlled, flared yesterday      Increased life stressors .  Her  recently told her he didn't love her any longer and moved out of the house and is living with another woman.      In general her skin is hypersensitive to during her cystic fibrosis flares, but she denies SOB or cough, no wheezing    I diagnosed her with perioral dermatitis encouraged her to not put anything on her skin aside from Vaseline she is instructed in gentle skin cleansers such as Cetaphil and to stop using any other topical cosmetics chapsticks or creams       Procedures performed 9/5  Final path <50 eos/hpf     1.  Bilateral total ethmoidectomy  2.  Bilateral sphenoidotomy  3.  Right frontal sinusotomy  4.  Bilateral maxillary sinus irrigation with polyp removal       Physical Exam  /76 (BP Location: Right arm, Patient Position: Sitting, Cuff Size: Adult Large)    "Pulse 86   Temp 97.4  F (36.3  C) (Tympanic)   Ht 1.727 m (5' 8\")   Wt 98.9 kg (218 lb)   SpO2 98%   BMI 33.15 kg/m       General - The patient is well nourished and well developed, and appears to have good nutritional status.  Alert and oriented to person and place, interactive.  Head and Face - Normocephalic and atraumatic, with no gross asymmetry noted of the contour of the facial features.  The facial nerve is intact, with strong symmetric movements.  Neck-no palpable lymphadenopathy or thyroid mass.  Trachea is midline.  Eyes - Extraocular movements intact.   Ears- External auditory canals are patent, tympanic membranes are intact without effusion or worrisome retractions   Nose - Nasal mucosa is pink and moist with no abnormal mucus.  The septum was grossly midline and non-obstructive, turbinates of normal size and position.  No polyps, masses, or purulence noted on examination.  Mouth - perioral erythema, mild, around red lips and lateral oral commissure, no ulcer or mass.  Examination of the oral cavity shows pink, healthy, moist mucosa.  No lesions or ulceration noted.  The dentition are in good repair.  The tongue is mobile and midline.  Throat - The walls of the oropharynx were smooth, pink, moist, symmetric, and had no lesions or ulcerations.  The tonsillar pillars and soft palate were symmetric.  The uvula was midline on elevation.    Lungs-no wheezing or rhonchi no stridor.      To evaluate the nose and sinuses in the post operative state, I performed rigid nasal endoscopy. The LPN had previously sprayed both nares with lidocaine and neosynephrine.     I began with the LEFT side using a 0 degree rigid nasal endoscope, and then similarly examined the RIGHT side     Findings:  Inferior turbinates:  Non edematous  Middle turbinate and middle meatus:  No purulence, no polyposis, no synechiae  Antrostomy patent bilaterally  Small amount of thin greenish secretions debrided from the middle meatus on the " left  Ethmoid cavity are clear bilaterally   Mucosa is  healthy throughout without polyps nor polypoid degeneration          Impression/Plan  Annie Garcia is a 48 year old female    ICD-10-CM    1. Chronic pansinusitis J32.4    2. Cystic fibrosis (H) E84.9    3. Allergic fungal sinusitis, not active J30.89     B49     alternaria isolated on 8/20/18     4. Perioral dermatitis L71.0      Annie's sinuses look excellent  Luckily she is still sleeping and controlling her life stress as much as possible during this difficult time.    Use Budesonide Rinses Twice Daily  Continue nasal bactroban irrigations    Continue Nasonex as prescribed  Follow up with Dr. Gonzalez in 4 months      Budesonide nasal saline irrigation per instructions:  -Obtain Asaf Med Sinus rinse over the counter.    -Use warm distilled water and 2 packets of the salt solution that comes with the bottle, dissolve in bottle up to the 240 mL airam.  -Add 1 vial of budesonide.  -Irrigate each side of your nose leaning over the sink, using 1/3 to 1/2 the volume of the bottle in each nostril every irrigation.  Irrigate 2 times daily.  -If additional rinses are needed/recommended, you may use the plan Asaf Med Sinus irrigation without the use of added budesonide.     Francoise Gonzalez D.O.  Otolaryngology/Head and Neck Surgery  Allergy            Again, thank you for allowing me to participate in the care of your patient.        Sincerely,        Francoise Gonzalez MD

## 2019-02-25 ENCOUNTER — OFFICE VISIT (OUTPATIENT)
Dept: PSYCHIATRY | Facility: OTHER | Age: 49
End: 2019-02-25
Attending: PSYCHIATRY & NEUROLOGY
Payer: COMMERCIAL

## 2019-02-25 VITALS
HEART RATE: 93 BPM | DIASTOLIC BLOOD PRESSURE: 84 MMHG | TEMPERATURE: 97.2 F | BODY MASS INDEX: 33.15 KG/M2 | SYSTOLIC BLOOD PRESSURE: 138 MMHG | WEIGHT: 218 LBS

## 2019-02-25 DIAGNOSIS — F41.1 GAD (GENERALIZED ANXIETY DISORDER): ICD-10-CM

## 2019-02-25 PROCEDURE — 99214 OFFICE O/P EST MOD 30 MIN: CPT | Performed by: PSYCHIATRY & NEUROLOGY

## 2019-02-25 RX ORDER — HYDROXYZINE PAMOATE 50 MG/1
50 CAPSULE ORAL EVERY 4 HOURS PRN
Qty: 180 CAPSULE | Refills: 3 | Status: SHIPPED | OUTPATIENT
Start: 2019-02-25 | End: 2020-01-10

## 2019-02-25 ASSESSMENT — ANXIETY QUESTIONNAIRES
7. FEELING AFRAID AS IF SOMETHING AWFUL MIGHT HAPPEN: SEVERAL DAYS
2. NOT BEING ABLE TO STOP OR CONTROL WORRYING: MORE THAN HALF THE DAYS
6. BECOMING EASILY ANNOYED OR IRRITABLE: MORE THAN HALF THE DAYS
GAD7 TOTAL SCORE: 11
5. BEING SO RESTLESS THAT IT IS HARD TO SIT STILL: MORE THAN HALF THE DAYS
1. FEELING NERVOUS, ANXIOUS, OR ON EDGE: SEVERAL DAYS
3. WORRYING TOO MUCH ABOUT DIFFERENT THINGS: MORE THAN HALF THE DAYS

## 2019-02-25 ASSESSMENT — PATIENT HEALTH QUESTIONNAIRE - PHQ9
SUM OF ALL RESPONSES TO PHQ QUESTIONS 1-9: 10
5. POOR APPETITE OR OVEREATING: SEVERAL DAYS

## 2019-02-25 ASSESSMENT — PAIN SCALES - GENERAL: PAINLEVEL: NO PAIN (0)

## 2019-02-25 NOTE — PROGRESS NOTES
PSYCHIATRY CLINIC PROGRESS NOTE   20 minute medication management, more than 50% of time spent counseling patient on medications, medication side effects, symptom history and management   SUBJECTIVE / INTERIM HISTORY                                                                       .  Social- moved from [place].  Children-  18 yo Rajiv is in [place], 17 Lionel and 15 Axel   Last visit: Continue Wellbutrin XL 2 tabs am and 1 tab afternoon.    Continue Ambien  SAMEER 10 mg HS prn. Hydroxyzine 50 mg mg every 4 hours as needed insomnia    - working with [employer] and they have been working on boundaries  - Rajiv is in [place] and going for engineering. Going well thus far and likes it there.  Axel was there this year for hockey too but now back home  - sinus issues and had surgery last year  - relationship issues  - still needs to get ankle surgery. Ruptured tendon (perhaps from Levaquin). Has put this off for couple years given all her obligations especially with driving the kids around  -  tradename Ambien works way better AND when sleeps better her CF sx tend to stay more in check  - Been here for ~3 years, they moved from [place]. Hard time finding new group of friends / being accepted into the area   - cystic fibrosis ( diagnosed age 39)  -  Was very active including cycling and working out and these helped her deal with stress. Taught cycling class in past    SUBSTANCE USE- no issues    SYMPTOMS-  excessive worry, nervous, edgy, tense and restless. depressed mood, anhedonia, insomnia , appetite change, weight gain /loss, low energy, feeling worthless and psychomotor agitation observed by others  MEDICAL ROS-  achilles pain, hip pain (had hip replacement). Respiratory issues (CF) and gets sinus issues (CF)  MEDICAL / SURGICAL HISTORY                pregnant [if applicable]--no     Patient Active Problem List   Diagnosis     Cystic fibrosis (H)     Depression     Tear of right acetabular labrum     Dyspepsia and other  specified disorders of function of stomach     GERD (gastroesophageal reflux disease)     Facial rash     Hyperlipidemia with target LDL less than 130     Mixed anxiety depressive disorder     Insomnia     Osteoarthritis of hip     Acute bronchitis     Pneumonia due to infectious organism, unspecified laterality, unspecified part of lung     Morbid obesity (H)     Eosinophilia     S/P FESS (functional endoscopic sinus surgery)     Chronic eosinophilic sinusitis     Allergic fungal sinusitis, not active     ALLERGY   Seasonal allergies  MEDICATIONS                                                                                             Current Outpatient Medications   Medication Sig     acetaminophen-codeine (TYLENOL #3) 300-30 MG tablet TAKE 1 TABLET BY MOUTH EVERY 4 HOURS AS NEEDED FOR MILD PAIN. TAKE FOR COUGHING PAIN     acetylcysteine (MUCOMYST) 20 % nebulizer solution Take by nebulization every 8 hours 5-10 ml inhalation every 8 hours     AMBIEN 10 MG tablet TAKE 1 TABLET BY MOUTH NIGHTLY AS NEEDED FOR SLEEP     BROVANA 15 MCG/2ML NEBU USE 1 VIAL IN NEBULIZER 2 TIMES A DAY AS NEEDED     budesonide (PULMICORT) 0.5 MG/2ML neb solution Squirt entire vial into previously made amanda med saline bottle, mix, and irrigate each nostril until entire bottle empty.  Do this once a day     budesonide (PULMICORT) 1 MG/2ML SUSP nebulizer solution Take 2 mLs (1 mg) by nebulization daily Take 1 mg by nebulization daily     clotrimazole 10 MG behzad 1 behzad Mouth/Throat 3 times a day     COMPOUNDED NON-CONTROLLED SUBSTANCE (CMPD RX) - PHARMACY TO MIX COMPOUNDED MEDICATION Irrigate bilateral nares with 240 ML Twice Daily for 4 Weeks.     dexlansoprazole (DEXILANT) 60 MG CPDR CR capsule Take 1 capsule (60 mg) by mouth daily as needed     doxycycline (VIBRA-TABS) 100 MG tablet Take 100 mg by mouth 2 times daily Takes one tablet daily prophylaxis.     fluconazole (DIFLUCAN) 100 MG tablet TAKE 1 TABLET BY MOUTH DAILY AS NEEDED      HYDROcodone-acetaminophen (NORCO) 5-325 MG per tablet Take 1 tablet by mouth every 4 hours as needed for pain     hydrOXYzine (VISTARIL) 50 MG capsule Take 1 capsule (50 mg) by mouth every 4 hours as needed for anxiety     levalbuterol (XOPENEX) 0.31 MG/3ML nebulizer solution Take 3 mLs (0.31 mg) by nebulization every 6 hours as needed for shortness of breath / dyspnea     mometasone (NASONEX) 50 MCG/ACT spray Spray 2 sprays into both nostrils daily     PULMOZYME 1 MG/ML neb solution USE 1 VIAL IN NEBULIZER DAILY AS NEEDED     sucralfate (CARAFATE) 1 GM tablet TAKE 1 TABLET BY MOUTH FOUR TIMES A DAY AS NEEDED     sulfamethoxazole-trimethoprim (BACTRIM DS/SEPTRA DS) 800-160 MG per tablet Take 1 tablet by mouth 2 times daily     WELLBUTRIN  MG 12 hr tablet TAKE 2 TABLETS BY MOUTH EVERY MORNING AND 1 TABLET EVERY AFTERNOON     No current facility-administered medications for this visit.        PAST MEDICATION TRIALS    Prozac (fluoxetine), Zoloft (sertraline), Lexapro (escitalopram) and Wellbutrin, Zyban, Aplenzin (bupropion). Prozac: heightened senses. Zoloft she felt like a zombie   no older antidepressants.   no benzos, ?? Buspar in past.   Ambien (zolpidem).no other sleep meds in past       VITALS   /84 (BP Location: Right arm, Patient Position: Sitting, Cuff Size: Adult Regular)   Pulse 93   Temp 97.2  F (36.2  C) (Tympanic)   Wt 98.9 kg (218 lb)   BMI 33.15 kg/m       PHQ9                     [unfilled]  LABS                                                                                                                           Last Comprehensive Metabolic Panel:  Sodium   Date Value Ref Range Status   12/11/2018 134 133 - 144 mmol/L Final     Potassium   Date Value Ref Range Status   12/11/2018 3.9 3.4 - 5.3 mmol/L Final     Chloride   Date Value Ref Range Status   12/11/2018 102 94 - 109 mmol/L Final     Carbon Dioxide   Date Value Ref Range Status   12/11/2018 25 20 - 32 mmol/L Final      Anion Gap   Date Value Ref Range Status   12/11/2018 7 3 - 14 mmol/L Final     Glucose   Date Value Ref Range Status   12/11/2018 88 70 - 99 mg/dL Final     Urea Nitrogen   Date Value Ref Range Status   12/11/2018 16 7 - 30 mg/dL Final     Creatinine   Date Value Ref Range Status   12/11/2018 1.19 (H) 0.52 - 1.04 mg/dL Final     GFR Estimate   Date Value Ref Range Status   12/11/2018 48 (L) >60 mL/min/1.7m2 Final     Comment:     Non  GFR Calc     Calcium   Date Value Ref Range Status   12/11/2018 9.0 8.5 - 10.1 mg/dL Final     CBC RESULTS:   Recent Labs   Lab Test 08/30/18  1650   WBC 10.1   RBC 4.82   HGB 13.9   HCT 40.1   MCV 83   MCH 28.8   MCHC 34.7   RDW 13.5        Liver Function Studies -   Recent Labs   Lab Test 12/11/18  1452   PROTTOTAL 7.1   ALBUMIN 3.8   BILITOTAL 0.2   ALKPHOS 85   AST 14   ALT 24           MENTAL STATUS EXAM                                                                                        Alert. Oriented to person, place, and date / time. Casually groomed, calm, cooperative with good eye contact. No problems with speech or psychomotor behavior. Mood was stressed and sad and affect was congruent to speech content and full range - tearful. Thought process, including associations, was unremarkable and thought content was devoid of suicidal and homicidal ideation and psychotic thought. No hallucinations. Insight was good. Judgment was intact and adequate for safety. Fund of knowledge was intact. Pt demonstrates no obvious problems with attention, concentration, language, recent or remote memory although these were not formally tested.     ASSESSMENT                                                                                                      HISTORICAL:  Initial psych note 6/7/16          NOTES:    Annie Garcia is a 47 yo , dx with CF at 39 and difficult time adjusting to living here and with her sons getting older also struggles with  parental role changing. She was a very active person including taught cycling. She had hip replacement and CF unable to work out: adds to depression and anxiety since exercise helped her significantly with stress. Today we agreed on keeping Wellbutrin.   We will stick with Kyleeliezer SAMEER as she has issues when formulation (generic / company it's coming from) changes -> ends up with morbid , vivid nightmares. She is sensitive to meds so any changes in future we will start out with lower doses and titrate up slowly. Today we agreed on keeping meds as are including the hydroxyzine as a prn of which she has taken in past and does help with anxiety.      TREATMENT RISK STATEMENT:  The risks, benefits, alternatives and potential adverse effects have been explained and are understood by the pt.  The pt agrees to the treatment plan with the ability to do so.   The pt knows to call the clinic for any problems or access emergency care if needed.        DIAGNOSES                    Adjustment disorder with mixed anxiety and depressed mood  STEVE  MDD, recurrent, mod        PLAN                                                                                                                    1)  MEDICATIONS:         --Continue Wellbutrin XL 2 tabs am and 1 tab afternoon, Ambien  SAMEER 10 mg HS prn. Hydroxyzine 50 mg mg every 4 hours as needed insomnia    2)  THERAPY: working with Terrell OLIVA    3)  LABS:  None    4)  PT MONITOR [call for probs]:  Worsening symptoms, SI/HI, SEs from meds    5)  REFERRALS [CD, medical, other]:  None    6)  RTC:  1 month

## 2019-02-25 NOTE — NURSING NOTE
"Chief Complaint   Patient presents with     RECHECK     Anxiety.       Initial /84 (BP Location: Right arm, Patient Position: Sitting, Cuff Size: Adult Regular)   Pulse 93   Temp 97.2  F (36.2  C) (Tympanic)   Wt 98.9 kg (218 lb)   BMI 33.15 kg/m   Estimated body mass index is 33.15 kg/m  as calculated from the following:    Height as of 2/11/19: 1.727 m (5' 8\").    Weight as of this encounter: 98.9 kg (218 lb).  Medication Reconciliation: complete    AXEL ARTHUR LPN    "

## 2019-02-26 ASSESSMENT — ANXIETY QUESTIONNAIRES: GAD7 TOTAL SCORE: 11

## 2019-03-04 ENCOUNTER — TELEPHONE (OUTPATIENT)
Dept: INTERNAL MEDICINE | Facility: OTHER | Age: 49
End: 2019-03-04

## 2019-03-04 DIAGNOSIS — M79.89 SWELLING OF LIMB: Primary | ICD-10-CM

## 2019-03-04 NOTE — TELEPHONE ENCOUNTER
Pt is requesting a x-ray and MRI to be done on her left ankle. Previous tear- now having swelling and itching. Also on long term prednisone. Nisha Singer LPN

## 2019-03-07 ENCOUNTER — HOSPITAL ENCOUNTER (OUTPATIENT)
Dept: MRI IMAGING | Facility: HOSPITAL | Age: 49
Discharge: HOME | End: 2019-03-07
Attending: INTERNAL MEDICINE | Admitting: INTERNAL MEDICINE
Payer: COMMERCIAL

## 2019-03-07 ENCOUNTER — HOSPITAL ENCOUNTER (OUTPATIENT)
Dept: GENERAL RADIOLOGY | Facility: HOSPITAL | Age: 49
End: 2019-03-07
Attending: INTERNAL MEDICINE
Payer: COMMERCIAL

## 2019-03-07 DIAGNOSIS — M79.89 SWELLING OF LIMB: ICD-10-CM

## 2019-03-07 PROCEDURE — 73721 MRI JNT OF LWR EXTRE W/O DYE: CPT | Mod: TC,RT

## 2019-03-07 PROCEDURE — 73600 X-RAY EXAM OF ANKLE: CPT | Mod: TC,RT

## 2019-03-27 ENCOUNTER — OFFICE VISIT (OUTPATIENT)
Dept: PSYCHIATRY | Facility: OTHER | Age: 49
End: 2019-03-27
Attending: PSYCHIATRY & NEUROLOGY
Payer: COMMERCIAL

## 2019-03-27 VITALS
HEART RATE: 92 BPM | DIASTOLIC BLOOD PRESSURE: 84 MMHG | TEMPERATURE: 98.2 F | WEIGHT: 218 LBS | SYSTOLIC BLOOD PRESSURE: 124 MMHG | BODY MASS INDEX: 33.15 KG/M2

## 2019-03-27 DIAGNOSIS — F51.01 PRIMARY INSOMNIA: ICD-10-CM

## 2019-03-27 DIAGNOSIS — F34.1 DYSTHYMIA: ICD-10-CM

## 2019-03-27 PROCEDURE — 99213 OFFICE O/P EST LOW 20 MIN: CPT | Performed by: PSYCHIATRY & NEUROLOGY

## 2019-03-27 RX ORDER — ZOLPIDEM TARTRATE 10 MG/1
10 TABLET, FILM COATED ORAL
Qty: 30 TABLET | Refills: 5 | Status: SHIPPED | OUTPATIENT
Start: 2019-04-15 | End: 2019-10-22

## 2019-03-27 RX ORDER — BUPROPION HCL 150 MG
TABLET,SUSTAINED-RELEASE 12 HR ORAL
Qty: 90 TABLET | Refills: 6 | Status: SHIPPED | OUTPATIENT
Start: 2019-03-27 | End: 2019-03-27

## 2019-03-27 RX ORDER — BUPROPION HCL 150 MG
TABLET,SUSTAINED-RELEASE 12 HR ORAL
Qty: 90 TABLET | Refills: 6 | Status: SHIPPED | OUTPATIENT
Start: 2019-03-27 | End: 2019-12-10

## 2019-03-27 ASSESSMENT — ANXIETY QUESTIONNAIRES
2. NOT BEING ABLE TO STOP OR CONTROL WORRYING: NEARLY EVERY DAY
3. WORRYING TOO MUCH ABOUT DIFFERENT THINGS: MORE THAN HALF THE DAYS
5. BEING SO RESTLESS THAT IT IS HARD TO SIT STILL: SEVERAL DAYS
6. BECOMING EASILY ANNOYED OR IRRITABLE: SEVERAL DAYS
1. FEELING NERVOUS, ANXIOUS, OR ON EDGE: SEVERAL DAYS
GAD7 TOTAL SCORE: 9
7. FEELING AFRAID AS IF SOMETHING AWFUL MIGHT HAPPEN: NOT AT ALL
IF YOU CHECKED OFF ANY PROBLEMS ON THIS QUESTIONNAIRE, HOW DIFFICULT HAVE THESE PROBLEMS MADE IT FOR YOU TO DO YOUR WORK, TAKE CARE OF THINGS AT HOME, OR GET ALONG WITH OTHER PEOPLE: SOMEWHAT DIFFICULT

## 2019-03-27 ASSESSMENT — PAIN SCALES - GENERAL: PAINLEVEL: NO PAIN (0)

## 2019-03-27 ASSESSMENT — PATIENT HEALTH QUESTIONNAIRE - PHQ9
SUM OF ALL RESPONSES TO PHQ QUESTIONS 1-9: 13
5. POOR APPETITE OR OVEREATING: SEVERAL DAYS

## 2019-03-27 NOTE — PROGRESS NOTES
PSYCHIATRY CLINIC PROGRESS NOTE   20 minute medication management, more than 50% of time spent counseling patient on medications, medication side effects, symptom history and management   SUBJECTIVE / INTERIM HISTORY                                                                       .  Social- moved from [place].  Children-  18 yo Rajiv is in [place], 17 Lionel and 15 Axel   Last visit:  --Continue Wellbutrin XL 2 tabs am and 1 tab afternoon, Ambien  SAMEER 10 mg HS prn. Hydroxyzine 50 mg mg every 4 hours as needed insomnia    - working with Terrell and they have been working on boundaries. She continues to work on this  - Rajiv is in [place] and going for engineering. Going well thus far and likes it there.  Axel was there this year for hockey too but now back home  - sinus issues and had surgery last year  - relationship issues continue  - still needs to get ankle surgery. Ruptured tendon (perhaps from Levaquin). Has put this off for couple years given all her obligations especially with driving the kids around  -  tradename Ambien works way better AND when sleeps better her CF sx tend to stay more in check  - Been here for ~3 years, they moved from [place]. Hard time finding new group of friends / being accepted into the area   - cystic fibrosis ( diagnosed age 39)  -  Was very active including cycling and working out and these helped her deal with stress. Taught cycling class in past    SUBSTANCE USE- no issues    SYMPTOMS-  excessive worry, nervous, edgy, tense and restless. depressed mood, anhedonia, insomnia , appetite change, weight gain /loss, low energy, feeling worthless and psychomotor agitation observed by others  MEDICAL ROS-  achilles pain, hip pain (had hip replacement). Respiratory issues (CF) and gets sinus issues (CF)  MEDICAL / SURGICAL HISTORY                pregnant [if applicable]--no     Patient Active Problem List   Diagnosis     Cystic fibrosis (H)     Depression     Tear of right acetabular  labrum     Dyspepsia and other specified disorders of function of stomach     GERD (gastroesophageal reflux disease)     Facial rash     Hyperlipidemia with target LDL less than 130     Mixed anxiety depressive disorder     Insomnia     Osteoarthritis of hip     Acute bronchitis     Pneumonia due to infectious organism, unspecified laterality, unspecified part of lung     Morbid obesity (H)     Eosinophilia     S/P FESS (functional endoscopic sinus surgery)     Chronic eosinophilic sinusitis     Allergic fungal sinusitis, not active     ALLERGY   Seasonal allergies  MEDICATIONS                                                                                             Current Outpatient Medications   Medication Sig     acetaminophen-codeine (TYLENOL #3) 300-30 MG tablet TAKE 1 TABLET BY MOUTH EVERY 4 HOURS AS NEEDED FOR MILD PAIN. TAKE FOR COUGHING PAIN     acetylcysteine (MUCOMYST) 20 % nebulizer solution Take by nebulization every 8 hours 5-10 ml inhalation every 8 hours     AMBIEN 10 MG tablet TAKE 1 TABLET BY MOUTH NIGHTLY AS NEEDED FOR SLEEP     budesonide (PULMICORT) 0.5 MG/2ML neb solution Squirt entire vial into previously made amnada med saline bottle, mix, and irrigate each nostril until entire bottle empty.  Do this once a day     clotrimazole 10 MG behzad 1 behzad Mouth/Throat 3 times a day     COMPOUNDED NON-CONTROLLED SUBSTANCE (CMPD RX) - PHARMACY TO MIX COMPOUNDED MEDICATION Irrigate bilateral nares with 240 ML Twice Daily for 4 Weeks.     dexlansoprazole (DEXILANT) 60 MG CPDR CR capsule Take 1 capsule (60 mg) by mouth daily as needed     doxycycline (VIBRA-TABS) 100 MG tablet Take 100 mg by mouth 2 times daily Takes one tablet daily prophylaxis.     fluconazole (DIFLUCAN) 100 MG tablet TAKE 1 TABLET BY MOUTH DAILY AS NEEDED     HYDROcodone-acetaminophen (NORCO) 5-325 MG per tablet Take 1 tablet by mouth every 4 hours as needed for pain     hydrOXYzine (VISTARIL) 50 MG capsule Take 1 capsule (50 mg) by  mouth every 4 hours as needed for anxiety     levalbuterol (XOPENEX) 0.31 MG/3ML nebulizer solution Take 3 mLs (0.31 mg) by nebulization every 6 hours as needed for shortness of breath / dyspnea     mometasone (NASONEX) 50 MCG/ACT spray Spray 2 sprays into both nostrils daily     PULMOZYME 1 MG/ML neb solution USE 1 VIAL IN NEBULIZER DAILY AS NEEDED     sucralfate (CARAFATE) 1 GM tablet TAKE 1 TABLET BY MOUTH FOUR TIMES A DAY AS NEEDED     sulfamethoxazole-trimethoprim (BACTRIM DS/SEPTRA DS) 800-160 MG per tablet Take 1 tablet by mouth 2 times daily     WELLBUTRIN  MG 12 hr tablet TAKE 2 TABLETS BY MOUTH EVERY MORNING AND 1 TABLET EVERY AFTERNOON     No current facility-administered medications for this visit.        PAST MEDICATION TRIALS    Prozac (fluoxetine), Zoloft (sertraline), Lexapro (escitalopram) and Wellbutrin, Zyban, Aplenzin (bupropion). Prozac: heightened senses. Zoloft she felt like a zombie   no older antidepressants.   no benzos, ?? Buspar in past.   Ambien (zolpidem).no other sleep meds in past       VITALS   /84 (BP Location: Right arm, Patient Position: Sitting, Cuff Size: Adult Regular)   Pulse 92   Temp 98.2  F (36.8  C) (Tympanic)   Wt 98.9 kg (218 lb)   BMI 33.15 kg/m       PHQ9                     [unfilled]  LABS                                                                                                                           Last Comprehensive Metabolic Panel:  Sodium   Date Value Ref Range Status   12/11/2018 134 133 - 144 mmol/L Final     Potassium   Date Value Ref Range Status   12/11/2018 3.9 3.4 - 5.3 mmol/L Final     Chloride   Date Value Ref Range Status   12/11/2018 102 94 - 109 mmol/L Final     Carbon Dioxide   Date Value Ref Range Status   12/11/2018 25 20 - 32 mmol/L Final     Anion Gap   Date Value Ref Range Status   12/11/2018 7 3 - 14 mmol/L Final     Glucose   Date Value Ref Range Status   12/11/2018 88 70 - 99 mg/dL Final     Urea Nitrogen    Date Value Ref Range Status   12/11/2018 16 7 - 30 mg/dL Final     Creatinine   Date Value Ref Range Status   12/11/2018 1.19 (H) 0.52 - 1.04 mg/dL Final     GFR Estimate   Date Value Ref Range Status   12/11/2018 48 (L) >60 mL/min/1.7m2 Final     Comment:     Non  GFR Calc     Calcium   Date Value Ref Range Status   12/11/2018 9.0 8.5 - 10.1 mg/dL Final     CBC RESULTS:   Recent Labs   Lab Test 08/30/18  1650   WBC 10.1   RBC 4.82   HGB 13.9   HCT 40.1   MCV 83   MCH 28.8   MCHC 34.7   RDW 13.5        Liver Function Studies -   Recent Labs   Lab Test 12/11/18  1452   PROTTOTAL 7.1   ALBUMIN 3.8   BILITOTAL 0.2   ALKPHOS 85   AST 14   ALT 24           MENTAL STATUS EXAM                                                                                        Alert. Oriented to person, place, and date / time. Casually groomed, calm, cooperative with good eye contact. No problems with speech or psychomotor behavior. Mood was stressed and sad and affect was congruent to speech content and full range - tearful. Thought process, including associations, was unremarkable and thought content was devoid of suicidal and homicidal ideation and psychotic thought. No hallucinations. Insight was good. Judgment was intact and adequate for safety. Fund of knowledge was intact. Pt demonstrates no obvious problems with attention, concentration, language, recent or remote memory although these were not formally tested.     ASSESSMENT                                                                                                      HISTORICAL:  Initial psych note 6/7/16          NOTES:    Annie Garcia is a 49 yo , dx with CF at 39 and difficult time adjusting to living here and with her sons getting older also struggles with parental role changing. She was a very active person including taught cycling. She had hip replacement and CF unable to work out: adds to depression and anxiety since exercise helped  her significantly with stress. Today we agreed on keeping Wellbutrin.   We will stick with Sabino ESTRELLA as she has issues when formulation (generic / company it's coming from) changes -> ends up with morbid , vivid nightmares. She is sensitive to meds so any changes in future we will start out with lower doses and titrate up slowly. Today we agreed on keeping meds as are including the hydroxyzine as a prn of which she has taken in past and does help with anxiety.      TREATMENT RISK STATEMENT:  The risks, benefits, alternatives and potential adverse effects have been explained and are understood by the pt.  The pt agrees to the treatment plan with the ability to do so.   The pt knows to call the clinic for any problems or access emergency care if needed.        DIAGNOSES                    Adjustment disorder with mixed anxiety and depressed mood  STEVE  MDD, recurrent, mod        PLAN                                                                                                                    1)  MEDICATIONS:         --Continue Wellbutrin XL 2 tabs am and 1 tab afternoon, Ambien  SAMEER 10 mg HS prn. Hydroxyzine 50 mg mg every 4 hours as needed insomnia    2)  THERAPY: working with Terrell OLIVA    3)  LABS:  None    4)  PT MONITOR [call for probs]:  Worsening symptoms, SI/HI, SEs from meds    5)  REFERRALS [CD, medical, other]:  None    6)  RTC:  1 month

## 2019-03-27 NOTE — NURSING NOTE
"Chief Complaint   Patient presents with     RECHECK     Anxiety, depression.       Initial /84 (BP Location: Right arm, Patient Position: Sitting, Cuff Size: Adult Regular)   Pulse 92   Temp 98.2  F (36.8  C) (Tympanic)   Wt 98.9 kg (218 lb)   BMI 33.15 kg/m   Estimated body mass index is 33.15 kg/m  as calculated from the following:    Height as of 2/11/19: 1.727 m (5' 8\").    Weight as of this encounter: 98.9 kg (218 lb).  Medication Reconciliation: complete    AXEL ARTHUR LPN    "

## 2019-03-28 ASSESSMENT — ANXIETY QUESTIONNAIRES: GAD7 TOTAL SCORE: 9

## 2019-04-04 ENCOUNTER — OFFICE VISIT (OUTPATIENT)
Dept: INTERNAL MEDICINE | Facility: OTHER | Age: 49
End: 2019-04-04
Attending: NURSE PRACTITIONER
Payer: COMMERCIAL

## 2019-04-04 VITALS
DIASTOLIC BLOOD PRESSURE: 80 MMHG | OXYGEN SATURATION: 99 % | WEIGHT: 220 LBS | HEART RATE: 99 BPM | RESPIRATION RATE: 18 BRPM | BODY MASS INDEX: 33.34 KG/M2 | HEIGHT: 68 IN | TEMPERATURE: 98.5 F | SYSTOLIC BLOOD PRESSURE: 130 MMHG

## 2019-04-04 DIAGNOSIS — E84.9 CYSTIC FIBROSIS (H): ICD-10-CM

## 2019-04-04 DIAGNOSIS — F41.8 MIXED ANXIETY DEPRESSIVE DISORDER: ICD-10-CM

## 2019-04-04 DIAGNOSIS — S93.491S: Primary | ICD-10-CM

## 2019-04-04 DIAGNOSIS — K21.9 GASTROESOPHAGEAL REFLUX DISEASE WITHOUT ESOPHAGITIS: ICD-10-CM

## 2019-04-04 PROCEDURE — 99214 OFFICE O/P EST MOD 30 MIN: CPT | Performed by: NURSE PRACTITIONER

## 2019-04-04 ASSESSMENT — ENCOUNTER SYMPTOMS
CONSTIPATION: 0
PALPITATIONS: 0
FATIGUE: 0
ARTHRALGIAS: 1
DIFFICULTY URINATING: 0
COUGH: 0
FEVER: 0
DIZZINESS: 0
VOMITING: 0
DIARRHEA: 0
NAUSEA: 0
SHORTNESS OF BREATH: 1
HEADACHES: 0
MYALGIAS: 1

## 2019-04-04 ASSESSMENT — PAIN SCALES - GENERAL: PAINLEVEL: MILD PAIN (3)

## 2019-04-04 ASSESSMENT — MIFFLIN-ST. JEOR: SCORE: 1671.41

## 2019-04-04 NOTE — LETTER
My Depression Action Plan  Name: Annie Garcia   Date of Birth 1970  Date: 4/4/2019    My doctor: Todd Torres   My clinic: [facility] [place]  [address]  [place] [address]  [phone]          GREEN    ZONE   Good Control    What it looks like:     Things are going generally well. You have normal up s and down s. You may even feel depressed from time to time, but bad moods usually last less than a day.   What you need to do:  1. Continue to care for yourself (see self care plan)  2. Check your depression survival kit and update it as needed  3. Follow your physician s recommendations including any medication.  4. Do not stop taking medication unless you consult with your physician first.           YELLOW         ZONE Getting Worse    What it looks like:     Depression is starting to interfere with your life.     It may be hard to get out of bed; you may be starting to isolate yourself from others.    Symptoms of depression are starting to last most all day and this has happened for several days.     You may have suicidal thoughts but they are not constant.   What you need to do:     1. Call your care team, your response to treatment will improve if you keep your care team informed of your progress. Yellow periods are signs an adjustment may need to be made.     2. Continue your self-care, even if you have to fake it!    3. Talk to someone in your support network    4. Open up your depression survival kit           RED    ZONE Medical Alert - Get Help    What it looks like:     Depression is seriously interfering with your life.     You may experience these or other symptoms: You can t get out of bed most days, can t work or engage in other necessary activities, you have trouble taking care of basic hygiene, or basic responsibilities, thoughts of suicide or death that will not go away, self-injurious behavior.     What you need to do:  1. Call your care team and  request a same-day appointment. If they are not available (weekends or after hours) call your local crisis line, emergency room or 911.            Depression Self Care Plan / Survival Kit    Self-Care for Depression  Here s the deal. Your body and mind are really not as separate as most people think.  What you do and think affects how you feel and how you feel influences what you do and think. This means if you do things that people who feel good do, it will help you feel better.  Sometimes this is all it takes.  There is also a place for medication and therapy depending on how severe your depression is, so be sure to consult with your medical provider and/ or Behavioral Health Consultant if your symptoms are worsening or not improving.     In order to better manage my stress, I will:    Exercise  Get some form of exercise, every day. This will help reduce pain and release endorphins, the  feel good  chemicals in your brain. This is almost as good as taking antidepressants!  This is not the same as joining a gym and then never going! (they count on that by the way ) It can be as simple as just going for a walk or doing some gardening, anything that will get you moving.      Hygiene   Maintain good hygiene (Get out of bed in the morning, Make your bed, Brush your teeth, Take a shower, and Get dressed like you were going to work, even if you are unemployed).  If your clothes don't fit try to get ones that do.    Diet  I will strive to eat foods that are good for me, drink plenty of water, and avoid excessive sugar, caffeine, alcohol, and other mood-altering substances.  Some foods that are helpful in depression are: complex carbohydrates, B vitamins, flaxseed, fish or fish oil, fresh fruits and vegetables.    Psychotherapy  I agree to participate in Individual Therapy (if recommended).    Medication  If prescribed medications, I agree to take them.  Missing doses can result in serious side effects.  I understand that  drinking alcohol, or other illicit drug use, may cause potential side effects.  I will not stop my medication abruptly without first discussing it with my provider.    Staying Connected With Others  I will stay in touch with my friends, family members, and my primary care provider/team.    Use your imagination  Be creative.  We all have a creative side; it doesn t matter if it s oil painting, sand castles, or mud pies! This will also kick up the endorphins.    Witness Beauty  (AKA stop and smell the roses) Take a look outside, even in mid-winter. Notice colors, textures. Watch the squirrels and birds.     Service to others  Be of service to others.  There is always someone else in need.  By helping others we can  get out of ourselves  and remember the really important things.  This also provides opportunities for practicing all the other parts of the program.    Humor  Laugh and be silly!  Adjust your TV habits for less news and crime-drama and more comedy.    Control your stress  Try breathing deep, massage therapy, biofeedback, and meditation. Find time to relax each day.     My support system    Clinic Contact:  Phone number:    Contact 1:  Phone number:    Contact 2:  Phone number:    [employer]/:  Phone number:    Therapist:  Phone number:    Local crisis center:    Phone number:    Other community support:  Phone number:

## 2019-04-04 NOTE — PROGRESS NOTES
SUBJECTIVE:   Annie Garcia is a 48 year old female who presents to clinic today for the following health issues:      Musculoskeletal problem/pain      Duration: 4 years  Wants referral to Podiatry.      Description  Location: right ankle    Intensity:  3/10    Accompanying signs and symptoms: swelling and pain, difficulty walking    History  Previous similar problem: yes  Previous evaluation:  x-ray and MRI    Precipitating or alleviating factors:  Trauma or overuse: no   Aggravating factors include: walking    Therapies tried and outcome: rest/inactivity    Cystic Fibrosis:  Sees Dr. Herman  On Xopenix, Pulmocort,  Mucomyst 20% when needed. Tylenol #3,  VIbramycin  Insomnia: On Ambien  Chronic Eosinophilic  Sinusitis: Had sinus surgery with Dr. Gonzalez. States condition has improved greatly.    GERD: On Dexilant, and has Carafate to take if needed. Sees Gastroenterology.       Problem list and histories reviewed & adjusted, as indicated.  Additional history: as documented    Patient Active Problem List   Diagnosis     Cystic fibrosis (H)     Depression     Tear of right acetabular labrum     Dyspepsia and other specified disorders of function of stomach     GERD (gastroesophageal reflux disease)     Facial rash     Hyperlipidemia with target LDL less than 130     Mixed anxiety depressive disorder     Insomnia     Osteoarthritis of hip     Acute bronchitis     Pneumonia due to infectious organism, unspecified laterality, unspecified part of lung     Morbid obesity (H)     Eosinophilia     S/P FESS (functional endoscopic sinus surgery)     Chronic eosinophilic sinusitis     Allergic fungal sinusitis, not active     Past Surgical History:   Procedure Laterality Date     BREAST SURGERY       ENDOSCOPIC SINUS SURGERY N/A 9/5/2018    Procedure: ENDOSCOPIC SINUS SURGERY;;  Surgeon: Francoise Gonzalez MD;  [facility]     ENDOSCOPY UPPER, COLONOSCOPY, COMBINED N/A 11/18/2016    Procedure:  COMBINED ENDOSCOPY UPPER, COLONOSCOPY;  Surgeon: Levi Hinkle MD;  Location: [facility]     ESOPHAGOSCOPY, GASTROSCOPY, DUODENOSCOPY (EGD), COMBINED  1/10/2014    Procedure: COMBINED ESOPHAGOSCOPY, GASTROSCOPY, DUODENOSCOPY (EGD);  UPPER ENDOSCOPY with Biopsy;  Surgeon: Levi Hinkle MD;  [facility]     EXAM UNDER ANESTHESIA NOSE N/A 9/5/2018    Procedure: EXAM UNDER ANESTHESIA NOSE;  SINUS EXAM UNDER ANESTHESIA, Endoscopic Sinus Surgery;  Surgeon: Francoise Gonzalez MD;  Location: [facility]     GYN SURGERY  2008    ablation     ORTHOPEDIC SURGERY  1994    left knee arthroscopy     ORTHOPEDIC SURGERY  1994    right shoulder     ORTHOPEDIC SURGERY  2014    left hip replacement     SEPTOPLASTY, ENDOSCOPIC SINUS SURGERY, COMBINED Left 2/21/2018    Procedure: COMBINED SEPTOPLASTY, ENDOSCOPIC SINUS SURGERY;  LEFT ENDOSCOPIC SINUS SURGERY, SEPTOPLASTY, BILATERAL TURBINATE REDUCTION, PROPEL IMPLANTS;  Surgeon: Francoise Gonzalez MD;  [facility]     TURBINOPLASTY Bilateral 2/21/2018    Procedure: TURBINOPLASTY;;  Surgeon: Francoise Gonzalez MD;  [facility]       Social History     Tobacco Use     Smoking status: Never Smoker     Smokeless tobacco: Never Used   Substance Use Topics     Alcohol use: Yes     Comment: rare     Family History   Problem Relation Age of Onset     Diabetes Mother      Hypertension Mother      Thyroid Disease Mother      Thyroid Disease Sister      Dementia Sister          Current Outpatient Medications   Medication Sig Dispense Refill     acetaminophen-codeine (TYLENOL #3) 300-30 MG tablet TAKE 1 TABLET BY MOUTH EVERY 4 HOURS AS NEEDED FOR MILD PAIN. TAKE FOR COUGHING PAIN 90 tablet 0     acetylcysteine (MUCOMYST) 20 % nebulizer solution Take by nebulization every 8 hours 5-10 ml inhalation every 8 hours       [START ON 4/15/2019] AMBIEN 10 MG tablet Take 1 tablet (10 mg) by mouth nightly as needed 30 tablet 5     budesonide (PULMICORT) 0.5 MG/2ML neb solution Squirt entire vial  into previously made amanda med saline bottle, mix, and irrigate each nostril until entire bottle empty.  Do this once a day 3 Box 11     clotrimazole 10 MG behzad 1 behzad Mouth/Throat 3 times a day       COMPOUNDED NON-CONTROLLED SUBSTANCE (CMPD RX) - PHARMACY TO MIX COMPOUNDED MEDICATION Irrigate bilateral nares with 240 ML Twice Daily for 4 Weeks. 4 Bottle 11     dexlansoprazole (DEXILANT) 60 MG CPDR CR capsule Take 1 capsule (60 mg) by mouth daily as needed 30 capsule 3     doxycycline (VIBRA-TABS) 100 MG tablet Take 100 mg by mouth 2 times daily Takes one tablet daily prophylaxis.       fluconazole (DIFLUCAN) 100 MG tablet TAKE 1 TABLET BY MOUTH DAILY AS NEEDED 90 tablet 0     HYDROcodone-acetaminophen (NORCO) 5-325 MG per tablet Take 1 tablet by mouth every 4 hours as needed for pain 18 tablet 0     hydrOXYzine (VISTARIL) 50 MG capsule Take 1 capsule (50 mg) by mouth every 4 hours as needed for anxiety 180 capsule 3     levalbuterol (XOPENEX) 0.31 MG/3ML nebulizer solution Take 3 mLs (0.31 mg) by nebulization every 6 hours as needed for shortness of breath / dyspnea 225 mL 0     mometasone (NASONEX) 50 MCG/ACT spray Spray 2 sprays into both nostrils daily 3 Box 11     PULMOZYME 1 MG/ML neb solution USE 1 VIAL IN NEBULIZER DAILY AS NEEDED 75 mL 0     sucralfate (CARAFATE) 1 GM tablet TAKE 1 TABLET BY MOUTH FOUR TIMES A DAY AS NEEDED 120 tablet 3     sulfamethoxazole-trimethoprim (BACTRIM DS/SEPTRA DS) 800-160 MG per tablet Take 1 tablet by mouth 2 times daily 60 tablet 11     WELLBUTRIN  MG 12 hr tablet TAKE 2 TABLETS BY MOUTH EVERY MORNING AND 1 TABLET EVERY AFTERNOON 90 tablet 6     Allergies   Allergen Reactions     Seasonal Allergies Itching       Reviewed and updated as needed this visit by clinical staff       Reviewed and updated as needed this visit by Provider         Review of Systems   Constitutional: Negative for fatigue and fever.   HENT: Negative for congestion, ear pain and postnasal drip.   "       Had surgery on sinuses. Doing well.     Respiratory: Positive for shortness of breath. Negative for cough.         Has Cystic Fibrosis. Sees Dr. Herman.   .   Cardiovascular: Negative for chest pain, palpitations and leg swelling.   Gastrointestinal: Negative for constipation, diarrhea, nausea and vomiting.   Genitourinary: Negative for difficulty urinating.   Musculoskeletal: Positive for arthralgias and myalgias.   Skin: Positive for rash.   Neurological: Negative for dizziness and headaches.   Psychiatric/Behavioral: Positive for dysphoric mood.        Upset about current home situation.     /80   Pulse 99   Temp 98.5  F (36.9  C) (Tympanic)   Resp 18   Ht 1.727 m (5' 8\")   Wt 99.8 kg (220 lb)   SpO2 99%   BMI 33.45 kg/m    Physical Exam   Constitutional: She appears well-developed and well-nourished. No distress.   HENT:   Right Ear: External ear normal.   Left Ear: External ear normal.   Nose: Nose normal.   Mouth/Throat: Oropharynx is clear and moist.   Eyes: Pupils are equal, round, and reactive to light. Conjunctivae and EOM are normal.   Neck: Normal range of motion. Neck supple. No thyromegaly present.   Cardiovascular: Normal rate, regular rhythm, normal heart sounds and intact distal pulses.   No murmur heard.  Pulmonary/Chest: Effort normal and breath sounds normal.   Has Cystic Fibrosis     Lymphadenopathy:     She has no cervical adenopathy.     Results for orders placed or performed during the hospital encounter of 03/07/19   XR Ankle Right 2 Views    Narrative    PROCEDURE: XR ANKLE RT 2 VW 3/7/2019 12:34 PM    HISTORY: Swelling of limb    COMPARISONS: None.    TECHNIQUE: 2 views.    FINDINGS: No acute fracture or dislocation is seen. Ankle mortise  appears congruent and there is no significant soft tissue swelling.    There is a tiny plantar calcaneal spur.         Impression    IMPRESSION: No acute abnormality.    HAYEDE DE LOS SANTOS MD     Lab Results   Component Value Date "    WBC 10.1 08/30/2018     Lab Results   Component Value Date    RBC 4.82 08/30/2018     Lab Results   Component Value Date    HGB 13.9 08/30/2018     Lab Results   Component Value Date    HCT 40.1 08/30/2018     No components found for: MCT  Lab Results   Component Value Date    MCV 83 08/30/2018     Lab Results   Component Value Date    MCH 28.8 08/30/2018     Lab Results   Component Value Date    MCHC 34.7 08/30/2018     Lab Results   Component Value Date    RDW 13.5 08/30/2018     Lab Results   Component Value Date     08/30/2018     Recent Labs   Lab Test 12/11/18  1452 11/26/18  1353    139   POTASSIUM 3.9 4.2   CHLORIDE 102 105   CO2 25 27   ANIONGAP 7 7   GLC 88 79   BUN 16 15   CR 1.19* 0.96   NALDO 9.0 9.5       ASSESSMENT / PLAN:  (S93.021S) Tear of talofibular ligament of right lower extremity, sequela  (primary encounter diagnosis)  Comment: On MRI shows tear of talofibulary ligament  Want to get fixed. Referred to Dr. Zuniga.    Plan: PODIATRY/FOOT & ANKLE SURGERY REFERRAL      (F41.8) Mixed anxiety depressive disorder  Comment: On Wellbutrin  SR 150mg. GOing through tough marital problems at this time    Plan: Wellbutrin      (K21.9) Gastroesophageal reflux disease without esophagitis  Comment:On Dexilant  , Has Carafate  Plan: Sees Gastroenterology      (E84.9) Cystic fibrosis (H)  CommentOn Pulmicort, Xopenix, Doxycycline 100mg 1 times a day,prophyllactic,  Has Mucinex. Has been on Tylenol #3 for years to control cough    Plan:Sees Pulmonology

## 2019-04-08 ASSESSMENT — ENCOUNTER SYMPTOMS: DYSPHORIC MOOD: 1

## 2019-04-09 ENCOUNTER — OFFICE VISIT (OUTPATIENT)
Dept: PODIATRY | Facility: OTHER | Age: 49
End: 2019-04-09
Attending: PODIATRIST
Payer: COMMERCIAL

## 2019-04-09 VITALS
DIASTOLIC BLOOD PRESSURE: 84 MMHG | SYSTOLIC BLOOD PRESSURE: 122 MMHG | HEART RATE: 85 BPM | OXYGEN SATURATION: 99 % | TEMPERATURE: 98.2 F

## 2019-04-09 DIAGNOSIS — E66.01 MORBID OBESITY (H): ICD-10-CM

## 2019-04-09 DIAGNOSIS — M76.821 POSTERIOR TIBIAL TENDON DYSFUNCTION (PTTD) OF RIGHT LOWER EXTREMITY: Primary | ICD-10-CM

## 2019-04-09 DIAGNOSIS — E84.9 CYSTIC FIBROSIS (H): ICD-10-CM

## 2019-04-09 DIAGNOSIS — F41.8 MIXED ANXIETY DEPRESSIVE DISORDER: ICD-10-CM

## 2019-04-09 DIAGNOSIS — M21.42 PES PLANUS OF BOTH FEET: ICD-10-CM

## 2019-04-09 DIAGNOSIS — M79.671 RIGHT FOOT PAIN: ICD-10-CM

## 2019-04-09 DIAGNOSIS — M21.41 PES PLANUS OF BOTH FEET: ICD-10-CM

## 2019-04-09 PROCEDURE — 99203 OFFICE O/P NEW LOW 30 MIN: CPT | Performed by: PODIATRIST

## 2019-04-09 NOTE — PROGRESS NOTES
Chief complaint: Patient presents with:  Foot Problems      History of Present Illness: This 48 year old female with mixed anxiety depressive disorder and CF is seen at the request of Todd Torres for evaluation and suggestions of management of RIGHT medial ankle pain. Pain started around 2016. She thinks her pain suddenly started one day, but she does not recall a particular trauma. She was on Levaquin for her lungs, and the pain started around that time so she is thinking the Levaquin may have contributed to her pain. She is not currently on Levaquin and she tries to stay away from it. She saw Dr. Cid in the past, but she said he only mentioned a wedge placed in the heel and she thought she needed more procedures. She went through physical therapy and she has tried an AFO, but she no pain relief from either. She went to a physician in the [facility] within the past year who mentioned a heel procedure, posterior tibial tendon repair, and other procedures for a full flat foot reconstruction. She wanted this procedure, but she didn't want to have to travel to the [facility] to have this done. She is here for her third opinion and she says she wants surgery immediately because she has difficulty walking due to her foot pain. Lastly, she has a rash that has been present around the distal medial RIGHT leg for about a month. She applies Hydrocortsone cream every few days, she has tried Triamcinolone cream, and Eucerin cream without success. The rash is itchy and she thinks it may be due to her swelling in her RIGHT ankle from her injury. No further pedal complaints today.     /84   Pulse 85   Temp 98.2  F (36.8  C)   SpO2 99%     Patient Active Problem List   Diagnosis     Cystic fibrosis (H)     Depression     Tear of right acetabular labrum     Dyspepsia and other specified disorders of function of stomach     GERD (gastroesophageal reflux disease)     Facial rash     Hyperlipidemia with target LDL less  than 130     Mixed anxiety depressive disorder     Insomnia     Osteoarthritis of hip     Acute bronchitis     Pneumonia due to infectious organism, unspecified laterality, unspecified part of lung     Morbid obesity (H)     Eosinophilia     S/P FESS (functional endoscopic sinus surgery)     Chronic eosinophilic sinusitis     Allergic fungal sinusitis, not active       Past Surgical History:   Procedure Laterality Date     BREAST SURGERY       ENDOSCOPIC SINUS SURGERY N/A 9/5/2018    Procedure: ENDOSCOPIC SINUS SURGERY;;  Surgeon: Francoise Gonzalez MD;  [facility]     ENDOSCOPY UPPER, COLONOSCOPY, COMBINED N/A 11/18/2016    Procedure: COMBINED ENDOSCOPY UPPER, COLONOSCOPY;  Surgeon: Levi Hinkle MD;  [facility]     ESOPHAGOSCOPY, GASTROSCOPY, DUODENOSCOPY (EGD), COMBINED  1/10/2014    Procedure: COMBINED ESOPHAGOSCOPY, GASTROSCOPY, DUODENOSCOPY (EGD);  UPPER ENDOSCOPY with Biopsy;  Surgeon: Levi Hinkle MD;  [facility]     EXAM UNDER ANESTHESIA NOSE N/A 9/5/2018    Procedure: EXAM UNDER ANESTHESIA NOSE;  SINUS EXAM UNDER ANESTHESIA, Endoscopic Sinus Surgery;  Surgeon: Francoise Gonzalez MD;  [facility]     GYN SURGERY  2008    ablation     ORTHOPEDIC SURGERY  1994    left knee arthroscopy     ORTHOPEDIC SURGERY  1994    right shoulder     ORTHOPEDIC SURGERY  2014    left hip replacement     SEPTOPLASTY, ENDOSCOPIC SINUS SURGERY, COMBINED Left 2/21/2018    Procedure: COMBINED SEPTOPLASTY, ENDOSCOPIC SINUS SURGERY;  LEFT ENDOSCOPIC SINUS SURGERY, SEPTOPLASTY, BILATERAL TURBINATE REDUCTION, PROPEL IMPLANTS;  Surgeon: Francoise Gonzalez MD;  [facility]     TURBINOPLASTY Bilateral 2/21/2018    Procedure: TURBINOPLASTY;;  Surgeon: Francoise Gonzalez MD;  [facility]       Current Outpatient Medications   Medication     acetaminophen-codeine (TYLENOL #3) 300-30 MG tablet     acetylcysteine (MUCOMYST) 20 % nebulizer solution     [START ON 4/15/2019] AMBIEN 10 MG tablet      budesonide (PULMICORT) 0.5 MG/2ML neb solution     Cefuroxime Axetil (CEFTIN PO)     clotrimazole 10 MG behzad     COMPOUNDED NON-CONTROLLED SUBSTANCE (CMPD RX) - PHARMACY TO MIX COMPOUNDED MEDICATION     dexlansoprazole (DEXILANT) 60 MG CPDR CR capsule     fluconazole (DIFLUCAN) 100 MG tablet     HYDROcodone-acetaminophen (NORCO) 5-325 MG per tablet     hydrOXYzine (VISTARIL) 50 MG capsule     levalbuterol (XOPENEX) 0.31 MG/3ML nebulizer solution     mometasone (NASONEX) 50 MCG/ACT spray     PULMOZYME 1 MG/ML neb solution     sucralfate (CARAFATE) 1 GM tablet     WELLBUTRIN  MG 12 hr tablet     sulfamethoxazole-trimethoprim (BACTRIM DS/SEPTRA DS) 800-160 MG per tablet     No current facility-administered medications for this visit.           Allergies   Allergen Reactions     Seasonal Allergies Itching       Family History   Problem Relation Age of Onset     Diabetes Mother      Hypertension Mother      Thyroid Disease Mother      Thyroid Disease Sister      Dementia Sister        Social History     Socioeconomic History     Marital status:      Spouse name: Not on file     Number of children: Not on file     Years of education: Not on file     Highest education level: Not on file   Occupational History     Not on file   Social Needs     Financial resource strain: Not on file     Food insecurity:     Worry: Not on file     Inability: Not on file     Transportation needs:     Medical: Not on file     Non-medical: Not on file   Tobacco Use     Smoking status: Never Smoker     Smokeless tobacco: Never Used   Substance and Sexual Activity     Alcohol use: Yes     Comment: rare     Drug use: No     Sexual activity: Not on file   Lifestyle     Physical activity:     Days per week: Not on file     Minutes per session: Not on file     Stress: Not on file   Relationships     Social connections:     Talks on phone: Not on file     Gets together: Not on file     Attends Congregational service: Not on file     Active  member of club or organization: Not on file     Attends meetings of clubs or organizations: Not on file     Relationship status: Not on file     Intimate partner violence:     Fear of current or ex partner: Not on file     Emotionally abused: Not on file     Physically abused: Not on file     Forced sexual activity: Not on file   Other Topics Concern     Parent/sibling w/ CABG, MI or angioplasty before 65F 55M? Not Asked   Social History Narrative     Not on file     Review of Systems   Constitutional: Negative for fatigue and fever.   HENT: Negative for congestion. H/o sinus surgery x 2 in 2018--no complications.  Respiratory: Postivie for CF (Followed by Dr. Herman). Positive for SOB (she says this is usual for her). Neative for cough or wheezing.    Cardiovascular / Derm / Msk: Positive for LOWER EXTREMITY edema in RIGHT leg, palpitations.  Musculoskeletal: Positive arthralgias and myalgias.  Skin: Positive for skin rashes on RIGHT leg.   Psychiatric/Behavioral: Positive for anxiety.  EXAM  Constitutional: healthy, alert and no distress    Psychiatric: mentation appears normal and affect normal/bright    VASCULAR:  -Dorsalis pedis pulse +2/4 b/l  -Posterior tibial pulse +1/4 b/l  -Capillary refill time < 3 seconds to b/l hallux  -Mild non-pitting edema to bilateral feet and ankles -- edema is equal to both extremities  NEURO:  -Light touch sensation intact to b/l plantar forefoot  DERM:  -Mild, raised, red nodules to the distal 1/4 of the medial leg  -Skin temperature, texture and turgor WNL b/l  MSK:  -Mild ain on palpation to posterior tibial tendon from just posterior to the medial malleolus and extending distally to the navicular  -Muscle strength of ankles +5/5 for dorsiflexion, plantarflexion, ABDUction and ADDuction b/l with minimal pain against resistance in all directions  -RCSP 2 degrees valgus bilaterally   -Abductory gait (R>L) and forefoot abduction to RIGHT foot while standing  -Ankle joint  passive ROM within normal limits except for dorsiflexion:    Dorsiflexion, RIGHT Straight knee +5 degrees    Dorsiflexion, LEFT Straight knee +5 degrees  -Moderate bilateral medial arch collapse with no remaining arch upon WB  RIGHT ANKLE RADIOGRAPH 03/07/2019                                       IMPRESSION: No acute abnormality.  HAYDEE DE LOS SANTOS MD  RIGHT ANKLE MRI 01/18/2017  IMPRESSION:  1.  LONGITUDINAL TEAR INVOLVING THE POSTERIOR TIBIALIS TENDON MEASURES APPROXIMATELY 2.0 X 2.5 CM IN LENGTH, AND EXTENDS DISTALLY BEYOND THE POSTERIOR MARGIN OF THE MEDIAL MALLEOLUS.  THIS SPLIT IS ALSO ASSOCIATED WITH MULTILOBULATED GANGLION CYST THAT MEASURES APPROXIMATELY 8 X 12 X 15 MM IN SIZE ALONG THE VENTRAL MARGIN OF THE TENDON.  ADDITIONALLY, FLUID ALONG THE TENDON SUGGESTS TENOSYNOVITIS WITH MILD EDEMA/INFLAMMATION INVOLVING THE POSTERIOR TIBIALIS MUSCLE AT THE MYOTENDINOUS JUNCTION.  2.  CHRONIC THINNING INVOLVING THE TIBIAL ATTACHMENT OF THE DISTAL TIBIOFIBULAR LIGAMENT SUGGESTING A REMOTE HISTORY OF PARTIAL TEAR.   3.  PARTIAL TEAR, LIKELY CHRONIC INVOLVING THE POSTERIOR ASPECT OF THE DELTOID LIGAMENT INVOLVING BOTH THE DEEP AND SUPERFICIAL ASPECTS.  RIGHT ANKLE MRI 03/07/2019  Impression: Longitudinal splitting of the tibialis posterior tendon with an appearance very similar to the prior exam. There is still some fluid around the tendon but no tendon disruption or retraction is seen.  HAYDEE DE LOS SANTOS MD    ============================================================    ASSESSMENT:  (M76.821) Posterior tibial tendon dysfunction (PTTD) of right lower extremity  (primary encounter diagnosis)    (M79.671) Right foot pain    (M21.41,  M21.42) Pes planus of both feet    (E84.9) Cystic fibrosis (H)    (F41.8) Mixed anxiety depressive disorder    (E66.01) Morbid obesity (H)      PLAN:  -Patient evaluated and examined. Treatment options discussed with no educational barriers noted.  -Patient presents stating she has  severe RIGHT medial ankle pain from an injury that has been present for three years. She had mild pain today with her examination. This is her third opinion. Discussed with patient that in order to surgically correct the posterior tibial tendon tear, a flat foot reconstruction should be performed in order to prevent a re-rupture of the tendon. She already saw Dr. Cid locally (about three years ago) who recommended a heel wedge surgery per the patient. The physician in the [facility] recommended multiple procedures. It is recommended she follow-up with Dr. Cid again or another another podiatrist who has more experience performing flat foot reconstruction surery for the most optimal post-op results. She is encouraged to discuss surgical options again with Dr. Cid since she saw him initially for this injury.  -Patient in agreement with the above treatment plan and all of patient's questions were answered.      RTC as needed        Linda Zuniga DPM

## 2019-04-16 NOTE — PROGRESS NOTES
SUBJECTIVE:   Annie Garcia is a 48 year old female who presents to clinic today for the following   health issues:      New Patient/Transfer of Care    Annie presents today to discuss transition to my practice.  She sees Todd Torres and plans to see her until she is done but wants to visit today to see if I am willing to accept her as a patient when that time comes.  Annie has a history of CF with recurrent upper respiratory and sinus infections.  She does not have any history of type 1 Diabetes.  She was diagnosed with her CF at age 39.  There is some question as to whether her father had CF also.  Annie currently is being evaluated for repeat surgery on her right foot due to  Tibialis tear.      Additional history: as documented    Reviewed  and updated as needed this visit by clinical staff         Reviewed and updated as needed this visit by Provider         Patient Active Problem List   Diagnosis     Cystic fibrosis (H)     Depression     Tear of right acetabular labrum     Dyspepsia and other specified disorders of function of stomach     GERD (gastroesophageal reflux disease)     Facial rash     Hyperlipidemia with target LDL less than 130     Mixed anxiety depressive disorder     Insomnia     Osteoarthritis of hip     Acute bronchitis     Pneumonia due to infectious organism, unspecified laterality, unspecified part of lung     Morbid obesity (H)     Eosinophilia     S/P FESS (functional endoscopic sinus surgery)     Chronic eosinophilic sinusitis     Allergic fungal sinusitis, not active     Past Surgical History:   Procedure Laterality Date     BREAST SURGERY       ENDOSCOPIC SINUS SURGERY N/A 9/5/2018    Procedure: ENDOSCOPIC SINUS SURGERY;;  Surgeon: Francoise Gonzalez MD;  [facility]     ENDOSCOPY UPPER, COLONOSCOPY, COMBINED N/A 11/18/2016    Procedure: COMBINED ENDOSCOPY UPPER, COLONOSCOPY;  Surgeon: Levi Hinkle MD;  Location: [facility]     ESOPHAGOSCOPY, GASTROSCOPY, DUODENOSCOPY  (EGD), COMBINED  1/10/2014    Procedure: COMBINED ESOPHAGOSCOPY, GASTROSCOPY, DUODENOSCOPY (EGD);  UPPER ENDOSCOPY with Biopsy;  Surgeon: Levi Hinkle MD;  [facility]     EXAM UNDER ANESTHESIA NOSE N/A 9/5/2018    Procedure: EXAM UNDER ANESTHESIA NOSE;  SINUS EXAM UNDER ANESTHESIA, Endoscopic Sinus Surgery;  Surgeon: Francoise Gonzalez MD;  [facility]     GYN SURGERY  2008    ablation     ORTHOPEDIC SURGERY  1994    left knee arthroscopy     ORTHOPEDIC SURGERY  1994    right shoulder     ORTHOPEDIC SURGERY  2014    left hip replacement     SEPTOPLASTY, ENDOSCOPIC SINUS SURGERY, COMBINED Left 2/21/2018    Procedure: COMBINED SEPTOPLASTY, ENDOSCOPIC SINUS SURGERY;  LEFT ENDOSCOPIC SINUS SURGERY, SEPTOPLASTY, BILATERAL TURBINATE REDUCTION, PROPEL IMPLANTS;  Surgeon: Francoise Gonzalez MD;  [facility]     TURBINOPLASTY Bilateral 2/21/2018    Procedure: TURBINOPLASTY;;  Surgeon: Francoise Gonzalez MD;  [facility]       Social History     Tobacco Use     Smoking status: Never Smoker     Smokeless tobacco: Never Used   Substance Use Topics     Alcohol use: Yes     Comment: rare     Family History   Problem Relation Age of Onset     Diabetes Mother      Hypertension Mother      Thyroid Disease Mother      Thyroid Disease Sister      Dementia Sister          Current Outpatient Medications   Medication Sig Dispense Refill     acetaminophen-codeine (TYLENOL #3) 300-30 MG tablet TAKE 1 TABLET BY MOUTH EVERY 4 HOURS AS NEEDED FOR MILD PAIN. TAKE FOR COUGHING PAIN 90 tablet 0     acetylcysteine (MUCOMYST) 20 % nebulizer solution Take by nebulization every 8 hours 5-10 ml inhalation every 8 hours       AMBIEN 10 MG tablet Take 1 tablet (10 mg) by mouth nightly as needed 30 tablet 5     budesonide (PULMICORT) 0.5 MG/2ML neb solution Squirt entire vial into previously made amanda med saline bottle, mix, and irrigate each nostril until entire bottle empty.  Do this once a day 3 Box 11     Cefuroxime Axetil  (CEFTIN PO) Take by mouth 2 times daily       clotrimazole 10 MG behzad 1 behzad Mouth/Throat 3 times a day       COMPOUNDED NON-CONTROLLED SUBSTANCE (CMPD RX) - PHARMACY TO MIX COMPOUNDED MEDICATION Irrigate bilateral nares with 240 ML Twice Daily for 4 Weeks. 4 Bottle 11     dexlansoprazole (DEXILANT) 60 MG CPDR CR capsule Take 1 capsule (60 mg) by mouth daily as needed 30 capsule 3     fluconazole (DIFLUCAN) 100 MG tablet TAKE 1 TABLET BY MOUTH DAILY AS NEEDED 90 tablet 0     HYDROcodone-acetaminophen (NORCO) 5-325 MG per tablet Take 1 tablet by mouth every 4 hours as needed for pain 18 tablet 0     hydrOXYzine (VISTARIL) 50 MG capsule Take 1 capsule (50 mg) by mouth every 4 hours as needed for anxiety 180 capsule 3     levalbuterol (XOPENEX) 0.31 MG/3ML nebulizer solution Take 3 mLs (0.31 mg) by nebulization every 6 hours as needed for shortness of breath / dyspnea 225 mL 0     mometasone (NASONEX) 50 MCG/ACT spray Spray 2 sprays into both nostrils daily 3 Box 11     PULMOZYME 1 MG/ML neb solution USE 1 VIAL IN NEBULIZER DAILY AS NEEDED 75 mL 0     sucralfate (CARAFATE) 1 GM tablet TAKE 1 TABLET BY MOUTH FOUR TIMES A DAY AS NEEDED 120 tablet 3     sulfamethoxazole-trimethoprim (BACTRIM DS/SEPTRA DS) 800-160 MG per tablet Take 1 tablet by mouth 2 times daily 60 tablet 11     WELLBUTRIN  MG 12 hr tablet TAKE 2 TABLETS BY MOUTH EVERY MORNING AND 1 TABLET EVERY AFTERNOON 90 tablet 6     Allergies   Allergen Reactions     Seasonal Allergies Itching     BP Readings from Last 3 Encounters:   04/17/19 110/74   04/09/19 122/84   04/04/19 130/80    Wt Readings from Last 3 Encounters:   04/17/19 100.7 kg (222 lb)   04/04/19 99.8 kg (220 lb)   03/27/19 98.9 kg (218 lb)                    ROS:  Constitutional, HEENT, cardiovascular, pulmonary, gi and gu systems are negative, except as otherwise noted.    OBJECTIVE:     /74   Pulse 79   Temp 97.6  F (36.4  C) (Tympanic)   Wt 100.7 kg (222 lb)   SpO2 98%   BMI  33.75 kg/m    Body mass index is 33.75 kg/m .   GENERAL: Alert and no distress  EYES: Eyes grossly normal to inspection, PERRL and conjunctivae and sclerae normal  HENT: ear canals and TM's normal, nose and mouth without ulcers or lesions  RESP: lungs clear to auscultation - no rales, rhonchi or wheezes  CV: regular rate and rhythm, normal S1 S2, no S3 or S4, no murmur, click or rub, no peripheral edema and peripheral pulses strong  MS: + 1 RLE edema   SKIN: Subtle rash around lips and right ankle   NEURO: Normal strength and tone, mentation intact and speech normal  PSYCH: mentation appears normal, affect normal/bright    Diagnostic Test Results:  No results found for this or any previous visit (from the past 24 hour(s)).  Results for orders placed or performed during the hospital encounter of 03/07/19   XR Ankle Right 2 Views    Narrative    PROCEDURE: XR ANKLE RT 2 VW 3/7/2019 12:34 PM    HISTORY: Swelling of limb    COMPARISONS: None.    TECHNIQUE: 2 views.    FINDINGS: No acute fracture or dislocation is seen. Ankle mortise  appears congruent and there is no significant soft tissue swelling.    There is a tiny plantar calcaneal spur.         Impression    IMPRESSION: No acute abnormality.    HAYDEE DE LOS SANTOS MD       ASSESSMENT/PLAN:     Problem List Items Addressed This Visit        Respiratory    Cystic fibrosis (H) - Primary       Endocrine    Hyperlipidemia with target LDL less than 130           Today I did agree to assume her care once Todd Torres retires.      Petar Salgado, DO  [facility]

## 2019-04-17 ENCOUNTER — OFFICE VISIT (OUTPATIENT)
Dept: INTERNAL MEDICINE | Facility: OTHER | Age: 49
End: 2019-04-17
Attending: INTERNAL MEDICINE
Payer: COMMERCIAL

## 2019-04-17 VITALS
TEMPERATURE: 97.6 F | BODY MASS INDEX: 33.75 KG/M2 | DIASTOLIC BLOOD PRESSURE: 74 MMHG | HEART RATE: 79 BPM | WEIGHT: 222 LBS | SYSTOLIC BLOOD PRESSURE: 110 MMHG | OXYGEN SATURATION: 98 %

## 2019-04-17 DIAGNOSIS — E78.5 HYPERLIPIDEMIA WITH TARGET LDL LESS THAN 130: ICD-10-CM

## 2019-04-17 DIAGNOSIS — E84.9 CYSTIC FIBROSIS (H): Primary | ICD-10-CM

## 2019-04-17 PROCEDURE — 99214 OFFICE O/P EST MOD 30 MIN: CPT | Performed by: INTERNAL MEDICINE

## 2019-04-17 ASSESSMENT — ANXIETY QUESTIONNAIRES
5. BEING SO RESTLESS THAT IT IS HARD TO SIT STILL: NOT AT ALL
6. BECOMING EASILY ANNOYED OR IRRITABLE: NOT AT ALL
GAD7 TOTAL SCORE: 0
7. FEELING AFRAID AS IF SOMETHING AWFUL MIGHT HAPPEN: NOT AT ALL
1. FEELING NERVOUS, ANXIOUS, OR ON EDGE: NOT AT ALL
2. NOT BEING ABLE TO STOP OR CONTROL WORRYING: NOT AT ALL
3. WORRYING TOO MUCH ABOUT DIFFERENT THINGS: NOT AT ALL
4. TROUBLE RELAXING: NOT AT ALL

## 2019-04-17 ASSESSMENT — PATIENT HEALTH QUESTIONNAIRE - PHQ9: SUM OF ALL RESPONSES TO PHQ QUESTIONS 1-9: 0

## 2019-04-17 ASSESSMENT — PAIN SCALES - GENERAL: PAINLEVEL: SEVERE PAIN (6)

## 2019-04-17 NOTE — LETTER
My Depression Action Plan  Name: Annie Garcia   Date of Birth 1970  Date: 4/17/2019    My doctor: Todd Torres   My clinic: [facility]  [address]  [place] [id number] [phone]          GREEN    ZONE   Good Control    What it looks like:     Things are going generally well. You have normal up s and down s. You may even feel depressed from time to time, but bad moods usually last less than a day.   What you need to do:  1. Continue to care for yourself (see self care plan)  2. Check your depression survival kit and update it as needed  3. Follow your physician s recommendations including any medication.  4. Do not stop taking medication unless you consult with your physician first.           YELLOW         ZONE Getting Worse    What it looks like:     Depression is starting to interfere with your life.     It may be hard to get out of bed; you may be starting to isolate yourself from others.    Symptoms of depression are starting to last most all day and this has happened for several days.     You may have suicidal thoughts but they are not constant.   What you need to do:     1. Call your care team, your response to treatment will improve if you keep your care team informed of your progress. Yellow periods are signs an adjustment may need to be made.     2. Continue your self-care, even if you have to fake it!    3. Talk to someone in your support network    4. Open up your depression survival kit           RED    ZONE Medical Alert - Get Help    What it looks like:     Depression is seriously interfering with your life.     You may experience these or other symptoms: You can t get out of bed most days, can t work or engage in other necessary activities, you have trouble taking care of basic hygiene, or basic responsibilities, thoughts of suicide or death that will not go away, self-injurious behavior.     What you need to do:  1. Call  your care team and request a same-day appointment. If they are not available (weekends or after hours) call your local crisis line, emergency room or 911.            Depression Self Care Plan / Survival Kit    Self-Care for Depression  Here s the deal. Your body and mind are really not as separate as most people think.  What you do and think affects how you feel and how you feel influences what you do and think. This means if you do things that people who feel good do, it will help you feel better.  Sometimes this is all it takes.  There is also a place for medication and therapy depending on how severe your depression is, so be sure to consult with your medical provider and/ or Behavioral Health Consultant if your symptoms are worsening or not improving.     In order to better manage my stress, I will:    Exercise  Get some form of exercise, every day. This will help reduce pain and release endorphins, the  feel good  chemicals in your brain. This is almost as good as taking antidepressants!  This is not the same as joining a gym and then never going! (they count on that by the way ) It can be as simple as just going for a walk or doing some gardening, anything that will get you moving.      Hygiene   Maintain good hygiene (Get out of bed in the morning, Make your bed, Brush your teeth, Take a shower, and Get dressed like you were going to work, even if you are unemployed).  If your clothes don't fit try to get ones that do.    Diet  I will strive to eat foods that are good for me, drink plenty of water, and avoid excessive sugar, caffeine, alcohol, and other mood-altering substances.  Some foods that are helpful in depression are: complex carbohydrates, B vitamins, flaxseed, fish or fish oil, fresh fruits and vegetables.    Psychotherapy  I agree to participate in Individual Therapy (if recommended).    Medication  If prescribed medications, I agree to take them.  Missing doses can result in serious side effects.   I understand that drinking alcohol, or other illicit drug use, may cause potential side effects.  I will not stop my medication abruptly without first discussing it with my provider.    Staying Connected With Others  I will stay in touch with my friends, family members, and my primary care provider/team.    Use your imagination  Be creative.  We all have a creative side; it doesn t matter if it s oil painting, sand castles, or mud pies! This will also kick up the endorphins.    Witness Beauty  (AKA stop and smell the roses) Take a look outside, even in mid-winter. Notice colors, textures. Watch the squirrels and birds.     Service to others  Be of service to others.  There is always someone else in need.  By helping others we can  get out of ourselves  and remember the really important things.  This also provides opportunities for practicing all the other parts of the program.    Humor  Laugh and be silly!  Adjust your TV habits for less news and crime-drama and more comedy.    Control your stress  Try breathing deep, massage therapy, biofeedback, and meditation. Find time to relax each day.     My support system    Clinic Contact:  Phone number:    Contact 1:  Phone number:    Contact 2:  Phone number:    Congregational/:  Phone number:    Therapist:  Phone number:    [facility] crisis center:    Phone number:    Other community support:  Phone number:

## 2019-04-17 NOTE — NURSING NOTE
"Chief Complaint   Patient presents with     Establish Care       Initial /74   Pulse 79   Temp 97.6  F (36.4  C) (Tympanic)   Wt 100.7 kg (222 lb)   SpO2 98%   BMI 33.75 kg/m   Estimated body mass index is 33.75 kg/m  as calculated from the following:    Height as of 4/4/19: 1.727 m (5' 8\").    Weight as of this encounter: 100.7 kg (222 lb).  Medication Reconciliation: complete    Nisha Singer LPN  "

## 2019-04-18 ASSESSMENT — ANXIETY QUESTIONNAIRES: GAD7 TOTAL SCORE: 0

## 2019-04-22 ENCOUNTER — TELEPHONE (OUTPATIENT)
Dept: OTOLARYNGOLOGY | Facility: OTHER | Age: 49
End: 2019-04-22

## 2019-04-22 NOTE — TELEPHONE ENCOUNTER
This patient calls today stating that she has a left sided sinus infection. She has increased her rinses and is using all of her other medications. She is wondering if she can start her Bactrim. Please Advise.

## 2019-04-22 NOTE — TELEPHONE ENCOUNTER
I spoke with Dr. Gonzalez who stated that it is fine to start the Bactrim. The patient was notified of this.

## 2019-04-29 ENCOUNTER — TRANSFERRED RECORDS (OUTPATIENT)
Dept: HEALTH INFORMATION MANAGEMENT | Facility: CLINIC | Age: 49
End: 2019-04-29

## 2019-05-10 NOTE — PROGRESS NOTES
SUBJECTIVE:   Annie Garcia is a 48 year old female who presents to clinic today for the following   health issues:      Musculoskeletal problem/right foot/ankle      Duration: 3 years    Description  Location: right foot and ankle     Intensity:  3/10    Accompanying signs and symptoms: pain and swelling    History  Previous similar problem: YES  Previous evaluation:  x-ray, MRI and seen ortho and podiatry    Precipitating or alleviating factors:  Trauma or overuse: no not sure what it is from  Aggravating factors include: walking    Therapies tried and outcome: seen a podiatrist          Additional history: as documented    Reviewed  and updated as needed this visit by clinical staff         Reviewed and updated as needed this visit by Provider         Patient Active Problem List   Diagnosis     Cystic fibrosis (H)     Depression     Tear of right acetabular labrum     Dyspepsia and other specified disorders of function of stomach     GERD (gastroesophageal reflux disease)     Facial rash     Hyperlipidemia with target LDL less than 130     Mixed anxiety depressive disorder     Insomnia     Osteoarthritis of hip     Acute bronchitis     Pneumonia due to infectious organism, unspecified laterality, unspecified part of lung     Morbid obesity (H)     Eosinophilia     S/P FESS (functional endoscopic sinus surgery)     Chronic eosinophilic sinusitis     Allergic fungal sinusitis, not active     Past Surgical History:   Procedure Laterality Date     BREAST SURGERY       ENDOSCOPIC SINUS SURGERY N/A 9/5/2018    Procedure: ENDOSCOPIC SINUS SURGERY;;  Surgeon: Francoise Gonzalez MD;  [facility]     ENDOSCOPY UPPER, COLONOSCOPY, COMBINED N/A 11/18/2016    Procedure: COMBINED ENDOSCOPY UPPER, COLONOSCOPY;  Surgeon: Levi Hinkle MD;  [facility]     ESOPHAGOSCOPY, GASTROSCOPY, DUODENOSCOPY (EGD), COMBINED  1/10/2014    Procedure: COMBINED ESOPHAGOSCOPY, GASTROSCOPY, DUODENOSCOPY (EGD);  UPPER  ENDOSCOPY with Biopsy;  Surgeon: Levi Hinkle MD;  [facility]     EXAM UNDER ANESTHESIA NOSE N/A 9/5/2018    Procedure: EXAM UNDER ANESTHESIA NOSE;  SINUS EXAM UNDER ANESTHESIA, Endoscopic Sinus Surgery;  Surgeon: Francoise Gonzalez MD;  [facility]     GYN SURGERY  2008    ablation     ORTHOPEDIC SURGERY  1994    left knee arthroscopy     ORTHOPEDIC SURGERY  1994    right shoulder     ORTHOPEDIC SURGERY  2014    left hip replacement     SEPTOPLASTY, ENDOSCOPIC SINUS SURGERY, COMBINED Left 2/21/2018    Procedure: COMBINED SEPTOPLASTY, ENDOSCOPIC SINUS SURGERY;  LEFT ENDOSCOPIC SINUS SURGERY, SEPTOPLASTY, BILATERAL TURBINATE REDUCTION, PROPEL IMPLANTS;  Surgeon: Francoise Gonzalez MD;  [facility]     TURBINOPLASTY Bilateral 2/21/2018    Procedure: TURBINOPLASTY;;  Surgeon: Francoise Gonzalez MD;  [facility]       Social History     Tobacco Use     Smoking status: Never Smoker     Smokeless tobacco: Never Used   Substance Use Topics     Alcohol use: Yes     Comment: rare     Family History   Problem Relation Age of Onset     Diabetes Mother      Hypertension Mother      Thyroid Disease Mother      Thyroid Disease Sister      Dementia Sister          Current Outpatient Medications   Medication Sig Dispense Refill     acetaminophen-codeine (TYLENOL #3) 300-30 MG tablet TAKE 1 TABLET BY MOUTH EVERY 4 HOURS AS NEEDED FOR MILD PAIN. TAKE FOR COUGHING PAIN 90 tablet 0     acetylcysteine (MUCOMYST) 20 % nebulizer solution Take by nebulization every 8 hours 5-10 ml inhalation every 8 hours       AMBIEN 10 MG tablet Take 1 tablet (10 mg) by mouth nightly as needed 30 tablet 5     budesonide (PULMICORT) 0.5 MG/2ML neb solution Squirt entire vial into previously made amanda med saline bottle, mix, and irrigate each nostril until entire bottle empty.  Do this once a day 3 Box 11     Cefuroxime Axetil (CEFTIN PO) Take by mouth 2 times daily       clotrimazole 10 MG behzad 1 behzad Mouth/Throat 3 times a  day       COMPOUNDED NON-CONTROLLED SUBSTANCE (CMPD RX) - PHARMACY TO MIX COMPOUNDED MEDICATION Irrigate bilateral nares with 240 ML Twice Daily for 4 Weeks. 4 Bottle 11     dexlansoprazole (DEXILANT) 60 MG CPDR CR capsule Take 1 capsule (60 mg) by mouth daily as needed 30 capsule 3     fluconazole (DIFLUCAN) 100 MG tablet Take 100mg a day for nasal fungus as needed. 90 tablet 0     fluconazole (DIFLUCAN) 100 MG tablet TAKE 1 TABLET BY MOUTH DAILY AS NEEDED 90 tablet 0     HYDROcodone-acetaminophen (NORCO) 5-325 MG per tablet Take 1 tablet by mouth every 4 hours as needed for pain 18 tablet 0     hydrOXYzine (VISTARIL) 50 MG capsule Take 1 capsule (50 mg) by mouth every 4 hours as needed for anxiety 180 capsule 3     levalbuterol (XOPENEX) 0.31 MG/3ML nebulizer solution Take 3 mLs (0.31 mg) by nebulization every 6 hours as needed for shortness of breath / dyspnea 225 mL 0     mometasone (NASONEX) 50 MCG/ACT spray Spray 2 sprays into both nostrils daily 3 Box 11     PULMOZYME 1 MG/ML neb solution USE 1 VIAL IN NEBULIZER DAILY AS NEEDED 75 mL 0     sucralfate (CARAFATE) 1 GM tablet TAKE 1 TABLET BY MOUTH FOUR TIMES A DAY AS NEEDED 120 tablet 3     sulfamethoxazole-trimethoprim (BACTRIM DS/SEPTRA DS) 800-160 MG per tablet Take 1 tablet by mouth 2 times daily 60 tablet 11     WELLBUTRIN  MG 12 hr tablet TAKE 2 TABLETS BY MOUTH EVERY MORNING AND 1 TABLET EVERY AFTERNOON 90 tablet 6     Allergies   Allergen Reactions     Seasonal Allergies Itching

## 2019-05-14 ENCOUNTER — OFFICE VISIT (OUTPATIENT)
Dept: INTERNAL MEDICINE | Facility: OTHER | Age: 49
End: 2019-05-14
Attending: NURSE PRACTITIONER
Payer: COMMERCIAL

## 2019-05-14 VITALS
OXYGEN SATURATION: 97 % | RESPIRATION RATE: 20 BRPM | WEIGHT: 220 LBS | HEART RATE: 77 BPM | SYSTOLIC BLOOD PRESSURE: 122 MMHG | TEMPERATURE: 97.7 F | DIASTOLIC BLOOD PRESSURE: 76 MMHG | BODY MASS INDEX: 33.34 KG/M2 | HEIGHT: 68 IN

## 2019-05-14 DIAGNOSIS — K21.9 GASTROESOPHAGEAL REFLUX DISEASE WITHOUT ESOPHAGITIS: ICD-10-CM

## 2019-05-14 DIAGNOSIS — F34.1 DYSTHYMIA: ICD-10-CM

## 2019-05-14 DIAGNOSIS — Z01.818 PREOP GENERAL PHYSICAL EXAM: ICD-10-CM

## 2019-05-14 DIAGNOSIS — J32.4 CHRONIC PANSINUSITIS: ICD-10-CM

## 2019-05-14 DIAGNOSIS — E78.5 HYPERLIPIDEMIA WITH TARGET LDL LESS THAN 130: ICD-10-CM

## 2019-05-14 DIAGNOSIS — Z13.220 LIPID SCREENING: ICD-10-CM

## 2019-05-14 DIAGNOSIS — E84.9 CYSTIC FIBROSIS (H): ICD-10-CM

## 2019-05-14 DIAGNOSIS — R53.83 FATIGUE, UNSPECIFIED TYPE: ICD-10-CM

## 2019-05-14 DIAGNOSIS — Z01.818 PRE-OP EXAM: Primary | ICD-10-CM

## 2019-05-14 LAB
ALBUMIN SERPL-MCNC: 3.8 G/DL (ref 3.4–5)
ALP SERPL-CCNC: 82 U/L (ref 40–150)
ALT SERPL W P-5'-P-CCNC: 22 U/L (ref 0–50)
ANION GAP SERPL CALCULATED.3IONS-SCNC: 2 MMOL/L (ref 3–14)
AST SERPL W P-5'-P-CCNC: 9 U/L (ref 0–45)
BASOPHILS # BLD AUTO: 0.1 10E9/L (ref 0–0.2)
BASOPHILS NFR BLD AUTO: 0.4 %
BILIRUB SERPL-MCNC: 0.2 MG/DL (ref 0.2–1.3)
BUN SERPL-MCNC: 18 MG/DL (ref 7–30)
CALCIUM SERPL-MCNC: 9 MG/DL (ref 8.5–10.1)
CHLORIDE SERPL-SCNC: 108 MMOL/L (ref 94–109)
CHOLEST SERPL-MCNC: 223 MG/DL
CO2 SERPL-SCNC: 30 MMOL/L (ref 20–32)
CREAT SERPL-MCNC: 0.98 MG/DL (ref 0.52–1.04)
DIFFERENTIAL METHOD BLD: ABNORMAL
EOSINOPHIL # BLD AUTO: 0.1 10E9/L (ref 0–0.7)
EOSINOPHIL NFR BLD AUTO: 1 %
ERYTHROCYTE [DISTWIDTH] IN BLOOD BY AUTOMATED COUNT: 13.5 % (ref 10–15)
GFR SERPL CREATININE-BSD FRML MDRD: 68 ML/MIN/{1.73_M2}
GLUCOSE SERPL-MCNC: 92 MG/DL (ref 70–99)
HCT VFR BLD AUTO: 44.2 % (ref 35–47)
HDLC SERPL-MCNC: 65 MG/DL
HGB BLD-MCNC: 15.1 G/DL (ref 11.7–15.7)
IMM GRANULOCYTES # BLD: 0 10E9/L (ref 0–0.4)
IMM GRANULOCYTES NFR BLD: 0.3 %
LDLC SERPL CALC-MCNC: 117 MG/DL
LYMPHOCYTES # BLD AUTO: 3.6 10E9/L (ref 0.8–5.3)
LYMPHOCYTES NFR BLD AUTO: 28.9 %
MCH RBC QN AUTO: 27 PG (ref 26.5–33)
MCHC RBC AUTO-ENTMCNC: 34.2 G/DL (ref 31.5–36.5)
MCV RBC AUTO: 79 FL (ref 78–100)
MONOCYTES # BLD AUTO: 0.8 10E9/L (ref 0–1.3)
MONOCYTES NFR BLD AUTO: 6.7 %
NEUTROPHILS # BLD AUTO: 7.9 10E9/L (ref 1.6–8.3)
NEUTROPHILS NFR BLD AUTO: 62.7 %
NONHDLC SERPL-MCNC: 158 MG/DL
NRBC # BLD AUTO: 0 10*3/UL
NRBC BLD AUTO-RTO: 0 /100
PLATELET # BLD AUTO: 369 10E9/L (ref 150–450)
POTASSIUM SERPL-SCNC: 3.5 MMOL/L (ref 3.4–5.3)
PROT SERPL-MCNC: 7.3 G/DL (ref 6.8–8.8)
RBC # BLD AUTO: 5.59 10E12/L (ref 3.8–5.2)
SODIUM SERPL-SCNC: 140 MMOL/L (ref 133–144)
TRIGL SERPL-MCNC: 203 MG/DL
TSH SERPL DL<=0.005 MIU/L-ACNC: 2.07 MU/L (ref 0.4–4)
WBC # BLD AUTO: 12.5 10E9/L (ref 4–11)

## 2019-05-14 PROCEDURE — 99214 OFFICE O/P EST MOD 30 MIN: CPT | Performed by: NURSE PRACTITIONER

## 2019-05-14 PROCEDURE — 36415 COLL VENOUS BLD VENIPUNCTURE: CPT | Performed by: NURSE PRACTITIONER

## 2019-05-14 PROCEDURE — 80050 GENERAL HEALTH PANEL: CPT | Performed by: NURSE PRACTITIONER

## 2019-05-14 PROCEDURE — 80061 LIPID PANEL: CPT | Performed by: NURSE PRACTITIONER

## 2019-05-14 ASSESSMENT — ENCOUNTER SYMPTOMS
RHINORRHEA: 0
BACK PAIN: 0
JOINT SWELLING: 1
COUGH: 0
BLOOD IN STOOL: 0
DIFFICULTY URINATING: 0
NERVOUS/ANXIOUS: 0
NECK PAIN: 0
FATIGUE: 1
MYALGIAS: 1
DIZZINESS: 0
DYSPHORIC MOOD: 0
PALPITATIONS: 0
SINUS PRESSURE: 0
EYES NEGATIVE: 1
ARTHRALGIAS: 1
SLEEP DISTURBANCE: 0
SORE THROAT: 0
SINUS PAIN: 0
HEADACHES: 0
ABDOMINAL PAIN: 0
FEVER: 0
NUMBNESS: 0
VOMITING: 1
CONSTIPATION: 0
WEAKNESS: 0
TROUBLE SWALLOWING: 0
DIARRHEA: 0
SHORTNESS OF BREATH: 0

## 2019-05-14 ASSESSMENT — MIFFLIN-ST. JEOR: SCORE: 1671.41

## 2019-05-14 ASSESSMENT — PAIN SCALES - GENERAL: PAINLEVEL: NO PAIN (0)

## 2019-05-14 NOTE — PATIENT INSTRUCTIONS
1. Use Xopenex   AM of Surgery.   2. Hold all other meds.     Before Your Surgery      Call your surgeon if there is any change in your health. This includes signs of a cold or flu (such as a sore throat, runny nose, cough, rash or fever).    Do not smoke, drink alcohol or take over the counter medicine (unless your surgeon or primary care doctor tells you to) for the 24 hours before and after surgery.    If you take prescribed drugs: Follow your doctor s orders about which medicines to take and which to stop until after surgery.    Eating and drinking prior to surgery: follow the instructions from your surgeon    Take a shower or bath the night before surgery. Use the soap your surgeon gave you to gently clean your skin. If you do not have soap from your surgeon, use your regular soap. Do not shave or scrub the surgery site.  Wear clean pajamas and have clean sheets on your bed.

## 2019-05-14 NOTE — H&P (VIEW-ONLY)
[facility] [place]  [address]  [place] [address]  [phone]  Dept: [phone]    PRE-OP EVALUATION:  Today's date: 2019    Annie Garcia (: 1970) presents for pre-operative evaluation assessment as requested by Dr. Cid.  She requires evaluation and anesthesia risk assessment prior to undergoing surgery/procedure for treatment of right foot pain .    Proposed Surgery/ Procedure:   CALCANEAL OSTEOTOMY, COTTON OSTEOTOMY, POSTERIOR TIBIAL TENDON DEBRIDEMENT/REPAIR, POSSIBLE SPRING LIGAMENT REPAIR, FLEXOR DIGITORUM LONGUS TRANSFER GASTROC RECESSION Right Combined General with Block   REPAIR, TENDON, FOOT         Date of Surgery/ Procedure: 2019  Time of Surgery/ Procedure: to be determined  Hospital/Surgical Facility: [facility]  Primary Physician: Todd Torres  Type of Anesthesia Anticipated: to be determined    Patient has a Health Care Directive or Living Will:  YES not on file    1. NO - Do you have a history of heart attack, stroke, stent, bypass or surgery on an artery in the head, neck, heart or legs?  2. NO - Do you ever have any pain or discomfort in your chest?  3. NO - Do you have a history of  Heart Failure?  4. NO - Are you troubled by shortness of breath when: walking on the level, up a slight hill or at night?  5. NO - Do you currently have a cold, bronchitis or other respiratory infection?  6. NO - Do you have a cough, shortness of breath or wheezing?  7. NO - Do you sometimes get pains in the calves of your legs when you walk?  8. NO - Do you or anyone in your family have previous history of blood clots?  9. NO - Do you or does anyone in your family have a serious bleeding problem such as prolonged bleeding following surgeries or cuts?  10. NO - Have you ever had problems with anemia or been told to take iron pills?  11. NO - Have you had any abnormal blood loss such as black, tarry or bloody stools, or abnormal vaginal bleeding?  12. NO -  Have you ever had a blood transfusion?  13. NO - Have you or any of your relatives ever had problems with anesthesia?  14. NO - Do you have sleep apnea, excessive snoring or daytime drowsiness?  15. NO - Do you have any prosthetic heart valves?  16. NO - Do you have prosthetic joints?  17. NO - Is there any chance that you may be pregnant?      HPI:     HPI related to upcoming procedure:has pain and swelling   to  Right  ankle            MEDICAL HISTORY:     Patient Active Problem List    Diagnosis Date Noted     Allergic fungal sinusitis, not active 02/11/2019     Priority: Medium     alternaria isolated on 8/20/18         Chronic eosinophilic sinusitis 04/27/2018     Priority: Medium     improved       Eosinophilia 03/12/2018     Priority: Medium     S/P FESS (functional endoscopic sinus surgery) 03/12/2018     Priority: Medium     Morbid obesity (H) 08/07/2017     Priority: Medium     Pneumonia due to infectious organism, unspecified laterality, unspecified part of lung 03/04/2017     Priority: Medium     Acute bronchitis 03/03/2017     Priority: Medium     Hyperlipidemia with target LDL less than 130 02/25/2015     Priority: Medium     Diagnosis updated by automated process. Provider to review and confirm.       Facial rash 01/23/2015     Priority: Medium     Mixed anxiety depressive disorder 05/09/2014     Priority: Medium     Insomnia 05/09/2014     Priority: Medium     Osteoarthritis of hip 05/09/2014     Priority: Medium     Dyspepsia and other specified disorders of function of stomach 01/10/2014     Priority: Medium     GERD (gastroesophageal reflux disease) 01/10/2014     Priority: Medium     Cystic fibrosis (H)      Priority: Medium     Depression      Priority: Medium     Tear of right acetabular labrum      Priority: Medium      Past Medical History:   Diagnosis Date     Cystic fibrosis (H)      Depression      Tear of right acetabular labrum      Past Surgical History:   Procedure Laterality Date      BREAST SURGERY       ENDOSCOPIC SINUS SURGERY N/A 9/5/2018    Procedure: ENDOSCOPIC SINUS SURGERY;;  Surgeon: Francoise Gonzalez MD;  Location: [facility]     ENDOSCOPY UPPER, COLONOSCOPY, COMBINED N/A 11/18/2016    Procedure: COMBINED ENDOSCOPY UPPER, COLONOSCOPY;  Surgeon: Levi Hinkle MD;  Location: [facility]     ESOPHAGOSCOPY, GASTROSCOPY, DUODENOSCOPY (EGD), COMBINED  1/10/2014    Procedure: COMBINED ESOPHAGOSCOPY, GASTROSCOPY, DUODENOSCOPY (EGD);  UPPER ENDOSCOPY with Biopsy;  Surgeon: Levi Hinkle MD;  Location: [facility]     EXAM UNDER ANESTHESIA NOSE N/A 9/5/2018    Procedure: EXAM UNDER ANESTHESIA NOSE;  SINUS EXAM UNDER ANESTHESIA, Endoscopic Sinus Surgery;  Surgeon: Francoise Gonzalez MD;  Location: [facility]     GYN SURGERY  2008    ablation     ORTHOPEDIC SURGERY  1994    left knee arthroscopy     ORTHOPEDIC SURGERY  1994    right shoulder     ORTHOPEDIC SURGERY  2014    left hip replacement     SEPTOPLASTY, ENDOSCOPIC SINUS SURGERY, COMBINED Left 2/21/2018    Procedure: COMBINED SEPTOPLASTY, ENDOSCOPIC SINUS SURGERY;  LEFT ENDOSCOPIC SINUS SURGERY, SEPTOPLASTY, BILATERAL TURBINATE REDUCTION, PROPEL IMPLANTS;  Surgeon: Francoise Gonzalez MD;  Location: [facility]     TURBINOPLASTY Bilateral 2/21/2018    Procedure: TURBINOPLASTY;;  Surgeon: Francoise Gonzalez MD;  Location: [facility]     Current Outpatient Medications   Medication Sig Dispense Refill     acetaminophen-codeine (TYLENOL #3) 300-30 MG tablet TAKE 1 TABLET BY MOUTH EVERY 4 HOURS AS NEEDED FOR MILD PAIN. TAKE FOR COUGHING PAIN 90 tablet 0     acetylcysteine (MUCOMYST) 20 % nebulizer solution Take by nebulization every 8 hours 5-10 ml inhalation every 8 hours       AMBIEN 10 MG tablet Take 1 tablet (10 mg) by mouth nightly as needed 30 tablet 5     budesonide (PULMICORT) 0.5 MG/2ML neb solution Squirt entire vial into previously made amanda med saline bottle, mix, and irrigate each nostril until entire bottle empty.  Do this once a day 3 Box 11      Cefuroxime Axetil (CEFTIN PO) Take by mouth 2 times daily       clotrimazole 10 MG behzad 1 behzad Mouth/Throat 3 times a day       COMPOUNDED NON-CONTROLLED SUBSTANCE (CMPD RX) - PHARMACY TO MIX COMPOUNDED MEDICATION Irrigate bilateral nares with 240 ML Twice Daily for 4 Weeks. 4 Bottle 11     dexlansoprazole (DEXILANT) 60 MG CPDR CR capsule Take 1 capsule (60 mg) by mouth daily as needed 30 capsule 3     fluconazole (DIFLUCAN) 100 MG tablet Take 100mg a day for nasal fungus as needed. 90 tablet 0     fluconazole (DIFLUCAN) 100 MG tablet TAKE 1 TABLET BY MOUTH DAILY AS NEEDED 90 tablet 0     HYDROcodone-acetaminophen (NORCO) 5-325 MG per tablet Take 1 tablet by mouth every 4 hours as needed for pain 18 tablet 0     hydrOXYzine (VISTARIL) 50 MG capsule Take 1 capsule (50 mg) by mouth every 4 hours as needed for anxiety 180 capsule 3     levalbuterol (XOPENEX) 0.31 MG/3ML nebulizer solution Take 3 mLs (0.31 mg) by nebulization every 6 hours as needed for shortness of breath / dyspnea 225 mL 0     mometasone (NASONEX) 50 MCG/ACT spray Spray 2 sprays into both nostrils daily 3 Box 11     PULMOZYME 1 MG/ML neb solution USE 1 VIAL IN NEBULIZER DAILY AS NEEDED 75 mL 0     sucralfate (CARAFATE) 1 GM tablet TAKE 1 TABLET BY MOUTH FOUR TIMES A DAY AS NEEDED 120 tablet 3     sulfamethoxazole-trimethoprim (BACTRIM DS/SEPTRA DS) 800-160 MG per tablet Take 1 tablet by mouth 2 times daily 60 tablet 11     WELLBUTRIN  MG 12 hr tablet TAKE 2 TABLETS BY MOUTH EVERY MORNING AND 1 TABLET EVERY AFTERNOON 90 tablet 6     OTC products: None, except as noted above    Allergies   Allergen Reactions     Seasonal Allergies Itching      Latex Allergy: NO    Social History     Tobacco Use     Smoking status: Never Smoker     Smokeless tobacco: Never Used   Substance Use Topics     Alcohol use: Yes     Comment: rare     History   Drug Use No       REVIEW OF SYSTEMS:   Review of Systems   Constitutional: Positive for fatigue. Negative for  "fever.   HENT: Negative for congestion, dental problem, ear discharge, ear pain, nosebleeds, postnasal drip, rhinorrhea, sinus pressure, sinus pain, sore throat, tinnitus and trouble swallowing.         Faint dryness around mouth.  Had surgery for eosinophilic sinusitis.     Eyes: Negative.  Negative for visual disturbance.   Respiratory: Negative for cough and shortness of breath.         Hx Cystic Fibrosis. Well controlled at this time.     Cardiovascular: Positive for leg swelling. Negative for chest pain and palpitations.        Right ankle.     Gastrointestinal: Positive for vomiting. Negative for abdominal pain, blood in stool, constipation and diarrhea.   Genitourinary: Negative for difficulty urinating.   Musculoskeletal: Positive for arthralgias, joint swelling and myalgias. Negative for back pain, gait problem and neck pain.   Skin: Positive for rash.        Around mouth.     Neurological: Negative for dizziness, weakness, numbness and headaches.   Psychiatric/Behavioral: Negative for dysphoric mood and sleep disturbance. The patient is not nervous/anxious.         On Wellbutrin, Ambien.  Having some home problems.         EXAM:   /76   Pulse 77   Temp 97.7  F (36.5  C) (Tympanic)   Resp 20   Ht 1.727 m (5' 8\")   Wt 99.8 kg (220 lb)   SpO2 97%   BMI 33.45 kg/m    Physical Exam   Constitutional: She is oriented to person, place, and time. She appears well-developed and well-nourished. No distress.   HENT:   Right Ear: External ear normal.   Left Ear: External ear normal.   Nose: Nose normal.   Mouth/Throat: Oropharynx is clear and moist.   Light reddened area around mouth.     Eyes: Pupils are equal, round, and reactive to light. Conjunctivae and EOM are normal.   Neck: Normal range of motion. Neck supple. No JVD present. No thyromegaly present.   Cardiovascular: Normal rate, regular rhythm, normal heart sounds and intact distal pulses.   No murmur heard.  Pulmonary/Chest: Effort normal and " breath sounds normal.   Abdominal: Soft. Bowel sounds are normal. She exhibits no mass. There is no tenderness.   Musculoskeletal: Normal range of motion. She exhibits no edema.   Lymphadenopathy:     She has no cervical adenopathy.   Neurological: She is alert and oriented to person, place, and time. She displays normal reflexes. No cranial nerve deficit.   Skin: Skin is warm and dry. Capillary refill takes less than 2 seconds.   Psychiatric: She has a normal mood and affect.       DIAGNOSTICS:     Results for orders placed or performed in visit on 05/14/19   CBC with platelets and differential   Result Value Ref Range    WBC 12.5 (H) 4.0 - 11.0 10e9/L    RBC Count 5.59 (H) 3.8 - 5.2 10e12/L    Hemoglobin 15.1 11.7 - 15.7 g/dL    Hematocrit 44.2 35.0 - 47.0 %    MCV 79 78 - 100 fl    MCH 27.0 26.5 - 33.0 pg    MCHC 34.2 31.5 - 36.5 g/dL    RDW 13.5 10.0 - 15.0 %    Platelet Count 369 150 - 450 10e9/L    Diff Method Automated Method     % Neutrophils 62.7 %    % Lymphocytes 28.9 %    % Monocytes 6.7 %    % Eosinophils 1.0 %    % Basophils 0.4 %    % Immature Granulocytes 0.3 %    Nucleated RBCs 0 0 /100    Absolute Neutrophil 7.9 1.6 - 8.3 10e9/L    Absolute Lymphocytes 3.6 0.8 - 5.3 10e9/L    Absolute Monocytes 0.8 0.0 - 1.3 10e9/L    Absolute Eosinophils 0.1 0.0 - 0.7 10e9/L    Absolute Basophils 0.1 0.0 - 0.2 10e9/L    Abs Immature Granulocytes 0.0 0 - 0.4 10e9/L    Absolute Nucleated RBC 0.0    Comprehensive metabolic panel (BMP + Alb, Alk Phos, ALT, AST, Total. Bili, TP)   Result Value Ref Range    Sodium 140 133 - 144 mmol/L    Potassium 3.5 3.4 - 5.3 mmol/L    Chloride 108 94 - 109 mmol/L    Carbon Dioxide 30 20 - 32 mmol/L    Anion Gap 2 (L) 3 - 14 mmol/L    Glucose 92 70 - 99 mg/dL    Urea Nitrogen 18 7 - 30 mg/dL    Creatinine 0.98 0.52 - 1.04 mg/dL    GFR Estimate 68 >60 mL/min/[1.73_m2]    GFR Estimate If Black 78 >60 mL/min/[1.73_m2]    Calcium 9.0 8.5 - 10.1 mg/dL    Bilirubin Total 0.2 0.2 - 1.3 mg/dL     Albumin 3.8 3.4 - 5.0 g/dL    Protein Total 7.3 6.8 - 8.8 g/dL    Alkaline Phosphatase 82 40 - 150 U/L    ALT 22 0 - 50 U/L    AST 9 0 - 45 U/L   TSH with free T4 reflex   Result Value Ref Range    TSH 2.07 0.40 - 4.00 mU/L   Lipid Profile (Chol, Trig, HDL, LDL calc)   Result Value Ref Range    Cholesterol 223 (H) <200 mg/dL    Triglycerides 203 (H) <150 mg/dL    HDL Cholesterol 65 >49 mg/dL    LDL Cholesterol Calculated 117 (H) <100 mg/dL    Non HDL Cholesterol 158 (H) <130 mg/dL         Recent Labs   Lab Test 12/11/18  1452 11/26/18  1353  08/30/18  1650  04/11/18  0843  10/12/15  0817  06/02/14 05/28/14 09/17/13  1042   HGB  --   --   --  13.9  --  13.8   < >  --    < >  --   --    < > 14.1   PLT  --   --   --  291  --  300   < >  --    < >  --   --    < > 319   INR  --   --   --   --   --   --   --   --   --  1.9* 1.4*   < >  --     139   < > 137   < > 139   < >  --    < >  --   --    < > 140   POTASSIUM 3.9 4.2   < > 4.1   < > 4.1   < >  --    < >  --   --    < > 4.2   CR 1.19* 0.96   < > 1.24*   < > 1.16*   < >  --    < >  --   --    < > 0.99   A1C  --   --   --   --   --   --   --  5.6  --   --   --   --  5.2    < > = values in this interval not displayed.        IMPRESSION:   Proposed Surgery/ Procedure:   CALCANEAL OSTEOTOMY, COTTON OSTEOTOMY, POSTERIOR TIBIAL TENDON DEBRIDEMENT/REPAIR, POSSIBLE SPRING LIGAMENT REPAIR, FLEXOR DIGITORUM LONGUS TRANSFER GASTROC RECESSION Right Combined General with Block   REPAIR, TENDON, FOOT         The proposed surgical procedure is considered INTERMEDIATE risk.    REVISED CARDIAC RISK INDEX  The patient has the following serious cardiovascular risks for perioperative complications such as (MI, PE, VFib and 3  AV Block):  No serious cardiac risks  INTERPRETATION: 0 risks: Class I (very low risk - 0.4% complication rate)    The patient has the following additional risks for perioperative complications:  Cystic Fibrosis      ICD-10-CM    1. Pre-op exam Z01.818 CBC  with platelets and differential     Comprehensive metabolic panel (BMP + Alb, Alk Phos, ALT, AST, Total. Bili, TP)   2. Cystic fibrosis (H) E84.9    3. Fatigue, unspecified type R53.83 TSH with free T4 reflex   4. Lipid screening Z13.220 Lipid Profile (Chol, Trig, HDL, LDL calc)   5. Gastroesophageal reflux disease without esophagitis K21.9    6. Chronic eosinophilic sinusitis J32.4    7. Dysthymia F34.1    8. Hyperlipidemia with target LDL less than 130 E78.5    9. Preop general physical exam Z01.818      ASSESSMENT / PLAN:  (Z01.818) Pre-op exam  (primary encounter diagnosis)  Comment: Had normal EKG on 2/18  NSR.    Hemoglobin   Date Value Ref Range Status   05/14/2019 15.1 11.7 - 15.7 g/dL Final   ]  Potassium   Date Value Ref Range Status   05/14/2019 3.5 3.4 - 5.3 mmol/L Final       Plan: CBC with platelets and differential,         Comprehensive metabolic panel (BMP + Alb, Alk         Phos, ALT, AST, Total. Bili, TP)        (E84.9) Cystic fibrosis (H)  Comment: Lungs are very clear today, Follows with Dr. Herman.  Has not had to use Inhalers lately. Has Pulmicort,  Xopenex,   Pulmozine 1mg/ml ,  On Bactrim /160   A day,  and Ceftin 250mg    2 times a day.  .  Uses Tylenol #3 for cough control. PFT on 11/29/18 showed minimal  diffusion defect.    Plan:Use Xopenex   AM of surgery.      (R53.83) Fatigue, unspecified type  Comment:   TSH   Date Value Ref Range Status   05/14/2019 2.07 0.40 - 4.00 mU/L Final       Plan: TSH with free T4 reflex          (Z13.220) Lipid screening  Comment: On no meds  Lab Results   Component Value Date    CHOL 223 05/14/2019    CHOL 145 09/17/2013     Lab Results   Component Value Date    HDL 65 05/14/2019    HDL 36 09/17/2013     Lab Results   Component Value Date     05/14/2019    LDL 88 09/17/2013     Lab Results   Component Value Date    TRIG 203 05/14/2019    TRIG 105 09/17/2013     Lab Results   Component Value Date    CHOLHDLRATIO 4.0 09/17/2013       Plan:  Lipid Profile (Chol, Trig, HDL, LDL calc)         (K21.9) Gastroesophageal reflux disease without esophagitis  Comment: On Dexilant 60mg   And has carafate. Sees   Plan:Hold Am of surgery      (J32.4) Chronic eosinophilic sinusitis  Comment:much improved since nasal surgery.  Uses Nasonex spray.    Plan: Eosinophil count normal.      (F34.1) Dysthymia  Comment: On Wellbutrin 150mg BID.  On Vistaril 50mg  As needed for anxiety.     Plan:Hold AM of surgery.          RECOMMENDATIONS:     1. Hold all meds AM of surgery  2. Use Xopenex AM of surgery.      APPROVAL GIVEN to proceed with proposed procedure, without further diagnostic evaluation       Signed Electronically by: Todd Torres NP    Copy of this evaluation report is provided to requesting physician.    [facility] Preop Guidelines    Revised Cardiac Risk Index

## 2019-05-14 NOTE — PROGRESS NOTES
[facility] [place]  [address]  [place] [address]  [phone]  Dept: [phone]    PRE-OP EVALUATION:  Today's date: 2019    Annie Garcia (: 1970) presents for pre-operative evaluation assessment as requested by Dr. Cid.  She requires evaluation and anesthesia risk assessment prior to undergoing surgery/procedure for treatment of right foot pain .    Proposed Surgery/ Procedure:   CALCANEAL OSTEOTOMY, COTTON OSTEOTOMY, POSTERIOR TIBIAL TENDON DEBRIDEMENT/REPAIR, POSSIBLE SPRING LIGAMENT REPAIR, FLEXOR DIGITORUM LONGUS TRANSFER GASTROC RECESSION Right Combined General with Block   REPAIR, TENDON, FOOT         Date of Surgery/ Procedure: 2019  Time of Surgery/ Procedure: to be determined  Hospital/Surgical Facility: [facility]  Primary Physician: Todd Torres  Type of Anesthesia Anticipated: to be determined    Patient has a Health Care Directive or Living Will:  YES not on file    1. NO - Do you have a history of heart attack, stroke, stent, bypass or surgery on an artery in the head, neck, heart or legs?  2. NO - Do you ever have any pain or discomfort in your chest?  3. NO - Do you have a history of  Heart Failure?  4. NO - Are you troubled by shortness of breath when: walking on the level, up a slight hill or at night?  5. NO - Do you currently have a cold, bronchitis or other respiratory infection?  6. NO - Do you have a cough, shortness of breath or wheezing?  7. NO - Do you sometimes get pains in the calves of your legs when you walk?  8. NO - Do you or anyone in your family have previous history of blood clots?  9. NO - Do you or does anyone in your family have a serious bleeding problem such as prolonged bleeding following surgeries or cuts?  10. NO - Have you ever had problems with anemia or been told to take iron pills?  11. NO - Have you had any abnormal blood loss such as black, tarry or bloody stools, or abnormal vaginal bleeding?  12. NO -  Have you ever had a blood transfusion?  13. NO - Have you or any of your relatives ever had problems with anesthesia?  14. NO - Do you have sleep apnea, excessive snoring or daytime drowsiness?  15. NO - Do you have any prosthetic heart valves?  16. NO - Do you have prosthetic joints?  17. NO - Is there any chance that you may be pregnant?      HPI:     HPI related to upcoming procedure:has pain and swelling   to  Right  ankle            MEDICAL HISTORY:     Patient Active Problem List    Diagnosis Date Noted     Allergic fungal sinusitis, not active 02/11/2019     Priority: Medium     alternaria isolated on 8/20/18         Chronic eosinophilic sinusitis 04/27/2018     Priority: Medium     improved       Eosinophilia 03/12/2018     Priority: Medium     S/P FESS (functional endoscopic sinus surgery) 03/12/2018     Priority: Medium     Morbid obesity (H) 08/07/2017     Priority: Medium     Pneumonia due to infectious organism, unspecified laterality, unspecified part of lung 03/04/2017     Priority: Medium     Acute bronchitis 03/03/2017     Priority: Medium     Hyperlipidemia with target LDL less than 130 02/25/2015     Priority: Medium     Diagnosis updated by automated process. Provider to review and confirm.       Facial rash 01/23/2015     Priority: Medium     Mixed anxiety depressive disorder 05/09/2014     Priority: Medium     Insomnia 05/09/2014     Priority: Medium     Osteoarthritis of hip 05/09/2014     Priority: Medium     Dyspepsia and other specified disorders of function of stomach 01/10/2014     Priority: Medium     GERD (gastroesophageal reflux disease) 01/10/2014     Priority: Medium     Cystic fibrosis (H)      Priority: Medium     Depression      Priority: Medium     Tear of right acetabular labrum      Priority: Medium      Past Medical History:   Diagnosis Date     Cystic fibrosis (H)      Depression      Tear of right acetabular labrum      Past Surgical History:   Procedure Laterality Date      BREAST SURGERY       ENDOSCOPIC SINUS SURGERY N/A 9/5/2018    Procedure: ENDOSCOPIC SINUS SURGERY;;  Surgeon: Francoise Gonzalez MD;  [facility]     ENDOSCOPY UPPER, COLONOSCOPY, COMBINED N/A 11/18/2016    Procedure: COMBINED ENDOSCOPY UPPER, COLONOSCOPY;  Surgeon: Levi Hinkle MD;  [facility]     ESOPHAGOSCOPY, GASTROSCOPY, DUODENOSCOPY (EGD), COMBINED  1/10/2014    Procedure: COMBINED ESOPHAGOSCOPY, GASTROSCOPY, DUODENOSCOPY (EGD);  UPPER ENDOSCOPY with Biopsy;  Surgeon: Levi Hinkle MD;  [facility]     EXAM UNDER ANESTHESIA NOSE N/A 9/5/2018    Procedure: EXAM UNDER ANESTHESIA NOSE;  SINUS EXAM UNDER ANESTHESIA, Endoscopic Sinus Surgery;  Surgeon: Francoise Gonzalez MD;  [facility]: [facility]     GYN SURGERY  2008    ablation     ORTHOPEDIC SURGERY  1994    left knee arthroscopy     ORTHOPEDIC SURGERY  1994    right shoulder     ORTHOPEDIC SURGERY  2014    left hip replacement     SEPTOPLASTY, ENDOSCOPIC SINUS SURGERY, COMBINED Left 2/21/2018    Procedure: COMBINED SEPTOPLASTY, ENDOSCOPIC SINUS SURGERY;  LEFT ENDOSCOPIC SINUS SURGERY, SEPTOPLASTY, BILATERAL TURBINATE REDUCTION, PROPEL IMPLANTS;  Surgeon: Francoise Gonzalez MD;  [facility]     TURBINOPLASTY Bilateral 2/21/2018    Procedure: TURBINOPLASTY;;  Surgeon: Francoise Gonzalez MD;  [facility]     Current Outpatient Medications   Medication Sig Dispense Refill     acetaminophen-codeine (TYLENOL #3) 300-30 MG tablet TAKE 1 TABLET BY MOUTH EVERY 4 HOURS AS NEEDED FOR MILD PAIN. TAKE FOR COUGHING PAIN 90 tablet 0     acetylcysteine (MUCOMYST) 20 % nebulizer solution Take by nebulization every 8 hours 5-10 ml inhalation every 8 hours       AMBIEN 10 MG tablet Take 1 tablet (10 mg) by mouth nightly as needed 30 tablet 5     budesonide (PULMICORT) 0.5 MG/2ML neb solution Squirt entire vial into previously made amanda med saline bottle, mix, and irrigate each nostril until entire bottle empty.  Do this once a day 3 Box 11      Cefuroxime Axetil (CEFTIN PO) Take by mouth 2 times daily       clotrimazole 10 MG behzad 1 behzad Mouth/Throat 3 times a day       COMPOUNDED NON-CONTROLLED SUBSTANCE (CMPD RX) - PHARMACY TO MIX COMPOUNDED MEDICATION Irrigate bilateral nares with 240 ML Twice Daily for 4 Weeks. 4 Bottle 11     dexlansoprazole (DEXILANT) 60 MG CPDR CR capsule Take 1 capsule (60 mg) by mouth daily as needed 30 capsule 3     fluconazole (DIFLUCAN) 100 MG tablet Take 100mg a day for nasal fungus as needed. 90 tablet 0     fluconazole (DIFLUCAN) 100 MG tablet TAKE 1 TABLET BY MOUTH DAILY AS NEEDED 90 tablet 0     HYDROcodone-acetaminophen (NORCO) 5-325 MG per tablet Take 1 tablet by mouth every 4 hours as needed for pain 18 tablet 0     hydrOXYzine (VISTARIL) 50 MG capsule Take 1 capsule (50 mg) by mouth every 4 hours as needed for anxiety 180 capsule 3     levalbuterol (XOPENEX) 0.31 MG/3ML nebulizer solution Take 3 mLs (0.31 mg) by nebulization every 6 hours as needed for shortness of breath / dyspnea 225 mL 0     mometasone (NASONEX) 50 MCG/ACT spray Spray 2 sprays into both nostrils daily 3 Box 11     PULMOZYME 1 MG/ML neb solution USE 1 VIAL IN NEBULIZER DAILY AS NEEDED 75 mL 0     sucralfate (CARAFATE) 1 GM tablet TAKE 1 TABLET BY MOUTH FOUR TIMES A DAY AS NEEDED 120 tablet 3     sulfamethoxazole-trimethoprim (BACTRIM DS/SEPTRA DS) 800-160 MG per tablet Take 1 tablet by mouth 2 times daily 60 tablet 11     WELLBUTRIN  MG 12 hr tablet TAKE 2 TABLETS BY MOUTH EVERY MORNING AND 1 TABLET EVERY AFTERNOON 90 tablet 6     OTC products: None, except as noted above    Allergies   Allergen Reactions     Seasonal Allergies Itching      Latex Allergy: NO    Social History     Tobacco Use     Smoking status: Never Smoker     Smokeless tobacco: Never Used   Substance Use Topics     Alcohol use: Yes     Comment: rare     History   Drug Use No       REVIEW OF SYSTEMS:   Review of Systems   Constitutional: Positive for fatigue. Negative for  "fever.   HENT: Negative for congestion, dental problem, ear discharge, ear pain, nosebleeds, postnasal drip, rhinorrhea, sinus pressure, sinus pain, sore throat, tinnitus and trouble swallowing.         Faint dryness around mouth.  Had surgery for eosinophilic sinusitis.     Eyes: Negative.  Negative for visual disturbance.   Respiratory: Negative for cough and shortness of breath.         Hx Cystic Fibrosis. Well controlled at this time.     Cardiovascular: Positive for leg swelling. Negative for chest pain and palpitations.        Right ankle.     Gastrointestinal: Positive for vomiting. Negative for abdominal pain, blood in stool, constipation and diarrhea.   Genitourinary: Negative for difficulty urinating.   Musculoskeletal: Positive for arthralgias, joint swelling and myalgias. Negative for back pain, gait problem and neck pain.   Skin: Positive for rash.        Around mouth.     Neurological: Negative for dizziness, weakness, numbness and headaches.   Psychiatric/Behavioral: Negative for dysphoric mood and sleep disturbance. The patient is not nervous/anxious.         On Wellbutrin, Ambien.  Having some home problems.         EXAM:   /76   Pulse 77   Temp 97.7  F (36.5  C) (Tympanic)   Resp 20   Ht 1.727 m (5' 8\")   Wt 99.8 kg (220 lb)   SpO2 97%   BMI 33.45 kg/m    Physical Exam   Constitutional: She is oriented to person, place, and time. She appears well-developed and well-nourished. No distress.   HENT:   Right Ear: External ear normal.   Left Ear: External ear normal.   Nose: Nose normal.   Mouth/Throat: Oropharynx is clear and moist.   Light reddened area around mouth.     Eyes: Pupils are equal, round, and reactive to light. Conjunctivae and EOM are normal.   Neck: Normal range of motion. Neck supple. No JVD present. No thyromegaly present.   Cardiovascular: Normal rate, regular rhythm, normal heart sounds and intact distal pulses.   No murmur heard.  Pulmonary/Chest: Effort normal and " breath sounds normal.   Abdominal: Soft. Bowel sounds are normal. She exhibits no mass. There is no tenderness.   Musculoskeletal: Normal range of motion. She exhibits no edema.   Lymphadenopathy:     She has no cervical adenopathy.   Neurological: She is alert and oriented to person, place, and time. She displays normal reflexes. No cranial nerve deficit.   Skin: Skin is warm and dry. Capillary refill takes less than 2 seconds.   Psychiatric: She has a normal mood and affect.       DIAGNOSTICS:     Results for orders placed or performed in visit on 05/14/19   CBC with platelets and differential   Result Value Ref Range    WBC 12.5 (H) 4.0 - 11.0 10e9/L    RBC Count 5.59 (H) 3.8 - 5.2 10e12/L    Hemoglobin 15.1 11.7 - 15.7 g/dL    Hematocrit 44.2 35.0 - 47.0 %    MCV 79 78 - 100 fl    MCH 27.0 26.5 - 33.0 pg    MCHC 34.2 31.5 - 36.5 g/dL    RDW 13.5 10.0 - 15.0 %    Platelet Count 369 150 - 450 10e9/L    Diff Method Automated Method     % Neutrophils 62.7 %    % Lymphocytes 28.9 %    % Monocytes 6.7 %    % Eosinophils 1.0 %    % Basophils 0.4 %    % Immature Granulocytes 0.3 %    Nucleated RBCs 0 0 /100    Absolute Neutrophil 7.9 1.6 - 8.3 10e9/L    Absolute Lymphocytes 3.6 0.8 - 5.3 10e9/L    Absolute Monocytes 0.8 0.0 - 1.3 10e9/L    Absolute Eosinophils 0.1 0.0 - 0.7 10e9/L    Absolute Basophils 0.1 0.0 - 0.2 10e9/L    Abs Immature Granulocytes 0.0 0 - 0.4 10e9/L    Absolute Nucleated RBC 0.0    Comprehensive metabolic panel (BMP + Alb, Alk Phos, ALT, AST, Total. Bili, TP)   Result Value Ref Range    Sodium 140 133 - 144 mmol/L    Potassium 3.5 3.4 - 5.3 mmol/L    Chloride 108 94 - 109 mmol/L    Carbon Dioxide 30 20 - 32 mmol/L    Anion Gap 2 (L) 3 - 14 mmol/L    Glucose 92 70 - 99 mg/dL    Urea Nitrogen 18 7 - 30 mg/dL    Creatinine 0.98 0.52 - 1.04 mg/dL    GFR Estimate 68 >60 mL/min/[1.73_m2]    GFR Estimate If Black 78 >60 mL/min/[1.73_m2]    Calcium 9.0 8.5 - 10.1 mg/dL    Bilirubin Total 0.2 0.2 - 1.3 mg/dL     Albumin 3.8 3.4 - 5.0 g/dL    Protein Total 7.3 6.8 - 8.8 g/dL    Alkaline Phosphatase 82 40 - 150 U/L    ALT 22 0 - 50 U/L    AST 9 0 - 45 U/L   TSH with free T4 reflex   Result Value Ref Range    TSH 2.07 0.40 - 4.00 mU/L   Lipid Profile (Chol, Trig, HDL, LDL calc)   Result Value Ref Range    Cholesterol 223 (H) <200 mg/dL    Triglycerides 203 (H) <150 mg/dL    HDL Cholesterol 65 >49 mg/dL    LDL Cholesterol Calculated 117 (H) <100 mg/dL    Non HDL Cholesterol 158 (H) <130 mg/dL         Recent Labs   Lab Test 12/11/18  1452 11/26/18  1353  08/30/18  1650  04/11/18  0843  10/12/15  0817  06/02/14 05/28/14 09/17/13  1042   HGB  --   --   --  13.9  --  13.8   < >  --    < >  --   --    < > 14.1   PLT  --   --   --  291  --  300   < >  --    < >  --   --    < > 319   INR  --   --   --   --   --   --   --   --   --  1.9* 1.4*   < >  --     139   < > 137   < > 139   < >  --    < >  --   --    < > 140   POTASSIUM 3.9 4.2   < > 4.1   < > 4.1   < >  --    < >  --   --    < > 4.2   CR 1.19* 0.96   < > 1.24*   < > 1.16*   < >  --    < >  --   --    < > 0.99   A1C  --   --   --   --   --   --   --  5.6  --   --   --   --  5.2    < > = values in this interval not displayed.        IMPRESSION:   Proposed Surgery/ Procedure:   CALCANEAL OSTEOTOMY, COTTON OSTEOTOMY, POSTERIOR TIBIAL TENDON DEBRIDEMENT/REPAIR, POSSIBLE SPRING LIGAMENT REPAIR, FLEXOR DIGITORUM LONGUS TRANSFER GASTROC RECESSION Right Combined General with Block   REPAIR, TENDON, FOOT         The proposed surgical procedure is considered INTERMEDIATE risk.    REVISED CARDIAC RISK INDEX  The patient has the following serious cardiovascular risks for perioperative complications such as (MI, PE, VFib and 3  AV Block):  No serious cardiac risks  INTERPRETATION: 0 risks: Class I (very low risk - 0.4% complication rate)    The patient has the following additional risks for perioperative complications:  Cystic Fibrosis      ICD-10-CM    1. Pre-op exam Z01.818 CBC  with platelets and differential     Comprehensive metabolic panel (BMP + Alb, Alk Phos, ALT, AST, Total. Bili, TP)   2. Cystic fibrosis (H) E84.9    3. Fatigue, unspecified type R53.83 TSH with free T4 reflex   4. Lipid screening Z13.220 Lipid Profile (Chol, Trig, HDL, LDL calc)   5. Gastroesophageal reflux disease without esophagitis K21.9    6. Chronic eosinophilic sinusitis J32.4    7. Dysthymia F34.1    8. Hyperlipidemia with target LDL less than 130 E78.5    9. Preop general physical exam Z01.818      ASSESSMENT / PLAN:  (Z01.818) Pre-op exam  (primary encounter diagnosis)  Comment: Had normal EKG on 2/18  NSR.    Hemoglobin   Date Value Ref Range Status   05/14/2019 15.1 11.7 - 15.7 g/dL Final   ]  Potassium   Date Value Ref Range Status   05/14/2019 3.5 3.4 - 5.3 mmol/L Final       Plan: CBC with platelets and differential,         Comprehensive metabolic panel (BMP + Alb, Alk         Phos, ALT, AST, Total. Bili, TP)        (E84.9) Cystic fibrosis (H)  Comment: Lungs are very clear today, Follows with Dr. Herman.  Has not had to use Inhalers lately. Has Pulmicort,  Xopenex,   Pulmozine 1mg/ml ,  On Bactrim /160   A day,  and Ceftin 250mg    2 times a day.  .  Uses Tylenol #3 for cough control. PFT on 11/29/18 showed minimal  diffusion defect.    Plan:Use Xopenex   AM of surgery.      (R53.83) Fatigue, unspecified type  Comment:   TSH   Date Value Ref Range Status   05/14/2019 2.07 0.40 - 4.00 mU/L Final       Plan: TSH with free T4 reflex          (Z13.220) Lipid screening  Comment: On no meds  Lab Results   Component Value Date    CHOL 223 05/14/2019    CHOL 145 09/17/2013     Lab Results   Component Value Date    HDL 65 05/14/2019    HDL 36 09/17/2013     Lab Results   Component Value Date     05/14/2019    LDL 88 09/17/2013     Lab Results   Component Value Date    TRIG 203 05/14/2019    TRIG 105 09/17/2013     Lab Results   Component Value Date    CHOLHDLRATIO 4.0 09/17/2013       Plan:  Lipid Profile (Chol, Trig, HDL, LDL calc)         (K21.9) Gastroesophageal reflux disease without esophagitis  Comment: On Dexilant 60mg   And has carafate. Sees   Plan:Hold Am of surgery      (J32.4) Chronic eosinophilic sinusitis  Comment:much improved since nasal surgery.  Uses Nasonex spray.    Plan: Eosinophil count normal.      (F34.1) Dysthymia  Comment: On Wellbutrin 150mg BID.  On Vistaril 50mg  As needed for anxiety.     Plan:Hold AM of surgery.          RECOMMENDATIONS:     1. Hold all meds AM of surgery  2. Use Xopenex AM of surgery.      APPROVAL GIVEN to proceed with proposed procedure, without further diagnostic evaluation       Signed Electronically by: Todd Torres NP    Copy of this evaluation report is provided to requesting physician.    [facility] Preop Guidelines    Revised Cardiac Risk Index

## 2019-05-14 NOTE — NURSING NOTE
"Chief Complaint   Patient presents with     Musculoskeletal Problem       Initial /76   Pulse 77   Temp 97.7  F (36.5  C) (Tympanic)   Resp 20   Ht 1.727 m (5' 8\")   Wt 99.8 kg (220 lb)   SpO2 97%   BMI 33.45 kg/m   Estimated body mass index is 33.45 kg/m  as calculated from the following:    Height as of this encounter: 1.727 m (5' 8\").    Weight as of this encounter: 99.8 kg (220 lb).  Medication Reconciliation: complete    Kasie Cantu LPN    "

## 2019-05-16 ENCOUNTER — ANESTHESIA EVENT (OUTPATIENT)
Dept: SURGERY | Facility: HOSPITAL | Age: 49
End: 2019-05-16
Payer: COMMERCIAL

## 2019-05-16 RX ORDER — KETOROLAC TROMETHAMINE 30 MG/ML
30 INJECTION, SOLUTION INTRAMUSCULAR; INTRAVENOUS EVERY 6 HOURS PRN
Status: CANCELLED | OUTPATIENT
Start: 2019-05-16 | End: 2019-05-21

## 2019-05-16 RX ORDER — MEPERIDINE HYDROCHLORIDE 50 MG/ML
12.5 INJECTION INTRAMUSCULAR; INTRAVENOUS; SUBCUTANEOUS
Status: CANCELLED | OUTPATIENT
Start: 2019-05-16

## 2019-05-16 RX ORDER — ONDANSETRON 4 MG/1
4 TABLET, ORALLY DISINTEGRATING ORAL EVERY 30 MIN PRN
Status: CANCELLED | OUTPATIENT
Start: 2019-05-16

## 2019-05-16 RX ORDER — SODIUM CHLORIDE, SODIUM LACTATE, POTASSIUM CHLORIDE, CALCIUM CHLORIDE 600; 310; 30; 20 MG/100ML; MG/100ML; MG/100ML; MG/100ML
INJECTION, SOLUTION INTRAVENOUS CONTINUOUS
Status: CANCELLED | OUTPATIENT
Start: 2019-05-16

## 2019-05-16 RX ORDER — NALOXONE HYDROCHLORIDE 0.4 MG/ML
.1-.4 INJECTION, SOLUTION INTRAMUSCULAR; INTRAVENOUS; SUBCUTANEOUS
Status: CANCELLED | OUTPATIENT
Start: 2019-05-16 | End: 2019-05-17

## 2019-05-16 RX ORDER — FENTANYL CITRATE 50 UG/ML
25-50 INJECTION, SOLUTION INTRAMUSCULAR; INTRAVENOUS
Status: CANCELLED | OUTPATIENT
Start: 2019-05-16

## 2019-05-16 RX ORDER — ONDANSETRON 2 MG/ML
4 INJECTION INTRAMUSCULAR; INTRAVENOUS EVERY 30 MIN PRN
Status: CANCELLED | OUTPATIENT
Start: 2019-05-16

## 2019-05-16 NOTE — ANESTHESIA PREPROCEDURE EVALUATION
Anesthesia Pre-Procedure Evaluation    Patient: Annie Garcia   MRN: [id number] : 1970          Preoperative Diagnosis: RIGHT HINDFOOT VALGUS, POSTERIOR TIBIAL TENDINOSIS, EQUINUS    Procedure(s):  CALCANEAL OSTEOTOMY, COTTON OSTEOTOMY, POSTERIOR TIBIAL TENDON DEBRIDEMENT/REPAIR, POSSIBLE SPRING LIGAMENT REPAIR, FLEXOR DIGITORUM LONGUS TRANSFER GASTROC RECESSION  REPAIR, TENDON, FOOT    Past Medical History:   Diagnosis Date     Cystic fibrosis (H)      Depression      Tear of right acetabular labrum      Past Surgical History:   Procedure Laterality Date     BREAST SURGERY       ENDOSCOPIC SINUS SURGERY N/A 2018    Procedure: ENDOSCOPIC SINUS SURGERY;;  Surgeon: Francoise Gonzalez MD;  [facility]     ENDOSCOPY UPPER, COLONOSCOPY, COMBINED N/A 2016    Procedure: COMBINED ENDOSCOPY UPPER, COLONOSCOPY;  Surgeon: Levi Hinkle MD;  [facility]     ESOPHAGOSCOPY, GASTROSCOPY, DUODENOSCOPY (EGD), COMBINED  1/10/2014    Procedure: COMBINED ESOPHAGOSCOPY, GASTROSCOPY, DUODENOSCOPY (EGD);  UPPER ENDOSCOPY with Biopsy;  Surgeon: Levi Hinkle MD;  [facility]     EXAM UNDER ANESTHESIA NOSE N/A 2018    Procedure: EXAM UNDER ANESTHESIA NOSE;  SINUS EXAM UNDER ANESTHESIA, Endoscopic Sinus Surgery;  Surgeon: Francoise Gonzalez MD;  [facility]     GYN SURGERY      ablation     ORTHOPEDIC SURGERY      left knee arthroscopy     ORTHOPEDIC SURGERY      right shoulder     ORTHOPEDIC SURGERY      left hip replacement     SEPTOPLASTY, ENDOSCOPIC SINUS SURGERY, COMBINED Left 2018    Procedure: COMBINED SEPTOPLASTY, ENDOSCOPIC SINUS SURGERY;  LEFT ENDOSCOPIC SINUS SURGERY, SEPTOPLASTY, BILATERAL TURBINATE REDUCTION, PROPEL IMPLANTS;  Surgeon: Francoise Gonzalez MD;  [facility]: [facility]     TURBINOPLASTY Bilateral 2018    Procedure: TURBINOPLASTY;;  Surgeon: Francoise Gonzalez MD;  [facility]       Anesthesia Evaluation     . Pt has had prior  anesthetic.     No history of anesthetic complications          ROS/MED HX    ENT/Pulmonary: Comment: Allergic fungal and chronic eosinophilic sinusitis.  S/P FESS    (+), cystic fibrosis mild. Other pulmonary disease history of bronchitis.    Neurologic: Comment: insomnia - neg neurologic ROS     Cardiovascular:     (+) Dyslipidemia, ----. : . . . :. . Previous cardiac testing date:results:date: results:ECG reviewed date:5/17/19 results: date: results:          METS/Exercise Tolerance:  3 - Able to walk 1-2 blocks without stopping   Hematologic:         Musculoskeletal: Comment: osteoarthritis - neg musculoskeletal ROS       GI/Hepatic: Comment: dyspepsia    (+) GERD Asymptomatic on medication,       Renal/Genitourinary:  - ROS Renal section negative       Endo:     (+) Obesity, .      Psychiatric:     (+) psychiatric history depression and anxiety      Infectious Disease:  - neg infectious disease ROS       Malignancy:      - no malignancy   Other:    (+) No chance of pregnancy H/O Chronic Pain,H/O chronic opiod use ,                         Physical Exam  Normal systems: cardiovascular, pulmonary and dental    Airway   Mallampati: II  TM distance: >3 FB  Neck ROM: full    Dental     Cardiovascular   Rhythm and rate: regular and normal      Pulmonary    breath sounds clear to auscultation            Lab Results   Component Value Date    WBC 12.5 (H) 05/14/2019    HGB 15.1 05/14/2019    HCT 44.2 05/14/2019     05/14/2019    CRP <2.9 03/20/2017    SED 7 03/20/2017     05/14/2019    POTASSIUM 3.5 05/14/2019    CHLORIDE 108 05/14/2019    CO2 30 05/14/2019    BUN 18 05/14/2019    CR 0.98 05/14/2019    GLC 92 05/14/2019    NALDO 9.0 05/14/2019    MAG 2.3 08/07/2017    ALBUMIN 3.8 05/14/2019    PROTTOTAL 7.3 05/14/2019    ALT 22 05/14/2019    AST 9 05/14/2019    ALKPHOS 82 05/14/2019    BILITOTAL 0.2 05/14/2019    LIPASE 129 09/18/2013    AMYLASE 30 09/18/2013    INR 1.9 (A) 06/02/2014    TSH 2.07 05/14/2019  "   HCG Negative 11/18/2016       Preop Vitals  BP Readings from Last 3 Encounters:   05/14/19 122/76   04/17/19 110/74   04/09/19 122/84    Pulse Readings from Last 3 Encounters:   05/14/19 77   04/17/19 79   04/09/19 85      Resp Readings from Last 3 Encounters:   05/14/19 20   04/04/19 18   09/05/18 18    SpO2 Readings from Last 3 Encounters:   05/14/19 97%   04/17/19 98%   04/09/19 99%      Temp Readings from Last 1 Encounters:   05/14/19 97.7  F (36.5  C) (Tympanic)    Ht Readings from Last 1 Encounters:   05/14/19 1.727 m (5' 8\")      Wt Readings from Last 1 Encounters:   05/14/19 99.8 kg (220 lb)    Estimated body mass index is 33.45 kg/m  as calculated from the following:    Height as of 5/14/19: 1.727 m (5' 8\").    Weight as of 5/14/19: 99.8 kg (220 lb).       Anesthesia Plan      History & Physical Review  History and physical reviewed and following examination; no interval change.    ASA Status:  3 .        Plan for General and Peripheral Nerve Block with Intravenous and Propofol induction. Maintenance will be Inhalation.    PONV prophylaxis:  Ondansetron (or other 5HT-3) and Dexamethasone or Solumedrol       Postoperative Care  Postoperative pain management:  IV analgesics and Peripheral nerve block (Single Shot).      Consents  Anesthetic plan, risks, benefits and alternatives discussed with:  Patient..                 VALENTINO King CRNA  "

## 2019-05-17 DIAGNOSIS — Z01.818 PREOP EXAMINATION: Primary | ICD-10-CM

## 2019-05-17 PROCEDURE — 93000 ELECTROCARDIOGRAM COMPLETE: CPT | Performed by: INTERNAL MEDICINE

## 2019-05-20 ENCOUNTER — HOSPITAL ENCOUNTER (OUTPATIENT)
Facility: HOSPITAL | Age: 49
Discharge: HOME | End: 2019-05-20
Attending: PODIATRIST | Admitting: PODIATRIST
Payer: COMMERCIAL

## 2019-05-20 ENCOUNTER — ANESTHESIA (OUTPATIENT)
Dept: SURGERY | Facility: HOSPITAL | Age: 49
End: 2019-05-20
Payer: COMMERCIAL

## 2019-05-20 VITALS
DIASTOLIC BLOOD PRESSURE: 83 MMHG | SYSTOLIC BLOOD PRESSURE: 120 MMHG | WEIGHT: 218 LBS | OXYGEN SATURATION: 95 % | TEMPERATURE: 97.9 F | BODY MASS INDEX: 33.15 KG/M2 | RESPIRATION RATE: 20 BRPM

## 2019-05-20 LAB — HCG UR QL: NEGATIVE

## 2019-05-20 PROCEDURE — 81025 URINE PREGNANCY TEST: CPT | Performed by: NURSE ANESTHETIST, CERTIFIED REGISTERED

## 2019-05-20 RX ORDER — CEFAZOLIN SODIUM 1 G/3ML
1 INJECTION, POWDER, FOR SOLUTION INTRAMUSCULAR; INTRAVENOUS SEE ADMIN INSTRUCTIONS
Status: DISCONTINUED | OUTPATIENT
Start: 2019-05-20 | End: 2019-05-20 | Stop reason: HOSPADM

## 2019-05-20 RX ORDER — LIDOCAINE 40 MG/G
CREAM TOPICAL
Status: DISCONTINUED | OUTPATIENT
Start: 2019-05-20 | End: 2019-05-20 | Stop reason: HOSPADM

## 2019-05-20 RX ORDER — SODIUM CHLORIDE, SODIUM LACTATE, POTASSIUM CHLORIDE, CALCIUM CHLORIDE 600; 310; 30; 20 MG/100ML; MG/100ML; MG/100ML; MG/100ML
INJECTION, SOLUTION INTRAVENOUS CONTINUOUS
Status: DISCONTINUED | OUTPATIENT
Start: 2019-05-20 | End: 2019-05-20 | Stop reason: HOSPADM

## 2019-05-20 RX ORDER — CEFAZOLIN SODIUM 2 G/100ML
2 INJECTION, SOLUTION INTRAVENOUS
Status: DISCONTINUED | OUTPATIENT
Start: 2019-05-20 | End: 2019-05-20 | Stop reason: HOSPADM

## 2019-06-20 ENCOUNTER — TELEPHONE (OUTPATIENT)
Dept: INTERNAL MEDICINE | Facility: OTHER | Age: 49
End: 2019-06-20

## 2019-06-20 NOTE — TELEPHONE ENCOUNTER
12:56 PM    Reason for Call: OVERBOOK    Patient is having the following symptoms:     The patient is requesting an appointment for Pre-Op with Brian.    Was an appointment offered for this call? No  If yes : Appointment type              Date    Preferred method for responding to this message: Telephone Call  What is your phone number ? [phone]    If we cannot reach you directly, may we leave a detailed response at the number you provided? Yes    Can this message wait until your PCP/provider returns, if unavailable today? YES    Ashley A. Lechevalier, LPN

## 2019-06-25 NOTE — TELEPHONE ENCOUNTER
Called the patient, surgery is July 29th, if she were to have a pre op done this week it would be over 30 days.  Patient plans to establish care with Dr. Salazar and she will call and schedule a preop in [facility].

## 2019-07-02 ENCOUNTER — APPOINTMENT (OUTPATIENT)
Dept: LAB | Facility: OTHER | Age: 49
End: 2019-07-02
Attending: INTERNAL MEDICINE
Payer: COMMERCIAL

## 2019-07-02 ENCOUNTER — OFFICE VISIT (OUTPATIENT)
Dept: INTERNAL MEDICINE | Facility: OTHER | Age: 49
End: 2019-07-02
Attending: INTERNAL MEDICINE
Payer: COMMERCIAL

## 2019-07-02 VITALS
WEIGHT: 234 LBS | OXYGEN SATURATION: 98 % | SYSTOLIC BLOOD PRESSURE: 120 MMHG | HEIGHT: 68 IN | BODY MASS INDEX: 35.46 KG/M2 | TEMPERATURE: 97.8 F | DIASTOLIC BLOOD PRESSURE: 76 MMHG | RESPIRATION RATE: 16 BRPM | HEART RATE: 95 BPM

## 2019-07-02 DIAGNOSIS — J32.4 CHRONIC PANSINUSITIS: ICD-10-CM

## 2019-07-02 DIAGNOSIS — E78.5 HYPERLIPIDEMIA WITH TARGET LDL LESS THAN 130: ICD-10-CM

## 2019-07-02 DIAGNOSIS — D72.10 EOSINOPHILIA: ICD-10-CM

## 2019-07-02 DIAGNOSIS — E66.01 MORBID OBESITY (H): ICD-10-CM

## 2019-07-02 DIAGNOSIS — Z01.818 PRE-OP EXAM: Primary | ICD-10-CM

## 2019-07-02 DIAGNOSIS — Z01.818 PREOP GENERAL PHYSICAL EXAM: ICD-10-CM

## 2019-07-02 LAB
ANION GAP SERPL CALCULATED.3IONS-SCNC: 8 MMOL/L (ref 3–14)
BASOPHILS # BLD AUTO: 0 10E9/L (ref 0–0.2)
BASOPHILS NFR BLD AUTO: 0.4 %
BUN SERPL-MCNC: 11 MG/DL (ref 7–30)
CALCIUM SERPL-MCNC: 9.4 MG/DL (ref 8.5–10.1)
CHLORIDE SERPL-SCNC: 106 MMOL/L (ref 94–109)
CO2 SERPL-SCNC: 27 MMOL/L (ref 20–32)
CREAT SERPL-MCNC: 0.94 MG/DL (ref 0.52–1.04)
DIFFERENTIAL METHOD BLD: ABNORMAL
EOSINOPHIL # BLD AUTO: 0.2 10E9/L (ref 0–0.7)
EOSINOPHIL NFR BLD AUTO: 2.1 %
ERYTHROCYTE [DISTWIDTH] IN BLOOD BY AUTOMATED COUNT: 13.9 % (ref 10–15)
GFR SERPL CREATININE-BSD FRML MDRD: 70 ML/MIN/{1.73_M2}
GLUCOSE SERPL-MCNC: 73 MG/DL (ref 70–99)
HCT VFR BLD AUTO: 42.5 % (ref 35–47)
HGB BLD-MCNC: 14.7 G/DL (ref 11.7–15.7)
LYMPHOCYTES # BLD AUTO: 2.5 10E9/L (ref 0.8–5.3)
LYMPHOCYTES NFR BLD AUTO: 26.5 %
MCH RBC QN AUTO: 27.3 PG (ref 26.5–33)
MCHC RBC AUTO-ENTMCNC: 34.6 G/DL (ref 31.5–36.5)
MCV RBC AUTO: 79 FL (ref 78–100)
MONOCYTES # BLD AUTO: 0.7 10E9/L (ref 0–1.3)
MONOCYTES NFR BLD AUTO: 7.1 %
NEUTROPHILS # BLD AUTO: 6 10E9/L (ref 1.6–8.3)
NEUTROPHILS NFR BLD AUTO: 63.9 %
PLATELET # BLD AUTO: 293 10E9/L (ref 150–450)
POTASSIUM SERPL-SCNC: 4.1 MMOL/L (ref 3.4–5.3)
RBC # BLD AUTO: 5.39 10E12/L (ref 3.8–5.2)
SODIUM SERPL-SCNC: 141 MMOL/L (ref 133–144)
WBC # BLD AUTO: 9.4 10E9/L (ref 4–11)

## 2019-07-02 PROCEDURE — 99215 OFFICE O/P EST HI 40 MIN: CPT | Mod: 25 | Performed by: INTERNAL MEDICINE

## 2019-07-02 PROCEDURE — 85025 COMPLETE CBC W/AUTO DIFF WBC: CPT | Performed by: INTERNAL MEDICINE

## 2019-07-02 PROCEDURE — 80048 BASIC METABOLIC PNL TOTAL CA: CPT | Performed by: INTERNAL MEDICINE

## 2019-07-02 PROCEDURE — 93000 ELECTROCARDIOGRAM COMPLETE: CPT | Performed by: INTERNAL MEDICINE

## 2019-07-02 PROCEDURE — 36415 COLL VENOUS BLD VENIPUNCTURE: CPT | Performed by: INTERNAL MEDICINE

## 2019-07-02 RX ORDER — PREDNISONE 5 MG/1
5 TABLET ORAL DAILY
Qty: 50 TABLET | Refills: 1 | Status: SHIPPED | OUTPATIENT
Start: 2019-07-02 | End: 2020-09-10

## 2019-07-02 ASSESSMENT — PAIN SCALES - GENERAL: PAINLEVEL: NO PAIN (0)

## 2019-07-02 ASSESSMENT — MIFFLIN-ST. JEOR: SCORE: 1729.92

## 2019-07-02 NOTE — NURSING NOTE
"Chief Complaint   Patient presents with     Pre-Op Exam     Dr Cid Right foot, [facility]       Initial /76 (BP Location: Left arm, Cuff Size: Adult Large)   Pulse 95   Temp 97.8  F (36.6  C) (Tympanic)   Resp 16   Ht 1.727 m (5' 8\")   Wt 106.1 kg (234 lb)   SpO2 98%   BMI 35.58 kg/m   Estimated body mass index is 35.58 kg/m  as calculated from the following:    Height as of this encounter: 1.727 m (5' 8\").    Weight as of this encounter: 106.1 kg (234 lb).  Medication Reconciliation: complete    "

## 2019-07-02 NOTE — PROGRESS NOTES
[facility]  [address]  [place] [id number] [phone]  Dept: [phone]    PRE-OP EVALUATION:  Today's date: 2019    Annie Garcia (: 1970) presents for pre-operative evaluation assessment as requested by Dr. FLOREZ.  She requires evaluation and anesthesia risk assessment prior to undergoing surgery/procedure for treatment of RIGHT FOOT RUPTURED TENDON .    Proposed Surgery/ Procedure: RIGHT FOOT TENDON REPAIR  Date of Surgery/ Procedure: 2019  Time of Surgery/ Procedure: [facility]  Hospital/Surgical Facility: [facility]  Fax number for surgical facility: [phone]  Primary Physician: Todd Torres  Type of Anesthesia Anticipated: General    Patient has a Health Care Directive or Living Will:  YES AT HOME    1. NO - Do you have a history of heart attack, stroke, stent, bypass or surgery on an artery in the head, neck, heart or legs?  2. NO - Do you ever have any pain or discomfort in your chest?  3. NO - Do you have a history of  Heart Failure?  4. NO - Are you troubled by shortness of breath when: walking on the level, up a slight hill or at night?  5. NO - Do you currently have a cold, bronchitis or other respiratory infection?  6. NO - Do you have a cough, shortness of breath or wheezing?  7. NO - Do you sometimes get pains in the calves of your legs when you walk?  8. NO - Do you or anyone in your family have previous history of blood clots?  9. NO - Do you or does anyone in your family have a serious bleeding problem such as prolonged bleeding following surgeries or cuts?  10. NO - Have you ever had problems with anemia or been told to take iron pills?  11. NO - Have you had any abnormal blood loss such as black, tarry or bloody stools, or abnormal vaginal bleeding?  12. NO - Have you ever had a blood transfusion?  13. NO - Have you or any of your relatives ever had problems with anesthesia?  14. NO - Do you have sleep  apnea, excessive snoring or daytime drowsiness?  15. NO - Do you have any prosthetic heart valves?  16. YES - DO YOU HAVE PROSTHETIC JOINTS? RIGHT HIP 2014  17. NO - Is there any chance that you may be pregnant?      HPI:     HPI related to upcoming procedure:       HYPERLIPIDEMIA - Patient has a long history of significant Hyperlipidemia requiring medication for treatment with recent good control. Patient reports no problems or side effects with the medication.     Eosinophilia    Cystic Fibrosis - Stable, upcoming chest CT per Dr. Herman      Depression - Stable     MEDICAL HISTORY:     Patient Active Problem List    Diagnosis Date Noted     Allergic fungal sinusitis, not active 02/11/2019     Priority: Medium     alternaria isolated on 8/20/18         Chronic eosinophilic sinusitis 04/27/2018     Priority: Medium     improved       Eosinophilia 03/12/2018     Priority: Medium     S/P FESS (functional endoscopic sinus surgery) 03/12/2018     Priority: Medium     Morbid obesity (H) 08/07/2017     Priority: Medium     Pneumonia due to infectious organism, unspecified laterality, unspecified part of lung 03/04/2017     Priority: Medium     Acute bronchitis 03/03/2017     Priority: Medium     Hyperlipidemia with target LDL less than 130 02/25/2015     Priority: Medium     Diagnosis updated by automated process. Provider to review and confirm.       Facial rash 01/23/2015     Priority: Medium     Mixed anxiety depressive disorder 05/09/2014     Priority: Medium     Insomnia 05/09/2014     Priority: Medium     Osteoarthritis of hip 05/09/2014     Priority: Medium     Dyspepsia and other specified disorders of function of stomach 01/10/2014     Priority: Medium     GERD (gastroesophageal reflux disease) 01/10/2014     Priority: Medium     Cystic fibrosis (H)      Priority: Medium     Depression      Priority: Medium     Tear of right acetabular labrum      Priority: Medium      Past Medical History:   Diagnosis Date      Cystic fibrosis (H)      Depression      Tear of right acetabular labrum      Past Surgical History:   Procedure Laterality Date     BREAST SURGERY       ENDOSCOPIC SINUS SURGERY N/A 9/5/2018    Procedure: ENDOSCOPIC SINUS SURGERY;;  Surgeon: Francoise Gonzalez MD;  Location: [facility]     ENDOSCOPY UPPER, COLONOSCOPY, COMBINED N/A 11/18/2016    Procedure: COMBINED ENDOSCOPY UPPER, COLONOSCOPY;  Surgeon: Levi Hinkle MD;  Location: [facility]     ESOPHAGOSCOPY, GASTROSCOPY, DUODENOSCOPY (EGD), COMBINED  1/10/2014    Procedure: COMBINED ESOPHAGOSCOPY, GASTROSCOPY, DUODENOSCOPY (EGD);  UPPER ENDOSCOPY with Biopsy;  Surgeon: Levi Hinkle MD;  Location: [facility]     EXAM UNDER ANESTHESIA NOSE N/A 9/5/2018    Procedure: EXAM UNDER ANESTHESIA NOSE;  SINUS EXAM UNDER ANESTHESIA, Endoscopic Sinus Surgery;  Surgeon: Francoise Gonzalez MD;  Location: [facility]     GYN SURGERY  2008    ablation     ORTHOPEDIC SURGERY  1994    left knee arthroscopy     ORTHOPEDIC SURGERY  1994    right shoulder     ORTHOPEDIC SURGERY  2014    left hip replacement     SEPTOPLASTY, ENDOSCOPIC SINUS SURGERY, COMBINED Left 2/21/2018    Procedure: COMBINED SEPTOPLASTY, ENDOSCOPIC SINUS SURGERY;  LEFT ENDOSCOPIC SINUS SURGERY, SEPTOPLASTY, BILATERAL TURBINATE REDUCTION, PROPEL IMPLANTS;  Surgeon: Francoise Gonzalez MD;  Location: [facility]     TURBINOPLASTY Bilateral 2/21/2018    Procedure: TURBINOPLASTY;;  Surgeon: Francoise Gonzalez MD;  Location: [facility]     Current Outpatient Medications   Medication Sig Dispense Refill     acetaminophen-codeine (TYLENOL #3) 300-30 MG tablet TAKE 1 TABLET BY MOUTH EVERY 4 HOURS AS NEEDED FOR MILD PAIN. TAKE FOR COUGHING PAIN 90 tablet 0     acetylcysteine (MUCOMYST) 20 % nebulizer solution Take by nebulization every 8 hours 5-10 ml inhalation every 8 hours       AMBIEN 10 MG tablet Take 1 tablet (10 mg) by mouth nightly as needed 30 tablet 5     budesonide (PULMICORT) 0.5 MG/2ML neb solution Squirt entire vial into  previously made amanda med saline bottle, mix, and irrigate each nostril until entire bottle empty.  Do this once a day 3 Box 11     Cefuroxime Axetil (CEFTIN PO) Take by mouth 2 times daily       dexlansoprazole (DEXILANT) 60 MG CPDR CR capsule Take 1 capsule (60 mg) by mouth daily as needed 30 capsule 3     fluconazole (DIFLUCAN) 100 MG tablet Take 100mg a day for nasal fungus as needed. 90 tablet 0     HYDROcodone-acetaminophen (NORCO) 5-325 MG per tablet Take 1 tablet by mouth every 4 hours as needed for pain 18 tablet 0     hydrOXYzine (VISTARIL) 50 MG capsule Take 1 capsule (50 mg) by mouth every 4 hours as needed for anxiety 180 capsule 3     levalbuterol (XOPENEX) 0.31 MG/3ML nebulizer solution Take 3 mLs (0.31 mg) by nebulization every 6 hours as needed for shortness of breath / dyspnea 225 mL 0     mometasone (NASONEX) 50 MCG/ACT spray Spray 2 sprays into both nostrils daily 3 Box 11     PULMOZYME 1 MG/ML neb solution USE 1 VIAL IN NEBULIZER DAILY AS NEEDED 75 mL 0     sucralfate (CARAFATE) 1 GM tablet TAKE 1 TABLET BY MOUTH FOUR TIMES A DAY AS NEEDED 120 tablet 3     sulfamethoxazole-trimethoprim (BACTRIM DS/SEPTRA DS) 800-160 MG per tablet Take 1 tablet by mouth 2 times daily 60 tablet 11     WELLBUTRIN  MG 12 hr tablet TAKE 2 TABLETS BY MOUTH EVERY MORNING AND 1 TABLET EVERY AFTERNOON 90 tablet 6     OTC products: None, except as noted above    Allergies   Allergen Reactions     Seasonal Allergies Itching      Latex Allergy: NO    Social History     Tobacco Use     Smoking status: Never Smoker     Smokeless tobacco: Never Used   Substance Use Topics     Alcohol use: Yes     Comment: rare     History   Drug Use No       REVIEW OF SYSTEMS:   Constitutional, neuro, ENT, endocrine, pulmonary, cardiac, gastrointestinal, genitourinary, musculoskeletal, integument and psychiatric systems are negative, except as otherwise noted.    EXAM:   /76 (BP Location: Left arm, Cuff Size: Adult Large)   Pulse  "95   Temp 97.8  F (36.6  C) (Tympanic)   Resp 16   Ht 1.727 m (5' 8\")   Wt 106.1 kg (234 lb)   SpO2 98%   BMI 35.58 kg/m      GENERAL APPEARANCE: Alert and no distress     EYES: EOMI, PERRL     HENT: ear canals and TM's normal and nose and mouth without ulcers or lesions     NECK: no adenopathy, no asymmetry, masses, or scars and thyroid normal to palpation     RESP: lungs clear to auscultation - no rales, rhonchi or wheezes     CV: regular rates and rhythm, normal S1 S2, no S3 or S4 and no murmur, click or rub     ABDOMEN:  soft, nontender, no HSM or masses and bowel sounds normal     MS: extremities normal- no gross deformities noted, no evidence of inflammation in joints, FROM in all extremities.     SKIN: Facial rash       NEURO: Normal strength and tone, sensory exam grossly normal, mentation intact and speech normal     PSYCH: mentation appears normal. and affect normal/bright    DIAGNOSTICS:   EKG: appears normal, NSR    Recent Labs   Lab Test 07/02/19  1050 05/14/19  0848 12/11/18  1452  10/12/15  0817  06/02/14 05/28/14 09/17/13  1042   HGB 14.7 15.1  --    < >  --    < >  --   --    < > 14.1    369  --    < >  --    < >  --   --    < > 319   INR  --   --   --   --   --   --  1.9* 1.4*   < >  --    NA  --  140 134   < >  --    < >  --   --    < > 140   POTASSIUM  --  3.5 3.9   < >  --    < >  --   --    < > 4.2   CR  --  0.98 1.19*   < >  --    < >  --   --    < > 0.99   A1C  --   --   --   --  5.6  --   --   --   --  5.2    < > = values in this interval not displayed.      Results for orders placed or performed in visit on 07/02/19   CBC with platelets and differential   Result Value Ref Range    WBC 9.4 4.0 - 11.0 10e9/L    RBC Count 5.39 (H) 3.8 - 5.2 10e12/L    Hemoglobin 14.7 11.7 - 15.7 g/dL    Hematocrit 42.5 35.0 - 47.0 %    MCV 79 78 - 100 fl    MCH 27.3 26.5 - 33.0 pg    MCHC 34.6 31.5 - 36.5 g/dL    RDW 13.9 10.0 - 15.0 %    Platelet Count 293 150 - 450 10e9/L    % Neutrophils 63.9 " %    % Lymphocytes 26.5 %    % Monocytes 7.1 %    % Eosinophils 2.1 %    % Basophils 0.4 %    Absolute Neutrophil 6.0 1.6 - 8.3 10e9/L    Absolute Lymphocytes 2.5 0.8 - 5.3 10e9/L    Absolute Monocytes 0.7 0.0 - 1.3 10e9/L    Absolute Eosinophils 0.2 0.0 - 0.7 10e9/L    Absolute Basophils 0.0 0.0 - 0.2 10e9/L    Diff Method Automated Method    Basic metabolic panel   Result Value Ref Range    Sodium 141 133 - 144 mmol/L    Potassium 4.1 3.4 - 5.3 mmol/L    Chloride 106 94 - 109 mmol/L    Carbon Dioxide 27 20 - 32 mmol/L    Anion Gap 8 3 - 14 mmol/L    Glucose 73 70 - 99 mg/dL    Urea Nitrogen 11 7 - 30 mg/dL    Creatinine 0.94 0.52 - 1.04 mg/dL    GFR Estimate 70 >60 mL/min/[1.73_m2]    GFR Estimate If Black 81 >60 mL/min/[1.73_m2]    Calcium 9.4 8.5 - 10.1 mg/dL     IMPRESSION:   Reason for surgery/procedure: right ankle surgery     The proposed surgical procedure is considered INTERMEDIATE risk.    REVISED CARDIAC RISK INDEX  The patient has the following serious cardiovascular risks for perioperative complications such as (MI, PE, VFib and 3  AV Block):  No serious cardiac risks  INTERPRETATION: 0 risks: Class I (very low risk - 0.4% complication rate)    The patient has the following additional risks for perioperative complications:  No identified additional risks  The 10-year ASCVD risk score (Rommelblas GUARDADO Jr., et al., 2013) is: 1%    Values used to calculate the score:      Age: 49 years      Sex: Female      Is Non- : No      Diabetic: No      Tobacco smoker: No      Systolic Blood Pressure: 120 mmHg      Is BP treated: No      HDL Cholesterol: 65 mg/dL      Total Cholesterol: 223 mg/dL      ICD-10-CM    1. Pre-op exam Z01.818 CBC with platelets and differential     Basic metabolic panel     EKG 12-lead complete w/read - (Clinic Performed)     CANCELED: XR CHEST 2 VW (Clinic Performed)       RECOMMENDATIONS:       --Patient is to use inhalers prior to surgery.  She will be off steriods and  antibiotics greater than 2 weeks prior to surgery.  She has upcoming CT chest scheduled for next week.      APPROVAL GIVEN to proceed with proposed procedure, without further diagnostic evaluation       Signed Electronically by: Petar Salgado DO    Copy of this evaluation report is provided to requesting physician.    [facility] Preop Guidelines    Revised Cardiac Risk Index

## 2019-07-08 ENCOUNTER — HOSPITAL ENCOUNTER (OUTPATIENT)
Dept: CT IMAGING | Facility: HOSPITAL | Age: 49
Discharge: HOME | End: 2019-07-08
Attending: INTERNAL MEDICINE | Admitting: INTERNAL MEDICINE
Payer: COMMERCIAL

## 2019-07-08 DIAGNOSIS — E84.9 CYSTIC FIBROSIS (H): ICD-10-CM

## 2019-07-08 DIAGNOSIS — R21 RASH AND NONSPECIFIC SKIN ERUPTION: Primary | ICD-10-CM

## 2019-07-08 PROCEDURE — 71250 CT THORAX DX C-: CPT | Mod: TC

## 2019-07-08 RX ORDER — DESONIDE 0.5 MG/G
OINTMENT TOPICAL
Qty: 60 G | Refills: 0 | Status: SHIPPED | OUTPATIENT
Start: 2019-07-08 | End: 2021-11-15

## 2019-07-08 NOTE — TELEPHONE ENCOUNTER
DESONIDE 0.05%   Last Written Prescription Date:   Last Fill Quantity: 0,   # refills: 0  Last Office Visit: 4/17/2019  Future Office visit:       Routing refill request to provider for review/approval because:  Drug not active on patient's medication list  Patient transferring care from Todd Torres

## 2019-07-09 ENCOUNTER — TELEPHONE (OUTPATIENT)
Dept: INTERNAL MEDICINE | Facility: OTHER | Age: 49
End: 2019-07-09

## 2019-07-30 ENCOUNTER — TRANSFERRED RECORDS (OUTPATIENT)
Dept: HEALTH INFORMATION MANAGEMENT | Facility: HOSPITAL | Age: 49
End: 2019-07-30

## 2019-08-06 ENCOUNTER — TELEPHONE (OUTPATIENT)
Dept: INTERNAL MEDICINE | Facility: OTHER | Age: 49
End: 2019-08-06

## 2019-08-06 DIAGNOSIS — S39.012A BACK STRAIN, INITIAL ENCOUNTER: Primary | ICD-10-CM

## 2019-08-06 RX ORDER — CYCLOBENZAPRINE HCL 10 MG
10 TABLET ORAL 3 TIMES DAILY PRN
Qty: 30 TABLET | Refills: 0 | Status: SHIPPED | OUTPATIENT
Start: 2019-08-06 | End: 2019-08-22

## 2019-08-06 NOTE — TELEPHONE ENCOUNTER
Pt calls, had foot surgery last week  Was using scooter  Threw back out  OTC meds worked in the past, [facility]  Not working now  Can hardly move  Requesting a muscle relaxer??     To Santiago Moreno

## 2019-08-22 DIAGNOSIS — S39.012A BACK STRAIN, INITIAL ENCOUNTER: ICD-10-CM

## 2019-08-23 RX ORDER — CYCLOBENZAPRINE HCL 10 MG
TABLET ORAL
Qty: 30 TABLET | Refills: 0 | Status: SHIPPED | OUTPATIENT
Start: 2019-08-23

## 2019-08-23 NOTE — TELEPHONE ENCOUNTER
cyclobenzaprine (FLEXERIL) 10 MG tablet  Last Written Prescription Date:  8/6/19  Last Fill Quantity: 30,   # refills: 0  Last Office Visit: 7/2/19  Future Office visit:

## 2019-09-10 ENCOUNTER — PATIENT OUTREACH (OUTPATIENT)
Dept: CARE COORDINATION | Facility: OTHER | Age: 49
End: 2019-09-10

## 2019-09-10 NOTE — PROGRESS NOTES
Clinic Care Coordination Contact  Care Team Conversations    8:59 AM    Reached out to patient to schedule follow up with .  Patient scheduled next available follow up.     Gwen Gavin, RN-BSN  Care Coordination, Behavioral Health

## 2019-09-11 ENCOUNTER — TELEPHONE (OUTPATIENT)
Dept: OTOLARYNGOLOGY | Facility: OTHER | Age: 49
End: 2019-09-11

## 2019-09-11 NOTE — TELEPHONE ENCOUNTER
This patient calls and is requesting a letter summarizing her sinus issues/cystic fibrosis issues as far as what has been done and what is being done to help her issues. She also needs in the letter what could be done in the future to help her. Basically, she needs a letter to give to her employer stating the course of treatment over the past years and what she is doing to relieve her symptoms. She states that it is a confusing situation and if there are any questions to give her a call at [phone]    She would like this mailed when completed.

## 2019-09-12 NOTE — TELEPHONE ENCOUNTER
I did a letter. Can you and/or Dr. Gonzalez review? If it looks like what is needed, we can print/sign and send to necessary fax #. I did it quick;) Thank you

## 2019-09-25 DIAGNOSIS — F34.1 DYSTHYMIA: ICD-10-CM

## 2019-09-25 RX ORDER — BUPROPION HCL 150 MG
TABLET,SUSTAINED-RELEASE 12 HR ORAL
Qty: 270 TABLET | Refills: 0 | Status: SHIPPED | OUTPATIENT
Start: 2019-09-25 | End: 2021-11-04

## 2019-09-25 NOTE — TELEPHONE ENCOUNTER
Next 5 appointments (look out 90 days)    Oct 01, 2019  8:20 AM CDT  (Arrive by 8:05 AM)  Return Visit with Mary Marley MD  [facility] ([facility] ) [address] [place] [id number] [phone]

## 2019-10-25 ENCOUNTER — HOSPITAL ENCOUNTER (OUTPATIENT)
Dept: PHYSICAL THERAPY | Facility: HOSPITAL | Age: 49
Setting detail: THERAPIES SERIES
End: 2019-10-25
Attending: FAMILY MEDICINE
Payer: COMMERCIAL

## 2019-10-25 PROCEDURE — 97162 PT EVAL MOD COMPLEX 30 MIN: CPT | Mod: GP | Performed by: PHYSICAL THERAPIST

## 2019-10-25 PROCEDURE — 97161 PT EVAL LOW COMPLEX 20 MIN: CPT | Mod: GP | Performed by: PHYSICAL THERAPIST

## 2019-10-25 PROCEDURE — 97110 THERAPEUTIC EXERCISES: CPT | Mod: GP | Performed by: PHYSICAL THERAPIST

## 2019-10-25 PROCEDURE — 97140 MANUAL THERAPY 1/> REGIONS: CPT | Mod: GP | Performed by: PHYSICAL THERAPIST

## 2019-10-25 NOTE — PROGRESS NOTES
10/25/19 1200   General Information   Type of Visit Initial OP Ortho PT Evaluation   Start of Care Date 10/25/19   Referring Physician Dr Cid   Patient/Family Goals Statement less pain, normal function of foot ankle   Orders Evaluate and Treat   Date of Order 10/21/19   Certification Required? No   Medical Diagnosis s/p R flatfoot reconstruction, springligament repair with internal brace and heel wedge   Surgical/Medical history reviewed Yes   Precautions/Limitations no known precautions/limitations   Body Part(s)   Body Part(s) Ankle/Foot   Presentation and Etiology   Pertinent history of current problem (include personal factors and/or comorbidities that impact the POC) Patient has long-standing R foot/ankle pain that she had treated conservatively for many years. She elected to have surgery to reconstruct her arch with a spring ligament repair and placement of an internal brace with wedge in heel.  She has been non-weight bearing and resting it for the past 3 months and is now able to start rehabing her foot/ankle with weight-bearinga dn progressive ROM and strengthening 2 times a week 4-6 weeks   Impairments A. Pain;B. Decreased WB tolerance;C. Swelling;D. Decreased ROM;F. Decreased strength and endurance;G. Impaired balance;H. Impaired gait;I. Impaired skin integrity;K. Numbness   Functional Limitations perform activities of daily living;perform required work activities   Symptom Location R ankle/foot   How/Where did it occur From insidious onset   Onset date of current episode/exacerbation 10/21/19  (physician order date)   Chronicity Chronic   Pain rating (0-10 point scale) Worst (/10)   Worst (/10) 6   Pain quality A. Sharp;C. Aching;E. Shooting;F. Stabbing   Frequency of pain/symptoms A. Constant   Pain/symptoms are: Worse during the day   Pain/symptoms exacerbated by B. Walking;C. Lifting;D. Carrying;G. Certain positions;K. Home tasks   Pain/symptoms eased by D. 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 No   Have you fallen and had an injury in the past year? No   Is patient a fall risk? No   Ankle/Foot Objective Findings   Side (if bilateral, select both right and left) Right   Observation slightly red - patient reports having nerve pain symptoms the past couple of months and did desensitization therapy on her own - still slightly shiny and sigh rubor along with mild swelling   Integumentary surgical incisions healed well with no signs of infection   Gait/Locomotion Ambulates with foot slightly turned out, antalgic   Balance/ Proprioception (Single Leg Stance) Impaired on R   Foot Position In Standing Good alignment achieved with surgery - decreased calcaneal valgus appreciated and improved arch structure    Ankle/Foot Special Tests Comments Special tests not performed due to post operative status   Palpation TTP along post tib distal insertion, medial arch, medial calf with noted decreased muscle mass/atrophy and tender dorsum of foot along incision   Right DF (Knee Ext) AROM 10   Right PF AROM 18   Right Calcanceal Inversion AROM 32   Right Calcaneal Eversion AROM 30   Right Great Toe Flexion AROM min abarca   Right DF/Inversion Strength 4/5   Right DF/Eversion Strength 4/5   Right PF/Inversion Strength 4/5   Right PF/Eversion Strength 4/5   Right Gastroc (in WB) Flexibility hypo   Right Soleus (in WB) Flexibility hypo   Planned Therapy Interventions   Planned Therapy Interventions manual therapy;neuromuscular re-education;ROM;strengthening;transfer training;balance training   Planned Modality Interventions   Planned Modality Interventions  Ultrasound;Iontophoresis   Planned Modality Interventions Comments possibly DN as needed   Clinical Impression   Criteria for Skilled Therapeutic Interventions Met yes, treatment indicated   PT Diagnosis R foot pain, s/p R spring ligament repair and flat foot correction with internal bracing with hell wedge   Influenced by the following impairments pain, weakness, tissue mobility   Functional limitations due to impairments difficulty performing home tasks   Clinical Presentation Evolving/Changing   Clinical Presentation Rationale due to other comorbidities that influence patient's status   Clinical Decision Making (Complexity) Moderate complexity   Therapy Frequency 2 times/Week   Predicted Duration of Therapy Intervention (days/wks) up to 12 weeks   Risk & Benefits of therapy have been explained Yes   Patient, Family & other staff in agreement with plan of care Yes   Clinical Impression Comments Presentation is consistent with s/p R ankle surgery that is expected to improve with skilled PT intervention   Education Assessment   Preferred Learning Style Demonstration   Barriers to Learning No barriers   ORTHO GOALS   PT Ortho Eval Goals 1;2;3   Ortho Goal 1   Goal Identifier STG 1   Goal Description Patient will be compliant with HEP in order to make progress while at home   Target Date 11/15/19   Ortho Goal 2   Goal Identifier LTG 1   Goal Description Patient will demonstrate normal ambulation without limp and without externally rotating foot for 50 ft in order to improve safety and efficiency of gait mechanics   Target Date 12/06/19   Ortho Goal 3   Goal Identifier LTG 2   Goal Description Patient will report overall 60% or more improvement in R foot function as it relates to daily tasks in order to perform all home tasks safely'   Target Date 01/03/20   Total Evaluation Time   PT Eval, Moderate Complexity Minutes ([id number]) 20

## 2019-10-29 ENCOUNTER — HOSPITAL ENCOUNTER (OUTPATIENT)
Dept: PHYSICAL THERAPY | Facility: HOSPITAL | Age: 49
Setting detail: THERAPIES SERIES
End: 2019-10-29
Attending: PODIATRIST
Payer: COMMERCIAL

## 2019-10-29 PROCEDURE — 97110 THERAPEUTIC EXERCISES: CPT | Mod: GP | Performed by: PHYSICAL THERAPIST

## 2019-10-29 PROCEDURE — 97140 MANUAL THERAPY 1/> REGIONS: CPT | Mod: GP | Performed by: PHYSICAL THERAPIST

## 2019-11-05 ENCOUNTER — HOSPITAL ENCOUNTER (OUTPATIENT)
Dept: PHYSICAL THERAPY | Facility: HOSPITAL | Age: 49
Setting detail: THERAPIES SERIES
End: 2019-11-05
Attending: PODIATRIST
Payer: COMMERCIAL

## 2019-11-05 PROCEDURE — 97140 MANUAL THERAPY 1/> REGIONS: CPT | Mod: GP | Performed by: PHYSICAL THERAPIST

## 2019-11-08 ENCOUNTER — HOSPITAL ENCOUNTER (OUTPATIENT)
Dept: PHYSICAL THERAPY | Facility: HOSPITAL | Age: 49
Setting detail: THERAPIES SERIES
End: 2019-11-08
Attending: INTERNAL MEDICINE
Payer: COMMERCIAL

## 2019-11-08 PROCEDURE — 97110 THERAPEUTIC EXERCISES: CPT | Mod: GP | Performed by: PHYSICAL THERAPIST

## 2019-11-08 PROCEDURE — 97140 MANUAL THERAPY 1/> REGIONS: CPT | Mod: GP | Performed by: PHYSICAL THERAPIST

## 2019-11-15 ENCOUNTER — TELEPHONE (OUTPATIENT)
Dept: INTERNAL MEDICINE | Facility: OTHER | Age: 49
End: 2019-11-15

## 2019-11-15 ENCOUNTER — HOSPITAL ENCOUNTER (OUTPATIENT)
Dept: PHYSICAL THERAPY | Facility: HOSPITAL | Age: 49
Setting detail: THERAPIES SERIES
End: 2019-11-15
Attending: INTERNAL MEDICINE
Payer: COMMERCIAL

## 2019-11-15 PROCEDURE — 97140 MANUAL THERAPY 1/> REGIONS: CPT | Mod: GP

## 2019-11-15 PROCEDURE — 97110 THERAPEUTIC EXERCISES: CPT | Mod: GP

## 2019-11-15 NOTE — TELEPHONE ENCOUNTER
Reviewed patient chart script sent in for 20 tablets, take one tablet by mouth twice daily.  Pharmacy notified

## 2019-11-15 NOTE — TELEPHONE ENCOUNTER
Fax received from [facility], the pt thought her Ceftin Rx was supposed to be for 20 days.

## 2019-11-18 DIAGNOSIS — B49 FUNGUS INFECTION: ICD-10-CM

## 2019-11-18 RX ORDER — CEFUROXIME AXETIL 250 MG/1
TABLET ORAL
Qty: 20 TABLET | Refills: 0 | Status: SHIPPED | OUTPATIENT
Start: 2019-11-18 | End: 2019-12-10

## 2019-11-18 NOTE — TELEPHONE ENCOUNTER
Ceftin   Last Written Prescription Date:  10/8/2019  Last Fill Quantity: 20,   # refills: 0  Last Office Visit: 5/14/2019  Future Office visit:       Routing refill request to provider for review/approval because:  Drug not on the [facility], [facility] or [facility] refill protocol or controlled substance

## 2019-11-19 ENCOUNTER — HOSPITAL ENCOUNTER (OUTPATIENT)
Dept: PHYSICAL THERAPY | Facility: HOSPITAL | Age: 49
Setting detail: THERAPIES SERIES
End: 2019-11-19
Attending: INTERNAL MEDICINE
Payer: COMMERCIAL

## 2019-11-19 PROCEDURE — 97110 THERAPEUTIC EXERCISES: CPT | Mod: GP | Performed by: PHYSICAL THERAPIST

## 2019-11-19 PROCEDURE — 97140 MANUAL THERAPY 1/> REGIONS: CPT | Mod: GP | Performed by: PHYSICAL THERAPIST

## 2019-11-20 ENCOUNTER — HOSPITAL ENCOUNTER (OUTPATIENT)
Dept: PHYSICAL THERAPY | Facility: HOSPITAL | Age: 49
Setting detail: THERAPIES SERIES
End: 2019-11-20
Attending: INTERNAL MEDICINE
Payer: COMMERCIAL

## 2019-11-20 PROCEDURE — 97110 THERAPEUTIC EXERCISES: CPT | Mod: GP | Performed by: PHYSICAL THERAPIST

## 2019-11-20 PROCEDURE — 97140 MANUAL THERAPY 1/> REGIONS: CPT | Mod: GP | Performed by: PHYSICAL THERAPIST

## 2019-11-21 ENCOUNTER — OFFICE VISIT (OUTPATIENT)
Dept: CHIROPRACTIC MEDICINE | Facility: OTHER | Age: 49
End: 2019-11-21
Attending: CHIROPRACTOR
Payer: COMMERCIAL

## 2019-11-21 DIAGNOSIS — M99.03 SEGMENTAL AND SOMATIC DYSFUNCTION OF LUMBAR REGION: Primary | ICD-10-CM

## 2019-11-21 DIAGNOSIS — M54.50 ACUTE BILATERAL LOW BACK PAIN WITHOUT SCIATICA: ICD-10-CM

## 2019-11-21 DIAGNOSIS — M99.01 SEGMENTAL AND SOMATIC DYSFUNCTION OF CERVICAL REGION: ICD-10-CM

## 2019-11-21 DIAGNOSIS — M99.02 SEGMENTAL AND SOMATIC DYSFUNCTION OF THORACIC REGION: ICD-10-CM

## 2019-11-21 PROCEDURE — 99202 OFFICE O/P NEW SF 15 MIN: CPT | Mod: 25 | Performed by: CHIROPRACTOR

## 2019-11-21 PROCEDURE — 98941 CHIROPRACT MANJ 3-4 REGIONS: CPT | Mod: AT | Performed by: CHIROPRACTOR

## 2019-11-21 NOTE — PROGRESS NOTES
Subjective Finding:    Chief compalint: Patient presents with:  Back Pain: from walking in boot following foot surgery.    Neck Pain  , Pain Scale: 6/10, Intensity: sharp, Duration: 1 months, Radiating: no.    Date of injury:     Activities that the pain restricts:   Home/household/hobbies/social activities: yes.  Work duties: no.  Sleep: yes.  Makes symptoms better: rest.  Makes symptoms worse: activity.  Have you seen anyone else for the symptoms? Yes: MD.  Work related: no.  Automobile related injury: no.    Objective and Assessment:    Posture Analysis:   High shoulder: .  Head tilt: .  High iliac crest: .  Head carriage: neutral.  Thoracic Kyphosis: neutral.  Lumbar Lordosis: forward.    Lumbar Range of Motion: extension decreased.  Cervical Range of Motion: extension decreased.  Thoracic Range of Motion: extension decreased.  Extremity Range of Motion: .    Palpation:   Quad lumb: bilateral, referred pain: no    Segmental dysfunction pre-treatment and treatment area: C2, C3, T7, L4 and L5.    Assessment post-treatment:  Cervical: ROM increased.  Thoracic: ROM increased.  Lumbar: ROM increased.    Comments: .      Complicating Factors: .    Procedure(s):  [facility]:  [id number] Chiropractic manipulative treatment 3-4 regions performed   Cervical: Diversified, See above for level, Supine, Thoracic: Diversified, See above for level, Prone and Pelvis: Diversified, See above for level, Side posture    Modalities:  None performed this visit    Therapeutic procedures:  None    Plan:  Treatment plan: PRN.  Instructed patient: stretch as instructed at visit.  Short term goals: increase ROM.  Long term goals: restore normal function.  Prognosis: very good.

## 2019-11-25 ENCOUNTER — HOSPITAL ENCOUNTER (OUTPATIENT)
Dept: CT IMAGING | Facility: HOSPITAL | Age: 49
Discharge: HOME | End: 2019-11-25
Attending: INTERNAL MEDICINE | Admitting: INTERNAL MEDICINE
Payer: COMMERCIAL

## 2019-11-25 ENCOUNTER — HOSPITAL ENCOUNTER (OUTPATIENT)
Dept: PHYSICAL THERAPY | Facility: HOSPITAL | Age: 49
Setting detail: THERAPIES SERIES
End: 2019-11-25
Attending: INTERNAL MEDICINE
Payer: COMMERCIAL

## 2019-11-25 DIAGNOSIS — E84.9 CYSTIC FIBROSIS (H): ICD-10-CM

## 2019-11-25 PROCEDURE — 71250 CT THORAX DX C-: CPT | Mod: TC

## 2019-11-25 PROCEDURE — 97140 MANUAL THERAPY 1/> REGIONS: CPT | Mod: GP | Performed by: PHYSICAL THERAPIST

## 2019-11-26 ENCOUNTER — PATIENT OUTREACH (OUTPATIENT)
Dept: CARE COORDINATION | Facility: OTHER | Age: 49
End: 2019-11-26

## 2019-11-26 NOTE — PROGRESS NOTES
Clinic Care Coordination Contact  Care Team Conversations    10:19 AM    Attempted to call patient to check in and get updated PHQ-9.  No answer.  Left message with contact information for patient to return call.     Gwen Gavin, RN-BSN  Care Coordination, Behavioral Health

## 2019-11-27 NOTE — PROGRESS NOTES
Clinic Care Coordination Contact  Care Team Conversations    Patient called back.  She was uncomfortable giving PHQ-9 over the phone so was unable to update chart.  Scheduled appointment with  and added patient to wait list.     Gwen Gavin RN-BSN  Care Coordination, Behavioral Health

## 2019-12-02 ENCOUNTER — HOSPITAL ENCOUNTER (OUTPATIENT)
Dept: RESPIRATORY THERAPY | Facility: HOSPITAL | Age: 49
Discharge: HOME | End: 2019-12-02
Attending: INTERNAL MEDICINE | Admitting: INTERNAL MEDICINE
Payer: COMMERCIAL

## 2019-12-02 ENCOUNTER — OFFICE VISIT (OUTPATIENT)
Dept: CHIROPRACTIC MEDICINE | Facility: OTHER | Age: 49
End: 2019-12-02
Attending: CHIROPRACTOR
Payer: COMMERCIAL

## 2019-12-02 DIAGNOSIS — M99.02 SEGMENTAL AND SOMATIC DYSFUNCTION OF THORACIC REGION: ICD-10-CM

## 2019-12-02 DIAGNOSIS — M99.01 SEGMENTAL AND SOMATIC DYSFUNCTION OF CERVICAL REGION: ICD-10-CM

## 2019-12-02 DIAGNOSIS — M99.03 SEGMENTAL AND SOMATIC DYSFUNCTION OF LUMBAR REGION: Primary | ICD-10-CM

## 2019-12-02 DIAGNOSIS — M54.50 ACUTE BILATERAL LOW BACK PAIN WITHOUT SCIATICA: ICD-10-CM

## 2019-12-02 PROCEDURE — 98941 CHIROPRACT MANJ 3-4 REGIONS: CPT | Mod: AT | Performed by: CHIROPRACTOR

## 2019-12-02 PROCEDURE — 94729 DIFFUSING CAPACITY: CPT | Mod: 26 | Performed by: INTERNAL MEDICINE

## 2019-12-02 PROCEDURE — 94726 PLETHYSMOGRAPHY LUNG VOLUMES: CPT | Mod: 26 | Performed by: INTERNAL MEDICINE

## 2019-12-02 PROCEDURE — 94726 PLETHYSMOGRAPHY LUNG VOLUMES: CPT

## 2019-12-02 PROCEDURE — 94010 BREATHING CAPACITY TEST: CPT | Mod: 26 | Performed by: INTERNAL MEDICINE

## 2019-12-02 PROCEDURE — 94729 DIFFUSING CAPACITY: CPT

## 2019-12-02 PROCEDURE — 94010 BREATHING CAPACITY TEST: CPT

## 2019-12-02 RX ORDER — ALBUTEROL SULFATE 0.83 MG/ML
2.5 SOLUTION RESPIRATORY (INHALATION)
Status: DISCONTINUED | OUTPATIENT
Start: 2019-12-02 | End: 2019-12-02 | Stop reason: CLARIF

## 2019-12-02 NOTE — PROGRESS NOTES
Subjective     Annie Garcia is a 49 year old female who presents to clinic today for the following health issues:    HPI   Cystic Fibrosis       Duration: Follow up    Description (location/character/radiation): recently met with Dr. Herman.    Intensity:  moderate    Accompanying signs and symptoms: recent PFT     History (similar episodes/previous evaluation): YES    Precipitating or alleviating factors: None    Therapies tried and outcome: see med list        Annie presents today for follow up of her Cystic Fibrosis.  She was seen by Dr. Herman last week and placed on Ceftin as she is having some symptoms of sinus and chest congestion.  She states she is overall feeling better now.  She also completed PFTs which appears to be similar as the past two years without evidence of disease progression.  CT chest was also completed and appears stable.    Patient Active Problem List   Diagnosis     Cystic fibrosis (H)     Depression     Tear of right acetabular labrum     Dyspepsia and other specified disorders of function of stomach     GERD (gastroesophageal reflux disease)     Facial rash     Hyperlipidemia with target LDL less than 130     Mixed anxiety depressive disorder     Insomnia     Osteoarthritis of hip     Acute bronchitis     Pneumonia due to infectious organism, unspecified laterality, unspecified part of lung     Morbid obesity (H)     Eosinophilia     S/P FESS (functional endoscopic sinus surgery)     Chronic eosinophilic sinusitis     Allergic fungal sinusitis, not active     Past Surgical History:   Procedure Laterality Date     ANKLE SURGERY Right 07/27/2019    foot/ankle     BREAST SURGERY       ENDOSCOPIC SINUS SURGERY N/A 9/5/2018    Procedure: ENDOSCOPIC SINUS SURGERY;;  Surgeon: Francoise Gonzalez MD;  Location: [facility]     ENDOSCOPY UPPER, COLONOSCOPY, COMBINED N/A 11/18/2016    Procedure: COMBINED ENDOSCOPY UPPER, COLONOSCOPY;  Surgeon: Levi Hinkle MD;  Location: [facility]      ESOPHAGOSCOPY, GASTROSCOPY, DUODENOSCOPY (EGD), COMBINED  1/10/2014    Procedure: COMBINED ESOPHAGOSCOPY, GASTROSCOPY, DUODENOSCOPY (EGD);  UPPER ENDOSCOPY with Biopsy;  Surgeon: Levi Hinkle MD;  Location: [facility]     EXAM UNDER ANESTHESIA NOSE N/A 9/5/2018    Procedure: EXAM UNDER ANESTHESIA NOSE;  SINUS EXAM UNDER ANESTHESIA, Endoscopic Sinus Surgery;  Surgeon: Francoise Gonzalez MD;  Location: [facility]     GYN SURGERY  2008    ablation     ORTHOPEDIC SURGERY  1994    left knee arthroscopy     ORTHOPEDIC SURGERY  1994    right shoulder     ORTHOPEDIC SURGERY  2014    left hip replacement     SEPTOPLASTY, ENDOSCOPIC SINUS SURGERY, COMBINED Left 2/21/2018    Procedure: COMBINED SEPTOPLASTY, ENDOSCOPIC SINUS SURGERY;  LEFT ENDOSCOPIC SINUS SURGERY, SEPTOPLASTY, BILATERAL TURBINATE REDUCTION, PROPEL IMPLANTS;  Surgeon: Francoise Gonzalez MD;  Location: [facility]     TURBINOPLASTY Bilateral 2/21/2018    Procedure: TURBINOPLASTY;;  Surgeon: Francoise Gonzalez MD;  Location: [facility]       Social History     Tobacco Use     Smoking status: Never Smoker     Smokeless tobacco: Never Used   Substance Use Topics     Alcohol use: Yes     Comment: rare     Family History   Problem Relation Age of Onset     Diabetes Mother      Hypertension Mother      Thyroid Disease Mother      Thyroid Disease Sister      Dementia Sister          Current Outpatient Medications   Medication Sig Dispense Refill     acetaminophen-codeine (TYLENOL #3) 300-30 MG tablet TAKE 1 TABLET BY MOUTH EVERY 4 HOURS AS NEEDED FOR MILD PAIN. TAKE FOR COUGHING PAIN 90 tablet 0     acetylcysteine (MUCOMYST) 20 % nebulizer solution Take by nebulization every 8 hours 5-10 ml inhalation every 8 hours       AMBIEN 10 MG tablet TAKE 1 TABLET BY MOUTH NIGHTLY AS NEEDED FOR SLEEP 30 tablet 3     budesonide (PULMICORT) 0.5 MG/2ML neb solution Squirt entire vial into previously made amanda med saline bottle, mix, and irrigate each nostril until entire bottle empty.  Do  this once a day 3 Box 11     Cefuroxime Axetil (CEFTIN PO) Take by mouth 2 times daily       cyclobenzaprine (FLEXERIL) 10 MG tablet TAKE 1 TABLET BY MOUTH 3 TIMES DAILY AS NEEDED FOR MUSCLE SPASMS 30 tablet 0     desonide (DESOWEN) 0.05 % external ointment APPLY 5 TIMES A DAY TO ACTIVE RASH ON LIPS AND FACE UNTIL RESOLVED 60 g 0     dexlansoprazole (DEXILANT) 60 MG CPDR CR capsule Take 1 capsule (60 mg) by mouth daily as needed 30 capsule 3     fluconazole (DIFLUCAN) 100 MG tablet TAKE 1 TABLET BY MOUTH DAILY FOR NASAL FUNGUS AS NEEDED 90 tablet 0     hydrOXYzine (VISTARIL) 50 MG capsule Take 1 capsule (50 mg) by mouth every 4 hours as needed for anxiety 180 capsule 3     levalbuterol (XOPENEX) 0.31 MG/3ML nebulizer solution Take 3 mLs (0.31 mg) by nebulization every 6 hours as needed for shortness of breath / dyspnea 225 mL 0     mometasone (NASONEX) 50 MCG/ACT spray Spray 2 sprays into both nostrils daily 3 Box 11     PULMOZYME 1 MG/ML neb solution USE 1 VIAL IN NEBULIZER DAILY AS NEEDED 75 mL 0     sucralfate (CARAFATE) 1 GM tablet TAKE 1 TABLET BY MOUTH FOUR TIMES A DAY AS NEEDED 120 tablet 3     WELLBUTRIN  MG 12 hr tablet TAKE 2 TABLETS BY MOUTH EVERY MORNING AND 1 TABLET EVERY AFTERNOON (Patient taking differently: Take 150 mg by mouth daily TAKE 2 TABLETS BY MOUTH EVERY MORNING AND 1 TABLET EVERY AFTERNOON) 270 tablet 0     predniSONE (DELTASONE) 5 MG tablet Take 1 tablet (5 mg) by mouth daily 20 mg Po daily x 3days, then 15 mg Po daily x 3 days, then 10 mg PO daily x 3 days, then 5mg Po daily x 3 days then off. (Patient not taking: Reported on 12/10/2019) 50 tablet 1     Allergies   Allergen Reactions     Seasonal Allergies Itching     BP Readings from Last 3 Encounters:   12/10/19 123/77   07/02/19 120/76   05/20/19 120/83    Wt Readings from Last 3 Encounters:   07/02/19 106.1 kg (234 lb)   05/20/19 98.9 kg (218 lb)   05/14/19 99.8 kg (220 lb)                  Reviewed and updated as needed this  visit by Provider         Review of Systems   ROS COMP: Constitutional, HEENT, cardiovascular, pulmonary, gi and gu systems are negative, except as otherwise noted.      Objective    /77 (BP Location: Left arm, Patient Position: Chair, Cuff Size: Adult Large)   Pulse 95   Temp 99.3  F (37.4  C) (Tympanic)   SpO2 98%   There is no height or weight on file to calculate BMI.  Physical Exam   GENERAL: Alert and no distress  RESP: lungs clear to auscultation - no rales, rhonchi or wheezes  CV: regular rate and rhythm, normal S1 S2, no S3 or S4, no murmur, click or rub, no peripheral edema and peripheral pulses strong  MS: no gross musculoskeletal defects noted, no edema  SKIN: no suspicious lesions or rashes  PSYCH: mentation appears normal, affect normal/bright    Diagnostic Test Results:  No results found for this or any previous visit (from the past 24 hour(s)).  No results found for any visits on 12/10/19.        Assessment & Plan   Problem List Items Addressed This Visit        Respiratory    Cystic fibrosis (H) - Primary    Acute bronchitis         Patient declined Mammogram referral today.  She will return this spring for routine labs.    Petar Salgado, [facility]

## 2019-12-02 NOTE — PROGRESS NOTES
Subjective Finding:    Chief compalint: Patient presents with:  Back Pain: hip pain from shoveling  , Pain Scale: 6/10, Intensity: sharp, Duration: 1 months, Radiating: no.    Date of injury:     Activities that the pain restricts:   Home/household/hobbies/social activities: yes.  Work duties: no.  Sleep: yes.  Makes symptoms better: rest.  Makes symptoms worse: activity.  Have you seen anyone else for the symptoms? Yes: MD.  Work related: no.  Automobile related injury: no.    Objective and Assessment:    Posture Analysis:   High shoulder: .  Head tilt: .  High iliac crest: .  Head carriage: neutral.  Thoracic Kyphosis: neutral.  Lumbar Lordosis: forward.    Lumbar Range of Motion: extension decreased.  Cervical Range of Motion: extension decreased.  Thoracic Range of Motion: extension decreased.  Extremity Range of Motion: .    Palpation:   Quad lumb: bilateral, referred pain: no    Segmental dysfunction pre-treatment and treatment area: C2, C3, T7, L4 and L5.    Assessment post-treatment:  Cervical: ROM increased.  Thoracic: ROM increased.  Lumbar: ROM increased.    Comments: .      Complicating Factors: .    Procedure(s):  CMT:  78528 Chiropractic manipulative treatment 3-4 regions performed   Cervical: Diversified, See above for level, Supine, Thoracic: Diversified, See above for level, Prone and Pelvis: Diversified, See above for level, Side posture    Modalities:  None performed this visit    Therapeutic procedures:  None    Plan:  Treatment plan: PRN.  Instructed patient: stretch as instructed at visit.  Short term goals: increase ROM.  Long term goals: restore normal function.  Prognosis: very good.

## 2019-12-03 ENCOUNTER — HOSPITAL ENCOUNTER (OUTPATIENT)
Dept: PHYSICAL THERAPY | Facility: HOSPITAL | Age: 49
Setting detail: THERAPIES SERIES
End: 2019-12-03
Attending: INTERNAL MEDICINE
Payer: COMMERCIAL

## 2019-12-03 PROCEDURE — 97140 MANUAL THERAPY 1/> REGIONS: CPT | Mod: GP | Performed by: PHYSICAL THERAPIST

## 2019-12-06 ENCOUNTER — HOSPITAL ENCOUNTER (OUTPATIENT)
Dept: PHYSICAL THERAPY | Facility: HOSPITAL | Age: 49
Setting detail: THERAPIES SERIES
End: 2019-12-06
Attending: INTERNAL MEDICINE
Payer: COMMERCIAL

## 2019-12-06 PROCEDURE — 97035 APP MDLTY 1+ULTRASOUND EA 15: CPT | Mod: GP | Performed by: PHYSICAL THERAPIST

## 2019-12-06 PROCEDURE — 97112 NEUROMUSCULAR REEDUCATION: CPT | Mod: GP | Performed by: PHYSICAL THERAPIST

## 2019-12-10 ENCOUNTER — HOSPITAL ENCOUNTER (OUTPATIENT)
Dept: PHYSICAL THERAPY | Facility: HOSPITAL | Age: 49
Setting detail: THERAPIES SERIES
End: 2019-12-10
Attending: INTERNAL MEDICINE
Payer: COMMERCIAL

## 2019-12-10 ENCOUNTER — OFFICE VISIT (OUTPATIENT)
Dept: INTERNAL MEDICINE | Facility: OTHER | Age: 49
End: 2019-12-10
Attending: INTERNAL MEDICINE
Payer: COMMERCIAL

## 2019-12-10 VITALS
TEMPERATURE: 99.3 F | OXYGEN SATURATION: 98 % | DIASTOLIC BLOOD PRESSURE: 77 MMHG | HEART RATE: 95 BPM | SYSTOLIC BLOOD PRESSURE: 123 MMHG

## 2019-12-10 DIAGNOSIS — E84.9 CYSTIC FIBROSIS (H): Primary | ICD-10-CM

## 2019-12-10 DIAGNOSIS — J20.9 ACUTE BRONCHITIS, UNSPECIFIED ORGANISM: ICD-10-CM

## 2019-12-10 PROCEDURE — 97140 MANUAL THERAPY 1/> REGIONS: CPT | Mod: GP | Performed by: PHYSICAL THERAPIST

## 2019-12-10 PROCEDURE — 97112 NEUROMUSCULAR REEDUCATION: CPT | Mod: GP | Performed by: PHYSICAL THERAPIST

## 2019-12-10 PROCEDURE — 99213 OFFICE O/P EST LOW 20 MIN: CPT | Performed by: INTERNAL MEDICINE

## 2019-12-10 ASSESSMENT — PAIN SCALES - GENERAL: PAINLEVEL: NO PAIN (0)

## 2019-12-10 ASSESSMENT — PATIENT HEALTH QUESTIONNAIRE - PHQ9: SUM OF ALL RESPONSES TO PHQ QUESTIONS 1-9: 9

## 2019-12-10 NOTE — NURSING NOTE
"Chief Complaint   Patient presents with     Breathing Problem       Initial /77 (BP Location: Left arm, Patient Position: Chair, Cuff Size: Adult Large)   Pulse 95   Temp 99.3  F (37.4  C) (Tympanic)   SpO2 98%  Estimated body mass index is 35.58 kg/m  as calculated from the following:    Height as of 7/2/19: 1.727 m (5' 8\").    Weight as of 7/2/19: 106.1 kg (234 lb).  Medication Reconciliation: complete  YASMINE LAKE LPN    "

## 2019-12-11 NOTE — PROGRESS NOTES
"Otolaryngology Progress Note          Annie Garcia is a 49 year old female presents for follow-up of allergic fungal sinusitis with Alternaria isolated on August 20, 2018  She has a significant history of cystic fibrosis and chronic pansinusitis    She is on budesonide irrigations daily and Bactroban sinus irrigations twice a day  She had been on prophylactic doxycycline 100 mg twice a day  In the past I kept her on Bactrim 800 mg twice daily for 2 to 3 months    She is also had a prior history of dermatologic manifestations includig perioral dermatitis which flares with her cystic fibrosis exacerbations.       Procedures performed 9/5  Final path <50 eos/hpf     1.  Bilateral total ethmoidectomy  2.  Bilateral sphenoidotomy  3.  Right frontal sinusotomy  4.  Bilateral maxillary sinus irrigation with polyp removal     I last saw her February 11, 2019 and her sinuses were healthy she was to continue budesonide irrigations twice a day and Bactroban irrigations as well as Nasonex and was to present back in 4 months for follow-up    currently she is on ceftin but no chronic abx use  Using plain amanda med irrigations  Some left congestion but no purulence, no chronic rhinorrhea no facial pressure overall she is feels very well .  no wheezing or shortness of breath  CT chest dated November 25 revealed mild bronchiectasis no concerning interval change from prior CT in July      Still under extreme life stressors with divorce    Physical Exam  /80   Pulse 90   Temp 98.6  F (37  C) (Tympanic)   Ht 1.727 m (5' 8\")   Wt 104.3 kg (230 lb)   SpO2 98%   BMI 34.97 kg/m    General - The patient is well nourished and well developed, and appears to have good nutritional status.  Alert and oriented to person and place, interactive.  Head and Face - Normocephalic and atraumatic, with no gross asymmetry noted of the contour of the facial features.  The facial nerve is intact, with strong symmetric " movements.  Neck-no palpable lymphadenopathy or thyroid mass.  Trachea is midline.  Eyes - Extraocular movements intact.   Ears- External auditory canals are patent, tympanic membranes are intact without effusion or worrisome retractions   Nose - Nasal mucosa is pink and moist with no abnormal mucus.  The septum was grossly midline and non-obstructive, turbinates of normal size and position.  No polyps, masses, or purulence noted on examination.  Mouth - Examination of the oral cavity shows pink, healthy, moist mucosa.  No lesions or ulceration noted.  The dentition are in good repair.  The tongue is mobile and midline.  Throat - The walls of the oropharynx were smooth, pink, moist, symmetric, and had no lesions or ulcerations.  The tonsillar pillars and soft palate were symmetric.  The uvula was midline on elevation.        To evaluate the nose and sinuses in the post operative state, I performed rigid nasal endoscopy. The LPN had previously sprayed both nares with lidocaine and neosynephrine.    I began with the LEFT side using a 0 degree rigid nasal endoscope, and then similarly examined the RIGHT side    Findings:   inferior turbinates are well  lateralized   Middle turbinate and middle meatus:  No purulence, no polyposis, no synechiae  Antrostomy coated in thick nonpurulent casting type secretions which were removed with Blakesley and curved suction  Ethmoid cavity clear  Mucosa is  healthy throughout without polyps nor polypoid degeneration    Impression/Plan  Annieliane Garcia is a 49 year old female    ICD-10-CM    1. History of endoscopic sinus surgery Z98.890    2. allergic fungal sinusitis J32.4 sulfamethoxazole-trimethoprim (BACTRIM DS/SEPTRA DS) 800-160 MG tablet   3. CF (cystic fibrosis) (H) E84.9 sulfamethoxazole-trimethoprim (BACTRIM DS/SEPTRA DS) 800-160 MG tablet       BUN, CR labs through PCP for Bactrim if starts long term use again, d/w Annie today  I did rewrite her Bactrim but instructed  her not to start this yet unless she develops an episode of sinusitis.  Her sinuses look excellent and she just needed to be debrided      Use Budesonide Rinses Twice Daily  Continue all other current medications  Start bactroban nasal irrigations  Follow up with Karen Mccallum in 1 month    Budesonide nasal saline irrigation per instructions:  -Obtain Asaf Med Sinus rinse over the counter.    -Use warm distilled water and 2 packets of the salt solution that comes with the bottle, dissolve in bottle up to the 240 mL airam.  -Add 1 vial of budesonide.  -Irrigate each side of your nose leaning over the sink, using 1/3 to 1/2 the volume of the bottle in each nostril every irrigation.  Irrigate 2 times daily.  -If additional rinses are needed/recommended, you may use the plan Asaf Med Sinus irrigation without the use of added budesonide.     Francoise Gonzalez D.O.  Otolaryngology/Head and Neck Surgery  Allergy

## 2019-12-12 ENCOUNTER — OFFICE VISIT (OUTPATIENT)
Dept: OTOLARYNGOLOGY | Facility: OTHER | Age: 49
End: 2019-12-12
Attending: OTOLARYNGOLOGY
Payer: COMMERCIAL

## 2019-12-12 VITALS
OXYGEN SATURATION: 98 % | TEMPERATURE: 98.6 F | WEIGHT: 230 LBS | SYSTOLIC BLOOD PRESSURE: 122 MMHG | HEART RATE: 90 BPM | BODY MASS INDEX: 34.86 KG/M2 | DIASTOLIC BLOOD PRESSURE: 80 MMHG | HEIGHT: 68 IN

## 2019-12-12 DIAGNOSIS — E84.9 CF (CYSTIC FIBROSIS) (H): ICD-10-CM

## 2019-12-12 DIAGNOSIS — J32.4 CHRONIC PANSINUSITIS: ICD-10-CM

## 2019-12-12 DIAGNOSIS — Z98.890 HISTORY OF ENDOSCOPIC SINUS SURGERY: Primary | ICD-10-CM

## 2019-12-12 PROCEDURE — 31237 NSL/SINS NDSC SURG BX POLYPC: CPT | Performed by: OTOLARYNGOLOGY

## 2019-12-12 PROCEDURE — 99213 OFFICE O/P EST LOW 20 MIN: CPT | Mod: 25 | Performed by: OTOLARYNGOLOGY

## 2019-12-12 RX ORDER — SULFAMETHOXAZOLE/TRIMETHOPRIM 800-160 MG
1 TABLET ORAL 2 TIMES DAILY
Qty: 60 TABLET | Refills: 3 | Status: SHIPPED | OUTPATIENT
Start: 2019-12-12 | End: 2020-01-10

## 2019-12-12 ASSESSMENT — MIFFLIN-ST. JEOR: SCORE: 1711.77

## 2019-12-12 ASSESSMENT — PAIN SCALES - GENERAL: PAINLEVEL: NO PAIN (0)

## 2019-12-12 NOTE — PATIENT INSTRUCTIONS
Thank you for allowing Dr. Gonzalez and our ENT team to participate in your care.  If your medications are too expensive, please give the nurse a call.  We can possibly change this medication.  If you have a scheduling or an appointment question please contact our Health Unit Coordinator at their direct line [phone].   ALL nursing questions or concerns can be directed to your ENT nurse at: [phone] [facility] [place]    Use Budesonide Rinses Twice Daily  Continue all other current medications  Follow up with Karen Mccallum in 1 month    Budesonide nasal saline irrigation per instructions:  -Obtain Asaf Med Sinus rinse over the counter.    -Use warm distilled water and 2 packets of the salt solution that comes with the bottle, dissolve in bottle up to the 240 mL airam.  -Add 1 vial of budesonide.  -Irrigate each side of your nose leaning over the sink, using 1/3 to 1/2 the volume of the bottle in each nostril every irrigation.  Irrigate 2 times daily.  -If additional rinses are needed/recommended, you may use the plan Asaf Med Sinus irrigation without the use of added budesonide.

## 2019-12-12 NOTE — LETTER
"    12/12/2019         RE: Annie Garcia  [address] [address]        Dear Colleague,    Thank you for referring your patient, Annie Garcia, to the [facility]. Please see a copy of my visit note below.    Otolaryngology Progress Note          Annie Garcia is a 49 year old female presents for follow-up of allergic fungal sinusitis with Alternaria isolated on August 20, 2018  She has a significant history of cystic fibrosis and chronic pansinusitis    She is on budesonide irrigations daily and Bactroban sinus irrigations twice a day  She had been on prophylactic doxycycline 100 mg twice a day  In the past I kept her on Bactrim 800 mg twice daily for 2 to 3 months    She is also had a prior history of dermatologic manifestations includig perioral dermatitis which flares with her cystic fibrosis exacerbations.       Procedures performed 9/5  Final path <50 eos/hpf     1.  Bilateral total ethmoidectomy  2.  Bilateral sphenoidotomy  3.  Right frontal sinusotomy  4.  Bilateral maxillary sinus irrigation with polyp removal     I last saw her February 11, 2019 and her sinuses were healthy she was to continue budesonide irrigations twice a day and Bactroban irrigations as well as Nasonex and was to present back in 4 months for follow-up    currently she is on ceftin but no chronic abx use  Using plain amanda med irrigations  Some left congestion but no purulence, no chronic rhinorrhea no facial pressure overall she is feels very well .  no wheezing or shortness of breath  CT chest dated November 25 revealed mild bronchiectasis no concerning interval change from prior CT in July      Still under extreme life stressors with divorce    Physical Exam  /80   Pulse 90   Temp 98.6  F (37  C) (Tympanic)   Ht 1.727 m (5' 8\")   Wt 104.3 kg (230 lb)   SpO2 98%   BMI 34.97 kg/m     General - The patient is well nourished and well developed, and appears to have " good nutritional status.  Alert and oriented to person and place, interactive.  Head and Face - Normocephalic and atraumatic, with no gross asymmetry noted of the contour of the facial features.  The facial nerve is intact, with strong symmetric movements.  Neck-no palpable lymphadenopathy or thyroid mass.  Trachea is midline.  Eyes - Extraocular movements intact.   Ears- External auditory canals are patent, tympanic membranes are intact without effusion or worrisome retractions   Nose - Nasal mucosa is pink and moist with no abnormal mucus.  The septum was grossly midline and non-obstructive, turbinates of normal size and position.  No polyps, masses, or purulence noted on examination.  Mouth - Examination of the oral cavity shows pink, healthy, moist mucosa.  No lesions or ulceration noted.  The dentition are in good repair.  The tongue is mobile and midline.  Throat - The walls of the oropharynx were smooth, pink, moist, symmetric, and had no lesions or ulcerations.  The tonsillar pillars and soft palate were symmetric.  The uvula was midline on elevation.        To evaluate the nose and sinuses in the post operative state, I performed rigid nasal endoscopy. The LPN had previously sprayed both nares with lidocaine and neosynephrine.    I began with the LEFT side using a 0 degree rigid nasal endoscope, and then similarly examined the RIGHT side    Findings:   inferior turbinates are well  lateralized   Middle turbinate and middle meatus:  No purulence, no polyposis, no synechiae  Antrostomy coated in thick nonpurulent casting type secretions which were removed with Blakesley and curved suction  Ethmoid cavity clear  Mucosa is  healthy throughout without polyps nor polypoid degeneration    Impression/Plan  Annie YESI Garcia is a 49 year old female    ICD-10-CM    1. History of endoscopic sinus surgery Z98.890    2. allergic fungal sinusitis J32.4 sulfamethoxazole-trimethoprim (BACTRIM DS/SEPTRA DS) 800-160 MG  tablet   3. CF (cystic fibrosis) (H) E84.9 sulfamethoxazole-trimethoprim (BACTRIM DS/SEPTRA DS) 800-160 MG tablet       BUN, CR labs through PCP for Bactrim if starts long term use again, d/w Annie today  I did rewrite her Bactrim but instructed her not to start this yet unless she develops an episode of sinusitis.  Her sinuses look excellent and she just needed to be debrided      Use Budesonide Rinses Twice Daily  Continue all other current medications  Start bactroban nasal irrigations  Follow up with Karen Mccallum in 1 month    Budesonide nasal saline irrigation per instructions:  -Obtain Asaf Med Sinus rinse over the counter.    -Use warm distilled water and 2 packets of the salt solution that comes with the bottle, dissolve in bottle up to the 240 mL airam.  -Add 1 vial of budesonide.  -Irrigate each side of your nose leaning over the sink, using 1/3 to 1/2 the volume of the bottle in each nostril every irrigation.  Irrigate 2 times daily.  -If additional rinses are needed/recommended, you may use the plan Asaf Med Sinus irrigation without the use of added budesonide.     Francoise Gonzalez D.O.  Otolaryngology/Head and Neck Surgery  Allergy        Again, thank you for allowing me to participate in the care of your patient.        Sincerely,        Francoise Gonzalez MD

## 2019-12-12 NOTE — NURSING NOTE
"Chief Complaint   Patient presents with     RECHECK     Follow Up Chronic Pansinusitis, History of FESS       Initial /80   Pulse 90   Temp 98.6  F (37  C) (Tympanic)   Ht 1.727 m (5' 8\")   Wt 104.3 kg (230 lb)   SpO2 98%   BMI 34.97 kg/m   Estimated body mass index is 34.97 kg/m  as calculated from the following:    Height as of this encounter: 1.727 m (5' 8\").    Weight as of this encounter: 104.3 kg (230 lb).  Medication Reconciliation: complete  Cady De Souza LPN  "

## 2019-12-16 ENCOUNTER — TRANSFERRED RECORDS (OUTPATIENT)
Dept: HEALTH INFORMATION MANAGEMENT | Facility: HOSPITAL | Age: 49
End: 2019-12-16

## 2019-12-17 ENCOUNTER — HOSPITAL ENCOUNTER (OUTPATIENT)
Dept: PHYSICAL THERAPY | Facility: HOSPITAL | Age: 49
Setting detail: THERAPIES SERIES
End: 2019-12-17
Attending: INTERNAL MEDICINE
Payer: COMMERCIAL

## 2019-12-17 PROCEDURE — 97112 NEUROMUSCULAR REEDUCATION: CPT | Mod: GP | Performed by: PHYSICAL THERAPIST

## 2019-12-30 ENCOUNTER — HOSPITAL ENCOUNTER (OUTPATIENT)
Dept: PHYSICAL THERAPY | Facility: HOSPITAL | Age: 49
Setting detail: THERAPIES SERIES
End: 2019-12-30
Attending: INTERNAL MEDICINE
Payer: COMMERCIAL

## 2019-12-30 PROCEDURE — 97112 NEUROMUSCULAR REEDUCATION: CPT | Mod: GP

## 2019-12-30 PROCEDURE — 97140 MANUAL THERAPY 1/> REGIONS: CPT | Mod: GP

## 2020-01-02 ENCOUNTER — HOSPITAL ENCOUNTER (OUTPATIENT)
Dept: PHYSICAL THERAPY | Facility: HOSPITAL | Age: 50
Setting detail: THERAPIES SERIES
End: 2020-01-02
Attending: INTERNAL MEDICINE
Payer: COMMERCIAL

## 2020-01-02 PROCEDURE — 97112 NEUROMUSCULAR REEDUCATION: CPT | Mod: GP

## 2020-01-02 PROCEDURE — 97140 MANUAL THERAPY 1/> REGIONS: CPT | Mod: GP

## 2020-01-06 ENCOUNTER — HOSPITAL ENCOUNTER (OUTPATIENT)
Dept: PHYSICAL THERAPY | Facility: HOSPITAL | Age: 50
Setting detail: THERAPIES SERIES
End: 2020-01-06
Attending: INTERNAL MEDICINE
Payer: COMMERCIAL

## 2020-01-06 PROCEDURE — 97140 MANUAL THERAPY 1/> REGIONS: CPT | Mod: GP | Performed by: PHYSICAL THERAPIST

## 2020-01-08 ENCOUNTER — TRANSFERRED RECORDS (OUTPATIENT)
Dept: HEALTH INFORMATION MANAGEMENT | Facility: HOSPITAL | Age: 50
End: 2020-01-08

## 2020-01-10 ENCOUNTER — HOSPITAL ENCOUNTER (OUTPATIENT)
Dept: PHYSICAL THERAPY | Facility: HOSPITAL | Age: 50
Setting detail: THERAPIES SERIES
End: 2020-01-10
Attending: INTERNAL MEDICINE
Payer: COMMERCIAL

## 2020-01-10 ENCOUNTER — OFFICE VISIT (OUTPATIENT)
Dept: OTOLARYNGOLOGY | Facility: OTHER | Age: 50
End: 2020-01-10
Attending: PHYSICIAN ASSISTANT
Payer: COMMERCIAL

## 2020-01-10 ENCOUNTER — OFFICE VISIT (OUTPATIENT)
Dept: PSYCHIATRY | Facility: OTHER | Age: 50
End: 2020-01-10
Attending: PSYCHIATRY & NEUROLOGY
Payer: COMMERCIAL

## 2020-01-10 VITALS
DIASTOLIC BLOOD PRESSURE: 66 MMHG | HEART RATE: 70 BPM | HEIGHT: 67 IN | WEIGHT: 245 LBS | OXYGEN SATURATION: 99 % | SYSTOLIC BLOOD PRESSURE: 112 MMHG | BODY MASS INDEX: 38.45 KG/M2 | TEMPERATURE: 97.5 F

## 2020-01-10 VITALS
HEART RATE: 60 BPM | DIASTOLIC BLOOD PRESSURE: 70 MMHG | SYSTOLIC BLOOD PRESSURE: 108 MMHG | TEMPERATURE: 97.7 F | OXYGEN SATURATION: 99 % | WEIGHT: 245 LBS | HEIGHT: 67 IN | BODY MASS INDEX: 38.45 KG/M2

## 2020-01-10 DIAGNOSIS — F41.1 GAD (GENERALIZED ANXIETY DISORDER): ICD-10-CM

## 2020-01-10 DIAGNOSIS — J32.4 CHRONIC PANSINUSITIS: Primary | ICD-10-CM

## 2020-01-10 DIAGNOSIS — F51.01 PRIMARY INSOMNIA: ICD-10-CM

## 2020-01-10 DIAGNOSIS — E84.9 CYSTIC FIBROSIS (H): ICD-10-CM

## 2020-01-10 DIAGNOSIS — Z98.890 HISTORY OF ENDOSCOPIC SINUS SURGERY: ICD-10-CM

## 2020-01-10 PROCEDURE — 99213 OFFICE O/P EST LOW 20 MIN: CPT | Mod: 25 | Performed by: PHYSICIAN ASSISTANT

## 2020-01-10 PROCEDURE — 97112 NEUROMUSCULAR REEDUCATION: CPT | Mod: GP | Performed by: PHYSICAL THERAPIST

## 2020-01-10 PROCEDURE — 31231 NASAL ENDOSCOPY DX: CPT | Performed by: PHYSICIAN ASSISTANT

## 2020-01-10 PROCEDURE — 97140 MANUAL THERAPY 1/> REGIONS: CPT | Mod: GP | Performed by: PHYSICAL THERAPIST

## 2020-01-10 PROCEDURE — 99214 OFFICE O/P EST MOD 30 MIN: CPT | Performed by: PSYCHIATRY & NEUROLOGY

## 2020-01-10 RX ORDER — MOMETASONE FUROATE MONOHYDRATE 50 UG/1
2 SPRAY, METERED NASAL DAILY
Qty: 1 BOX | Refills: 11 | Status: SHIPPED | OUTPATIENT
Start: 2020-01-10 | End: 2021-11-15

## 2020-01-10 RX ORDER — ZOLPIDEM TARTRATE 10 MG/1
10 TABLET, FILM COATED ORAL
Qty: 30 TABLET | Refills: 5 | Status: SHIPPED | OUTPATIENT
Start: 2020-01-10 | End: 2020-07-17

## 2020-01-10 RX ORDER — HYDROXYZINE PAMOATE 50 MG/1
50 CAPSULE ORAL EVERY 4 HOURS PRN
Qty: 180 CAPSULE | Refills: 6 | Status: SHIPPED | OUTPATIENT
Start: 2020-01-10 | End: 2020-09-10

## 2020-01-10 ASSESSMENT — PAIN SCALES - GENERAL
PAINLEVEL: NO PAIN (0)
PAINLEVEL: NO PAIN (0)

## 2020-01-10 ASSESSMENT — ANXIETY QUESTIONNAIRES
3. WORRYING TOO MUCH ABOUT DIFFERENT THINGS: MORE THAN HALF THE DAYS
GAD7 TOTAL SCORE: 10
7. FEELING AFRAID AS IF SOMETHING AWFUL MIGHT HAPPEN: NOT AT ALL
1. FEELING NERVOUS, ANXIOUS, OR ON EDGE: MORE THAN HALF THE DAYS
2. NOT BEING ABLE TO STOP OR CONTROL WORRYING: MORE THAN HALF THE DAYS
5. BEING SO RESTLESS THAT IT IS HARD TO SIT STILL: MORE THAN HALF THE DAYS
6. BECOMING EASILY ANNOYED OR IRRITABLE: SEVERAL DAYS

## 2020-01-10 ASSESSMENT — MIFFLIN-ST. JEOR
SCORE: 1763.94
SCORE: 1763.94

## 2020-01-10 ASSESSMENT — PATIENT HEALTH QUESTIONNAIRE - PHQ9
SUM OF ALL RESPONSES TO PHQ QUESTIONS 1-9: 8
5. POOR APPETITE OR OVEREATING: SEVERAL DAYS

## 2020-01-10 NOTE — NURSING NOTE
"Chief Complaint   Patient presents with     RECHECK     Depression, anxiety.       Initial /66 (BP Location: Left arm, Patient Position: Sitting, Cuff Size: Adult Large)   Pulse 70   Temp 97.5  F (36.4  C) (Tympanic)   Ht 1.702 m (5' 7\")   Wt 111.1 kg (245 lb)   SpO2 99%   BMI 38.37 kg/m   Estimated body mass index is 38.37 kg/m  as calculated from the following:    Height as of this encounter: 1.702 m (5' 7\").    Weight as of this encounter: 111.1 kg (245 lb).  Medication Reconciliation: complete  AXEL ARTHUR LPN    "

## 2020-01-10 NOTE — PROGRESS NOTES
"Chief Complaint   Patient presents with     Sinus Problem     Pt is here for a f/u allergic fungal sinusitis.  Pt was placed on Bactrim.       Annie Garcia is a 49 year old female presents for follow-up of allergic fungal sinusitis with Alternaria isolated on August 20, 2018  She has a significant history of cystic fibrosis and chronic pansinusitis     She reports using rinses PRN and nasonex PRN.   She has a schedule which she would like to maintain.   No other concerns.   She feels her sinuses are well.       She is also had a prior history of dermatologic manifestations includig perioral dermatitis which flares with her cystic fibrosis exacerbations.        Procedures performed 9/5  Final path <50 eos/hpf    Past Medical History:   Diagnosis Date     Cystic fibrosis (H)      Depression      Tear of right acetabular labrum         Allergies   Allergen Reactions     Seasonal Allergies Itching     Current Outpatient Medications   Medication     acetaminophen-codeine (TYLENOL #3) 300-30 MG tablet     acetylcysteine (MUCOMYST) 20 % nebulizer solution     AMBIEN 10 MG tablet     budesonide (PULMICORT) 0.5 MG/2ML neb solution     Cefuroxime Axetil (CEFTIN PO)     cyclobenzaprine (FLEXERIL) 10 MG tablet     desonide (DESOWEN) 0.05 % external ointment     dexlansoprazole (DEXILANT) 60 MG CPDR CR capsule     fluconazole (DIFLUCAN) 100 MG tablet     hydrOXYzine (VISTARIL) 50 MG capsule     levalbuterol (XOPENEX) 0.31 MG/3ML nebulizer solution     mometasone (NASONEX) 50 MCG/ACT spray     predniSONE (DELTASONE) 5 MG tablet     PULMOZYME 1 MG/ML neb solution     sucralfate (CARAFATE) 1 GM tablet     WELLBUTRIN  MG 12 hr tablet     No current facility-administered medications for this visit.       ROS: 10 point ROS neg other than the symptoms noted above in the HPI.  /70   Pulse 60   Temp 97.7  F (36.5  C) (Tympanic)   Ht 1.702 m (5' 7\")   Wt 111.1 kg (245 lb)   SpO2 99%   BMI 38.37 kg/m    General " - The patient is well nourished and well developed, and appears to have good nutritional status.  Alert and oriented to person and place, interactive.  Head and Face - Normocephalic and atraumatic, with no gross asymmetry noted of the contour of the facial features.  The facial nerve is intact, with strong symmetric movements.  Neck-no palpable lymphadenopathy or thyroid mass.  Trachea is midline.  Eyes - Extraocular movements intact.   Ears- External auditory canals are patent, tympanic membranes are intact without effusion or worrisome retractions   Nose - Nasal mucosa is pink and moist with no abnormal mucus.  The septum was grossly midline and non-obstructive, turbinates of normal size and position.  No polyps, masses, or purulence noted on examination.  Mouth - Examination of the oral cavity shows pink, healthy, moist mucosa.  No lesions or ulceration noted.  The dentition are in good repair.  The tongue is mobile and midline.  Throat - The walls of the oropharynx were smooth, pink, moist, symmetric, and had no lesions or ulcerations.  The tonsillar pillars and soft palate were symmetric.  The uvula was midline on elevation.          To evaluate the nose and sinuses in the post operative state, I performed rigid nasal endoscopy. The LPN had previously sprayed both nares with lidocaine and neosynephrine.     I began with the LEFT side using a 0 degree rigid nasal endoscope, and then similarly examined the RIGHT side     Findings:   inferior turbinates are well  lateralized   Middle turbinate and middle meatus:  No purulence, no polyposis, no synechiae  Antrostomy coated w/ dried debris along Right, left is clear.   Ethmoid cavity clear  Mucosa is  healthy throughout without polyps nor polypoid degeneration     Patient declined suctioning.       ASSESSMENT:    ICD-10-CM    1. allergic fungal sinusitis J32.4 mometasone (NASONEX) 50 MCG/ACT nasal spray   2. History of endoscopic sinus surgery Z98.890 mometasone  (NASONEX) 50 MCG/ACT nasal spray   3. Cystic fibrosis (H) E84.9        She reports she has been using rinses PRN vs. BID/ daily. This has been aiding in her relief.   Use Budesonide Rinses Twice Daily  Continue all other current medications  Start bactroban nasal irrigations  Follow up in ENT in 6 months or PRN.     nasonex refilled.       Karen Mccallum PA-C  ENT  [facility], [place]  [phone]

## 2020-01-10 NOTE — LETTER
1/10/2020         RE: Annie Garcia  [address] [facility] [facility] [address]        Dear Colleague,    Thank you for referring your patient, Annie Garcia, to the [facility]. Please see a copy of my visit note below.    Chief Complaint   Patient presents with     Sinus Problem     Pt is here for a f/u allergic fungal sinusitis.  Pt was placed on Bactrim.       Annie Garcia is a 49 year old female presents for follow-up of allergic fungal sinusitis with Alternaria isolated on August 20, 2018  She has a significant history of cystic fibrosis and chronic pansinusitis     She reports using rinses PRN and nasonex PRN.   She has a schedule which she would like to maintain.   No other concerns.   She feels her sinuses are well.       She is also had a prior history of dermatologic manifestations includig perioral dermatitis which flares with her cystic fibrosis exacerbations.        Procedures performed 9/5  Final path <50 eos/hpf    Past Medical History:   Diagnosis Date     Cystic fibrosis (H)      Depression      Tear of right acetabular labrum         Allergies   Allergen Reactions     Seasonal Allergies Itching     Current Outpatient Medications   Medication     acetaminophen-codeine (TYLENOL #3) 300-30 MG tablet     acetylcysteine (MUCOMYST) 20 % nebulizer solution     AMBIEN 10 MG tablet     budesonide (PULMICORT) 0.5 MG/2ML neb solution     Cefuroxime Axetil (CEFTIN PO)     cyclobenzaprine (FLEXERIL) 10 MG tablet     desonide (DESOWEN) 0.05 % external ointment     dexlansoprazole (DEXILANT) 60 MG CPDR CR capsule     fluconazole (DIFLUCAN) 100 MG tablet     hydrOXYzine (VISTARIL) 50 MG capsule     levalbuterol (XOPENEX) 0.31 MG/3ML nebulizer solution     mometasone (NASONEX) 50 MCG/ACT spray     predniSONE (DELTASONE) 5 MG tablet     PULMOZYME 1 MG/ML neb solution     sucralfate (CARAFATE) 1 GM tablet     WELLBUTRIN  MG 12 hr tablet     No current  "facility-administered medications for this visit.       ROS: 10 point ROS neg other than the symptoms noted above in the HPI.  /70   Pulse 60   Temp 97.7  F (36.5  C) (Tympanic)   Ht 1.702 m (5' 7\")   Wt 111.1 kg (245 lb)   SpO2 99%   BMI 38.37 kg/m     General - The patient is well nourished and well developed, and appears to have good nutritional status.  Alert and oriented to person and place, interactive.  Head and Face - Normocephalic and atraumatic, with no gross asymmetry noted of the contour of the facial features.  The facial nerve is intact, with strong symmetric movements.  Neck-no palpable lymphadenopathy or thyroid mass.  Trachea is midline.  Eyes - Extraocular movements intact.   Ears- External auditory canals are patent, tympanic membranes are intact without effusion or worrisome retractions   Nose - Nasal mucosa is pink and moist with no abnormal mucus.  The septum was grossly midline and non-obstructive, turbinates of normal size and position.  No polyps, masses, or purulence noted on examination.  Mouth - Examination of the oral cavity shows pink, healthy, moist mucosa.  No lesions or ulceration noted.  The dentition are in good repair.  The tongue is mobile and midline.  Throat - The walls of the oropharynx were smooth, pink, moist, symmetric, and had no lesions or ulcerations.  The tonsillar pillars and soft palate were symmetric.  The uvula was midline on elevation.          To evaluate the nose and sinuses in the post operative state, I performed rigid nasal endoscopy. The LPN had previously sprayed both nares with lidocaine and neosynephrine.     I began with the LEFT side using a 0 degree rigid nasal endoscope, and then similarly examined the RIGHT side     Findings:   inferior turbinates are well  lateralized   Middle turbinate and middle meatus:  No purulence, no polyposis, no synechiae  Antrostomy coated  w/ dried debris along Right, left is clear.   Ethmoid cavity " clear  Mucosa is  healthy throughout without polyps nor polypoid degeneration     Patient declined suctioning.       ASSESSMENT:    ICD-10-CM    1. allergic fungal sinusitis J32.4 mometasone (NASONEX) 50 MCG/ACT nasal spray   2. History of endoscopic sinus surgery Z98.890 mometasone (NASONEX) 50 MCG/ACT nasal spray   3. Cystic fibrosis (H) E84.9        She reports she has been using rinses PRN vs. BID/ daily. This has been aiding in her relief.   Use Budesonide Rinses Twice Daily  Continue all other current medications  Start bactroban nasal irrigations  Follow up in ENT in 6 months or PRN.     nasonex refilled.       Karen Mccallum PA-C  ENT  [facility], [place]  [phone]      Again, thank you for allowing me to participate in the care of your patient.        Sincerely,        Karen Mccallum PA-C

## 2020-01-10 NOTE — NURSING NOTE
"Chief Complaint   Patient presents with     Sinus Problem     Pt is here for a f/u allergic fungal sinusitis.  Pt was placed on Bactrim.       Initial /70   Pulse 60   Temp 97.7  F (36.5  C) (Tympanic)   Ht 1.702 m (5' 7\")   Wt 111.1 kg (245 lb)   SpO2 99%   BMI 38.37 kg/m   Estimated body mass index is 38.37 kg/m  as calculated from the following:    Height as of this encounter: 1.702 m (5' 7\").    Weight as of this encounter: 111.1 kg (245 lb).  Medication Reconciliation: complete  Cady De Souza LPN  "

## 2020-01-10 NOTE — PATIENT INSTRUCTIONS
Refilled Nasonex.   Sinuses overall clear  Mild debris along right ethmoid.   Rinse daily.      Thank you for allowing Karen Mccallum PA-C and our ENT team to participate in your care.  If your medications are too expensive, please give the nurse a call.  We can possibly change this medication.  If you have a scheduling or an appointment question please contact our Health Unit Coordinator at their direct line [phone].   ALL nursing questions or concerns can be directed to your ENT nurse at: [phone] [place]

## 2020-01-10 NOTE — PROGRESS NOTES
PSYCHIATRY CLINIC PROGRESS NOTE   20 minute medication management, more than 50% of time spent counseling patient on medications, medication side effects, symptom history and management   SUBJECTIVE / INTERIM HISTORY                                                                       .  Social- moved from [place].  Children-  20 yo Rajiv is in [place], 18 Lionel and 16 Axel   Last visit:  Continue Wellbutrin XL 2 tabs am and 1 tab afternoon, Ambien  SAMEER 10 mg HS prn. Hydroxyzine 50 mg mg every 4 hours as needed insomnia    - hasn't seen Terrell in while  - tried not taking Wellbutrin for past month. Maybe a bit more down. Thus far prefers try continue not taking Wellbutrin  - they are going through divorce.  - Axel did not play hockey this year.  - they were in [place] this weekend and she felt hypervigilant, nightmares (of him going to take the kids away, him coming after her to cause harm) situations reminiscent of traumatic events cause increased psychological and physiological arousal. Especially the days she has contact  - Rajiv is in [place] and going for engineering. Going well thus far and likes it there.  Axel was there this year for hockey too but now back home  - next court is in February  - has realized she has not been sick (at least not as frequent or as severe)  - finally getting to get surgery.  Ruptured tendon (perhaps from Levaquin). Has put this off for couple years given all her obligations especially with driving the kids around  -  tradename Ambien works way better AND when sleeps better her CF sx tend to stay more in check  - Been here for ~3 years, they moved from [place]. Hard time finding new group of friends / being accepted into the area   - cystic fibrosis ( diagnosed age 39)  -  Was very active including cycling and working out and these helped her deal with stress. Taught cycling class in past    SUBSTANCE USE- no issues    SYMPTOMS-  excessive worry, nervous, edgy, tense and restless.  depressed mood, anhedonia, insomnia , appetite change, weight gain /loss, low energy, feeling worthless and psychomotor agitation observed by others  MEDICAL ROS-  achilles pain, hip pain (had hip replacement). Respiratory issues (CF) and gets sinus issues (CF)  MEDICAL / SURGICAL HISTORY                pregnant [if applicable]--no     Patient Active Problem List   Diagnosis     Cystic fibrosis (H)     Depression     Tear of right acetabular labrum     Dyspepsia and other specified disorders of function of stomach     GERD (gastroesophageal reflux disease)     Facial rash     Hyperlipidemia with target LDL less than 130     Mixed anxiety depressive disorder     Insomnia     Osteoarthritis of hip     Acute bronchitis     Pneumonia due to infectious organism, unspecified laterality, unspecified part of lung     Morbid obesity (H)     Eosinophilia     S/P FESS (functional endoscopic sinus surgery)     Chronic eosinophilic sinusitis     Allergic fungal sinusitis, not active     ALLERGY   Seasonal allergies  MEDICATIONS                                                                                             Current Outpatient Medications   Medication Sig     acetaminophen-codeine (TYLENOL #3) 300-30 MG tablet TAKE 1 TABLET BY MOUTH EVERY 4 HOURS AS NEEDED FOR MILD PAIN. TAKE FOR COUGHING PAIN     acetylcysteine (MUCOMYST) 20 % nebulizer solution Take by nebulization every 8 hours 5-10 ml inhalation every 8 hours     AMBIEN 10 MG tablet TAKE 1 TABLET BY MOUTH NIGHTLY AS NEEDED FOR SLEEP     budesonide (PULMICORT) 0.5 MG/2ML neb solution Squirt entire vial into previously made amanda med saline bottle, mix, and irrigate each nostril until entire bottle empty.  Do this once a day     Cefuroxime Axetil (CEFTIN PO) Take by mouth 2 times daily     cyclobenzaprine (FLEXERIL) 10 MG tablet TAKE 1 TABLET BY MOUTH 3 TIMES DAILY AS NEEDED FOR MUSCLE SPASMS     desonide (DESOWEN) 0.05 % external ointment APPLY 5 TIMES A DAY TO  "ACTIVE RASH ON LIPS AND FACE UNTIL RESOLVED     dexlansoprazole (DEXILANT) 60 MG CPDR CR capsule Take 1 capsule (60 mg) by mouth daily as needed     fluconazole (DIFLUCAN) 100 MG tablet TAKE 1 TABLET BY MOUTH DAILY FOR NASAL FUNGUS AS NEEDED     hydrOXYzine (VISTARIL) 50 MG capsule Take 1 capsule (50 mg) by mouth every 4 hours as needed for anxiety     levalbuterol (XOPENEX) 0.31 MG/3ML nebulizer solution Take 3 mLs (0.31 mg) by nebulization every 6 hours as needed for shortness of breath / dyspnea     mometasone (NASONEX) 50 MCG/ACT spray Spray 2 sprays into both nostrils daily     predniSONE (DELTASONE) 5 MG tablet Take 1 tablet (5 mg) by mouth daily 20 mg Po daily x 3days, then 15 mg Po daily x 3 days, then 10 mg PO daily x 3 days, then 5mg Po daily x 3 days then off.     PULMOZYME 1 MG/ML neb solution USE 1 VIAL IN NEBULIZER DAILY AS NEEDED     sucralfate (CARAFATE) 1 GM tablet TAKE 1 TABLET BY MOUTH FOUR TIMES A DAY AS NEEDED     WELLBUTRIN  MG 12 hr tablet TAKE 2 TABLETS BY MOUTH EVERY MORNING AND 1 TABLET EVERY AFTERNOON (Patient taking differently: Take 150 mg by mouth daily TAKE 2 TABLETS BY MOUTH EVERY MORNING AND 1 TABLET EVERY AFTERNOON)     No current facility-administered medications for this visit.        PAST MEDICATION TRIALS    Prozac (fluoxetine), Zoloft (sertraline), Lexapro (escitalopram) and Wellbutrin, Zyban, Aplenzin (bupropion). Prozac: heightened senses. Zoloft she felt like a zombie   no older antidepressants.   no benzos, ?? Buspar in past.   Ambien (zolpidem).no other sleep meds in past       VITALS   /66 (BP Location: Left arm, Patient Position: Sitting, Cuff Size: Adult Large)   Pulse 70   Temp 97.5  F (36.4  C) (Tympanic)   Ht 1.702 m (5' 7\")   Wt 111.1 kg (245 lb)   SpO2 99%   BMI 38.37 kg/m       PHQ9                     [unfilled]  LABS                                                                                                                       "     Last Comprehensive Metabolic Panel:  Sodium   Date Value Ref Range Status   07/02/2019 141 133 - 144 mmol/L Final     Potassium   Date Value Ref Range Status   07/02/2019 4.1 3.4 - 5.3 mmol/L Final     Chloride   Date Value Ref Range Status   07/02/2019 106 94 - 109 mmol/L Final     Carbon Dioxide   Date Value Ref Range Status   07/02/2019 27 20 - 32 mmol/L Final     Anion Gap   Date Value Ref Range Status   07/02/2019 8 3 - 14 mmol/L Final     Glucose   Date Value Ref Range Status   07/02/2019 73 70 - 99 mg/dL Final     Urea Nitrogen   Date Value Ref Range Status   07/02/2019 11 7 - 30 mg/dL Final     Creatinine   Date Value Ref Range Status   07/02/2019 0.94 0.52 - 1.04 mg/dL Final     GFR Estimate   Date Value Ref Range Status   07/02/2019 70 >60 mL/min/[1.73_m2] Final     Comment:     Non  GFR Calc  Starting 12/18/2018, serum creatinine based estimated GFR (eGFR) will be   calculated using the Chronic Kidney Disease Epidemiology Collaboration   (CKD-EPI) equation.       Calcium   Date Value Ref Range Status   07/02/2019 9.4 8.5 - 10.1 mg/dL Final     CBC RESULTS:   Recent Labs   Lab Test 08/30/18  1650   WBC 10.1   RBC 4.82   HGB 13.9   HCT 40.1   MCV 83   MCH 28.8   MCHC 34.7   RDW 13.5        Liver Function Studies -   Recent Labs   Lab Test 12/11/18  1452   PROTTOTAL 7.1   ALBUMIN 3.8   BILITOTAL 0.2   ALKPHOS 85   AST 14   ALT 24           MENTAL STATUS EXAM                                                                                        Alert. Oriented to person, place, and date / time. Casually groomed, calm, cooperative with good eye contact. No problems with speech or psychomotor behavior. Mood was stressed and sad and affect was congruent to speech content and full range - tearful. Thought process, including associations, was unremarkable and thought content was devoid of suicidal and homicidal ideation and psychotic thought. No hallucinations. Insight was good. Judgment  was intact and adequate for safety. Fund of knowledge was intact. Pt demonstrates no obvious problems with attention, concentration, language, recent or remote memory although these were not formally tested.     ASSESSMENT                                                                                                      HISTORICAL:  Initial psych note 6/7/16          NOTES:    Annie Garcia is a 50 yo , dx with CF at 39 and difficult time adjusting to living here and with her sons getting older also struggles with parental role changing. She was a very active person including taught cycling. She had hip replacement and CF unable to work out: adds to depression and anxiety since exercise helped her significantly with stress. She has not been taking Wellbutrin past month but does have some left if she decides go back taking..   We will stick with Sabino ESTRELLA as she has issues when formulation (generic / company it's coming from) changes -> ends up with morbid , vivid nightmares. She is sensitive to meds so any changes in future we will start out with lower doses and titrate up slowly. Today we agreed on keeping meds as are including the hydroxyzine as a prn of which she has taken in past and does help with anxiety.      TREATMENT RISK STATEMENT:  The risks, benefits, alternatives and potential adverse effects have been explained and are understood by the pt.  The pt agrees to the treatment plan with the ability to do so.   The pt knows to call the clinic for any problems or access emergency care if needed.        DIAGNOSES                    Adjustment disorder with mixed anxiety and depressed mood  STEVE  MDD, recurrent, mod        PLAN                                                                                                                    1)  MEDICATIONS:         --Continue Wellbutrin XL 2 tabs am and 1 tab afternoon, Ambien  SAMEER 10 mg HS prn. Hydroxyzine 50 mg mg every 4 hours as needed  insomnia    2)  THERAPY: working with Terrell OLIVA    3)  LABS:  None    4)  PT MONITOR [call for probs]:  Worsening symptoms, SI/HI, SEs from meds    5)  REFERRALS [CD, medical, other]:  None    6)  RTC: prn

## 2020-01-11 ASSESSMENT — ANXIETY QUESTIONNAIRES: GAD7 TOTAL SCORE: 10

## 2020-01-15 ENCOUNTER — HOSPITAL ENCOUNTER (OUTPATIENT)
Dept: PHYSICAL THERAPY | Facility: HOSPITAL | Age: 50
Setting detail: THERAPIES SERIES
End: 2020-01-15
Attending: INTERNAL MEDICINE
Payer: COMMERCIAL

## 2020-01-15 PROCEDURE — 97112 NEUROMUSCULAR REEDUCATION: CPT | Mod: GP | Performed by: PHYSICAL THERAPIST

## 2020-01-15 PROCEDURE — 97140 MANUAL THERAPY 1/> REGIONS: CPT | Mod: GP | Performed by: PHYSICAL THERAPIST

## 2020-01-16 ENCOUNTER — OFFICE VISIT (OUTPATIENT)
Dept: INTERNAL MEDICINE | Facility: OTHER | Age: 50
End: 2020-01-16
Attending: INTERNAL MEDICINE
Payer: COMMERCIAL

## 2020-01-16 VITALS
OXYGEN SATURATION: 98 % | TEMPERATURE: 97.3 F | HEIGHT: 67 IN | BODY MASS INDEX: 39.71 KG/M2 | HEART RATE: 75 BPM | SYSTOLIC BLOOD PRESSURE: 118 MMHG | WEIGHT: 253 LBS | DIASTOLIC BLOOD PRESSURE: 68 MMHG

## 2020-01-16 DIAGNOSIS — Z01.818 PREOP GENERAL PHYSICAL EXAM: Primary | ICD-10-CM

## 2020-01-16 LAB
ANION GAP SERPL CALCULATED.3IONS-SCNC: 4 MMOL/L (ref 3–14)
BASOPHILS # BLD AUTO: 0 10E9/L (ref 0–0.2)
BASOPHILS NFR BLD AUTO: 0.4 %
BUN SERPL-MCNC: 16 MG/DL (ref 7–30)
CALCIUM SERPL-MCNC: 9 MG/DL (ref 8.5–10.1)
CHLORIDE SERPL-SCNC: 106 MMOL/L (ref 94–109)
CO2 SERPL-SCNC: 28 MMOL/L (ref 20–32)
CREAT SERPL-MCNC: 0.92 MG/DL (ref 0.52–1.04)
DIFFERENTIAL METHOD BLD: NORMAL
EOSINOPHIL # BLD AUTO: 0.2 10E9/L (ref 0–0.7)
EOSINOPHIL NFR BLD AUTO: 2.1 %
ERYTHROCYTE [DISTWIDTH] IN BLOOD BY AUTOMATED COUNT: 13.9 % (ref 10–15)
GFR SERPL CREATININE-BSD FRML MDRD: 73 ML/MIN/{1.73_M2}
GLUCOSE SERPL-MCNC: 108 MG/DL (ref 70–99)
HCT VFR BLD AUTO: 42.5 % (ref 35–47)
HGB BLD-MCNC: 14.4 G/DL (ref 11.7–15.7)
LYMPHOCYTES # BLD AUTO: 1.8 10E9/L (ref 0.8–5.3)
LYMPHOCYTES NFR BLD AUTO: 25.1 %
MCH RBC QN AUTO: 27.7 PG (ref 26.5–33)
MCHC RBC AUTO-ENTMCNC: 33.9 G/DL (ref 31.5–36.5)
MCV RBC AUTO: 82 FL (ref 78–100)
MONOCYTES # BLD AUTO: 0.5 10E9/L (ref 0–1.3)
MONOCYTES NFR BLD AUTO: 6.2 %
NEUTROPHILS # BLD AUTO: 4.8 10E9/L (ref 1.6–8.3)
NEUTROPHILS NFR BLD AUTO: 66.2 %
PLATELET # BLD AUTO: 310 10E9/L (ref 150–450)
POTASSIUM SERPL-SCNC: 4.1 MMOL/L (ref 3.4–5.3)
RBC # BLD AUTO: 5.2 10E12/L (ref 3.8–5.2)
SODIUM SERPL-SCNC: 138 MMOL/L (ref 133–144)
WBC # BLD AUTO: 7.3 10E9/L (ref 4–11)

## 2020-01-16 PROCEDURE — 93000 ELECTROCARDIOGRAM COMPLETE: CPT | Performed by: INTERNAL MEDICINE

## 2020-01-16 PROCEDURE — 36415 COLL VENOUS BLD VENIPUNCTURE: CPT | Performed by: INTERNAL MEDICINE

## 2020-01-16 PROCEDURE — 80048 BASIC METABOLIC PNL TOTAL CA: CPT | Performed by: INTERNAL MEDICINE

## 2020-01-16 PROCEDURE — 85025 COMPLETE CBC W/AUTO DIFF WBC: CPT | Performed by: INTERNAL MEDICINE

## 2020-01-16 PROCEDURE — 99214 OFFICE O/P EST MOD 30 MIN: CPT | Mod: 25 | Performed by: INTERNAL MEDICINE

## 2020-01-16 ASSESSMENT — MIFFLIN-ST. JEOR: SCORE: 1800.23

## 2020-01-16 ASSESSMENT — PAIN SCALES - GENERAL: PAINLEVEL: NO PAIN (0)

## 2020-01-16 NOTE — H&P (VIEW-ONLY)
[facility]  [address]  [place] [address]  [phone]  Dept: [phone]    PRE-OP EVALUATION:  Today's date: 2020    Annie Garcia (: 1970) presents for pre-operative evaluation assessment as requested by Dr. Cid.  She requires evaluation and anesthesia risk assessment prior to undergoing surgery/procedure for treatment of Hardware removal .    Proposed Surgery/ Procedure: Hardware removal- right foot  Date of Surgery/ Procedure: 2020  Time of Surgery/ Procedure: unknown  Hospital/Surgical Facility: [facility] [place]  Primary Physician: Petar Salgado  Type of Anesthesia Anticipated: to be determined    Patient has a Health Care Directive or Living Will:  YES    1. NO - Do you have a history of heart attack, stroke, stent, bypass or surgery on an artery in the head, neck, heart or legs?  2. NO - Do you ever have any pain or discomfort in your chest?  3. NO - Do you have a history of  Heart Failure?  4. NO - Are you troubled by shortness of breath when: walking on the level, up a slight hill or at night?  5. NO - Do you currently have a cold, bronchitis or other respiratory infection?  6. NO - Do you have a cough, shortness of breath or wheezing?  7. NO - Do you sometimes get pains in the calves of your legs when you walk?  8. NO - Do you or anyone in your family have previous history of blood clots?  9. NO - Do you or does anyone in your family have a serious bleeding problem such as prolonged bleeding following surgeries or cuts?  10. NO - Have you ever had problems with anemia or been told to take iron pills?  11. NO - Have you had any abnormal blood loss such as black, tarry or bloody stools, or abnormal vaginal bleeding?  12. NO - Have you ever had a blood transfusion?  13. NO - Have you or any of your relatives ever had problems with anesthesia?  14. NO - Do you have sleep apnea, excessive snoring or daytime drowsiness?  15. NO - Do  you have any prosthetic heart valves?  16. NO - Do you have prosthetic joints?  17. NO - Is there any chance that you may be pregnant?      HPI:     HPI related to upcoming procedure: Hardware removal right foot      DEPRESSION - Patient has a long history of Depression of moderate severity requiring medication for control with recent symptoms being stable..Current symptoms of depression include depressed mood.     Cystic Fibrosis     MEDICAL HISTORY:     Patient Active Problem List    Diagnosis Date Noted     Allergic fungal sinusitis, not active 02/11/2019     Priority: Medium     alternaria isolated on 8/20/18         Chronic eosinophilic sinusitis 04/27/2018     Priority: Medium     improved       Eosinophilia 03/12/2018     Priority: Medium     S/P FESS (functional endoscopic sinus surgery) 03/12/2018     Priority: Medium     Morbid obesity (H) 08/07/2017     Priority: Medium     Pneumonia due to infectious organism, unspecified laterality, unspecified part of lung 03/04/2017     Priority: Medium     Acute bronchitis 03/03/2017     Priority: Medium     Hyperlipidemia with target LDL less than 130 02/25/2015     Priority: Medium     Diagnosis updated by automated process. Provider to review and confirm.       Facial rash 01/23/2015     Priority: Medium     Mixed anxiety depressive disorder 05/09/2014     Priority: Medium     Insomnia 05/09/2014     Priority: Medium     Osteoarthritis of hip 05/09/2014     Priority: Medium     Dyspepsia and other specified disorders of function of stomach 01/10/2014     Priority: Medium     GERD (gastroesophageal reflux disease) 01/10/2014     Priority: Medium     Cystic fibrosis (H)      Priority: Medium     Depression      Priority: Medium     Tear of right acetabular labrum      Priority: Medium      Past Medical History:   Diagnosis Date     Cystic fibrosis (H)      Depression      Tear of right acetabular labrum      Past Surgical History:   Procedure Laterality Date      ANKLE SURGERY Right 07/27/2019    foot/ankle     BREAST SURGERY       ENDOSCOPIC SINUS SURGERY N/A 9/5/2018    Procedure: ENDOSCOPIC SINUS SURGERY;;  Surgeon: Francoise Gonzalez MD;  Location: [facility]     ENDOSCOPY UPPER, COLONOSCOPY, COMBINED N/A 11/18/2016    Procedure: COMBINED ENDOSCOPY UPPER, COLONOSCOPY;  Surgeon: Levi Hinkle MD;  Location: [facility]     ESOPHAGOSCOPY, GASTROSCOPY, DUODENOSCOPY (EGD), COMBINED  1/10/2014    Procedure: COMBINED ESOPHAGOSCOPY, GASTROSCOPY, DUODENOSCOPY (EGD);  UPPER ENDOSCOPY with Biopsy;  Surgeon: Levi Hinkle MD;  Location: [facility]     EXAM UNDER ANESTHESIA NOSE N/A 9/5/2018    Procedure: EXAM UNDER ANESTHESIA NOSE;  SINUS EXAM UNDER ANESTHESIA, Endoscopic Sinus Surgery;  Surgeon: Francoise Gonzalez MD;  Location: [facility]     GYN SURGERY  2008    ablation     ORTHOPEDIC SURGERY  1994    left knee arthroscopy     ORTHOPEDIC SURGERY  1994    right shoulder     ORTHOPEDIC SURGERY  2014    left hip replacement     SEPTOPLASTY, ENDOSCOPIC SINUS SURGERY, COMBINED Left 2/21/2018    Procedure: COMBINED SEPTOPLASTY, ENDOSCOPIC SINUS SURGERY;  LEFT ENDOSCOPIC SINUS SURGERY, SEPTOPLASTY, BILATERAL TURBINATE REDUCTION, PROPEL IMPLANTS;  Surgeon: Francoise Gonzalez MD;  Location: [facility]     TURBINOPLASTY Bilateral 2/21/2018    Procedure: TURBINOPLASTY;;  Surgeon: Francoise Gonzalez MD;  Location: [facility]     Current Outpatient Medications   Medication Sig Dispense Refill     acetaminophen-codeine (TYLENOL #3) 300-30 MG tablet TAKE 1 TABLET BY MOUTH EVERY 4 HOURS AS NEEDED FOR MILD PAIN. TAKE FOR COUGHING PAIN 90 tablet 0     acetylcysteine (MUCOMYST) 20 % nebulizer solution Take by nebulization every 8 hours 5-10 ml inhalation every 8 hours       AMBIEN 10 MG tablet Take 1 tablet (10 mg) by mouth nightly as needed for sleep 30 tablet 5     budesonide (PULMICORT) 0.5 MG/2ML neb solution Squirt entire vial into previously made amanda med saline bottle, mix, and irrigate each nostril until  entire bottle empty.  Do this once a day 3 Box 11     Cefuroxime Axetil (CEFTIN PO) Take by mouth 2 times daily       cyclobenzaprine (FLEXERIL) 10 MG tablet TAKE 1 TABLET BY MOUTH 3 TIMES DAILY AS NEEDED FOR MUSCLE SPASMS 30 tablet 0     desonide (DESOWEN) 0.05 % external ointment APPLY 5 TIMES A DAY TO ACTIVE RASH ON LIPS AND FACE UNTIL RESOLVED 60 g 0     dexlansoprazole (DEXILANT) 60 MG CPDR CR capsule Take 1 capsule (60 mg) by mouth daily as needed 30 capsule 3     fluconazole (DIFLUCAN) 100 MG tablet TAKE 1 TABLET BY MOUTH DAILY FOR NASAL FUNGUS AS NEEDED 90 tablet 0     hydrOXYzine (VISTARIL) 50 MG capsule Take 1 capsule (50 mg) by mouth every 4 hours as needed for anxiety 180 capsule 6     levalbuterol (XOPENEX) 0.31 MG/3ML nebulizer solution Take 3 mLs (0.31 mg) by nebulization every 6 hours as needed for shortness of breath / dyspnea 225 mL 0     mometasone (NASONEX) 50 MCG/ACT nasal spray Spray 2 sprays into both nostrils daily 1 Box 11     mometasone (NASONEX) 50 MCG/ACT spray Spray 2 sprays into both nostrils daily 3 Box 11     predniSONE (DELTASONE) 5 MG tablet Take 1 tablet (5 mg) by mouth daily 20 mg Po daily x 3days, then 15 mg Po daily x 3 days, then 10 mg PO daily x 3 days, then 5mg Po daily x 3 days then off. 50 tablet 1     PULMOZYME 1 MG/ML neb solution USE 1 VIAL IN NEBULIZER DAILY AS NEEDED 75 mL 0     sucralfate (CARAFATE) 1 GM tablet TAKE 1 TABLET BY MOUTH FOUR TIMES A DAY AS NEEDED 120 tablet 3     WELLBUTRIN  MG 12 hr tablet TAKE 2 TABLETS BY MOUTH EVERY MORNING AND 1 TABLET EVERY AFTERNOON (Patient taking differently: Take 150 mg by mouth daily TAKE 2 TABLETS BY MOUTH EVERY MORNING AND 1 TABLET EVERY AFTERNOON) 270 tablet 0     OTC products: None, except as noted above    Allergies   Allergen Reactions     Seasonal Allergies Itching      Latex Allergy: NO    Social History     Tobacco Use     Smoking status: Never Smoker     Smokeless tobacco: Never Used   Substance Use Topics      "Alcohol use: Yes     Comment: rare     History   Drug Use No       REVIEW OF SYSTEMS:   Constitutional, neuro, ENT, endocrine, pulmonary, cardiac, gastrointestinal, genitourinary, musculoskeletal, integument and psychiatric systems are negative, except as otherwise noted.    EXAM:   /68 (BP Location: Right arm, Patient Position: Chair, Cuff Size: Adult Large)   Pulse 75   Temp 97.3  F (36.3  C) (Tympanic)   Ht 1.702 m (5' 7\")   Wt 114.8 kg (253 lb)   SpO2 98%   BMI 39.63 kg/m      GENERAL APPEARANCE: Alert and no distress     EYES: EOMI, PERRL     HENT: ear canals and TM's normal and nose and mouth without ulcers or lesions     NECK: no adenopathy, no bruits     RESP: lungs clear to auscultation - no rales, rhonchi or wheezes     CV: regular rates and rhythm, normal S1 S2, no S3 or S4 and no murmur, click or rub     ABDOMEN:  Obese-soft, nontender, no HSM or masses and bowel sounds normal     MS: extremities normal- no gross deformities noted, no evidence of inflammation in joints, FROM in all extremities.     SKIN: no suspicious lesions or rashes     NEURO: Normal strength and tone, sensory exam grossly normal, mentation intact and speech normal     PSYCH: mentation appears normal. and affect normal/bright    DIAGNOSTICS:   EKG: NSR    Recent Labs   Lab Test 07/02/19  1050 05/14/19  0848  10/12/15  0817  06/02/14 05/28/14 09/17/13  1042   HGB 14.7 15.1   < >  --    < >  --   --    < > 14.1    369   < >  --    < >  --   --    < > 319   INR  --   --   --   --   --  1.9* 1.4*   < >  --     140   < >  --    < >  --   --    < > 140   POTASSIUM 4.1 3.5   < >  --    < >  --   --    < > 4.2   CR 0.94 0.98   < >  --    < >  --   --    < > 0.99   A1C  --   --   --  5.6  --   --   --   --  5.2    < > = values in this interval not displayed.      Results for orders placed or performed in visit on 01/16/20   CBC with platelets and differential     Status: None   Result Value Ref Range    WBC 7.3 4.0 - " 11.0 10e9/L    RBC Count 5.20 3.8 - 5.2 10e12/L    Hemoglobin 14.4 11.7 - 15.7 g/dL    Hematocrit 42.5 35.0 - 47.0 %    MCV 82 78 - 100 fl    MCH 27.7 26.5 - 33.0 pg    MCHC 33.9 31.5 - 36.5 g/dL    RDW 13.9 10.0 - 15.0 %    Platelet Count 310 150 - 450 10e9/L    % Neutrophils 66.2 %    % Lymphocytes 25.1 %    % Monocytes 6.2 %    % Eosinophils 2.1 %    % Basophils 0.4 %    Absolute Neutrophil 4.8 1.6 - 8.3 10e9/L    Absolute Lymphocytes 1.8 0.8 - 5.3 10e9/L    Absolute Monocytes 0.5 0.0 - 1.3 10e9/L    Absolute Eosinophils 0.2 0.0 - 0.7 10e9/L    Absolute Basophils 0.0 0.0 - 0.2 10e9/L    Diff Method Automated Method    Basic metabolic panel     Status: Abnormal   Result Value Ref Range    Sodium 138 133 - 144 mmol/L    Potassium 4.1 3.4 - 5.3 mmol/L    Chloride 106 94 - 109 mmol/L    Carbon Dioxide 28 20 - 32 mmol/L    Anion Gap 4 3 - 14 mmol/L    Glucose 108 (H) 70 - 99 mg/dL    Urea Nitrogen 16 7 - 30 mg/dL    Creatinine 0.92 0.52 - 1.04 mg/dL    GFR Estimate 73 >60 mL/min/[1.73_m2]    GFR Estimate If Black 84 >60 mL/min/[1.73_m2]    Calcium 9.0 8.5 - 10.1 mg/dL     IMPRESSION:   Diagnosis/reason for consult: Hardware removal right foot    The proposed surgical procedure is considered INTERMEDIATE risk.    REVISED CARDIAC RISK INDEX  The patient has the following serious cardiovascular risks for perioperative complications such as (MI, PE, VFib and 3  AV Block):  No serious cardiac risks  INTERPRETATION: 0 risks: Class I (very low risk - 0.4% complication rate)    The patient has the following additional risks for perioperative complications:  No identified additional risks  The 10-year ASCVD risk score (Rommel GUARDADO Jr., et al., 2013) is: 1%    Values used to calculate the score:      Age: 49 years      Sex: Female      Is Non- : No      Diabetic: No      Tobacco smoker: No      Systolic Blood Pressure: 118 mmHg      Is BP treated: No      HDL Cholesterol: 65 mg/dL      Total Cholesterol: 223  mg/dL      ICD-10-CM    1. Preop general physical exam Z01.818 EKG 12-lead complete w/read - (Clinic Performed)     CBC with platelets and differential     Basic metabolic panel     CANCELED: XR CHEST 2 VW (Clinic Performed)     CANCELED: CBC with platelets and differential     CANCELED: Basic metabolic panel       RECOMMENDATIONS:       --Patient is to us Nebulizer morning of procedure.    APPROVAL GIVEN to proceed with proposed procedure, without further diagnostic evaluation       Signed Electronically by: Petar Salgado DO    Copy of this evaluation report is provided to requesting physician.    [facility] Preop Guidelines    Revised Cardiac Risk Index

## 2020-01-16 NOTE — NURSING NOTE
"Chief Complaint   Patient presents with     Pre-Op Exam       Initial /68 (BP Location: Right arm, Patient Position: Chair, Cuff Size: Adult Large)   Pulse 75   Temp 97.3  F (36.3  C) (Tympanic)   Ht 1.702 m (5' 7\")   Wt 114.8 kg (253 lb)   SpO2 98%   BMI 39.63 kg/m   Estimated body mass index is 39.63 kg/m  as calculated from the following:    Height as of this encounter: 1.702 m (5' 7\").    Weight as of this encounter: 114.8 kg (253 lb).  Medication Reconciliation: complete  YASMINE LAKE LPN  "

## 2020-01-16 NOTE — PROGRESS NOTES
[facility]  [address]  [place] [address]  [phone]  Dept: [phone]    PRE-OP EVALUATION:  Today's date: 2020    Annie Garcia (: 1970) presents for pre-operative evaluation assessment as requested by Dr. Cid.  She requires evaluation and anesthesia risk assessment prior to undergoing surgery/procedure for treatment of Hardware removal .    Proposed Surgery/ Procedure: Hardware removal- right foot  Date of Surgery/ Procedure: 2020  Time of Surgery/ Procedure: unknown  Hospital/Surgical Facility: [facility]  Primary Physician: Petar Salgado  Type of Anesthesia Anticipated: to be determined    Patient has a Health Care Directive or Living Will:  YES    1. NO - Do you have a history of heart attack, stroke, stent, bypass or surgery on an artery in the head, neck, heart or legs?  2. NO - Do you ever have any pain or discomfort in your chest?  3. NO - Do you have a history of  Heart Failure?  4. NO - Are you troubled by shortness of breath when: walking on the level, up a slight hill or at night?  5. NO - Do you currently have a cold, bronchitis or other respiratory infection?  6. NO - Do you have a cough, shortness of breath or wheezing?  7. NO - Do you sometimes get pains in the calves of your legs when you walk?  8. NO - Do you or anyone in your family have previous history of blood clots?  9. NO - Do you or does anyone in your family have a serious bleeding problem such as prolonged bleeding following surgeries or cuts?  10. NO - Have you ever had problems with anemia or been told to take iron pills?  11. NO - Have you had any abnormal blood loss such as black, tarry or bloody stools, or abnormal vaginal bleeding?  12. NO - Have you ever had a blood transfusion?  13. NO - Have you or any of your relatives ever had problems with anesthesia?  14. NO - Do you have sleep apnea, excessive snoring or daytime drowsiness?  15. NO - Do  you have any prosthetic heart valves?  16. NO - Do you have prosthetic joints?  17. NO - Is there any chance that you may be pregnant?      HPI:     HPI related to upcoming procedure: Hardware removal right foot      DEPRESSION - Patient has a long history of Depression of moderate severity requiring medication for control with recent symptoms being stable..Current symptoms of depression include depressed mood.     Cystic Fibrosis     MEDICAL HISTORY:     Patient Active Problem List    Diagnosis Date Noted     Allergic fungal sinusitis, not active 02/11/2019     Priority: Medium     alternaria isolated on 8/20/18         Chronic eosinophilic sinusitis 04/27/2018     Priority: Medium     improved       Eosinophilia 03/12/2018     Priority: Medium     S/P FESS (functional endoscopic sinus surgery) 03/12/2018     Priority: Medium     Morbid obesity (H) 08/07/2017     Priority: Medium     Pneumonia due to infectious organism, unspecified laterality, unspecified part of lung 03/04/2017     Priority: Medium     Acute bronchitis 03/03/2017     Priority: Medium     Hyperlipidemia with target LDL less than 130 02/25/2015     Priority: Medium     Diagnosis updated by automated process. Provider to review and confirm.       Facial rash 01/23/2015     Priority: Medium     Mixed anxiety depressive disorder 05/09/2014     Priority: Medium     Insomnia 05/09/2014     Priority: Medium     Osteoarthritis of hip 05/09/2014     Priority: Medium     Dyspepsia and other specified disorders of function of stomach 01/10/2014     Priority: Medium     GERD (gastroesophageal reflux disease) 01/10/2014     Priority: Medium     Cystic fibrosis (H)      Priority: Medium     Depression      Priority: Medium     Tear of right acetabular labrum      Priority: Medium      Past Medical History:   Diagnosis Date     Cystic fibrosis (H)      Depression      Tear of right acetabular labrum      Past Surgical History:   Procedure Laterality Date      ANKLE SURGERY Right 07/27/2019    foot/ankle     BREAST SURGERY       ENDOSCOPIC SINUS SURGERY N/A 9/5/2018    Procedure: ENDOSCOPIC SINUS SURGERY;;  Surgeon: Francoise Gonzalez MD;  [facility]     ENDOSCOPY UPPER, COLONOSCOPY, COMBINED N/A 11/18/2016    Procedure: COMBINED ENDOSCOPY UPPER, COLONOSCOPY;  Surgeon: Levi Hinkle MD;  [facility]     ESOPHAGOSCOPY, GASTROSCOPY, DUODENOSCOPY (EGD), COMBINED  1/10/2014    Procedure: COMBINED ESOPHAGOSCOPY, GASTROSCOPY, DUODENOSCOPY (EGD);  UPPER ENDOSCOPY with Biopsy;  Surgeon: Levi Hinkle MD;  [facility]     EXAM UNDER ANESTHESIA NOSE N/A 9/5/2018    Procedure: EXAM UNDER ANESTHESIA NOSE;  SINUS EXAM UNDER ANESTHESIA, Endoscopic Sinus Surgery;  Surgeon: Francoise Gonzalez MD;  [facility]     GYN SURGERY  2008    ablation     ORTHOPEDIC SURGERY  1994    left knee arthroscopy     ORTHOPEDIC SURGERY  1994    right shoulder     ORTHOPEDIC SURGERY  2014    left hip replacement     SEPTOPLASTY, ENDOSCOPIC SINUS SURGERY, COMBINED Left 2/21/2018    Procedure: COMBINED SEPTOPLASTY, ENDOSCOPIC SINUS SURGERY;  LEFT ENDOSCOPIC SINUS SURGERY, SEPTOPLASTY, BILATERAL TURBINATE REDUCTION, PROPEL IMPLANTS;  Surgeon: Francoise Gonzalez MD;  [facility]     TURBINOPLASTY Bilateral 2/21/2018    Procedure: TURBINOPLASTY;;  Surgeon: Francoise Gonzalez MD;  [facility]     Current Outpatient Medications   Medication Sig Dispense Refill     acetaminophen-codeine (TYLENOL #3) 300-30 MG tablet TAKE 1 TABLET BY MOUTH EVERY 4 HOURS AS NEEDED FOR MILD PAIN. TAKE FOR COUGHING PAIN 90 tablet 0     acetylcysteine (MUCOMYST) 20 % nebulizer solution Take by nebulization every 8 hours 5-10 ml inhalation every 8 hours       AMBIEN 10 MG tablet Take 1 tablet (10 mg) by mouth nightly as needed for sleep 30 tablet 5     budesonide (PULMICORT) 0.5 MG/2ML neb solution Squirt entire vial into previously made amanda med saline bottle, mix, and irrigate each nostril until  entire bottle empty.  Do this once a day 3 Box 11     Cefuroxime Axetil (CEFTIN PO) Take by mouth 2 times daily       cyclobenzaprine (FLEXERIL) 10 MG tablet TAKE 1 TABLET BY MOUTH 3 TIMES DAILY AS NEEDED FOR MUSCLE SPASMS 30 tablet 0     desonide (DESOWEN) 0.05 % external ointment APPLY 5 TIMES A DAY TO ACTIVE RASH ON LIPS AND FACE UNTIL RESOLVED 60 g 0     dexlansoprazole (DEXILANT) 60 MG CPDR CR capsule Take 1 capsule (60 mg) by mouth daily as needed 30 capsule 3     fluconazole (DIFLUCAN) 100 MG tablet TAKE 1 TABLET BY MOUTH DAILY FOR NASAL FUNGUS AS NEEDED 90 tablet 0     hydrOXYzine (VISTARIL) 50 MG capsule Take 1 capsule (50 mg) by mouth every 4 hours as needed for anxiety 180 capsule 6     levalbuterol (XOPENEX) 0.31 MG/3ML nebulizer solution Take 3 mLs (0.31 mg) by nebulization every 6 hours as needed for shortness of breath / dyspnea 225 mL 0     mometasone (NASONEX) 50 MCG/ACT nasal spray Spray 2 sprays into both nostrils daily 1 Box 11     mometasone (NASONEX) 50 MCG/ACT spray Spray 2 sprays into both nostrils daily 3 Box 11     predniSONE (DELTASONE) 5 MG tablet Take 1 tablet (5 mg) by mouth daily 20 mg Po daily x 3days, then 15 mg Po daily x 3 days, then 10 mg PO daily x 3 days, then 5mg Po daily x 3 days then off. 50 tablet 1     PULMOZYME 1 MG/ML neb solution USE 1 VIAL IN NEBULIZER DAILY AS NEEDED 75 mL 0     sucralfate (CARAFATE) 1 GM tablet TAKE 1 TABLET BY MOUTH FOUR TIMES A DAY AS NEEDED 120 tablet 3     WELLBUTRIN  MG 12 hr tablet TAKE 2 TABLETS BY MOUTH EVERY MORNING AND 1 TABLET EVERY AFTERNOON (Patient taking differently: Take 150 mg by mouth daily TAKE 2 TABLETS BY MOUTH EVERY MORNING AND 1 TABLET EVERY AFTERNOON) 270 tablet 0     OTC products: None, except as noted above    Allergies   Allergen Reactions     Seasonal Allergies Itching      Latex Allergy: NO    Social History     Tobacco Use     Smoking status: Never Smoker     Smokeless tobacco: Never Used   Substance Use Topics      "Alcohol use: Yes     Comment: rare     History   Drug Use No       REVIEW OF SYSTEMS:   Constitutional, neuro, ENT, endocrine, pulmonary, cardiac, gastrointestinal, genitourinary, musculoskeletal, integument and psychiatric systems are negative, except as otherwise noted.    EXAM:   /68 (BP Location: Right arm, Patient Position: Chair, Cuff Size: Adult Large)   Pulse 75   Temp 97.3  F (36.3  C) (Tympanic)   Ht 1.702 m (5' 7\")   Wt 114.8 kg (253 lb)   SpO2 98%   BMI 39.63 kg/m      GENERAL APPEARANCE: Alert and no distress     EYES: EOMI, PERRL     HENT: ear canals and TM's normal and nose and mouth without ulcers or lesions     NECK: no adenopathy, no bruits     RESP: lungs clear to auscultation - no rales, rhonchi or wheezes     CV: regular rates and rhythm, normal S1 S2, no S3 or S4 and no murmur, click or rub     ABDOMEN:  Obese-soft, nontender, no HSM or masses and bowel sounds normal     MS: extremities normal- no gross deformities noted, no evidence of inflammation in joints, FROM in all extremities.     SKIN: no suspicious lesions or rashes     NEURO: Normal strength and tone, sensory exam grossly normal, mentation intact and speech normal     PSYCH: mentation appears normal. and affect normal/bright    DIAGNOSTICS:   EKG: NSR    Recent Labs   Lab Test 07/02/19  1050 05/14/19  0848  10/12/15  0817  06/02/14 05/28/14 09/17/13  1042   HGB 14.7 15.1   < >  --    < >  --   --    < > 14.1    369   < >  --    < >  --   --    < > 319   INR  --   --   --   --   --  1.9* 1.4*   < >  --     140   < >  --    < >  --   --    < > 140   POTASSIUM 4.1 3.5   < >  --    < >  --   --    < > 4.2   CR 0.94 0.98   < >  --    < >  --   --    < > 0.99   A1C  --   --   --  5.6  --   --   --   --  5.2    < > = values in this interval not displayed.      Results for orders placed or performed in visit on 01/16/20   CBC with platelets and differential     Status: None   Result Value Ref Range    WBC 7.3 4.0 - " 11.0 10e9/L    RBC Count 5.20 3.8 - 5.2 10e12/L    Hemoglobin 14.4 11.7 - 15.7 g/dL    Hematocrit 42.5 35.0 - 47.0 %    MCV 82 78 - 100 fl    MCH 27.7 26.5 - 33.0 pg    MCHC 33.9 31.5 - 36.5 g/dL    RDW 13.9 10.0 - 15.0 %    Platelet Count 310 150 - 450 10e9/L    % Neutrophils 66.2 %    % Lymphocytes 25.1 %    % Monocytes 6.2 %    % Eosinophils 2.1 %    % Basophils 0.4 %    Absolute Neutrophil 4.8 1.6 - 8.3 10e9/L    Absolute Lymphocytes 1.8 0.8 - 5.3 10e9/L    Absolute Monocytes 0.5 0.0 - 1.3 10e9/L    Absolute Eosinophils 0.2 0.0 - 0.7 10e9/L    Absolute Basophils 0.0 0.0 - 0.2 10e9/L    Diff Method Automated Method    Basic metabolic panel     Status: Abnormal   Result Value Ref Range    Sodium 138 133 - 144 mmol/L    Potassium 4.1 3.4 - 5.3 mmol/L    Chloride 106 94 - 109 mmol/L    Carbon Dioxide 28 20 - 32 mmol/L    Anion Gap 4 3 - 14 mmol/L    Glucose 108 (H) 70 - 99 mg/dL    Urea Nitrogen 16 7 - 30 mg/dL    Creatinine 0.92 0.52 - 1.04 mg/dL    GFR Estimate 73 >60 mL/min/[1.73_m2]    GFR Estimate If Black 84 >60 mL/min/[1.73_m2]    Calcium 9.0 8.5 - 10.1 mg/dL     IMPRESSION:   Diagnosis/reason for consult: Hardware removal right foot    The proposed surgical procedure is considered INTERMEDIATE risk.    REVISED CARDIAC RISK INDEX  The patient has the following serious cardiovascular risks for perioperative complications such as (MI, PE, VFib and 3  AV Block):  No serious cardiac risks  INTERPRETATION: 0 risks: Class I (very low risk - 0.4% complication rate)    The patient has the following additional risks for perioperative complications:  No identified additional risks  The 10-year ASCVD risk score (Rommel GUARDADO Jr., et al., 2013) is: 1%    Values used to calculate the score:      Age: 49 years      Sex: Female      Is Non- : No      Diabetic: No      Tobacco smoker: No      Systolic Blood Pressure: 118 mmHg      Is BP treated: No      HDL Cholesterol: 65 mg/dL      Total Cholesterol: 223  mg/dL      ICD-10-CM    1. Preop general physical exam Z01.818 EKG 12-lead complete w/read - (Clinic Performed)     CBC with platelets and differential     Basic metabolic panel     CANCELED: XR CHEST 2 VW (Clinic Performed)     CANCELED: CBC with platelets and differential     CANCELED: Basic metabolic panel       RECOMMENDATIONS:       --Patient is to us Nebulizer morning of procedure.    APPROVAL GIVEN to proceed with proposed procedure, without further diagnostic evaluation       Signed Electronically by: Petar Salgado DO    Copy of this evaluation report is provided to requesting physician.    [facility] Preop Guidelines    Revised Cardiac Risk Index

## 2020-01-17 ENCOUNTER — HOSPITAL ENCOUNTER (OUTPATIENT)
Dept: PHYSICAL THERAPY | Facility: HOSPITAL | Age: 50
Setting detail: THERAPIES SERIES
End: 2020-01-17
Attending: INTERNAL MEDICINE
Payer: COMMERCIAL

## 2020-01-17 PROCEDURE — 97112 NEUROMUSCULAR REEDUCATION: CPT | Mod: GP | Performed by: PHYSICAL THERAPIST

## 2020-01-17 PROCEDURE — 97140 MANUAL THERAPY 1/> REGIONS: CPT | Mod: GP | Performed by: PHYSICAL THERAPIST

## 2020-01-20 ENCOUNTER — TELEPHONE (OUTPATIENT)
Dept: INTERNAL MEDICINE | Facility: OTHER | Age: 50
End: 2020-01-20

## 2020-01-22 ENCOUNTER — HOSPITAL ENCOUNTER (OUTPATIENT)
Dept: PHYSICAL THERAPY | Facility: HOSPITAL | Age: 50
Setting detail: THERAPIES SERIES
End: 2020-01-22
Attending: INTERNAL MEDICINE
Payer: COMMERCIAL

## 2020-01-22 ENCOUNTER — ANESTHESIA EVENT (OUTPATIENT)
Dept: SURGERY | Facility: HOSPITAL | Age: 50
End: 2020-01-22
Payer: COMMERCIAL

## 2020-01-22 PROCEDURE — 97140 MANUAL THERAPY 1/> REGIONS: CPT | Mod: GP | Performed by: PHYSICAL THERAPIST

## 2020-01-22 PROCEDURE — 97112 NEUROMUSCULAR REEDUCATION: CPT | Mod: GP | Performed by: PHYSICAL THERAPIST

## 2020-01-22 NOTE — ANESTHESIA PREPROCEDURE EVALUATION
Anesthesia Pre-Procedure Evaluation    Patient: Annie Garcia   MRN: [id number] : 1970          Preoperative Diagnosis: Pain from implanted hardware [T85.848A]    Procedure(s):  REMOVAL, HARDWARE RIGHT FOOT    Past Medical History:   Diagnosis Date     Cystic fibrosis (H)      Depression      Tear of right acetabular labrum      Past Surgical History:   Procedure Laterality Date     ANKLE SURGERY Right 2019    foot/ankle     BREAST SURGERY       ENDOSCOPIC SINUS SURGERY N/A 2018    Procedure: ENDOSCOPIC SINUS SURGERY;;  Surgeon: Francoise Gonzalez MD;  [facility]     ENDOSCOPY UPPER, COLONOSCOPY, COMBINED N/A 2016    Procedure: COMBINED ENDOSCOPY UPPER, COLONOSCOPY;  Surgeon: Levi Hinkle MD;  [facility]     ESOPHAGOSCOPY, GASTROSCOPY, DUODENOSCOPY (EGD), COMBINED  1/10/2014    Procedure: COMBINED ESOPHAGOSCOPY, GASTROSCOPY, DUODENOSCOPY (EGD);  UPPER ENDOSCOPY with Biopsy;  Surgeon: Levi Hinkle MD;  [facility]     EXAM UNDER ANESTHESIA NOSE N/A 2018    Procedure: EXAM UNDER ANESTHESIA NOSE;  SINUS EXAM UNDER ANESTHESIA, Endoscopic Sinus Surgery;  Surgeon: Francoise Gonzalez MD;  [facility]     GYN SURGERY      ablation     ORTHOPEDIC SURGERY      left knee arthroscopy     ORTHOPEDIC SURGERY      right shoulder     ORTHOPEDIC SURGERY      left hip replacement     SEPTOPLASTY, ENDOSCOPIC SINUS SURGERY, COMBINED Left 2018    Procedure: COMBINED SEPTOPLASTY, ENDOSCOPIC SINUS SURGERY;  LEFT ENDOSCOPIC SINUS SURGERY, SEPTOPLASTY, BILATERAL TURBINATE REDUCTION, PROPEL IMPLANTS;  Surgeon: Francoise Gonzalez MD;  [facility]     TURBINOPLASTY Bilateral 2018    Procedure: TURBINOPLASTY;;  Surgeon: Francoise Gonzalez MD;  [facility]       Anesthesia Evaluation     . Pt has had prior anesthetic. Type: Regional, MAC and General           ROS/MED HX    ENT/Pulmonary: Comment: Allergic fungal and chronic eosinophilic  sinusitis.  S/P FESS    (+), cystic fibrosis mild. Other pulmonary disease history of bronchitis.    Neurologic: Comment: insomnia - neg neurologic ROS     Cardiovascular:     (+) Dyslipidemia, ----. : . . . :. . Previous cardiac testing date:results:date: results:ECG reviewed date:5/17/19 results: date: results:          METS/Exercise Tolerance:  3 - Able to walk 1-2 blocks without stopping   Hematologic:  - neg hematologic  ROS       Musculoskeletal: Comment: osteoarthritis  (+) arthritis,  -       GI/Hepatic: Comment: dyspepsia    (+) GERD Asymptomatic on medication,       Renal/Genitourinary:  - ROS Renal section negative       Endo:     (+) Obesity, .      Psychiatric:     (+) psychiatric history depression and anxiety      Infectious Disease:  - neg infectious disease ROS       Malignancy:      - no malignancy   Other:    (+) No chance of pregnancy H/O Chronic Pain,H/O chronic opiod use ,                         Physical Exam  Normal systems: cardiovascular and pulmonary    Airway   Mallampati: II  TM distance: >3 FB  Neck ROM: full    Dental     Cardiovascular   Rhythm and rate: regular and normal      Pulmonary             Lab Results   Component Value Date    WBC 7.3 01/16/2020    HGB 14.4 01/16/2020    HCT 42.5 01/16/2020     01/16/2020    CRP <2.9 03/20/2017    SED 7 03/20/2017     01/16/2020    POTASSIUM 4.1 01/16/2020    CHLORIDE 106 01/16/2020    CO2 28 01/16/2020    BUN 16 01/16/2020    CR 0.92 01/16/2020     (H) 01/16/2020    NALDO 9.0 01/16/2020    MAG 2.3 08/07/2017    ALBUMIN 3.8 05/14/2019    PROTTOTAL 7.3 05/14/2019    ALT 22 05/14/2019    AST 9 05/14/2019    ALKPHOS 82 05/14/2019    BILITOTAL 0.2 05/14/2019    LIPASE 129 09/18/2013    AMYLASE 30 09/18/2013    INR 1.9 (A) 06/02/2014    TSH 2.07 05/14/2019    HCG Negative 05/20/2019       Preop Vitals  BP Readings from Last 3 Encounters:   01/16/20 118/68   01/10/20 108/70   01/10/20 112/66    Pulse Readings from Last 3  "Encounters:   01/16/20 75   01/10/20 60   01/10/20 70      Resp Readings from Last 3 Encounters:   07/02/19 16   05/20/19 20   05/14/19 20    SpO2 Readings from Last 3 Encounters:   01/16/20 98%   01/10/20 99%   01/10/20 99%      Temp Readings from Last 1 Encounters:   01/16/20 97.3  F (36.3  C) (Tympanic)    Ht Readings from Last 1 Encounters:   01/16/20 1.702 m (5' 7\")      Wt Readings from Last 1 Encounters:   01/16/20 114.8 kg (253 lb)    Estimated body mass index is 39.63 kg/m  as calculated from the following:    Height as of 1/16/20: 1.702 m (5' 7\").    Weight as of 1/16/20: 114.8 kg (253 lb).       Anesthesia Plan      History & Physical Review  History and physical reviewed and following examination; no interval change.    ASA Status:  3 .        Plan for MAC with Intravenous and Propofol induction. Maintenance will be TIVA.  Reason for MAC:  Deep or markedly invasive procedure (G8) and Extreme anxiety (QS)    Hp 1/16/20      Postoperative Care  Postoperative pain management:  IV analgesics.      Consents  Anesthetic plan, risks, benefits and alternatives discussed with: .  Use of blood products discussed: No .   .                VALENTINO Mackey CRNA  "

## 2020-01-24 ENCOUNTER — HOSPITAL ENCOUNTER (OUTPATIENT)
Dept: PHYSICAL THERAPY | Facility: HOSPITAL | Age: 50
Setting detail: THERAPIES SERIES
End: 2020-01-24
Attending: INTERNAL MEDICINE
Payer: COMMERCIAL

## 2020-01-24 PROCEDURE — 97112 NEUROMUSCULAR REEDUCATION: CPT | Mod: GP | Performed by: PHYSICAL THERAPIST

## 2020-01-24 PROCEDURE — 97140 MANUAL THERAPY 1/> REGIONS: CPT | Mod: GP | Performed by: PHYSICAL THERAPIST

## 2020-01-27 ENCOUNTER — APPOINTMENT (OUTPATIENT)
Dept: GENERAL RADIOLOGY | Facility: HOSPITAL | Age: 50
End: 2020-01-27
Attending: PODIATRIST
Payer: COMMERCIAL

## 2020-01-27 ENCOUNTER — ANESTHESIA (OUTPATIENT)
Dept: SURGERY | Facility: HOSPITAL | Age: 50
End: 2020-01-27
Payer: COMMERCIAL

## 2020-01-27 ENCOUNTER — HOSPITAL ENCOUNTER (OUTPATIENT)
Facility: HOSPITAL | Age: 50
Discharge: HOME | End: 2020-01-27
Attending: PODIATRIST | Admitting: PODIATRIST
Payer: COMMERCIAL

## 2020-01-27 VITALS
HEIGHT: 67 IN | DIASTOLIC BLOOD PRESSURE: 76 MMHG | SYSTOLIC BLOOD PRESSURE: 122 MMHG | TEMPERATURE: 97.8 F | WEIGHT: 247 LBS | RESPIRATION RATE: 16 BRPM | OXYGEN SATURATION: 95 % | BODY MASS INDEX: 38.77 KG/M2 | HEART RATE: 72 BPM

## 2020-01-27 DIAGNOSIS — G89.18 POST-OP PAIN: Primary | ICD-10-CM

## 2020-01-27 PROCEDURE — 20680 REMOVAL OF IMPLANT DEEP: CPT | Performed by: NURSE ANESTHETIST, CERTIFIED REGISTERED

## 2020-01-27 PROCEDURE — 25800030 ZZH RX IP 258 OP 636: Performed by: NURSE ANESTHETIST, CERTIFIED REGISTERED

## 2020-01-27 PROCEDURE — 25000125 ZZHC RX 250: Performed by: NURSE ANESTHETIST, CERTIFIED REGISTERED

## 2020-01-27 PROCEDURE — 25000128 H RX IP 250 OP 636: Performed by: NURSE ANESTHETIST, CERTIFIED REGISTERED

## 2020-01-27 PROCEDURE — 25000125 ZZHC RX 250: Performed by: PODIATRIST

## 2020-01-27 PROCEDURE — 36000060 ZZH SURGERY LEVEL 3 W FLUORO 1ST 30 MIN: Performed by: PODIATRIST

## 2020-01-27 PROCEDURE — 27210794 ZZH OR GENERAL SUPPLY STERILE: Performed by: PODIATRIST

## 2020-01-27 PROCEDURE — 36000058 ZZH SURGERY LEVEL 3 EA 15 ADDTL MIN: Performed by: PODIATRIST

## 2020-01-27 PROCEDURE — 37000008 ZZH ANESTHESIA TECHNICAL FEE, 1ST 30 MIN: Performed by: PODIATRIST

## 2020-01-27 PROCEDURE — 25000128 H RX IP 250 OP 636: Performed by: PODIATRIST

## 2020-01-27 PROCEDURE — 40000277 XR SURGERY CARM FLUORO LESS THAN 5 MIN W STILLS: Mod: TC

## 2020-01-27 PROCEDURE — 71000027 ZZH RECOVERY PHASE 2 EACH 15 MINS: Performed by: PODIATRIST

## 2020-01-27 PROCEDURE — 40000306 ZZH STATISTIC PRE PROC ASSESS II: Performed by: PODIATRIST

## 2020-01-27 PROCEDURE — 37000009 ZZH ANESTHESIA TECHNICAL FEE, EACH ADDTL 15 MIN: Performed by: PODIATRIST

## 2020-01-27 RX ORDER — CEFAZOLIN SODIUM 1 G/3ML
1 INJECTION, POWDER, FOR SOLUTION INTRAMUSCULAR; INTRAVENOUS SEE ADMIN INSTRUCTIONS
Status: DISCONTINUED | OUTPATIENT
Start: 2020-01-27 | End: 2020-01-27 | Stop reason: HOSPADM

## 2020-01-27 RX ORDER — FENTANYL CITRATE 50 UG/ML
INJECTION, SOLUTION INTRAMUSCULAR; INTRAVENOUS PRN
Status: DISCONTINUED | OUTPATIENT
Start: 2020-01-27 | End: 2020-01-27

## 2020-01-27 RX ORDER — OXYCODONE HYDROCHLORIDE 5 MG/1
5 TABLET ORAL
Status: DISCONTINUED | OUTPATIENT
Start: 2020-01-27 | End: 2020-01-27 | Stop reason: HOSPADM

## 2020-01-27 RX ORDER — PROPOFOL 10 MG/ML
INJECTION, EMULSION INTRAVENOUS CONTINUOUS PRN
Status: DISCONTINUED | OUTPATIENT
Start: 2020-01-27 | End: 2020-01-27

## 2020-01-27 RX ORDER — CEFAZOLIN SODIUM 2 G/100ML
2 INJECTION, SOLUTION INTRAVENOUS
Status: COMPLETED | OUTPATIENT
Start: 2020-01-27 | End: 2020-01-27

## 2020-01-27 RX ORDER — ACETAMINOPHEN 325 MG/1
650 TABLET ORAL
Status: DISCONTINUED | OUTPATIENT
Start: 2020-01-27 | End: 2020-01-27 | Stop reason: HOSPADM

## 2020-01-27 RX ORDER — ONDANSETRON 2 MG/ML
INJECTION INTRAMUSCULAR; INTRAVENOUS PRN
Status: DISCONTINUED | OUTPATIENT
Start: 2020-01-27 | End: 2020-01-27

## 2020-01-27 RX ORDER — HYDROCODONE BITARTRATE AND ACETAMINOPHEN 5; 325 MG/1; MG/1
1 TABLET ORAL EVERY 4 HOURS PRN
Qty: 20 TABLET | Refills: 0 | Status: SHIPPED | OUTPATIENT
Start: 2020-01-27 | End: 2021-11-04

## 2020-01-27 RX ORDER — LIDOCAINE HYDROCHLORIDE 20 MG/ML
INJECTION, SOLUTION INFILTRATION; PERINEURAL PRN
Status: DISCONTINUED | OUTPATIENT
Start: 2020-01-27 | End: 2020-01-27

## 2020-01-27 RX ORDER — LIDOCAINE 40 MG/G
CREAM TOPICAL
Status: DISCONTINUED | OUTPATIENT
Start: 2020-01-27 | End: 2020-01-27 | Stop reason: HOSPADM

## 2020-01-27 RX ORDER — ONDANSETRON 4 MG/1
4 TABLET, ORALLY DISINTEGRATING ORAL
Status: DISCONTINUED | OUTPATIENT
Start: 2020-01-27 | End: 2020-01-27 | Stop reason: HOSPADM

## 2020-01-27 RX ORDER — MEPERIDINE HYDROCHLORIDE 50 MG/ML
12.5 INJECTION INTRAMUSCULAR; INTRAVENOUS; SUBCUTANEOUS
Status: DISCONTINUED | OUTPATIENT
Start: 2020-01-27 | End: 2020-01-27 | Stop reason: HOSPADM

## 2020-01-27 RX ORDER — PROPOFOL 10 MG/ML
INJECTION, EMULSION INTRAVENOUS PRN
Status: DISCONTINUED | OUTPATIENT
Start: 2020-01-27 | End: 2020-01-27

## 2020-01-27 RX ORDER — KETAMINE HYDROCHLORIDE 10 MG/ML
INJECTION INTRAMUSCULAR; INTRAVENOUS PRN
Status: DISCONTINUED | OUTPATIENT
Start: 2020-01-27 | End: 2020-01-27

## 2020-01-27 RX ORDER — ACETAMINOPHEN 500 MG
1000 TABLET ORAL EVERY 8 HOURS PRN
Qty: 15 TABLET | Refills: 0 | Status: SHIPPED | OUTPATIENT
Start: 2020-01-27 | End: 2020-04-07

## 2020-01-27 RX ORDER — DEXAMETHASONE SODIUM PHOSPHATE 10 MG/ML
INJECTION, SOLUTION INTRAMUSCULAR; INTRAVENOUS PRN
Status: DISCONTINUED | OUTPATIENT
Start: 2020-01-27 | End: 2020-01-27

## 2020-01-27 RX ORDER — SODIUM CHLORIDE, SODIUM LACTATE, POTASSIUM CHLORIDE, CALCIUM CHLORIDE 600; 310; 30; 20 MG/100ML; MG/100ML; MG/100ML; MG/100ML
INJECTION, SOLUTION INTRAVENOUS CONTINUOUS
Status: DISCONTINUED | OUTPATIENT
Start: 2020-01-27 | End: 2020-01-27 | Stop reason: HOSPADM

## 2020-01-27 RX ORDER — FENTANYL CITRATE 50 UG/ML
25-50 INJECTION, SOLUTION INTRAMUSCULAR; INTRAVENOUS
Status: DISCONTINUED | OUTPATIENT
Start: 2020-01-27 | End: 2020-01-27 | Stop reason: HOSPADM

## 2020-01-27 RX ORDER — ONDANSETRON 4 MG/1
4 TABLET, ORALLY DISINTEGRATING ORAL EVERY 30 MIN PRN
Status: DISCONTINUED | OUTPATIENT
Start: 2020-01-27 | End: 2020-01-27 | Stop reason: HOSPADM

## 2020-01-27 RX ORDER — ONDANSETRON 2 MG/ML
4 INJECTION INTRAMUSCULAR; INTRAVENOUS EVERY 30 MIN PRN
Status: DISCONTINUED | OUTPATIENT
Start: 2020-01-27 | End: 2020-01-27 | Stop reason: HOSPADM

## 2020-01-27 RX ORDER — KETOROLAC TROMETHAMINE 30 MG/ML
INJECTION, SOLUTION INTRAMUSCULAR; INTRAVENOUS PRN
Status: DISCONTINUED | OUTPATIENT
Start: 2020-01-27 | End: 2020-01-27

## 2020-01-27 RX ORDER — NALOXONE HYDROCHLORIDE 0.4 MG/ML
.1-.4 INJECTION, SOLUTION INTRAMUSCULAR; INTRAVENOUS; SUBCUTANEOUS
Status: DISCONTINUED | OUTPATIENT
Start: 2020-01-27 | End: 2020-01-27 | Stop reason: HOSPADM

## 2020-01-27 RX ADMIN — FENTANYL CITRATE 50 MCG: 50 INJECTION, SOLUTION INTRAMUSCULAR; INTRAVENOUS at 14:51

## 2020-01-27 RX ADMIN — CEFAZOLIN SODIUM 2 G: 2 INJECTION, SOLUTION INTRAVENOUS at 13:49

## 2020-01-27 RX ADMIN — KETAMINE HYDROCHLORIDE 10 MG: 10 INJECTION, SOLUTION INTRAMUSCULAR; INTRAVENOUS at 13:58

## 2020-01-27 RX ADMIN — FENTANYL CITRATE 50 MCG: 50 INJECTION, SOLUTION INTRAMUSCULAR; INTRAVENOUS at 13:48

## 2020-01-27 RX ADMIN — KETOROLAC TROMETHAMINE 30 MG: 30 INJECTION, SOLUTION INTRAMUSCULAR at 14:08

## 2020-01-27 RX ADMIN — KETAMINE HYDROCHLORIDE 10 MG: 10 INJECTION, SOLUTION INTRAMUSCULAR; INTRAVENOUS at 13:55

## 2020-01-27 RX ADMIN — FENTANYL CITRATE 50 MCG: 50 INJECTION, SOLUTION INTRAMUSCULAR; INTRAVENOUS at 13:51

## 2020-01-27 RX ADMIN — KETAMINE HYDROCHLORIDE 10 MG: 10 INJECTION, SOLUTION INTRAMUSCULAR; INTRAVENOUS at 13:51

## 2020-01-27 RX ADMIN — LIDOCAINE HYDROCHLORIDE 40 MG: 20 INJECTION, SOLUTION INFILTRATION; PERINEURAL at 13:52

## 2020-01-27 RX ADMIN — ONDANSETRON 4 MG: 2 INJECTION INTRAMUSCULAR; INTRAVENOUS at 13:58

## 2020-01-27 RX ADMIN — MIDAZOLAM 2 MG: 1 INJECTION INTRAMUSCULAR; INTRAVENOUS at 13:48

## 2020-01-27 RX ADMIN — SODIUM CHLORIDE, POTASSIUM CHLORIDE, SODIUM LACTATE AND CALCIUM CHLORIDE: 600; 310; 30; 20 INJECTION, SOLUTION INTRAVENOUS at 12:47

## 2020-01-27 RX ADMIN — PROPOFOL 70 MCG/KG/MIN: 10 INJECTION, EMULSION INTRAVENOUS at 13:53

## 2020-01-27 RX ADMIN — PROPOFOL 50 MG: 10 INJECTION, EMULSION INTRAVENOUS at 13:53

## 2020-01-27 RX ADMIN — DEXAMETHASONE SODIUM PHOSPHATE 6 MG: 10 INJECTION, SOLUTION INTRAMUSCULAR; INTRAVENOUS at 13:57

## 2020-01-27 RX ADMIN — FENTANYL CITRATE 50 MCG: 50 INJECTION, SOLUTION INTRAMUSCULAR; INTRAVENOUS at 14:06

## 2020-01-27 ASSESSMENT — MIFFLIN-ST. JEOR: SCORE: 1773.01

## 2020-01-27 NOTE — ANESTHESIA CARE TRANSFER NOTE
Patient: Annie Garcia    Procedure(s):  REMOVAL, HARDWARE RIGHT FOOT    Diagnosis: Pain from implanted hardware [T85.848A]  Diagnosis Additional Information: No value filed.    Anesthesia Type:   MAC     Note:  Airway :Nasal Cannula  Patient transferred to:Phase II  Handoff Report: Identifed the Patient, Identified the Reponsible Provider, Reviewed the pertinent medical history, Discussed the surgical course, Reviewed Intra-OP anesthesia mangement and issues during anesthesia, Set expectations for post-procedure period and Allowed opportunity for questions and acknowledgement of understanding      Vitals: (Last set prior to Anesthesia Care Transfer)    CRNA VITALS  1/27/2020 1357 - 1/27/2020 1432      1/27/2020             Resp Rate (set):  8                Electronically Signed By: VALENTINO Valencia CRNA  January 27, 2020  2:32 PM

## 2020-01-27 NOTE — BRIEF OP NOTE
[facility] Brief Operative Note    Pre-operative diagnosis: Pain from implanted hardware [T85.042W]   Post-operative diagnosis same   Procedure: Procedure(s):  REMOVAL, HARDWARE RIGHT FOOT   Surgeon(s): Surgeon(s) and Role:     * Bryan Cid DPM - Primary     * Taiwo Holden PA - Assisting   Estimated blood loss: 2 mL    Specimens: * No specimens in log *   Findings: Consistent with diagnosis

## 2020-01-27 NOTE — OR NURSING
Patient and responsible adult given discharge instructions with no questions regarding instructions. Noah score 18. Pain level 2/10.  Discharged from unit via wheelchair. Patient discharged to home accompanied by her son Axel and friend Lin.

## 2020-01-27 NOTE — ANESTHESIA POSTPROCEDURE EVALUATION
Patient: Annie Garcia    Procedure(s):  REMOVAL, HARDWARE RIGHT FOOT    Diagnosis:Pain from implanted hardware [T85.848A]  Diagnosis Additional Information: No value filed.    Anesthesia Type:  MAC    Note:  Anesthesia Post Evaluation    Patient participation: Able to fully participate in evaluation  Level of consciousness: awake  Pain management: adequate  Airway patency: patent  Cardiovascular status: acceptable  Respiratory status: acceptable  Hydration status: acceptable  PONV: none     Anesthetic complications: None          Last vitals:  Vitals:    01/27/20 1445 01/27/20 1452 01/27/20 1456   BP: (!) 94/45 (!) 101/45 123/89   Pulse: 63 73 72   Resp: 16  16   Temp:      SpO2: 98%  98%         Electronically Signed By: VALENTINO Valencia CRNA  January 27, 2020  3:33 PM

## 2020-01-27 NOTE — OR NURSING
The patient's discharge pain medication prescription was delivered to the bedside by [facility].

## 2020-01-27 NOTE — OR NURSING
4 screws and 1 plate removed from right dorsal foot. 3 screws and 1 plate removed from right lateral foot.

## 2020-01-27 NOTE — DISCHARGE INSTRUCTIONS
Post-Anesthesia Patient Instructions    IMMEDIATELY FOLLOWING SURGERY:  Do not drive or operate machinery for the first twenty four hours after surgery.  Do not make any important decisions for twenty four hours after surgery or while taking narcotic pain medications or sedatives.  If you develop intractable nausea and vomiting or a severe headache please notify your doctor immediately.    FOLLOW-UP:  Please make an appointment with your surgeon as instructed. You do not need to follow up with anesthesia unless specifically instructed to do so.    WOUND CARE INSTRUCTIONS (if applicable):  Keep a dry clean dressing on the anesthesia/puncture wound site if there is drainage.  Once the wound has quit draining you may leave it open to air.  Generally you should leave the bandage intact for twenty four hours unless there is drainage.  If the epidural site drains for more than 36-48 hours please call the anesthesia department.    QUESTIONS?:  Please feel free to call your physician or the hospital  if you have any questions, and they will be happy to assist you.           *****FOLLOW UP APPOINTMENT:    Post operative follow up appointment with Dr. Cid at [facility] in [place]  Monday February 3rd, 2020 at 1:15 p.m.  [facility] [phone]

## 2020-01-28 NOTE — OP NOTE
Procedure Date: 01/27/2020      SURGEON:  Bryan Cid DPM.      ASSISTANT:  Taiwo Holden PA-C.  A skilled first assistant was necessary due to technical complexity of the procedure and for the patient's safety.  The assistant helped with positioning, retraction, visualization of the operative field and moreover helped to complete the procedure in a technically safe and efficient manner.        PREOPERATIVE DIAGNOSIS:  Right foot retained painful hardware overlying calcaneus and overlying midfoot.      POSTOPERATIVE DIAGNOSIS:  Right foot retained painful hardware overlying calcaneus and overlying midfoot.      PROCEDURES:   1.  Hardware removal, lateral calcaneus, right.   2.  Hardware removal, medial cuneiform, right.      ANESTHESIA:  MAC with local.      HEMOSTASIS:  Ankle tourniquet, electrocautery.      ESTIMATED BLOOD LOSS:  Minimal.      INDICATIONS FOR PROCEDURE:  This 49-year-old female who underwent a successful flat foot reconstruction with extraarticular reconstruction has pain overlying the hardware and wanted this removed.  Elected to proceed after a detailed discussion of risks, potential complications, alternatives and benefits.      DESCRIPTION OF PROCEDURE:  Supine position, utilized MAC anesthesia, local block performed.  Right lower extremity was prepped and draped in the usual sterile fashion.  Ankle tourniquet utilized for duration of procedure.      Attention was directed to anterior lateral calcaneus.  Incision made overlying a previous interval.  The peroneal tendons exposed, carefully retracted plate with 2 screws in it, exposed and removed without incident.  Had an extra interfragmentary compression screw, which were removed without incident.  This was flushed with normal saline.  Deep tissue repaired with 2-0 Vicryl, skin with nonabsorbable suture.      Attention then directed to the dorsal medial foot overlying medial cuneiform.  Incision made at previous interval deepened using  blunt and sharp dissection.  Plate and 4 small screws identified and removed without incident.  Flushed with normal saline.  Deep tissue repaired with 2-0 Vicryl, skin with nonabsorbable suture, placed in a sterile dressing and a postop shoe.        Weightbear as tolerated and follow up as scheduled.         NABIL FLOREZ DPM             D: 2020   T: 2020   MT: WT      Name:     DOLORES CARTWRIGHT   MRN:      [id number]        Account:        [id number]   :      1970           Procedure Date: 2020      Document: [id number]

## 2020-02-10 ENCOUNTER — HOSPITAL ENCOUNTER (OUTPATIENT)
Dept: PHYSICAL THERAPY | Facility: HOSPITAL | Age: 50
Setting detail: THERAPIES SERIES
End: 2020-02-10
Attending: PODIATRIST
Payer: COMMERCIAL

## 2020-02-10 PROCEDURE — 97110 THERAPEUTIC EXERCISES: CPT | Mod: GP | Performed by: PHYSICAL THERAPIST

## 2020-02-10 PROCEDURE — 97140 MANUAL THERAPY 1/> REGIONS: CPT | Mod: GP | Performed by: PHYSICAL THERAPIST

## 2020-02-10 NOTE — PROGRESS NOTES
Outpatient Physical Therapy Progress Note     Patient: Annie Garcia  : 1970    Beginning/End Dates of Reporting Period:  10/25/2019  to 2/10/2020    Referring Provider: Dr Cid DPM    Therapy Diagnosis: s/p R flatfoot reconstruction, 2 weeks s/p hardware removal     Client Self Report: Patient had her harware removed and is nwo about 2 weeks out from that. States she notes a big improvement in ehr discomfort after having the hardware removed. States her swelling feels different and she is not getting as much pain and irritation on the medial and lateral aspects of her foot and the dorsum of her foot, however she feels stiff/tight/like her foot needs to crack. We discussed now moving forward with more strengtheing, gait training and progession of function and that some degree of discomfort is likely to continue. New order was obtained from surgeon    Objective Measurements:  Objective Measure: R foot examination  Details: Surgical incisions healing well - some thickening of scar along lateral aspect that is tender - improved with manual work. Ankle motion is grossly WFL give surgery. Arch is well-preserved - expect it to lower some as it settles, but educated on importance of strengthening muscles now to support arch. Strength grossly 4/5 all planes, most noted weakness with PF combined with INV rated 3+/5     Goals:  Goal Identifier STG 1   Goal Description Patient will be compliant with HEP in order to make progress while at home   Target Date 11/15/19   Date Met  11/15/19   Progress:     Goal Identifier LTG 1   Goal Description Patient will demonstrate normal ambulation without limp and without externally rotating foot for 50 ft in order to improve safety and efficiency of gait mechanics   Target Date 20   Date Met  (goal extended due to second surgery)   Progress:     Goal Identifier LTG 2   Goal Description Patient will report overall 60% or more improvement in R foot function as it  relates to daily tasks in order to perform all home tasks safely'   Target Date 04/06/20   Date Met  (goal extended due to second surgery)   Progress:       Progress Toward Goals:   Progress this reporting period: Goals revised base on second surgery      Plan:  Changes to therapy plan of care: 2x/week for 4-6 weeks per new order    Discharge:  No

## 2020-02-21 ENCOUNTER — HOSPITAL ENCOUNTER (OUTPATIENT)
Dept: PHYSICAL THERAPY | Facility: HOSPITAL | Age: 50
Setting detail: THERAPIES SERIES
End: 2020-02-21
Attending: INTERNAL MEDICINE
Payer: COMMERCIAL

## 2020-02-21 PROCEDURE — 97110 THERAPEUTIC EXERCISES: CPT | Mod: GP

## 2020-02-21 PROCEDURE — 97140 MANUAL THERAPY 1/> REGIONS: CPT | Mod: GP

## 2020-02-24 ENCOUNTER — HOSPITAL ENCOUNTER (OUTPATIENT)
Dept: PHYSICAL THERAPY | Facility: HOSPITAL | Age: 50
Setting detail: THERAPIES SERIES
End: 2020-02-24
Attending: INTERNAL MEDICINE
Payer: COMMERCIAL

## 2020-02-24 PROCEDURE — 97110 THERAPEUTIC EXERCISES: CPT | Mod: GP

## 2020-02-24 PROCEDURE — 97140 MANUAL THERAPY 1/> REGIONS: CPT | Mod: GP

## 2020-02-26 ENCOUNTER — HOSPITAL ENCOUNTER (OUTPATIENT)
Dept: PHYSICAL THERAPY | Facility: HOSPITAL | Age: 50
Setting detail: THERAPIES SERIES
End: 2020-02-26
Attending: INTERNAL MEDICINE
Payer: COMMERCIAL

## 2020-02-26 PROCEDURE — 97140 MANUAL THERAPY 1/> REGIONS: CPT | Mod: GP

## 2020-03-02 ENCOUNTER — HOSPITAL ENCOUNTER (OUTPATIENT)
Dept: PHYSICAL THERAPY | Facility: HOSPITAL | Age: 50
Setting detail: THERAPIES SERIES
End: 2020-03-02
Attending: INTERNAL MEDICINE
Payer: COMMERCIAL

## 2020-03-02 PROCEDURE — 97140 MANUAL THERAPY 1/> REGIONS: CPT | Mod: GP | Performed by: PHYSICAL THERAPIST

## 2020-03-04 ENCOUNTER — HOSPITAL ENCOUNTER (OUTPATIENT)
Dept: PHYSICAL THERAPY | Facility: HOSPITAL | Age: 50
Setting detail: THERAPIES SERIES
End: 2020-03-04
Attending: INTERNAL MEDICINE
Payer: COMMERCIAL

## 2020-03-04 PROCEDURE — 97110 THERAPEUTIC EXERCISES: CPT | Mod: GP

## 2020-03-09 ENCOUNTER — HOSPITAL ENCOUNTER (OUTPATIENT)
Dept: PHYSICAL THERAPY | Facility: HOSPITAL | Age: 50
Setting detail: THERAPIES SERIES
End: 2020-03-09
Attending: INTERNAL MEDICINE
Payer: COMMERCIAL

## 2020-03-09 PROCEDURE — 97140 MANUAL THERAPY 1/> REGIONS: CPT | Mod: GP | Performed by: PHYSICAL THERAPIST

## 2020-03-09 PROCEDURE — 97110 THERAPEUTIC EXERCISES: CPT | Mod: GP | Performed by: PHYSICAL THERAPIST

## 2020-03-13 ENCOUNTER — HOSPITAL ENCOUNTER (OUTPATIENT)
Dept: PHYSICAL THERAPY | Facility: HOSPITAL | Age: 50
Setting detail: THERAPIES SERIES
End: 2020-03-13
Attending: INTERNAL MEDICINE
Payer: COMMERCIAL

## 2020-03-13 DIAGNOSIS — J32.4 CHRONIC PANSINUSITIS: ICD-10-CM

## 2020-03-13 DIAGNOSIS — G89.29 CHRONIC PAIN OF BOTH HIPS: ICD-10-CM

## 2020-03-13 DIAGNOSIS — M25.551 CHRONIC PAIN OF BOTH HIPS: ICD-10-CM

## 2020-03-13 DIAGNOSIS — E84.9 CF (CYSTIC FIBROSIS) (H): ICD-10-CM

## 2020-03-13 DIAGNOSIS — M25.552 CHRONIC PAIN OF BOTH HIPS: ICD-10-CM

## 2020-03-13 PROCEDURE — 97140 MANUAL THERAPY 1/> REGIONS: CPT | Mod: GP

## 2020-03-13 PROCEDURE — 97110 THERAPEUTIC EXERCISES: CPT | Mod: GP

## 2020-03-13 RX ORDER — BUDESONIDE 0.5 MG/2ML
INHALANT ORAL
Qty: 180 ML | Refills: 0 | Status: SHIPPED | OUTPATIENT
Start: 2020-03-13 | End: 2021-11-04

## 2020-03-13 NOTE — TELEPHONE ENCOUNTER
Controlled Substance Refill Request for:  Acetaminophen-codeine (Tylenol #3)  Problem List Complete:  Yes    Last Written Prescription Date:  10/8/19  Last Fill Quantity: 90,   # refills: 0    THE MOST RECENT OFFICE VISIT MUST BE WITHIN THE PAST 3 MONTHS. AT LEAST ONE FACE TO FACE VISIT MUST OCCUR EVERY 6 MONTHS. ADDITIONAL VISITS CAN BE VIRTUAL.  (THIS STATEMENT SHOULD BE DELETED.)    Last Office Visit with [place] primary care provider: 1/16/20    Future Office visit:     Controlled substance agreement:   Encounter-Level CSA:    There are no encounter-level csa.     Patient-Level CSA:    There are no patient-level csa.         Last Urine Drug Screen: No results found for: CDAUT, No results found for: COMDAT, No results found for: THC13, PCP13, COC13, MAMP13, OPI13, AMP13, BZO13, TCA13, MTD13, BAR13, OXY13, PPX13, BUP13     Processing:  Rx to be electronically transmitted to pharmacy by provider     [email or web address]   checked in past 3 months?

## 2020-03-13 NOTE — TELEPHONE ENCOUNTER
Acetaminophen-codeine 300-30MG      Last Written Prescription Date:  1-  Last Fill Quantity: 15,   # refills: 0  Last Office Visit: 1-  Future Office visit:       Routing refill request to provider for review/approval because:

## 2020-04-06 ENCOUNTER — TELEPHONE (OUTPATIENT)
Dept: OTOLARYNGOLOGY | Facility: OTHER | Age: 50
End: 2020-04-06

## 2020-04-06 NOTE — TELEPHONE ENCOUNTER
Patient feels her infection is back in her nose. Wants to come and see Dr Gonzalez. Patient is willing to do a telephone visit.       Please advise    Thank you  Claudia

## 2020-04-07 ENCOUNTER — VIRTUAL VISIT (OUTPATIENT)
Dept: OTOLARYNGOLOGY | Facility: OTHER | Age: 50
End: 2020-04-07
Attending: OTOLARYNGOLOGY
Payer: COMMERCIAL

## 2020-04-07 VITALS — BODY MASS INDEX: 38.77 KG/M2 | WEIGHT: 247 LBS | HEIGHT: 67 IN

## 2020-04-07 DIAGNOSIS — J32.4 CHRONIC PANSINUSITIS: Primary | ICD-10-CM

## 2020-04-07 PROCEDURE — 99213 OFFICE O/P EST LOW 20 MIN: CPT | Mod: TEL | Performed by: OTOLARYNGOLOGY

## 2020-04-07 RX ORDER — BUDESONIDE 0.5 MG/2ML
INHALANT ORAL
Qty: 3 BOX | Refills: 11 | Status: SHIPPED | OUTPATIENT
Start: 2020-04-07 | End: 2021-11-15

## 2020-04-07 RX ORDER — CETIRIZINE HYDROCHLORIDE 10 MG/1
10 TABLET ORAL DAILY
COMMUNITY

## 2020-04-07 RX ORDER — SULFAMETHOXAZOLE/TRIMETHOPRIM 800-160 MG
1 TABLET ORAL 2 TIMES DAILY
Qty: 180 TABLET | Refills: 11 | Status: SHIPPED | OUTPATIENT
Start: 2020-04-07 | End: 2020-07-06

## 2020-04-07 ASSESSMENT — PAIN SCALES - GENERAL: PAINLEVEL: NO PAIN (0)

## 2020-04-07 ASSESSMENT — MIFFLIN-ST. JEOR: SCORE: 1773.01

## 2020-04-07 NOTE — PROGRESS NOTES
"Annie Garcia is a 49 year old female who is being evaluated via a billable telephone visit.      The patient has been notified of following:     \"This telephone visit will be conducted via a call between you and your physician/provider. We have found that certain health care needs can be provided without the need for a physical exam.  This service lets us provide the care you need with a short phone conversation.  If a prescription is necessary we can send it directly to your pharmacy.  If lab work is needed we can place an order for that and you can then stop by our lab to have the test done at a later time.    If during the course of the call the physician/provider feels a telephone visit is not appropriate, you will not be charged for this service.\"     Patient has given verbal consent for Telephone visit?  Yes    Annie Garcia complains of    Chief Complaint   Patient presents with     Sinus Problem       I have reviewed and updated the patient's Past Medical History, Social History, Family History and Medication List.    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  Prior isolation of alternaria in 2018 but no mention of fungal elements in surgery path    Significant hx of cystic fibrosis    I last examined her on 12/12/2019     She has been on budesonide irrigations twice daily and is also used Bactroban irrigations in the past twice daily.     She has started to note dysosmia again, smelling 'cigarette smoke' when there is no smoke  She denies fever, no myalgias  No wheezing  No new onset shortness of breath   No chronic cough    She notes a headache occipital and lower frontal pressure  Denies purulent discharge    Her allergies are flaring and using zyrtec  Intermittent purulent discharge from both nares and occasional crusting removed with irrigation  Copious thick post nasal drainage leading to gagging    The mucous in " her nose and what is coming out of her lungs with cough seems different than in the past    During episodes of prior acute on chronic sinusitis she is been on Bactrim 800 mg twice daily for 2 to 3 months.    History of perioral dermatitis which tends to flare with her cystic fibrosis exacerbation.      Procedures performed 9/5/18  Final path <50 eos/hpf     1.  Bilateral total ethmoidectomy  2.  Bilateral sphenoidotomy  3.  Right frontal sinusotomy  4.  Bilateral maxillary sinus irrigation with polyp removal      When I saw her in February 11, 2019 and her sinuses were healthy she was to continue budesonide irrigations twice a day and Bactroban irrigations as well as Nasonex and was to present back in 4 months for follow-up    I then scoped her in December and noted thick non purulent casting secretions in the antrostomies, ethmoid clear, no polyposis, mucosa o/w healthy.  I debrided her max sinuses and started her on bactrim        Creatinine 9/19/18 1.05, GFR 56 while on bactrim  Recent Cr on 1/16 normal at 0.92, GFR 73      ICD-10-CM    1. Chronic pansinusitis  J32.4 sulfamethoxazole-trimethoprim (BACTRIM DS) 800-160 MG tablet     budesonide (PULMICORT) 0.5 MG/2ML neb solution     She does have some Bactrim at home the 800 mg strength she will start this twice a day in the past she would do a break after 5 days which is certainly fair I gave her 90 days of Bactrim and hopefully we can get her in for labs over the next 1 to 2 months.  I will message Dr. Voss and I did talk to him today as well.  He feels that it is certainly fair to start her on budesonide without risking exposure with lab draws as the changes to creatinine level may be artifact from the medication and not necessarily indicative of any injury to the kidneys.  I also discussed this with Annie    Due to COVID-19/Coronavirus pandemic, we are unable to proceed with any non-emergent surgical procedures at this time.     We will contact the  patient back once we are able to proceed with surgery or if appropriate, an office visit or office procedure.     Nurse recheck via phone in 1 month  F/u labs per Dr. Field  See me as soon as possilbe for debridement  Continue social distancing    Continue budesonide, also refilled  Bactrim 800 mg bid x 3 months    Phone call duration: 12 minutes, with coordination of care and chart prep 35 minutes    Francoise Gonzalez D.O.  Otolaryngology/Head and Neck Surgery  Allergy

## 2020-04-07 NOTE — PATIENT INSTRUCTIONS
Thank you for allowing Dr. Gonzalez and our ENT team to participate in your care.  If your medications are too expensive, please give the nurse a call.  We can possibly change this medication.  If you have a scheduling or an appointment question please contact our Health Unit Coordinator at their direct line [phone].   ALL nursing questions or concerns can be directed to your ENT nurse at: [phone] - [place]      F/u labs per Dr. Salgado    Follow up with Dr. Gonzalez for debridement once COVID resolves    Continue social distancing     Continue budesonide rinses    Bactrim 800 mg bid x 3 months      Budesonide nasal saline irrigation per instructions:  -Obtain Asaf Med Sinus rinse over the counter.    -Use warm distilled water and 2 packets of the salt solution that comes with the bottle, dissolve in bottle up to the 240 mL airam.  -Add 1 vial of budesonide.  -Irrigate each side of your nose leaning over the sink, using 1/3 to 1/2 the volume of the bottle in each nostril every irrigation.  Irrigate 2 times daily.  -If additional rinses are needed/recommended, you may use the plan As[employer] [facility] Sinus irrigation without the use of added budesonide.

## 2020-05-01 ENCOUNTER — HOSPITAL ENCOUNTER (OUTPATIENT)
Dept: PHYSICAL THERAPY | Facility: HOSPITAL | Age: 50
Setting detail: THERAPIES SERIES
End: 2020-05-01
Attending: INTERNAL MEDICINE
Payer: COMMERCIAL

## 2020-05-01 PROCEDURE — 97530 THERAPEUTIC ACTIVITIES: CPT | Mod: GP,95,GT | Performed by: PHYSICAL THERAPIST

## 2020-05-01 PROCEDURE — 97110 THERAPEUTIC EXERCISES: CPT | Mod: GP,95 | Performed by: PHYSICAL THERAPIST

## 2020-05-01 NOTE — PROGRESS NOTES
Annie Garcia is a 49 year old female who is being seen via a billable video visit.      Patient has given verbal consent for Video visit? Yes    Video Start Time: 11:12am    Telehealth Visit Details    Type of Service:  Telehealth    Video End Time (time video stopped): 11:52am    Originating Location (pt. location): Home    Additional Participants in Telehealth Visit: None    Distant Location (provider location):  [facility]     Mode of Communication (Audio Visual or Audio Only):  Audio and video    Randi Barnhart, PT  May 1, 2020

## 2020-05-04 NOTE — PROGRESS NOTES
Outpatient Physical Therapy Progress Note     Patient: Annie Garcia  : 1970    Beginning/End Dates of Reporting Period:  2/10/2020 to 2020    Referring Provider: Dr Cid    Therapy Diagnosis: s/p arch lift procedure and fusion     Client Self Report: Patient was seen via telehealth and states she has had more tiem to rest her foot vs drving a lot and being on her foot most of the day. States this has helped and the exercises she has been doing have been helpful as well. She notes less swelling and feel her walking is better with improved heel-toe mechanics    Objective Measurements:  Objective Measure: Strength  Details: Patient will unable to do full heel raise in standing - need to off=load using other foot and expresses she will be in pain after 3 attempts. Ankle and foot appear mostly normal - patient identifies very mild swelling across dorsum of foot by 2nd-4th toes, however this is extremely minimal    Goals:  Goal Identifier (P) STG 1   Goal Description (P) Patient will be compliant with HEP in order to make progress while at home   Target Date (P) 11/15/19   Date Met  (P) 11/15/19   Progress:     Goal Identifier (P) LTG 1   Goal Description (P) Patient will demonstrate normal ambulation without limp and without externally rotating foot for 50 ft in order to improve safety and efficiency of gait mechanics   Target Date (P) 20   Date Met      Progress:     Goal Identifier LTG 2   Goal Description Patient will report overall 60% or more improvement in R foot function as it relates to daily tasks in order to perform all home tasks safely'   Target Date 20   Date Met      Progress:       Progress Toward Goals:   Progress this reporting period: Patient is improving      Plan:  Continue therapy per current plan of care. Patient will be followed up with 1x every 2 weeks via telehealth and in-clinic visits as able for up to 3 months    Discharge:  No

## 2020-05-27 DIAGNOSIS — B49 FUNGUS INFECTION: ICD-10-CM

## 2020-05-28 NOTE — TELEPHONE ENCOUNTER
f;uconazole      Last Written Prescription Date:  10/8/2019  Last Fill Quantity: 90,   # refills: 0  Last Office Visit: 4/7/2020

## 2020-06-01 RX ORDER — FLUCONAZOLE 100 MG/1
TABLET ORAL
Qty: 90 TABLET | Refills: 0 | Status: SHIPPED | OUTPATIENT
Start: 2020-06-01 | End: 2020-09-10

## 2020-06-01 NOTE — TELEPHONE ENCOUNTER
Antifungal Agents [id number]/28/2020 09:48 AM   Not Fluconazole or Terconazole     Pended.    Gemma Jennings RN

## 2020-07-17 DIAGNOSIS — F51.01 PRIMARY INSOMNIA: ICD-10-CM

## 2020-07-17 RX ORDER — ZOLPIDEM TARTRATE 10 MG/1
TABLET, FILM COATED ORAL
Qty: 30 TABLET | Refills: 0 | Status: SHIPPED | OUTPATIENT
Start: 2020-07-17 | End: 2020-09-04

## 2020-07-17 NOTE — TELEPHONE ENCOUNTER
AMBIEN 10mg  TABLET       Last Written Prescription Date:  1/10/2020  Last Fill Quantity: 30,   # refills: 5  Last Office Visit: 1/6/2020  Future Office visit:       Routing refill request to provider for review/approval because:  Drug not on the [facility], [facility] or [facility] refill protocol or controlled substance

## 2020-07-20 NOTE — PROGRESS NOTES
Discharge Summary     05/04/20 1400   Signing Clinician's Name / Credentials   Signing clinician's name / credentials Randi Barnhart DPT, OCS, Cert DN   Session Number   Session Number 29   Progress Note/Recertification   Progress Note Completed Date 05/01/20   Adult Goals   PT Ortho Eval Goals 1;2;3   Ortho Goal 1   Goal Identifier STG 1   Goal Description Patient will be compliant with HEP in order to make progress while at home   Target Date 11/15/19   Date Met 11/15/19   Ortho Goal 2   Goal Identifier LTG 1   Goal Description Patient will demonstrate normal ambulation without limp and without externally rotating foot for 50 ft in order to improve safety and efficiency of gait mechanics   Target Date 05/20/20   Ortho Goal 3   Goal Identifier LTG 2   Goal Description Patient will report overall 60% or more improvement in R foot function as it relates to daily tasks in order to perform all home tasks safely'   Target Date 06/06/20   Subjective Report   Subjective Report Patient was seen via telehealth and states she has had more tiem to rest her foot vs drving a lot and being on her foot most of the day. States this has helped and the exercises she has been doing have been helpful as well. She notes less swelling and feel her walking is better with improved heel-toe mechanics   Objective Measure 1   Objective Measure Strength   Details Patient will unable to do full heel raise in standing - need to off=load using other foot and expresses she will be in pain after 3 attempts. Ankle and foot appear mostly normal - patient identifies very mild swelling across dorsum of foot by 2nd-4th toes, however this is extremely minimal   Treatment Interventions   Interventions Therapeutic Procedure/Exercise;Therapeutic Activity   Therapeutic Procedure/exercise   Therapeutic Procedures: strength, endurance, ROM, flexibillity minutes (66288) 10   Skilled Intervention ther ex   Patient Response good   Treatment Detail Instructed  patient in progressive eccentric strengthening work with up with two and down slowly with more weight through surgical foot/LE to increase functional strength as well as continuing box steps, lateral walking abd backwards walking   Therapeutic Activity   Therapeutic Activities: dynamic activities to improve functional performance minutes (31932) 15   Skilled Intervention home management, swelling, ADLs   Patient Response good   Treatment Detail Ongoing education on home management, swelling management with compression, progressive activity back on bike and functional strengthening despite discomfort as her pain is likely due to chronic weakness at this point. Patient reported understanding   Assessments Completed   Assessments Completed Patient feels she is about 40% back to normal and will benefit from ongoing strengthening   Communication with other professionals   Communication with other professionals telehealth - see attached note   Plan   Home program See above   Plan for next session Progress strengthening to decrease chronic, ongoing pain that is likely due to noted weakness   Total Session Time   Timed Code Treatment Minutes 40   Total Treatment Time (sum of timed and untimed services) 40

## 2020-08-03 ENCOUNTER — OFFICE VISIT (OUTPATIENT)
Dept: CHIROPRACTIC MEDICINE | Facility: OTHER | Age: 50
End: 2020-08-03
Attending: CHIROPRACTOR
Payer: COMMERCIAL

## 2020-08-03 DIAGNOSIS — M99.03 SEGMENTAL AND SOMATIC DYSFUNCTION OF LUMBAR REGION: Primary | ICD-10-CM

## 2020-08-03 DIAGNOSIS — M99.01 SEGMENTAL AND SOMATIC DYSFUNCTION OF CERVICAL REGION: ICD-10-CM

## 2020-08-03 DIAGNOSIS — M54.50 ACUTE BILATERAL LOW BACK PAIN WITHOUT SCIATICA: ICD-10-CM

## 2020-08-03 DIAGNOSIS — M99.02 SEGMENTAL AND SOMATIC DYSFUNCTION OF THORACIC REGION: ICD-10-CM

## 2020-08-03 PROCEDURE — 98941 CHIROPRACT MANJ 3-4 REGIONS: CPT | Mod: AT | Performed by: CHIROPRACTOR

## 2020-08-03 PROCEDURE — 99212 OFFICE O/P EST SF 10 MIN: CPT | Mod: 25 | Performed by: CHIROPRACTOR

## 2020-08-03 NOTE — PROGRESS NOTES
Subjective Finding:    Chief compalint: Patient presents with:  Back Pain  , Pain Scale: 6/10, Intensity: sharp, Duration: 1 months, Radiating: no.    Date of injury:     Activities that the pain restricts:   Home/household/hobbies/social activities: yes.  Work duties: no.  Sleep: yes.  Makes symptoms better: rest.  Makes symptoms worse: activity.  Have you seen anyone else for the symptoms? Yes: MD.  Work related: no.  Automobile related injury: no.    Objective and Assessment:    Posture Analysis:   High shoulder: .  Head tilt: .  High iliac crest: .  Head carriage: neutral.  Thoracic Kyphosis: neutral.  Lumbar Lordosis: forward.    Lumbar Range of Motion: extension decreased.  Cervical Range of Motion: extension decreased.  Thoracic Range of Motion: extension decreased.  Extremity Range of Motion: .    Palpation:   Quad lumb: bilateral, referred pain: no    Segmental dysfunction pre-treatment and treatment area: C2, C3, T7, L4 and L5.    Assessment post-treatment:  Cervical: ROM increased.  Thoracic: ROM increased.  Lumbar: ROM increased.    Comments: .      Complicating Factors: .    Procedure(s):  CMT:  47641 Chiropractic manipulative treatment 3-4 regions performed   Cervical: Diversified, See above for level, Supine, Thoracic: Diversified, See above for level, Prone and Pelvis: Diversified, See above for level, Side posture    Modalities:  None performed this visit    Therapeutic procedures:  None    Plan:  Treatment plan: PRN.  Instructed patient: stretch as instructed at visit.  Short term goals: increase ROM.  Long term goals: restore normal function.  Prognosis: very good.

## 2020-08-06 ENCOUNTER — OFFICE VISIT (OUTPATIENT)
Dept: CHIROPRACTIC MEDICINE | Facility: OTHER | Age: 50
End: 2020-08-06
Attending: CHIROPRACTOR
Payer: COMMERCIAL

## 2020-08-06 DIAGNOSIS — M54.50 ACUTE BILATERAL LOW BACK PAIN WITHOUT SCIATICA: ICD-10-CM

## 2020-08-06 DIAGNOSIS — M99.01 SEGMENTAL AND SOMATIC DYSFUNCTION OF CERVICAL REGION: ICD-10-CM

## 2020-08-06 DIAGNOSIS — M99.02 SEGMENTAL AND SOMATIC DYSFUNCTION OF THORACIC REGION: ICD-10-CM

## 2020-08-06 DIAGNOSIS — M99.03 SEGMENTAL AND SOMATIC DYSFUNCTION OF LUMBAR REGION: Primary | ICD-10-CM

## 2020-08-06 PROCEDURE — 98941 CHIROPRACT MANJ 3-4 REGIONS: CPT | Mod: AT | Performed by: CHIROPRACTOR

## 2020-08-11 NOTE — PROGRESS NOTES
Subjective Finding:    Chief compalint: Patient presents with:  Back Pain  , Pain Scale: 6/10, Intensity: sharp, Duration: 1 months, Radiating: no.    Date of injury:     Activities that the pain restricts:   Home/household/hobbies/social activities: yes.  Work duties: no.  Sleep: yes.  Makes symptoms better: rest.  Makes symptoms worse: activity.  Have you seen anyone else for the symptoms? Yes: MD.  Work related: no.  Automobile related injury: no.    Objective and Assessment:    Posture Analysis:   High shoulder: .  Head tilt: .  High iliac crest: .  Head carriage: neutral.  Thoracic Kyphosis: neutral.  Lumbar Lordosis: forward.    Lumbar Range of Motion: extension decreased.  Cervical Range of Motion: extension decreased.  Thoracic Range of Motion: extension decreased.  Extremity Range of Motion: .    Palpation:   Quad lumb: bilateral, referred pain: no    Segmental dysfunction pre-treatment and treatment area: C2, C3, T7, L4 and L5.    Assessment post-treatment:  Cervical: ROM increased.  Thoracic: ROM increased.  Lumbar: ROM increased.    Comments: .      Complicating Factors: .    Procedure(s):  CMT:  82954 Chiropractic manipulative treatment 3-4 regions performed   Cervical: Diversified, See above for level, Supine, Thoracic: Diversified, See above for level, Prone and Pelvis: Diversified, See above for level, Side posture    Modalities:  None performed this visit    Therapeutic procedures:  None    Plan:  Treatment plan: PRN.  Instructed patient: stretch as instructed at visit.  Short term goals: increase ROM.  Long term goals: restore normal function.  Prognosis: very good.

## 2020-08-19 ENCOUNTER — OFFICE VISIT (OUTPATIENT)
Dept: CHIROPRACTIC MEDICINE | Facility: OTHER | Age: 50
End: 2020-08-19
Attending: CHIROPRACTOR
Payer: COMMERCIAL

## 2020-08-19 DIAGNOSIS — M99.01 SEGMENTAL AND SOMATIC DYSFUNCTION OF CERVICAL REGION: ICD-10-CM

## 2020-08-19 DIAGNOSIS — M54.50 ACUTE BILATERAL LOW BACK PAIN WITHOUT SCIATICA: ICD-10-CM

## 2020-08-19 DIAGNOSIS — M99.03 SEGMENTAL AND SOMATIC DYSFUNCTION OF LUMBAR REGION: Primary | ICD-10-CM

## 2020-08-19 DIAGNOSIS — M99.02 SEGMENTAL AND SOMATIC DYSFUNCTION OF THORACIC REGION: ICD-10-CM

## 2020-08-19 PROCEDURE — 98941 CHIROPRACT MANJ 3-4 REGIONS: CPT | Mod: AT | Performed by: CHIROPRACTOR

## 2020-08-20 NOTE — PROGRESS NOTES
Subjective Finding:    Chief compalint: Patient presents with:  Back Pain  Neck Pain  , Pain Scale: 6/10, Intensity: sharp, Duration: 1 months, Radiating: no.    Date of injury:     Activities that the pain restricts:   Home/household/hobbies/social activities: yes.  Work duties: no.  Sleep: yes.  Makes symptoms better: rest.  Makes symptoms worse: activity.  Have you seen anyone else for the symptoms? Yes: MD.  Work related: no.  Automobile related injury: no.    Objective and Assessment:    Posture Analysis:   High shoulder: .  Head tilt: .  High iliac crest: .  Head carriage: neutral.  Thoracic Kyphosis: neutral.  Lumbar Lordosis: forward.    Lumbar Range of Motion: extension decreased.  Cervical Range of Motion: extension decreased.  Thoracic Range of Motion: extension decreased.  Extremity Range of Motion: .    Palpation:   Quad lumb: bilateral, referred pain: no    Segmental dysfunction pre-treatment and treatment area: C2, C3, T7, L4 and L5.    Assessment post-treatment:  Cervical: ROM increased.  Thoracic: ROM increased.  Lumbar: ROM increased.    Comments: .      Complicating Factors: .    Procedure(s):  CMT:  18934 Chiropractic manipulative treatment 3-4 regions performed   Cervical: Diversified, See above for level, Supine, Thoracic: Diversified, See above for level, Prone and Pelvis: Diversified, See above for level, Side posture    Modalities:  None performed this visit    Therapeutic procedures:  None    Plan:  Treatment plan: PRN.  Instructed patient: stretch as instructed at visit.  Short term goals: increase ROM.  Long term goals: restore normal function.  Prognosis: very good.

## 2020-08-21 ENCOUNTER — OFFICE VISIT (OUTPATIENT)
Dept: FAMILY MEDICINE | Facility: OTHER | Age: 50
End: 2020-08-21
Attending: INTERNAL MEDICINE
Payer: COMMERCIAL

## 2020-08-21 DIAGNOSIS — Z71.84 COUNSELING FOR TRAVEL: Primary | ICD-10-CM

## 2020-08-21 PROCEDURE — U0003 INFECTIOUS AGENT DETECTION BY NUCLEIC ACID (DNA OR RNA); SEVERE ACUTE RESPIRATORY SYNDROME CORONAVIRUS 2 (SARS-COV-2) (CORONAVIRUS DISEASE [COVID-19]), AMPLIFIED PROBE TECHNIQUE, MAKING USE OF HIGH THROUGHPUT TECHNOLOGIES AS DESCRIBED BY CMS-2020-01-R: HCPCS | Performed by: INTERNAL MEDICINE

## 2020-08-21 NOTE — PATIENT INSTRUCTIONS
Sinuses/ Nose appear well.   Continue with Bactroban rinses.   Use Budesonide daily.   Continue with Bactrim.   Monitor kidney functions with Todd.   Return in 4-6 weeks for recheck    Thank you for allowing GWEN Perdue and our ENT team to participate in your care.  If your medications are too expensive, please give the nurse a call.  We can possibly change this medication.  If you have a scheduling or an appointment question please contact our Health Unit Coordinator at their direct line [phone].   ALL nursing questions or concerns can be directed to your ENT nurse at: [phone] [place]       no chest pain, no cough, and no shortness of breath.

## 2020-08-24 LAB
SARS-COV-2 RNA SPEC QL NAA+PROBE: NOT DETECTED
SPECIMEN SOURCE: NORMAL

## 2020-09-03 DIAGNOSIS — F51.01 PRIMARY INSOMNIA: ICD-10-CM

## 2020-09-03 NOTE — TELEPHONE ENCOUNTER
Ambien      Last Written Prescription Date:  7.17.2020  Last Fill Quantity: 30,   # refills: 0  Last Office Visit: 08.21.2020

## 2020-09-04 RX ORDER — ZOLPIDEM TARTRATE 10 MG/1
TABLET, FILM COATED ORAL
Qty: 30 TABLET | Refills: 0 | Status: SHIPPED | OUTPATIENT
Start: 2020-09-04 | End: 2020-09-29

## 2020-09-09 DIAGNOSIS — B49 FUNGUS INFECTION: ICD-10-CM

## 2020-09-09 DIAGNOSIS — M25.551 CHRONIC PAIN OF BOTH HIPS: ICD-10-CM

## 2020-09-09 DIAGNOSIS — J32.4 CHRONIC PANSINUSITIS: ICD-10-CM

## 2020-09-09 DIAGNOSIS — G89.29 CHRONIC PAIN OF BOTH HIPS: ICD-10-CM

## 2020-09-09 DIAGNOSIS — F41.1 GAD (GENERALIZED ANXIETY DISORDER): ICD-10-CM

## 2020-09-09 DIAGNOSIS — M25.552 CHRONIC PAIN OF BOTH HIPS: ICD-10-CM

## 2020-09-10 RX ORDER — HYDROXYZINE PAMOATE 50 MG/1
CAPSULE ORAL
Qty: 180 CAPSULE | Refills: 0 | Status: SHIPPED | OUTPATIENT
Start: 2020-09-10 | End: 2021-11-15

## 2020-09-10 RX ORDER — PREDNISONE 5 MG/1
TABLET ORAL
Qty: 50 TABLET | Refills: 0 | Status: SHIPPED | OUTPATIENT
Start: 2020-09-10 | End: 2020-09-22

## 2020-09-10 RX ORDER — FLUCONAZOLE 100 MG/1
TABLET ORAL
Qty: 90 TABLET | Refills: 0 | Status: SHIPPED | OUTPATIENT
Start: 2020-09-10 | End: 2020-10-26

## 2020-09-10 NOTE — TELEPHONE ENCOUNTER
Vistaril       Last Written Prescription Date:  1/10/2020  Last Fill Quantity: 180,   # refills: 6  Last Office Visit: 1/16/2020  Future Office visit:

## 2020-09-10 NOTE — TELEPHONE ENCOUNTER
Diflucan       Last Written Prescription Date:  6/1/2020  Last Fill Quantity: 90,   # refills: 0    Deltasone      Last Written Prescription Date:  7/2/2019  Last Fill Quantity: 50,   # refills: 1    Norco      Last Written Prescription Date:  1/27/2020  Last Fill Quantity: 20,   # refills: 0  Last Office Visit: 1/16/2020  Future Office visit:       Routing refill request to provider for review/approval because:  Drug not on the [facility], [facility] or [facility] refill protocol or controlled substance

## 2020-09-21 DIAGNOSIS — J32.4 CHRONIC PANSINUSITIS: ICD-10-CM

## 2020-09-22 RX ORDER — PREDNISONE 5 MG/1
TABLET ORAL
Qty: 50 TABLET | Refills: 0 | Status: SHIPPED | OUTPATIENT
Start: 2020-09-22 | End: 2020-10-26

## 2020-09-29 ENCOUNTER — MEDICAL CORRESPONDENCE (OUTPATIENT)
Dept: CT IMAGING | Facility: HOSPITAL | Age: 50
End: 2020-09-29

## 2020-09-29 DIAGNOSIS — F51.01 PRIMARY INSOMNIA: ICD-10-CM

## 2020-09-29 RX ORDER — ZOLPIDEM TARTRATE 10 MG/1
TABLET, FILM COATED ORAL
Qty: 30 TABLET | Refills: 0 | Status: SHIPPED | OUTPATIENT
Start: 2020-09-29 | End: 2020-10-27

## 2020-10-07 ENCOUNTER — HOSPITAL ENCOUNTER (OUTPATIENT)
Dept: PHYSICAL THERAPY | Facility: HOSPITAL | Age: 50
Setting detail: THERAPIES SERIES
End: 2020-10-07
Attending: INTERNAL MEDICINE
Payer: COMMERCIAL

## 2020-10-07 PROCEDURE — 97140 MANUAL THERAPY 1/> REGIONS: CPT | Mod: GP

## 2020-10-07 PROCEDURE — 97110 THERAPEUTIC EXERCISES: CPT | Mod: GP

## 2020-10-07 NOTE — PROGRESS NOTES
"Outpatient Physical Therapy Progress Note     Patient: Annie Garcia  : 1970    Beginning/End Dates of Reporting Period:  2/10/2020 to 2020  Resumption of PT 10/07/20     Referring Provider: Dr Cid     Therapy Diagnosis: s/p arch lift procedure and fusion     Client Self Report:  Patienthas continued exercise program that was discussed Adriane in May during a virtual visit. Has continued with some of the exercises from both Kaylyn Mao and Adriane Barnhart from past visits.     Currently today feels like ankle is \"Jammed up.\" Last couple months has been able to walk more. Big toe will cramp and go into extension and will feel into tibialias anterior and extensor hallicus longus and brevis per her description.     General soreness present at medial ankle. Feels numbness and tingling at instep of foot.     Overall improved. Balance deficits are still noted on R LE compared to L LE. Can do rhomberg when R LE is back LE. Single leg heel raises are still a challenge.     Still feels bound in forefoot and metatarsal.     Functional improvement is at 60% per her report.   Pain is at 80-85% improvement.     Going up on toes and balance and are the noted deficits by patient.     For stairs patient is using a compensated pattern that involves turn out for ascent and side step for descent.     Patient will be losing health insurance coverage in the next months and so will likely need to hold on PT services when this occurs.       Objective Measurements:  DF: 10 degrees.   PF: 40 degrees.   IV: 35 degrees  EV: 20 degrees with tightness at medial ankle.     Strength assessment:  DF: 5/5  PF: 5-/5  EV: 5/5  IV: 4/5    Gait Assessment: Patient ER R LE during gait once fatigued. With fatigue as well not decreased stance time on R LE.     Balance Assessment: Decreased performance on R LE compared to L, but improved compared to last in person session.     Goals:  Goals:  Goal Identifier (P) STG 1   Goal " Description (P) Patient will be compliant with HEP in order to make progress while at home   Target Date (P) 11/15/19   Date Met  (P) 11/15/19   Progress:      Goal Identifier (P) LTG 1   Goal Description (P) Patient will demonstrate normal ambulation without limp and without externally rotating foot for 50 ft in order to improve safety and efficiency of gait mechanics   Target Date (P) 05/20/20   Date Met      Progress: Goal met 10/07      Goal Identifier LTG 2   Goal Description Patient will report overall 60% or more improvement in R foot function as it relates to daily tasks in order to perform all home tasks safely'   Target Date 06/06/20   Date Met      Progress: Goal Met 10/07      LTG 3  Patient will be able to perform community distance ambulation with normalized gait pattern and good activity tolerance.   12/07/20    LTG 4  Patient will be able to perform stairs with reciprocal gait pattern with good tolerance, stability, strength and motion.   12/07/20    Assessment:Patient has continued with HEP since May. Remaining deficits include limited eversion, weakness with inversion and functional limitations with performance of stairs and ambulation. Added to HEP to address deficits.     Plan:  Changes to therapy plan of care: finalize HEP to address remaining deficits to promote optimal recovery.     Discharge:  No    I certify the need for these services furnished under this plan of treatment and while under my care. (Physician co-signature of this document indicates review and certification of the therapy plan).      _____________________________     __________________________    ____________  Physician's Signature                 Date               Time

## 2020-10-12 ENCOUNTER — HOSPITAL ENCOUNTER (OUTPATIENT)
Dept: PHYSICAL THERAPY | Facility: HOSPITAL | Age: 50
Setting detail: THERAPIES SERIES
End: 2020-10-12
Attending: INTERNAL MEDICINE
Payer: COMMERCIAL

## 2020-10-12 ENCOUNTER — HOSPITAL ENCOUNTER (OUTPATIENT)
Dept: CT IMAGING | Facility: HOSPITAL | Age: 50
Discharge: HOME | End: 2020-10-12
Attending: INTERNAL MEDICINE | Admitting: INTERNAL MEDICINE
Payer: COMMERCIAL

## 2020-10-12 DIAGNOSIS — E84.9 CYSTIC FIBROSIS (H): ICD-10-CM

## 2020-10-12 PROCEDURE — 71250 CT THORAX DX C-: CPT

## 2020-10-12 PROCEDURE — 97110 THERAPEUTIC EXERCISES: CPT | Mod: GP

## 2020-10-12 PROCEDURE — 97140 MANUAL THERAPY 1/> REGIONS: CPT | Mod: GP

## 2020-10-14 ENCOUNTER — HOSPITAL ENCOUNTER (OUTPATIENT)
Dept: RESPIRATORY THERAPY | Facility: HOSPITAL | Age: 50
Discharge: HOME | End: 2020-10-14
Attending: INTERNAL MEDICINE | Admitting: INTERNAL MEDICINE
Payer: COMMERCIAL

## 2020-10-14 PROCEDURE — 94010 BREATHING CAPACITY TEST: CPT

## 2020-10-14 PROCEDURE — 94726 PLETHYSMOGRAPHY LUNG VOLUMES: CPT

## 2020-10-14 PROCEDURE — 94729 DIFFUSING CAPACITY: CPT

## 2020-10-14 PROCEDURE — 94726 PLETHYSMOGRAPHY LUNG VOLUMES: CPT | Mod: 26 | Performed by: INTERNAL MEDICINE

## 2020-10-14 PROCEDURE — 94010 BREATHING CAPACITY TEST: CPT | Mod: 26 | Performed by: INTERNAL MEDICINE

## 2020-10-14 PROCEDURE — 94729 DIFFUSING CAPACITY: CPT | Mod: 26 | Performed by: INTERNAL MEDICINE

## 2020-10-21 DIAGNOSIS — J32.4 CHRONIC PANSINUSITIS: ICD-10-CM

## 2020-10-21 DIAGNOSIS — G89.29 CHRONIC PAIN OF BOTH HIPS: ICD-10-CM

## 2020-10-21 DIAGNOSIS — M25.551 CHRONIC PAIN OF BOTH HIPS: ICD-10-CM

## 2020-10-21 DIAGNOSIS — B49 FUNGUS INFECTION: ICD-10-CM

## 2020-10-21 DIAGNOSIS — M25.552 CHRONIC PAIN OF BOTH HIPS: ICD-10-CM

## 2020-10-21 DIAGNOSIS — K21.9 GASTROESOPHAGEAL REFLUX DISEASE WITHOUT ESOPHAGITIS: ICD-10-CM

## 2020-10-22 ENCOUNTER — HOSPITAL ENCOUNTER (OUTPATIENT)
Dept: PHYSICAL THERAPY | Facility: HOSPITAL | Age: 50
Setting detail: THERAPIES SERIES
End: 2020-10-22
Attending: INTERNAL MEDICINE
Payer: COMMERCIAL

## 2020-10-22 PROCEDURE — 97110 THERAPEUTIC EXERCISES: CPT | Mod: GP | Performed by: PHYSICAL THERAPIST

## 2020-10-22 PROCEDURE — 97140 MANUAL THERAPY 1/> REGIONS: CPT | Mod: GP | Performed by: PHYSICAL THERAPIST

## 2020-10-23 RX ORDER — DEXLANSOPRAZOLE 60 MG/1
CAPSULE, DELAYED RELEASE ORAL
Qty: 30 CAPSULE | Refills: 0 | Status: SHIPPED | OUTPATIENT
Start: 2020-10-23 | End: 2021-11-15

## 2020-10-23 RX ORDER — SUCRALFATE 1 G/1
TABLET ORAL
Qty: 120 TABLET | Refills: 0 | Status: SHIPPED | OUTPATIENT
Start: 2020-10-23 | End: 2021-11-04

## 2020-10-23 NOTE — TELEPHONE ENCOUNTER
Patient has not been seen by primary since 1/16/2020, and that was for a pre-op.  Should schedule with Dr. Salgado, then refill can be approved.

## 2020-10-23 NOTE — TELEPHONE ENCOUNTER
PCP out. Please advise, thank you.    acetaminophen-codeine (TYLENOL #3) 300-30 MG tablet      Last Written Prescription Date:  09/17/20  Last Fill Quantity: 90,   # refills: 0  Last Office Visit: 01/16/20  Future Office visit:       Routing refill request to provider for review/approval because:  Drug not on the [facility], P or [facility] refill protocol or controlled substance    predniSONE (DELTASONE) 5 MG tablet      Last Written Prescription Date:  09/22/20  Last Fill Quantity: 50,   # refills: 0    fluconazole (DIFLUCAN) 100 MG tablet      Last Written Prescription Date:  09/10/20  Last Fill Quantity: 90,   # refills: 0

## 2020-10-24 DIAGNOSIS — F51.01 PRIMARY INSOMNIA: ICD-10-CM

## 2020-10-26 RX ORDER — PREDNISONE 5 MG/1
TABLET ORAL
Qty: 50 TABLET | Refills: 0 | Status: SHIPPED | OUTPATIENT
Start: 2020-10-26 | End: 2021-11-15

## 2020-10-26 RX ORDER — FLUCONAZOLE 100 MG/1
TABLET ORAL
Qty: 90 TABLET | Refills: 0 | Status: SHIPPED | OUTPATIENT
Start: 2020-10-26 | End: 2021-11-15

## 2020-10-27 ENCOUNTER — TRANSFERRED RECORDS (OUTPATIENT)
Dept: HEALTH INFORMATION MANAGEMENT | Facility: CLINIC | Age: 50
End: 2020-10-27

## 2020-10-27 RX ORDER — ZOLPIDEM TARTRATE 10 MG/1
TABLET, FILM COATED ORAL
Qty: 30 TABLET | Refills: 0 | Status: SHIPPED | OUTPATIENT
Start: 2020-10-27 | End: 2020-12-16

## 2020-10-27 NOTE — TELEPHONE ENCOUNTER
Ambien       Last Written Prescription Date:  9/29/2020  Last Fill Quantity: 30,   # refills: 0  Last Office Visit: 1/16/2020  Future Office visit:       Routing refill request to provider for review/approval

## 2020-10-29 ENCOUNTER — HOSPITAL ENCOUNTER (OUTPATIENT)
Dept: PHYSICAL THERAPY | Facility: HOSPITAL | Age: 50
Setting detail: THERAPIES SERIES
End: 2020-10-29
Attending: INTERNAL MEDICINE
Payer: COMMERCIAL

## 2020-10-29 PROCEDURE — 97140 MANUAL THERAPY 1/> REGIONS: CPT | Mod: GP | Performed by: PHYSICAL THERAPIST

## 2020-10-29 PROCEDURE — 97110 THERAPEUTIC EXERCISES: CPT | Mod: GP | Performed by: PHYSICAL THERAPIST

## 2020-11-17 ENCOUNTER — HOSPITAL ENCOUNTER (OUTPATIENT)
Dept: PHYSICAL THERAPY | Facility: HOSPITAL | Age: 50
Setting detail: THERAPIES SERIES
End: 2020-11-17
Attending: INTERNAL MEDICINE
Payer: COMMERCIAL

## 2020-11-17 PROCEDURE — 97110 THERAPEUTIC EXERCISES: CPT | Mod: GP | Performed by: PHYSICAL THERAPIST

## 2020-11-17 PROCEDURE — 97140 MANUAL THERAPY 1/> REGIONS: CPT | Mod: GP | Performed by: PHYSICAL THERAPIST

## 2020-12-04 ENCOUNTER — OFFICE VISIT (OUTPATIENT)
Dept: CHIROPRACTIC MEDICINE | Facility: OTHER | Age: 50
End: 2020-12-04
Attending: CHIROPRACTOR
Payer: MEDICAID

## 2020-12-04 DIAGNOSIS — M54.41 ACUTE RIGHT-SIDED LOW BACK PAIN WITH RIGHT-SIDED SCIATICA: ICD-10-CM

## 2020-12-04 DIAGNOSIS — M99.03 SEGMENTAL AND SOMATIC DYSFUNCTION OF LUMBAR REGION: Primary | ICD-10-CM

## 2020-12-04 DIAGNOSIS — M99.02 SEGMENTAL AND SOMATIC DYSFUNCTION OF THORACIC REGION: ICD-10-CM

## 2020-12-04 PROCEDURE — 98940 CHIROPRACT MANJ 1-2 REGIONS: CPT | Mod: AT | Performed by: CHIROPRACTOR

## 2020-12-08 NOTE — PROGRESS NOTES
Subjective Finding:    Chief compalint: Patient presents with:  Back Pain  , Pain Scale: 6/10, Intensity: sharp, Duration: 1 months, Radiating: no.    Date of injury:     Activities that the pain restricts:   Home/household/hobbies/social activities: yes.  Work duties: no.  Sleep: yes.  Makes symptoms better: rest.  Makes symptoms worse: activity.  Have you seen anyone else for the symptoms? Yes: MD.  Work related: no.  Automobile related injury: no.    Objective and Assessment:    Posture Analysis:   High shoulder: .  Head tilt: .  High iliac crest: .  Head carriage: neutral.  Thoracic Kyphosis: neutral.  Lumbar Lordosis: forward.    Lumbar Range of Motion: extension decreased.  Cervical Range of Motion: extension decreased.  Thoracic Range of Motion: extension decreased.  Extremity Range of Motion: .    Palpation:   Quad lumb: bilateral, referred pain: no    Segmental dysfunction pre-treatment and treatment area: T9  SI.    Assessment post-treatment:  Cervical: ROM increased.  Thoracic: ROM increased.  Lumbar: ROM increased.    Comments: .      Complicating Factors: .    Procedure(s):  CMT:  71603 Chiropractic manipulative treatment 3-4 regions performed   Cervical: Diversified, See above for level, Supine, Thoracic: Diversified, See above for level, Prone and Pelvis: Diversified, See above for level, Side posture    Modalities:  None performed this visit    Therapeutic procedures:  None    Plan:  Treatment plan: PRN.  Instructed patient: stretch as instructed at visit.  Short term goals: increase ROM.  Long term goals: restore normal function.  Prognosis: very good.

## 2020-12-15 ENCOUNTER — TELEPHONE (OUTPATIENT)
Dept: INTERNAL MEDICINE | Facility: OTHER | Age: 50
End: 2020-12-15

## 2020-12-15 DIAGNOSIS — G47.00 INSOMNIA, UNSPECIFIED TYPE: Primary | ICD-10-CM

## 2020-12-15 RX ORDER — TRAZODONE HYDROCHLORIDE 50 MG/1
50 TABLET, FILM COATED ORAL AT BEDTIME
Qty: 30 TABLET | Refills: 3 | Status: SHIPPED | OUTPATIENT
Start: 2020-12-15 | End: 2021-11-04

## 2020-12-15 NOTE — TELEPHONE ENCOUNTER
Patient has a new insurance (medical assistance)  Her med (Ambien) is being refused.  She would like to discuss with provider, she thinks that this is going to happen to all of her meds.  [phone]

## 2020-12-15 NOTE — TELEPHONE ENCOUNTER
Patient is requesting a PA to be done on brand name Ambien and [facility] that is equivalent to Ambien 10 mg.      Please advise   YASMINE LAKE LPN     Has failed generic ambien- side effects, vivid, colorful, violent dreams

## 2020-12-16 DIAGNOSIS — F51.01 PRIMARY INSOMNIA: ICD-10-CM

## 2020-12-16 RX ORDER — ZOLPIDEM TARTRATE 10 MG/1
10 TABLET, FILM COATED ORAL
Qty: 30 TABLET | Refills: 0 | Status: SHIPPED | OUTPATIENT
Start: 2020-12-16 | End: 2020-12-21

## 2020-12-16 NOTE — TELEPHONE ENCOUNTER
Ambien  Last Written Prescription Date: 10/27/20  Last Fill Quantity: 30 # of Refills: 0  Last Office Visit: 1/16/20       normal

## 2020-12-17 ENCOUNTER — TELEPHONE (OUTPATIENT)
Dept: INTERNAL MEDICINE | Facility: OTHER | Age: 50
End: 2020-12-17

## 2020-12-17 DIAGNOSIS — F51.01 PRIMARY INSOMNIA: ICD-10-CM

## 2020-12-17 DIAGNOSIS — G47.09 OTHER INSOMNIA: Primary | ICD-10-CM

## 2020-12-17 NOTE — TELEPHONE ENCOUNTER
Prior Authorization Retail Medication Request  [facility] KEY# [id number]    Medication/Dose: AMBIEN 10MG TABLETS  ICD code (if different than what is on RX):    Previously Tried and Failed:    Rationale:      Insurance Name:  MEDICAID  Insurance ID:  [id number]      Pharmacy Information (if different than what is on RX)  Name:    Phone:

## 2020-12-17 NOTE — TELEPHONE ENCOUNTER
Central Prior Authorization Team   Phone: [phone]    PA Initiation    Medication: AMBIEN 10MG TABLETS  Insurance Company: [place] Medicaid ([facility]) - Phone [phone] Fax [phone]  Pharmacy Filling the Rx: [facility] - [place] - [facility] [address]  Filling Pharmacy Phone: [phone]  Filling Pharmacy Fax: [phone]  Start Date: 12/17/2020

## 2020-12-18 NOTE — TELEPHONE ENCOUNTER
PRIOR AUTHORIZATION DENIED    Medication: AMBIEN 10MG TABLETS-DENIED    Denial Date: 12/17/2020    Denial Rational: PATIENT DID NOT MEET CRITERIA -         Appeal Information:  IF YOU WOULD LIKE TO APPEAL PLEASE SUPPLY PA TEAM WITH A LETTER OF MEDICAL NECESSITY WITH CLINICAL REASON.

## 2020-12-21 RX ORDER — ZOLPIDEM TARTRATE 10 MG/1
10 TABLET ORAL
Qty: 30 TABLET | Refills: 0 | Status: SHIPPED | OUTPATIENT
Start: 2020-12-21 | End: 2021-01-11

## 2020-12-21 NOTE — TELEPHONE ENCOUNTER
Patient notified of Denial of brand name Ambien. Would be willing to try generic Ambien again. Spoke with pharmacy  Torrent patient tolerated well.    Trazodone has not been effective.     Order pending for Zolpidem   YASMINE LAKE LPN

## 2021-01-08 NOTE — PROGRESS NOTES
"  Assessment & Plan   Problem List Items Addressed This Visit        Respiratory    Cystic fibrosis (H) - Primary    Relevant Orders    GASTROENTEROLOGY ADULT REF PROCEDURE ONLY       Other    Insomnia    Relevant Medications    zolpidem (AMBIEN) 10 MG tablet      Other Visit Diagnoses     Encounter for screening mammogram for breast cancer        Relevant Orders    MA Screen Bilateral w/True    Current chronic use of systemic steroids        Relevant Orders    DX Hip/Pelvis/Spine                           25 minutes spent on the date of the encounter doing chart review, history and exam, documentation and further activities as noted above         BMI:   Estimated body mass index is 37.59 kg/m  as calculated from the following:    Height as of 4/7/20: 1.702 m (5' 7\").    Weight as of this encounter: 108.9 kg (240 lb).         FUTURE APPOINTMENTS:       - Make appointment with GI specialist    No follow-ups on file.    Petar Salgado,   [facility]    Richard Valencia is a 50 year old who presents to clinic today for the following health issues     HPI   Annie presents today for follow up.  She does need some refills.   She is also due for repeat colonoscopy due to her CF.  CF has been stable but she does still have some sinus symptoms.   Annie does report some ongoing insomnia.  She also has been on steroids numerous times for various flares so we did dicussed routine screening colonoscopy.           Insomnia  Onset/Duration: Follow up   Description:   Frequency of insomnia:  None when taking ambien  Time to fall asleep (sleep latency): 1 hour  Middle of night awakening:  YES  Early morning awakening:  YES  Progression of Symptoms:  improving  Accompanying Signs & Symptoms:  Daytime sleepiness/napping: no  Excessive snoring/apnea: no  Restless legs: no  Waking to urinate: YES  Chronic pain:  no  Depression symptoms (if yes, do PHQ9): no  Anxiety symptoms (if yes, do STEVE-7): " YES  History:  Prior Insomnia: YES  New stressful situation: YES  Precipitating factors:   Caffeine intake: no  OTC decongestants: no  Any new medications: no  Therapies tried and outcome: Ambien -  effective      Concern - Chronic Sinusitis  Onset: follow up   Description: Sinusitis due to cystic fibrosis  Intensity: moderate  Progression of Symptoms:  constant  Accompanying Signs & Symptoms: none  Previous history of similar problem: yes   Precipitating factors:        Worsened by: none   Alleviating factors:        Improved by: none   Therapies tried and outcome: prednisone- would like to discuss.       Review of Systems   Constitutional, HEENT, cardiovascular, pulmonary, gi and gu systems are negative, except as otherwise noted.      Objective    /76 (BP Location: Left arm, Patient Position: Chair, Cuff Size: Adult Large)   Pulse 86   Temp 98.2  F (36.8  C) (Tympanic)   Wt 108.9 kg (240 lb)   SpO2 98%   BMI 37.59 kg/m    Body mass index is 37.59 kg/m .  Physical Exam   GENERAL: healthy, alert and no distress  EYES: Eyes grossly normal to inspection, PERRL and conjunctivae and sclerae normal  RESP: lungs clear to auscultation - no rales, rhonchi or wheezes  CV: regular rate and rhythm, normal S1 S2, no S3 or S4, no murmur, click or rub, no peripheral edema and peripheral pulses strong  ABDOMEN: soft, nontender, no hepatosplenomegaly, no masses and bowel sounds normal  MS: no gross musculoskeletal defects noted, no edema  SKIN: no suspicious lesions or rashes  NEURO: Normal strength and tone, mentation intact and speech normal  PSYCH: mentation appears normal, affect normal/bright    Office Visit on 08/21/2020   Component Date Value Ref Range Status     COVID-19 Virus PCR to [facility] So* 08/21/2020 Nasopharyngeal   Final     COVID-19 Virus PCR to [facility] - Re* 08/21/2020 Not Detected   Final    Comment: Collection of multiple specimens from the same patient may be necessary to   detect the virus. The  possibility of a false negative should be considered if   the patient's recent exposure or clinical presentation suggests 2019 nCOV   infection and diagnostic tests for other causes of illness are negative.   Repeat testing may be considered in this setting.  Viral RNA was extracted via a validated method and subsequently underwent   single step reverse transcriptase-real time polymerase chain reaction using   primers to the CDC specified N1,N2 gene targets of CoV2 and human RNP as an   internal control.  A negative result does not rule out the presence of real-time PCR inhibitors   in the specimen or COVID-19 RNA in concentrations below the limit of detection   of the assay. The possibility of a false negative should be considered if the   patients recent exposure or clinical presentation suggests COVID-19.   Additional testing or repeat testing requires consultation with the   laborator                           y.  Nasopharyngeal specimen is the preferred choice for swab-based SARS CoV2   testing. When collection of a nasopharyngeal swab is not possible the   following are acceptable alternatives:  an oropharyngeal (OP) specimen collected by a healthcare professional, or a   nasal mid-turbinate (NMT) swab collected by a healthcare professional or by   onsite self-collection (using a flocked tapered swab), or an anterior nares   specimen collected by a healthcare professional or by onsite self-collection   (using a round foam swab). (Centers for Disease Control)  Testing performed by [facility], Room [address], [address] [place] [address]. This test was developed and its   performance characteristics determined by the [facility]. It has not been cleared or approved by the FDA.  The laboratory is regulated under the Clinical Laboratory Improvement   Amendments of 1988 (CLIA-88) as qualified to perform high-complexity testin                            g.   This test is used for clinical purposes. It should not be regarded as   investigational or for research.         No results found for any visits on 01/11/21.  No results found for this or any previous visit (from the past 24 hour(s)).

## 2021-01-11 ENCOUNTER — OFFICE VISIT (OUTPATIENT)
Dept: INTERNAL MEDICINE | Facility: OTHER | Age: 51
End: 2021-01-11
Attending: INTERNAL MEDICINE
Payer: COMMERCIAL

## 2021-01-11 VITALS
SYSTOLIC BLOOD PRESSURE: 128 MMHG | DIASTOLIC BLOOD PRESSURE: 76 MMHG | TEMPERATURE: 98.2 F | WEIGHT: 240 LBS | HEART RATE: 86 BPM | BODY MASS INDEX: 37.59 KG/M2 | OXYGEN SATURATION: 98 %

## 2021-01-11 DIAGNOSIS — Z79.52 CURRENT CHRONIC USE OF SYSTEMIC STEROIDS: ICD-10-CM

## 2021-01-11 DIAGNOSIS — E84.9 CYSTIC FIBROSIS (H): Primary | ICD-10-CM

## 2021-01-11 DIAGNOSIS — Z12.31 ENCOUNTER FOR SCREENING MAMMOGRAM FOR BREAST CANCER: ICD-10-CM

## 2021-01-11 DIAGNOSIS — G47.09 OTHER INSOMNIA: ICD-10-CM

## 2021-01-11 PROCEDURE — G0463 HOSPITAL OUTPT CLINIC VISIT: HCPCS

## 2021-01-11 PROCEDURE — 99214 OFFICE O/P EST MOD 30 MIN: CPT | Performed by: INTERNAL MEDICINE

## 2021-01-11 RX ORDER — ZOLPIDEM TARTRATE 10 MG/1
10 TABLET ORAL
Qty: 30 TABLET | Refills: 0 | Status: SHIPPED | OUTPATIENT
Start: 2021-01-11 | End: 2021-02-10

## 2021-01-11 ASSESSMENT — ANXIETY QUESTIONNAIRES
2. NOT BEING ABLE TO STOP OR CONTROL WORRYING: MORE THAN HALF THE DAYS
GAD7 TOTAL SCORE: 4
3. WORRYING TOO MUCH ABOUT DIFFERENT THINGS: SEVERAL DAYS
5. BEING SO RESTLESS THAT IT IS HARD TO SIT STILL: NOT AT ALL
7. FEELING AFRAID AS IF SOMETHING AWFUL MIGHT HAPPEN: NOT AT ALL
1. FEELING NERVOUS, ANXIOUS, OR ON EDGE: SEVERAL DAYS
6. BECOMING EASILY ANNOYED OR IRRITABLE: NOT AT ALL
4. TROUBLE RELAXING: NOT AT ALL

## 2021-01-11 ASSESSMENT — PATIENT HEALTH QUESTIONNAIRE - PHQ9: SUM OF ALL RESPONSES TO PHQ QUESTIONS 1-9: 7

## 2021-01-11 ASSESSMENT — PAIN SCALES - GENERAL: PAINLEVEL: NO PAIN (1)

## 2021-01-11 NOTE — NURSING NOTE
"Chief Complaint   Patient presents with     Insomnia       Initial /76 (BP Location: Left arm, Patient Position: Chair, Cuff Size: Adult Large)   Pulse 86   Temp 98.2  F (36.8  C) (Tympanic)   Wt 108.9 kg (240 lb)   SpO2 98%   BMI 37.59 kg/m   Estimated body mass index is 37.59 kg/m  as calculated from the following:    Height as of 4/7/20: 1.702 m (5' 7\").    Weight as of this encounter: 108.9 kg (240 lb).  Medication Reconciliation: complete  YASMINE LAKE LPN  "

## 2021-01-12 ASSESSMENT — ANXIETY QUESTIONNAIRES: GAD7 TOTAL SCORE: 4

## 2021-02-09 DIAGNOSIS — G47.09 OTHER INSOMNIA: ICD-10-CM

## 2021-02-10 RX ORDER — ZOLPIDEM TARTRATE 10 MG/1
TABLET ORAL
Qty: 30 TABLET | Refills: 0 | Status: SHIPPED | OUTPATIENT
Start: 2021-02-10 | End: 2021-03-16

## 2021-02-11 ENCOUNTER — HOSPITAL ENCOUNTER (OUTPATIENT)
Dept: PHYSICAL THERAPY | Facility: HOSPITAL | Age: 51
Setting detail: THERAPIES SERIES
End: 2021-02-11
Attending: INTERNAL MEDICINE
Payer: COMMERCIAL

## 2021-02-11 DIAGNOSIS — M79.671 RIGHT FOOT PAIN: Primary | ICD-10-CM

## 2021-02-11 DIAGNOSIS — M25.551 HIP PAIN, RIGHT: ICD-10-CM

## 2021-02-11 DIAGNOSIS — R26.9 ABNORMAL GAIT: ICD-10-CM

## 2021-02-11 PROCEDURE — 999N000214 HC STATISTIC PT OUTPT VISIT: Performed by: PHYSICAL THERAPIST

## 2021-02-11 PROCEDURE — 97140 MANUAL THERAPY 1/> REGIONS: CPT | Mod: GP | Performed by: PHYSICAL THERAPIST

## 2021-02-11 PROCEDURE — 97110 THERAPEUTIC EXERCISES: CPT | Mod: GP | Performed by: PHYSICAL THERAPIST

## 2021-02-12 NOTE — PROGRESS NOTES
Outpatient Physical Therapy Progress Note     Patient: Annie Garcia  : 1970    Beginning/End Dates of Reporting Period:  2020 to 2021    Referring Provider: Dr Cid    Therapy Diagnosis: s/p R foot flatfoot reconstruction, s/p hardware removal, R hip pain and weakness     Client Self Report: Patient was last seen in 2020 for her s/p foot/ankle rehab. She reports her foot and ankle continue to do well and her progress is slow and steady. She has not come in because of covid related precautions and feels she has done well progressing on her own. Sh continues to  have some difficulty with her R hip and foot/ankle function and would like to continue PT on a PRN basis. She is having difficulty with her R hip mobility in the internal rotation direction - states she has always crossed her legs even though she is not supposed to, but feels she can't do this well anymore due to tightness and would like to address that as well. I updated her goals and we will see her on a PRN basis with an updated order.    Objective Measurements:  Objective Measure: B LE screen, gait  Details: R hip decreased functional IR where she used to be able to cross her leg, but now there is resistance - noted tightness in TFL, piriformis, glute med and min and itband.  Still outturned foot with gait, however this has improved from prior to surgery.  Surgical incisions healed well, minimal to no swelling and slight discoloration. Still feels fullness on the bottom of her foot occasionally and feels her strength is gradually increasing her her home program, but feels on day when she has more activtity, it gets more sore and that is something she can deal with       Goals:  Goal Identifier STG 1   Goal Description Patient will be compliant with HEP in order to make progress while at home   Target Date 11/15/19   Date Met  11/15/19   Progress:     Goal Identifier LTG 1   Goal Description Patient will  demonstrate normal ambulation without limp and without externally rotating foot for 50 ft in order to improve safety and efficiency of gait mechanics   Target Date 05/20/20   Date Met  02/12/21   Progress:     Goal Identifier LTG 2   Goal Description Patient will report overall 60% or more improvement in R foot function as it relates to daily tasks in order to perform all home tasks safely'   Target Date 06/06/20   Date Met  02/12/21   Progress:     Goal Identifier LTG 3   Goal Description Patient will report overall 80% or more improvement to normal function as it relates to her LE function in order to perform all home and communicty tasks safely   Target Date 04/23/21   Date Met      Progress:         Progress Toward Goals:   Progress this reporting period: Patient has made progress and goals updated      Plan:  Changes to therapy plan of care: Will see patient PRN as she continues to progress with strengthening program on her own. Updated order from PCP requested for ongoing R LE complaints    Discharge:  No    I certify the need for these services furnished under this plan of treatment and while under my care. (Physician co-signature of this document indicates review and certification of the therapy plan).

## 2021-02-15 ENCOUNTER — HOSPITAL ENCOUNTER (OUTPATIENT)
Dept: BONE DENSITY | Facility: HOSPITAL | Age: 51
End: 2021-02-15
Attending: INTERNAL MEDICINE
Payer: COMMERCIAL

## 2021-02-15 ENCOUNTER — ANCILLARY PROCEDURE (OUTPATIENT)
Dept: MAMMOGRAPHY | Facility: OTHER | Age: 51
End: 2021-02-15
Attending: INTERNAL MEDICINE
Payer: COMMERCIAL

## 2021-02-15 DIAGNOSIS — Z79.52 CURRENT CHRONIC USE OF SYSTEMIC STEROIDS: ICD-10-CM

## 2021-02-15 DIAGNOSIS — Z12.31 ENCOUNTER FOR SCREENING MAMMOGRAM FOR BREAST CANCER: ICD-10-CM

## 2021-02-15 PROCEDURE — 77063 BREAST TOMOSYNTHESIS BI: CPT | Mod: TC

## 2021-02-15 PROCEDURE — 77080 DXA BONE DENSITY AXIAL: CPT

## 2021-03-15 DIAGNOSIS — G47.09 OTHER INSOMNIA: ICD-10-CM

## 2021-03-15 NOTE — TELEPHONE ENCOUNTER
ambien  10 mg  Last Written Prescription Date:  2-  Last Fill Quantity: 30,   # refills: 0  Last Office Visit: 1-

## 2021-03-16 RX ORDER — ZOLPIDEM TARTRATE 10 MG/1
TABLET ORAL
Qty: 30 TABLET | Refills: 0 | Status: SHIPPED | OUTPATIENT
Start: 2021-03-16 | End: 2021-04-14

## 2021-04-13 DIAGNOSIS — G47.09 OTHER INSOMNIA: ICD-10-CM

## 2021-04-14 DIAGNOSIS — J32.9 CHRONIC INFECTION OF SINUS: ICD-10-CM

## 2021-04-14 DIAGNOSIS — E84.9 CF (CYSTIC FIBROSIS) (H): Primary | ICD-10-CM

## 2021-04-14 DIAGNOSIS — J47.9 BRONCHIECTASIS WITHOUT ACUTE EXACERBATION (H): ICD-10-CM

## 2021-04-14 RX ORDER — ZOLPIDEM TARTRATE 10 MG/1
TABLET ORAL
Qty: 30 TABLET | Refills: 0 | Status: SHIPPED | OUTPATIENT
Start: 2021-04-14 | End: 2021-05-25

## 2021-04-16 DIAGNOSIS — J32.9 CHRONIC INFECTION OF SINUS: ICD-10-CM

## 2021-04-16 DIAGNOSIS — E84.9 CF (CYSTIC FIBROSIS) (H): ICD-10-CM

## 2021-04-16 DIAGNOSIS — J47.9 BRONCHIECTASIS WITHOUT ACUTE EXACERBATION (H): ICD-10-CM

## 2021-04-16 LAB
ALBUMIN SERPL-MCNC: 3.6 G/DL (ref 3.4–5)
ALP SERPL-CCNC: 98 U/L (ref 40–150)
ALT SERPL W P-5'-P-CCNC: 26 U/L (ref 0–50)
ANION GAP SERPL CALCULATED.3IONS-SCNC: 4 MMOL/L (ref 3–14)
AST SERPL W P-5'-P-CCNC: 11 U/L (ref 0–45)
BASOPHILS # BLD AUTO: 0.1 10E9/L (ref 0–0.2)
BASOPHILS NFR BLD AUTO: 0.7 %
BILIRUB SERPL-MCNC: 0.2 MG/DL (ref 0.2–1.3)
BUN SERPL-MCNC: 13 MG/DL (ref 7–30)
CALCIUM SERPL-MCNC: 8.9 MG/DL (ref 8.5–10.1)
CHLORIDE SERPL-SCNC: 105 MMOL/L (ref 94–109)
CO2 SERPL-SCNC: 27 MMOL/L (ref 20–32)
CREAT SERPL-MCNC: 0.99 MG/DL (ref 0.52–1.04)
DIFFERENTIAL METHOD BLD: ABNORMAL
EOSINOPHIL # BLD AUTO: 0.1 10E9/L (ref 0–0.7)
EOSINOPHIL NFR BLD AUTO: 1.4 %
ERYTHROCYTE [DISTWIDTH] IN BLOOD BY AUTOMATED COUNT: 13.6 % (ref 10–15)
GFR SERPL CREATININE-BSD FRML MDRD: 66 ML/MIN/{1.73_M2}
GLUCOSE SERPL-MCNC: 108 MG/DL (ref 70–99)
HCT VFR BLD AUTO: 45.4 % (ref 35–47)
HGB BLD-MCNC: 15 G/DL (ref 11.7–15.7)
IMM GRANULOCYTES # BLD: 0 10E9/L (ref 0–0.4)
IMM GRANULOCYTES NFR BLD: 0.3 %
LIPASE SERPL-CCNC: 127 U/L (ref 73–393)
LYMPHOCYTES # BLD AUTO: 1.6 10E9/L (ref 0.8–5.3)
LYMPHOCYTES NFR BLD AUTO: 17.7 %
MCH RBC QN AUTO: 27.1 PG (ref 26.5–33)
MCHC RBC AUTO-ENTMCNC: 33 G/DL (ref 31.5–36.5)
MCV RBC AUTO: 82 FL (ref 78–100)
MONOCYTES # BLD AUTO: 0.5 10E9/L (ref 0–1.3)
MONOCYTES NFR BLD AUTO: 5.4 %
NEUTROPHILS # BLD AUTO: 6.8 10E9/L (ref 1.6–8.3)
NEUTROPHILS NFR BLD AUTO: 74.5 %
NRBC # BLD AUTO: 0 10*3/UL
NRBC BLD AUTO-RTO: 0 /100
PLATELET # BLD AUTO: 126 10E9/L (ref 150–450)
POTASSIUM SERPL-SCNC: 4.2 MMOL/L (ref 3.4–5.3)
PROT SERPL-MCNC: 7.2 G/DL (ref 6.8–8.8)
RBC # BLD AUTO: 5.54 10E12/L (ref 3.8–5.2)
SODIUM SERPL-SCNC: 136 MMOL/L (ref 133–144)
WBC # BLD AUTO: 9.1 10E9/L (ref 4–11)

## 2021-04-16 PROCEDURE — 85025 COMPLETE CBC W/AUTO DIFF WBC: CPT | Mod: ZL,59 | Performed by: INTERNAL MEDICINE

## 2021-04-16 PROCEDURE — 36415 COLL VENOUS BLD VENIPUNCTURE: CPT | Mod: ZL,59 | Performed by: INTERNAL MEDICINE

## 2021-04-16 PROCEDURE — 83690 ASSAY OF LIPASE: CPT | Mod: ZL | Performed by: INTERNAL MEDICINE

## 2021-04-16 PROCEDURE — 80053 COMPREHEN METABOLIC PANEL: CPT | Mod: ZL,59 | Performed by: INTERNAL MEDICINE

## 2021-04-19 DIAGNOSIS — E84.9 CF (CYSTIC FIBROSIS) (H): Primary | ICD-10-CM

## 2021-04-19 DIAGNOSIS — J32.9 CHRONIC SINUSITIS, UNSPECIFIED: ICD-10-CM

## 2021-04-19 DIAGNOSIS — J47.9 BRONCHIECTASIS, UNCOMPLICATED (H): ICD-10-CM

## 2021-04-21 ENCOUNTER — TRANSFERRED RECORDS (OUTPATIENT)
Dept: HEALTH INFORMATION MANAGEMENT | Facility: CLINIC | Age: 51
End: 2021-04-21

## 2021-04-21 LAB
C TRACH DNA SPEC QL PROBE+SIG AMP: NEGATIVE
HEP C HIM: NORMAL
HIV 1&2 EXT: NORMAL
HPV ABSTRACT: NORMAL
N GONORRHOEA DNA SPEC QL PROBE+SIG AMP: NEGATIVE
PAP-ABSTRACT: NORMAL
SPECIMEN DESCRIP: NORMAL
SPECIMEN DESCRIPTION: NORMAL
TSH SERPL-ACNC: 2.38 MIU/ML (ref 0.35–4.8)

## 2021-05-05 ENCOUNTER — TRANSFERRED RECORDS (OUTPATIENT)
Dept: HEALTH INFORMATION MANAGEMENT | Facility: CLINIC | Age: 51
End: 2021-05-05

## 2021-05-13 ENCOUNTER — HOSPITAL ENCOUNTER (OUTPATIENT)
Dept: PHYSICAL THERAPY | Facility: HOSPITAL | Age: 51
Setting detail: THERAPIES SERIES
End: 2021-05-13
Attending: INTERNAL MEDICINE
Payer: COMMERCIAL

## 2021-05-13 PROCEDURE — 97110 THERAPEUTIC EXERCISES: CPT | Mod: GP | Performed by: PHYSICAL THERAPIST

## 2021-05-13 PROCEDURE — 97140 MANUAL THERAPY 1/> REGIONS: CPT | Mod: GP | Performed by: PHYSICAL THERAPIST

## 2021-05-21 ENCOUNTER — TRANSFERRED RECORDS (OUTPATIENT)
Dept: HEALTH INFORMATION MANAGEMENT | Facility: CLINIC | Age: 51
End: 2021-05-21

## 2021-05-24 ENCOUNTER — RECORDS - HEALTHEAST (OUTPATIENT)
Dept: ADMINISTRATIVE | Facility: CLINIC | Age: 51
End: 2021-05-24

## 2021-05-25 DIAGNOSIS — G47.09 OTHER INSOMNIA: ICD-10-CM

## 2021-05-25 RX ORDER — ZOLPIDEM TARTRATE 10 MG/1
TABLET ORAL
Qty: 30 TABLET | Refills: 0 | Status: SHIPPED | OUTPATIENT
Start: 2021-05-25 | End: 2021-06-23

## 2021-05-25 NOTE — TELEPHONE ENCOUNTER
Zolpidem 10 mg      Last Written Prescription Date:  4/14/21  Last Fill Quantity: 30,   # refills: 0  Last Office Visit: 1/11/20  Future Office visit:       Routing refill request to provider for review/approval because:  Drug not on the [facility], P or [facility] refill protocol or controlled substance

## 2021-06-23 DIAGNOSIS — G47.09 OTHER INSOMNIA: ICD-10-CM

## 2021-06-23 RX ORDER — ZOLPIDEM TARTRATE 10 MG/1
TABLET ORAL
Qty: 30 TABLET | Refills: 0 | Status: SHIPPED | OUTPATIENT
Start: 2021-06-23 | End: 2021-07-19

## 2021-06-23 NOTE — TELEPHONE ENCOUNTER
zolpidem (AMBIEN) 10 MG tablet    Last Written Prescription Date:  5/25/21  Last Fill Quantity: 30,   # refills: 0  Last Office Visit: 1/11/21  Future Office visit:       Routing refill request to provider for review/approval

## 2021-07-19 DIAGNOSIS — G47.09 OTHER INSOMNIA: ICD-10-CM

## 2021-07-19 RX ORDER — ZOLPIDEM TARTRATE 10 MG/1
TABLET ORAL
Qty: 30 TABLET | Refills: 0 | Status: SHIPPED | OUTPATIENT
Start: 2021-07-19 | End: 2021-08-18

## 2021-07-19 NOTE — TELEPHONE ENCOUNTER
AMBIEN      Last Written Prescription Date:  6/23/2021  Last Fill Quantity: 30,   # refills: 0  Last Office Visit: 1/11/21  Future Office visit:       Routing refill request to provider for review/approval because:

## 2021-07-23 DIAGNOSIS — J32.4 CHRONIC PANSINUSITIS: Primary | ICD-10-CM

## 2021-07-23 NOTE — TELEPHONE ENCOUNTER
Bactroban      Last Written Prescription Date:  0  Last Fill Quantity: 0,   # refills: 0  Last Office Visit: 1.11.21    Not current on medication list

## 2021-07-26 RX ORDER — MUPIROCIN 20 MG/G
OINTMENT TOPICAL
Qty: 7000 G | Refills: 0 | Status: SHIPPED | OUTPATIENT
Start: 2021-07-26 | End: 2021-11-15

## 2021-08-17 DIAGNOSIS — G47.09 OTHER INSOMNIA: ICD-10-CM

## 2021-08-18 RX ORDER — ZOLPIDEM TARTRATE 10 MG/1
TABLET ORAL
Qty: 30 TABLET | Refills: 0 | Status: SHIPPED | OUTPATIENT
Start: 2021-08-18 | End: 2021-09-14

## 2021-08-18 NOTE — TELEPHONE ENCOUNTER
ambien      Last Written Prescription Date:  7/19/21  Last Fill Quantity: 30,   # refills: 0  Last Office Visit: 1/11/21  Future Office visit:

## 2021-09-13 DIAGNOSIS — G47.09 OTHER INSOMNIA: ICD-10-CM

## 2021-09-14 RX ORDER — ZOLPIDEM TARTRATE 10 MG/1
TABLET ORAL
Qty: 30 TABLET | Refills: 0 | Status: SHIPPED | OUTPATIENT
Start: 2021-09-14 | End: 2021-10-11

## 2021-10-11 DIAGNOSIS — G47.09 OTHER INSOMNIA: ICD-10-CM

## 2021-10-11 RX ORDER — ZOLPIDEM TARTRATE 10 MG/1
TABLET ORAL
Qty: 30 TABLET | Refills: 0 | Status: SHIPPED | OUTPATIENT
Start: 2021-10-11 | End: 2021-11-02

## 2021-10-11 NOTE — TELEPHONE ENCOUNTER
ambien      Last Written Prescription Date:  9/14/21  Last Fill Quantity: 30,   # refills: 0  Last Office Visit: 1/19/21  Future Office visit:

## 2021-10-13 ENCOUNTER — OFFICE VISIT (OUTPATIENT)
Dept: CHIROPRACTIC MEDICINE | Facility: OTHER | Age: 51
End: 2021-10-13
Attending: CHIROPRACTOR
Payer: COMMERCIAL

## 2021-10-13 DIAGNOSIS — M99.01 SEGMENTAL AND SOMATIC DYSFUNCTION OF CERVICAL REGION: ICD-10-CM

## 2021-10-13 DIAGNOSIS — M99.02 SEGMENTAL AND SOMATIC DYSFUNCTION OF THORACIC REGION: ICD-10-CM

## 2021-10-13 DIAGNOSIS — M99.03 SEGMENTAL AND SOMATIC DYSFUNCTION OF LUMBAR REGION: Primary | ICD-10-CM

## 2021-10-13 DIAGNOSIS — M54.50 ACUTE BILATERAL LOW BACK PAIN WITHOUT SCIATICA: ICD-10-CM

## 2021-10-13 PROCEDURE — 98941 CHIROPRACT MANJ 3-4 REGIONS: CPT | Mod: AT | Performed by: CHIROPRACTOR

## 2021-10-13 NOTE — PROGRESS NOTES
Subjective Finding:    Chief compalint: Patient presents with:  Back Pain  , Pain Scale: 6/10, Intensity: sharp, Duration: 1 months, Radiating: no.    Date of injury:     Activities that the pain restricts:   Home/household/hobbies/social activities: yes.  Work duties: no.  Sleep: yes.  Makes symptoms better: rest.  Makes symptoms worse: activity.  Have you seen anyone else for the symptoms? Yes: MD.  Work related: no.  Automobile related injury: no.    Objective and Assessment:    Posture Analysis:   High shoulder: .  Head tilt: .  High iliac crest: .  Head carriage: neutral.  Thoracic Kyphosis: neutral.  Lumbar Lordosis: forward.    Lumbar Range of Motion: extension decreased.  Cervical Range of Motion: extension decreased.  Thoracic Range of Motion: extension decreased.  Extremity Range of Motion: .    Palpation:   Quad lumb: bilateral, referred pain: no    Segmental dysfunction pre-treatment and treatment area: T9  SI.    Assessment post-treatment:  Cervical: ROM increased.  Thoracic: ROM increased.  Lumbar: ROM increased.    Comments: .      Complicating Factors: .    Procedure(s):  CMT:  14558 Chiropractic manipulative treatment 3-4 regions performed   Cervical: Diversified, See above for level, Supine, Thoracic: Diversified, See above for level, Prone and Pelvis: Diversified, See above for level, Side posture    Modalities:  None performed this visit    Therapeutic procedures:  None    Plan:  Treatment plan: PRN.  Instructed patient: stretch as instructed at visit.  Short term goals: increase ROM.  Long term goals: restore normal function.  Prognosis: very good.

## 2021-11-01 ENCOUNTER — TRANSFERRED RECORDS (OUTPATIENT)
Dept: HEALTH INFORMATION MANAGEMENT | Facility: CLINIC | Age: 51
End: 2021-11-01

## 2021-11-02 DIAGNOSIS — G47.09 OTHER INSOMNIA: ICD-10-CM

## 2021-11-02 RX ORDER — ZOLPIDEM TARTRATE 10 MG/1
10 TABLET ORAL
Qty: 30 TABLET | Refills: 0 | Status: SHIPPED | OUTPATIENT
Start: 2021-11-09 | End: 2021-11-15

## 2021-11-02 NOTE — TELEPHONE ENCOUNTER
Patient moved to [place]. 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  Address added under temporary due to patient states to keep her other address.  Also new pharmacy added.     Requesting refill on Ambien. But will need a refills on all medications.   Will need a visit for these. Requesting a zoom appt.    YASMINE LAKE LPN

## 2021-11-04 ENCOUNTER — TELEPHONE (OUTPATIENT)
Dept: INTERNAL MEDICINE | Facility: OTHER | Age: 51
End: 2021-11-04

## 2021-11-04 DIAGNOSIS — K21.9 GASTROESOPHAGEAL REFLUX DISEASE WITHOUT ESOPHAGITIS: ICD-10-CM

## 2021-11-15 ENCOUNTER — OFFICE VISIT (OUTPATIENT)
Dept: INTERNAL MEDICINE | Facility: OTHER | Age: 51
End: 2021-11-15
Attending: INTERNAL MEDICINE
Payer: COMMERCIAL

## 2021-11-15 VITALS
HEART RATE: 85 BPM | DIASTOLIC BLOOD PRESSURE: 72 MMHG | BODY MASS INDEX: 36.37 KG/M2 | WEIGHT: 240 LBS | OXYGEN SATURATION: 98 % | SYSTOLIC BLOOD PRESSURE: 132 MMHG | HEIGHT: 68 IN

## 2021-11-15 DIAGNOSIS — K21.9 GASTROESOPHAGEAL REFLUX DISEASE WITHOUT ESOPHAGITIS: ICD-10-CM

## 2021-11-15 DIAGNOSIS — J32.4 CHRONIC PANSINUSITIS: ICD-10-CM

## 2021-11-15 DIAGNOSIS — J42 CHRONIC BRONCHITIS, UNSPECIFIED CHRONIC BRONCHITIS TYPE (H): ICD-10-CM

## 2021-11-15 DIAGNOSIS — Z98.890 HISTORY OF ENDOSCOPIC SINUS SURGERY: ICD-10-CM

## 2021-11-15 DIAGNOSIS — Z02.0 SCHOOL PHYSICAL EXAM: Primary | ICD-10-CM

## 2021-11-15 DIAGNOSIS — E84.0 CYSTIC FIBROSIS OF THE LUNG (H): ICD-10-CM

## 2021-11-15 DIAGNOSIS — M25.551 CHRONIC PAIN OF BOTH HIPS: ICD-10-CM

## 2021-11-15 DIAGNOSIS — M25.552 CHRONIC PAIN OF BOTH HIPS: ICD-10-CM

## 2021-11-15 DIAGNOSIS — B49 FUNGUS INFECTION: ICD-10-CM

## 2021-11-15 DIAGNOSIS — G47.09 OTHER INSOMNIA: ICD-10-CM

## 2021-11-15 DIAGNOSIS — R21 RASH AND NONSPECIFIC SKIN ERUPTION: ICD-10-CM

## 2021-11-15 DIAGNOSIS — G89.29 CHRONIC PAIN OF BOTH HIPS: ICD-10-CM

## 2021-11-15 PROCEDURE — 86765 RUBEOLA ANTIBODY: CPT | Mod: ZL | Performed by: INTERNAL MEDICINE

## 2021-11-15 PROCEDURE — 86481 TB AG RESPONSE T-CELL SUSP: CPT | Mod: ZL | Performed by: INTERNAL MEDICINE

## 2021-11-15 PROCEDURE — 86762 RUBELLA ANTIBODY: CPT | Mod: ZL | Performed by: INTERNAL MEDICINE

## 2021-11-15 PROCEDURE — 86706 HEP B SURFACE ANTIBODY: CPT | Mod: ZL | Performed by: INTERNAL MEDICINE

## 2021-11-15 PROCEDURE — 86803 HEPATITIS C AB TEST: CPT | Mod: ZL | Performed by: INTERNAL MEDICINE

## 2021-11-15 PROCEDURE — 36415 COLL VENOUS BLD VENIPUNCTURE: CPT | Mod: ZL | Performed by: INTERNAL MEDICINE

## 2021-11-15 PROCEDURE — 86787 VARICELLA-ZOSTER ANTIBODY: CPT | Mod: ZL | Performed by: INTERNAL MEDICINE

## 2021-11-15 PROCEDURE — 87389 HIV-1 AG W/HIV-1&-2 AB AG IA: CPT | Mod: ZL | Performed by: INTERNAL MEDICINE

## 2021-11-15 PROCEDURE — G0463 HOSPITAL OUTPT CLINIC VISIT: HCPCS

## 2021-11-15 PROCEDURE — 87340 HEPATITIS B SURFACE AG IA: CPT | Mod: ZL | Performed by: INTERNAL MEDICINE

## 2021-11-15 PROCEDURE — 86735 MUMPS ANTIBODY: CPT | Mod: ZL | Performed by: INTERNAL MEDICINE

## 2021-11-15 PROCEDURE — 99215 OFFICE O/P EST HI 40 MIN: CPT | Performed by: INTERNAL MEDICINE

## 2021-11-15 RX ORDER — MOMETASONE FUROATE MONOHYDRATE 50 UG/1
2 SPRAY, METERED NASAL DAILY
Qty: 17 G | Refills: 1 | Status: SHIPPED | OUTPATIENT
Start: 2021-11-15 | End: 2024-06-11

## 2021-11-15 RX ORDER — PREDNISONE 5 MG/1
TABLET ORAL
Qty: 50 TABLET | Refills: 1 | Status: SHIPPED | OUTPATIENT
Start: 2021-11-15 | End: 2024-07-29

## 2021-11-15 RX ORDER — DEXLANSOPRAZOLE 30 MG/1
30 CAPSULE, DELAYED RELEASE ORAL DAILY
Qty: 30 CAPSULE | Refills: 1 | Status: SHIPPED | OUTPATIENT
Start: 2021-11-15

## 2021-11-15 RX ORDER — SULFAMETHOXAZOLE/TRIMETHOPRIM 800-160 MG
1 TABLET ORAL 2 TIMES DAILY
Qty: 21 TABLET | Refills: 1 | Status: SHIPPED | OUTPATIENT
Start: 2021-11-15 | End: 2024-07-29

## 2021-11-15 RX ORDER — BUDESONIDE 0.5 MG/2ML
INHALANT ORAL
Qty: 120 ML | Refills: 1 | Status: SHIPPED | OUTPATIENT
Start: 2021-11-15

## 2021-11-15 RX ORDER — FLUCONAZOLE 100 MG/1
TABLET ORAL
Qty: 90 TABLET | Refills: 1 | Status: SHIPPED | OUTPATIENT
Start: 2021-11-15 | End: 2024-06-25

## 2021-11-15 RX ORDER — DESONIDE 0.5 MG/G
OINTMENT TOPICAL
Qty: 60 G | Refills: 1 | Status: SHIPPED | OUTPATIENT
Start: 2021-11-15

## 2021-11-15 RX ORDER — MUPIROCIN 20 MG/G
OINTMENT TOPICAL
Qty: 7000 G | Refills: 1 | Status: SHIPPED | OUTPATIENT
Start: 2021-11-15

## 2021-11-15 RX ORDER — ZOLPIDEM TARTRATE 10 MG/1
10 TABLET ORAL
Qty: 30 TABLET | Refills: 1 | Status: SHIPPED | OUTPATIENT
Start: 2021-11-15

## 2021-11-15 ASSESSMENT — ANXIETY QUESTIONNAIRES
6. BECOMING EASILY ANNOYED OR IRRITABLE: SEVERAL DAYS
4. TROUBLE RELAXING: MORE THAN HALF THE DAYS
3. WORRYING TOO MUCH ABOUT DIFFERENT THINGS: SEVERAL DAYS
1. FEELING NERVOUS, ANXIOUS, OR ON EDGE: SEVERAL DAYS
7. FEELING AFRAID AS IF SOMETHING AWFUL MIGHT HAPPEN: SEVERAL DAYS
2. NOT BEING ABLE TO STOP OR CONTROL WORRYING: SEVERAL DAYS
IF YOU CHECKED OFF ANY PROBLEMS ON THIS QUESTIONNAIRE, HOW DIFFICULT HAVE THESE PROBLEMS MADE IT FOR YOU TO DO YOUR WORK, TAKE CARE OF THINGS AT HOME, OR GET ALONG WITH OTHER PEOPLE: SOMEWHAT DIFFICULT
5. BEING SO RESTLESS THAT IT IS HARD TO SIT STILL: SEVERAL DAYS
GAD7 TOTAL SCORE: 8

## 2021-11-15 ASSESSMENT — PAIN SCALES - GENERAL: PAINLEVEL: NO PAIN (0)

## 2021-11-15 ASSESSMENT — MIFFLIN-ST. JEOR: SCORE: 1739.19

## 2021-11-15 NOTE — PROGRESS NOTES
Assessment & Plan   Problem List Items Addressed This Visit        Respiratory    Chronic eosinophilic sinusitis    Relevant Medications    predniSONE (DELTASONE) 5 MG tablet    mupirocin 2 % OINT, sodium chloride 0.9% (bottle) 0.9 % SOLN external ointment    mometasone (NASONEX) 50 MCG/ACT nasal spray    fluconazole (DIFLUCAN) 100 MG tablet    budesonide (PULMICORT) 0.5 MG/2ML neb solution    sulfamethoxazole-trimethoprim (BACTRIM DS) 800-160 MG tablet       Digestive    GERD (gastroesophageal reflux disease)    Relevant Medications    dexlansoprazole (DEXILANT) 30 MG CPDR CR capsule       Other    Insomnia    Relevant Medications    zolpidem (AMBIEN) 10 MG tablet      Other Visit Diagnoses     School physical exam    -  Primary    Relevant Orders    Hepatitis B surface antigen    Varicella Zoster Virus Antibody IgG    Mumps Immune Status, IgG    Rubeola Antibody IgG    Rubella Antibody IgG    QuantiFERON TB Gold (In Tube) ([facility])    Hepatitis B Surface Antibody    HIV Antigen Antibody Combo    Hepatitis C antibody    Quantiferon-TB Gold Plus    Chronic pain of both hips        Relevant Medications    acetaminophen-codeine (TYLENOL #3) 300-30 MG tablet    predniSONE (DELTASONE) 5 MG tablet    Chronic bronchitis, unspecified chronic bronchitis type (H)        Relevant Medications    dornase alpha (PULMOZYME) 2.5 MG/2.5ML neb solution    sulfamethoxazole-trimethoprim (BACTRIM DS) 800-160 MG tablet    History of endoscopic sinus surgery        Relevant Medications    mometasone (NASONEX) 50 MCG/ACT nasal spray    Fungus infection        Relevant Medications    mometasone (NASONEX) 50 MCG/ACT nasal spray    fluconazole (DIFLUCAN) 100 MG tablet    budesonide (PULMICORT) 0.5 MG/2ML neb solution    Rash and nonspecific skin eruption        Relevant Medications    predniSONE (DELTASONE) 5 MG tablet    mupirocin 2 % OINT, sodium chloride 0.9% (bottle) 0.9 % SOLN external ointment    mometasone (NASONEX) 50 MCG/ACT nasal  "spray    desonide (DESOWEN) 0.05 % external ointment    budesonide (PULMICORT) 0.5 MG/2ML neb solution    Cystic fibrosis of the lung (H)        Relevant Medications    sulfamethoxazole-trimethoprim (BACTRIM DS) 800-160 MG tablet             60 minutes spent on the date of the encounter doing chart review, review of test results, interpretation of tests, patient visit, documentation and Paperwork        BMI:   Estimated body mass index is 37.03 kg/m  as calculated from the following:    Height as of this encounter: 1.715 m (5' 7.5\").    Weight as of this encounter: 108.9 kg (240 lb).           No follow-ups on file.    Petar Salgado, [facility]    Richard Valencia is a 51 year old who presents for the following health issues     HPI   Annie presents today for follow up.  She has a history of CF and needs hard copy of all her medications. In addition she is entering a graduate program at [employer] and needs paperwork along with titers and vaccination certifications.     Concern - Titers   Onset: hx of immunizations not on record   Description: Hep B, varicella, MMR, Quatiferon Gold       Review of Systems   Constitutional, HEENT, cardiovascular, pulmonary, gi and gu systems are negative, except as otherwise noted.      Objective    /72 (BP Location: Right arm, Patient Position: Chair, Cuff Size: Adult Large)   Pulse 85   Ht 1.715 m (5' 7.5\")   Wt 108.9 kg (240 lb)   SpO2 98%   BMI 37.03 kg/m    Body mass index is 37.03 kg/m .  Physical Exam   GENERAL: alert and no distress  RESP: lungs clear to auscultation - no rales, rhonchi or wheezes  CV: regular rate and rhythm, normal S1 S2, no S3 or S4, no murmur, click or rub, no peripheral edema and peripheral pulses strong  MS: no gross musculoskeletal defects noted, no edema  SKIN: no suspicious lesions or rashes  PSYCH: mentation appears normal, affect normal/bright    Transferred Records on 04/21/2021   Component Date Value " Ref Range Status     PAP-ABSTRACT 04/21/2021 See Scanned Document   Final     HPV Abstract 04/21/2021 See Scanned Document   Final     Hep C HIM 04/21/2021 See Scanned Document   Final     Specimen Description 04/21/2021 Swab   Final     Chlamydia Trachomatis PCR 04/21/2021 Negative  Negative Final     Specimen Descrip 04/21/2021 Swab   Final     N Gonorrhea PCR 04/21/2021 Negative  Negative Final     TSH (External) 04/21/2021 2.378  0.350 - 4.800 mIU/mL Final     HIV 1&2 EXT 04/21/2021 Non-Reactive  Non-Reactive Final     Results for orders placed or performed in visit on 11/15/21   Quantiferon-TB Gold Plus     Status: None (In process)    Specimen: Arm, Left; Blood    Narrative    The following orders were created for panel order Quantiferon-TB Gold Plus.  Procedure                               Abnormality         Status                     ---------                               -----------         ------                     Quantiferon TB Gold Plus...[[id number]]                      In process                 Quantiferon TB Gold Plus...[[id number]]                      In process                 Quantiferon TB Gold Plus...[[id number]]                      In process                 Quantiferon TB Gold Plus...[[id number]]                      In process                   Please view results for these tests on the individual orders.     Results for orders placed or performed in visit on 11/15/21 (from the past 24 hour(s))   Quantiferon-TB Gold Plus    Specimen: Arm, Left; Blood    Narrative    The following orders were created for panel order Quantiferon-TB Gold Plus.  Procedure                               Abnormality         Status                     ---------                               -----------         ------                     Quantiferon TB Gold Plus...[[id number]]                      In process                 Quantiferon TB Gold Plus...[[id number]]                      In process                  Quantiferon TB Gold Plus...[[id number]]                      In process                 Quantiferon TB Gold Plus...[[id number]]                      In process                   Please view results for these tests on the individual orders.

## 2021-11-15 NOTE — NURSING NOTE
"Chief Complaint   Patient presents with     Forms       Initial /72 (BP Location: Right arm, Patient Position: Chair, Cuff Size: Adult Large)   Pulse 85   Ht 1.715 m (5' 7.5\")   Wt 108.9 kg (240 lb)   SpO2 98%   BMI 37.03 kg/m   Estimated body mass index is 37.03 kg/m  as calculated from the following:    Height as of this encounter: 1.715 m (5' 7.5\").    Weight as of this encounter: 108.9 kg (240 lb).  Medication Reconciliation: complete  Kim Jon LPN    "

## 2021-11-16 ASSESSMENT — ANXIETY QUESTIONNAIRES: GAD7 TOTAL SCORE: 8

## 2021-11-16 ASSESSMENT — PATIENT HEALTH QUESTIONNAIRE - PHQ9: SUM OF ALL RESPONSES TO PHQ QUESTIONS 1-9: 9

## 2021-11-17 LAB
GAMMA INTERFERON BACKGROUND BLD IA-ACNC: 0.05 IU/ML
HBV SURFACE AB SERPL IA-ACNC: 4.48 M[IU]/ML
HBV SURFACE AG SERPL QL IA: NONREACTIVE
HCV AB SERPL QL IA: NONREACTIVE
HIV 1+2 AB+HIV1 P24 AG SERPL QL IA: NONREACTIVE
M TB IFN-G BLD-IMP: NEGATIVE
M TB IFN-G CD4+ BCKGRND COR BLD-ACNC: 9.95 IU/ML
MEV IGG SER IA-ACNC: 83.7 AU/ML
MEV IGG SER IA-ACNC: POSITIVE
MITOGEN IGNF BCKGRD COR BLD-ACNC: 0.02 IU/ML
MITOGEN IGNF BCKGRD COR BLD-ACNC: 0.06 IU/ML
MUMPS ANTIBODY IGG INSTRUMENT VALUE: 113 AU/ML
MUV IGG SER QL IA: POSITIVE
QUANTIFERON MITOGEN: 10 IU/ML
QUANTIFERON NIL TUBE: 0.05 IU/ML
QUANTIFERON TB1 TUBE: 0.11 IU/ML
QUANTIFERON TB2 TUBE: 0.07
RUBV IGG SERPL QL IA: 1.47 INDEX
RUBV IGG SERPL QL IA: POSITIVE
VZV IGG SER QL IA: 316.9 INDEX
VZV IGG SER QL IA: POSITIVE

## 2021-12-02 NOTE — TELEPHONE ENCOUNTER
AMBIEN 10 MG tablet      Last Written Prescription Date:  9/4/20  Last Fill Quantity: 30,   # refills: 0  Last Office Visit: 1/16/20  Future Office visit:       Routing refill request to provider for review/approval because:  Drug not on the [facility], [facility] or [facility] refill protocol or controlled substance    
- - -

## 2021-12-27 NOTE — TELEPHONE ENCOUNTER
"Patient notified Todd Torres is out of office till Monday.  Patient left message requesting return call re: order to replace lost Pulse Oximeter for \"Wrist\".  Please call [phone]  " No

## 2022-04-05 ENCOUNTER — TRANSFERRED RECORDS (OUTPATIENT)
Dept: HEALTH INFORMATION MANAGEMENT | Facility: CLINIC | Age: 52
End: 2022-04-05

## 2022-11-15 ENCOUNTER — TELEPHONE (OUTPATIENT)
Dept: INTERNAL MEDICINE | Facility: OTHER | Age: 52
End: 2022-11-15

## 2022-11-17 NOTE — TELEPHONE ENCOUNTER
Received a PA from [facility] for a Compound drug mupirocin 2 % OINT, sodium chloride 0.9% (bottle) 0.9 % SOLN external ointment. Completed form and faxed to [facility]. Received DENIAL from [facility] for medication. 11/17/2022    Forms scanned to [facility].

## 2022-12-14 ENCOUNTER — TRANSFERRED RECORDS (OUTPATIENT)
Dept: HEALTH INFORMATION MANAGEMENT | Facility: CLINIC | Age: 52
End: 2022-12-14

## 2022-12-28 DIAGNOSIS — J42 CHRONIC BRONCHITIS, UNSPECIFIED CHRONIC BRONCHITIS TYPE (H): ICD-10-CM

## 2022-12-28 NOTE — TELEPHONE ENCOUNTER
Pulmozyme  Last Written Prescription Date: 11/15/21  Last Fill Quantity: 75 ml # of Refills: 3  Last Office Visit: 11/15/21

## 2023-10-06 NOTE — LETTER
7/12/2018         RE: Annie Garcia  [address] [address]        Dear Colleague,    Thank you for referring your patient, Annie Garcia, to the [facility]. Please see a copy of my visit note below.    Otolaryngology Progress Note      History of Present Illness   Patient presents with:  RECHECK: sinus check-- FESS SEPTO, TR-2/21/18      Annie Garcia is a 48 year old female with a history of cystic fibrosis and eosinophilic chronic recurrent sinusitis status post endoscopic sinus surgery on 2/21/2018 presents for surveillance today.    She has had some left epistaxis and is seeing Fawn Pressley for this.  She is on budesonide irrigations twice a day and Nasonex     She is on oral doxycycline for CF prophylaxis and Karen started her on Bactrim on 5/15/2018.  She feels that since she has been on Bactrim (culture directed against Staph Aureus) her sinuses overall feel improved but she notes more of postnasal drainage.  She continues to have parosmia smelling smoke constantly where there is no smoke.  She has taste disturbance food just does not taste as good.      There is no wheezing or shortness of breath     No fever, no cough    She is using her budesonide irrigations and is restarted Nasonex.    Her right sinuses were clear at her surgery with me in 2/21       PROCEDURES:   1.  Left total ethmoidectomy.   2.  Left frontal sinusotomy.   3.  Left maxillary antrostomy with tissue removal.   4.  Septoplasty with cartilage reinsertion.   5.  Bilateral submucosal reduction inferior turbinates.   6.  Procedures performed using [employer] navigation.        Cultures were positive again for Heavy growth Staph A, susceptible to Sulfa. She has tolerated Sulfa medications w/o concerns.   FINAL DIAGNOSIS:   Sinuses, FESS   - Inflammatory nasal polyps with approximately 100 eosinophils/hpf   - Severe chronic sinusitis     Physical Exam  /76 (BP Location: Left  Attending Attestation (For Attendings USE Only)... "arm, Patient Position: Chair, Cuff Size: Adult Large)  Pulse 93  Temp 97.4  F (36.3  C) (Tympanic)  Ht 5' 7\" (1.702 m)  Wt 240 lb (108.9 kg)  SpO2 97%  BMI 37.59 kg/m2  General - The patient is well nourished and well developed, and appears to have good nutritional status.  Alert and oriented to person and place, interactive.   Head and Face - Normocephalic and atraumatic, with no gross asymmetry noted of the contour of the facial features.  The facial nerve is intact, with strong symmetric movements.  No wheezing, no chest retractions  Neck-no palpable lymphadenopathy or thyroid mass.  Trachea is midline.  Eyes - Extraocular movements intact.   Ears- External auditory canals are patent, tympanic membranes are intact without effusion or worrisome retractions   Nose - Nasal mucosa is pink and moist with no abnormal mucus.  The septum was grossly midline and non-obstructive, turbinates of normal size and position.   Mouth - Examination of the oral cavity shows pink, healthy, moist mucosa.  No lesions or ulceration noted.  The dentition are in good repair.  The tongue is mobile and midline.  Throat - The walls of the oropharynx were smooth, pink, moist, symmetric, and had no lesions or ulcerations.  The tonsillar pillars and soft palate were symmetric.  The uvula was midline on elevation.        To evaluate the nose and sinuses in the post operative state, I performed rigid nasal endoscopy. The LPN had previously sprayed both nares with lidocaine and neosynephrine.    I began with the LEFT side using a 0 degree rigid nasal endoscope, and then similarly examined the RIGHT side    Findings:  Inferior turbinates:  Mild edema  Middle turbinate and middle meatus: Bright green viscous thick secretions suctioned debrided from the maxillary sinuses bilaterally with a curved suction  Antrostomy patent with some polypoid degeneration mucosa of antrum and a non obstructive polyp adjacent to the remaining right " uncinate  Ethmoid cavity clear  Bilateral maxillary sinus irrigation performed  She tolerated this all well aside from standard associated procedural anxiety which has been improving    Impression/Plan  Annie Garcia is a 48 year old female    ICD-10-CM    1. Cystic fibrosis (H) E84.9    2. S/P FESS (functional endoscopic sinus surgery) Z98.890    3. Chronic eosinophilic sinusitis J32.4      I do want her to stay on her Bactrim to cover staph until I see her back in 1 month.  Consider tobramycin and office irrigations.  I do not think she would tolerate an office steroid eluding stent placement and she agrees.  I told mom because she is actually doing better with sinus debridements in the office but she feels she has a lot of anxiety.  I have encouraged Annie did try to take ibuprofen before she comes in for her next debridement as her epistaxis has improved.  Continue her prophylactic doxycycline    Continue budesonide irrigations bid and Nasonex.  Correct use was reinforced particularly with the budesonide irrigations I do want this solution to be as warm as possible.    If there is no improvement follow-up I would order a CAT scan and perform a sinus exam with debridement under anesthesia she may need additional surgery performed based on the CAT scan she agrees with the plan and will contact us sooner if there are any other concerns    She did not tolerate removal of the propel implants so I would have to decide if these are refusing at her next surgery.    Total exam time was over 40 minutes with over 25 minutes of this time spent in direct patient education, counseling and coordination of care, review of prior surgeries and micro reports.  We discussed the importance of high flow nasal saline use to prevent nasal secretion stasis, the correct use of nasal steroids    Francoise Gonzalez D.O.  Otolaryngology/Head and Neck Surgery  Allergy            Again, thank you for allowing me to participate  in the care of your patient.        Sincerely,        Francoise Gonzalez MD

## 2023-12-01 ENCOUNTER — PHARMACY VISIT (OUTPATIENT)
Dept: ADMINISTRATIVE | Facility: CLINIC | Age: 53
End: 2023-12-01
Payer: COMMERCIAL

## 2023-12-01 NOTE — PROGRESS NOTES
Cystic Fibrosis Clinical Follow Up Assessment  Summary Notes     I spoke to Annie for CF Therapy Management assessment. Order set with  tech to deliver 12/7     Pulmozyme - PRN only. She is using it daily right now because she has an upcoming surgery and she wants to make sure she is in good shape going into that. PDC = 0.34 - PRN ONLY. No adherence concerns.     CF - Denies changes. Things are going well. No questions or concerns at this time.     Follow-up - TM clinicians will continue biannual assessments.        Care Details    What are the patient's goals for this therapy?   ? 6/1/23 - keeping up with her log of medications.      Did you identify any patient barriers to access and successful treatment?   ? No barriers to access identified      Document PDC   ? 0.34      Has the patient missed doses inappropriately?   ? No - PRN USE ONLY       CURRENT side effect(s) for monitoring:   ? None     Court Couch, PharmD  Therapy Management Pharmacist  [facility]  [email or web address]  [email or web address]  Specialty: [phone]

## 2024-04-01 ENCOUNTER — MEDICAL CORRESPONDENCE (OUTPATIENT)
Dept: HEALTH INFORMATION MANAGEMENT | Facility: CLINIC | Age: 54
End: 2024-04-01
Payer: COMMERCIAL

## 2024-04-03 ENCOUNTER — TRANSCRIBE ORDERS (OUTPATIENT)
Dept: OTHER | Age: 54
End: 2024-04-03

## 2024-04-03 DIAGNOSIS — J32.4 CHRONIC PANSINUSITIS: Primary | ICD-10-CM

## 2024-04-03 DIAGNOSIS — J47.9 BRONCHIECTASIS WITHOUT COMPLICATION (H): ICD-10-CM

## 2024-04-03 DIAGNOSIS — A49.8 INFECTION DUE TO MULTIDRUG-RESISTANT PSEUDOMONAS AERUGINOSA: ICD-10-CM

## 2024-04-03 DIAGNOSIS — E84.9 CYSTIC FIBROSIS (H): ICD-10-CM

## 2024-04-03 DIAGNOSIS — J33.9 NASAL POLYPOSIS: ICD-10-CM

## 2024-04-03 DIAGNOSIS — Z16.24 INFECTION DUE TO MULTIDRUG-RESISTANT PSEUDOMONAS AERUGINOSA: ICD-10-CM

## 2024-04-03 DIAGNOSIS — Z98.890 HISTORY OF ENDOSCOPIC SINUS SURGERY: ICD-10-CM

## 2024-04-24 NOTE — TELEPHONE ENCOUNTER
FUTURE VISIT INFORMATION      FUTURE VISIT INFORMATION:  Date:  5/30/24 DR. 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],ref by Everardo Underwood MD, pt made appt, [facility] location     RECORDS REQUESTED FROM:       Clinic name Comments Records Status Imaging Status   [facility]  4/16/24- Ov Nadja Ochoa MD     5/8/24, 4/1/24, 3/27/24, 1/15/24, 11/21/23 - OV/Referral Everardo Underwood MD  1/28/22- Ov Gabino Mondragon, APRN, CNP      More in CE Scanned in & CE    [facility] imaging 4/30/24- CT Sinus  11/3/23- CT HEAD  CE 5/16/24- Pending req    Disc received 5/20/24    Imaging  2/21/18- CT Sinus   1/10/18- CT Maxillofacial  [facility]  Laboratory:  9/5/18- Sinus surgery Specimen #: [id number]   8/20/18- Sinus Culture     Office Visit:  1/10/20- OV Karen Mccallum PA-C   12/12/19- OV Francoise Gonzalez MD  Epic       May 16, 2024 2:15 PM - Faxed a request to [facility] to send imaging disc- Raegan   May 20, 2024 8:06 AM - Received imaging disc from [facility] and dropped off to Jaqueline to upload to PACS -Eufemia

## 2024-05-13 ENCOUNTER — OFFICE VISIT (OUTPATIENT)
Dept: PULMONOLOGY | Facility: CLINIC | Age: 54
End: 2024-05-13
Attending: OTOLARYNGOLOGY
Payer: COMMERCIAL

## 2024-05-13 VITALS — DIASTOLIC BLOOD PRESSURE: 77 MMHG | SYSTOLIC BLOOD PRESSURE: 123 MMHG | HEART RATE: 82 BPM | OXYGEN SATURATION: 96 %

## 2024-05-13 DIAGNOSIS — A49.8 INFECTION DUE TO MULTIDRUG-RESISTANT PSEUDOMONAS AERUGINOSA: ICD-10-CM

## 2024-05-13 DIAGNOSIS — E84.9 CYSTIC FIBROSIS (H): ICD-10-CM

## 2024-05-13 DIAGNOSIS — Z16.24 INFECTION DUE TO MULTIDRUG-RESISTANT PSEUDOMONAS AERUGINOSA: ICD-10-CM

## 2024-05-13 DIAGNOSIS — J32.4 CHRONIC PANSINUSITIS: ICD-10-CM

## 2024-05-13 DIAGNOSIS — E84.9 CYSTIC FIBROSIS (H): Primary | ICD-10-CM

## 2024-05-13 DIAGNOSIS — J47.9 BRONCHIECTASIS WITHOUT COMPLICATION (H): ICD-10-CM

## 2024-05-13 DIAGNOSIS — Z98.890 HISTORY OF ENDOSCOPIC SINUS SURGERY: ICD-10-CM

## 2024-05-13 DIAGNOSIS — J33.9 NASAL POLYPOSIS: ICD-10-CM

## 2024-05-13 LAB
EXPTIME-PRE: 6.55 SEC
FEF2575-%PRED-PRE: 120 %
FEF2575-PRE: 2.98 L/SEC
FEF2575-PRED: 2.48 L/SEC
FEFMAX-%PRED-PRE: 131 %
FEFMAX-PRE: 9.03 L/SEC
FEFMAX-PRED: 6.88 L/SEC
FEV1-%PRED-PRE: 101 %
FEV1-PRE: 2.67 L
FEV1FEV6-PRE: 83 %
FEV1FEV6-PRED: 82 %
FEV1FVC-PRE: 82 %
FEV1FVC-PRED: 80 %
FIFMAX-PRE: 6.64 L/SEC
FVC-%PRED-PRE: 99 %
FVC-PRE: 3.26 L
FVC-PRED: 3.29 L

## 2024-05-13 PROCEDURE — G0463 HOSPITAL OUTPT CLINIC VISIT: HCPCS | Performed by: INTERNAL MEDICINE

## 2024-05-13 PROCEDURE — 87186 SC STD MICRODIL/AGAR DIL: CPT | Performed by: INTERNAL MEDICINE

## 2024-05-13 PROCEDURE — 94375 RESPIRATORY FLOW VOLUME LOOP: CPT | Performed by: INTERNAL MEDICINE

## 2024-05-13 PROCEDURE — 99205 OFFICE O/P NEW HI 60 MIN: CPT | Mod: 25 | Performed by: INTERNAL MEDICINE

## 2024-05-13 NOTE — LETTER
5/13/2024         RE: Annie Garcia  [address]  [facility] [address]        Dear Colleague,    Thank you for referring your patient, Annie Garcia, to the [facility] FOR LUNG SCIENCE AND [facility] [place]. Please see a copy of my visit note below.    Pulmonary Staff Initial Consult    CC: Asked by Dr. Dania Barkley to evaluate the patient's cystic fibrosis.    History present illness: The patient is a 53-year-old female with a history of cystic fibrosis diagnosed at the age of 39.  Her greatest burden of symptoms have been related to sinus disease.  Long before her diagnosis of CF, she did have sinus symptoms.  She has had 2 sinus surgeries in a brief time span 8 years ago when she was living in [place].  Previously her sinuses have been managed with suppression of MRSA with Bactrim.  She also has used budesonide in her saline irrigations.  More recently, she grew Pseudomonas for the first time in a sinus culture in January.  She also grew Citrobacter at that time.  She recently completed a 3-week course of IV antibiotics in an attempt to eradicate Pseudomonas.  Her typical antibiotic course is 3 weeks has shorter courses have not been as effective.  She occasionally will use prednisone burst but this is exclusively for sinus or lower respiratory symptoms.    In terms of her lungs, she recalls undergoing an extensive evaluation in her early 20s and recalls a diagnosis of exercise-induced bronchoconstriction.  She also recalls being on antibiotics every other month to prevent recurrent respiratory symptoms.  In more recent years, her need for antibiotics is more often driven by sinus symptoms.  She feels sinus infections lead to subsequent lower respiratory infections.  She recalls being hospitalized for lung infection as a senior in high school and again 7 years ago for pneumonia.  At baseline, she has at least some cough on a daily basis.  She inconsistently  "produces sputum.  She feels fatigue is a trigger for worsening lower respiratory symptoms.  She overall feels her exercise tolerance has not been as good in the past few years.  She can still live a busy life but has to slow down a bit more lately.  She notes air quality and pollen exposure as other potential triggers for increased lower respiratory symptoms.  When her lungs are flared up they have a \"hurting\" sensation.  She uses vest anywhere from once a week to daily at baseline.  Overall averages around 3 times per week.  She has a number of nebs prescribed including budesonide, Pulmozyme, saline, and a variety of bronchodilators.    Past medical history  1.  Cystic fibrosis mutation analysis: Genotype delta F508/D110H, sweat chloride c/w CF  2.  Cystic fibrosis related sinus disease.  Outside records indicate history of associated mycetoma.  3.  Cystic fibrosis lung disease: Chest CT March, 2023 makes reference to right upper lobe bronchiectasis with smaller degree of bronchiectasis in the lingula and left lower lobe.  Patient reports lower espiratory cultures historically normal.  4.  Depression (details unavailable)    Past surgical history  1.  Status post right hip arthroplasty, 2014  2.  Removal of \"rope\" under right breast  3.  Status post sinus surgery approximately 8 years ago x 2    Medications:  Current Outpatient Medications   Medication Sig Dispense Refill     acetaminophen-codeine (TYLENOL #3) 300-30 MG tablet PO TID prn 90 tablet 0     acetylcysteine (MUCOMYST) 20 % nebulizer solution Take by nebulization every 8 hours 5-10 ml inhalation every 8 hours       budesonide (PULMICORT) 0.5 MG/2ML neb solution Squirt entire vial into previously made amanda med saline bottle, mix, and irrigate each nostril until entire bottle empty.  Do this twice daily. 120 mL 1     cetirizine (ZYRTEC) 10 MG tablet Take 10 mg by mouth daily       cyclobenzaprine (FLEXERIL) 10 MG tablet TAKE 1 TABLET BY MOUTH 3 TIMES DAILY " AS NEEDED FOR MUSCLE SPASMS 30 tablet 0     desonide (DESOWEN) 0.05 % external ointment APPLY 5 TIMES A DAY TO ACTIVE RASH ON LIPS AND FACE UNTIL RESOLVED 60 g 1     dexlansoprazole (DEXILANT) 30 MG CPDR CR capsule Take 1 capsule (30 mg) by mouth daily 30 capsule 1     dornase stefan (PULMOZYME) 2.5 MG/2.5ML neb solution USE 1 VIAL IN NEBULIZER DAILY AS NEEDED 75 mL 3     fluconazole (DIFLUCAN) 100 MG tablet Take 1 pill daily as needed 90 tablet 1     levalbuterol (XOPENEX) 0.31 MG/3ML nebulizer solution Take 3 mLs (0.31 mg) by nebulization every 6 hours as needed for shortness of breath / dyspnea 225 mL 0     mometasone (NASONEX) 50 MCG/ACT nasal spray Spray 2 sprays into both nostrils daily 17 g 1     mupirocin 2 % OINT, sodium chloride 0.9% (bottle) 0.9 % SOLN external ointment IRRIGATE BILATERAL NARES WITH 240MLS 2 TIMES DAILY FOR 4 WEEKS 7000 g 1     predniSONE (DELTASONE) 5 MG tablet TAKE 4 TABLETS BY MOUTH DAILY FOR 3 DAYS, THEN TAKE 3 TABLETS DAILY FOR 3 DAYS, THEN 2 TABLETS DAILY FOR 3 DAYS, THEN 1 TABLET DAILY FOR 3 D 50 tablet 1     sulfamethoxazole-trimethoprim (BACTRIM DS) 800-160 MG tablet Take 1 tablet by mouth 2 times daily 21 tablet 1     zolpidem (AMBIEN) 10 MG tablet Take 1 tablet (10 mg) by mouth nightly as needed for sleep 30 tablet 1     No current facility-administered medications for this visit.   N acetylcysteine po bid     Allergies: Reports history of seasonal allergies.  Outside records report Bactrim allergy but patient has subsequently tolerated Bactrim per her report.  Duration of quinolones kept to a minimum out of concern levofloxacin may have caused tendon injury.    Family history: Patient has a sister with cystic fibrosis.  Possible their father had manifestations of CF.    Social history: Lifelong non-smoker.  Previously lived in [place], [place] and was initially followed in a CF center there.  She subsequently moved to [place] and lived there for several years.  She moved to  [facility] a few years ago.  She was an athlete growing up.  She previously has worked as a .    Review of systems: GI: Occasional reflux symptoms.  No constipation or grease in her stools.    Physical examination  /77   Pulse 82   SpO2 96% , weight 110.5 kg, BMI 39.2  General: Alert appropriate pleasant in no apparent distress  HEENT: Sclera anicteric.  No overt nasal discharge.  Oropharynx is moist without lesion or exudate.  Chest: Clear to auscultation bilaterally with good air movement and normal chest wall excursion.  Cardiovascular: S1-S2 with a regular rate and rhythm.  Distant heart sounds.  No lower extremity edema.  Abdomen: Soft, nondistended, nontender, bowel sounds present  Musculoskeletal: No digital clubbing.    Outside studies:  1.  Outside chest CT scan March, 2023: Right upper lobe bronchiectasis with lesser degree of bronchiectasis involving the lingula and left lower lobe.  2.  Most recent sinus cultures in January and March, 2024 with Citrobacter freundi and Pseudomonas (most recently resistant to quinolone)  3.  Most recent sinus CT scan April, 2024: Postoperative changes involving her maxillary sinuses.  Moderate to severe mucosal thickening involving the frontal, maxillary, sphenoid sinuses.  4.  Limited previous CBCs have not had peripheral eosinophilia.    New studies:  Pulm function test from today personally reviewed.  Valid maneuver.  FEV1 2.47 L (101% predicted), FVC 3.26 L (99% predicted).  There is no significant interval change compared to February, 2024.  Compared to October, 2023, the FEV1 is down 10% from 2.86.  Lung volumes and diffusing capacity normal on outside PFTs October, 2023.    Impression and plan  1.  Cystic fibrosis lung disease: The patient overall has had a good clinical course but overall notes increased burden of symptoms over the past couple years and does have evidence of bronchiectasis on chest CT scan.  Pulmonary function tests  unchanged from a few months ago but are lower than last fall.  Suspect she could benefit from more consistent prophylactic airway clearance.  Currently uses vest predominantly on an as-needed basis.  Goal will be to also clarify and simplify nebulized medications so that this does not require more than a total of 30 minutes to complete while using vest.  Radha, respiratory therapist, consulted to review equipment, settings, and nebulizer regimen.  She recently has undergone a course of IV antibiotics to attempt Pseudomonas eradication from her upper airway.  We did review how there are multiple potential treatments available should she ultimately start growing Pseudomonas from her lungs.  -Throat culture obtained today in clinic  -Goal of vest therapy 3-4 times per week with increased to twice daily with respiratory exacerbation  -Nebulizer therapy with vest as follows:  Brovana or lev albuterol followed by  Budesonide followed by  Pulmozyme or hypertonic saline    2.  Cystic fibrosis related sinus disease: Increased burden of symptoms in recent months without dramatic improvement in burden of symptoms despite recently completing 3 weeks of IV antibiotics.  She is scheduled to be seen by our otolaryngology service.     3.  CFTR modulator candidacy: Based on the patient's mutations, she is a candidate for modulator therapy.  However, she would rather not pursue that at this juncture.  She reports her sister with cystic fibrosis had unwanted substantial weight gain and diffuse joint pains associated with Trikafta therapy.  We did review how she may have substantial relief in her burden of sinus symptoms and have significant protection from progressive lung disease with treatment and that we could pursue starting at reduced doses of Trikafta in the future.  Consensus is to revisit this possibility over time.    Follow-up in October, 2024    Total visit time today including review of outside records 80 minutes.  This is  exclusive of time interpreting pulmonary function test.    Bryce Fowler MD            Respiratory Therapist Note: Initial Evaluation      Vest                Brand: SmartVest - settings unknown                Cough Pause: unknown                Vest Garment Size: Adult XL                Last Fitting Date: 5/24 - fits well                Frequency of therapy: 3-4 times per week                Concerns: Inconsistent with vest use, encouraged one daily use. Will send patient message to determine settings.          Exercise (purposeful and aerobic for >20 minutes each session): NO.                Does this qualify as additional airway clearance: No         Alternative Airway Clearance: Acapella, Changed to Aerobika this visit. Instructions and demonstration provided.         Nebulized Medications                Bronchodilators: Xopenex, Duoneb, Brovana                Mucolytic: Pulmozyme, and Hypertonic Saline (strength unknown)                Antibiotics: no inhaled antibiotics at this time                Additional Inhaled Medications: ICS - Pulmicort neb                Spacer Use: n/a         Review Cleaning: Yes. Undecided, has been only rinsing disposable supplies. Handout provided with CFF approved options         Education and Transition Information                Correct order of inhaled medications: No                Mechanism of Action of inhaled medications: No                Frequency of inhaled medications: No                Dosage of inhaled medications: No                Other: Patient will benefit from ongoing education to learn above essentials. We focused today on classed of medications, and what those classes do, along with order of medications (bronchodilator, then steroid, then mucolytic).         Home Care:                Nebulizer Cups (Brand/Type): Sidestream reusable cups provided today in clinic                Nebulizer Compressor                            Year Purchased: unknown                             Unknown, patient declined establishing with local company at this time                Nebulizer Supply Company:                            Unknown, patient declined establishing with local company at this time         Oxygen: none         Pulmonary Rehab: none         Plan of Care and Goals for next visit: Patient would benefit from ongoing support from structured multidisciplinary CF team. The focus of my long visit today was limited to history, education surrounding classes and order of medications, cleaning of supplies, vest fit and settings, and AerobiKa technique.   Patient is unwilling to stop any current inhaled medications at this time, so encouraged to use 1) Bronchodilator - Levalbuterol or Duoneb, or Brovana, 2) Inhaled Steroid - Pulmicort, then 3) Mucolytic - Pulmozyme or Hypertonic Saline   Will message patient to determine current vest settings once she is in front of her device  AerobiKa instruction and demonstration in clinic today. Patient reports good understanding of device  Sample nebulizer cups and masks provided to patient today  Cleaning instructions provided via handout and [employer] message  This RT did not address any nasally prescribed medications and defer to ENT for those  Additional Sinus Rinse devices provided to patient today  We briefly discussed Inhaled Tobramycin and the benenfits to inhaled over IV formulations. We will await the throat culture results today to assess if needed per CFF PSA Eradication Protocol.      Again, thank you for allowing me to participate in the care of your patient.        Sincerely,        Bryce Fowler MD

## 2024-05-13 NOTE — PATIENT INSTRUCTIONS
Please use your nebulized medications in this order:   1. Brovana OR Levalbuterol (Bronchodilator to open up your lungs)  2. Pulmicort (Inhaled steroid)   3. Pulmozyme OR Saline (Break up mucous)    Goal of vest 3 to 4 times per week when feeling well. Increase to twice a day when lungs flared up.    RT Radha will message you about vest settings.   Aerobika provided and instructions given.  Nebulizer supplies provided.  Cleaning instructions provided via [employer].

## 2024-05-13 NOTE — NURSING NOTE
"Annie Garcia is a 53 year old year old who is being seen for New Patient (cf)      Medications reviewed and Vital signs taken.    Specimen Collection Type: Throat Swab    Order(s) placed: CF Aerobic Bacterial    *IF AFB order placed - please enter \"PRIORITIZE AFB\" to order comments.       No results found for: \"ACIDFAST\"      No results found for: \"AFBSMS\"        "

## 2024-05-13 NOTE — PROGRESS NOTES
Pulmonary Staff Initial Consult    CC: Asked by Dr. Dania Barkley to evaluate the patient's cystic fibrosis.    History present illness: The patient is a 53-year-old female with a history of cystic fibrosis diagnosed at the age of 39.  Her greatest burden of symptoms have been related to sinus disease.  Long before her diagnosis of CF, she did have sinus symptoms.  She has had 2 sinus surgeries in a brief time span 8 years ago when she was living in [place].  Previously her sinuses have been managed with suppression of MRSA with Bactrim.  She also has used budesonide in her saline irrigations.  More recently, she grew Pseudomonas for the first time in a sinus culture in January.  She also grew Citrobacter at that time.  She recently completed a 3-week course of IV antibiotics in an attempt to eradicate Pseudomonas.  Her typical antibiotic course is 3 weeks has shorter courses have not been as effective.  She occasionally will use prednisone burst but this is exclusively for sinus or lower respiratory symptoms.    In terms of her lungs, she recalls undergoing an extensive evaluation in her early 20s and recalls a diagnosis of exercise-induced bronchoconstriction.  She also recalls being on antibiotics every other month to prevent recurrent respiratory symptoms.  In more recent years, her need for antibiotics is more often driven by sinus symptoms.  She feels sinus infections lead to subsequent lower respiratory infections.  She recalls being hospitalized for lung infection as a senior in high school and again 7 years ago for pneumonia.  At baseline, she has at least some cough on a daily basis.  She inconsistently produces sputum.  She feels fatigue is a trigger for worsening lower respiratory symptoms.  She overall feels her exercise tolerance has not been as good in the past few years.  She can still live a busy life but has to slow down a bit more lately.  She notes air quality and pollen exposure as other  "potential triggers for increased lower respiratory symptoms.  When her lungs are flared up they have a \"hurting\" sensation.  She uses vest anywhere from once a week to daily at baseline.  Overall averages around 3 times per week.  She has a number of nebs prescribed including budesonide, Pulmozyme, saline, and a variety of bronchodilators.    Past medical history  1.  Cystic fibrosis mutation analysis: Genotype delta F508/D110H, sweat chloride c/w CF  2.  Cystic fibrosis related sinus disease.  Outside records indicate history of associated mycetoma.  3.  Cystic fibrosis lung disease: Chest CT March, 2023 makes reference to right upper lobe bronchiectasis with smaller degree of bronchiectasis in the lingula and left lower lobe.  Patient reports lower espiratory cultures historically normal.  4.  Depression (details unavailable)    Past surgical history  1.  Status post right hip arthroplasty, 2014  2.  Removal of \"rope\" under right breast  3.  Status post sinus surgery approximately 8 years ago x 2    Medications:  Current Outpatient Medications   Medication Sig Dispense Refill    acetaminophen-codeine (TYLENOL #3) 300-30 MG tablet PO TID prn 90 tablet 0    acetylcysteine (MUCOMYST) 20 % nebulizer solution Take by nebulization every 8 hours 5-10 ml inhalation every 8 hours      budesonide (PULMICORT) 0.5 MG/2ML neb solution Squirt entire vial into previously made amanda med saline bottle, mix, and irrigate each nostril until entire bottle empty.  Do this twice daily. 120 mL 1    cetirizine (ZYRTEC) 10 MG tablet Take 10 mg by mouth daily      cyclobenzaprine (FLEXERIL) 10 MG tablet TAKE 1 TABLET BY MOUTH 3 TIMES DAILY AS NEEDED FOR MUSCLE SPASMS 30 tablet 0    desonide (DESOWEN) 0.05 % external ointment APPLY 5 TIMES A DAY TO ACTIVE RASH ON LIPS AND FACE UNTIL RESOLVED 60 g 1    dexlansoprazole (DEXILANT) 30 MG CPDR CR capsule Take 1 capsule (30 mg) by mouth daily 30 capsule 1    dornase stefan (PULMOZYME) 2.5 MG/2.5ML " neb solution USE 1 VIAL IN NEBULIZER DAILY AS NEEDED 75 mL 3    fluconazole (DIFLUCAN) 100 MG tablet Take 1 pill daily as needed 90 tablet 1    levalbuterol (XOPENEX) 0.31 MG/3ML nebulizer solution Take 3 mLs (0.31 mg) by nebulization every 6 hours as needed for shortness of breath / dyspnea 225 mL 0    mometasone (NASONEX) 50 MCG/ACT nasal spray Spray 2 sprays into both nostrils daily 17 g 1    mupirocin 2 % OINT, sodium chloride 0.9% (bottle) 0.9 % SOLN external ointment IRRIGATE BILATERAL NARES WITH 240MLS 2 TIMES DAILY FOR 4 WEEKS 7000 g 1    predniSONE (DELTASONE) 5 MG tablet TAKE 4 TABLETS BY MOUTH DAILY FOR 3 DAYS, THEN TAKE 3 TABLETS DAILY FOR 3 DAYS, THEN 2 TABLETS DAILY FOR 3 DAYS, THEN 1 TABLET DAILY FOR 3 D 50 tablet 1    sulfamethoxazole-trimethoprim (BACTRIM DS) 800-160 MG tablet Take 1 tablet by mouth 2 times daily 21 tablet 1    zolpidem (AMBIEN) 10 MG tablet Take 1 tablet (10 mg) by mouth nightly as needed for sleep 30 tablet 1     No current facility-administered medications for this visit.   N acetylcysteine po bid     Allergies: Reports history of seasonal allergies.  Outside records report Bactrim allergy but patient has subsequently tolerated Bactrim per her report.  Duration of quinolones kept to a minimum out of concern levofloxacin may have caused tendon injury.    Family history: Patient has a sister with cystic fibrosis.  Possible their father had manifestations of CF.    Social history: Lifelong non-smoker.  Previously lived in [place], [place] and was initially followed in a CF center there.  She subsequently moved to [place] and lived there for several years.  She moved to [facility] a few years ago.  She was an athlete growing up.  She previously has worked as a .    Review of systems: GI: Occasional reflux symptoms.  No constipation or grease in her stools.    Physical examination  /77   Pulse 82   SpO2 96% , weight 110.5 kg, BMI 39.2  General: Alert  appropriate pleasant in no apparent distress  HEENT: Sclera anicteric.  No overt nasal discharge.  Oropharynx is moist without lesion or exudate.  Chest: Clear to auscultation bilaterally with good air movement and normal chest wall excursion.  Cardiovascular: S1-S2 with a regular rate and rhythm.  Distant heart sounds.  No lower extremity edema.  Abdomen: Soft, nondistended, nontender, bowel sounds present  Musculoskeletal: No digital clubbing.    Outside studies:  1.  Outside chest CT scan March, 2023: Right upper lobe bronchiectasis with lesser degree of bronchiectasis involving the lingula and left lower lobe.  2.  Most recent sinus cultures in January and March, 2024 with Citrobacter freundi and Pseudomonas (most recently resistant to quinolone)  3.  Most recent sinus CT scan April, 2024: Postoperative changes involving her maxillary sinuses.  Moderate to severe mucosal thickening involving the frontal, maxillary, sphenoid sinuses.  4.  Limited previous CBCs have not had peripheral eosinophilia.    New studies:  Pulm function test from today personally reviewed.  Valid maneuver.  FEV1 2.47 L (101% predicted), FVC 3.26 L (99% predicted).  There is no significant interval change compared to February, 2024.  Compared to October, 2023, the FEV1 is down 10% from 2.86.  Lung volumes and diffusing capacity normal on outside PFTs October, 2023.    Impression and plan  1.  Cystic fibrosis lung disease: The patient overall has had a good clinical course but overall notes increased burden of symptoms over the past couple years and does have evidence of bronchiectasis on chest CT scan.  Pulmonary function tests unchanged from a few months ago but are lower than last fall.  Suspect she could benefit from more consistent prophylactic airway clearance.  Currently uses vest predominantly on an as-needed basis.  Goal will be to also clarify and simplify nebulized medications so that this does not require more than a total of 30  minutes to complete while using vest.  Radha, respiratory therapist, consulted to review equipment, settings, and nebulizer regimen.  She recently has undergone a course of IV antibiotics to attempt Pseudomonas eradication from her upper airway.  We did review how there are multiple potential treatments available should she ultimately start growing Pseudomonas from her lungs.  -Throat culture obtained today in clinic  -Goal of vest therapy 3-4 times per week with increased to twice daily with respiratory exacerbation  -Nebulizer therapy with vest as follows:  Brovana or lev albuterol followed by  Budesonide followed by  Pulmozyme or hypertonic saline    2.  Cystic fibrosis related sinus disease: Increased burden of symptoms in recent months without dramatic improvement in burden of symptoms despite recently completing 3 weeks of IV antibiotics.  She is scheduled to be seen by our otolaryngology service.     3.  CFTR modulator candidacy: Based on the patient's mutations, she is a candidate for modulator therapy.  However, she would rather not pursue that at this juncture.  She reports her sister with cystic fibrosis had unwanted substantial weight gain and diffuse joint pains associated with Trikafta therapy.  We did review how she may have substantial relief in her burden of sinus symptoms and have significant protection from progressive lung disease with treatment and that we could pursue starting at reduced doses of Trikafta in the future.  Consensus is to revisit this possibility over time.    Follow-up in October, 2024    Total visit time today including review of outside records 80 minutes.  This is exclusive of time interpreting pulmonary function test.    Bryce Fowler MD

## 2024-05-14 NOTE — PROGRESS NOTES
Respiratory Therapist Note: Initial Evaluation      Vest                Brand: SmartVest - settings unknown                Cough Pause: unknown                Vest Garment Size: Adult XL                Last Fitting Date: 5/24 - fits well                Frequency of therapy: 3-4 times per week                Concerns: Inconsistent with vest use, encouraged one daily use. Will send patient message to determine settings.          Exercise (purposeful and aerobic for >20 minutes each session): NO.                Does this qualify as additional airway clearance: No         Alternative Airway Clearance: Acapella, Changed to Aerobika this visit. Instructions and demonstration provided.         Nebulized Medications                Bronchodilators: Xopenex, Duoneb, Brovana                Mucolytic: Pulmozyme, and Hypertonic Saline (strength unknown)                Antibiotics: no inhaled antibiotics at this time                Additional Inhaled Medications: ICS - Pulmicort neb                Spacer Use: n/a         Review Cleaning: Yes. Undecided, has been only rinsing disposable supplies. Handout provided with [facility] approved options         Education and Transition Information                Correct order of inhaled medications: No                Mechanism of Action of inhaled medications: No                Frequency of inhaled medications: No                Dosage of inhaled medications: No                Other: Patient will benefit from ongoing education to learn above essentials. We focused today on classed of medications, and what those classes do, along with order of medications (bronchodilator, then steroid, then mucolytic).         Home Care:                Nebulizer Cups (Brand/Type): Sidestream reusable cups provided today in clinic                Nebulizer Compressor                            Year Purchased: unknown                            Unknown, patient declined establishing with local company at this  time                Nebulizer Supply Company:                            Unknown, patient declined establishing with local company at this time         Oxygen: none         Pulmonary Rehab: none         Plan of Care and Goals for next visit: Patient would benefit from ongoing support from structured multidisciplinary CF team. The focus of my long visit today was limited to history, education surrounding classes and order of medications, cleaning of supplies, vest fit and settings, and AerobiKa technique.   Patient is unwilling to stop any current inhaled medications at this time, so encouraged to use 1) Bronchodilator - Levalbuterol or Duoneb, or Brovana, 2) Inhaled Steroid - Pulmicort, then 3) Mucolytic - Pulmozyme or Hypertonic Saline   Will message patient to determine current vest settings once she is in front of her device  AerobiKa instruction and demonstration in clinic today. Patient reports good understanding of device  Sample nebulizer cups and masks provided to patient today  Cleaning instructions provided via handout and myChart message  This RT did not address any nasally prescribed medications and defer to ENT for those  Additional Sinus Rinse devices provided to patient today  We briefly discussed Inhaled Tobramycin and the benenfits to inhaled over IV formulations. We will await the throat culture results today to assess if needed per [facility] PSA Eradication Protocol.

## 2024-05-16 ENCOUNTER — TELEPHONE (OUTPATIENT)
Dept: OTOLARYNGOLOGY | Facility: CLINIC | Age: 54
End: 2024-05-16
Payer: COMMERCIAL

## 2024-05-16 NOTE — TELEPHONE ENCOUNTER
Left Voicemail (1st Attempt) for the patient to call back and schedule the following:    Appointment type: NEW [facility]INO  Provider: Mariana  Return date:  5/30 at 8:50 per Liza  Specialty phone number: direct  Additional appointment(s) needed:   Additional Notes: sooner visit

## 2024-05-16 NOTE — TELEPHONE ENCOUNTER
Patient confirmed scheduled appointment:  Date: 5/30  Time: 8:50  Provider: Mariana  Location: [facility]   Testing/imaging:   Additional notes: .

## 2024-05-18 ENCOUNTER — MYC MEDICAL ADVICE (OUTPATIENT)
Dept: PULMONOLOGY | Facility: CLINIC | Age: 54
End: 2024-05-18
Payer: COMMERCIAL

## 2024-05-18 LAB
BACTERIA SPEC CULT: ABNORMAL
BACTERIA SPEC CULT: ABNORMAL

## 2024-05-20 ENCOUNTER — TELEPHONE (OUTPATIENT)
Dept: PULMONOLOGY | Facility: CLINIC | Age: 54
End: 2024-05-20
Payer: COMMERCIAL

## 2024-05-20 DIAGNOSIS — A49.8 PSEUDOMONAS AERUGINOSA INFECTION: ICD-10-CM

## 2024-05-20 DIAGNOSIS — E84.9 CYSTIC FIBROSIS (H): Primary | ICD-10-CM

## 2024-05-20 RX ORDER — CIPROFLOXACIN 750 MG/1
750 TABLET, FILM COATED ORAL 2 TIMES DAILY
Qty: 42 TABLET | Refills: 0 | Status: SHIPPED | OUTPATIENT
Start: 2024-05-20 | End: 2024-06-10

## 2024-05-20 NOTE — TELEPHONE ENCOUNTER
Patient grew Pseudomonas aeruginosa  on 5/13/2024 culture. Discussed with Dr. Fowler. Plan to pursue eradication protocol:    Patient was recently treated with IV antibiotics. Also has noted history of tendon injury with Levaquin. Dr. Fowler is OK with deferring treatment of oral ciprofloxacin unless patient feels strongly about starting.    Plan to proceed with inhaled Tobramycin: 300mg twice daily, 28 days on, 28 days off.    RN routing message to [employer] at high priority requesting test claim to confirm coverage and pharmacy prior to patient coming to clinic for test dose.     ARNAV Santos

## 2024-05-20 NOTE — TELEPHONE ENCOUNTER
See telephone call 5/20.    Pseudomonas eradication protocol initiated per provider.    ARNAV Santos

## 2024-05-20 NOTE — TELEPHONE ENCOUNTER
RN called patient via phone to discuss plan.     Per Dr. Fowler and eradication protocol    -Ciprofloxacin 750mg twice daily x 21 days and Tobramycin nebs 300mg twice daily 28 days on 28 days off x 5 cycles   -Follow up in CF clinic in three months. Limited supply of Charlie will be sent to encourage patient to follow up in three months for repeat cultures and visit    Patient has no concerns about starting Ciprofloxacin. Reports no issues leading up to her tendon surgery. Does not feel side effects were related to Levaquin use and has tolerated Ciprofloxacin in the past. She would like this sent to local [facility].    Patient will be seen in clinic with RT on Wednesday for a test dose of inhaled Tobramycin. Per Pharmacy LiaisonLobito--No copay for [facility] and preferred pharmacy [facility] (where patients gets other medications). If patient tolerates, RN will place order and send to [facility] to start protocol.    Patient mentioned ENT had concerns about her starting Tobramycin from a hearing side effect standpoint. Patient has follow up with ENT on 5/30 and will discuss at that time. RN placed order for hearing screening for patient to have baseline if interested should she develop any tinnitus to have as a hearing comparison. Patient will discuss with ENT to see if this can be conducted during 5/30 visit.    Patient verbalized understanding of plan and no additional questions.    ARNAV Santos

## 2024-05-22 ENCOUNTER — ALLIED HEALTH/NURSE VISIT (OUTPATIENT)
Dept: PULMONOLOGY | Facility: CLINIC | Age: 54
End: 2024-05-22
Attending: INTERNAL MEDICINE
Payer: COMMERCIAL

## 2024-05-22 DIAGNOSIS — A49.8 PSEUDOMONAS AERUGINOSA INFECTION: Primary | ICD-10-CM

## 2024-05-22 DIAGNOSIS — E84.9 CYSTIC FIBROSIS (H): ICD-10-CM

## 2024-05-22 PROCEDURE — 94640 AIRWAY INHALATION TREATMENT: CPT

## 2024-05-22 PROCEDURE — 250N000009 HC RX 250: Performed by: INTERNAL MEDICINE

## 2024-05-22 RX ORDER — LEVALBUTEROL INHALATION SOLUTION 0.63 MG/3ML
0.63 SOLUTION RESPIRATORY (INHALATION) ONCE
Status: COMPLETED | OUTPATIENT
Start: 2024-05-22 | End: 2024-05-22

## 2024-05-22 RX ORDER — TOBRAMYCIN INHALATION SOLUTION 300 MG/5ML
300 INHALANT RESPIRATORY (INHALATION) ONCE
Status: COMPLETED | OUTPATIENT
Start: 2024-05-22 | End: 2024-05-22

## 2024-05-22 RX ORDER — TOBRAMYCIN 300 MG/4ML
300 SOLUTION RESPIRATORY (INHALATION) 2 TIMES DAILY
Qty: 224 ML | Refills: 1 | Status: SHIPPED | OUTPATIENT
Start: 2024-05-22

## 2024-05-22 RX ADMIN — TOBRAMYCIN 300 MG: 300 SOLUTION RESPIRATORY (INHALATION) at 10:16

## 2024-05-22 RX ADMIN — LEVALBUTEROL HYDROCHLORIDE 0.63 MG: 0.63 SOLUTION RESPIRATORY (INHALATION) at 10:05

## 2024-05-22 NOTE — PROGRESS NOTES
INHALED TOBRAMYCIN TEST DOSE   RESPIRATORY THERAPY DOCUMENTATION    Patient was seen in CF clinic today for their first dose of inhaled Tobramycin. This medication was ordered by Dr Tian Fowler.      Test dose medication was provided by [facility] supply.    Premedication given: 0.63mg Xopenex  Test medication: 300 mg Tobramycin  (Please see MAR for NDC inforamation)    Pre Procedure Vitals: HR: 90, RR: 14, SpO2: 97%, breath sounds: clear and diminished throughout, greatest in RLL    Post Procedure Vitals: HR: 87, RR: 14, SpO2: 98%, breath sounds: Clear and diminished, but increased aeration throughout      This service today was provided under the supervision of Dr. Bonnie Sommers, who was available if needed.       PLAN:  Patient will begin Bethkis inhaled 2 times daily for 28.  We will use this medication on a rotating basis until clinic follow-up in 3 months.  Formulation, reconstitution, and storage of medication reviewed.  Nebulizer compressor, supply, and sterilization discussed and reinforced.  Possible side effects reviewed.  Inhaled Bethkis medication instruction sheet provided to the patient via [employer].

## 2024-05-22 NOTE — TELEPHONE ENCOUNTER
Patient had test dose for inhaled Tobramycin in clinic today with RT Radha. Patient tolerated well with no side effects.     RN sent Bethkis (brand preferred per test claim completed by [employer]) to [facility]. Limited quantity provided per Dr. Fowler's direction. Patient to follow up in clinic in three months.    ARNAV Santos

## 2024-05-28 ENCOUNTER — TELEPHONE (OUTPATIENT)
Dept: AUDIOLOGY | Facility: CLINIC | Age: 54
End: 2024-05-28

## 2024-05-28 NOTE — TELEPHONE ENCOUNTER
Health Call Center    Phone Message    May a detailed message be left on voicemail: yes     Reason for Call: Appointment Intake    Referring Provider Name: Bryce Fowler MD   Diagnosis and/or Symptoms: Cystic fibrosis (H) [E84.9]  Pseudomonas aeruginosa infection [A49.8],, pt has an appt on 05/30/24 with Dr. Peña and is wondering if she can be sen on same day since she is come from [place]? Someone was supposed to be working on this but pt has not heard anything. Please call to discuss. Thank you    Action Taken: Message routed to:  [facility] AUDIO    Travel Screening: Not Applicable

## 2024-05-29 NOTE — TELEPHONE ENCOUNTER
Left Voicemail (1st Attempt) for the patient to call back and schedule the following:    Appointment type: Adult hearing eval  Provider: any  Return date: next  Specialty phone number: [phone]  Additional appointment(s) needed:   Additional Notes: Hearing eval. We do not have anything same day. Can schedule at any [facility]

## 2024-05-30 ENCOUNTER — OFFICE VISIT (OUTPATIENT)
Dept: OTOLARYNGOLOGY | Facility: CLINIC | Age: 54
End: 2024-05-30
Attending: OTOLARYNGOLOGY
Payer: COMMERCIAL

## 2024-05-30 VITALS
OXYGEN SATURATION: 97 % | WEIGHT: 244.5 LBS | BODY MASS INDEX: 38.37 KG/M2 | SYSTOLIC BLOOD PRESSURE: 125 MMHG | HEART RATE: 100 BPM | DIASTOLIC BLOOD PRESSURE: 84 MMHG | HEIGHT: 67 IN

## 2024-05-30 DIAGNOSIS — J47.9 BRONCHIECTASIS WITHOUT COMPLICATION (H): ICD-10-CM

## 2024-05-30 DIAGNOSIS — Z16.24 INFECTION DUE TO MULTIDRUG-RESISTANT PSEUDOMONAS AERUGINOSA: ICD-10-CM

## 2024-05-30 DIAGNOSIS — J33.9 NASAL POLYPOSIS: ICD-10-CM

## 2024-05-30 DIAGNOSIS — Z98.890 HISTORY OF ENDOSCOPIC SINUS SURGERY: ICD-10-CM

## 2024-05-30 DIAGNOSIS — E84.9 CYSTIC FIBROSIS (H): ICD-10-CM

## 2024-05-30 DIAGNOSIS — J32.4 CHRONIC PANSINUSITIS: ICD-10-CM

## 2024-05-30 DIAGNOSIS — A49.8 INFECTION DUE TO MULTIDRUG-RESISTANT PSEUDOMONAS AERUGINOSA: ICD-10-CM

## 2024-05-30 PROCEDURE — 87081 CULTURE SCREEN ONLY: CPT | Performed by: STUDENT IN AN ORGANIZED HEALTH CARE EDUCATION/TRAINING PROGRAM

## 2024-05-30 PROCEDURE — 31231 NASAL ENDOSCOPY DX: CPT | Performed by: STUDENT IN AN ORGANIZED HEALTH CARE EDUCATION/TRAINING PROGRAM

## 2024-05-30 PROCEDURE — 99205 OFFICE O/P NEW HI 60 MIN: CPT | Mod: 25 | Performed by: STUDENT IN AN ORGANIZED HEALTH CARE EDUCATION/TRAINING PROGRAM

## 2024-05-30 PROCEDURE — 87075 CULTR BACTERIA EXCEPT BLOOD: CPT | Performed by: STUDENT IN AN ORGANIZED HEALTH CARE EDUCATION/TRAINING PROGRAM

## 2024-05-30 PROCEDURE — 99000 SPECIMEN HANDLING OFFICE-LAB: CPT | Performed by: PATHOLOGY

## 2024-05-30 PROCEDURE — 87186 SC STD MICRODIL/AGAR DIL: CPT | Performed by: STUDENT IN AN ORGANIZED HEALTH CARE EDUCATION/TRAINING PROGRAM

## 2024-05-30 RX ORDER — METHYLPREDNISOLONE ACETATE 80 MG/ML
80 INJECTION, SUSPENSION INTRA-ARTICULAR; INTRALESIONAL; INTRAMUSCULAR; SOFT TISSUE
COMMUNITY
Start: 2023-03-28 | End: 2024-07-29

## 2024-05-30 RX ORDER — CEPHALEXIN 500 MG/1
CAPSULE ORAL
COMMUNITY
Start: 2023-12-28 | End: 2024-07-29

## 2024-05-30 RX ORDER — IPRATROPIUM BROMIDE AND ALBUTEROL SULFATE 2.5; .5 MG/3ML; MG/3ML
3 SOLUTION RESPIRATORY (INHALATION)
COMMUNITY
Start: 2023-04-27

## 2024-05-30 RX ORDER — LEVOFLOXACIN 750 MG/1
750 TABLET, FILM COATED ORAL
COMMUNITY
Start: 2023-03-28 | End: 2024-07-29

## 2024-05-30 RX ORDER — AZITHROMYCIN 250 MG/1
TABLET, FILM COATED ORAL
COMMUNITY
Start: 2024-01-20 | End: 2024-07-29

## 2024-05-30 RX ORDER — DOXYCYCLINE HYCLATE 100 MG/1
100 TABLET, DELAYED RELEASE ORAL
COMMUNITY
End: 2024-07-29

## 2024-05-30 RX ORDER — CEFDINIR 300 MG/1
CAPSULE ORAL
COMMUNITY
Start: 2023-11-21 | End: 2024-07-29

## 2024-05-30 RX ORDER — ARFORMOTEROL TARTRATE 15 UG/2ML
15 SOLUTION RESPIRATORY (INHALATION)
COMMUNITY
Start: 2024-03-21 | End: 2024-07-29

## 2024-05-30 ASSESSMENT — PAIN SCALES - GENERAL: PAINLEVEL: MILD PAIN (2)

## 2024-05-30 NOTE — PROGRESS NOTES
[facility]  New Patient Visit      Encounter date:   May 30, 2024    Referring Provider:   Everardo Underwood MD  [facility]  [address]  [place] [address]    Reason for Visit: New Patient      History of Present Illness:      Annie Garcia is a 53 year old female who presents for consultation regarding history of cystic fibrosis related chronic sinusitis. 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 per patient her cystic fibrosis is for the most part primarily been sinus disease related.  Has had 2 previous sinus surgeries about 8 years ago when she was living in [place].  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  Is also previously used budesonide saline irrigations.  Also does use alcohol and saline.  Grew Pseudomonas for the first time in her sinus culture in January and is since then has begun and attempted to completely eradicate this.  Recently completed a 3-week course of IV antibiotics.  Started nebulized tobramycin per pulmonology which patient does think has helped.  Less crusting and thick drainage.  Is concerned that she might need a repeat sinus surgery. Previously followed with Dr. Everardo Underwood of [facility].         Sino-Nasal Outcome Test (SNOT - 22)  1. Need to Blow Nose: (P) Mild or slight  2. Nasal Blockage: (P) Mild or slight  3. Sneezing: (P) Moderate  4. Runny Nose: (P) Moderate  5. Cough: (P) Moderate  6. Post-nasal discharge: (P) Moderate  7. Thick nasal discharge: (P) Mild or slight  8. Ear fullness: (P) Very mild  9. Dizziness: (P) None  10. Ear Pain: (P) Moderate  11. Facial pain/pressure: (P) Very mild  12. Decreased Sense of Smell/Taste: (P) Very mild  13. Difficulty falling asleep: (P) Mild or slight  14. Wake up at night: (P) Mild or slight  15. Lack of a good night's sleep: (P) Mild or slight  16. Wake up tired: (P) Very mild  17. Fatigue: (P) Very mild  18. Reduced Productivity:  (P) Very mild  19. Reduced Concentration: (P) Very mild  20. Frustrated/restless/irritable: (P) Very mild  21. Sad: (P) Very mild  22. Embarrassed: (P) Very mild  Total Score: (P) 37       Review of Systems:  A comprehensive 14-point review of systems was performed with positives and pertinent negatives listed in the HPI.    Past Medical/Surgical History:  Reviewed today with the patient. No history of major medical comorbidities. Does not take any blood thinners. No prior history of sinonasal surgery or trauma.    Allergies:       Allergies   Allergen Reactions    Seasonal Allergies Itching       Medical History:  Past Medical History:   Diagnosis Date    Cystic fibrosis (H)     Depression     Tear of right acetabular labrum         Surgical History:   Past Surgical History:   Procedure Laterality Date    ANKLE SURGERY Right 07/27/2019    foot/ankle    AS HYSTEROSCOPY, SURGICAL; W/ ENDOMETRIAL ABLATION, ANY METHOD  2008    [facility]. Thermal balloon.    BREAST SURGERY      ENDOSCOPIC SINUS SURGERY N/A 09/05/2018    Procedure: ENDOSCOPIC SINUS SURGERY;;  Surgeon: Francoise Gonzalez MD;  Location: [facility]    ENDOSCOPY UPPER, COLONOSCOPY, COMBINED N/A 11/18/2016    Procedure: COMBINED ENDOSCOPY UPPER, COLONOSCOPY;  Surgeon: Levi Hinkle MD;  Location: [facility]    ESOPHAGOSCOPY, GASTROSCOPY, DUODENOSCOPY (EGD), COMBINED  01/10/2014    Procedure: COMBINED ESOPHAGOSCOPY, GASTROSCOPY, DUODENOSCOPY (EGD);  UPPER ENDOSCOPY with Biopsy;  Surgeon: Levi Hinkle MD;  Location: [facility]    EXAM UNDER ANESTHESIA NOSE N/A 09/05/2018    Procedure: EXAM UNDER ANESTHESIA NOSE;  SINUS EXAM UNDER ANESTHESIA, Endoscopic Sinus Surgery;  Surgeon: Francoise Gonzalez MD;  Location: [facility]    ORTHOPEDIC SURGERY  1994    left knee arthroscopy    ORTHOPEDIC SURGERY  1994    right shoulder    ORTHOPEDIC SURGERY  2014    left hip replacement    REMOVE HARDWARE FOOT Right 01/27/2020    Procedure: REMOVAL, HARDWARE RIGHT FOOT;   Surgeon: Bryan Cid DPM;  Location: [facility]    SEPTOPLASTY, ENDOSCOPIC SINUS SURGERY, COMBINED Left 02/21/2018    Procedure: COMBINED SEPTOPLASTY, ENDOSCOPIC SINUS SURGERY;  LEFT ENDOSCOPIC SINUS SURGERY, SEPTOPLASTY, BILATERAL TURBINATE REDUCTION, PROPEL IMPLANTS;  Surgeon: Francoise Gonzalez MD;  Location: [facility]    TURBINOPLASTY Bilateral 02/21/2018    Procedure: TURBINOPLASTY;;  Surgeon: Francoise Gonzalez MD;  Location: [facility]        Family History:  Family History   Problem Relation Age of Onset    Diabetes Mother     Hypertension Mother     Thyroid Disease Mother     Thyroid Disease Sister     Dementia Sister         Social History:   Social History     Socioeconomic History    Marital status:    Tobacco Use    Smoking status: Never    Smokeless tobacco: Never   Substance and Sexual Activity    Alcohol use: Yes     Comment: rare    Drug use: No     Social Determinants of Health     Financial Resource Strain: Low Risk  (10/24/2023)    Received from [facility]    Overall Financial Resource Strain (CARDIA)     Difficulty of Paying Living Expenses: Not hard at all   Food Insecurity: No Food Insecurity (10/24/2023)    Received from [facility]    Hunger Vital Sign     Worried About Running Out of Food in the Last Year: Never true     Ran Out of Food in the Last Year: Never true   Transportation Needs: No Transportation Needs (10/24/2023)    Received from [facility]    PRAPARE - Transportation     Lack of Transportation (Medical): No     Lack of Transportation (Non-Medical): No   Physical Activity: Sufficiently Active (10/24/2023)    Received from [facility]    Exercise Vital Sign     Days of Exercise per Week: 4 days     Minutes of Exercise per Session: 60 min   Stress: Stress Concern Present (10/24/2023)    [facility] from [facility]    [facility] of Occupational Health - Occupational Stress Questionnaire     Feeling of Stress : To some extent   Social Connections: Moderately Integrated (10/24/2023)    [facility]    Social Connection and Isolation Panel [NHANES]     Frequency of Communication with Friends and Family: More than three times a week     Frequency of Social Gatherings with Friends and Family: Three times a week     Attends [employer] Services: More than 4 times per year     Active Member of Clubs or Organizations: No     Attends Club or Organization Meetings: 1 to 4 times per year     Marital Status:    Interpersonal Safety: Not At Risk (10/24/2023)    [facility]    Humiliation, Afraid, Rape, and Kick questionnaire     Fear of Current or Ex-Partner: No     Emotionally Abused: No     Physically Abused: No     Sexually Abused: No   Housing Stability: Low Risk  (10/24/2023)    [facility]    Housing Stability Vital Sign     Unable to Pay for Housing in the Last Year: No     Number of Places Lived in the Last Year: 1     Unstable Housing in the Last Year: No            Family History:  Reviewed with patient. No pertinent positives.    Physical Examination:  /84 (BP Location: Left arm, Patient Position: Sitting, Cuff Size: Adult Large)   Pulse 100   Ht 1.702 m (5' 7\")   Wt 110.9 kg (244 lb 8 oz)   SpO2 97%   BMI 38.29 kg/m      Constitutional/Psychiatric: This is a well-appearing, well-dressed patient in no acute distress. female communicates easily with no obvious speech issues. Oriented to self and environment with appropriate conversation. Does not appear depressed, anxious, or agitated.  Head: Normocephalic.  Eyes: Extra-ocular motions are intact with symmetric gaze.Ears: Auricles without masses or lesions bilaterally.   Oral Cavity/Oropharynx: " Lips, gingiva and teeth appear healthy.   Neck/Lymphatic: Flat.  Respiratory: No increased work of breathing or respiratory distress. No audible stridor or stertor.  Peripheral/Cardiovascular: Brisk capillary refill bilaterally.   Neurologic: Cranial nerves II-XII grossly intact as tested.    Nasal examination: No lesions, masses, or scars of the external nose are visualized. I do not appreciate any external deviation of the bony nasal vault. External valves patent without inspiratory alar collapse. Columella is midline.      Imaging Review:  CT Facial Bones without Contrast 4/30/23  IMPRESSION:   Postoperative changes.  Moderate-severe paranasal sinus mucosal   thickening.  Underlying polyp or mucous retention cysts are difficult to   exclude.   Normal variant anatomy as above.     CT scan of the sinuses was reviewed and independently interpreted by me today.    Procedure Note: Diagnostic Nasal Endoscopy, CPT 80659  Date of Service: May 30, 2024  Provider: Inocencio Peña MD   Presumptive Diagnosis: No primary diagnosis found.  Anesthesia: Topical lidocaine and oxymetazoline via atomizer    Indication:  Evaluation of nasal/sinus complaints; inadequate visualization with anterior rhinoscopy    Description of procedure:  After obtaining consent for the procedure from the patient, the sinonasal cavity was sprayed with topical anesthetic. A rigid 30-degree nasal endoscope was used to first used to visualize the nasal floor and the nasopharynx on the left. A second pass was then made to visualize the middle meatus and sphenoethmoid recess. Finally, the scope was turned 90-degrees and used to visualize the olfactory cleft and frontal outflow tract. A similar approach was used for the contralateral side. She tolerated the procedure well without difficulty.    Endoscopic Findings:    [place] Endoscopic Scoring System  Endoscopic observation Right Left   Polyps in middle meatus (0 = absent, 1 = restricted to middle  meatus, 2 = Beyond middle meatus) 0 0   Discharge (0 = absent, 1 = thin and clear, 2 = thick, purulent) 1 1   Edema (0 = absent, 1 = mild-moderate, 2 = moderate-severe) 0 0   Crusting (0 = absent, 1 = mild-moderate, 2 = moderate-severe) 1 1   Scarring (0= absent, 1 = mild-moderate, 2 = moderate-severe) 0 0   Total 2 2       Bilateral sinonasal edema is noted with small amount of mucoid drainage.  No nasal polyposis is seen within the nasal cavity.  The middle meatus has crusting and thick mucous in bilateral sinuses  The superior meatus is clear without polyps.  The sphenoethmoid recess and olfactory cleft are clear bilaterally.  No nasopharyngeal lesions are noted.  The eustachian tubes are patent and non-obstructed.  Redemonstration of septal deviation and turbinate hypertrophy as noted above.    Final Assessment/Plan:  53-year-old female presents with history of cystic fibrosis with predominantly sinus manifestations.  Recently has grown Pseudomonas and is currently undergoing eradication therapy.      Discussed with patient that her nasal endoscopy today certainly looks better than her most recent CT scan.  It does seem that her recent courses of inhaled IV and oral antibiotics have definitely helped her sinus disease.    Discussed that at this point would recommend treating with tobramycin rinses for period of 3 months.  Also took a culture from the middle meatus to see what it grows.    Based on how well she has done certainly a possibility that we could avoid a future surgery if we are able to get her crusting and Pseudomonas under control.    Will plan for follow-up in 3 months.            Scribe Disclosure:   I, Cammy Govea, am serving as a scribe; to document services personally performed by Inocencio Pñea MD -based on data collection and the provider's statements to me.     Provider Disclosure:  I agree with above History, Review of Systems, Physical exam and Plan.  I have reviewed the content of the  documentation and have edited it as needed. I have personally performed the services documented here and the documentation accurately represents those services and the decisions I have made.      Electronically signed by:  Inocencio Peña MD    [facility]  Rhinology  Endoscopic Skull Base Surgery  [facility]  Department of Otolaryngology - Head & Neck Surgery    60 minutes spent on the date of the encounter doing chart review, review of outside records, review of test results, interpretation of tests, patient visit, documentation and discussion with other provider(s) outside of the procedure performed

## 2024-05-30 NOTE — NURSING NOTE
"Chief Complaint   Patient presents with    Consult   Blood pressure 125/84, pulse 100, height 1.702 m (5' 7\"), weight 110.9 kg (244 lb 8 oz), SpO2 97%. Magen Guerrero, EMT    "

## 2024-05-30 NOTE — PATIENT INSTRUCTIONS
You were seen in the ENT Clinic today by Dr. Peña. If you have any questions or concerns after your appointment, please contact us (see below)       2.   The following recommendations have been made based upon your appointment today:   -Cultures of your sinuses have been sent to the lab. We will follow up with you on results once Dr. Peña has had the opportunity to view them.   -Start tobramycin rinses twice daily for the next 3 months.      3.   Plan to return to the ENT clinic in 3 months.           How to Contact Us:  Send a [employer] message to your provider. Our team will respond to you via [employer]. Occasionally, we will need to call you to get further information.  For urgent matters (Monday-Friday), call the ENT Clinic: [phone] and speak with a call center team member - they will route your call appropriately.   If you'd like to speak directly with a nurse, please find our contact information below. We do our best to check voicemail frequently throughout the day, and will work to call you back within 1-2 days. For urgent matters, please use the general clinic phone numbers listed above.     Liza VILLAREAL RN  Direct: [phone]  Bee GARLAND LPN  Direct: [phone]         [facility]  Department of Otolaryngology         Tobramycin Nasal Irrigations     - Tobramycin is a compounded antibiotic which means it is made only when ordered and by compounding pharmacies.  [employer]  [facility] Renetta Hardin [address] [place] [phone]  - This medication will arrive in the mail in capsule form.      Cleaning of the Nasal or Sinus Cavity     Please follow all three steps.  Nasal irrigation (cleaning) should be done 2 times/day.     Preparation:  1.  Wash your hands  2.  We suggest that you buy the [employer] Sinus Rinse Kit  3.  Use distilled water or tap water that has been boiled and brought to room temperature. This is important because serious infections can result from using tap water  that is not clean. These infections are very rare, but it's better to be absolutely safe.  4.  Fill the irrigation bottle with room temperature water (distilled or boiled tap water) and add mixture (pre made packet or homemade recipe).                   If you wish to make a homemade recipe:                   Mix 1/4 teaspoon salt (kosher,non-iodine salt) with 1/4 teaspoon baking soda in each bottle.  5. Open Tobramycin Nasal Capsule and empty contents into solution.      Cleansing Irrigation/Sinus Rinse:     1.  Lean forward over a sink and insert the rinse bottle applicator into the right side of your nose. Make sure to point the applicator towards the back of your head.      2.  Tilt head down and to the left side.  With your mouth open (breathing through your mouth), gently direct the water around the inside of your nose until clear fluid starts to drain from the opposite nostril.  This is called flushing or irrigation.     3.  When you have used 1/4 to 1/2 or the solution, switch to the left nostril.     4.  To irrigate the left nostril, tilt your head down and to the right side.  With your mouth open (breathing through your mouth),  gently direct the water around the inside of your nose until clear fluid starts to drain from the opposite nostril.      Cleaning the Equipment:  1.  Throw away any leftover solution  2.  Clean the rinse bottle and cap with clear water. Air dry.        Call the ENT Clinic if you have any questions or concerns at [phone]

## 2024-05-31 ENCOUNTER — TELEPHONE (OUTPATIENT)
Dept: PULMONOLOGY | Facility: CLINIC | Age: 54
End: 2024-05-31
Payer: COMMERCIAL

## 2024-05-31 ENCOUNTER — PHARMACY VISIT (OUTPATIENT)
Dept: ADMINISTRATIVE | Facility: CLINIC | Age: 54
End: 2024-05-31
Payer: COMMERCIAL

## 2024-05-31 NOTE — PROGRESS NOTES
Cystic Fibrosis Clinical Follow Up Assessment   Summary Notes    I spoke to Annie for 7-day [place] follow-up. Tolerating well. Denies side effects or missed doses.     Her ENT also recently prescribed a bay nasal saline rinse to compounding. Updated CF navigator to help facilitate fills. NO additional questions or concerns.     TM will follow-up in a month to see if completed 28 day course. PDC is low since Pulmozyme is PRN only.        Care Details    What are the patient's goals for this therapy?   ? 6/1/23 - keeping up with her log of medications.      Did you identify any patient barriers to access and successful treatment?   ? No barriers to access identified      PDC (percent days covered; adherence metric. Goal >0.80 or 80% adherent)   ? 0.52      Has the patient missed doses inappropriately?   ? No      CURRENT side effect(s) for monitoring:   ? None       Court Couch, PharmD  Therapy Management Pharmacist  [facility]  [email or web address]  Specialty: [phone]

## 2024-06-01 ENCOUNTER — MYC MEDICAL ADVICE (OUTPATIENT)
Dept: PULMONOLOGY | Facility: CLINIC | Age: 54
End: 2024-06-01
Payer: COMMERCIAL

## 2024-06-03 ENCOUNTER — MYC MEDICAL ADVICE (OUTPATIENT)
Dept: OTOLARYNGOLOGY | Facility: CLINIC | Age: 54
End: 2024-06-03
Payer: COMMERCIAL

## 2024-06-04 ENCOUNTER — MYC MEDICAL ADVICE (OUTPATIENT)
Dept: OTOLARYNGOLOGY | Facility: CLINIC | Age: 54
End: 2024-06-04
Payer: COMMERCIAL

## 2024-06-04 ENCOUNTER — MYC MEDICAL ADVICE (OUTPATIENT)
Dept: PULMONOLOGY | Facility: CLINIC | Age: 54
End: 2024-06-04
Payer: COMMERCIAL

## 2024-06-04 LAB
BACTERIA SPEC CULT: ABNORMAL

## 2024-06-04 NOTE — CONFIDENTIAL NOTE
Questions addressed on the phone with patient, charted in a separate encounter.    SUNITHA GaliciaN, RN  RN Care Coordinator Cystic Fibrosis Adult Clinic

## 2024-06-04 NOTE — CONFIDENTIAL NOTE
Call to Dr. Fowler to review the multiple mychart messages from patient  along with ER visit and recommendations from ENT.    Follow up call to patient to discuss conversation with Dr. Fowler and his treatment recommendations.  Reassured patient that Dr. Fowler  wants her to stay on the current treatment plan.  Dr. Fowler would like to see the patient back in clinic next Tuesday for assessment.  Patient in agreement with plan. PFT/Clinic visit scheduled for  6/11, time confirmed with patient.  Patient confirmed she is following the prednisone taper plan prescribed in the ER. Dr. Fowler  wants her to complete the prednisone.  Patient is taking the Cipro. Dr. Fowler wants her to complete the course and to not add any other antibiotics at this time.Un-necessary antibiotics lead to  side effects and resistance to organisms.  Patient has had her pulse oximeter for 10 years. This RN shared with patient that Dr. Fowler has some concerns about the accuracy and discrepancies in readings from home to ER, may need a new monitor.  Dr. Fowler will not order Tylenol #3. Dr. Fowler will discuss further at her clinic visit next week.  This RN stressed for patient to follow the current course of treatment.  Should patient worsen, to go  to ER to be seen.    Patient in agreement with all recommendations from Dr. Fowler. Patient appreciated the call back and discussion of her  concerns.     Dr. Fowler wanted RT Radha to speak with patient and recommendations regarding airway clearance and nebs. Radha spoke with patient after this RN and discussed recommendations.    Fatuma Martínez BSN, RN  RN Care Coordinator Cystic Fibrosis Adult Clinic

## 2024-06-05 ENCOUNTER — MYC MEDICAL ADVICE (OUTPATIENT)
Dept: OTOLARYNGOLOGY | Facility: CLINIC | Age: 54
End: 2024-06-05
Payer: COMMERCIAL

## 2024-06-06 ENCOUNTER — TELEPHONE (OUTPATIENT)
Dept: PULMONOLOGY | Facility: CLINIC | Age: 54
End: 2024-06-06
Payer: COMMERCIAL

## 2024-06-06 ENCOUNTER — PREP FOR PROCEDURE (OUTPATIENT)
Dept: OTOLARYNGOLOGY | Facility: CLINIC | Age: 54
End: 2024-06-06
Payer: COMMERCIAL

## 2024-06-06 ENCOUNTER — MYC MEDICAL ADVICE (OUTPATIENT)
Dept: PULMONOLOGY | Facility: CLINIC | Age: 54
End: 2024-06-06
Payer: COMMERCIAL

## 2024-06-06 DIAGNOSIS — J32.4 CHRONIC PANSINUSITIS: Primary | ICD-10-CM

## 2024-06-06 DIAGNOSIS — E84.9 CYSTIC FIBROSIS (H): ICD-10-CM

## 2024-06-06 RX ORDER — DEXAMETHASONE SODIUM PHOSPHATE 4 MG/ML
10 INJECTION, SOLUTION INTRA-ARTICULAR; INTRALESIONAL; INTRAMUSCULAR; INTRAVENOUS; SOFT TISSUE ONCE
Status: CANCELLED | OUTPATIENT
Start: 2024-06-06 | End: 2024-06-06

## 2024-06-06 NOTE — TELEPHONE ENCOUNTER
Left Voicemail (1st Attempt) for the patient to call back and schedule the following:    Appointment type: F  Provider: SHAYY  Return date: 10/13/2024  Specialty phone number: [phone]  Additional appointment(s) needed: N/A  Additonal Notes: N/A

## 2024-06-11 ENCOUNTER — OFFICE VISIT (OUTPATIENT)
Dept: PULMONOLOGY | Facility: CLINIC | Age: 54
End: 2024-06-11
Attending: INTERNAL MEDICINE
Payer: COMMERCIAL

## 2024-06-11 VITALS
HEIGHT: 66 IN | OXYGEN SATURATION: 95 % | HEART RATE: 103 BPM | WEIGHT: 245 LBS | SYSTOLIC BLOOD PRESSURE: 135 MMHG | BODY MASS INDEX: 39.37 KG/M2 | DIASTOLIC BLOOD PRESSURE: 82 MMHG

## 2024-06-11 DIAGNOSIS — J32.4 CHRONIC PANSINUSITIS: ICD-10-CM

## 2024-06-11 DIAGNOSIS — Z98.890 HISTORY OF ENDOSCOPIC SINUS SURGERY: ICD-10-CM

## 2024-06-11 DIAGNOSIS — E84.9 CYSTIC FIBROSIS (H): Primary | ICD-10-CM

## 2024-06-11 DIAGNOSIS — E84.9 CYSTIC FIBROSIS (H): ICD-10-CM

## 2024-06-11 LAB
EXPTIME-PRE: 4.18 SEC
FEF2575-%PRED-PRE: 104 %
FEF2575-PRE: 2.6 L/SEC
FEF2575-PRED: 2.48 L/SEC
FEFMAX-%PRED-PRE: 114 %
FEFMAX-PRE: 7.89 L/SEC
FEFMAX-PRED: 6.87 L/SEC
FEV1-%PRED-PRE: 87 %
FEV1-PRE: 2.31 L
FEV1FEV6-PRE: 84 %
FEV1FEV6-PRED: 82 %
FEV1FVC-PRE: 84 %
FEV1FVC-PRED: 80 %
FIFMAX-PRE: 6.66 L/SEC
FVC-%PRED-PRE: 83 %
FVC-PRE: 2.76 L
FVC-PRED: 3.29 L

## 2024-06-11 PROCEDURE — 99215 OFFICE O/P EST HI 40 MIN: CPT | Mod: 25 | Performed by: INTERNAL MEDICINE

## 2024-06-11 PROCEDURE — 87070 CULTURE OTHR SPECIMN AEROBIC: CPT | Performed by: INTERNAL MEDICINE

## 2024-06-11 PROCEDURE — G0463 HOSPITAL OUTPT CLINIC VISIT: HCPCS | Performed by: INTERNAL MEDICINE

## 2024-06-11 PROCEDURE — 94375 RESPIRATORY FLOW VOLUME LOOP: CPT | Performed by: INTERNAL MEDICINE

## 2024-06-11 RX ORDER — SULFAMETHOXAZOLE/TRIMETHOPRIM 800-160 MG
1 TABLET ORAL 3 TIMES DAILY
Qty: 63 TABLET | Refills: 0 | Status: SHIPPED | OUTPATIENT
Start: 2024-06-11 | End: 2024-07-02

## 2024-06-11 RX ORDER — ACETAMINOPHEN AND CODEINE PHOSPHATE 300; 30 MG/1; MG/1
1 TABLET ORAL EVERY 8 HOURS PRN
Qty: 10 TABLET | Refills: 0 | Status: SHIPPED | OUTPATIENT
Start: 2024-06-11 | End: 2024-06-14

## 2024-06-11 RX ORDER — MOMETASONE FUROATE MONOHYDRATE 50 UG/1
2 SPRAY, METERED NASAL DAILY
Qty: 17 G | Refills: 5 | Status: SHIPPED | OUTPATIENT
Start: 2024-06-11

## 2024-06-11 ASSESSMENT — PAIN SCALES - GENERAL: PAINLEVEL: NO PAIN (0)

## 2024-06-11 NOTE — PROGRESS NOTES
Annie Garcia comes into clinic today at the request of Dr. Bryce Fowler Ordering Provider for PFT      Pb Lind, RRT

## 2024-06-11 NOTE — LETTER
6/11/2024      Annie Garcia  [address]  [facility] [address]      Dear Colleague,    Thank you for referring your patient, Annie Garica, to the [facility]G [facility] AND [facility]. Please see a copy of my visit note below.    Pulmonary Staff Initial Consult    CC: The patient is here for follow-up of cystic fibrosis.    Interval history: Since I first evaluated the patient in mid May she has had a difficult clinical course.  At that visit, she recently completed a course of IV antibiotic and oral antibiotic treatment for Pseudomonas.  Her PFTs were at baseline.  Her sputum subsequently grew Pseudomonas.  We have elected to try eradicating the Pseudomonas given unclear chronicity.  She was placed on additional ciprofloxacin and after tolerating a test dose, was started on ISIDRO nebs.  She felt reasonably well initially but reports increased sinus and lower respiratory symptoms since her initial otolaryngology visit here.  She was noting oxygen saturations at home in the high 70s.  We advised her to be evaluated.  She was seen at an outside emergency department where she had oxygen saturation below baseline in the low 90s.  She was given 125 mg of methylprednisolone, given a single dose of cefepime, and given a single dose of levofloxacin as well as it dose of Toradol as she was having a lot of headache and chest pain from frequent cough.  In addition she was put on a prednisone burst.  Since then she has continued to feel poorly.  She feels like there is a blockage of an airway in her right lung.  She is coughing frequently with predominantly nonproductive cough but occasionally producing some green sputum which is unusual for her.  She is having frequent cough incontinence.  She has been doing vest and airway clearance a couple times per day.  She feels it is difficult to get a full breath.      Past medical history  1.  Cystic fibrosis mutation  "analysis: Genotype delta F508/D110H, sweat chloride c/w CF  2.  Cystic fibrosis related sinus disease.  Outside records indicate history of associated mycetoma.  3.  Cystic fibrosis lung disease: Chest CT March, 2023 makes reference to right upper lobe bronchiectasis with smaller degree of bronchiectasis in the lingula and left lower lobe.  Patient reports lower espiratory cultures historically normal.  4.  Depression (details unavailable)    Past surgical history  1.  Status post right hip arthroplasty, 2014  2.  Removal of \"rope\" under right breast  3.  Status post sinus surgery approximately 8 years ago x 2    Medications:  Current Outpatient Medications   Medication Sig Dispense Refill     acetaminophen-codeine (TYLENOL #3) 300-30 MG per tablet Take 1 tablet by mouth every 8 hours as needed for severe pain 10 tablet 0     mometasone (NASONEX) 50 MCG/ACT nasal spray Spray 2 sprays into both nostrils daily 17 g 5     sulfamethoxazole-trimethoprim (BACTRIM DS) 800-160 MG tablet Take 1 tablet by mouth 3 times daily for 21 days 63 tablet 0     acetaminophen-codeine (TYLENOL #3) 300-30 MG tablet PO TID prn 90 tablet 0     acetylcysteine (MUCOMYST) 20 % nebulizer solution Take by nebulization every 8 hours 5-10 ml inhalation every 8 hours       arformoterol (BROVANA) 15 MCG/2ML NEBU neb solution Inhale 15 mcg into the lungs       azithromycin (ZITHROMAX) 250 MG tablet TAKE 2 TABLETS BY MOUTH ON DAY 1, AND THEN TAKE 1 TABLET BY MOUTH ONCE A DAY ON DAY 2 THROUGH DAY 5 (Patient not taking: Reported on 5/30/2024)       BETHKIS 300 MG/4ML nebulizer solution Take 4 mLs (300 mg) by nebulization 2 times daily 28 days on, 28 days off 224 mL 1     budesonide (PULMICORT) 0.5 MG/2ML neb solution Squirt entire vial into previously made amanda med saline bottle, mix, and irrigate each nostril until entire bottle empty.  Do this twice daily. 120 mL 1     cefdinir (OMNICEF) 300 MG capsule take 1 capsule by mouth twice daily for 14 days " (Patient not taking: Reported on 5/30/2024)       cephALEXin (KEFLEX) 500 MG capsule take 1 capsule by mouth three times daily for 7 days (Patient not taking: Reported on 5/30/2024)       cetirizine (ZYRTEC) 10 MG tablet Take 10 mg by mouth daily       COMPOUNDED NON-CONTROLLED SUBSTANCE (CMPD RX) - PHARMACY TO MIX COMPOUNDED MEDICATION Open Tobramycin capsule and empty contents into 240 ml of nasal saline mixture. Rinse each nasal cavity with 120 ml of mixture twice daily. 90 capsule 0     cyclobenzaprine (FLEXERIL) 10 MG tablet TAKE 1 TABLET BY MOUTH 3 TIMES DAILY AS NEEDED FOR MUSCLE SPASMS 30 tablet 0     desonide (DESOWEN) 0.05 % external ointment APPLY 5 TIMES A DAY TO ACTIVE RASH ON LIPS AND FACE UNTIL RESOLVED 60 g 1     dexlansoprazole (DEXILANT) 30 MG CPDR CR capsule Take 1 capsule (30 mg) by mouth daily 30 capsule 1     dornase stefan (PULMOZYME) 2.5 MG/2.5ML neb solution USE 1 VIAL IN NEBULIZER DAILY AS NEEDED 75 mL 3     Doxycycline Hyclate 100 MG TBEC Take 100 mg by mouth (Patient not taking: Reported on 5/30/2024)       fluconazole (DIFLUCAN) 100 MG tablet Take 1 pill daily as needed 90 tablet 1     ipratropium - albuterol 0.5 mg/2.5 mg/3 mL (DUONEB) 0.5-2.5 (3) MG/3ML neb solution Inhale 3 mLs into the lungs       levalbuterol (XOPENEX) 0.31 MG/3ML nebulizer solution Take 3 mLs (0.31 mg) by nebulization every 6 hours as needed for shortness of breath / dyspnea 225 mL 0     levofloxacin (LEVAQUIN) 750 MG tablet Take 750 mg by mouth (Patient not taking: Reported on 5/30/2024)       methylPREDNISolone (DEPO-MEDROL) 80 MG/ML injection 80 mg by INTRA-ARTICULAR route (Patient not taking: Reported on 5/30/2024)       mupirocin 2 % OINT, sodium chloride 0.9% (bottle) 0.9 % SOLN external ointment IRRIGATE BILATERAL NARES WITH 240MLS 2 TIMES DAILY FOR 4 WEEKS 7000 g 1     predniSONE (DELTASONE) 5 MG tablet TAKE 4 TABLETS BY MOUTH DAILY FOR 3 DAYS, THEN TAKE 3 TABLETS DAILY FOR 3 DAYS, THEN 2 TABLETS DAILY FOR 3  "DAYS, THEN 1 TABLET DAILY FOR 3 D (Patient not taking: Reported on 5/30/2024) 50 tablet 1     sulfamethoxazole-trimethoprim (BACTRIM DS) 800-160 MG tablet Take 1 tablet by mouth 2 times daily (Patient not taking: Reported on 5/30/2024) 21 tablet 1     zolpidem (AMBIEN) 10 MG tablet Take 1 tablet (10 mg) by mouth nightly as needed for sleep 30 tablet 1     No current facility-administered medications for this visit.   N acetylcysteine po bid     Allergies: Reports history of seasonal allergies.  Outside records report Bactrim allergy but patient has subsequently tolerated Bactrim per her report.  Duration of quinolones kept to a minimum out of concern levofloxacin may have caused tendon injury.    Family history: Patient has a sister with cystic fibrosis.  Possible their father had manifestations of CF.    Social history: Lifelong non-smoker.  Previously lived in [place], [place] and was initially followed in a CF center there.  She subsequently moved to [place] and lived there for several years.  She moved to [facility] a few years ago.  She was an athlete growing up.  She previously has worked as a .    Review of systems: Constitutional: Currently no fever or chills.  ENT: Still having significant congestion.  She feels there is a scab over her right anterior nares that is expanding in size.  Significant ongoing drainage.    Physical examination  /82   Pulse 103   Ht 1.676 m (5' 6\")   Wt 111.1 kg (245 lb)   SpO2 95%   BMI 39.54 kg/m  ,   General: Alert appropriate pleasant in no apparent distress speaking in full sentences.  HEENT: Sclera anicteric.  No overt nasal discharge.  Oropharynx is moist without lesion or exudate.  Chest: Clear to auscultation bilaterally with good air movement and normal chest wall excursion.  Cardiovascular: S1-S2 with a regular rate and rhythm.  Distant heart sounds.  No lower extremity edema.  Abdomen: Soft, nondistended, nontender, bowel sounds " present  Musculoskeletal: No digital clubbing.    Outside studies:  1.  Outside chest CT scan March, 2023: Right upper lobe bronchiectasis with lesser degree of bronchiectasis involving the lingula and left lower lobe.  2.  Most recent sinus cultures in January and March, 2024 with Citrobacter freundi and Pseudomonas (most recently resistant to quinolone)  3.  Most recent sinus CT scan April, 2024: Postoperative changes involving her maxillary sinuses.  Moderate to severe mucosal thickening involving the frontal, maxillary, sphenoid sinuses.  4.  Limited previous CBCs have not had peripheral eosinophilia.    New studies:  Pulm function test from today personally reviewed.  Valid maneuver.  FEV1 2.31 L (87% predicted), FVC 2.76 L (83% predicted).  Values down symmetrically more than 10%.  She is below her baseline.    Sputum culture from last visit grew Pseudomonas.  Sinus culture from last month grew 2 strains of Pseudomonas, MRSA x 1, stenotrophomonas x 1    Impression and plan  1.  Moderate exacerbation of cystic fibrosis lung disease: Patient with high burden of symptoms, primarily with frequent cough complicated by urinary incontinence.  Noteworthy drop in her pulmonary function tests.  Patient suspects this is being driven by her sinus disease and that may be the case.  Will treat MRSA from sinuses with Bactrim which will also cover stenotrophomonas.  She has a couple more days of prednisone and ciprofloxacin left and will complete those.  Some suspicion that ISIDRO nebs may be irritating her airways.  I recommended she hold these for now as I think it is highly unlikely that her respiratory exacerbation is due to an adequate treatment of Pseudomonas as she has had aggressive for this for several weeks.  She will continue vest with nebs that include lev albuterol, budesonide, and mucolytic.  After extensive discussion about risks versus benefits, I have given her 10 tablets of Tylenol 3 to manage her refractory  cough.  She reports not responding to over-the-counter options. Patient also sent home with sputum cup - placing orders so it can be collected at [facility].    2.  Acute on chronic cystic fibrosis related sinus disease: This has not responded to aggressive treatment for Pseudomonas to date.  Will see how she responds to Bactrim for MRSA.  She has follow-up with ENT.  She was supposed to get started on bay saline irrigation but has not heard from compounding pharmacy. She plans on contacting the pharmacy.     3.  CFTR modulator candidacy: Based on the patient's mutations, she is a candidate for modulator therapy.  However, she would rather not pursue that at this juncture.  She reports her sister with cystic fibrosis had unwanted substantial weight gain and diffuse joint pains associated with Trikafta therapy.  Briefly discussed that a trial of Trikafta would be warranted if she continues to have a high burden of sinus disease.    Will plan on patient following up in 2 to 3 months.    Total visit time today 45 minutes.  This is exclusive of time interpreting pulmonary function test.    Bryce Fowler MD

## 2024-06-11 NOTE — PROGRESS NOTES
Pulmonary Staff Initial Consult    CC: The patient is here for follow-up of cystic fibrosis.    Interval history: Since I first evaluated the patient in mid May she has had a difficult clinical course.  At that visit, she recently completed a course of IV antibiotic and oral antibiotic treatment for Pseudomonas.  Her PFTs were at baseline.  Her sputum subsequently grew Pseudomonas.  We have elected to try eradicating the Pseudomonas given unclear chronicity.  She was placed on additional ciprofloxacin and after tolerating a test dose, was started on ISIDRO nebs.  She felt reasonably well initially but reports increased sinus and lower respiratory symptoms since her initial otolaryngology visit here.  She was noting oxygen saturations at home in the high 70s.  We advised her to be evaluated.  She was seen at an outside emergency department where she had oxygen saturation below baseline in the low 90s.  She was given 125 mg of methylprednisolone, given a single dose of cefepime, and given a single dose of levofloxacin as well as it dose of Toradol as she was having a lot of headache and chest pain from frequent cough.  In addition she was put on a prednisone burst.  Since then she has continued to feel poorly.  She feels like there is a blockage of an airway in her right lung.  She is coughing frequently with predominantly nonproductive cough but occasionally producing some green sputum which is unusual for her.  She is having frequent cough incontinence.  She has been doing vest and airway clearance a couple times per day.  She feels it is difficult to get a full breath.      Past medical history  1.  Cystic fibrosis mutation analysis: Genotype delta F508/D110H, sweat chloride c/w CF  2.  Cystic fibrosis related sinus disease.  Outside records indicate history of associated mycetoma.  3.  Cystic fibrosis lung disease: Chest CT March, 2023 makes reference to right upper lobe bronchiectasis with smaller degree of  "bronchiectasis in the lingula and left lower lobe.  Patient reports lower espiratory cultures historically normal.  4.  Depression (details unavailable)    Past surgical history  1.  Status post right hip arthroplasty, 2014  2.  Removal of \"rope\" under right breast  3.  Status post sinus surgery approximately 8 years ago x 2    Medications:  Current Outpatient Medications   Medication Sig Dispense Refill    acetaminophen-codeine (TYLENOL #3) 300-30 MG per tablet Take 1 tablet by mouth every 8 hours as needed for severe pain 10 tablet 0    mometasone (NASONEX) 50 MCG/ACT nasal spray Spray 2 sprays into both nostrils daily 17 g 5    sulfamethoxazole-trimethoprim (BACTRIM DS) 800-160 MG tablet Take 1 tablet by mouth 3 times daily for 21 days 63 tablet 0    acetaminophen-codeine (TYLENOL #3) 300-30 MG tablet PO TID prn 90 tablet 0    acetylcysteine (MUCOMYST) 20 % nebulizer solution Take by nebulization every 8 hours 5-10 ml inhalation every 8 hours      arformoterol (BROVANA) 15 MCG/2ML NEBU neb solution Inhale 15 mcg into the lungs      azithromycin (ZITHROMAX) 250 MG tablet TAKE 2 TABLETS BY MOUTH ON DAY 1, AND THEN TAKE 1 TABLET BY MOUTH ONCE A DAY ON DAY 2 THROUGH DAY 5 (Patient not taking: Reported on 5/30/2024)      BETHKIS 300 MG/4ML nebulizer solution Take 4 mLs (300 mg) by nebulization 2 times daily 28 days on, 28 days off 224 mL 1    budesonide (PULMICORT) 0.5 MG/2ML neb solution Squirt entire vial into previously made amanda med saline bottle, mix, and irrigate each nostril until entire bottle empty.  Do this twice daily. 120 mL 1    cefdinir (OMNICEF) 300 MG capsule take 1 capsule by mouth twice daily for 14 days (Patient not taking: Reported on 5/30/2024)      cephALEXin (KEFLEX) 500 MG capsule take 1 capsule by mouth three times daily for 7 days (Patient not taking: Reported on 5/30/2024)      cetirizine (ZYRTEC) 10 MG tablet Take 10 mg by mouth daily      COMPOUNDED NON-CONTROLLED SUBSTANCE (CMPD RX) - " PHARMACY TO MIX COMPOUNDED MEDICATION Open Tobramycin capsule and empty contents into 240 ml of nasal saline mixture. Rinse each nasal cavity with 120 ml of mixture twice daily. 90 capsule 0    cyclobenzaprine (FLEXERIL) 10 MG tablet TAKE 1 TABLET BY MOUTH 3 TIMES DAILY AS NEEDED FOR MUSCLE SPASMS 30 tablet 0    desonide (DESOWEN) 0.05 % external ointment APPLY 5 TIMES A DAY TO ACTIVE RASH ON LIPS AND FACE UNTIL RESOLVED 60 g 1    dexlansoprazole (DEXILANT) 30 MG CPDR CR capsule Take 1 capsule (30 mg) by mouth daily 30 capsule 1    dornase stefan (PULMOZYME) 2.5 MG/2.5ML neb solution USE 1 VIAL IN NEBULIZER DAILY AS NEEDED 75 mL 3    Doxycycline Hyclate 100 MG TBEC Take 100 mg by mouth (Patient not taking: Reported on 5/30/2024)      fluconazole (DIFLUCAN) 100 MG tablet Take 1 pill daily as needed 90 tablet 1    ipratropium - albuterol 0.5 mg/2.5 mg/3 mL (DUONEB) 0.5-2.5 (3) MG/3ML neb solution Inhale 3 mLs into the lungs      levalbuterol (XOPENEX) 0.31 MG/3ML nebulizer solution Take 3 mLs (0.31 mg) by nebulization every 6 hours as needed for shortness of breath / dyspnea 225 mL 0    levofloxacin (LEVAQUIN) 750 MG tablet Take 750 mg by mouth (Patient not taking: Reported on 5/30/2024)      methylPREDNISolone (DEPO-MEDROL) 80 MG/ML injection 80 mg by INTRA-ARTICULAR route (Patient not taking: Reported on 5/30/2024)      mupirocin 2 % OINT, sodium chloride 0.9% (bottle) 0.9 % SOLN external ointment IRRIGATE BILATERAL NARES WITH 240MLS 2 TIMES DAILY FOR 4 WEEKS 7000 g 1    predniSONE (DELTASONE) 5 MG tablet TAKE 4 TABLETS BY MOUTH DAILY FOR 3 DAYS, THEN TAKE 3 TABLETS DAILY FOR 3 DAYS, THEN 2 TABLETS DAILY FOR 3 DAYS, THEN 1 TABLET DAILY FOR 3 D (Patient not taking: Reported on 5/30/2024) 50 tablet 1    sulfamethoxazole-trimethoprim (BACTRIM DS) 800-160 MG tablet Take 1 tablet by mouth 2 times daily (Patient not taking: Reported on 5/30/2024) 21 tablet 1    zolpidem (AMBIEN) 10 MG tablet Take 1 tablet (10 mg) by mouth  "nightly as needed for sleep 30 tablet 1     No current facility-administered medications for this visit.   N acetylcysteine po bid     Allergies: Reports history of seasonal allergies.  Outside records report Bactrim allergy but patient has subsequently tolerated Bactrim per her report.  Duration of quinolones kept to a minimum out of concern levofloxacin may have caused tendon injury.    Family history: Patient has a sister with cystic fibrosis.  Possible their father had manifestations of CF.    Social history: Lifelong non-smoker.  Previously lived in [place], [place] and was initially followed in a CF center there.  She subsequently moved to [place] and lived there for several years.  She moved to [facility] a few years ago.  She was an athlete growing up.  She previously has worked as a .    Review of systems: Constitutional: Currently no fever or chills.  ENT: Still having significant congestion.  She feels there is a scab over her right anterior nares that is expanding in size.  Significant ongoing drainage.    Physical examination  /82   Pulse 103   Ht 1.676 m (5' 6\")   Wt 111.1 kg (245 lb)   SpO2 95%   BMI 39.54 kg/m  ,   General: Alert appropriate pleasant in no apparent distress speaking in full sentences.  HEENT: Sclera anicteric.  No overt nasal discharge.  Oropharynx is moist without lesion or exudate.  Chest: Clear to auscultation bilaterally with good air movement and normal chest wall excursion.  Cardiovascular: S1-S2 with a regular rate and rhythm.  Distant heart sounds.  No lower extremity edema.  Abdomen: Soft, nondistended, nontender, bowel sounds present  Musculoskeletal: No digital clubbing.    Outside studies:  1.  Outside chest CT scan March, 2023: Right upper lobe bronchiectasis with lesser degree of bronchiectasis involving the lingula and left lower lobe.  2.  Most recent sinus cultures in January and March, 2024 with Citrobacter freundi and Pseudomonas " (most recently resistant to quinolone)  3.  Most recent sinus CT scan April, 2024: Postoperative changes involving her maxillary sinuses.  Moderate to severe mucosal thickening involving the frontal, maxillary, sphenoid sinuses.  4.  Limited previous CBCs have not had peripheral eosinophilia.    New studies:  Pulm function test from today personally reviewed.  Valid maneuver.  FEV1 2.31 L (87% predicted), FVC 2.76 L (83% predicted).  Values down symmetrically more than 10%.  She is below her baseline.    Sputum culture from last visit grew Pseudomonas.  Sinus culture from last month grew 2 strains of Pseudomonas, MRSA x 1, stenotrophomonas x 1    Impression and plan  1.  Moderate exacerbation of cystic fibrosis lung disease: Patient with high burden of symptoms, primarily with frequent cough complicated by urinary incontinence.  Noteworthy drop in her pulmonary function tests.  Patient suspects this is being driven by her sinus disease and that may be the case.  Will treat MRSA from sinuses with Bactrim which will also cover stenotrophomonas.  She has a couple more days of prednisone and ciprofloxacin left and will complete those.  Some suspicion that ISIDRO nebs may be irritating her airways.  I recommended she hold these for now as I think it is highly unlikely that her respiratory exacerbation is due to an adequate treatment of Pseudomonas as she has had aggressive for this for several weeks.  She will continue vest with nebs that include lev albuterol, budesonide, and mucolytic.  After extensive discussion about risks versus benefits, I have given her 10 tablets of Tylenol 3 to manage her refractory cough.  She reports not responding to over-the-counter options. Patient also sent home with sputum cup - placing orders so it can be collected at [facility].    2.  Acute on chronic cystic fibrosis related sinus disease: This has not responded to aggressive treatment for Pseudomonas to date.  Will see how she  responds to Bactrim for MRSA.  She has follow-up with ENT.  She was supposed to get started on bay saline irrigation but has not heard from compounding pharmacy. She plans on contacting the pharmacy.     3.  CFTR modulator candidacy: Based on the patient's mutations, she is a candidate for modulator therapy.  However, she would rather not pursue that at this juncture.  She reports her sister with cystic fibrosis had unwanted substantial weight gain and diffuse joint pains associated with Trikafta therapy.  Briefly discussed that a trial of Trikafta would be warranted if she continues to have a high burden of sinus disease.    Will plan on patient following up in 2 to 3 months.    Total visit time today 45 minutes.  This is exclusive of time interpreting pulmonary function test.    Bryce Fowler MD

## 2024-06-11 NOTE — NURSING NOTE
"Annie Garcia is a 53 year old year old who is being seen for Cystic Fibrosis (CF Follow up )      Medications reviewed and Vital signs taken.    Specimen Collection Type: Throat Swab    Order(s) placed: CF Aerobic Bacterial    *IF AFB order placed - please enter \"PRIORITIZE AFB\" to order comments.       No results found for: \"ACIDFAST\"      No results found for: \"AFBSMS\"    Vitals were taken and medications were reconciled.    Aida Vega RMA  3:35 PM\    "

## 2024-06-12 ENCOUNTER — TELEPHONE (OUTPATIENT)
Dept: OTOLARYNGOLOGY | Facility: CLINIC | Age: 54
End: 2024-06-12
Payer: COMMERCIAL

## 2024-06-12 ENCOUNTER — MYC MEDICAL ADVICE (OUTPATIENT)
Dept: PULMONOLOGY | Facility: CLINIC | Age: 54
End: 2024-06-12
Payer: COMMERCIAL

## 2024-06-12 DIAGNOSIS — J32.4 CHRONIC PANSINUSITIS: Primary | ICD-10-CM

## 2024-06-12 DIAGNOSIS — E84.9 CYSTIC FIBROSIS (H): Primary | ICD-10-CM

## 2024-06-12 NOTE — CONFIDENTIAL NOTE
Sputum cx ordered for patient when she is able to produce sputum.  Fatuma Martínez, BSN, RN  RN Care Coordinator [facility]

## 2024-06-12 NOTE — TELEPHONE ENCOUNTER
[facility] Call Center    Phone Message    May a detailed message be left on voicemail: yes     Reason for Call: Other: Pt would like a call to discuss upcoming surgery. Please call. Thanks.     Action Taken: Other: ENT    Travel Screening: Not Applicable     Date of Service:

## 2024-06-13 LAB — BACTERIA SPEC CULT: NORMAL

## 2024-06-14 NOTE — TELEPHONE ENCOUNTER
"Writer called and spoke to pt regarding call center message. Pt had multiple question regarding surgery. Pt stated that she would like another follow up appointment with Dr. Peña because she feels like she \"does not know the plan\" for surgery. Pt stated that she has surgery 8 years ago with Dr. Gonzalez at the [facility] and during that surgery the provider placed antibiotic tubules in the patients nose that release abx for an extended period of time and she wants Dr. Peña to know that she is expecting this same approach. Pt stated that \"I am a very complicated case and have many different bacteria growing in my nose and am already on so many oral abx that I need to tubules placed and I need Dr. Peña to know this and do research so that he knows what he is getting into when he does surgery\". Pt stated that she is worried that the surgery was not scheduled for enough time because her last surgery was over 4 hours long, pt wants to make sure Dr. Peña is well prepared for when he sees the inside of her nose. Pt also stated that she received a call from her pharmacy and they are providing her the tobramycin capsules but she needs saline to go with it. Pt stated the pharmacist stated that she can just use water, but patient would like a prescription for saline to be sent in since her pharmacy will cover the cost. Writer stated that we will reach out to the pharmacy to see if they will provide her saline. Writer also stated that a follow up visit with Dr. Peña will be scheduled and a note will be sent to Dr. Peña with pts concerns. Pt expressed understanding.    Liza Montes RN  "

## 2024-06-14 NOTE — TELEPHONE ENCOUNTER
Writer LVM stating that the pharmacy was contacted and will be sending pt the premixed tobramycin nasal rinse. Writer stated that the pharmacy does not send the neilmed rinse bottle but this can be purchased over the counter.    Liza Montes RN

## 2024-06-14 NOTE — TELEPHONE ENCOUNTER
Writer called [facility] pharmacy regarding pts tobramycin prescription. Writer stated that pt would like it to come premixed in the saline. Pharmacist confirmed that this is how it will be sent, pharmacist stated that they will be reaching out to the patient shortly regarding the medication. Writer questioned if pharmacy will also send a rinse bottle. Pharmacist stated they do not stock those and recommend the patient purchase neilmed rinse bottles at any pharmacy or use the syringes that are provided with the medication.    Liza Montes RN

## 2024-06-16 LAB — BACTERIA SPEC CULT: NORMAL

## 2024-06-19 ENCOUNTER — TELEPHONE (OUTPATIENT)
Dept: OTOLARYNGOLOGY | Facility: CLINIC | Age: 54
End: 2024-06-19
Payer: COMMERCIAL

## 2024-06-19 NOTE — TELEPHONE ENCOUNTER
Called patient to schedule surgery with Dr. Peña. Patient states that she was supposed to hear back from Dr. Peña's RN. Patient wants to ensure they are all on the same page prior to scheduling. Informed patient that writer will send a message to RN for call back to discuss prior to scheduling. Patient inquired how far out Dr. Peña is booking. Advised within 1 month, depending, 4-6 weeks on average.     Sending message to RN for follow up with patient prior to scheduling. Will wait to hear back from RN prior to surgery scheduling calling patient.      Danielle Figueredo on 6/19/2024 at 10:34 AM

## 2024-06-20 NOTE — TELEPHONE ENCOUNTER
Writer called pt to discuss concerns. Writer stated that there was some miscommunication on when Dr. Peña was going to call pt to discuss surgery. Writer stated that writer will talk to Dr. Peña on Monday and have him call pt to answer questions. Pt is concerned that Dr. Peña doesn't

## 2024-06-24 DIAGNOSIS — B49 FUNGUS INFECTION: ICD-10-CM

## 2024-06-25 ENCOUNTER — PHARMACY VISIT (OUTPATIENT)
Dept: ADMINISTRATIVE | Facility: CLINIC | Age: 54
End: 2024-06-25
Payer: COMMERCIAL

## 2024-06-25 ENCOUNTER — TELEPHONE (OUTPATIENT)
Dept: OTOLARYNGOLOGY | Facility: CLINIC | Age: 54
End: 2024-06-25
Payer: COMMERCIAL

## 2024-06-25 DIAGNOSIS — M26.609 TMJ (TEMPOROMANDIBULAR JOINT SYNDROME): Primary | ICD-10-CM

## 2024-06-25 RX ORDER — FLUCONAZOLE 100 MG/1
TABLET ORAL
Qty: 6 TABLET | Refills: 0 | Status: SHIPPED | OUTPATIENT
Start: 2024-06-25

## 2024-06-25 NOTE — TELEPHONE ENCOUNTER
Writer called and spoke to pt. Writer stated that an order for an MRI face has been sent to [facility]. Writer lso stated that a referral for [facility] has been sent and pt should be hearing from them to schedule. Pt expressed understanding.     Liza Montes RN

## 2024-06-25 NOTE — TELEPHONE ENCOUNTER
Scheduled surgery with Dr. Inocencio Peña on 8/6/2024    Spoke with: Annie (patient)    Surgery is located at [facility] [facility]    Patient will be seen for their H&P by their PCP Dr. Ward within 30 days of surgery - Confirmed PCP on file is up to date     Does patient need a consult before upcoming surgery? No    Anesthesia type: General    Requested Imaging required for surgery: No    Patient is scheduled for their 1 week post op on 8/15/2024 at 2:45PM with Dr. Inocencio Peña & 4 week post op on 9/4/2024 at 2:45 PM    Patient will receive their surgery packet via [facility] per their preference    Patient was not provided a start time for surgery & is aware they will receive this information 2-3 days before surgery      Additional comments:     - Patient is stating that she does not have a  for after surgery and requested to stay overnight.     Patient was instructed to call back with any further questions or concerns.     Maki Reed on 6/25/2024 at 3:05 PM

## 2024-06-25 NOTE — PROGRESS NOTES
"   Cystic Fibrosis Clinical Follow Up Assessment  Summary Notes    I spoke to Annie for biannual CF assessment.     Bethkis - held per patient (confirmed on chart review) due to lack of response in Pseudomonas eradication and nasal irritation. \"scab inside nose.\" Started on Compounded Tobramycin nasal irrigation 320 mg/L. Rinse each nasal cavity with 120 mL twice daily. Also using a Bactrim solution to help with potential MRSA.     Pulmozyme - She was using frequently once or twice daily when she was more symptomatic. She is back to using PRN and has several boxes on hand. She thinks this will last her about 3 months.     Question - She is traveling for a 5 day trip soon and would like to know if, instead of bringing the compounded solution (large volume; each fill is 6L), she could mix some of her extra Bethkis vials with normal saline (bought once arrived at destination) and use those as nasal rinse. Counseled this would require an excessive amount of nasal saline added to get appropriate concentration and would have to do be done for each dose since unable to determine sterility. She could fill 80 cap at compound and mix with 240 mL of NS She expressed understanding to not mix Bethkis neb vials with NS and use a nasal rinse. She will continue with current therapy as is. [employer]     Goals - get rid of infection and be able to enjoy travel No allergy changes. Medication changes noted above and ERx up to date.     TM clinicians will continue biannual assessments.       Care Details    What are the patient's goals for this therapy?   ? 6/25/24 - get rid of infection and be able to enjoy travel   - 6/1/23 - keeping up with her log of medications.      Did you identify any patient barriers to access and successful treatment?   ? No barriers to access identified      Document PDC   ? 0.57      Has the patient missed doses inappropriately?   ? No      Please select CURRENT side effect(s) for monitoring:   ? None     "   Court Couch, PharmD  Therapy Management Pharmacist  [facility]  [email or web address]  Specialty: [phone]

## 2024-07-01 ENCOUNTER — TELEPHONE (OUTPATIENT)
Dept: OTOLARYNGOLOGY | Facility: CLINIC | Age: 54
End: 2024-07-01
Payer: COMMERCIAL

## 2024-07-01 NOTE — TELEPHONE ENCOUNTER
Writer called and informed pt that Dr. Peña will not need to see pt back for a culture before surgery, that Dr. Peña will culture the sinuses which in surgery. Pt expressed understanding.    Liza Montes RN

## 2024-07-01 NOTE — TELEPHONE ENCOUNTER
[facility] Call Center    Phone Message    May a detailed message be left on voicemail: yes     Reason for Call: Other: Pt would like a call back to discuss options to see Dr Peña before surgery since she has been on the antibiotics for so long and is wondering if that has helped.  [facility] location , Thanks     Action Taken: Other: ENT    Travel Screening: Not Applicable     Date of Service:

## 2024-07-01 NOTE — TELEPHONE ENCOUNTER
"Writer returned call to pt to discuss call center message. Pt stated that she will be coming off her ABX in 10 days and that is when the bacteria will start to grow back in her sinuses. Pt is wondering if Dr. Cruz will want to see her in 10 days to take a culture before surgery. Writer stated that a message will be sent to Dr. Peña about this request. Pt stated that she has been doing the tobramycin rinses twice a day and they have been helping her sinuses, pt stated that \"I am starting to feel better although I am getting more stuffy any have before a mouth breather\". Writer encourage pt to continue to do the rinses twice a day. Pt expressed understanding.      Liza Montes RN    "

## 2024-07-11 ENCOUNTER — MYC MEDICAL ADVICE (OUTPATIENT)
Dept: OTOLARYNGOLOGY | Facility: CLINIC | Age: 54
End: 2024-07-11
Payer: COMMERCIAL

## 2024-07-22 ENCOUNTER — OFFICE VISIT (OUTPATIENT)
Dept: AUDIOLOGY | Facility: CLINIC | Age: 54
End: 2024-07-22
Attending: INTERNAL MEDICINE
Payer: COMMERCIAL

## 2024-07-22 DIAGNOSIS — A49.8 PSEUDOMONAS AERUGINOSA INFECTION: ICD-10-CM

## 2024-07-22 DIAGNOSIS — Z79.899 ENCOUNTER FOR MONITORING OTOTOXIC DRUG THERAPY: ICD-10-CM

## 2024-07-22 DIAGNOSIS — E84.9 CYSTIC FIBROSIS (H): ICD-10-CM

## 2024-07-22 DIAGNOSIS — Z51.81 ENCOUNTER FOR MONITORING OTOTOXIC DRUG THERAPY: ICD-10-CM

## 2024-07-22 DIAGNOSIS — H92.01 RIGHT EAR PAIN: Primary | ICD-10-CM

## 2024-07-22 PROCEDURE — 92550 TYMPANOMETRY & REFLEX THRESH: CPT | Performed by: AUDIOLOGIST

## 2024-07-22 PROCEDURE — 92557 COMPREHENSIVE HEARING TEST: CPT | Performed by: AUDIOLOGIST

## 2024-07-22 PROCEDURE — 92588 EVOKED AUDITORY TST COMPLETE: CPT | Performed by: AUDIOLOGIST

## 2024-07-22 NOTE — PROGRESS NOTES
AUDIOLOGY REPORT    SUBJECTIVE:  Annie Garcia is a 54 year old female who was seen in the Audiology Clinic at the [facility] for audiologic evaluation, referred by Bryce Fowler M.D.  The patient's history is significant for cystic fibrosis- she is here for baseline test for tobramycin treatment which started 2 months ago. The patient reports no concerns with hearing. Annie reports right sided jaw pain which can radiate to the ear. She denies significant tinnitus. The patient denies drainage, dizziness, and history of noise exposure or ear surgeries.     OBJECTIVE:  Abuse Screening:  Do you feel unsafe at home or work/school? No  Do you feel threatened by someone? No  Does anyone try to keep you from having contact with others, or doing things outside of your home? No  Physical signs of abuse present? No    Otoscopic exam indicated ears are clear of cerumen bilaterally     Pure Tone Thresholds assessed using conventional audiometry with good, reliability from 250-8000 Hz bilaterally using insert earphones and circumaural headphones     RIGHT: normal hearing sensitivity     LEFT:   normal hearing sensitivity    High frequency audiometry from 9,000-16,000 Hz was performed and is within aged norms bilaterally.    Distortion product otoacoustic emissions were performed from 1.5-10 kHz and were present from 1.5-4 kHz in the right ear and 1.5-2.5 kHz in the left ear.    Tympanogram:    RIGHT: normal eardrum mobility    LEFT:  normal eardrum mobility    Reflexes (reported by stimulus ear):  RIGHT: Ipsilateral: present at elevated levels  RIGHT: Contralateral: present at normal levels  LEFT:   Ipsilateral: present at normal levels  LEFT:   Contralateral: absent at frequencies tested      Speech Reception Threshold:    RIGHT: 15 dB HL    LEFT:   10 dB HL  Word Recognition Score:     RIGHT: 100% at 55 dB HL using NU-6 recorded word list.    LEFT:   100% at 50 dB HL  using NU-6 recorded word list.      ASSESSMENT:  Normal hearing bilaterally. Extended high frequency thresholds are within aged norms bilaterally. No grade assigned on the CTCAE4.03 Scale as today's testing will serve as a baseline evaluation. Today s results were discussed with the patient in detail.     PLAN:  It is recommended that the patient return for testing based on physician protocol and recommendation, sooner if hearing changes are noted or new ear symptoms arise. Please call this clinic with questions regarding these results or recommendations.      Lionel Burnham B.S.  Audiology Doctoral Student  [place] #[id number]    I was present with the patient for the entire Audiology appointment including all procedures/testing performed by the AuD student, and agree with the student s assessment and plan as documented.    Sidney Linton.  Licensed Audiologist  [place] # [id number]

## 2024-07-26 ENCOUNTER — PRE VISIT (OUTPATIENT)
Dept: OTOLARYNGOLOGY | Facility: CLINIC | Age: 54
End: 2024-07-26

## 2024-07-29 ENCOUNTER — OFFICE VISIT (OUTPATIENT)
Dept: PULMONOLOGY | Facility: CLINIC | Age: 54
End: 2024-07-29
Attending: INTERNAL MEDICINE
Payer: COMMERCIAL

## 2024-07-29 ENCOUNTER — OFFICE VISIT (OUTPATIENT)
Dept: PULMONOLOGY | Facility: CLINIC | Age: 54
End: 2024-07-29
Payer: COMMERCIAL

## 2024-07-29 VITALS
SYSTOLIC BLOOD PRESSURE: 129 MMHG | BODY MASS INDEX: 39.9 KG/M2 | HEART RATE: 87 BPM | DIASTOLIC BLOOD PRESSURE: 76 MMHG | WEIGHT: 248.24 LBS | HEIGHT: 66 IN | OXYGEN SATURATION: 96 %

## 2024-07-29 DIAGNOSIS — E84.9 CYSTIC FIBROSIS (H): Primary | ICD-10-CM

## 2024-07-29 DIAGNOSIS — E84.9 CYSTIC FIBROSIS (H): ICD-10-CM

## 2024-07-29 LAB
EXPTIME-PRE: 5.47 SEC
FEF2575-%PRED-PRE: 132 %
FEF2575-PRE: 3.28 L/SEC
FEF2575-PRED: 2.48 L/SEC
FEFMAX-%PRED-PRE: 128 %
FEFMAX-PRE: 8.8 L/SEC
FEFMAX-PRED: 6.87 L/SEC
FEV1-%PRED-PRE: 104 %
FEV1-PRE: 2.74 L
FEV1FEV6-PRE: 85 %
FEV1FEV6-PRED: 81 %
FEV1FVC-PRE: 84 %
FEV1FVC-PRED: 80 %
FIFMAX-PRE: 6.41 L/SEC
FVC-%PRED-PRE: 98 %
FVC-PRE: 3.25 L
FVC-PRED: 3.28 L

## 2024-07-29 PROCEDURE — 99207 PR INHALATION/NEBULIZER TREATMENT: CPT

## 2024-07-29 PROCEDURE — 94375 RESPIRATORY FLOW VOLUME LOOP: CPT | Performed by: INTERNAL MEDICINE

## 2024-07-29 PROCEDURE — G0463 HOSPITAL OUTPT CLINIC VISIT: HCPCS | Performed by: INTERNAL MEDICINE

## 2024-07-29 PROCEDURE — 99215 OFFICE O/P EST HI 40 MIN: CPT | Mod: 25 | Performed by: INTERNAL MEDICINE

## 2024-07-29 PROCEDURE — 87070 CULTURE OTHR SPECIMN AEROBIC: CPT | Performed by: INTERNAL MEDICINE

## 2024-07-29 RX ORDER — ARFORMOTEROL TARTRATE 15 UG/2ML
15 SOLUTION RESPIRATORY (INHALATION) 2 TIMES DAILY
Qty: 120 ML | Refills: 3 | Status: SHIPPED | OUTPATIENT
Start: 2024-07-29

## 2024-07-29 ASSESSMENT — PAIN SCALES - GENERAL: PAINLEVEL: NO PAIN (0)

## 2024-07-29 NOTE — NURSING NOTE
Chief Complaint   Patient presents with    RECHECK     Return Cystic Fibrosis     Medications reviewed and vital signs taken.   Sj Davenport, CMA

## 2024-07-29 NOTE — PROGRESS NOTES
"Pulmonary Staff Initial Consult    CC: The patient is here for follow-up of cystic fibrosis.    Interval history: At the patient's last clinic visit in early June, she was having an exacerbation of her sinus and lower respiratory disease.  She is having a lot of cough with a drop-off in pulmonary function tests.  She was completing a course of Cipro and prednisone started previously.  At that visit, added Bactrim to cover MRSA growing in her sinus culture.  Also stopped ISIDRO nebs as there was concern this was irritating her airways.  Today, the patient reports feeling much better.  Of note, she felt her nebs were causing a nonproductive, irritating cough and so she stopped DuoNeb, budesonide, and Pulmozyme 2 weeks ago.  She subsequently stopped doing vest therapy 1 week ago.  Instead, she will stand or lean against a platform that she has at home that shakes.  She feels this creates sufficient jostling of her chest that she will occasionally produce sputum when using it.  She also describes how it is improving lymphatic drainage.  This is based on sensation she feels in her legs.  Her sinus symptoms have improved with her course of Bactrim.    Past medical history  1.  Cystic fibrosis mutation analysis: Genotype delta F508/D110H, sweat chloride c/w CF  2.  Cystic fibrosis related sinus disease.  Outside records indicate history of associated mycetoma.  3.  Cystic fibrosis lung disease: Chest CT March, 2023 makes reference to right upper lobe bronchiectasis with smaller degree of bronchiectasis in the lingula and left lower lobe.  Patient reports lower espiratory cultures historically normal.  4.  Depression (details unavailable)    Past surgical history  1.  Status post right hip arthroplasty, 2014  2.  Removal of \"rope\" under right breast  3.  Status post sinus surgery approximately 8 years ago x 2    Medications:  Current Outpatient Medications   Medication Sig Dispense Refill    arformoterol (BROVANA) 15 MCG/2ML " NEBU neb solution Take 2 mLs (15 mcg) by nebulization 2 times daily 120 mL 3    zolpidem (AMBIEN) 10 MG tablet Take 1 tablet (10 mg) by mouth nightly as needed for sleep 30 tablet 1    acetaminophen-codeine (TYLENOL #3) 300-30 MG tablet PO TID prn (Patient not taking: Reported on 7/29/2024) 90 tablet 0    acetylcysteine (MUCOMYST) 20 % nebulizer solution Take by nebulization every 8 hours 5-10 ml inhalation every 8 hours (Patient not taking: Reported on 7/29/2024)      BETHKIS 300 MG/4ML nebulizer solution Take 4 mLs (300 mg) by nebulization 2 times daily 28 days on, 28 days off (Patient not taking: Reported on 7/29/2024) 224 mL 1    budesonide (PULMICORT) 0.5 MG/2ML neb solution Squirt entire vial into previously made amanda med saline bottle, mix, and irrigate each nostril until entire bottle empty.  Do this twice daily. (Patient not taking: Reported on 7/29/2024) 120 mL 1    cetirizine (ZYRTEC) 10 MG tablet Take 10 mg by mouth daily (Patient not taking: Reported on 7/29/2024)      COMPOUNDED NON-CONTROLLED SUBSTANCE (CMPD RX) - PHARMACY TO MIX COMPOUNDED MEDICATION Irrigate each nasal cavity with 120mL of tobramycin solution twice a day (Patient not taking: Reported on 7/29/2024) 33264 mL 1    COMPOUNDED NON-CONTROLLED SUBSTANCE (CMPD RX) - PHARMACY TO MIX COMPOUNDED MEDICATION Open Tobramycin capsule and empty contents into 240 ml of nasal saline mixture. Rinse each nasal cavity with 120 ml of mixture twice daily. (Patient not taking: Reported on 7/29/2024) 90 capsule 0    cyclobenzaprine (FLEXERIL) 10 MG tablet TAKE 1 TABLET BY MOUTH 3 TIMES DAILY AS NEEDED FOR MUSCLE SPASMS (Patient not taking: Reported on 7/29/2024) 30 tablet 0    desonide (DESOWEN) 0.05 % external ointment APPLY 5 TIMES A DAY TO ACTIVE RASH ON LIPS AND FACE UNTIL RESOLVED (Patient not taking: Reported on 7/29/2024) 60 g 1    dexlansoprazole (DEXILANT) 30 MG CPDR CR capsule Take 1 capsule (30 mg) by mouth daily (Patient not taking: Reported on  7/29/2024) 30 capsule 1    dornase stefan (PULMOZYME) 2.5 MG/2.5ML neb solution USE 1 VIAL IN NEBULIZER DAILY AS NEEDED (Patient not taking: Reported on 7/29/2024) 75 mL 3    fluconazole (DIFLUCAN) 100 MG tablet TAKE 1 TABLET BY MOUTH ONCE DAILY FOR 3 DAYS MAY  REPEAT  A  SECOND  COURSE  IF  NEEDED. (Patient not taking: Reported on 7/29/2024) 6 tablet 0    ipratropium - albuterol 0.5 mg/2.5 mg/3 mL (DUONEB) 0.5-2.5 (3) MG/3ML neb solution Inhale 3 mLs into the lungs (Patient not taking: Reported on 7/29/2024)      levalbuterol (XOPENEX) 0.31 MG/3ML nebulizer solution Take 3 mLs (0.31 mg) by nebulization every 6 hours as needed for shortness of breath / dyspnea (Patient not taking: Reported on 7/29/2024) 225 mL 0    mometasone (NASONEX) 50 MCG/ACT nasal spray Spray 2 sprays into both nostrils daily (Patient not taking: Reported on 7/29/2024) 17 g 5    mupirocin 2 % OINT, sodium chloride 0.9% (bottle) 0.9 % SOLN external ointment IRRIGATE BILATERAL NARES WITH 240MLS 2 TIMES DAILY FOR 4 WEEKS (Patient not taking: Reported on 7/29/2024) 7000 g 1     No current facility-administered medications for this visit.   N acetylcysteine po bid     Allergies: Reports history of seasonal allergies.  Outside records report Bactrim allergy but patient has subsequently tolerated Bactrim per her report.  Duration of quinolones kept to a minimum out of concern levofloxacin may have caused tendon injury.    Family history: Patient has a sister with cystic fibrosis.  Possible their father had manifestations of CF.    Social history: Lifelong non-smoker.  Previously lived in [place], [place] and was initially followed in a CF center there.  She subsequently moved to [place] and lived there for several years.  She moved to [facility] a few years ago.  She was an athlete growing up.  She previously has worked as a .    Review of systems: Constitutional: Currently no fever or chills.  Fatigue improved.  Reports unwanted  "weight gain due to being on antibiotics recently.    Physical examination  /76   Pulse 87   Ht 1.676 m (5' 6\")   Wt 112.6 kg (248 lb 3.8 oz)   SpO2 96%   BMI 40.07 kg/m  ,   General: Alert appropriate pleasant in no apparent distress speaking in full sentences.  HEENT: Sclera anicteric.  No overt nasal discharge.  Oropharynx is moist without lesion or exudate.  Chest: Clear to auscultation bilaterally with good air movement and normal chest wall excursion.  Cardiovascular: S1-S2 with a regular rate and rhythm.  Distant heart sounds.  No lower extremity edema.  Abdomen: Soft, nondistended, nontender, bowel sounds present  Musculoskeletal: No digital clubbing.    Previously reviewed outside studies:  1.  Outside chest CT scan March, 2023: Right upper lobe bronchiectasis with lesser degree of bronchiectasis involving the lingula and left lower lobe.  2.  Most recent sinus cultures in January and March, 2024 with Citrobacter freundi and Pseudomonas (most recently resistant to quinolone)  3.  Most recent sinus CT scan April, 2024: Postoperative changes involving her maxillary sinuses.  Moderate to severe mucosal thickening involving the frontal, maxillary, sphenoid sinuses.  4.  Limited previous CBCs have not had peripheral eosinophilia.    New studies:  PFTs from today personally reviewed.  Valid maneuver.  FEV1 2.74 L (104% predicted), FVC 3.25 L (98% predicted).  Spirometry is within normal limits.  Values have improved significantly from June and she is back to her initial values at our center in May, 2024.  In October, 2023 her FEV1 was 2.86 L    Respiratory throat culture from last visit grew normal hoang and grew Pseudomonas May, 2024.  Sinus culture from May, 2024 grew 2 strains of Pseudomonas, MRSA x 1, stenotrophomonas x 1    Impression and plan  1.  Cystic fibrosis lung disease: Bronchiectasis noted on previous outside chest CT.  Currently recovered from exacerbation present last visit.  PFTs back " to her recent baseline.  Unfortunately, she is not tolerating nebulized medications for unclear reasons.  She is also stopped vest therapy.  Will see if she tolerates resumption of LABA neb Brovana.  Reordered today.  If tolerating this, favor seeing if she can again tolerate budesonide nebs.  Reports poor tolerance of vest therapy.  She is using a platform that induces shaking in her chest at home.  Difficult to predict long-term efficacy of this.  Induced sputum culture obtained by PFT lab today.  Would not treat a positive culture unless there were signs or symptoms of uncontrolled infection, which currently are not present.  I do not think additional attempts at Pseudomonas eradication at this time will be helpful.    2.  Chronic cystic fibrosis related sinus disease: Symptoms better after a course of Bactrim for MRSA in her sinus culture.  She reports tolerating sinus irrigation with ISIDRO.  She is scheduled for functional endoscopic sinus surgery in early August.    3.  Preoperative pulmonary evaluation: Currently doing well as outlined above and clear to proceed with sinus surgery from a respiratory standpoint.  However, patient advised to let her providers know if she develops increased respiratory symptoms between now and her surgery.    4.  CFTR modulator candidacy: Based on the patient's mutations, she is a candidate for modulator therapy.  However, she would rather not pursue that at this juncture.  She is under the impression modulator therapy would not help her sinus disease due to absence of cilia in her sinuses due to prior surgery.  I shared my experience that many patients with cystic fibrosis sinus disease still have found benefit from modulator therapy despite prior sinus surgeries.  She would like to return to clinic in a couple months and discuss in more detail with our CF pharmacist about options for starting at reduced dose and expected burden of side effects.  Could also potentially meet with  our CF nutritionist to discuss healthy means of taking the medication with fat to allow absorption.     If patient ultimately decides she would rather not pursue modulation therapy, I think it would be reasonable for her to resume her care entirely through her pulmonologist, Dr. Barkley.    Will plan on patient following up in 2 months    Total visit time today 40 minutes.  This is exclusive of time interpreting pulmonary function test.    Bryce Fowler MD

## 2024-07-29 NOTE — PROGRESS NOTES
Annie Garcia comes into clinic  today at the request of Dr. Bryce Fowler for a sputum induction.    Patient was first given 2.5 mg / 3 ml of albuterol (NDC# [id number], LOT# [id number], EXP 11/30/2025) nebulizer, after which 5mL 10% hypertonic saline was administered via nebulizer.      Pre-treatment: SpO2= 98% HR= 81 bpm BBS= clear  Post-treatment: SpO2= 98%  HR= 75 bpm BBS= clear    Patient was not successful in producing a sample.    No adverse side effects noted by the patient.    This service provided today was under the supervision of Dr. Bryce Fowler, who was available if needed.      Pb Lind, RRT

## 2024-07-29 NOTE — PATIENT INSTRUCTIONS
Induced sputum today  Resume brovana nebs twice a day.  If tolerating brovana, can add budesonide (Pulmicort) nebs twice a day  Come back to CF clinic in September

## 2024-07-29 NOTE — LETTER
7/29/2024      Annie Garcia  [address]  [facility] [address]      Dear Colleague,    Thank you for referring your patient, Annie Garcia, to the [facility]. Please see a copy of my visit note below.    Patient sent to pulmonary function lab for sputum induction.    Pulmonary Staff Initial Consult    CC: The patient is here for follow-up of cystic fibrosis.    Interval history: At the patient's last clinic visit in early June, she was having an exacerbation of her sinus and lower respiratory disease.  She is having a lot of cough with a drop-off in pulmonary function tests.  She was completing a course of Cipro and prednisone started previously.  At that visit, added Bactrim to cover MRSA growing in her sinus culture.  Also stopped ISIDRO nebs as there was concern this was irritating her airways.  Today, the patient reports feeling much better.  Of note, she felt her nebs were causing a nonproductive, irritating cough and so she stopped DuoNeb, budesonide, and Pulmozyme 2 weeks ago.  She subsequently stopped doing vest therapy 1 week ago.  Instead, she will stand or lean against a platform that she has at home that shakes.  She feels this creates sufficient jostling of her chest that she will occasionally produce sputum when using it.  She also describes how it is improving lymphatic drainage.  This is based on sensation she feels in her legs.  Her sinus symptoms have improved with her course of Bactrim.    Past medical history  1.  Cystic fibrosis mutation analysis: Genotype delta F508/D110H, sweat chloride c/w CF  2.  Cystic fibrosis related sinus disease.  Outside records indicate history of associated mycetoma.  3.  Cystic fibrosis lung disease: Chest CT March, 2023 makes reference to right upper lobe bronchiectasis with smaller degree of bronchiectasis in the lingula and left lower lobe.  Patient reports lower espiratory  "cultures historically normal.  4.  Depression (details unavailable)    Past surgical history  1.  Status post right hip arthroplasty, 2014  2.  Removal of \"rope\" under right breast  3.  Status post sinus surgery approximately 8 years ago x 2    Medications:  Current Outpatient Medications   Medication Sig Dispense Refill     arformoterol (BROVANA) 15 MCG/2ML NEBU neb solution Take 2 mLs (15 mcg) by nebulization 2 times daily 120 mL 3     zolpidem (AMBIEN) 10 MG tablet Take 1 tablet (10 mg) by mouth nightly as needed for sleep 30 tablet 1     acetaminophen-codeine (TYLENOL #3) 300-30 MG tablet PO TID prn (Patient not taking: Reported on 7/29/2024) 90 tablet 0     acetylcysteine (MUCOMYST) 20 % nebulizer solution Take by nebulization every 8 hours 5-10 ml inhalation every 8 hours (Patient not taking: Reported on 7/29/2024)       BETHKIS 300 MG/4ML nebulizer solution Take 4 mLs (300 mg) by nebulization 2 times daily 28 days on, 28 days off (Patient not taking: Reported on 7/29/2024) 224 mL 1     budesonide (PULMICORT) 0.5 MG/2ML neb solution Squirt entire vial into previously made amanda med saline bottle, mix, and irrigate each nostril until entire bottle empty.  Do this twice daily. (Patient not taking: Reported on 7/29/2024) 120 mL 1     cetirizine (ZYRTEC) 10 MG tablet Take 10 mg by mouth daily (Patient not taking: Reported on 7/29/2024)       COMPOUNDED NON-CONTROLLED SUBSTANCE (CMPD RX) - PHARMACY TO MIX COMPOUNDED MEDICATION Irrigate each nasal cavity with 120mL of tobramycin solution twice a day (Patient not taking: Reported on 7/29/2024) 98448 mL 1     COMPOUNDED NON-CONTROLLED SUBSTANCE (CMPD RX) - PHARMACY TO MIX COMPOUNDED MEDICATION Open Tobramycin capsule and empty contents into 240 ml of nasal saline mixture. Rinse each nasal cavity with 120 ml of mixture twice daily. (Patient not taking: Reported on 7/29/2024) 90 capsule 0     cyclobenzaprine (FLEXERIL) 10 MG tablet TAKE 1 TABLET BY MOUTH 3 TIMES DAILY AS " NEEDED FOR MUSCLE SPASMS (Patient not taking: Reported on 7/29/2024) 30 tablet 0     desonide (DESOWEN) 0.05 % external ointment APPLY 5 TIMES A DAY TO ACTIVE RASH ON LIPS AND FACE UNTIL RESOLVED (Patient not taking: Reported on 7/29/2024) 60 g 1     dexlansoprazole (DEXILANT) 30 MG CPDR CR capsule Take 1 capsule (30 mg) by mouth daily (Patient not taking: Reported on 7/29/2024) 30 capsule 1     dornase stefan (PULMOZYME) 2.5 MG/2.5ML neb solution USE 1 VIAL IN NEBULIZER DAILY AS NEEDED (Patient not taking: Reported on 7/29/2024) 75 mL 3     fluconazole (DIFLUCAN) 100 MG tablet TAKE 1 TABLET BY MOUTH ONCE DAILY FOR 3 DAYS MAY  REPEAT  A  SECOND  COURSE  IF  NEEDED. (Patient not taking: Reported on 7/29/2024) 6 tablet 0     ipratropium - albuterol 0.5 mg/2.5 mg/3 mL (DUONEB) 0.5-2.5 (3) MG/3ML neb solution Inhale 3 mLs into the lungs (Patient not taking: Reported on 7/29/2024)       levalbuterol (XOPENEX) 0.31 MG/3ML nebulizer solution Take 3 mLs (0.31 mg) by nebulization every 6 hours as needed for shortness of breath / dyspnea (Patient not taking: Reported on 7/29/2024) 225 mL 0     mometasone (NASONEX) 50 MCG/ACT nasal spray Spray 2 sprays into both nostrils daily (Patient not taking: Reported on 7/29/2024) 17 g 5     mupirocin 2 % OINT, sodium chloride 0.9% (bottle) 0.9 % SOLN external ointment IRRIGATE BILATERAL NARES WITH 240MLS 2 TIMES DAILY FOR 4 WEEKS (Patient not taking: Reported on 7/29/2024) 7000 g 1     No current facility-administered medications for this visit.   N acetylcysteine po bid     Allergies: Reports history of seasonal allergies.  Outside records report Bactrim allergy but patient has subsequently tolerated Bactrim per her report.  Duration of quinolones kept to a minimum out of concern levofloxacin may have caused tendon injury.    Family history: Patient has a sister with cystic fibrosis.  Possible their father had manifestations of CF.    Social history: Lifelong non-smoker.  Previously  "lived in [place], [place] and was initially followed in a CF center there.  She subsequently moved to [place] and lived there for several years.  She moved to [facility] a few years ago.  She was an athlete growing up.  She previously has worked as a .    Review of systems: Constitutional: Currently no fever or chills.  Fatigue improved.  Reports unwanted weight gain due to being on antibiotics recently.    Physical examination  /76   Pulse 87   Ht 1.676 m (5' 6\")   Wt 112.6 kg (248 lb 3.8 oz)   SpO2 96%   BMI 40.07 kg/m  ,   General: Alert appropriate pleasant in no apparent distress speaking in full sentences.  HEENT: Sclera anicteric.  No overt nasal discharge.  Oropharynx is moist without lesion or exudate.  Chest: Clear to auscultation bilaterally with good air movement and normal chest wall excursion.  Cardiovascular: S1-S2 with a regular rate and rhythm.  Distant heart sounds.  No lower extremity edema.  Abdomen: Soft, nondistended, nontender, bowel sounds present  Musculoskeletal: No digital clubbing.    Previously reviewed outside studies:  1.  Outside chest CT scan March, 2023: Right upper lobe bronchiectasis with lesser degree of bronchiectasis involving the lingula and left lower lobe.  2.  Most recent sinus cultures in January and March, 2024 with Citrobacter freundi and Pseudomonas (most recently resistant to quinolone)  3.  Most recent sinus CT scan April, 2024: Postoperative changes involving her maxillary sinuses.  Moderate to severe mucosal thickening involving the frontal, maxillary, sphenoid sinuses.  4.  Limited previous CBCs have not had peripheral eosinophilia.    New studies:  PFTs from today personally reviewed.  Valid maneuver.  FEV1 2.74 L (104% predicted), FVC 3.25 L (98% predicted).  Spirometry is within normal limits.  Values have improved significantly from June and she is back to her initial values at our center in May, 2024.  In October, 2023 her FEV1 " was 2.86 L    Respiratory throat culture from last visit grew normal hoang and grew Pseudomonas May, 2024.  Sinus culture from May, 2024 grew 2 strains of Pseudomonas, MRSA x 1, stenotrophomonas x 1    Impression and plan  1.  Cystic fibrosis lung disease: Bronchiectasis noted on previous outside chest CT.  Currently recovered from exacerbation present last visit.  PFTs back to her recent baseline.  Unfortunately, she is not tolerating nebulized medications for unclear reasons.  She is also stopped vest therapy.  Will see if she tolerates resumption of LABA neb Brovana.  Reordered today.  If tolerating this, favor seeing if she can again tolerate budesonide nebs.  Reports poor tolerance of vest therapy.  She is using a platform that induces shaking in her chest at home.  Difficult to predict long-term efficacy of this.  Induced sputum culture obtained by PFT lab today.  Would not treat a positive culture unless there were signs or symptoms of uncontrolled infection, which currently are not present.  I do not think additional attempts at Pseudomonas eradication at this time will be helpful.    2.  Chronic cystic fibrosis related sinus disease: Symptoms better after a course of Bactrim for MRSA in her sinus culture.  She reports tolerating sinus irrigation with ISIDRO.  She is scheduled for functional endoscopic sinus surgery in early August.    3.  Preoperative pulmonary evaluation: Currently doing well as outlined above and clear to proceed with sinus surgery from a respiratory standpoint.  However, patient advised to let her providers know if she develops increased respiratory symptoms between now and her surgery.    4.  CFTR modulator candidacy: Based on the patient's mutations, she is a candidate for modulator therapy.  However, she would rather not pursue that at this juncture.  She is under the impression modulator therapy would not help her sinus disease due to absence of cilia in her sinuses due to prior  surgery.  I shared my experience that many patients with cystic fibrosis sinus disease still have found benefit from modulator therapy despite prior sinus surgeries.  She would like to return to clinic in a couple months and discuss in more detail with our CF pharmacist about options for starting at reduced dose and expected burden of side effects.  Could also potentially meet with our CF nutritionist to discuss healthy means of taking the medication with fat to allow absorption.     If patient ultimately decides she would rather not pursue modulation therapy, I think it would be reasonable for her to resume her care entirely through her pulmonologist, Dr. Barkley.    Will plan on patient following up in 2 months    Total visit time today 40 minutes.  This is exclusive of time interpreting pulmonary function test.    Bryce Fowler MD          Again, thank you for allowing me to participate in the care of your patient.        Sincerely,        Bryce Fowler MD

## 2024-07-30 ENCOUNTER — TELEPHONE (OUTPATIENT)
Dept: OTOLARYNGOLOGY | Facility: CLINIC | Age: 54
End: 2024-07-30
Payer: COMMERCIAL

## 2024-07-30 NOTE — TELEPHONE ENCOUNTER
[facility] Call Center    Phone Message    May a detailed message be left on voicemail: yes     Reason for Call: Other: Pt calling in regards to upcoming procedure. Would like a call to discuss. Also would like to know if she is to continue rinses? Please call to advise. Thanks.     Action Taken: Other: ENT    Travel Screening: Not Applicable     Date of Service:

## 2024-07-30 NOTE — TELEPHONE ENCOUNTER
Writer returned call to pt to discuss call center message. Pt stated that she is wondering if she will be continuing tobramycin sinus rinses after surgery or if Dr. Peña is going to put in a prescription for Bactrim rinses. Writer stated that Dr. Peña will decide which rinse he would like pt to do after the surgery is completed. Pt expressed understanding. Pt stated that writer will [facility] still want to send her more of the tobramycin rinse even though she has enough to get her to surgery. Writer stated that writer will call. Pt stated that she does not have a ride home from surgery and was told that Dr. Peña told her that he will have her admitted after surgery. Writer stated that a message will be sent to Dr. Peña to clarify if this is true.       Liza Montes RN

## 2024-08-02 ENCOUNTER — TELEPHONE (OUTPATIENT)
Dept: OTOLARYNGOLOGY | Facility: CLINIC | Age: 54
End: 2024-08-02
Payer: COMMERCIAL

## 2024-08-02 NOTE — TELEPHONE ENCOUNTER
Writer LVM requesting pt call writer back to discuss upcoming surgery and caretaker coordination. Writer left direct number for pt to call.      Liza Montes RN

## 2024-08-03 LAB — BACTERIA SPEC CULT: NORMAL

## 2024-08-05 ENCOUNTER — ANESTHESIA EVENT (OUTPATIENT)
Dept: SURGERY | Facility: CLINIC | Age: 54
End: 2024-08-05
Payer: COMMERCIAL

## 2024-08-05 NOTE — ANESTHESIA PREPROCEDURE EVALUATION
Anesthesia Pre-Procedure Evaluation    Patient: Annie Garcia   MRN: [id number] : 1970        Procedure : Procedure(s):  Stealth assisted bilateral revision maxillary antrostomy, total ethmoidectomy, sphenoidotomy, frontal sinusotomy          Past Medical History:   Diagnosis Date     Cystic fibrosis (H)      Depression      Tear of right acetabular labrum       Past Surgical History:   Procedure Laterality Date     ANKLE SURGERY Right 2019    foot/ankle     AS HYSTEROSCOPY, SURGICAL; W/ ENDOMETRIAL ABLATION, ANY METHOD      [facility]. Thermal balloon.     BREAST SURGERY       ENDOSCOPIC SINUS SURGERY N/A 2018    Procedure: ENDOSCOPIC SINUS SURGERY;;  Surgeon: Francoise Gonzalez MD;  Location: [facility]     ENDOSCOPY UPPER, COLONOSCOPY, COMBINED N/A 2016    Procedure: COMBINED ENDOSCOPY UPPER, COLONOSCOPY;  Surgeon: Levi Hinkle MD;  Location: [facility]     ESOPHAGOSCOPY, GASTROSCOPY, DUODENOSCOPY (EGD), COMBINED  01/10/2014    Procedure: COMBINED ESOPHAGOSCOPY, GASTROSCOPY, DUODENOSCOPY (EGD);  UPPER ENDOSCOPY with Biopsy;  Surgeon: Levi Hinkle MD;  Location: [facility]     EXAM UNDER ANESTHESIA NOSE N/A 2018    Procedure: EXAM UNDER ANESTHESIA NOSE;  SINUS EXAM UNDER ANESTHESIA, Endoscopic Sinus Surgery;  Surgeon: Francoise Gonzalez MD;  Location: [facility]     ORTHOPEDIC SURGERY      left knee arthroscopy     ORTHOPEDIC SURGERY      right shoulder     ORTHOPEDIC SURGERY      left hip replacement     REMOVE HARDWARE FOOT Right 2020    Procedure: REMOVAL, HARDWARE RIGHT FOOT;  Surgeon: Bryan Cid DPM;  Location: [facility]     SEPTOPLASTY, ENDOSCOPIC SINUS SURGERY, COMBINED Left 2018    Procedure: COMBINED SEPTOPLASTY, ENDOSCOPIC SINUS SURGERY;  LEFT ENDOSCOPIC SINUS SURGERY, SEPTOPLASTY, BILATERAL TURBINATE REDUCTION, PROPEL IMPLANTS;  Surgeon: Francoise Gonzalez MD;  Location: [facility]     TURBINOPLASTY Bilateral 2018    Procedure:  TURBINOPLASTY;;  Surgeon: Francoise Gonzalez MD;  Location: [facility]      Allergies   Allergen Reactions     Seasonal Allergies Itching      Social History     Tobacco Use     Smoking status: Never     Smokeless tobacco: Never   Substance Use Topics     Alcohol use: Yes     Comment: rare      Wt Readings from Last 1 Encounters:   07/29/24 112.6 kg (248 lb 3.8 oz)        Anesthesia Evaluation   Pt has had prior anesthetic. Type: General.    No history of anesthetic complications       ROS/MED HX  ENT/Pulmonary: Comment: Normal PFT on 7/29/24    Chronic sinusitis     (+)                                 cystic fibrosis,       Neurologic:  - neg neurologic ROS     Cardiovascular:  - neg cardiovascular ROS   (+) Dyslipidemia - -   -  - -                                 Previous cardiac testing   Echo: Date: Results:    Stress Test:  Date: Results:    ECG Reviewed:  Date: 2020 Results:  NSR  Cath:  Date: Results:      METS/Exercise Tolerance: >4 METS    Hematologic:  - neg hematologic  ROS     Musculoskeletal:  - neg musculoskeletal ROS     GI/Hepatic:  - neg GI/hepatic ROS   (+) GERD,                   Renal/Genitourinary:  - neg Renal ROS     Endo:     (+)               Obesity,       Psychiatric/Substance Use: Comment: Insomnia - neg psychiatric ROS   (+) psychiatric history anxiety and other (comment)       Infectious Disease:  - neg infectious disease ROS     Malignancy:  - neg malignancy ROS     Other:            Physical Exam    Airway  airway exam normal      Mallampati: II   TM distance: > 3 FB   Neck ROM: full   Mouth opening: > 3 cm    Respiratory Devices and Support         Dental       (+) Minor Abnormalities - some fillings, tiny chips      Cardiovascular   cardiovascular exam normal       Rhythm and rate: regular and normal     Pulmonary   pulmonary exam normal        breath sounds clear to auscultation         OUTSIDE LABS:  CBC:   Lab Results   Component Value Date    WBC 9.1 04/16/2021    WBC 7.3  01/16/2020    HGB 15.0 04/16/2021    HGB 14.4 01/16/2020    HCT 45.4 04/16/2021    HCT 42.5 01/16/2020     (L) 04/16/2021     01/16/2020     BMP:   Lab Results   Component Value Date     04/16/2021     01/16/2020    POTASSIUM 4.2 04/16/2021    POTASSIUM 4.1 01/16/2020    CHLORIDE 105 04/16/2021    CHLORIDE 106 01/16/2020    CO2 27 04/16/2021    CO2 28 01/16/2020    BUN 13 04/16/2021    BUN 16 01/16/2020    CR 0.99 04/16/2021    CR 0.92 01/16/2020     (H) 04/16/2021     (H) 01/16/2020     COAGS:   Lab Results   Component Value Date    INR 1.9 (A) 06/02/2014     POC:   Lab Results   Component Value Date    HCG Negative 05/20/2019     HEPATIC:   Lab Results   Component Value Date    ALBUMIN 3.6 04/16/2021    PROTTOTAL 7.2 04/16/2021    ALT 26 04/16/2021    AST 11 04/16/2021    ALKPHOS 98 04/16/2021    BILITOTAL 0.2 04/16/2021     OTHER:   Lab Results   Component Value Date    PH 7.45 03/03/2017    LACT 1.3 03/03/2017    A1C 5.6 10/12/2015    NALDO 8.9 04/16/2021    MAG 2.3 08/07/2017    LIPASE 127 04/16/2021    AMYLASE 30 09/18/2013    TSH 2.07 05/14/2019    CRP <2.9 03/20/2017    SED 7 03/20/2017       Anesthesia Plan    ASA Status:  3    NPO Status:  NPO Appropriate    Anesthesia Type: General.     - Airway: ETT   Induction: Intravenous, Propofol.   Maintenance: Balanced.   Techniques and Equipment:     - Airway: Video-Laryngoscope     - Lines/Monitors: 2nd IV, BIS     Consents    Anesthesia Plan(s) and associated risks, benefits, and realistic alternatives discussed. Questions answered and patient/representative(s) expressed understanding.     - Discussed: Risks, Benefits and Alternatives for the PROCEDURE were discussed     - Discussed with:  Patient      - Extended Intubation/Ventilatory Support Discussed: No.      - Patient is DNR/DNI Status: No     Use of blood products discussed: No .     Postoperative Care    Pain management: IV analgesics, Oral pain medications,  "Multi-modal analgesia.   PONV prophylaxis: Ondansetron (or other 5HT-3), Dexamethasone or Solumedrol     Comments:               Wally Wells MD    I have reviewed the pertinent notes and labs in the chart from the past 30 days and (re)examined the patient.  Any updates or changes from those notes are reflected in this note.              # Severe Obesity: Estimated body mass index is 40.07 kg/m  as calculated from the following:    Height as of 7/29/24: 1.676 m (5' 6\").    Weight as of 7/29/24: 112.6 kg (248 lb 3.8 oz).      "

## 2024-08-05 NOTE — TELEPHONE ENCOUNTER
Writer called and spoke to pt regarding up coming surgery. Writer stated that writer was informed pt would be able to drive herself home the day after surgery. Writer stated that the pt will need to arrive to the [facility] at 6:30am on the day of surgery, pt expressed understanding.      Liza Montes RN

## 2024-08-06 ENCOUNTER — TELEPHONE (OUTPATIENT)
Dept: OTOLARYNGOLOGY | Facility: CLINIC | Age: 54
End: 2024-08-06

## 2024-08-06 ENCOUNTER — ANESTHESIA (OUTPATIENT)
Dept: SURGERY | Facility: CLINIC | Age: 54
End: 2024-08-06
Payer: COMMERCIAL

## 2024-08-06 ENCOUNTER — HOSPITAL ENCOUNTER (OUTPATIENT)
Facility: CLINIC | Age: 54
Setting detail: OBSERVATION
Discharge: HOME | End: 2024-08-07
Attending: STUDENT IN AN ORGANIZED HEALTH CARE EDUCATION/TRAINING PROGRAM | Admitting: STUDENT IN AN ORGANIZED HEALTH CARE EDUCATION/TRAINING PROGRAM
Payer: COMMERCIAL

## 2024-08-06 DIAGNOSIS — E84.9 CYSTIC FIBROSIS (H): ICD-10-CM

## 2024-08-06 DIAGNOSIS — B49 ALLERGIC FUNGAL SINUSITIS: Primary | ICD-10-CM

## 2024-08-06 DIAGNOSIS — Z16.24 INFECTION DUE TO MULTIDRUG-RESISTANT PSEUDOMONAS AERUGINOSA: ICD-10-CM

## 2024-08-06 DIAGNOSIS — G89.18 POST-OP PAIN: ICD-10-CM

## 2024-08-06 DIAGNOSIS — A49.8 INFECTION DUE TO MULTIDRUG-RESISTANT PSEUDOMONAS AERUGINOSA: ICD-10-CM

## 2024-08-06 DIAGNOSIS — J30.89 ALLERGIC FUNGAL SINUSITIS: Primary | ICD-10-CM

## 2024-08-06 DIAGNOSIS — J32.4 CHRONIC PANSINUSITIS: ICD-10-CM

## 2024-08-06 DIAGNOSIS — Z98.890 HISTORY OF ENDOSCOPIC SINUS SURGERY: ICD-10-CM

## 2024-08-06 PROCEDURE — 250N000025 HC SEVOFLURANE, PER MIN: Performed by: STUDENT IN AN ORGANIZED HEALTH CARE EDUCATION/TRAINING PROGRAM

## 2024-08-06 PROCEDURE — 710N000010 HC RECOVERY PHASE 1, LEVEL 2, PER MIN: Performed by: STUDENT IN AN ORGANIZED HEALTH CARE EDUCATION/TRAINING PROGRAM

## 2024-08-06 PROCEDURE — 87186 SC STD MICRODIL/AGAR DIL: CPT | Performed by: STUDENT IN AN ORGANIZED HEALTH CARE EDUCATION/TRAINING PROGRAM

## 2024-08-06 PROCEDURE — 250N000009 HC RX 250: Performed by: CHIROPRACTOR

## 2024-08-06 PROCEDURE — 31257 NSL/SINS NDSC TOT W/SPHENDT: CPT | Mod: 50 | Performed by: STUDENT IN AN ORGANIZED HEALTH CARE EDUCATION/TRAINING PROGRAM

## 2024-08-06 PROCEDURE — 88305 TISSUE EXAM BY PATHOLOGIST: CPT | Mod: TC | Performed by: STUDENT IN AN ORGANIZED HEALTH CARE EDUCATION/TRAINING PROGRAM

## 2024-08-06 PROCEDURE — 250N000013 HC RX MED GY IP 250 OP 250 PS 637

## 2024-08-06 PROCEDURE — 370N000017 HC ANESTHESIA TECHNICAL FEE, PER MIN: Performed by: STUDENT IN AN ORGANIZED HEALTH CARE EDUCATION/TRAINING PROGRAM

## 2024-08-06 PROCEDURE — G0378 HOSPITAL OBSERVATION PER HR: HCPCS

## 2024-08-06 PROCEDURE — 272N000001 HC OR GENERAL SUPPLY STERILE: Performed by: STUDENT IN AN ORGANIZED HEALTH CARE EDUCATION/TRAINING PROGRAM

## 2024-08-06 PROCEDURE — 87070 CULTURE OTHR SPECIMN AEROBIC: CPT | Performed by: STUDENT IN AN ORGANIZED HEALTH CARE EDUCATION/TRAINING PROGRAM

## 2024-08-06 PROCEDURE — 360N000079 HC SURGERY LEVEL 6, PER MIN: Performed by: STUDENT IN AN ORGANIZED HEALTH CARE EDUCATION/TRAINING PROGRAM

## 2024-08-06 PROCEDURE — 31256 EXPLORATION MAXILLARY SINUS: CPT | Mod: 50 | Performed by: STUDENT IN AN ORGANIZED HEALTH CARE EDUCATION/TRAINING PROGRAM

## 2024-08-06 PROCEDURE — 258N000003 HC RX IP 258 OP 636: Performed by: CHIROPRACTOR

## 2024-08-06 PROCEDURE — 250N000011 HC RX IP 250 OP 636: Performed by: CHIROPRACTOR

## 2024-08-06 PROCEDURE — 87077 CULTURE AEROBIC IDENTIFY: CPT | Performed by: STUDENT IN AN ORGANIZED HEALTH CARE EDUCATION/TRAINING PROGRAM

## 2024-08-06 PROCEDURE — 88305 TISSUE EXAM BY PATHOLOGIST: CPT | Mod: 26 | Performed by: PATHOLOGY

## 2024-08-06 PROCEDURE — C2625 STENT, NON-COR, TEM W/DEL SY: HCPCS | Performed by: STUDENT IN AN ORGANIZED HEALTH CARE EDUCATION/TRAINING PROGRAM

## 2024-08-06 PROCEDURE — 61782 SCAN PROC CRANIAL EXTRA: CPT | Mod: GC | Performed by: STUDENT IN AN ORGANIZED HEALTH CARE EDUCATION/TRAINING PROGRAM

## 2024-08-06 PROCEDURE — 31276 NSL/SINS NDSC FRNT TISS RMVL: CPT | Mod: 50 | Performed by: STUDENT IN AN ORGANIZED HEALTH CARE EDUCATION/TRAINING PROGRAM

## 2024-08-06 PROCEDURE — 31256 EXPLORATION MAXILLARY SINUS: CPT

## 2024-08-06 PROCEDURE — 250N000013 HC RX MED GY IP 250 OP 250 PS 637: Performed by: CHIROPRACTOR

## 2024-08-06 PROCEDURE — 999N000141 HC STATISTIC PRE-PROCEDURE NURSING ASSESSMENT: Performed by: STUDENT IN AN ORGANIZED HEALTH CARE EDUCATION/TRAINING PROGRAM

## 2024-08-06 PROCEDURE — 258N000003 HC RX IP 258 OP 636: Performed by: STUDENT IN AN ORGANIZED HEALTH CARE EDUCATION/TRAINING PROGRAM

## 2024-08-06 PROCEDURE — 250N000011 HC RX IP 250 OP 636: Performed by: STUDENT IN AN ORGANIZED HEALTH CARE EDUCATION/TRAINING PROGRAM

## 2024-08-06 DEVICE — IMP SINUS PROPEL MINI MOMETASONE FUORATE 370MCCG 16MM 60011: Type: IMPLANTABLE DEVICE | Site: SINUS | Status: FUNCTIONAL

## 2024-08-06 DEVICE — IMP SINUS PROPEL MOMETASONE FUORATE 370MCCG 50011: Type: IMPLANTABLE DEVICE | Site: SINUS | Status: FUNCTIONAL

## 2024-08-06 RX ORDER — HYDROMORPHONE HCL IN WATER/PF 6 MG/30 ML
0.2 PATIENT CONTROLLED ANALGESIA SYRINGE INTRAVENOUS EVERY 5 MIN PRN
Status: DISCONTINUED | OUTPATIENT
Start: 2024-08-06 | End: 2024-08-06 | Stop reason: HOSPADM

## 2024-08-06 RX ORDER — LIDOCAINE HYDROCHLORIDE 20 MG/ML
INJECTION, SOLUTION INFILTRATION; PERINEURAL PRN
Status: DISCONTINUED | OUTPATIENT
Start: 2024-08-06 | End: 2024-08-06

## 2024-08-06 RX ORDER — KETOROLAC TROMETHAMINE 30 MG/ML
INJECTION, SOLUTION INTRAMUSCULAR; INTRAVENOUS PRN
Status: DISCONTINUED | OUTPATIENT
Start: 2024-08-06 | End: 2024-08-06

## 2024-08-06 RX ORDER — ONDANSETRON 2 MG/ML
4 INJECTION INTRAMUSCULAR; INTRAVENOUS EVERY 6 HOURS PRN
Status: DISCONTINUED | OUTPATIENT
Start: 2024-08-06 | End: 2024-08-07 | Stop reason: HOSPADM

## 2024-08-06 RX ORDER — NALOXONE HYDROCHLORIDE 0.4 MG/ML
0.4 INJECTION, SOLUTION INTRAMUSCULAR; INTRAVENOUS; SUBCUTANEOUS
Status: DISCONTINUED | OUTPATIENT
Start: 2024-08-06 | End: 2024-08-07 | Stop reason: HOSPADM

## 2024-08-06 RX ORDER — NALOXONE HYDROCHLORIDE 0.4 MG/ML
0.2 INJECTION, SOLUTION INTRAMUSCULAR; INTRAVENOUS; SUBCUTANEOUS
Status: DISCONTINUED | OUTPATIENT
Start: 2024-08-06 | End: 2024-08-07 | Stop reason: HOSPADM

## 2024-08-06 RX ORDER — OXYCODONE HYDROCHLORIDE 10 MG/1
10 TABLET ORAL EVERY 4 HOURS PRN
Status: DISCONTINUED | OUTPATIENT
Start: 2024-08-06 | End: 2024-08-07 | Stop reason: HOSPADM

## 2024-08-06 RX ORDER — LEVALBUTEROL INHALATION SOLUTION 0.31 MG/3ML
1 SOLUTION RESPIRATORY (INHALATION) EVERY 6 HOURS PRN
Status: DISCONTINUED | OUTPATIENT
Start: 2024-08-06 | End: 2024-08-07 | Stop reason: HOSPADM

## 2024-08-06 RX ORDER — FENTANYL CITRATE 50 UG/ML
25 INJECTION, SOLUTION INTRAMUSCULAR; INTRAVENOUS EVERY 5 MIN PRN
Status: DISCONTINUED | OUTPATIENT
Start: 2024-08-06 | End: 2024-08-06 | Stop reason: HOSPADM

## 2024-08-06 RX ORDER — PROPOFOL 10 MG/ML
INJECTION, EMULSION INTRAVENOUS PRN
Status: DISCONTINUED | OUTPATIENT
Start: 2024-08-06 | End: 2024-08-06

## 2024-08-06 RX ORDER — LIDOCAINE 40 MG/G
CREAM TOPICAL
Status: DISCONTINUED | OUTPATIENT
Start: 2024-08-06 | End: 2024-08-06 | Stop reason: HOSPADM

## 2024-08-06 RX ORDER — DEXAMETHASONE SODIUM PHOSPHATE 4 MG/ML
4 INJECTION, SOLUTION INTRA-ARTICULAR; INTRALESIONAL; INTRAMUSCULAR; INTRAVENOUS; SOFT TISSUE
Status: DISCONTINUED | OUTPATIENT
Start: 2024-08-06 | End: 2024-08-06 | Stop reason: HOSPADM

## 2024-08-06 RX ORDER — OXYCODONE HYDROCHLORIDE 5 MG/1
5 TABLET ORAL EVERY 4 HOURS PRN
Status: DISCONTINUED | OUTPATIENT
Start: 2024-08-06 | End: 2024-08-07 | Stop reason: HOSPADM

## 2024-08-06 RX ORDER — LIDOCAINE 40 MG/G
CREAM TOPICAL
Status: DISCONTINUED | OUTPATIENT
Start: 2024-08-06 | End: 2024-08-07 | Stop reason: HOSPADM

## 2024-08-06 RX ORDER — DOXYCYCLINE 100 MG/1
100 CAPSULE ORAL 2 TIMES DAILY
Qty: 20 CAPSULE | Refills: 0 | Status: SHIPPED | OUTPATIENT
Start: 2024-08-06 | End: 2024-08-16

## 2024-08-06 RX ORDER — DEXAMETHASONE SODIUM PHOSPHATE 10 MG/ML
10 INJECTION, SOLUTION INTRAMUSCULAR; INTRAVENOUS ONCE
Status: DISCONTINUED | OUTPATIENT
Start: 2024-08-06 | End: 2024-08-06 | Stop reason: HOSPADM

## 2024-08-06 RX ORDER — HYDROXYZINE HYDROCHLORIDE 25 MG/1
25 TABLET, FILM COATED ORAL EVERY 6 HOURS PRN
Status: DISCONTINUED | OUTPATIENT
Start: 2024-08-06 | End: 2024-08-07 | Stop reason: HOSPADM

## 2024-08-06 RX ORDER — SODIUM CHLORIDE, SODIUM LACTATE, POTASSIUM CHLORIDE, CALCIUM CHLORIDE 600; 310; 30; 20 MG/100ML; MG/100ML; MG/100ML; MG/100ML
INJECTION, SOLUTION INTRAVENOUS CONTINUOUS PRN
Status: DISCONTINUED | OUTPATIENT
Start: 2024-08-06 | End: 2024-08-06

## 2024-08-06 RX ORDER — NALOXONE HYDROCHLORIDE 0.4 MG/ML
0.1 INJECTION, SOLUTION INTRAMUSCULAR; INTRAVENOUS; SUBCUTANEOUS
Status: DISCONTINUED | OUTPATIENT
Start: 2024-08-06 | End: 2024-08-06 | Stop reason: HOSPADM

## 2024-08-06 RX ORDER — DOXYCYCLINE 100 MG/1
100 CAPSULE ORAL EVERY 12 HOURS SCHEDULED
Status: DISCONTINUED | OUTPATIENT
Start: 2024-08-06 | End: 2024-08-07 | Stop reason: HOSPADM

## 2024-08-06 RX ORDER — ACETAMINOPHEN 325 MG/1
650 TABLET ORAL EVERY 6 HOURS PRN
Status: DISCONTINUED | OUTPATIENT
Start: 2024-08-06 | End: 2024-08-07 | Stop reason: HOSPADM

## 2024-08-06 RX ORDER — EPINEPHRINE 1 MG/ML
INJECTION INTRAMUSCULAR; INTRAVENOUS; SUBCUTANEOUS PRN
Status: DISCONTINUED | OUTPATIENT
Start: 2024-08-06 | End: 2024-08-06 | Stop reason: HOSPADM

## 2024-08-06 RX ORDER — DEXAMETHASONE SODIUM PHOSPHATE 4 MG/ML
INJECTION, SOLUTION INTRA-ARTICULAR; INTRALESIONAL; INTRAMUSCULAR; INTRAVENOUS; SOFT TISSUE PRN
Status: DISCONTINUED | OUTPATIENT
Start: 2024-08-06 | End: 2024-08-06

## 2024-08-06 RX ORDER — ACETAMINOPHEN 325 MG/1
975 TABLET ORAL ONCE
Status: COMPLETED | OUTPATIENT
Start: 2024-08-06 | End: 2024-08-06

## 2024-08-06 RX ORDER — PROCHLORPERAZINE MALEATE 10 MG
10 TABLET ORAL EVERY 6 HOURS PRN
Status: DISCONTINUED | OUTPATIENT
Start: 2024-08-06 | End: 2024-08-07 | Stop reason: HOSPADM

## 2024-08-06 RX ORDER — ZOLPIDEM TARTRATE 5 MG/1
5 TABLET ORAL
Status: DISCONTINUED | OUTPATIENT
Start: 2024-08-06 | End: 2024-08-07 | Stop reason: HOSPADM

## 2024-08-06 RX ORDER — SODIUM CHLORIDE, SODIUM LACTATE, POTASSIUM CHLORIDE, CALCIUM CHLORIDE 600; 310; 30; 20 MG/100ML; MG/100ML; MG/100ML; MG/100ML
INJECTION, SOLUTION INTRAVENOUS CONTINUOUS
Status: DISCONTINUED | OUTPATIENT
Start: 2024-08-06 | End: 2024-08-06 | Stop reason: HOSPADM

## 2024-08-06 RX ORDER — ONDANSETRON 4 MG/1
4 TABLET, ORALLY DISINTEGRATING ORAL EVERY 6 HOURS PRN
Status: DISCONTINUED | OUTPATIENT
Start: 2024-08-06 | End: 2024-08-07 | Stop reason: HOSPADM

## 2024-08-06 RX ORDER — FENTANYL CITRATE 50 UG/ML
INJECTION, SOLUTION INTRAMUSCULAR; INTRAVENOUS PRN
Status: DISCONTINUED | OUTPATIENT
Start: 2024-08-06 | End: 2024-08-06

## 2024-08-06 RX ORDER — HYDROMORPHONE HCL IN WATER/PF 6 MG/30 ML
0.4 PATIENT CONTROLLED ANALGESIA SYRINGE INTRAVENOUS EVERY 5 MIN PRN
Status: DISCONTINUED | OUTPATIENT
Start: 2024-08-06 | End: 2024-08-06 | Stop reason: HOSPADM

## 2024-08-06 RX ORDER — VANCOMYCIN HYDROCHLORIDE 1 G/20ML
INJECTION, POWDER, LYOPHILIZED, FOR SOLUTION INTRAVENOUS PRN
Status: DISCONTINUED | OUTPATIENT
Start: 2024-08-06 | End: 2024-08-06 | Stop reason: HOSPADM

## 2024-08-06 RX ORDER — FENTANYL CITRATE 50 UG/ML
50 INJECTION, SOLUTION INTRAMUSCULAR; INTRAVENOUS EVERY 5 MIN PRN
Status: DISCONTINUED | OUTPATIENT
Start: 2024-08-06 | End: 2024-08-06 | Stop reason: HOSPADM

## 2024-08-06 RX ORDER — VANCOMYCIN HYDROCHLORIDE
1500
Status: COMPLETED | OUTPATIENT
Start: 2024-08-06 | End: 2024-08-06

## 2024-08-06 RX ORDER — LABETALOL HYDROCHLORIDE 5 MG/ML
10 INJECTION, SOLUTION INTRAVENOUS
Status: DISCONTINUED | OUTPATIENT
Start: 2024-08-06 | End: 2024-08-06 | Stop reason: HOSPADM

## 2024-08-06 RX ORDER — ONDANSETRON 2 MG/ML
4 INJECTION INTRAMUSCULAR; INTRAVENOUS EVERY 30 MIN PRN
Status: DISCONTINUED | OUTPATIENT
Start: 2024-08-06 | End: 2024-08-06 | Stop reason: HOSPADM

## 2024-08-06 RX ORDER — ONDANSETRON 2 MG/ML
INJECTION INTRAMUSCULAR; INTRAVENOUS PRN
Status: DISCONTINUED | OUTPATIENT
Start: 2024-08-06 | End: 2024-08-06

## 2024-08-06 RX ORDER — ONDANSETRON 4 MG/1
4 TABLET, ORALLY DISINTEGRATING ORAL EVERY 30 MIN PRN
Status: DISCONTINUED | OUTPATIENT
Start: 2024-08-06 | End: 2024-08-06 | Stop reason: HOSPADM

## 2024-08-06 RX ORDER — OXYCODONE HYDROCHLORIDE 5 MG/1
5-10 TABLET ORAL EVERY 6 HOURS PRN
Qty: 10 TABLET | Refills: 0 | Status: SHIPPED | OUTPATIENT
Start: 2024-08-06 | End: 2024-10-07

## 2024-08-06 RX ADMIN — Medication 80 MG: at 08:27

## 2024-08-06 RX ADMIN — DEXAMETHASONE SODIUM PHOSPHATE 10 MG: 4 INJECTION, SOLUTION INTRA-ARTICULAR; INTRALESIONAL; INTRAMUSCULAR; INTRAVENOUS; SOFT TISSUE at 09:04

## 2024-08-06 RX ADMIN — ONDANSETRON 4 MG: 2 INJECTION INTRAMUSCULAR; INTRAVENOUS at 11:40

## 2024-08-06 RX ADMIN — FENTANYL CITRATE 25 MCG: 50 INJECTION, SOLUTION INTRAMUSCULAR; INTRAVENOUS at 11:44

## 2024-08-06 RX ADMIN — SUGAMMADEX 200 MG: 100 INJECTION, SOLUTION INTRAVENOUS at 10:25

## 2024-08-06 RX ADMIN — ACETAMINOPHEN 650 MG: 325 TABLET ORAL at 22:15

## 2024-08-06 RX ADMIN — FENTANYL CITRATE 25 MCG: 50 INJECTION, SOLUTION INTRAMUSCULAR; INTRAVENOUS at 11:54

## 2024-08-06 RX ADMIN — KETOROLAC TROMETHAMINE 30 MG: 30 INJECTION, SOLUTION INTRAMUSCULAR at 10:16

## 2024-08-06 RX ADMIN — DOXYCYCLINE 100 MG: 100 CAPSULE ORAL at 20:01

## 2024-08-06 RX ADMIN — PROPOFOL 200 MG: 10 INJECTION, EMULSION INTRAVENOUS at 08:24

## 2024-08-06 RX ADMIN — PROPOFOL 20 MG: 10 INJECTION, EMULSION INTRAVENOUS at 10:17

## 2024-08-06 RX ADMIN — ZOLPIDEM TARTRATE 5 MG: 5 TABLET, COATED ORAL at 22:14

## 2024-08-06 RX ADMIN — FENTANYL CITRATE 25 MCG: 50 INJECTION, SOLUTION INTRAMUSCULAR; INTRAVENOUS at 11:11

## 2024-08-06 RX ADMIN — LIDOCAINE HYDROCHLORIDE 100 MG: 20 INJECTION, SOLUTION INFILTRATION; PERINEURAL at 08:24

## 2024-08-06 RX ADMIN — VANCOMYCIN HYDROCHLORIDE 1500 MG: 10 INJECTION, POWDER, LYOPHILIZED, FOR SOLUTION INTRAVENOUS at 07:15

## 2024-08-06 RX ADMIN — DEXMEDETOMIDINE HYDROCHLORIDE 12 MCG: 100 INJECTION, SOLUTION INTRAVENOUS at 09:56

## 2024-08-06 RX ADMIN — ACETAMINOPHEN 650 MG: 325 TABLET ORAL at 15:47

## 2024-08-06 RX ADMIN — FENTANYL CITRATE 50 MCG: 50 INJECTION INTRAMUSCULAR; INTRAVENOUS at 09:32

## 2024-08-06 RX ADMIN — SODIUM CHLORIDE, POTASSIUM CHLORIDE, SODIUM LACTATE AND CALCIUM CHLORIDE: 600; 310; 30; 20 INJECTION, SOLUTION INTRAVENOUS at 08:16

## 2024-08-06 RX ADMIN — DEXMEDETOMIDINE HYDROCHLORIDE 8 MCG: 100 INJECTION, SOLUTION INTRAVENOUS at 10:17

## 2024-08-06 RX ADMIN — ACETAMINOPHEN 975 MG: 325 TABLET, FILM COATED ORAL at 07:06

## 2024-08-06 RX ADMIN — ONDANSETRON 4 MG: 2 INJECTION INTRAMUSCULAR; INTRAVENOUS at 10:12

## 2024-08-06 RX ADMIN — HYDROMORPHONE HYDROCHLORIDE 0.3 MG: 1 INJECTION, SOLUTION INTRAMUSCULAR; INTRAVENOUS; SUBCUTANEOUS at 10:09

## 2024-08-06 RX ADMIN — FENTANYL CITRATE 100 MCG: 50 INJECTION INTRAMUSCULAR; INTRAVENOUS at 08:24

## 2024-08-06 ASSESSMENT — ACTIVITIES OF DAILY LIVING (ADL)
ADLS_ACUITY_SCORE: 33
ADLS_ACUITY_SCORE: 24
ADLS_ACUITY_SCORE: 33
ADLS_ACUITY_SCORE: 24
ADLS_ACUITY_SCORE: 31
ADLS_ACUITY_SCORE: 24
ADLS_ACUITY_SCORE: 33
ADLS_ACUITY_SCORE: 33
ADLS_ACUITY_SCORE: 24
ADLS_ACUITY_SCORE: 24
ADLS_ACUITY_SCORE: 33
ADLS_ACUITY_SCORE: 24
ADLS_ACUITY_SCORE: 33
ADLS_ACUITY_SCORE: 24
ADLS_ACUITY_SCORE: 33

## 2024-08-06 NOTE — ANESTHESIA PROCEDURE NOTES
Airway       Patient location during procedure: OR       Procedure Start/Stop Times: 8/6/2024 8:30 AM  Staff -        Anesthesiologist:  Lucio Vaughan MD       Resident/Fellow: Wally Hernandez MD       Other Anesthesia Staff: Janiya Mccarty MD       Performed By: resident  Consent for Airway        Urgency: elective  Indications and Patient Condition       Indications for airway management: leni-procedural       Induction type:intravenous       Mask difficulty assessment: 1 - vent by mask    Final Airway Details       Final airway type: endotracheal airway       Successful airway: ETT - single  Endotracheal Airway Details        ETT size (mm): 7.0       Cuffed: yes       Successful intubation technique: video laryngoscopy       VL Blade Size: MAC 3       Grade View of Cords: 1       Adjucts: stylet       Position: Left       Measured from: gums/teeth       Secured at (cm): 21       Bite block used: Soft    Post intubation assessment        Placement verified by: capnometry and equal breath sounds        Number of attempts at approach: 1       Number of other approaches attempted: 0       Secured with: tape       Ease of procedure: easy       Dentition: Intact and Unchanged    Medication(s) Administered   Medication Administration Time: 8/6/2024 8:30 AM

## 2024-08-06 NOTE — ANESTHESIA CARE TRANSFER NOTE
Patient: Annie Garcia    Procedure: Procedure(s):  Stealth assisted bilateral revision maxillary antrostomy, total ethmoidectomy, sphenoidotomy, frontal sinusotomy       Diagnosis: Chronic pansinusitis [J32.4]  Cystic fibrosis (H) [E84.9]  Diagnosis Additional Information: No value filed.    Anesthesia Type:   General     Note:    Oropharynx: spontaneously breathing and oropharynx clear of all foreign objects  Level of Consciousness: awake  Oxygen Supplementation: face mask  Level of Supplemental Oxygen (L/min / FiO2): 6  Independent Airway: airway patency satisfactory and stable  Dentition: dentition unchanged  Vital Signs Stable: post-procedure vital signs reviewed and stable  Report to RN Given: handoff report given  Patient transferred to: PACU    Handoff Report: Identifed the Patient, Identified the Reponsible Provider, Reviewed the pertinent medical history, Discussed the surgical course, Reviewed Intra-OP anesthesia mangement and issues during anesthesia, Set expectations for post-procedure period and Allowed opportunity for questions and acknowledgement of understanding      Vitals:  Vitals Value Taken Time   /69 08/06/24 1042   Temp     Pulse 87 08/06/24 1045   Resp     SpO2 100 % 08/06/24 1045   Vitals shown include unfiled device data.    Electronically Signed By: Wally Wells MD  August 6, 2024  10:45 AM

## 2024-08-06 NOTE — DISCHARGE SUMMARY
Discharge Summary  Annie Garcia  [id number]  1970    Date of Admission: 8/6/2024  Date of Discharge: 8/7/2024    Admission Diagnosis: Chronic pansinusitis [J32.4]  Cystic fibrosis (H) [E84.9]  Discharge Diagnosis: Same    Procedures:  Date:   Procedure(s):  Stealth assisted (stereotactic image-guided) bilateral revision maxillary antrostomy, total ethmoidectomy, sphenoidotomy, frontal sinusotomy    Pathology: one specimen sent for permanent pathology  ID Source Type Collected By Time Frozen   1 Sinus Tissue Inocencio Peña MD 8/6/24 1010 No   Description: Bilateral Microdebrider contents     HPI: Annie Garcia is a 54 year old female with history of cystic fibrosis related chronic sinusitis. 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 Per patient, her cystic fibrosis has for the most part primarily been sinus disease related.  She has had 2 previous sinus surgeries about 8 years ago when she was living in [place]. She recently has grown Pseudomonas and was undergoing eradication therapy; after nasal endoscopy in office tobramycin rinses were trialled with some symptomatic improvement but symptoms persisted and return to OR for revision sinus surgery and debridement was indicated.    It was recommended that she undergo operative intervention and the patient consented to the above procedure after detailed explanation of the risks and benefits of said procedure.    Hospital Course: The patient was admitted to the hospital and underwent the above mentioned procedure. She tolerated the procedure without any intra- or leni-operative complications. Please see the operative report for full details of the procedure. The patient was admitted for post-operative monitoring under observation. Her postoperative course was uneventful. At discharge, the patient's pain was well controlled, the patient was voiding on her own, and she was ambulating and tolerating a regular  diet.     Discharge Exam:  Vitals:    08/06/24 1251 08/06/24 1600 08/06/24 1920 08/07/24 0000   BP: 101/65 125/69 117/62 122/70   BP Location: Left arm Left arm Left arm Left arm   Cuff Size:    Adult Large   Pulse:  79 78 77   Resp: 18 17 18 18   Temp: 98.4  F (36.9  C) 98.4  F (36.9  C) 98.2  F (36.8  C) 98.4  F (36.9  C)   TempSrc: Oral Oral Oral Oral   SpO2: 98% 98% 97% 98%   Weight:       Height:           General: A&O x 3, No acute distress  HEENT: No anterior drainage, no posterior oropharyngeal drainage on exam, mild crusting in bilateral nares as expected POD1  Respiratory: Breathing non-labored on room air, no stridor, no accessory muscle use.     Discharge Medications:     Medication List        Started      COMPOUNDED NON-CONTROLLED SUBSTANCE - PHARMACY TO MIX COMPOUNDED MEDICATION  Commonly known as: CMPD RX  Open Tobramycin capsule and empty contents into 240 ml of nasal saline mixture. Rinse each nasal cavity with 120 ml of mixture twice daily.     doxycycline hyclate 100 MG capsule  Commonly known as: VIBRAMYCIN  100 mg, Oral, 2 TIMES DAILY     oxyCODONE 5 MG tablet  Commonly known as: ROXICODONE  5-10 mg, Oral, EVERY 6 HOURS PRN            ASK your doctor about these medications      acetylcysteine 20 % neb solution  Commonly known as: MUCOMYST     Bethkis 300 MG/4ML nebulizer solution  Generic drug: tobramycin  300 mg, Nebulization, 2 TIMES DAILY, 28 days on, 28 days off     budesonide 0.5 MG/2ML neb solution  Commonly known as: PULMICORT  Squirt entire vial into previously made amanda med saline bottle, mix, and irrigate each nostril until entire bottle empty.  Do this twice daily.     cetirizine 10 MG tablet  Commonly known as: zyrTEC     cyclobenzaprine 10 MG tablet  Commonly known as: FLEXERIL  TAKE 1 TABLET BY MOUTH 3 TIMES DAILY AS NEEDED FOR MUSCLE SPASMS     desonide 0.05 % external ointment  Commonly known as: DESOWEN  APPLY 5 TIMES A DAY TO ACTIVE RASH ON LIPS AND FACE UNTIL RESOLVED    "  dexlansoprazole 30 MG Cpdr CR capsule  Commonly known as: Dexilant  30 mg, Oral, DAILY     dornase stefan 2.5 MG/2.5ML neb solution  Commonly known as: Pulmozyme  USE 1 VIAL IN NEBULIZER DAILY AS NEEDED     mupirocin 2 % OINT, sodium chloride 0.9% (bottle) 0.9 % SOLN external ointment  IRRIGATE BILATERAL NARES WITH 240MLS 2 TIMES DAILY FOR 4 WEEKS              Discharge Procedure Orders   Discharge Instructions   Order Comments: Dr. Peña's Surgical Discharge Instructions    1. Start rinsing the nasal cavities with ocean saline spray as needed for congestion and nasal saline rinse 2 or more times daily the day after surgery.  2. Take medications as prescribed.  This includes the tobramycin rinses and oral doxycycline pills.  3. You have been prescribed a narcotic pain medication (oxycodone) to take as needed for pain not controlled with tylenol and a nasal saline spray. An antibiotic will be prescribed if there was evidence of infection during surgery and cultures were obtained at the time of surgery.  If needed we will also send you home on anti-nausea medication  4. Do not drive or operate machinery while taking narcotic pain medication.   5. For nasal bleeding that is bothersome or excessive, spray afrin (oxymetazoline) nasal spray (four sprays into each nostril) and pinch the tip of the nose closed for 10 minutes. This can be bought over the counter. If you find you have done this four times in one hour and are still having bothersome bleeding, please call your doctor. Sometimes you will be given a bottle of afrin at the time of discharge.  This was used at the end of your surgery in your own nose, so you will find that the bottle is already opened, and may have some small streaks of blood on the tip of the bottle. Please do not be alarmed, as this was used on you, and you alone!  6. There are no activity restrictions after surgery, but please \"listen to your body\". If you feel like you are doing too much, " "please reduce your activity level. The one restriction I have is that you avoid excessive nose-blowing, as this can lead to nasal bleeding.   7. Please call your doctor for any headache not controlled with pain medication, severe nausea or vomiting, fevers >100.4, changes in mental status, or any other concerning symptoms you may identify.      Inocencio Peña MD    [facility]  Rhinology  Endoscopic Skull Base Surgery  [facility]  Department of Otolaryngology - Head & Neck Surgery     Reason for your hospital stay   Order Comments: You were observed in the hospital after sinus surgery with Dr. Peña.     Activity   Order Comments: No heavy lifting greater than 10 lbs (including children, pets) and no strenuous exercise for 2 weeks or until follow up appointment. No driving while taking narcotic pain medications.     Order Specific Question Answer Comments   Is discharge order? Yes      When to contact your care team   Order Comments: Please notify your doctor if you experience wound breakdown, sustained bleeding from the wound site, or increasing redness, swelling, and/or purulent malorodorous discharge from the wound site which may indicate infection.     If you feel it is acute, or experience sudden changes in breathing, chest pain, or excessive sleepiness/somnolence please return to the emergency department or call 911.     If you have questions or concerns during the day please call ENT clinic and [phone]. If at night you can call [facility] at [phone] and ask for the \"ENT resident on call\".     Adult [facility] Follow-up and recommended labs and tests   Order Comments: Follow up in ENT clinic with Dr. Peña on August 15th at 3:00 PM. Please call the clinic with questions/concerns: [phone].    Otolaryngology/ENT Clinic:  [facility]  [address] [place] [address]     Diet   Order " Comments: You may eat your regular diet.     Order Specific Question Answer Comments   Is discharge order? Yes        Dispo: To home in good condition. All of the patient's questions/concerns have been addressed at this time. They are comfortable and independent in all cares.     Diana Newman MD  Otolaryngology-Head & Neck Surgery PGY-1  To contact ENT please dial [phone] and enter job [phone].

## 2024-08-06 NOTE — OP NOTE
DATE OF PROCEDURE:  August 6, 2024   ATTENDING SURGEON: Inocencio Peña MD  ASSISTANT SURGEON: Diana Newman MD     PREOPERATIVE DIAGNOSES:  1. Chronic maxillary sinusitis  2. Chronic ethmoid sinusitis  3. Chronic sphenoid sinusitis  4. Chronic frontal sinusitis          POSTOPERATIVE DIAGNOSES:  1. Chronic maxillary sinusitis  2. Chronic ethmoid sinusitis  3. Chronic sphenoid sinusitis  4. Chronic frontal sinusitis       PROCEDURES PERFORMED:  1. Bilateral revision endoscopic maxillary antrostomy   2. Bilateral revision endoscopic total sphenoethmoidectomy   3. Bilateral revision endoscopic frontal sinusotomy  4. Stereotactic image-guided surgery, CPT 22338.     FINDINGS: Purulence and crusting found in bilateral maxillary antrostomies, bilateral sphenoids, and within the ethmoids.  None visible within the frontal sinuses though did have inflammatory changes to the mucosa.     ANESTHESIA: General.  EBL: 30 cc.  SPECIMEN: bilateral sinus contents  COMPLICATIONS: None     INDICATIONS: The patient is a 54 year old female with long-standing chronic rhinosinusitis with history of cystic fibrosis and prior sinus surgery. Prior therapy has included both systemic and topical steroids, antibiotics, antihistamines, and nasal saline irrigation; however, these therapies have failed to bring long-standing relief of symptoms. Physical examination with nasal endoscopy revealed widespread sinonasal edema as well as discolored nasal discharge. Imaging of the sinuses revealed mucoperiosteal thickening in the sinuses. The risks and expectations of endoscopic sinus surgery were reviewed with the patient who agreed to proceed.     JUSTIFICATION FOR CPT 17748: Sphenoid & posterior ethmoid surgery, frontal sinus surgery and skull base disease     PROCEDURE:  After informed consent was given, the patient was placed under satisfactory anesthesia by the anesthesiologist. The nose was topically decongested with pledgets moistened with  1:1,000 epinephrine. The bed was rotated, and the patient was prepped and draped in the usual fashion. The patient was identified and a surgical time out was performed.      STEREOTACTIC IMAGE-GUIDED SURGERY: The Fusion navigation device was used to perform stereotactic navigation. The tracking instruments were calibrated appropriately with the headgear, and topical landmarks were confirmed to assure accuracy. During the case, the navigation probes were used to confirm our location along the skull base.          ENDOSCOPIC SINUS SURGERY: We first began with the 0  nasal endoscope on the left.  The pledgets were removed and the inferior turbinate was gently infractured and then outfractured with a Ola elevator.  Crusting was visualized in the middle meatus between the maxillary sinus and the middle turbinate.  This was removed with a bayonet and sent for culture.  The middle turbinate was then medialized with the Ola elevator.  The previously opened maxillary sinus was visualized with a 30 degree scope and significant purulent was visualized within the sinus along with abnormal mucosa.  Purulent debris and crusting was carefully removed from the sinus itself.  The abnormal polypoid mucosa was then debrided with a 40 degree microdebrider.  The maxillary antrostomy was widened inferiorly and posteriorly along the posterior lamella.      Next the basal lamella was identified which had previously been opened.  Residual superior turbinate was visualized to be edematous and scarred.  This residual inferior aspect was removed in order to access the sphenoid sinus.  A straight mushroom punch and Kerrison was then used to widen the sphenoid os laterally towards the orbit and superiorly towards the skull base.  Residual posterior ethmoid cells were then taken down using a 45  through-cutting forcep and the microdebrider.  Once the posterior ethmoid skull base was identified we switched to the 30  endoscope and curved  instrumentation.  We then performed a completion ethmoidectomy from posterior to anterior taking down residual ethmoid septations with a 45  through-cutting forcep and the curved microdebrider.  Areas of significant alta-osteogenesis were taken down using the Cobra forcep.  Care was taken to avoid injury to the anterior and posterior ethmoid arteries.  The opening of the frontal sinus was then visualized with some narrowing due to polypoid edema of the mucosa.  This was carefully debrided with the microdebrider and then widened with a Hosemann punch and Cobra.  Frontal sinus was then able to be cannulated without obvious purulence or crusting present.      The nose was then copiously irrigated and all debris and bone chips were removed.  We then turned our attention to the opposing side where a similar procedure was performed for the maxillary antrostomy, total sphenoethmoidectomy, and frontal sinusotomy.  There was some crusting and purulent debris in the right maxillary sinus as well which was completely removed and mucosa was debrided with the rad 40.  The base lamella was entered on the side as it previously had not been done which allowed us to visualize the inferior turbinate.  The inferior aspect of it was removed and access was gained into the sphenoid sinus.  Sphenoid sinus was opened superiorly and laterally with a 2 Kerrison and straight mushroom punch.  The posterior skull base was then followed anteriorly with residual ethmoid cells being removed until the frontal sinus could be accessed.  There was a small amount of polypoid edema around this was carefully debrided and the frontal sinus was cannulated without significant purulence or crusting visualized.         At the termination of the case, hemostasis was assured.  All sinuses on both sides were irrigated with vancomycin solution.  We then placed a propel mini stents in the bilateral frontal outflow tracts and propel contour stents in the bilateral  maxillary sinuses.  All counts were correct times two. An orogastric tube was used to suction gastric and pharyngeal contents. The patient was then returned to the anesthesiologist for awakening.       I was present for and performed the entire procedure.

## 2024-08-06 NOTE — ANESTHESIA POSTPROCEDURE EVALUATION
Patient: Annie Garcia    Procedure: Procedure(s):  Stealth assisted bilateral revision maxillary antrostomy, total ethmoidectomy, sphenoidotomy, frontal sinusotomy       Anesthesia Type:  General    Note:  Disposition: Admission   Postop Pain Control: Uneventful            Sign Out: Well controlled pain   PONV: No   Neuro/Psych: Uneventful            Sign Out: Acceptable/Baseline neuro status   Airway/Respiratory: Uneventful            Sign Out: Acceptable/Baseline resp. status   CV/Hemodynamics: Uneventful            Sign Out: Acceptable CV status; No obvious hypovolemia; No obvious fluid overload   Other NRE: NONE   DID A NON-ROUTINE EVENT OCCUR? No           Last vitals:  Vitals Value Taken Time   /71 08/06/24 1115   Temp 36.7  C (98.1  F) 08/06/24 1045   Pulse 78 08/06/24 1121   Resp 12 08/06/24 1121   SpO2 94 % 08/06/24 1121   Vitals shown include unfiled device data.    Electronically Signed By: Sudha Michaud MD  August 6, 2024  11:21 AM  
Statement Selected

## 2024-08-06 NOTE — TELEPHONE ENCOUNTER
[facility] Call Center    Phone Message    May a detailed message be left on voicemail: yes     Reason for Call: Other: Pt would like a call back from Dr Peña or team to discuss the pictures that were taken today during the procedure.  Pt is in hospital for observation after the procedure and can be reached on her cell phone.  [facility] Location, thanks     Action Taken: Other: ENT    Travel Screening: Not Applicable     Date of Service:

## 2024-08-06 NOTE — BRIEF OP NOTE
[facility]    Brief Operative Note    Pre-operative diagnosis: Chronic pansinusitis [J32.4]  Cystic fibrosis (H) [E84.9]  Post-operative diagnosis Same as pre-operative diagnosis    Procedure: Stealth assisted bilateral revision maxillary antrostomy, total ethmoidectomy, sphenoidotomy, frontal sinusotomy, Bilateral - Head    Surgeon: Surgeons and Role:     * Inocencio Peña MD - Primary     * Diana Newman MD - Resident - Assisting  Anesthesia: General   Estimated Blood Loss: 50 mL    Drains: None  Specimens:   ID Type Source Tests Collected by Time Destination   1 : Bilateral Microdebrider contents Tissue Sinus SURGICAL PATHOLOGY EXAM Inocencio Peña MD 8/6/2024 10:10 AM    A : Left Maxillary Sinus Swab Sinus ANAEROBIC BACTERIAL CULTURE ROUTINE, AEROBIC BACTERIAL CULTURE ROUTINE (Canceled) Inocencio Peña MD 8/6/2024  9:06 AM    B : Left Sphenoid Swab Sinus ANAEROBIC BACTERIAL CULTURE ROUTINE, AEROBIC BACTERIAL CULTURE ROUTINE (Canceled) Inocencio Peña MD 8/6/2024  9:12 AM      Findings:   Purulence, crusting of bilateral maxillary antrostomies, bilateral sphenoids, ethmoids .  Complications: None.  Implants:   Implant Name Type Inv. Item Serial No.  Lot No. LRB No. Used Action   IMP SINUS PROPEL MINI MOMETASONE FUORATE 370MCCG 16MM [id number] - SNA Other IMP SINUS PROPEL MINI MOMETASONE FUORATE 370MCCG 16MM [id number] NA [employer] [id number] N/A 1 Implanted   IMP SINUS PROPEL MINI MOMETASONE FUORATE 370MCCG 16MM [id number] - SNA Other IMP SINUS PROPEL MINI MOMETASONE FUORATE 370MCCG 16MM [id number] NA [employer] [id number] N/A 1 Implanted   IMP SINUS PROPEL MOMETASONE FUORATE 370MCCG [id number] - SNA Other IMP SINUS PROPEL MOMETASONE FUORATE 370MCCG 32096 NA [employer] [id number] N/A 1 Implanted   IMP SINUS PROPEL MOMETASONE FUORATE 370MCCG [id number] - SNA Other IMP SINUS PROPEL MOMETASONE FUORATE 370MCCG [id number] NA [employer] [id number] N/A 1 Implanted

## 2024-08-06 NOTE — PLAN OF CARE
Goal Outcome Evaluation:      Plan of Care Reviewed With: patient    Overall Patient Progress: no changeOverall Patient Progress: no change    Outcome Evaluation: Obs goals ongoing    S/p bilateral revision maxillary antrostomy, total ethmoidectomy, sphenoidotomy, frontal sinusotomy. VSS on RA. Neuros intact. Nasal sling in place; serosanguinous drainage. Bilateral stents;UTV. Ok for no IV. Voided x1 upon arrial to 6A. BM PTA;BS+. Regular diet. HA managed with tylenol and Oxy available. Continue to monitor and follow POC.     Obs goals:    pain controlled on PO pain medication-MET   - tolerating diet to maintain hydration - MET   - able to take PO antibiotics - Ongoing

## 2024-08-07 VITALS
WEIGHT: 249.34 LBS | DIASTOLIC BLOOD PRESSURE: 69 MMHG | BODY MASS INDEX: 39.13 KG/M2 | HEIGHT: 67 IN | OXYGEN SATURATION: 98 % | TEMPERATURE: 98.4 F | HEART RATE: 77 BPM | SYSTOLIC BLOOD PRESSURE: 125 MMHG | RESPIRATION RATE: 18 BRPM

## 2024-08-07 PROCEDURE — G0378 HOSPITAL OBSERVATION PER HR: HCPCS

## 2024-08-07 PROCEDURE — 250N000013 HC RX MED GY IP 250 OP 250 PS 637

## 2024-08-07 RX ADMIN — DOXYCYCLINE 100 MG: 100 CAPSULE ORAL at 07:53

## 2024-08-07 RX ADMIN — ACETAMINOPHEN 650 MG: 325 TABLET ORAL at 03:45

## 2024-08-07 ASSESSMENT — ACTIVITIES OF DAILY LIVING (ADL)
ADLS_ACUITY_SCORE: 24

## 2024-08-07 NOTE — PLAN OF CARE
Goal Outcome Evaluation:      Plan of Care Reviewed With: patient    Overall Patient Progress: improvingOverall Patient Progress: improving    Outcome Evaluation: Obs goals Met    Discharge time/date: 8/7/24 0900  Walked or Wheelchair: Walked   PIV removed: Yes   Reviewed AVS with patient: Yes  Medication due times added to AVS in EPIC: Yes  Verbalized understanding of discharge with teachback: Yes  Medications retrieved from pharmacy: Yes  Supplies sent home: Yes     Observation goals:    - pain controlled on PO pain medication-Met  - tolerating diet to maintain hydration-Met  - able to take PO anti biotics- Met

## 2024-08-07 NOTE — PLAN OF CARE
Status: Pt is POD#1, S/p Stealth assisted bilateral revision maxillary antrostomy, total ethmoidectomy, sphenoidotomy, frontal sinusotomy   Vitals: VSS on RA  Neuros: Intact, ex has HA.  Strength 5/5 t/0  IV: OK for no PIV  Resp/trach: WNL  Diet: Regular  Bowel status: LBM 8/6  : voiding  Skin: nasal bloody drainage- team aware  Pain: H/A managed with Tylenol  Activity: up ad ashley  Plan: continue with observation goals    Observation goals:    - pain controlled on PO pain medication-Met  - tolerating diet to maintain hydration-Met  - able to take PO anti biotics- Met

## 2024-08-07 NOTE — PLAN OF CARE
Status: Pt is POD#0, S/p Stealth assisted bilateral revision maxillary antrostomy, total ethmoidectomy, sphenoidotomy, frontal sinusotomy   Vitals: VSS on RA  Neuros: Intact ex HA. Strength 5/5 t/o  IV: ok for no PIV   Resp/trach: WNL  Diet: Regular   Bowel status: LBM 8/6  : Voiding   Skin: Nasal bloody drainage-team aware   Pain: Managed with Tylenol   Activity: Up ad ashley  Plan: On Observation goals, Cont POC     Observation goals:    - pain controlled on PO pain medication-Met  - tolerating diet to maintain hydration-Met  - able to take PO antibiotics-Ongoing

## 2024-08-07 NOTE — PLAN OF CARE
Observation goals:    - pain controlled on PO pain medication-Met  - tolerating diet to maintain hydration-Met  - able to take PO anti biotics- Met

## 2024-08-08 ENCOUNTER — TELEPHONE (OUTPATIENT)
Dept: OTOLARYNGOLOGY | Facility: CLINIC | Age: 54
End: 2024-08-08
Payer: COMMERCIAL

## 2024-08-08 NOTE — TELEPHONE ENCOUNTER
Writer LVM for pt asking how pt is feeling after surgery. Writer stated that Dr. Peña would like to only do the tobramycin rinses once a day from now on. Writer requested call back.    Liza Montes RN

## 2024-08-08 NOTE — TELEPHONE ENCOUNTER
"Writer received Vm from pt stating that she is having minimal pain that is controlled with Tylenol. Pt stated that she is feeling very tired since surgery \"which is not normal for her\". Pt stated that she will start doing bay rinses once a day, pt reports doing saline rinses as well. Pt requesting writer call pharmacy to change instructions on sinus rinses to refect once a day so they do not send her too much of the medication.      Liza Montes RN  "

## 2024-08-08 NOTE — TELEPHONE ENCOUNTER
Writer called and spoke to pharmacist, instructions for tobramycin rinses were changed to irrigate once a day.    Liza Montes RN

## 2024-08-09 NOTE — TELEPHONE ENCOUNTER
Writer called ans spoke to pt stating that pts pharmacy was contacted and bay rinses were changed to once a day and pt will be receiving new shipment soon. Pt stated that she is having little pain after surgery. Pt had no other questions or concerns. Writer encouraged pt to call back if any  questions or concerns come up.    Liza Montes RN

## 2024-08-11 LAB
BACTERIA SPEC CULT: ABNORMAL
PATH REPORT.COMMENTS IMP SPEC: NORMAL
PATH REPORT.COMMENTS IMP SPEC: NORMAL
PATH REPORT.FINAL DX SPEC: NORMAL
PATH REPORT.GROSS SPEC: NORMAL
PATH REPORT.MICROSCOPIC SPEC OTHER STN: NORMAL
PATH REPORT.RELEVANT HX SPEC: NORMAL
PHOTO IMAGE: NORMAL

## 2024-08-12 ENCOUNTER — TELEPHONE (OUTPATIENT)
Dept: OTOLARYNGOLOGY | Facility: CLINIC | Age: 54
End: 2024-08-12
Payer: COMMERCIAL

## 2024-08-12 NOTE — TELEPHONE ENCOUNTER
Writer received VM for pt stating she did not received enough capsules of the doxycycline. Pt stated she was supposed to received 10 days worth but is now out but still has 4 more days that she is supposed to take the medication. Pt requesting call back.

## 2024-08-12 NOTE — TELEPHONE ENCOUNTER
Writer called pt back to discuss VM, pt stated that she is currently on the phone with the pharmacy and they are working to figure out the issues. Writer confirmed that pt has been taking correct dose of medication. Writer requested pt call writer back with an update.      Liza Montes RN

## 2024-08-12 NOTE — TELEPHONE ENCOUNTER
Writer received call from pt who stated that the mistake with the medication has been fixed and her local [facility] is filling the rest of the doxycycline prescription. Pt stated that she has been using the bay rinses and normal saline rinses. Pt wondering if Dr. Peña will be taking another culture at her post op appointment on Thursday, writer stated that this will be decided by Dr. Peña when he sees pt and if he deems it necessary. Pt expressed understanding.    Liza Montes RN

## 2024-08-13 LAB
BACTERIA SPEC CULT: ABNORMAL
BACTERIA SPEC CULT: ABNORMAL
BACTERIA SPEC CULT: NORMAL

## 2024-08-15 ENCOUNTER — OFFICE VISIT (OUTPATIENT)
Dept: OTOLARYNGOLOGY | Facility: CLINIC | Age: 54
End: 2024-08-15
Payer: COMMERCIAL

## 2024-08-15 ENCOUNTER — TELEPHONE (OUTPATIENT)
Dept: OTOLARYNGOLOGY | Facility: CLINIC | Age: 54
End: 2024-08-15

## 2024-08-15 VITALS
OXYGEN SATURATION: 98 % | HEIGHT: 67 IN | BODY MASS INDEX: 39.38 KG/M2 | HEART RATE: 82 BPM | SYSTOLIC BLOOD PRESSURE: 136 MMHG | DIASTOLIC BLOOD PRESSURE: 68 MMHG | WEIGHT: 250.9 LBS

## 2024-08-15 DIAGNOSIS — J32.4 CHRONIC PANSINUSITIS: ICD-10-CM

## 2024-08-15 DIAGNOSIS — Z98.890 S/P FESS (FUNCTIONAL ENDOSCOPIC SINUS SURGERY): ICD-10-CM

## 2024-08-15 DIAGNOSIS — J32.4 CHRONIC PANSINUSITIS: Primary | ICD-10-CM

## 2024-08-15 PROCEDURE — 31237 NSL/SINS NDSC SURG BX POLYPC: CPT | Mod: 50 | Performed by: STUDENT IN AN ORGANIZED HEALTH CARE EDUCATION/TRAINING PROGRAM

## 2024-08-15 PROCEDURE — 99213 OFFICE O/P EST LOW 20 MIN: CPT | Mod: 25 | Performed by: STUDENT IN AN ORGANIZED HEALTH CARE EDUCATION/TRAINING PROGRAM

## 2024-08-15 RX ORDER — PREDNISONE 10 MG/1
30 TABLET ORAL DAILY
Qty: 15 TABLET | Refills: 0 | Status: SHIPPED | OUTPATIENT
Start: 2024-08-15 | End: 2024-08-20

## 2024-08-15 RX ORDER — CIPROFLOXACIN 750 MG/1
750 TABLET, FILM COATED ORAL 2 TIMES DAILY
Qty: 42 TABLET | Refills: 0 | Status: SHIPPED | OUTPATIENT
Start: 2024-08-15 | End: 2024-09-05

## 2024-08-15 ASSESSMENT — PAIN SCALES - GENERAL: PAINLEVEL: MILD PAIN (3)

## 2024-08-15 NOTE — PROGRESS NOTES
"  [facility]  New Patient Visit      Encounter date:   August 15, 2024    Referring Provider:   Referred Self, MD  No address on file    Chief Complaint: post-op follow up    ID: S/p stealth assisted bilateral revision maxillary antrostomy, total ethmoidectomy, sphenoidotomy, frontal sinusotomy 8/6/24    History of Present Illness:   Annie Garcia is a 53 year old female who presents for consultation regarding history of cystic fibrosis related chronic sinusitis. 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 per patient her cystic fibrosis is for the most part primarily been sinus disease related.  Has had 2 previous sinus surgeries about 8 years ago when she was living in [place].  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  Is also previously used budesonide saline irrigations.  Also does use alcohol and saline.  Grew Pseudomonas for the first time in her sinus culture in January and is since then has begun and attempted to completely eradicate this.  Recently completed a 3-week course of IV antibiotics.  Started nebulized tobramycin per pulmonology which patient does think has helped.  Less crusting and thick drainage.  Is concerned that she might need a repeat sinus surgery. Previously followed with Dr. Everardo Underwood of [facility].       Interval History (08/15/24)  Annie Garcia is a 54 year old female who presents for consultation regarding post op follow up. She is not feeling well on the right side. She reports that yesterday, something started \"brewing,\" and has been having more inflammation/irritation. Her ear is also starting to get irritated on the right.     She mentions that after a few bites of food, she is no longer able to taste.    Cultures have grown Psuedomonas, Strentrophomonas maltophilia, and staph epidermidis         Sino-Nasal Outcome Test (SNOT - 22)   DNT    [place] [facility] History:  DATE OF PROCEDURE:  August 6, " 2024   ATTENDING SURGEON: Inocencio Peña MD  ASSISTANT SURGEON: Diana Newman MD     PREOPERATIVE DIAGNOSES:  1. Chronic maxillary sinusitis  2. Chronic ethmoid sinusitis  3. Chronic sphenoid sinusitis  4. Chronic frontal sinusitis           POSTOPERATIVE DIAGNOSES:  1. Chronic maxillary sinusitis  2. Chronic ethmoid sinusitis  3. Chronic sphenoid sinusitis  4. Chronic frontal sinusitis        PROCEDURES PERFORMED:  1. Bilateral revision endoscopic maxillary antrostomy   2. Bilateral revision endoscopic total sphenoethmoidectomy   3. Bilateral revision endoscopic frontal sinusotomy  4. Stereotactic image-guided surgery, CPT 26210.        Procedure Note  Procedure performed: Rigid nasal endoscopy  Date: 8/21/24  Indication: To evaluate for sinonasal pathology not visualized on routine anterior rhinoscopy  Anesthesia: 4% topical lidocaine with 0.05 % oxymetazoline  Description of procedure: A 30 degree, 3 mm rigid endoscope was inserted into bilateral nasal cavities and the nasal valves, nasal cavity, middle meatus, sphenoethmoid recess, nasopharynx were evaluated for evidence of obstruction, edema, purulence, polyps and/or mass/lesion.      FINDINGS: Purulence and crusting found in bilateral maxillary antrostomies, bilateral sphenoids, and within the ethmoids.  None visible within the frontal sinuses though did have inflammatory changes to the mucosa.    Bilateral sinonasal cavities debrided     Review of systems: A 14-point review of systems has been conducted and was negative for any notable symptoms, except as dictated in the history of present illness.     Medical History:  Past Medical History:   Diagnosis Date    Cystic fibrosis (H)     Depression     Tear of right acetabular labrum         Surgical History:   Past Surgical History:   Procedure Laterality Date    ANKLE SURGERY Right 07/27/2019    foot/ankle    AS HYSTEROSCOPY, SURGICAL; W/ ENDOMETRIAL ABLATION, ANY METHOD  2008    [facility].  Thermal balloon.    BREAST SURGERY      ENDOSCOPIC SINUS SURGERY N/A 09/05/2018    Procedure: ENDOSCOPIC SINUS SURGERY;;  Surgeon: Francoise Gonzalez MD;  Location: [facility]    ENDOSCOPY UPPER, COLONOSCOPY, COMBINED N/A 11/18/2016    Procedure: COMBINED ENDOSCOPY UPPER, COLONOSCOPY;  Surgeon: Levi Hinkle MD;  Location: [facility]    ESOPHAGOSCOPY, GASTROSCOPY, DUODENOSCOPY (EGD), COMBINED  01/10/2014    Procedure: COMBINED ESOPHAGOSCOPY, GASTROSCOPY, DUODENOSCOPY (EGD);  UPPER ENDOSCOPY with Biopsy;  Surgeon: Levi Hinkle MD;  Location: [facility]    EXAM UNDER ANESTHESIA NOSE N/A 09/05/2018    Procedure: EXAM UNDER ANESTHESIA NOSE;  SINUS EXAM UNDER ANESTHESIA, Endoscopic Sinus Surgery;  Surgeon: Francoise Gonzalez MD;  Location: [facility]    OPTICAL TRACKING SYSTEM ENDOSCOPIC SINUS SURGERY Bilateral 8/6/2024    Procedure: Stealth assisted bilateral revision maxillary antrostomy, total ethmoidectomy, sphenoidotomy, frontal sinusotomy;  Surgeon: Inocencio Peña MD;  Location: [facility] OR    ORTHOPEDIC SURGERY  1994    left knee arthroscopy    ORTHOPEDIC SURGERY  1994    right shoulder    ORTHOPEDIC SURGERY  2014    left hip replacement    REMOVE HARDWARE FOOT Right 01/27/2020    Procedure: REMOVAL, HARDWARE RIGHT FOOT;  Surgeon: Bryan Cid DPM;  Location: [facility]    SEPTOPLASTY, ENDOSCOPIC SINUS SURGERY, COMBINED Left 02/21/2018    Procedure: COMBINED SEPTOPLASTY, ENDOSCOPIC SINUS SURGERY;  LEFT ENDOSCOPIC SINUS SURGERY, SEPTOPLASTY, BILATERAL TURBINATE REDUCTION, PROPEL IMPLANTS;  Surgeon: Francoise Gonzalez MD;  Location: [facility]    TURBINOPLASTY Bilateral 02/21/2018    Procedure: TURBINOPLASTY;;  Surgeon: Francoise Gonzalez MD;  Location: [facility]        Family History:  Family History   Problem Relation Age of Onset    Diabetes Mother     Hypertension Mother     Thyroid Disease Mother     Thyroid Disease Sister     Dementia Sister         Social History:   Social History     Socioeconomic History    Marital status: Single    Tobacco Use    Smoking status: Never    Smokeless tobacco: Never   Substance and Sexual Activity    Alcohol use: Yes     Comment: rare    Drug use: No     Social Determinants of Health     Financial Resource Strain: Low Risk  (10/24/2023)    Received from [facility]    Overall Financial Resource Strain (CARDIA)     Difficulty of Paying Living Expenses: Not hard at all   Food Insecurity: No Food Insecurity (10/24/2023)    Received from [facility]    Hunger Vital Sign     Worried About Running Out of Food in the Last Year: Never true     Ran Out of Food in the Last Year: Never true   Transportation Needs: No Transportation Needs (10/24/2023)    Received from [facility]    PRAPARE - Transportation     Lack of Transportation (Medical): No     Lack of Transportation (Non-Medical): No   Physical Activity: Sufficiently Active (10/24/2023)    Received from [facility]    Exercise Vital Sign     Days of Exercise per Week: 4 days     Minutes of Exercise per Session: 60 min   Stress: Stress Concern Present (10/24/2023)    Received from [facility]    [employer] [place] [employer] - Occupational Stress Questionnaire     Feeling of Stress : To some extent   Social Connections: Moderately Integrated (10/24/2023)    Received from [facility]    Social Connection and Isolation Panel [NHANES]     Frequency of Communication with Friends and Family: More than three times a week     Frequency of Social Gatherings with Friends and Family: Three times a week     Attends Muslim Services: More than 4 times per year     Active Member of Clubs or Organizations: No     Attends Club or Organization Meetings: 1 to 4 times per year     Marital Status:    Interpersonal Safety: Not At Risk (10/24/2023)    [facility]    Humiliation, Afraid, Rape, and Kick questionnaire     Fear of Current or Ex-Partner: No     Emotionally Abused: No     Physically Abused: No     Sexually Abused: No   Housing Stability: Low Risk  (10/24/2023)    [facility]    Housing Stability Vital Sign     Unable to Pay for Housing in the Last Year: No     Number of Places Lived in the Last Year: 1     Unstable Housing in the Last Year: No        Physical Exam:  Vital signs: /68 (BP Location: Left arm, Patient Position: Sitting, Cuff Size: Adult Large)   Pulse 82   Ht 5' 7\" (1.702 m)   Wt 250 lb 14.4 oz (113.8 kg)   SpO2 98%   BMI 39.30 kg/m     General Appearance: No acute distress, appropriate demeanor, conversant  Eyes: moist conjunctivae; EOMI; pupils symmetric; visual acuity grossly intact; no proptosis  Head: normocephalic; overall symmetric appearance without deformity  Face: overall symmetric without deformity  Ears: Normal appearance of external ear; external meatus normal in appearance  Nose: No external deformity  Oral Cavity/oropharynx: Normal appearance of mucosa  Neck: no palpable lymphadenopathy; thyroid without palpable nodules  Lungs: symmetric chest rise; no wheezing  CV: Good distal perfusion; normal hear rate  Extremities: No deformity  Neurologic Exam: Cranial nerves II-XII are grossly intact; no focal deficit    Findings  RT/LT: Crusting and clot debrided from bilateral nostrils with entirely removed on the left but some left on the right due to adherence to the maxillary sinus wall. No evidence of underlying infection.    The patient tolerated the procedure well without complication.     Laboratory Review:  Final Diagnosis 8/6/24  Respiratory Aerobic Bacterial Culture 8/6/24  2+ Stenotrophomonas maltophilia Abnormal     Anaerobic Bacterial Culture  No anaerobic organisms isolated    Imaging Review:  N/a    Pathology " Review:  Final Diagnosis 24   Surgical pathology exam   BILATERAL SINUSES, MICRODEBRIDER CONTENTS:  -Benign sinonasal mucosa with marked chronic inflammation and increased eosinophilic infiltrate.      Assessment/Medical Decision Makin year old female who is following up S/p stealth assisted bilateral revision maxillary antrostomy, total ethmoidectomy, sphenoidotomy, frontal sinusotomy 24    Plan:  Patient is healing well. Discussed that the patient is cleared to start exercising. One more week before entering the water. Has some clot on the right which is adherent which should clear with rinses  Continue Ta sinus rinses once daily.  Start ciprofloxacin 750mg BID for 3 weeks  Start Prednisone 30 mg for 5 days if needed  Follow up scheduled with me for 24    Scribe Disclosure:   I, ROMMEL REICH, am serving as a scribe; to document services personally performed by Inocencio Peña MD -based on data collection and the provider's statements to me.     Provider Disclosure:  I agree with above History, Review of Systems, Physical exam and Plan.  I have reviewed the content of the documentation and have edited it as needed. I have personally performed the services documented here and the documentation accurately represents those services and the decisions I have made.      Electronically signed by:  Inocencio Peña MD    [facility]  Rhinology  Endoscopic Skull Base Surgery  [facility]  Department of Otolaryngology - Head & Neck Surgery

## 2024-08-15 NOTE — TELEPHONE ENCOUNTER
Writer received call from pt stating that she is still taking the doxycycline and wondering if she should start the ciprofloxacin while she is still on the doxy or to wait until she is done with it. Per Dr. Peña pt can stop the doxycycline and start ciprofloxacin right away. Pt expressed understanding.    Liza Montes RN

## 2024-08-15 NOTE — PATIENT INSTRUCTIONS
You were seen in the ENT Clinic today by Dr. Peña. If you have any questions or concerns after your appointment, please contact us (see below)       2.   The following recommendations have been made based upon your appointment today:   -a prescription for ciprofloxacin has been sent to your pharmacy, please take 750mg two times a day for 3 weeks   -a prescription for prednisone has been sent to your pharmacy, please take 30mg daily for 5 days   -continue daily tobramycin sinus rinses    3.   Plan to return to the ENT clinic on 8/28/24           How to Contact Us:  Send a [employer] message to your provider. Our team will respond to you via [employer]. Occasionally, we will need to call you to get further information.  For urgent matters (Monday-Friday), call the ENT Clinic: [phone] and speak with a call center team member - they will route your call appropriately.   If you'd like to speak directly with a nurse, please find our contact information below. We do our best to check voicemail frequently throughout the day, and will work to call you back within 1-2 days. For urgent matters, please use the general clinic phone numbers listed above.     Liza VILLAREAL RN  Direct: [phone]  Bee GARLAND LPN  Direct: [phone]         [facility]  Department of Otolaryngology

## 2024-08-15 NOTE — LETTER
"8/15/2024       RE: Annie Garcia  [address]  [facility] [address]     Dear Colleague,    Thank you for referring your patient, Annie Garcia, to the [facility] [place] [facility]. Please see a copy of my visit note below.      [facility]  New Patient Visit      Encounter date:   August 15, 2024    Referring Provider:   Referred Self, MD  No address on file    Chief Complaint: post-op follow up    ID: S/p stealth assisted bilateral revision maxillary antrostomy, total ethmoidectomy, sphenoidotomy, frontal sinusotomy 8/6/24    History of Present Illness:   Annie Garcia is a 53 year old female who presents for consultation regarding history of cystic fibrosis related chronic sinusitis. 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 per patient her cystic fibrosis is for the most part primarily been sinus disease related.  Has had 2 previous sinus surgeries about 8 years ago when she was living in [place].  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  Is also previously used budesonide saline irrigations.  Also does use alcohol and saline.  Grew Pseudomonas for the first time in her sinus culture in January and is since then has begun and attempted to completely eradicate this.  Recently completed a 3-week course of IV antibiotics.  Started nebulized tobramycin per pulmonology which patient does think has helped.  Less crusting and thick drainage.  Is concerned that she might need a repeat sinus surgery. Previously followed with Dr. Everardo Underwood of [facility].       Interval History (08/15/24)  Annie Garcia is a 54 year old female who presents for consultation regarding post op follow up. She is not feeling well on the right side. She reports that yesterday, something started \"brewing,\" and has been having more " inflammation/irritation. Her ear is also starting to get irritated on the right.     She mentions that after a few bites of food, she is no longer able to taste.    Cultures have grown Psuedomonas, Strentrophomonas maltophilia, and staph epidermidis         Sino-Nasal Outcome Test (SNOT - 22)   DNT    [place] [facility]:  DATE OF PROCEDURE:  August 6, 2024   ATTENDING SURGEON: Inocencio Peña MD  ASSISTANT SURGEON: Diana Newman MD     PREOPERATIVE DIAGNOSES:  1. Chronic maxillary sinusitis  2. Chronic ethmoid sinusitis  3. Chronic sphenoid sinusitis  4. Chronic frontal sinusitis           POSTOPERATIVE DIAGNOSES:  1. Chronic maxillary sinusitis  2. Chronic ethmoid sinusitis  3. Chronic sphenoid sinusitis  4. Chronic frontal sinusitis        PROCEDURES PERFORMED:  1. Bilateral revision endoscopic maxillary antrostomy   2. Bilateral revision endoscopic total sphenoethmoidectomy   3. Bilateral revision endoscopic frontal sinusotomy  4. Stereotactic image-guided surgery, CPT 54526.     FINDINGS: Purulence and crusting found in bilateral maxillary antrostomies, bilateral sphenoids, and within the ethmoids.  None visible within the frontal sinuses though did have inflammatory changes to the mucosa.    Review of systems: A 14-point review of systems has been conducted and was negative for any notable symptoms, except as dictated in the history of present illness.     Medical History:  Past Medical History:   Diagnosis Date     Cystic fibrosis (H)      Depression      Tear of right acetabular labrum         Surgical History:   Past Surgical History:   Procedure Laterality Date     ANKLE SURGERY Right 07/27/2019    foot/ankle     AS HYSTEROSCOPY, SURGICAL; W/ ENDOMETRIAL ABLATION, ANY METHOD  2008    . Thermal balloon.     BREAST SURGERY       ENDOSCOPIC SINUS SURGERY N/A 09/05/2018    Procedure: ENDOSCOPIC SINUS SURGERY;;  Surgeon: Francoise Gonzalez MD;  [facility]     ENDOSCOPY  UPPER, COLONOSCOPY, COMBINED N/A 11/18/2016    Procedure: COMBINED ENDOSCOPY UPPER, COLONOSCOPY;  Surgeon: Levi Hinkle MD;  [facility]     ESOPHAGOSCOPY, GASTROSCOPY, DUODENOSCOPY (EGD), COMBINED  01/10/2014    Procedure: COMBINED ESOPHAGOSCOPY, GASTROSCOPY, DUODENOSCOPY (EGD);  UPPER ENDOSCOPY with Biopsy;  Surgeon: Levi Hinkle MD;  [facility]     EXAM UNDER ANESTHESIA NOSE N/A 09/05/2018    Procedure: EXAM UNDER ANESTHESIA NOSE;  SINUS EXAM UNDER ANESTHESIA, Endoscopic Sinus Surgery;  Surgeon: Francoise Gonzalez MD;  [facility]     OPTICAL TRACKING SYSTEM ENDOSCOPIC SINUS SURGERY Bilateral 8/6/2024    Procedure: Stealth assisted bilateral revision maxillary antrostomy, total ethmoidectomy, sphenoidotomy, frontal sinusotomy;  Surgeon: Inocencio Peña MD;  [facility]     ORTHOPEDIC SURGERY  1994    left knee arthroscopy     ORTHOPEDIC SURGERY  1994    right shoulder     ORTHOPEDIC SURGERY  2014    left hip replacement     REMOVE HARDWARE FOOT Right 01/27/2020    Procedure: REMOVAL, HARDWARE RIGHT FOOT;  Surgeon: Bryan Cid DPM;  [facility]     SEPTOPLASTY, ENDOSCOPIC SINUS SURGERY, COMBINED Left 02/21/2018    Procedure: COMBINED SEPTOPLASTY, ENDOSCOPIC SINUS SURGERY;  LEFT ENDOSCOPIC SINUS SURGERY, SEPTOPLASTY, BILATERAL TURBINATE REDUCTION, PROPEL IMPLANTS;  Surgeon: Francoise Gonzalez MD;  [facility]     TURBINOPLASTY Bilateral 02/21/2018    Procedure: TURBINOPLASTY;;  Surgeon: Francoise Gonzalez MD;  [facility]        Family History:  Family History   Problem Relation Age of Onset     Diabetes Mother      Hypertension Mother      Thyroid Disease Mother      Thyroid Disease Sister      Dementia Sister         Social History:   Social History     Socioeconomic History     Marital status: Single   Tobacco Use     Smoking status: Never     Smokeless tobacco: Never   Substance and Sexual Activity     Alcohol use: Yes     Comment: rare     Drug use: No     Social  Determinants of Health     Financial Resource Strain: Low Risk  (10/24/2023)    [facility]    Overall Financial Resource Strain (CARDIA)      Difficulty of Paying Living Expenses: Not hard at all   Food Insecurity: No Food Insecurity (10/24/2023)    [facility]    Hunger Vital Sign      Worried About Running Out of Food in the Last Year: Never true      Ran Out of Food in the Last Year: Never true   Transportation Needs: No Transportation Needs (10/24/2023)    [facility]    PRAPARE - Transportation      Lack of Transportation (Medical): No      Lack of Transportation (Non-Medical): No   Physical Activity: Sufficiently Active (10/24/2023)    [facility]    Exercise Vital Sign      Days of Exercise per Week: 4 days      Minutes of Exercise per Session: 60 min   Stress: Stress Concern Present (10/24/2023)    [facility]    [place] of Occupational Health - Occupational Stress Questionnaire      Feeling of Stress : To some extent   Social Connections: Moderately Integrated (10/24/2023)    [facility]    Social Connection and Isolation Panel [NHANES]      Frequency of Communication with Friends and Family: More than three times a week      Frequency of Social Gatherings with Friends and Family: Three times a week      Attends Hindu Services: More than 4 times per year      Active Member of Clubs or Organizations: No      Attends Club or Organization Meetings: 1 to 4 times per year      Marital Status:    Interpersonal Safety: Not At Risk (10/24/2023)    Received [facility]    Humiliation, Afraid, Rape, and Kick questionnaire      Fear of Current or Ex-Partner: No       "Emotionally Abused: No      Physically Abused: No      Sexually Abused: No   Housing Stability: Low Risk  (10/24/2023)    [facility]    Housing Stability Vital Sign      Unable to Pay for Housing in the Last Year: No      Number of Places Lived in the Last Year: 1      Unstable Housing in the Last Year: No        Physical Exam:  Vital signs: /68 (BP Location: Left arm, Patient Position: Sitting, Cuff Size: Adult Large)   Pulse 82   Ht 5' 7\" (1.702 m)   Wt 250 lb 14.4 oz (113.8 kg)   SpO2 98%   BMI 39.30 kg/m     General Appearance: No acute distress, appropriate demeanor, conversant  Eyes: moist conjunctivae; EOMI; pupils symmetric; visual acuity grossly intact; no proptosis  Head: normocephalic; overall symmetric appearance without deformity  Face: overall symmetric without deformity  Ears: Normal appearance of external ear; external meatus normal in appearance  Nose: No external deformity  Oral Cavity/oropharynx: Normal appearance of mucosa  Neck: no palpable lymphadenopathy; thyroid without palpable nodules  Lungs: symmetric chest rise; no wheezing  CV: Good distal perfusion; normal hear rate  Extremities: No deformity  Neurologic Exam: Cranial nerves II-XII are grossly intact; no focal deficit    Findings  RT/LT: Crusting and clot debrided from bilateral nostrils with entirely removed on the left but some left on the right due to adherence to the maxillary sinus wall. No evidence of underlying infection.    The patient tolerated the procedure well without complication.     Laboratory Review:  Final Diagnosis 8/6/24  Respiratory Aerobic Bacterial Culture 8/6/24  2+ Stenotrophomonas maltophilia Abnormal     Anaerobic Bacterial Culture  No anaerobic organisms isolated    Imaging Review:  N/a    Pathology Review:  Final Diagnosis 8/6/24   Surgical pathology exam   BILATERAL SINUSES, MICRODEBRIDER CONTENTS:  -Benign sinonasal mucosa with marked chronic " inflammation and increased eosinophilic infiltrate.      Assessment/Medical Decision Makin year old female who is following up S/p stealth assisted bilateral revision maxillary antrostomy, total ethmoidectomy, sphenoidotomy, frontal sinusotomy 24    Plan:  Patient is healing well. Discussed that the patient is cleared to start exercising. One more week before entering the water. Has some clot on the right which is adherent which should clear with rinses  Continue Ta sinus rinses once daily.  Start ciprofloxacin 750mg BID for 3 weeks  Start Prednisone 30 mg for 5 days if needed  Follow up scheduled with me for 24    Scribe Disclosure:   IROMMEL, am serving as a scribe; to document services personally performed by Inocencio Peña MD -based on data collection and the provider's statements to me.     Provider Disclosure:  I agree with above History, Review of Systems, Physical exam and Plan.  I have reviewed the content of the documentation and have edited it as needed. I have personally performed the services documented here and the documentation accurately represents those services and the decisions I have made.      Electronically signed by:  Inocencio Peña MD    [facility]  Rhinology  Endoscopic Skull Base Surgery  [facility]  Department of Otolaryngology - Head & Neck Surgery          Again, thank you for allowing me to participate in the care of your patient.      Sincerely,    Inocencio Peña MD

## 2024-08-15 NOTE — NURSING NOTE
"Chief Complaint   Patient presents with    Consult   Blood pressure 136/68, pulse 82, height 1.702 m (5' 7\"), weight 113.8 kg (250 lb 14.4 oz), SpO2 98%. Magen Guerrero, EMT    "

## 2024-08-16 ENCOUNTER — TELEPHONE (OUTPATIENT)
Dept: OTOLARYNGOLOGY | Facility: CLINIC | Age: 54
End: 2024-08-16
Payer: COMMERCIAL

## 2024-08-16 NOTE — TELEPHONE ENCOUNTER
This writer called [facility] to discuss their questions regarding prescription sent over for patient yesterday. This writer reviewed the prescription that had been placed for the patient yesterday. This writer was unable to get ahold of staff so this writer reached out to ARNAV Loredo to assist. Gertrude will call the pharmacy this afternoon to verify that the prescription should be for capsules of tobramycin that patient should add to her sinus rinses to be used once daily, as opposed to the solution that was sent in error. Dr. Peña wanted patient to switch from twice daily rinses to once daily. Medication order pended in orders only encounter from 8/15/2024.    NAM CARDONA, JALEEL on 8/16/2024 at 12:41 PM

## 2024-08-16 NOTE — TELEPHONE ENCOUNTER
[facility] Call Center    Phone Message    May a detailed message be left on voicemail: yes     Reason for Call: Other: Pharmacy is requesting to speak with care team regarding a prescription that was sent over for the patient. Please call to discuss. Thanks     Action Taken: Message routed to:  [facility] ENT     Travel Screening: Not Applicable     Date of Service:

## 2024-08-16 NOTE — TELEPHONE ENCOUNTER
Called and spoke to compounding pharmacy to let them know previous order has been discontinued and new order placed for Tobramycin capsules. Pharmacist reviewed new prescription and confirmed it is correct.     Gertrude Torres, RN, BSN  RN Care Coordinator, ENT Clinic

## 2024-08-21 ENCOUNTER — TELEPHONE (OUTPATIENT)
Dept: OTOLARYNGOLOGY | Facility: CLINIC | Age: 54
End: 2024-08-21
Payer: COMMERCIAL

## 2024-08-21 NOTE — TELEPHONE ENCOUNTER
Writer called and spoke to pt regarding culture results. Writer answered pts questions. Pt stated she has been following all of Dr. Peña's recommendations, and stating to feel better after surgery, pt reports being more tired than usual but attributes it to her busy lifestyle.      Liza Montes RN

## 2024-08-22 NOTE — PROGRESS NOTES
"  [facility]  Return Visit  Encounter date:   August 22, 2024    Chief Complaint: follow-up    ID: s/p stealth assisted bilateral revision maxillary antrostomy, total ethmoidectomy, sphenoidotomy, frontal sinusotomy 8/6/24     Interval History:   Annie Garcia is a 54 year old female who presents for follow up s/p stealth assisted bilateral revision maxillary antrostomy, total ethmoidectomy, sphenoidotomy, frontal sinusotomy 8/6/24.    5/30/24: Annie Garcia is a 53 year old female who presents for consultation regarding history of cystic fibrosis related chronic sinusitis. 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 per patient her cystic fibrosis is for the most part primarily been sinus disease related. Has had 2 previous sinus surgeries about 8 years ago when she was living in [place]. 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 Is also previously used budesonide saline irrigations. Also does use alcohol and saline. Grew Pseudomonas for the first time in her sinus culture in January and is since then has begun and attempted to completely eradicate this. Recently completed a 3-week course of IV antibiotics. Started nebulized tobramycin per pulmonology which patient does think has helped. Less crusting and thick drainage. Is concerned that she might need a repeat sinus surgery. Previously followed with Dr. Everardo Underwood of [facility].     8/15/24: Annie Garcia is a 54 year old female who presents for consultation regarding post op follow up. She is not feeling well on the right side. She reports that yesterday, something started \"brewing,\" and has been having more inflammation/irritation. Her ear is also starting to get irritated on the right.  She mentions that after a few bites of food, she is no longer able to taste.  Cultures have grown Psuedomonas, Strentrophomonas maltophilia, and staph epidermidis     8/28/24:   She states " "that she took her ciprofloxacin and has been taking Prednisone for the past 4 days. She states that yesterday she started to have relief with Prednisone. Taste has not yet returned. She is continuing with Ta sinus rinses once daily. She states that she has a lot of nasal dryness and crusting that she cannot remove at times. Hard for her to sleep when mucous gets bad.     [place] Operative [facility]  stealth assisted bilateral revision maxillary antrostomy, total ethmoidectomy, sphenoidotomy, frontal sinusotomy 8/6/24 found purulence, crusting of bilateral maxillary antrostomies, bilateral sphenoids, ethmoids.     Review of systems: A 14-point review of systems has been conducted and is negative for any notable symptoms, except as dictated in the history of present illness.     Physical Exam:  Vital signs: BP (!) 163/81 (BP Location: Right arm, Patient Position: Sitting, Cuff Size: Adult Large)   Pulse 90   Ht 1.702 m (5' 7\")   Wt 113.7 kg (250 lb 9.6 oz)   SpO2 96%   BMI 39.25 kg/m     General Appearance: No acute distress, appropriate demeanor, conversant  Eyes: moist conjunctivae; EOMI; pupils symmetric; visual acuity grossly intact; no proptosis  Head: normocephalic; overall symmetric appearance without deformity  Face: overall symmetric without deformity  Ears: Normal appearance of external ear  Nose: No external deformity  Oral Cavity/oropharynx: Normal appearance of mucosa  Neck: no palpable lymphadenopathy; thyroid without palpable nodules  Lungs: symmetric chest rise; no wheezing  CV: Good distal perfusion; normal hear rate  Extremities: No deformity  Neurologic Exam: Cranial nerves II-XII are grossly intact      Procedure Note  Procedure performed: Rigid nasal endoscopy  Indication: To evaluate for sinonasal pathology not visualized on routine anterior rhinoscopy  Anesthesia: 4% topical lidocaine with 0.05 % oxymetazoline  Description of procedure: A 30 degree, 3 mm rigid endoscope was inserted into " bilateral nasal cavities and the nasal valves, nasal cavity, middle meatus, sphenoethmoid recess, nasopharynx were evaluated for evidence of obstruction, edema, purulence, polyps and/or mass/lesion.     Findings  RT and LT: improved crusting and inflammation in bilateral sinus cavities, significant crusting removed right right sinonasal cavity with no evidence of active infection    The patient tolerated the procedure well without complication.     Assessment/Medical Decision Making:  Annie Garcia is a 54 year old female s/p stealth assisted bilateral revision maxillary antrostomy, total ethmoidectomy, sphenoidotomy, frontal sinusotomy 8/6/24. Her endoscopy showed  improved crusting and inflammation in bilateral sinus cavities with no evidence of active infection.       Plan:  Continue Ta sinus rinses once daily  Follow up with me in 6 weeks, will work to get this done on October 7th with the rest of her appointments   Start diflucan for yeast infection, 2 week course  Follow-up with me on October 7th    Scribe Disclosure:   I, Angie Meneses, am serving as a scribe; to document services personally performed by Inocencio Peña MD -based on data collection and the provider's statements to me.     Provider Disclosure:  I agree with above History, Review of Systems, Physical exam and Plan.  I have reviewed the content of the documentation and have edited it as needed. I have personally performed the services documented here and the documentation accurately represents those services and the decisions I have made.      Electronically signed by:    Inocencio Peña MD    [facility]  Rhinology, Endoscopic Skull Base Surgery  [facility]  Department of Otolaryngology - Head & Neck Surgery    ~~~~~~~~~~~~~~~~~~~~~~~~~~~~~~~~~~~~~~~~~~~~~~~~~~~~~~~~~~~~~~~~~~~~~~~~~~~~~~~~~~~~~~~~~~~~~~~~~~~~~~~~~~~~~~~~~~~~~~~~~~~~~~~~~~~~~~~    Past Medical History:   Diagnosis Date    Cystic  fibrosis (H)     Depression     Tear of right acetabular labrum         Past Surgical History:   Procedure Laterality Date    ANKLE SURGERY Right 07/27/2019    foot/ankle    AS HYSTEROSCOPY, SURGICAL; W/ ENDOMETRIAL ABLATION, ANY METHOD  2008    [facility]. Thermal balloon.    BREAST SURGERY      ENDOSCOPIC SINUS SURGERY N/A 09/05/2018    Procedure: ENDOSCOPIC SINUS SURGERY;;  Surgeon: Francoise Gonzalez MD;  [facility]    ENDOSCOPY UPPER, COLONOSCOPY, COMBINED N/A 11/18/2016    Procedure: COMBINED ENDOSCOPY UPPER, COLONOSCOPY;  Surgeon: Levi Hinkle MD;  [facility]    ESOPHAGOSCOPY, GASTROSCOPY, DUODENOSCOPY (EGD), COMBINED  01/10/2014    Procedure: COMBINED ESOPHAGOSCOPY, GASTROSCOPY, DUODENOSCOPY (EGD);  UPPER ENDOSCOPY with Biopsy;  Surgeon: Levi Hinkle MD;  [facility]    EXAM UNDER ANESTHESIA NOSE N/A 09/05/2018    Procedure: EXAM UNDER ANESTHESIA NOSE;  SINUS EXAM UNDER ANESTHESIA, Endoscopic Sinus Surgery;  Surgeon: Francoise Gonzalez MD;  [facility]    OPTICAL TRACKING SYSTEM ENDOSCOPIC SINUS SURGERY Bilateral 8/6/2024    Procedure: Stealth assisted bilateral revision maxillary antrostomy, total ethmoidectomy, sphenoidotomy, frontal sinusotomy;  Surgeon: Inocencio Peña MD;  [facility]    ORTHOPEDIC SURGERY  1994    left knee arthroscopy    ORTHOPEDIC SURGERY  1994    right shoulder    ORTHOPEDIC SURGERY  2014    left hip replacement    REMOVE HARDWARE FOOT Right 01/27/2020    Procedure: REMOVAL, HARDWARE RIGHT FOOT;  Surgeon: Bryan Cid DPM;  [facility]    SEPTOPLASTY, ENDOSCOPIC SINUS SURGERY, COMBINED Left 02/21/2018    Procedure: COMBINED SEPTOPLASTY, ENDOSCOPIC SINUS SURGERY;  LEFT ENDOSCOPIC SINUS SURGERY, SEPTOPLASTY, BILATERAL TURBINATE REDUCTION, PROPEL IMPLANTS;  Surgeon: Francoise Gonzalez MD;  [facility]    TURBINOPLASTY Bilateral 02/21/2018    Procedure: TURBINOPLASTY;;  Surgeon: Francoise Gonzalez MD;  [facility]         Family History   Problem Relation Age of Onset    Diabetes Mother     Hypertension Mother     Thyroid Disease Mother     Thyroid Disease Sister     Dementia Sister         Social History     Socioeconomic History    Marital status: Single   Tobacco Use    Smoking status: Never    Smokeless tobacco: Never   Substance and Sexual Activity    Alcohol use: Yes     Comment: rare    Drug use: No     Social Determinants of Health     Financial Resource Strain: Low Risk  (10/24/2023)    Received from [facility]    Overall Financial Resource Strain (CARDIA)     Difficulty of Paying Living Expenses: Not hard at all   Food Insecurity: No Food Insecurity (10/24/2023)    Received from [facility]    Hunger Vital Sign     Worried About Running Out of Food in the Last Year: Never true     Ran Out of Food in the Last Year: Never true   Transportation Needs: No Transportation Needs (10/24/2023)    Received from [facility]    PRAPARE - Transportation     Lack of Transportation (Medical): No     Lack of Transportation (Non-Medical): No   Physical Activity: Sufficiently Active (10/24/2023)    Received from [facility]    Exercise Vital Sign     Days of Exercise per Week: 4 days     Minutes of Exercise per Session: 60 min   Stress: Stress Concern Present (10/24/2023)    Received from [facility]    [place] of Occupational Health - Occupational Stress Questionnaire     Feeling of Stress : To some extent   Social Connections: Moderately Integrated (10/24/2023)    Received from [facility]    Social Connection and Isolation Panel [NHANES]     Frequency of Communication with Friends and Family: More than three times a week     Frequency of Social Gatherings with Friends and Family: Three times a week     Attends  Taoism Services: More than 4 times per year     Active Member of Clubs or Organizations: No     Attends Club or Organization Meetings: 1 to 4 times per year     Marital Status:    Interpersonal Safety: Not At Risk (10/24/2023)    [facility]    Humiliation, Afraid, Rape, and Kick questionnaire     Fear of Current or Ex-Partner: No     Emotionally Abused: No     Physically Abused: No     Sexually Abused: No   Housing Stability: Low Risk  (10/24/2023)    [facility]    Housing Stability Vital Sign     Unable to Pay for Housing in the Last Year: No     Number of Places Lived in the Last Year: 1     Unstable Housing in the Last Year: No

## 2024-08-28 ENCOUNTER — OFFICE VISIT (OUTPATIENT)
Dept: OTOLARYNGOLOGY | Facility: CLINIC | Age: 54
End: 2024-08-28
Attending: STUDENT IN AN ORGANIZED HEALTH CARE EDUCATION/TRAINING PROGRAM
Payer: COMMERCIAL

## 2024-08-28 VITALS
WEIGHT: 250.6 LBS | HEART RATE: 90 BPM | OXYGEN SATURATION: 96 % | HEIGHT: 67 IN | BODY MASS INDEX: 39.33 KG/M2 | DIASTOLIC BLOOD PRESSURE: 81 MMHG | SYSTOLIC BLOOD PRESSURE: 163 MMHG

## 2024-08-28 DIAGNOSIS — J33.9 NASAL POLYPOSIS: ICD-10-CM

## 2024-08-28 DIAGNOSIS — B37.9 YEAST INFECTION: Primary | ICD-10-CM

## 2024-08-28 DIAGNOSIS — Z98.890 HISTORY OF ENDOSCOPIC SINUS SURGERY: ICD-10-CM

## 2024-08-28 DIAGNOSIS — J47.9 BRONCHIECTASIS WITHOUT COMPLICATION (H): ICD-10-CM

## 2024-08-28 DIAGNOSIS — J32.4 CHRONIC PANSINUSITIS: ICD-10-CM

## 2024-08-28 DIAGNOSIS — Z16.24 INFECTION DUE TO MULTIDRUG-RESISTANT PSEUDOMONAS AERUGINOSA: ICD-10-CM

## 2024-08-28 DIAGNOSIS — Z98.890 S/P FESS (FUNCTIONAL ENDOSCOPIC SINUS SURGERY): ICD-10-CM

## 2024-08-28 DIAGNOSIS — E84.9 CYSTIC FIBROSIS (H): ICD-10-CM

## 2024-08-28 DIAGNOSIS — A49.8 INFECTION DUE TO MULTIDRUG-RESISTANT PSEUDOMONAS AERUGINOSA: ICD-10-CM

## 2024-08-28 PROCEDURE — 31237 NSL/SINS NDSC SURG BX POLYPC: CPT | Mod: RT | Performed by: STUDENT IN AN ORGANIZED HEALTH CARE EDUCATION/TRAINING PROGRAM

## 2024-08-28 PROCEDURE — 99213 OFFICE O/P EST LOW 20 MIN: CPT | Mod: 25 | Performed by: STUDENT IN AN ORGANIZED HEALTH CARE EDUCATION/TRAINING PROGRAM

## 2024-08-28 RX ORDER — FLUCONAZOLE 100 MG/1
100 TABLET ORAL DAILY
Qty: 14 TABLET | Refills: 0 | Status: SHIPPED | OUTPATIENT
Start: 2024-08-28 | End: 2024-09-11

## 2024-08-28 ASSESSMENT — PAIN SCALES - GENERAL: PAINLEVEL: NO PAIN (0)

## 2024-08-28 NOTE — LETTER
8/28/2024       RE: Annie Garcia  [address]  [facility] [address]     Dear Colleague,    Thank you for referring your patient, Annie Garcia, to the [facility] [place] at [facility]. Please see a copy of my visit note below.      [facility]  Return Visit  Encounter date:   August 22, 2024    Chief Complaint: follow-up    ID: s/p stealth assisted bilateral revision maxillary antrostomy, total ethmoidectomy, sphenoidotomy, frontal sinusotomy 8/6/24     Interval History:   Annie Garcia is a 54 year old female who presents for follow up s/p stealth assisted bilateral revision maxillary antrostomy, total ethmoidectomy, sphenoidotomy, frontal sinusotomy 8/6/24.    5/30/24: Annie Garcia is a 53 year old female who presents for consultation regarding history of cystic fibrosis related chronic sinusitis. 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 per patient her cystic fibrosis is for the most part primarily been sinus disease related. Has had 2 previous sinus surgeries about 8 years ago when she was living in [place]. 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 Is also previously used budesonide saline irrigations. Also does use alcohol and saline. Grew Pseudomonas for the first time in her sinus culture in January and is since then has begun and attempted to completely eradicate this. Recently completed a 3-week course of IV antibiotics. Started nebulized tobramycin per pulmonology which patient does think has helped. Less crusting and thick drainage. Is concerned that she might need a repeat sinus surgery. Previously followed with Dr. Everardo Underwood of [facility].     8/15/24: Annie Garcia is a 54 year old female who presents for consultation regarding post op follow up. She is not feeling well on the right side. She reports  "that yesterday, something started \"brewing,\" and has been having more inflammation/irritation. Her ear is also starting to get irritated on the right.  She mentions that after a few bites of food, she is no longer able to taste.  Cultures have grown Psuedomonas, Strentrophomonas maltophilia, and staph epidermidis     8/28/24:   She states that she took her ciprofloxacin and has been taking Prednisone for the past 4 days. She states that yesterday she started to have relief with Prednisone. Taste has not yet returned. She is continuing with Ta sinus rinses once daily. She states that she has a lot of nasal dryness and crusting that she cannot remove at times. Hard for her to sleep when mucous gets bad.     [facility]  stealth assisted bilateral revision maxillary antrostomy, total ethmoidectomy, sphenoidotomy, frontal sinusotomy 8/6/24 found purulence, crusting of bilateral maxillary antrostomies, bilateral sphenoids, ethmoids.     Review of systems: A 14-point review of systems has been conducted and is negative for any notable symptoms, except as dictated in the history of present illness.     Physical Exam:  Vital signs: BP (!) 163/81 (BP Location: Right arm, Patient Position: Sitting, Cuff Size: Adult Large)   Pulse 90   Ht 1.702 m (5' 7\")   Wt 113.7 kg (250 lb 9.6 oz)   SpO2 96%   BMI 39.25 kg/m     General Appearance: No acute distress, appropriate demeanor, conversant  Eyes: moist conjunctivae; EOMI; pupils symmetric; visual acuity grossly intact; no proptosis  Head: normocephalic; overall symmetric appearance without deformity  Face: overall symmetric without deformity  Ears: Normal appearance of external ear  Nose: No external deformity  Oral Cavity/oropharynx: Normal appearance of mucosa  Neck: no palpable lymphadenopathy; thyroid without palpable nodules  Lungs: symmetric chest rise; no wheezing  CV: Good distal perfusion; normal hear rate  Extremities: No deformity  Neurologic " Exam: Cranial nerves II-XII are grossly intact      Procedure Note  Procedure performed: Rigid nasal endoscopy  Indication: To evaluate for sinonasal pathology not visualized on routine anterior rhinoscopy  Anesthesia: 4% topical lidocaine with 0.05 % oxymetazoline  Description of procedure: A 30 degree, 3 mm rigid endoscope was inserted into bilateral nasal cavities and the nasal valves, nasal cavity, middle meatus, sphenoethmoid recess, nasopharynx were evaluated for evidence of obstruction, edema, purulence, polyps and/or mass/lesion.     Findings  RT and LT: improved crusting and inflammation in bilateral sinus cavities, significant crusting removed right right sinonasal cavity with no evidence of active infection    The patient tolerated the procedure well without complication.     Assessment/Medical Decision Making:  Annie Garcia is a 54 year old female s/p stealth assisted bilateral revision maxillary antrostomy, total ethmoidectomy, sphenoidotomy, frontal sinusotomy 8/6/24. Her endoscopy showed  improved crusting and inflammation in bilateral sinus cavities with no evidence of active infection.       Plan:  Continue Ta sinus rinses once daily  Follow up with me in 6 weeks, will work to get this done on October 7th with the rest of her appointments   Start diflucan for yeast infection, 2 week course  Follow-up with me on October 7th    Scribe Disclosure:   I, Angie Meneses, am serving as a scribe; to document services personally performed by Inocencio Peña MD -based on data collection and the provider's statements to me.     Provider Disclosure:  I agree with above History, Review of Systems, Physical exam and Plan.  I have reviewed the content of the documentation and have edited it as needed. I have personally performed the services documented here and the documentation accurately represents those services and the decisions I have made.      Electronically signed by:    Inocencio Peña MD  Assistant    [facility]  Rhinology, Endoscopic Skull Base Surgery  [facility]  Department of Otolaryngology - Head & Neck Surgery    ~~~~~~~~~~~~~~~~~~~~~~~~~~~~~~~~~~~~~~~~~~~~~~~~~~~~~~~~~~~~~~~~~~~~~~~~~~~~~~~~~~~~~~~~~~~~~~~~~~~~~~~~~~~~~~~~~~~~~~~~~~~~~~~~~~~~~~~    Past Medical History:   Diagnosis Date     Cystic fibrosis (H)      Depression      Tear of right acetabular labrum         Past Surgical History:   Procedure Laterality Date     ANKLE SURGERY Right 07/27/2019    foot/ankle     AS HYSTEROSCOPY, SURGICAL; W/ ENDOMETRIAL ABLATION, ANY METHOD  2008    [facility]. Thermal balloon.     BREAST SURGERY       ENDOSCOPIC SINUS SURGERY N/A 09/05/2018    Procedure: ENDOSCOPIC SINUS SURGERY;;  Surgeon: Francoise Gonzalez MD;  Location: [facility]     ENDOSCOPY UPPER, COLONOSCOPY, COMBINED N/A 11/18/2016    Procedure: COMBINED ENDOSCOPY UPPER, COLONOSCOPY;  Surgeon: Levi Hinkle MD;  Location: [facility]     ESOPHAGOSCOPY, GASTROSCOPY, DUODENOSCOPY (EGD), COMBINED  01/10/2014    Procedure: COMBINED ESOPHAGOSCOPY, GASTROSCOPY, DUODENOSCOPY (EGD);  UPPER ENDOSCOPY with Biopsy;  Surgeon: Levi Hinkle MD;  Location: [facility]     EXAM UNDER ANESTHESIA NOSE N/A 09/05/2018    Procedure: EXAM UNDER ANESTHESIA NOSE;  SINUS EXAM UNDER ANESTHESIA, Endoscopic Sinus Surgery;  Surgeon: Francoise Gonzalez MD;  Location: [facility]     OPTICAL TRACKING SYSTEM ENDOSCOPIC SINUS SURGERY Bilateral 8/6/2024    Procedure: Stealth assisted bilateral revision maxillary antrostomy, total ethmoidectomy, sphenoidotomy, frontal sinusotomy;  Surgeon: Inocencio Peña MD;  Location:  OR     ORTHOPEDIC SURGERY  1994    left knee arthroscopy     ORTHOPEDIC SURGERY  1994    right shoulder     ORTHOPEDIC SURGERY  2014    left hip replacement     REMOVE HARDWARE FOOT Right 01/27/2020    Procedure: REMOVAL, HARDWARE RIGHT FOOT;  Surgeon: Bryan Cid DPM;  Location: [facility]     SEPTOPLASTY, ENDOSCOPIC SINUS SURGERY,  COMBINED Left 02/21/2018    Procedure: COMBINED SEPTOPLASTY, ENDOSCOPIC SINUS SURGERY;  LEFT ENDOSCOPIC SINUS SURGERY, SEPTOPLASTY, BILATERAL TURBINATE REDUCTION, PROPEL IMPLANTS;  Surgeon: Francoise Gonzalez MD;  Location: [facility]     TURBINOPLASTY Bilateral 02/21/2018    Procedure: TURBINOPLASTY;;  Surgeon: Francoise Gonzalez MD;  Location: [facility]        Family History   Problem Relation Age of Onset     Diabetes Mother      Hypertension Mother      Thyroid Disease Mother      Thyroid Disease Sister      Dementia Sister         Social History     Socioeconomic History     Marital status: Single   Tobacco Use     Smoking status: Never     Smokeless tobacco: Never   Substance and Sexual Activity     Alcohol use: Yes     Comment: rare     Drug use: No     Social Determinants of Health     Financial Resource Strain: Low Risk  (10/24/2023)    Received from [facility]    Overall Financial Resource Strain (CARDIA)      Difficulty of Paying Living Expenses: Not hard at all   Food Insecurity: No Food Insecurity (10/24/2023)    Received from [facility]    Hunger Vital Sign      Worried About Running Out of Food in the Last Year: Never true      Ran Out of Food in the Last Year: Never true   Transportation Needs: No Transportation Needs (10/24/2023)    Received from [facility]    PRAPARE - Transportation      Lack of Transportation (Medical): No      Lack of Transportation (Non-Medical): No   Physical Activity: Sufficiently Active (10/24/2023)    Received from [facility]    Exercise Vital Sign      Days of Exercise per Week: 4 days      Minutes of Exercise per Session: 60 min   Stress: Stress Concern Present (10/24/2023)    Received from [facility]    [employer] [place] [employer] - Occupational Stress Questionnaire      Feeling of  Stress : To some extent   Social Connections: Moderately Integrated (10/24/2023)    Received from [facility]    Social Connection and Isolation Panel [NHANES]      Frequency of Communication with Friends and Family: More than three times a week      Frequency of Social Gatherings with Friends and Family: Three times a week      Attends Spiritism Services: More than 4 times per year      Active Member of Clubs or Organizations: No      Attends Club or Organization Meetings: 1 to 4 times per year      Marital Status:    Interpersonal Safety: Not At Risk (10/24/2023)    Received from [facility], [facility]    Humiliation, Afraid, Rape, and Kick questionnaire      Fear of Current or Ex-Partner: No      Emotionally Abused: No      Physically Abused: No      Sexually Abused: No   Housing Stability: Low Risk  (10/24/2023)    Received from [facility], [facility]    Housing Stability Vital Sign      Unable to Pay for Housing in the Last Year: No      Number of Places Lived in the Last Year: 1      Unstable Housing in the Last Year: No         Again, thank you for allowing me to participate in the care of your patient.      Sincerely,    Inocencio Peña MD

## 2024-08-28 NOTE — PATIENT INSTRUCTIONS
You were seen in the ENT Clinic today by Dr. Peña. If you have any questions or concerns after your appointment, please contact us (see below)       2.   The following recommendations have been made based upon your appointment today:   -Start Diflucan: 1 tablet by mouth daily for 2 weeks.   -Continue sinus rinses.      3.   Plan to return to the ENT clinic on October 7th.           How to Contact Us:  Send a [employer] message to your provider. Our team will respond to you via [employer]. Occasionally, we will need to call you to get further information.  For urgent matters (Monday-Friday), call the ENT Clinic: [phone] and speak with a call center team member - they will route your call appropriately.   If you'd like to speak directly with a nurse, please find our contact information below. We do our best to check voicemail frequently throughout the day, and will work to call you back within 1-2 days. For urgent matters, please use the general clinic phone numbers listed above.     Liza VILLAREAL RN  Direct: [phone]  Bee GARLAND LPN  Direct: [phone]         [facility]  Department of Otolaryngology

## 2024-08-28 NOTE — NURSING NOTE
"Chief Complaint   Patient presents with    RECHECK   Blood pressure (!) 163/81, pulse 90, height 1.702 m (5' 7\"), weight 113.7 kg (250 lb 9.6 oz), SpO2 96%. Magen Guerrero, EMT    "

## 2024-08-29 ENCOUNTER — MYC MEDICAL ADVICE (OUTPATIENT)
Dept: OTOLARYNGOLOGY | Facility: CLINIC | Age: 54
End: 2024-08-29
Payer: COMMERCIAL

## 2024-09-25 ENCOUNTER — TELEPHONE (OUTPATIENT)
Dept: OTOLARYNGOLOGY | Facility: CLINIC | Age: 54
End: 2024-09-25
Payer: COMMERCIAL

## 2024-09-25 NOTE — TELEPHONE ENCOUNTER
Writer called pt to reschedule appointment with Dr. Peña. Pt rescheduled from 10/7 to 10/31/24. Pt agreeable to plan.    Liza Montes RN

## 2024-10-07 ENCOUNTER — LAB (OUTPATIENT)
Dept: LAB | Facility: CLINIC | Age: 54
End: 2024-10-07
Payer: COMMERCIAL

## 2024-10-07 ENCOUNTER — OFFICE VISIT (OUTPATIENT)
Dept: PULMONOLOGY | Facility: CLINIC | Age: 54
End: 2024-10-07
Attending: INTERNAL MEDICINE
Payer: COMMERCIAL

## 2024-10-07 ENCOUNTER — OFFICE VISIT (OUTPATIENT)
Dept: PHARMACY | Facility: CLINIC | Age: 54
End: 2024-10-07
Attending: INTERNAL MEDICINE
Payer: COMMERCIAL

## 2024-10-07 VITALS — DIASTOLIC BLOOD PRESSURE: 81 MMHG | OXYGEN SATURATION: 97 % | SYSTOLIC BLOOD PRESSURE: 134 MMHG | HEART RATE: 83 BPM

## 2024-10-07 DIAGNOSIS — R21 RASH: ICD-10-CM

## 2024-10-07 DIAGNOSIS — E84.9 CYSTIC FIBROSIS (H): Primary | ICD-10-CM

## 2024-10-07 DIAGNOSIS — E84.9 CYSTIC FIBROSIS (H): ICD-10-CM

## 2024-10-07 DIAGNOSIS — Z78.9 TAKES DIETARY SUPPLEMENTS: ICD-10-CM

## 2024-10-07 DIAGNOSIS — Z23 NEED FOR INFLUENZA VACCINATION: Primary | ICD-10-CM

## 2024-10-07 LAB
ALBUMIN SERPL BCG-MCNC: 4.4 G/DL (ref 3.5–5.2)
ALP SERPL-CCNC: 94 U/L (ref 40–150)
ALT SERPL W P-5'-P-CCNC: 22 U/L (ref 0–50)
AST SERPL W P-5'-P-CCNC: 21 U/L (ref 0–45)
BILIRUB DIRECT SERPL-MCNC: <0.2 MG/DL (ref 0–0.3)
BILIRUB SERPL-MCNC: 0.2 MG/DL
CK SERPL-CCNC: 39 U/L (ref 26–192)
EXPTIME-PRE: 5.43 SEC
FEF2575-%PRED-PRE: 121 %
FEF2575-PRE: 2.99 L/SEC
FEF2575-PRED: 2.47 L/SEC
FEFMAX-%PRED-PRE: 124 %
FEFMAX-PRE: 8.54 L/SEC
FEFMAX-PRED: 6.86 L/SEC
FEV1-%PRED-PRE: 96 %
FEV1-PRE: 2.53 L
FEV1FEV6-PRE: 83 %
FEV1FEV6-PRED: 81 %
FEV1FVC-PRE: 83 %
FEV1FVC-PRED: 80 %
FIFMAX-PRE: 3.33 L/SEC
FVC-%PRED-PRE: 92 %
FVC-PRE: 3.04 L
FVC-PRED: 3.28 L
PROT SERPL-MCNC: 7.5 G/DL (ref 6.4–8.3)

## 2024-10-07 PROCEDURE — 90673 RIV3 VACCINE NO PRESERV IM: CPT | Performed by: INTERNAL MEDICINE

## 2024-10-07 PROCEDURE — 99605 MTMS BY PHARM NP 15 MIN: CPT | Performed by: PHARMACIST

## 2024-10-07 PROCEDURE — 250N000011 HC RX IP 250 OP 636: Performed by: INTERNAL MEDICINE

## 2024-10-07 PROCEDURE — 99607 MTMS BY PHARM ADDL 15 MIN: CPT | Performed by: PHARMACIST

## 2024-10-07 PROCEDURE — G0463 HOSPITAL OUTPT CLINIC VISIT: HCPCS | Performed by: INTERNAL MEDICINE

## 2024-10-07 PROCEDURE — G0008 ADMIN INFLUENZA VIRUS VAC: HCPCS | Performed by: INTERNAL MEDICINE

## 2024-10-07 PROCEDURE — 99215 OFFICE O/P EST HI 40 MIN: CPT | Mod: 25 | Performed by: INTERNAL MEDICINE

## 2024-10-07 PROCEDURE — 80076 HEPATIC FUNCTION PANEL: CPT | Performed by: PATHOLOGY

## 2024-10-07 PROCEDURE — 87070 CULTURE OTHR SPECIMN AEROBIC: CPT | Performed by: INTERNAL MEDICINE

## 2024-10-07 PROCEDURE — 94375 RESPIRATORY FLOW VOLUME LOOP: CPT | Performed by: INTERNAL MEDICINE

## 2024-10-07 PROCEDURE — 82550 ASSAY OF CK (CPK): CPT | Performed by: PATHOLOGY

## 2024-10-07 PROCEDURE — 36415 COLL VENOUS BLD VENIPUNCTURE: CPT | Performed by: PATHOLOGY

## 2024-10-07 RX ORDER — ZOLPIDEM TARTRATE 5 MG/1
5 TABLET ORAL
COMMUNITY
Start: 2024-09-10

## 2024-10-07 RX ADMIN — INFLUENZA A VIRUS A/WEST VIRGINIA/30/2022 (H1N1) RECOMBINANT HEMAGGLUTININ ANTIGEN, INFLUENZA A VIRUS A/MASSACHUSETTS/18/2022 (H3N2) RECOMBINANT HEMAGGLUTININ ANTIGEN, AND INFLUENZA B VIRUS B/AUSTRIA/1359417/2021 RECOMBINANT HEMAGGLUTININ ANTIGEN 0.5 ML: 45; 45; 45 INJECTION INTRAMUSCULAR at 15:28

## 2024-10-07 NOTE — LETTER
"10/7/2024      Annie Garcia  [address]  [facility] [address]      Dear Colleague,    Thank you for referring your patient, Annie Garcia, to the [facility] FOR LUNG SCIENCE AND [facility] [place]. Please see a copy of my visit note below.    Pulmonary Staff Initial Consult    CC: The patient is here for follow-up of cystic fibrosis.    Interval history: At the patient's last clinic visit in July she was feeling at baseline from a sinus and lower respiratory standpoint after completing a course of Bactrim.  Previous prednisone and Cipro had not been fully effective and ISIDRO nebs found to be irritating to her airways.  She has subsequently undergone functional endoscopic sinus surgery in August.  She reports a excellent clinical course.  Up until a few days ago she was feeling the best she had in several months.  However now in the past 2 days she has had daily sputum production and is experiencing increasing sinus congestion.  She attributes this to dust exposure associated with windy days and also feels her symptoms are typical of previous autumn conditions.  She has not been doing vest or nebs recently.    Past medical history  1.  Cystic fibrosis mutation analysis: Genotype delta F508/D110H, sweat chloride c/w CF  2.  Cystic fibrosis related sinus disease.  Outside records indicate history of associated mycetoma.  3.  Cystic fibrosis lung disease: Chest CT March, 2023 makes reference to right upper lobe bronchiectasis with smaller degree of bronchiectasis in the lingula and left lower lobe.  Patient reports lower espiratory cultures historically normal.  4.  Depression (details unavailable)    Past surgical history  1.  Status post right hip arthroplasty, 2014  2.  Removal of \"rope\" under right breast  3.  Status post sinus surgery approximately 8 years ago x 2, [facility], August, 2024    Medications:  Current Outpatient Medications   Medication " Sig Dispense Refill     acetaminophen-codeine (TYLENOL #3) 300-30 MG tablet PO TID prn 90 tablet 0     Acetylcysteine (NAC PO) Take 1 capsule by mouth 2 times daily.       arformoterol (BROVANA) 15 MCG/2ML NEBU neb solution Take 2 mLs (15 mcg) by nebulization 2 times daily 120 mL 3     BETHKIS 300 MG/4ML nebulizer solution Take 4 mLs (300 mg) by nebulization 2 times daily 28 days on, 28 days off (Patient not taking: Reported on 8/5/2024) 224 mL 1     budesonide (PULMICORT) 0.5 MG/2ML neb solution Squirt entire vial into previously made amanda med saline bottle, mix, and irrigate each nostril until entire bottle empty.  Do this twice daily. 120 mL 1     cetirizine (ZYRTEC) 10 MG tablet Take 10 mg by mouth daily.       COMPOUNDED NON-CONTROLLED SUBSTANCE (CMPD RX) - PHARMACY TO MIX COMPOUNDED MEDICATION Open Tobramycin capsule and empty contents into 240 ml of nasal saline mixture. Rinse each nasal cavity with 120 ml of mixture daily. 30 capsule 1     desonide (DESOWEN) 0.05 % external ointment APPLY 5 TIMES A DAY TO ACTIVE RASH ON LIPS AND FACE UNTIL RESOLVED (Patient not taking: Reported on 7/29/2024) 60 g 1     dexlansoprazole (DEXILANT) 30 MG CPDR CR capsule Take 1 capsule (30 mg) by mouth daily (Patient not taking: Reported on 8/5/2024) 30 capsule 1     dornase stefan (PULMOZYME) 2.5 MG/2.5ML neb solution USE 1 VIAL IN NEBULIZER DAILY AS NEEDED 75 mL 3     fluconazole (DIFLUCAN) 100 MG tablet TAKE 1 TABLET BY MOUTH ONCE DAILY FOR 3 DAYS MAY  REPEAT  A  SECOND  COURSE  IF  NEEDED. (Patient not taking: Reported on 10/7/2024) 6 tablet 0     ipratropium - albuterol 0.5 mg/2.5 mg/3 mL (DUONEB) 0.5-2.5 (3) MG/3ML neb solution Inhale 3 mLs into the lungs (Patient not taking: Reported on 10/7/2024)       mometasone (NASONEX) 50 MCG/ACT nasal spray Spray 2 sprays into both nostrils daily 17 g 5     sodium chloride (OCEAN) 0.65 % nasal spray Spray 1 spray into both nostrils daily as needed for congestion.       zolpidem  (AMBIEN) 5 MG tablet Take 5 mg by mouth nightly as needed for sleep.       No current facility-administered medications for this visit.   N acetylcysteine po bid     Allergies: Reports history of seasonal allergies. Duration of quinolones kept to a minimum out of concern levofloxacin may have caused tendon injury.    Family history: Patient has a sister with cystic fibrosis.  Possible their father had manifestations of CF.    Social history: Lifelong non-smoker.  Previously lived in [place], [place] and was initially followed in a CF center there.  She subsequently moved to [place] and lived there for several years.  She moved to [facility] a few years ago.  She was an athlete growing up.  She previously has worked as a .    Review of systems: Constitutional: Currently no fever or chills.  Musculoskeletal: She sustained multiple fractures of her lower extremity with a fall 6 weeks ago.  Fractures are in alignment and she does not need surgery but is nonweightbearing for 12 weeks.    Physical examination  /81   Pulse 83   SpO2 97% , weight 112.5 kg, BMI 39.9  General: Alert appropriate pleasant in no apparent distress speaking in full sentences.  HEENT: Sclera anicteric.  No overt nasal discharge.  Oropharynx is moist without lesion or exudate.    Chest: Clear to auscultation bilaterally with good air movement and normal chest wall excursion.  Cardiovascular: S1-S2 with a regular rate and rhythm.  Distant heart sounds.  No lower extremity edema.  Abdomen: Soft, nondistended, nontender, bowel sounds present  Musculoskeletal: No digital clubbing.  Wearing a boot over her right foot and distal lower extremity.    Studies:  1.  Pulmonary function test from today personally reviewed.  Valid maneuver.  FEV1 2.53 L (96% predicted), FVC 3.04 L (92% predicted).  Values down between 5 and 10% from previous.  She is at the lower end of her baseline.  2.  Most recent respiratory culture July, 2024  with normal hoang.  She last grew Pseudomonas from her lungs May, 2024.  3.  Sinus culture August 6, 2024 with stenotrophomonas and 2 strains of Pseudomonas..  4.  Most recent liver function tests from June were within normal limits.  5.  2023 chest CT scan available for review.  There are some more prominent bronchiectasis in the lateral right lung.  There is bronchiectasis bilaterally but largely without any thickening of the airways and without mucous plugging.    Impression and plan  1.  Cystic fibrosis lung disease: She may have early exacerbation with some sputum the past couple days.  Pulmonary function tests modestly reduced from last visit and are at the lower end of her baseline.  She is amenable to resuming LABA neb Brovana and vest therapy.  Will consider resumption of budesonide nebs as well.  Initiating modulation therapy may also help.  Will follow-up on today's culture and anticipate empiric oral antibiotics if respiratory symptoms escalate. I do not think additional attempts at Pseudomonas eradication at this time will be helpful.  She will resume physical activity to augment airway clearance once recovered from right lower extremity fractures.    2.  Chronic cystic fibrosis related sinus disease: Status post functional endoscopic sinus surgery in August and did well postoperatively.  Modest interval increase in symptoms in the past few days.  She will continue irrigation.  Will see if initiation of modulation therapy at reduced dose helps.    3.  CFTR modulator candidacy: Patient met with our CF pharmacist, Renea, and details of indications, administration, and side effects of modulation therapy reviewed.  I discussed options for starting modulation therapy at length with our pharmacist and the patient as well. Consensus is to proceed with trial at reduced dose of Trikafta 1 orange tablet daily.  Liver function tests ordered and patient plans on obtaining these this afternoon.    4.  Healthcare  maintenance: Patient received influenza vaccine today in clinic.      Will plan on patient following up in 2 months    Total visit time today 40 minutes.  This is exclusive of time interpreting pulmonary function test.    Bryce Fowler MD          Again, thank you for allowing me to participate in the care of your patient.        Sincerely,        Bryce Fowler MD

## 2024-10-07 NOTE — PROGRESS NOTES
Medication Therapy Management (MTM) Encounter    ASSESSMENT:                            Medication Adherence/Access: See below for considerations    CF/CFTR:   Education on Trikafta today, shared decision to start 1 orange daily due to concern for side effects, labs today are within normal limits. Patient may benefit from use of nebulized medications preventatively, will discuss in future per her preference . Patient may also benefit from protecting sterility of nasal saline.     CF Nutrition:   Stable       Rash:  Stable     PLAN:                            Shared decision to start Trikafta 1 orange daily, labs today are within normal limits, plan to check-in in 1-3 months to assess efficacy and any side effects  Future consideration to trial daily preventative use of nebs and cetirizine to assess if this is helpful  Consider alcohol wiping nasal saline or keeping cap to reduce risk of contaminating spray    [facility]    SUBJECTIVE/OBJECTIVE:                          Annie Garcia is a 54 year old female seen for a co-visit with Dr. Fowler and team.     Reason for visit: Annual Medication Review    Allergies/ADRs: Reviewed in chart  Past Medical History: Reviewed in chart  Tobacco: She reports that she has never smoked. She has never been exposed to tobacco smoke. She has never used smokeless tobacco.  Alcohol: Less than 1 beverage / month  Other Substance Use: none    Medication Adherence/Access:   Medication: Annie manages her own medications at home  Pharmacy: [facility], [facility]       CF:    Inhaled medications:   Bronchodilator: Brovana as needed (none recently)   Mucolytic: Pulmozyme as needed (none recently)   Other: Pulmicort 0.5mg nasal rinse daily, tobramycin nasal rinse daily,Nasonex 2 sprays each nostril daily, nasal saline as needed   Oral medications:   CFTR modulator: none, eligible for Trikafta, Symdeko, and Kalydeco (see below)   Other:  cetirizine as needed seasonally (none recently), Tylenol #3 as needed for cough (none recently) , fluconazole as needed (none recently)    Genotype: [id number]  Cultures (last growth): throat cultures and Sinus swabs grow Steno (8/6/24), PSA (5/30/24-8/6/24), and MRSA (5/30/24)  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 Previously has exclusively followed with outside pulmonologists (declined care at CF centers) due to disagreement with care standards, but feels it is time to take next steps including education on Trikafta (see below). Today she states she has noticed feeling an illness coming on. Did consider restarting her nebulized medications (which she does not use daily due to her preference) but felt this would make her cough too much during her PFTs since she hadn't been doing it consistently. Open to restarting after visit today. Also states that Tylenol #3 is one of the only things helpful for her coughs when she has them, but has trouble getting providers to write for this. Noted that Duoneb has been unavailable at her local pharmacy since shortage, but since Brovana was approved would prefer to remain on that instead, does have some back supply of Duoneb if needed so will keep on list. Previously was prescribed Bactrim nasal rinses which Annie feels were very effective in preventing sinus illness, however her recent ENTs have not recommended these rinses. Notes that fluconazole is used daily during antibiotic treatments to avoid yeast infections, not using currently as she's not currently on antibiotics. Annie did share she's recently started a nasal saline as needed to prevent dryness which has been very helpful, she keeps this in her purse, but has lost the cap.      CFTR:   Patient is eligible for treatment with CFTR modulator therapy. Most appropriate choice for patient of currently available CFTR  modulators is: elexacaftor/tezacaftor/ivacaftor based on age, CFTR mutation genotype, past medical history and current medications. Patient was previously naive to CFTR modulator. Annie states she has concern for ocular effects (sister went blind from 12-2 in one eye after modulator use), concerned for weight gain (has broken ankle and from antibiotics/steroids has already gained weight this month), also concerned for potential benefit/effectiveness for sinuses.    CF Genotype: [id number]/D110H    Current weight: 113.7kg    Recommended dose Trikafta reduced dose: one orange elexacaftor 100mg/tezacaftor 50 mg/ivacaftor 75 mg in the morning (no blue) given concern for side effects, could consider dose increase as tolerated, or transition to Symdeko or Kalydeco if preferred in the future.    Drug interactions with CFTR modulator:  intermittently fluconazole use. Not currently on therapy but uses this while on antibiotics, reviewed this would require a Trikafta dose adjustment when used.    Dose should be given with age-appropriate fat containing food (~10g or up to 20g if experiencing GI upset). Provided appropriate examples of fat-containing foods to patient (e.g. Whole milk, cheese, avocado, peanut butter).    Patient will not need dilated eye exam prior to initiation.    Baseline LFTs and CK checked and are within normal limits Recommend continuing to monitor LFTs and CK quarterly for the first year of treatment then annually therafter.  Additional recommended monitoring: none.  Lab Results   Component Value Date    ALT 22 10/07/2024    AST 21 10/07/2024    BILITOTAL 0.2 10/07/2024    DBIL <0.20 10/07/2024    ALKPHOS 94 10/07/2024    CKT 39 10/07/2024     Educated patient on potential side effects, including headache, GI disturbances, rash, increased mucus production/respiratory symptoms, weight gain, acne, and risk for cataracts.  Education provided on how to administer medication, what to do if a dose is missed,  "monitoring prior to and while on therapy, medications/foods to avoid (e.g. grapefruit).  Written patient information from [employer] was provided to patient.  Discussed with patient how to obtain CFTR modulator from specialty pharmacy and informed patient they will need to bring home supply if hospitalized. Patient was engaged in teaching and verbalized understanding.    Patient  did not discuss [facility] due to time, patient has Medicaid sponsored insurance which would not qualify for copay assistance  .         CF Nutrition:   Patient is pancreatic sufficient and does not require enzyme supplementation  Acid reducer: dexlansoprazole as needed (none recently)    Vitamins/Supplements: NAC 750mg twice daily    Patient is not experiencing sign/symptoms of malabsorption.  Uses dexlansoprazole for heartburn related to antibiotic use which is helpful. No concerns today. Also noticed great benefit from starting oral NAC which she continues.       Rash:  Desonide ointment as needed    Uses this intermittently for rash which has been helpful. No new concerns today.      Today's Vitals:   BP Readings from Last 1 Encounters:   10/07/24 134/81     Pulse Readings from Last 1 Encounters:   10/07/24 83     Wt Readings from Last 1 Encounters:   08/28/24 250 lb 9.6 oz (113.7 kg)     Ht Readings from Last 1 Encounters:   08/28/24 5' 7\" (1.702 m)     Estimated body mass index is 39.25 kg/m  as calculated from the following:    Height as of 8/28/24: 5' 7\" (1.702 m).    Weight as of 8/28/24: 250 lb 9.6 oz (113.7 kg).    ----------------      I spent 75 minutes with this patient today. I offer these suggestions for consideration by Dr. Fowler during covisit today.     A summary of these recommendations was given to the patient (see AVS from today's appointment with Dr. Fowler).    Renea Yao, PharmD  Cystic Fibrosis MTM Pharmacist  [facility]  Voicemail: [phone]           Medication Therapy " Recommendations  Cystic fibrosis (H)    Rationale: Synergistic therapy - Needs additional medication therapy - Indication   Recommendation: Start Medication - Trikafta 100-50-75 & 150 MG Tbpk   Status: Accepted per Provider

## 2024-10-07 NOTE — NURSING NOTE
"Annie Garcia is a 54 year old year old who is being seen for Cystic Fibrosis (F/u)      Medications reviewed and Vital signs taken.    Specimen Collection Type: Throat Swab    Order(s) placed: CF Aerobic Bacterial    *IF AFB order placed - please enter \"PRIORITIZE AFB\" to order comments.       No results found for: \"ACIDFAST\"      No results found for: \"AFBSMS\"        "

## 2024-10-07 NOTE — PROGRESS NOTES
"Pulmonary Staff Initial Consult    CC: The patient is here for follow-up of cystic fibrosis.    Interval history: At the patient's last clinic visit in July she was feeling at baseline from a sinus and lower respiratory standpoint after completing a course of Bactrim.  Previous prednisone and Cipro had not been fully effective and ISIDRO nebs found to be irritating to her airways.  She has subsequently undergone functional endoscopic sinus surgery in August.  She reports a excellent clinical course.  Up until a few days ago she was feeling the best she had in several months.  However now in the past 2 days she has had daily sputum production and is experiencing increasing sinus congestion.  She attributes this to dust exposure associated with windy days and also feels her symptoms are typical of previous autumn conditions.  She has not been doing vest or nebs recently.    Past medical history  1.  Cystic fibrosis mutation analysis: Genotype delta F508/D110H, sweat chloride c/w CF  2.  Cystic fibrosis related sinus disease.  Outside records indicate history of associated mycetoma.  3.  Cystic fibrosis lung disease: Chest CT March, 2023 makes reference to right upper lobe bronchiectasis with smaller degree of bronchiectasis in the lingula and left lower lobe.  Patient reports lower espiratory cultures historically normal.  4.  Depression (details unavailable)    Past surgical history  1.  Status post right hip arthroplasty, 2014  2.  Removal of \"rope\" under right breast  3.  Status post sinus surgery approximately 8 years ago x 2, [facility], August, 2024    Medications:  Current Outpatient Medications   Medication Sig Dispense Refill    acetaminophen-codeine (TYLENOL #3) 300-30 MG tablet PO TID prn 90 tablet 0    Acetylcysteine (NAC PO) Take 1 capsule by mouth 2 times daily.      arformoterol (BROVANA) 15 MCG/2ML NEBU neb solution Take 2 mLs (15 mcg) by nebulization 2 times daily 120 mL 3    BETHKIS 300 " MG/4ML nebulizer solution Take 4 mLs (300 mg) by nebulization 2 times daily 28 days on, 28 days off (Patient not taking: Reported on 8/5/2024) 224 mL 1    budesonide (PULMICORT) 0.5 MG/2ML neb solution Squirt entire vial into previously made amanda med saline bottle, mix, and irrigate each nostril until entire bottle empty.  Do this twice daily. 120 mL 1    cetirizine (ZYRTEC) 10 MG tablet Take 10 mg by mouth daily.      COMPOUNDED NON-CONTROLLED SUBSTANCE (CMPD RX) - PHARMACY TO MIX COMPOUNDED MEDICATION Open Tobramycin capsule and empty contents into 240 ml of nasal saline mixture. Rinse each nasal cavity with 120 ml of mixture daily. 30 capsule 1    desonide (DESOWEN) 0.05 % external ointment APPLY 5 TIMES A DAY TO ACTIVE RASH ON LIPS AND FACE UNTIL RESOLVED (Patient not taking: Reported on 7/29/2024) 60 g 1    dexlansoprazole (DEXILANT) 30 MG CPDR CR capsule Take 1 capsule (30 mg) by mouth daily (Patient not taking: Reported on 8/5/2024) 30 capsule 1    dornase stefan (PULMOZYME) 2.5 MG/2.5ML neb solution USE 1 VIAL IN NEBULIZER DAILY AS NEEDED 75 mL 3    fluconazole (DIFLUCAN) 100 MG tablet TAKE 1 TABLET BY MOUTH ONCE DAILY FOR 3 DAYS MAY  REPEAT  A  SECOND  COURSE  IF  NEEDED. (Patient not taking: Reported on 10/7/2024) 6 tablet 0    ipratropium - albuterol 0.5 mg/2.5 mg/3 mL (DUONEB) 0.5-2.5 (3) MG/3ML neb solution Inhale 3 mLs into the lungs (Patient not taking: Reported on 10/7/2024)      mometasone (NASONEX) 50 MCG/ACT nasal spray Spray 2 sprays into both nostrils daily 17 g 5    sodium chloride (OCEAN) 0.65 % nasal spray Spray 1 spray into both nostrils daily as needed for congestion.      zolpidem (AMBIEN) 5 MG tablet Take 5 mg by mouth nightly as needed for sleep.       No current facility-administered medications for this visit.   N acetylcysteine po bid     Allergies: Reports history of seasonal allergies. Duration of quinolones kept to a minimum out of concern levofloxacin may have caused tendon  injury.    Family history: Patient has a sister with cystic fibrosis.  Possible their father had manifestations of CF.    Social history: Lifelong non-smoker.  Previously lived in [place], [place] and was initially followed in a CF center there.  She subsequently moved to [place] and lived there for several years.  She moved to [place] a few years ago.  She was an athlete growing up.  She previously has worked as a .    Review of systems: Constitutional: Currently no fever or chills.  Musculoskeletal: She sustained multiple fractures of her lower extremity with a fall 6 weeks ago.  Fractures are in alignment and she does not need surgery but is nonweightbearing for 12 weeks.    Physical examination  /81   Pulse 83   SpO2 97% , weight 112.5 kg, BMI 39.9  General: Alert appropriate pleasant in no apparent distress speaking in full sentences.  HEENT: Sclera anicteric.  No overt nasal discharge.  Oropharynx is moist without lesion or exudate.    Chest: Clear to auscultation bilaterally with good air movement and normal chest wall excursion.  Cardiovascular: S1-S2 with a regular rate and rhythm.  Distant heart sounds.  No lower extremity edema.  Abdomen: Soft, nondistended, nontender, bowel sounds present  Musculoskeletal: No digital clubbing.  Wearing a boot over her right foot and distal lower extremity.    Studies:  1.  Pulmonary function test from today personally reviewed.  Valid maneuver.  FEV1 2.53 L (96% predicted), FVC 3.04 L (92% predicted).  Values down between 5 and 10% from previous.  She is at the lower end of her baseline.  2.  Most recent respiratory culture July, 2024 with normal hoang.  She last grew Pseudomonas from her lungs May, 2024.  3.  Sinus culture August 6, 2024 with stenotrophomonas and 2 strains of Pseudomonas..  4.  Most recent liver function tests from June were within normal limits.  5.  2023 chest CT scan available for review.  There are some more prominent  bronchiectasis in the lateral right lung.  There is bronchiectasis bilaterally but largely without any thickening of the airways and without mucous plugging.    Impression and plan  1.  Cystic fibrosis lung disease: She may have early exacerbation with some sputum the past couple days.  Pulmonary function tests modestly reduced from last visit and are at the lower end of her baseline.  She is amenable to resuming LABA neb Brovana and vest therapy.  Will consider resumption of budesonide nebs as well.  Initiating modulation therapy may also help.  Will follow-up on today's culture and anticipate empiric oral antibiotics if respiratory symptoms escalate. I do not think additional attempts at Pseudomonas eradication at this time will be helpful.  She will resume physical activity to augment airway clearance once recovered from right lower extremity fractures.    2.  Chronic cystic fibrosis related sinus disease: Status post functional endoscopic sinus surgery in August and did well postoperatively.  Modest interval increase in symptoms in the past few days.  She will continue irrigation.  Will see if initiation of modulation therapy at reduced dose helps.    3.  CFTR modulator candidacy: Patient met with our CF pharmacist, Renea, and details of indications, administration, and side effects of modulation therapy reviewed.  I discussed options for starting modulation therapy at length with our pharmacist and the patient as well. Consensus is to proceed with trial at reduced dose of Trikafta 1 orange tablet daily.  Liver function tests ordered and patient plans on obtaining these this afternoon.    4.  Healthcare maintenance: Patient received influenza vaccine today in clinic.      Will plan on patient following up in 2 months    Total visit time today 40 minutes.  This is exclusive of time interpreting pulmonary function test.    Bryce Fowler MD

## 2024-10-08 DIAGNOSIS — E84.9 CYSTIC FIBROSIS (H): Primary | ICD-10-CM

## 2024-10-08 NOTE — PATIENT INSTRUCTIONS
See provider AVS for a summary of recommendations from today's visit.    Renea Yao, PharmD  Cystic Fibrosis MTM Pharmacist  [facility] [place]  Voicemail: [phone]

## 2024-10-09 ENCOUNTER — TELEPHONE (OUTPATIENT)
Dept: PULMONOLOGY | Facility: CLINIC | Age: 54
End: 2024-10-09
Payer: COMMERCIAL

## 2024-10-09 NOTE — TELEPHONE ENCOUNTER
PA Initiation    Medication: TRIKAFTA 100-50-75 & 150 MG PO TBPK  Insurance Company: [employer] - Phone [phone] Fax [phone]  Pharmacy Filling the Rx:    Filling Pharmacy Phone:    Filling Pharmacy Fax:    Start Date: 10/9/2024    Key: [id number]

## 2024-10-11 ENCOUNTER — TELEPHONE (OUTPATIENT)
Dept: PULMONOLOGY | Facility: CLINIC | Age: 54
End: 2024-10-11
Payer: COMMERCIAL

## 2024-10-11 NOTE — TELEPHONE ENCOUNTER
Patient Contacted for the patient to call back and schedule the following:    Appointment type: Return CF  Provider: Janeth  Return date: 12/7/2024  Specialty phone number: [phone]  Additional appointment(s) needed: cf loop  Additonal Notes: na

## 2024-10-12 LAB — BACTERIA SPEC CULT: NORMAL

## 2024-10-15 NOTE — TELEPHONE ENCOUNTER
Prior Authorization Approval    Medication: TRIKAFTA 100-50-75 & 150 MG PO TBPK  Authorization Effective Date: 10/10/2024  Authorization Expiration Date: 10/10/2025  Approved Dose/Quantity: #84 per 28 days  Reference #: Key: [id number]   Insurance Company: [employer] - Phone [phone] Fax [phone]  Expected CoPay: $ 0  CoPay Card Available: No    Financial Assistance Needed: No, PMAP  Which Pharmacy is filling the prescription: [facility] - [place], [place] - [address]   Pharmacy Notified: Yes, sent CF Teams request  Patient Notified: Yes

## 2024-10-15 NOTE — TELEPHONE ENCOUNTER
CHIARA INITIATED    Medication: TRIKAFTA 100-50-75 & 150 MG PO TBPK  [facility] Name: [facility]  Date submitted: 10/15/2024  3:10 PM   Foundation Phone:    [facility] Fax:     Sumbitted enrollment form on [employer] portal for patient in case insurance changes in the future.

## 2024-10-21 DIAGNOSIS — J32.4 CHRONIC PANSINUSITIS: ICD-10-CM

## 2024-10-21 NOTE — TELEPHONE ENCOUNTER
CHIARA APPROVED    Medication: TRIKAFTA 100-50-75 & 150 MG PO TBPK  Amount: $    Foundation Name: [employer]  [facility] Effective Date:   10/15/24  Foundation Expiration Date:  10/15/2034  Additional Information: Enrolled patient in [facility] in case assistance is ever needed  Patient Notified: Yes

## 2024-10-23 ENCOUNTER — TELEPHONE (OUTPATIENT)
Dept: RADIATION ONCOLOGY | Facility: CLINIC | Age: 54
End: 2024-10-23
Payer: COMMERCIAL

## 2024-10-23 NOTE — TELEPHONE ENCOUNTER
Writer called and spoke to pharmacist to clarify tobramycin rinse order. Writer stated that pt would prefer the pre-mix tobramycin solution. Pharmacist expressed understanding.    Liza Montes RN

## 2024-10-23 NOTE — TELEPHONE ENCOUNTER
Hello,    We received a prescription for Tobramycin with instructions indicating to empty contents of capsule into 240 ml of saline but there was no strength of the capsule to do so.    We actually sent a refill request to refill a premixed saline solution that the patient has been receiving of Tobramycin 320mg/l nasal irrigation with sig of instill 120 ml into each nostril daily.    Please resend prescription or call with questions at [phone].    Thank you,    Latha Duran CPhT, KRISTAN    [facility]  [address] [place] [id number]   [email or web address]  [email or web address]   Phone: [phone]  Fax: [phone]

## 2024-10-24 ENCOUNTER — PHARMACY VISIT (OUTPATIENT)
Dept: ADMINISTRATIVE | Facility: CLINIC | Age: 54
End: 2024-10-24
Payer: COMMERCIAL

## 2024-10-24 NOTE — PROGRESS NOTES
Cystic Fibrosis Clinical Follow Up Assessment   Completed on 2024/10/24 15:55:56 [facility], by Francoise Blanton        Patient  Patient Name: DOLORES CARTWRIGHT  :   EHR ID: [id number]       Activity Date 2024/10/24     Activity Medications    TRIKAFTA        Care Details    What are the patient's goals for this therapy?   ? 24 - get rid of infection and be able to enjoy travel  23 - keeping up with her log of medications.      Did you identify any patient barriers to access and successful treatment?   ? No barriers to access identified      Is it appropriate to collect a PDC at this time? [QA Metric] (An MPR or PDC would not be appropriate for cycled medications or if the patient is on therapy   ? No      Has the patient missed doses inappropriately?   ? No      Please select CURRENT side effect(s) for monitoring:   ? Headache   ? Fatigue   ? Rash/skin reaction   ? Other   ? Sinus congestion          Summary Notes     Spoke with Pt today for Trikafta 7 day follow up.     Trikafta 1 orange daily (modified dose): She was able to get started. She stated getting in the 10 gm of fat is difficult for her. She has had to change her way of eating. She stated she is used to counting protein but not fat. She has been taking doses with whole milk but does not prefer to drink whole milk. We discussed other options like olive oil, avocado, PB or a cheese stick and trying her best to take with meals that contain fat. Ideally, we want 10 gm but any fat is better to help with absorption vs none.     SE: She stated she has had some fatigue, her typical stuffy nose feeling and headaches and still having mild headaches throughout the day. Discussed these are common SE and should resolve in the next few weeks as her body adjusts to the Trikafta. She also stated she has had itching. Her hair follicles feel itchy and she has also had random patches of ichy skin. Not all over but one day it will be her arm, the next  her leg, etc. Feels like dry skin would, not allergic reaction to medication feeling. She stated it seems to happen in the evening. She takes One Orange pill daily at 5:30pm and by 9-10pm the itchy feeling starts. She stated if she takes a warm shower, the itching goes away. She also mentioned in the past few days  she has felt her liver.  I advised to inform clinic of these SE so they can closely monitor her and labs. She said she has been writing down all of her symptoms and has an ENT appt next week she plans to discuss at that time. I will be reporting these SE to [employer] today and will also inform clinic of our conversation.     She did not have any other questions or concerns today.     TM clinician will follow up at time of refill and invited pt to call sooner if questions or concerns arise.     Francoise Blanton, Pharm.D.[facility] [facility] Pharmacist  [facility]  [address] [place] [address] [place] [address]  [email or web address]  Office: [phone]

## 2024-10-31 ENCOUNTER — OFFICE VISIT (OUTPATIENT)
Dept: OTOLARYNGOLOGY | Facility: CLINIC | Age: 54
End: 2024-10-31
Payer: COMMERCIAL

## 2024-10-31 ENCOUNTER — TELEPHONE (OUTPATIENT)
Dept: PULMONOLOGY | Facility: CLINIC | Age: 54
End: 2024-10-31

## 2024-10-31 VITALS — BODY MASS INDEX: 39.24 KG/M2 | HEIGHT: 67 IN | WEIGHT: 250 LBS

## 2024-10-31 DIAGNOSIS — J32.4 CHRONIC PANSINUSITIS: Primary | ICD-10-CM

## 2024-10-31 DIAGNOSIS — Z98.890 HISTORY OF ENDOSCOPIC SINUS SURGERY: ICD-10-CM

## 2024-10-31 DIAGNOSIS — E84.9 CYSTIC FIBROSIS (H): ICD-10-CM

## 2024-10-31 DIAGNOSIS — J33.9 NASAL POLYPOSIS: ICD-10-CM

## 2024-10-31 PROCEDURE — 99213 OFFICE O/P EST LOW 20 MIN: CPT | Mod: 25 | Performed by: STUDENT IN AN ORGANIZED HEALTH CARE EDUCATION/TRAINING PROGRAM

## 2024-10-31 PROCEDURE — 99000 SPECIMEN HANDLING OFFICE-LAB: CPT | Performed by: PATHOLOGY

## 2024-10-31 PROCEDURE — 87077 CULTURE AEROBIC IDENTIFY: CPT | Performed by: STUDENT IN AN ORGANIZED HEALTH CARE EDUCATION/TRAINING PROGRAM

## 2024-10-31 PROCEDURE — 31231 NASAL ENDOSCOPY DX: CPT | Performed by: STUDENT IN AN ORGANIZED HEALTH CARE EDUCATION/TRAINING PROGRAM

## 2024-10-31 PROCEDURE — 87075 CULTR BACTERIA EXCEPT BLOOD: CPT | Performed by: STUDENT IN AN ORGANIZED HEALTH CARE EDUCATION/TRAINING PROGRAM

## 2024-10-31 ASSESSMENT — PAIN SCALES - GENERAL: PAINLEVEL_OUTOF10: MILD PAIN (2)

## 2024-10-31 NOTE — PROGRESS NOTES
"  [facility]  Return Visit  Encounter date:   October 31, 2024    Chief Complaint:   Follow up    ID: s/p stealth assisted bilateral revision maxillary antrostomy, total ethmoidectomy, sphenoidotomy, frontal sinusotomy 8/6/24.     Interval History:   Annie Garcia is a 54 year old female who presents for follow up s/p stealth assisted bilateral revision maxillary antrostomy, total ethmoidectomy, sphenoidotomy, frontal sinusotomy 8/6/24.    Has been feeling more nasal congestion and has noticed some crusting and drainage down the back of her throat.  Started Trikafta about 2 weeks ago.  Also notices a scratchy sensation in the back of her throat.  Overall other than those doing quite well    [place] [facility]  stealth assisted bilateral revision maxillary antrostomy, total ethmoidectomy, sphenoidotomy, frontal sinusotomy 8/6/24 found purulence, crusting of bilateral maxillary antrostomies, bilateral sphenoids, ethmoids.     Review of systems: A 14-point review of systems has been conducted and is negative for any notable symptoms, except as dictated in the history of present illness.     Physical Exam:  Vital signs: Ht 1.702 m (5' 7\")   Wt 113.4 kg (250 lb)   BMI 39.16 kg/m     General Appearance: No acute distress, appropriate demeanor, conversant  Eyes: moist conjunctivae; EOMI; pupils symmetric; visual acuity grossly intact; no proptosis  Head: normocephalic; overall symmetric appearance without deformity  Face: overall symmetric without deformity  Ears: Normal appearance of external ear  Nose: No external deformity  Oral Cavity/oropharynx: Normal appearance of mucosa  Neck: no palpable lymphadenopathy; thyroid without palpable nodules  Lungs: symmetric chest rise; no wheezing  CV: Good distal perfusion; normal hear rate  Extremities: No deformity  Neurologic Exam: Cranial nerves II-XII are grossly intact      Procedure Note  Procedure performed: Rigid nasal endoscopy  Indication: " To evaluate for sinonasal pathology not visualized on routine anterior rhinoscopy  Anesthesia: 4% topical lidocaine with 0.05 % oxymetazoline  Description of procedure: A 30 degree, 3 mm rigid endoscope was inserted into bilateral nasal cavities and the nasal valves, nasal cavity, middle meatus, sphenoethmoid recess, nasopharynx were evaluated for evidence of obstruction, edema, purulence, polyps and/or mass/lesion.     Cecilia-Remy Endoscopic Scoring System  Endoscopic observation Right Left   Polyps in middle meatus (0 = absent, 1 = restricted to middle meatus, 2 = Beyond middle meatus) 0 0   Discharge (0 = absent, 1 = thin and clear, 2 = thick, purulent) 0 0   Edema (0 = absent, 1 = mild-moderate, 2 = moderate-severe) 0 0   Crusting (0 = absent, 1 = mild-moderate, 2 = moderate-severe) 0 1   Scarring (0= absent, 1 = mild-moderate, 2 = moderate-severe) 0 0   Total 0 1     Findings  RT: Sinus is completely healed. No evidence of infection or crusting.    LT: Completely healed from sinus surgery without evidence of infection, small amount of crusting in the left maxillary sinus.    The patient tolerated the procedure well without complication.     Assessment/Medical Decision Makin-year-old female with history of cystic fibrosis presents for second postop appointment.  On endoscopy patient is doing very well.  Only had small amount of crusting in the left maxillary sinus and none at all on the right. remainder of open sinuses patent and without infection    Sent cultures today from nasal cavity and discussed that hopefully the combination of the surgery and Trikafta her symptoms will continue to be stable and her sinuses will continue to look as good as they do today    Will plan on continuing the tobramycin rinses and also discussed with patient using allergy medication as needed as I expect this is the cause of her itchy eyes and possibly throat.      Plan:  Follow up 3-4 months    Inocencio Peña MD  Assistant    [facility]  Rhinology, Endoscopic Skull Base Surgery  [facility]  Department of Otolaryngology - Head & Neck Surgery    ~~~~~~~~~~~~~~~~~~~~~~~~~~~~~~~~~~~~~~~~~~~~~~~~~~~~~~~~~~~~~~~~~~~~~~~~~~~~~~~~~~~~~~~~~~~~~~~~~~~~~~~~~~~~~~~~~~~~~~~~~~~~~~~~~~~~~~~    Past Medical History:   Diagnosis Date    Cystic fibrosis (H)     Depression     Tear of right acetabular labrum         Past Surgical History:   Procedure Laterality Date    ANKLE SURGERY Right 07/27/2019    foot/ankle    AS HYSTEROSCOPY, SURGICAL; W/ ENDOMETRIAL ABLATION, ANY METHOD  2008    [facility]. Thermal balloon.    BREAST SURGERY      ENDOSCOPIC SINUS SURGERY N/A 09/05/2018    Procedure: ENDOSCOPIC SINUS SURGERY;;  Surgeon: Francoise Gonzaelz MD;  Location: [facility]    ENDOSCOPY UPPER, COLONOSCOPY, COMBINED N/A 11/18/2016    Procedure: COMBINED ENDOSCOPY UPPER, COLONOSCOPY;  Surgeon: Levi Hinkle MD;  Location: [facility]    ESOPHAGOSCOPY, GASTROSCOPY, DUODENOSCOPY (EGD), COMBINED  01/10/2014    Procedure: COMBINED ESOPHAGOSCOPY, GASTROSCOPY, DUODENOSCOPY (EGD);  UPPER ENDOSCOPY with Biopsy;  Surgeon: Levi Hinkle MD;  [facility]: [facility]    EXAM UNDER ANESTHESIA NOSE N/A 09/05/2018    Procedure: EXAM UNDER ANESTHESIA NOSE;  SINUS EXAM UNDER ANESTHESIA, Endoscopic Sinus Surgery;  Surgeon: Francoise Gonzalez MD;  Location: [facility]    OPTICAL TRACKING SYSTEM ENDOSCOPIC SINUS SURGERY Bilateral 8/6/2024    Procedure: Stealth assisted bilateral revision maxillary antrostomy, total ethmoidectomy, sphenoidotomy, frontal sinusotomy;  Surgeon: Inocencio Peña MD;  Location:  OR    ORTHOPEDIC SURGERY  1994    left knee arthroscopy    ORTHOPEDIC SURGERY  1994    right shoulder    ORTHOPEDIC SURGERY  2014    left hip replacement    REMOVE HARDWARE FOOT Right 01/27/2020    Procedure: REMOVAL, HARDWARE RIGHT FOOT;  Surgeon: Bryan Cid DPM;  [facility]    SEPTOPLASTY, ENDOSCOPIC SINUS SURGERY, COMBINED Left  02/21/2018    Procedure: COMBINED SEPTOPLASTY, ENDOSCOPIC SINUS SURGERY;  LEFT ENDOSCOPIC SINUS SURGERY, SEPTOPLASTY, BILATERAL TURBINATE REDUCTION, PROPEL IMPLANTS;  Surgeon: Francoise Gonzalez MD;  Location: [facility]    TURBINOPLASTY Bilateral 02/21/2018    Procedure: TURBINOPLASTY;;  Surgeon: Francoise Gonzalez MD;  [facility]: [facility]        Family History   Problem Relation Age of Onset    Diabetes Mother     Hypertension Mother     Thyroid Disease Mother     Thyroid Disease Sister     Dementia Sister         Social History     Socioeconomic History    Marital status: Single   Tobacco Use    Smoking status: Never     Passive exposure: Never    Smokeless tobacco: Never   Vaping Use    Vaping status: Never Used   Substance and Sexual Activity    Alcohol use: Yes     Comment: rare    Drug use: No     Social Drivers of Health     Financial Resource Strain: Low Risk  (10/24/2023)    [facility]    Overall Financial Resource Strain (CARDIA)     Difficulty of Paying Living Expenses: Not hard at all   Food Insecurity: No Food Insecurity (10/28/2024)    Received from [facility]    Hunger Vital Sign     Worried About Running Out of Food in the Last Year: Never true     Ran Out of Food in the Last Year: Never true   Transportation Needs: No Transportation Needs (10/28/2024)    Received from [facility]    PRAPARE - Transportation     Lack of Transportation (Medical): No     Lack of Transportation (Non-Medical): No   Physical Activity: Patient Declined (10/28/2024)    Received from [facility]    Exercise Vital Sign     Days of Exercise per Week: Patient declined     Minutes of Exercise per Session: Patient declined   Stress: Stress Concern Present (10/24/2023)    [facility]    [place] [employer]ational [employer] - Occupational Stress Questionnaire     Feeling of Stress : To some extent   Social Connections: Moderately  Integrated (10/24/2023)    [facility]    Social Connection and Isolation Panel [NHANES]     Frequency of Communication with Friends and Family: More than three times a week     Frequency of Social Gatherings with Friends and Family: Three times a week     Attends Scientologist Services: More than 4 times per year     Active Member of Clubs or Organizations: No     Attends Club or Organization Meetings: 1 to 4 times per year     Marital Status:    Interpersonal Safety: Not At Risk (10/24/2023)    [facility]    Humiliation, Afraid, Rape, and Kick questionnaire     Fear of Current or Ex-Partner: No     Emotionally Abused: No     Physically Abused: No     Sexually Abused: No   Housing Stability: Low Risk  (10/28/2024)    Received from [facility]    Housing Stability     What is your living situation today?: I have a steady place to live

## 2024-10-31 NOTE — LETTER
"10/31/2024       RE: Annie Garcia  [address]  [place] [address]     Dear Colleague,    Thank you for referring your patient, Annie Garcia, to the [facility] [place] at [facility]. Please see a copy of my visit note below.      [facility]  Return Visit  Encounter date:   October 31, 2024    Chief Complaint:   Follow up    ID: s/p stealth assisted bilateral revision maxillary antrostomy, total ethmoidectomy, sphenoidotomy, frontal sinusotomy 8/6/24.     Interval History:   Annie Garcia is a 54 year old female who presents for follow up s/p stealth assisted bilateral revision maxillary antrostomy, total ethmoidectomy, sphenoidotomy, frontal sinusotomy 8/6/24.    Has been feeling more nasal congestion and has noticed some crusting and drainage down the back of her throat.  Started Trikafta about 2 weeks ago.  Also notices a scratchy sensation in the back of her throat.  Overall other than those doing quite well    [place] Operative History  stealth assisted bilateral revision maxillary antrostomy, total ethmoidectomy, sphenoidotomy, frontal sinusotomy 8/6/24 found purulence, crusting of bilateral maxillary antrostomies, bilateral sphenoids, ethmoids.     Review of systems: A 14-point review of systems has been conducted and is negative for any notable symptoms, except as dictated in the history of present illness.     Physical Exam:  Vital signs: Ht 1.702 m (5' 7\")   Wt 113.4 kg (250 lb)   BMI 39.16 kg/m     General Appearance: No acute distress, appropriate demeanor, conversant  Eyes: moist conjunctivae; EOMI; pupils symmetric; visual acuity grossly intact; no proptosis  Head: normocephalic; overall symmetric appearance without deformity  Face: overall symmetric without deformity  Ears: Normal appearance of external ear  Nose: No external deformity  Oral Cavity/oropharynx: " Normal appearance of mucosa  Neck: no palpable lymphadenopathy; thyroid without palpable nodules  Lungs: symmetric chest rise; no wheezing  CV: Good distal perfusion; normal hear rate  Extremities: No deformity  Neurologic Exam: Cranial nerves II-XII are grossly intact      Procedure Note  Procedure performed: Rigid nasal endoscopy  Indication: To evaluate for sinonasal pathology not visualized on routine anterior rhinoscopy  Anesthesia: 4% topical lidocaine with 0.05 % oxymetazoline  Description of procedure: A 30 degree, 3 mm rigid endoscope was inserted into bilateral nasal cavities and the nasal valves, nasal cavity, middle meatus, sphenoethmoid recess, nasopharynx were evaluated for evidence of obstruction, edema, purulence, polyps and/or mass/lesion.     [place]-Remy Endoscopic Scoring System  Endoscopic observation Right Left   Polyps in middle meatus (0 = absent, 1 = restricted to middle meatus, 2 = Beyond middle meatus) 0 0   Discharge (0 = absent, 1 = thin and clear, 2 = thick, purulent) 0 0   Edema (0 = absent, 1 = mild-moderate, 2 = moderate-severe) 0 0   Crusting (0 = absent, 1 = mild-moderate, 2 = moderate-severe) 0 1   Scarring (0= absent, 1 = mild-moderate, 2 = moderate-severe) 0 0   Total 0 1     Findings  RT: Sinus is completely healed. No evidence of infection or crusting.    LT: Completely healed from sinus surgery without evidence of infection, small amount of crusting in the left maxillary sinus.    The patient tolerated the procedure well without complication.     Assessment/Medical Decision Makin-year-old female with history of cystic fibrosis presents for second postop appointment.  On endoscopy patient is doing very well.  Only had small amount of crusting in the left maxillary sinus and none at all on the right. remainder of open sinuses patent and without infection    Sent cultures today from nasal cavity and discussed that hopefully the combination of the surgery and Trikafta her  symptoms will continue to be stable and her sinuses will continue to look as good as they do today    Will plan on continuing the tobramycin rinses and also discussed with patient using allergy medication as needed as I expect this is the cause of her itchy eyes and possibly throat.      Plan:  Follow up 3-4 months    Inocencio Peña MD    [facility]  Rhinology, Endoscopic Skull Base Surgery  [facility]  Department of Otolaryngology - Head & Neck Surgery    ~~~~~~~~~~~~~~~~~~~~~~~~~~~~~~~~~~~~~~~~~~~~~~~~~~~~~~~~~~~~~~~~~~~~~~~~~~~~~~~~~~~~~~~~~~~~~~~~~~~~~~~~~~~~~~~~~~~~~~~~~~~~~~~~~~~~~~~    Past Medical History:   Diagnosis Date     Cystic fibrosis (H)      Depression      Tear of right acetabular labrum         Past Surgical History:   Procedure Laterality Date     ANKLE SURGERY Right 07/27/2019    foot/ankle     AS HYSTEROSCOPY, SURGICAL; W/ ENDOMETRIAL ABLATION, ANY METHOD  2008    [facility]. Thermal balloon.     BREAST SURGERY       ENDOSCOPIC SINUS SURGERY N/A 09/05/2018    Procedure: ENDOSCOPIC SINUS SURGERY;;  Surgeon: Francoise Gonzalez MD;  [facility]     ENDOSCOPY UPPER, COLONOSCOPY, COMBINED N/A 11/18/2016    Procedure: COMBINED ENDOSCOPY UPPER, COLONOSCOPY;  Surgeon: Levi Hinkle MD;  [facility]: [facility]     ESOPHAGOSCOPY, GASTROSCOPY, DUODENOSCOPY (EGD), COMBINED  01/10/2014    Procedure: COMBINED ESOPHAGOSCOPY, GASTROSCOPY, DUODENOSCOPY (EGD);  UPPER ENDOSCOPY with Biopsy;  Surgeon: Levi Hinkle MD;  [facility]     EXAM UNDER ANESTHESIA NOSE N/A 09/05/2018    Procedure: EXAM UNDER ANESTHESIA NOSE;  SINUS EXAM UNDER ANESTHESIA, Endoscopic Sinus Surgery;  Surgeon: Francoise Gonzalez MD;  [facility]     OPTICAL TRACKING SYSTEM ENDOSCOPIC SINUS SURGERY Bilateral 8/6/2024    Procedure: Stealth assisted bilateral revision maxillary antrostomy, total ethmoidectomy, sphenoidotomy, frontal sinusotomy;  Surgeon: Inocencio Peña MD;   [facility]     ORTHOPEDIC SURGERY  1994    left knee arthroscopy     ORTHOPEDIC SURGERY  1994    right shoulder     ORTHOPEDIC SURGERY  2014    left hip replacement     REMOVE HARDWARE FOOT Right 01/27/2020    Procedure: REMOVAL, HARDWARE RIGHT FOOT;  Surgeon: Bryan Cid DPM;  [facility]     SEPTOPLASTY, ENDOSCOPIC SINUS SURGERY, COMBINED Left 02/21/2018    Procedure: COMBINED SEPTOPLASTY, ENDOSCOPIC SINUS SURGERY;  LEFT ENDOSCOPIC SINUS SURGERY, SEPTOPLASTY, BILATERAL TURBINATE REDUCTION, PROPEL IMPLANTS;  Surgeon: Francoise Gonzalez MD;  [facility]     TURBINOPLASTY Bilateral 02/21/2018    Procedure: TURBINOPLASTY;;  Surgeon: Francoise Gonzalez MD;  [facility]        Family History   Problem Relation Age of Onset     Diabetes Mother      Hypertension Mother      Thyroid Disease Mother      Thyroid Disease Sister      Dementia Sister         Social History     Socioeconomic History     Marital status: Single   Tobacco Use     Smoking status: Never     Passive exposure: Never     Smokeless tobacco: Never   Vaping Use     Vaping status: Never Used   Substance and Sexual Activity     Alcohol use: Yes     Comment: rare     Drug use: No     Social Drivers of Health     Financial Resource Strain: Low Risk  (10/24/2023)    [facility]    Overall Financial Resource Strain (CARDIA)      Difficulty of Paying Living Expenses: Not hard at all   Food Insecurity: No Food Insecurity (10/28/2024)    Received from [facility]    Hunger Vital Sign      Worried About Running Out of Food in the Last Year: Never true      Ran Out of Food in the Last Year: Never true   Transportation Needs: No Transportation Needs (10/28/2024)    Received from [facility]    PRAPARE - Transportation      Lack of Transportation (Medical): No      Lack of Transportation (Non-Medical): No   Physical Activity: Patient Declined (10/28/2024)    Received from [facility]     Exercise Vital Sign      Days of Exercise per Week: Patient declined      Minutes of Exercise per Session: Patient declined   Stress: Stress Concern Present (10/24/2023)    [facility]    [place] of Occupational Health - Occupational Stress Questionnaire      Feeling of Stress : To some extent   Social Connections: Moderately Integrated (10/24/2023)    [facility]    Social Connection and Isolation Panel [NHANES]      Frequency of Communication with Friends and Family: More than three times a week      Frequency of Social Gatherings with Friends and Family: Three times a week      Attends Temple Services: More than 4 times per year      Active Member of Clubs or Organizations: No      Attends Club or Organization Meetings: 1 to 4 times per year      Marital Status:    Interpersonal Safety: Not At Risk (10/24/2023)    Received from [facility]    Humiliation, Afraid, Rape, and Kick questionnaire      Fear of Current or Ex-Partner: No      Emotionally Abused: No      Physically Abused: No      Sexually Abused: No   Housing Stability: Low Risk  (10/28/2024)    Received from [facility]    Housing Stability      What is your living situation today?: I have a steady place to live           Again, thank you for allowing me to participate in the care of your patient.      Sincerely,    Inocencio Peña MD

## 2024-10-31 NOTE — PATIENT INSTRUCTIONS
You were seen in the ENT Clinic today by Dr. Peña. If you have any questions or concerns after your appointment, please contact us (see below)       2.   The following recommendations have been made based upon your appointment today:   -Continue current medications.   -We will send cultures to the lab today. Dr. Peña will follow up once he has reviewed those results.      3.   Plan to return to the ENT clinic in 3-4 months.           How to Contact Us:  Send a Publisha message to your provider. Our team will respond to you via Publisha. Occasionally, we will need to call you to get further information.  For urgent matters (Monday-Friday), call the ENT Clinic: [phone] and speak with a call center team member - they will route your call appropriately.   If you'd like to speak directly with a nurse, please find our contact information below. We do our best to check voicemail frequently throughout the day, and will work to call you back within 1-2 days. For urgent matters, please use the general clinic phone numbers listed above.     Liza VILLAREAL RN  Direct: [phone]  Bee GARLAND LPN  Direct: [phone]         [facility]  Department of Otolaryngology

## 2024-10-31 NOTE — TELEPHONE ENCOUNTER
Patient left message on the voicemail providing an update.  Cady, clinic coordinator followed up with patient to schedule lab appt for Jan when patient is scheduled to see Dr. Fowler.    In the message, patient shared that she has an appt with ENT tomorrow.  Patient shared that she had an eye exam scheduled  Patient shared that she is scheduled to see a provider in [place]  on  11/12.  Patient shared that she has been on Trikafta for a couple weeks, and she will send a Agent Panda  message with an update on her side effects.  Patient referenced that she wants to know when to complete lab work.    This RN will reach out to the pharmacy team and follow up with the patient.    SUNITHA GaliciaN, RN  RN Care Coordinator [facility]

## 2024-11-05 ENCOUNTER — PHARMACY VISIT (OUTPATIENT)
Dept: ADMINISTRATIVE | Facility: CLINIC | Age: 54
End: 2024-11-05
Payer: COMMERCIAL

## 2024-11-05 ENCOUNTER — MYC MEDICAL ADVICE (OUTPATIENT)
Dept: OTOLARYNGOLOGY | Facility: CLINIC | Age: 54
End: 2024-11-05
Payer: COMMERCIAL

## 2024-11-05 LAB
BACTERIA SINUS CULT: ABNORMAL

## 2024-11-05 NOTE — PROGRESS NOTES
Cystic Fibrosis Clinical Follow Up Assessment  Spoke with Annie today for 1 month Trikafta follow up.     Trikafta: Continues to take 1 Orange tablet daily at 5:30pm. She keeps a sleeve of the tablets with her in her purse so if she isn't home in time, she can take her dose.   Denies missed doses.   SE: Stated she is still feeling fatigued. She stated with the weather changes, she can't tell if she may be getting sick or if it is just fatigue from Trikafta. She also stated her stools are more loose than they have ever been - just really loose, not diarrhea. Discussed to make sure to stay hydrated. Headaches come and go. She is keeping notes of all her SE and plans to discuss at her next appt in Jan.     CF: Denies allergy or medication changes.     Question: Asked what to do with the blue tablets. We discussed ok to discard. If and/or when she were to add the blue tablets, she will get them with each order.     Ok for TM to follow up next month and invited pt to call sooner if questions or concerns arise.       Francoise Blanton, Pharm.D.[place] [facility] Pharmacist  [address] [address]  [email or web address]  [email or web address]  Office: [phone]

## 2024-11-07 LAB
BACTERIA SINUS CULT: ABNORMAL
BACTERIA SINUS CULT: NORMAL

## 2024-11-12 ENCOUNTER — MYC MEDICAL ADVICE (OUTPATIENT)
Dept: OTOLARYNGOLOGY | Facility: CLINIC | Age: 54
End: 2024-11-12

## 2024-11-15 DIAGNOSIS — J32.4 CHRONIC PANSINUSITIS: ICD-10-CM

## 2024-12-02 ENCOUNTER — PHARMACY VISIT (OUTPATIENT)
Dept: ADMINISTRATIVE | Facility: CLINIC | Age: 54
End: 2024-12-02
Payer: COMMERCIAL

## 2024-12-02 NOTE — PROGRESS NOTES
Cystic Fibrosis Clinical Follow Up Assessment    I spoke with Annie for two month Trikafta follow up and TM assessment.     Order set up for Trikafta to del 12/5/24.     Trikafta: She stated she is doing good on Trikafta. Continues to take 1 Orange tablet daily in the evening. She stated headaches are less. Fatigue is better than it was. Still having some of these symptoms on and off. She continues to have very soft stools. She stated she tried to change her diet but that did not help. She feels like it might be yeast related. She has Diflucan that she has on hand for yeast infections. She stated she tried taking a couple doses and it helped her symptoms but when she stopped, the soft stools returned. We discussed that if she needs to take Diflucan, she should consult with the [facility] pharmacist at the clinic to monitor and adjust Trikafta as needed due to DDI. She stated she took the Diflucan in the AM and the Trikafta in the PM. She understands the DDI affects the liver. I advised even if she has a healthy liver, it can still increase Trikafta concentrations if taken together which could affect liver function. I offered to reach out to [facility] pharmacist to reach out to discuss. Pt was agreeable to this.     Question: She stated she broke her ankle and asked if Trikafta would affect healing. Discussed Trikafta is not an immunosuppressant and I did not expect it to affect healing.     CF: Denies other health, allergy or medication changes today.     TM will continue monthly follow up and invited pt to call sooner if questions or concerns arise.     Francoise Blanton, Pharm.D.[facility] [facility] Pharmacist  [address] [address]  [email or web address]  Office: [phone]

## 2024-12-03 NOTE — TELEPHONE ENCOUNTER
Sent MyChart to Annie to discuss further.    Yolis Henley, PharmD   Cystic Fibrosis MTM Pharmacist  [facility]

## 2024-12-09 DIAGNOSIS — E84.9 CYSTIC FIBROSIS (H): ICD-10-CM

## 2024-12-09 DIAGNOSIS — A49.8 PSEUDOMONAS AERUGINOSA INFECTION: ICD-10-CM

## 2024-12-09 RX ORDER — TOBRAMYCIN 300 MG/4ML
300 SOLUTION RESPIRATORY (INHALATION) 2 TIMES DAILY
Qty: 224 ML | Refills: 1 | Status: SHIPPED | OUTPATIENT
Start: 2024-12-09

## 2024-12-11 ENCOUNTER — TELEPHONE (OUTPATIENT)
Dept: OTOLARYNGOLOGY | Facility: CLINIC | Age: 54
End: 2024-12-11
Payer: COMMERCIAL

## 2024-12-11 DIAGNOSIS — J32.4 CHRONIC PANSINUSITIS: ICD-10-CM

## 2024-12-11 NOTE — TELEPHONE ENCOUNTER
[facility] Call Center    Phone Message    May a detailed message be left on voicemail: yes     Reason for Call: Other: Radha with [facility] pharmacy called and would like a call back refill message had been sent, but instead of a refill a new script was sent and pt was not aware of new prescription.  Please call [phone] and ask for Radha or Pharmacy tech.  [facility] location, thanks     Action Taken: Other: ENT    Travel Screening: Not Applicable     Date of Service:

## 2024-12-11 NOTE — TELEPHONE ENCOUNTER
Writer called and spoke to pharmacy tech. Writer stated that refills for bay rinses have been sent to pharmacy.     Liza Montes RN

## 2024-12-12 ENCOUNTER — TELEPHONE (OUTPATIENT)
Dept: OTOLARYNGOLOGY | Facility: CLINIC | Age: 54
End: 2024-12-12
Payer: COMMERCIAL

## 2024-12-12 NOTE — TELEPHONE ENCOUNTER
[facility] Call Center    Phone Message    May a detailed message be left on voicemail: yes     Reason for Call: Other: Tobramycin The pharmacy is asking what strength capsule of Tobramycin should be, 20 mg, 80 mg? Please call the pharmacy asap so that they can dispense the medication. Thanks.     Action Taken: Message routed to:  [facility] ENT    Travel Screening: Not Applicable     Date of Service:

## 2024-12-21 ENCOUNTER — HEALTH MAINTENANCE LETTER (OUTPATIENT)
Age: 54
End: 2024-12-21

## 2024-12-23 ENCOUNTER — MYC MEDICAL ADVICE (OUTPATIENT)
Dept: PULMONOLOGY | Facility: CLINIC | Age: 54
End: 2024-12-23
Payer: COMMERCIAL

## 2024-12-24 ENCOUNTER — TELEPHONE (OUTPATIENT)
Dept: PULMONOLOGY | Facility: CLINIC | Age: 54
End: 2024-12-24
Payer: COMMERCIAL

## 2024-12-24 DIAGNOSIS — E84.9 CYSTIC FIBROSIS (H): Primary | ICD-10-CM

## 2024-12-24 NOTE — TELEPHONE ENCOUNTER
Lab orders entered for upcoming appt.    Fatuma Martínez, BSN, RN  RN Care Coordinator [facility]

## 2025-01-06 ENCOUNTER — OFFICE VISIT (OUTPATIENT)
Dept: PULMONOLOGY | Facility: CLINIC | Age: 55
End: 2025-01-06
Attending: INTERNAL MEDICINE
Payer: COMMERCIAL

## 2025-01-06 ENCOUNTER — OFFICE VISIT (OUTPATIENT)
Dept: PHARMACY | Facility: CLINIC | Age: 55
End: 2025-01-06

## 2025-01-06 ENCOUNTER — LAB (OUTPATIENT)
Dept: LAB | Facility: CLINIC | Age: 55
End: 2025-01-06
Attending: INTERNAL MEDICINE
Payer: COMMERCIAL

## 2025-01-06 VITALS
BODY MASS INDEX: 43.62 KG/M2 | HEIGHT: 66 IN | OXYGEN SATURATION: 97 % | HEART RATE: 128 BPM | SYSTOLIC BLOOD PRESSURE: 139 MMHG | WEIGHT: 271.39 LBS | DIASTOLIC BLOOD PRESSURE: 84 MMHG

## 2025-01-06 DIAGNOSIS — E84.9 CYSTIC FIBROSIS (H): Primary | ICD-10-CM

## 2025-01-06 DIAGNOSIS — E84.9 CYSTIC FIBROSIS (H): ICD-10-CM

## 2025-01-06 LAB
ALBUMIN SERPL BCG-MCNC: 4.3 G/DL (ref 3.5–5.2)
ALP SERPL-CCNC: 97 U/L (ref 40–150)
ALT SERPL W P-5'-P-CCNC: 38 U/L (ref 0–50)
AST SERPL W P-5'-P-CCNC: 35 U/L (ref 0–45)
BILIRUB DIRECT SERPL-MCNC: <0.2 MG/DL (ref 0–0.3)
BILIRUB SERPL-MCNC: 0.3 MG/DL
CK SERPL-CCNC: 60 U/L (ref 26–192)
EXPTIME-PRE: 7.7 SEC
FEF2575-%PRED-PRE: 113 %
FEF2575-PRE: 2.8 L/SEC
FEF2575-PRED: 2.45 L/SEC
FEFMAX-%PRED-PRE: 128 %
FEFMAX-PRE: 8.79 L/SEC
FEFMAX-PRED: 6.85 L/SEC
FEV1-%PRED-PRE: 94 %
FEV1-PRE: 2.46 L
FEV1FEV6-PRE: 83 %
FEV1FEV6-PRED: 81 %
FEV1FVC-PRE: 82 %
FEV1FVC-PRED: 80 %
FIFMAX-PRE: 6.27 L/SEC
FVC-%PRED-PRE: 91 %
FVC-PRE: 2.98 L
FVC-PRED: 3.27 L
PROT SERPL-MCNC: 7.4 G/DL (ref 6.4–8.3)

## 2025-01-06 PROCEDURE — 80076 HEPATIC FUNCTION PANEL: CPT | Performed by: PATHOLOGY

## 2025-01-06 PROCEDURE — 87070 CULTURE OTHR SPECIMN AEROBIC: CPT | Performed by: INTERNAL MEDICINE

## 2025-01-06 PROCEDURE — G0463 HOSPITAL OUTPT CLINIC VISIT: HCPCS | Performed by: INTERNAL MEDICINE

## 2025-01-06 PROCEDURE — 36415 COLL VENOUS BLD VENIPUNCTURE: CPT | Performed by: PATHOLOGY

## 2025-01-06 PROCEDURE — 94375 RESPIRATORY FLOW VOLUME LOOP: CPT | Performed by: INTERNAL MEDICINE

## 2025-01-06 PROCEDURE — 82550 ASSAY OF CK (CPK): CPT | Performed by: PATHOLOGY

## 2025-01-06 PROCEDURE — 99207 PR NO CHARGE LOS: CPT | Performed by: PHARMACIST

## 2025-01-06 PROCEDURE — 99215 OFFICE O/P EST HI 40 MIN: CPT | Mod: 25 | Performed by: INTERNAL MEDICINE

## 2025-01-06 ASSESSMENT — PAIN SCALES - GENERAL: PAINLEVEL_OUTOF10: NO PAIN (0)

## 2025-01-06 NOTE — PROGRESS NOTES
Patient verbalized that she would not like a visit from RD today during CF clinic appt.  Provider will notify RD at upcoming/future CF appts should pt agree to RD visit.    Karen Yanez MS, RD, LD, CACFD   Cystic Fibrosis/CF Lung Transplant Dietitian      -Available Mon-Thurs 8 AM-4:30 PM. Contact via SeatGe[employer] or Epic Inbasket.      -On Fridays please contact Gwen Carolina RD and on weekends/holidays contact coverage dietitian via [employer] (inpatient use only).

## 2025-01-06 NOTE — PROGRESS NOTES
Disease State Management Encounter:                          Annie Garcia is a 54 year old female coming in for a follow-up visit. Today's visit is a co-visit with Dr. Bryce Fowler MD.     Reason for visit: Medication Question.    CF/CFTR:    Trikafta - 1 orange daily  Annie may start taking fluconazole 1 tablet up to once weekly for seborrheic dermatitis, prescribed by dermatology.  Genotype: [id number]   Cultures (last growth): throat cultures and Sinus swabs grow Steno (8/6/24), PSA (5/30/24-8/6/24), and MRSA (5/30/24)   Lab Results   Component Value Date    ALT 38 01/06/2025    AST 35 01/06/2025    BILITOTAL 0.3 01/06/2025    DBIL <0.20 01/06/2025    ALKPHOS 97 01/06/2025    CKT 60 01/06/2025     Assessment/Plan:    1. Trikafta labs within normal limits, continue Trikafta - modified dose 1 orange daily. Recheck hepatic panel and CK in 3 months.   2. When Annie takes once weekly fluconazole, recommend she take Trikafta - 1 orange on day 1, then skip Trikafta day 2, then back to normal dosing.     Follow-up: 3 months for Trikafta labs    I spent 15 minutes with this patient today. All changes were made via verbal approval with Dr. Bryce Fowler MD.     A summary of these recommendations was given to the patient (see AVS from today's appointment with Dr. Bryce Fowler MD).    Yolis Henley, PharmD   Cystic Fibrosis [facility] Pharmacist  [facility]       Medication Therapy Recommendations  Cystic fibrosis (H)   1 Current Medication: elexacaftor-tezacaftor-ivacaftor & ivacaftor (TRIKAFTA) 100-50-75 & 150 MG tablet pack   Current Medication Sig: Take 1 orange tablet daily. (Skip blue) Swallow whole with fat-containing food.   Rationale: Medication interaction - Adverse medication event - Safety   Recommendation: Decrease Dose   Status: Accepted per Provider   Identified Date: 1/6/2025 Completed Date: 1/6/2025

## 2025-01-06 NOTE — PATIENT INSTRUCTIONS
See provider AVS for a summary of recommendations from today's visit.  Yolis Henley, PharmD  Cystic Fibrosis [facility] Pharmacist  [facility]

## 2025-01-06 NOTE — LETTER
1/6/2025      Annie Garcia  [address]  [facility] [address]      Dear Colleague,    Thank you for referring your patient, Annie Garcia, to the [facility] [place]. Please see a copy of my visit note below.    Patient verbalized that she would not like a visit from RD today during CF clinic appt.  Provider will notify RD at upcoming/future CF appts should pt agree to RD visit.    Karen Yanez MS, RD, LD, CACFD   Cystic Fibrosis/CF Lung Transplant Dietitian      -Available Mon-Thurs 8 AM-4:30 PM. Contact via [employer] or Epic Inbasket.      -On Fridays please contact Gwen Carolina RD and on weekends/holidays contact coverage dietitian via [employer] (inpatient use only).       Pulmonary Staff Initial Consult    CC: The patient is here for follow-up of cystic fibrosis.    Interval history: At the patient's last clinic visit in October, she elected to undergo a trial of low-dose Trikafta.  She recalls having headache from this for the first month but not recently.  She also felt it transiently affected her sleep adversely but this has resolved.  In addition she has had some pruritus but this also resolved for the past month.  Her main concern in terms of side effects is that she has increased appetite, in particular new craving for carbohydrates such as bread and pasta.  Her stools are a bit looser than baseline and perhaps stickier as well.  She has not yet been able to resume regular physical activity due to her ankle injury.  She notes unwanted weight gain.  She does feel the lower dose of Trikafta is helping her sinuses although she was recently advised to take cetirizine daily.  She developed a URI 10 days ago and felt it cleared much more readily from both her sinuses and lower respiratory tract than usual.  She reports being on Bactrim for 10 days.  Her secretions never became as thick and purulent as typical.  Currently she feels her  "respiratory symptoms are near baseline.  She has been using nebs and vest.  She also takes shots of oxygen boost spray.  She reports wheezing intermittently emanating from her left upper lung field since starting Trikafta.    Past medical history  1.  Cystic fibrosis mutation analysis: Genotype delta F508/D110H, sweat chloride c/w CF  2.  Cystic fibrosis related sinus disease.  Outside records indicate history of associated mycetoma.  3.  Cystic fibrosis lung disease: Chest CT March, 2023 makes reference to right upper lobe bronchiectasis with smaller degree of bronchiectasis in the lingula and left lower lobe.  Patient reports lower espiratory cultures historically normal.  4.  Depression (details unavailable)    Past surgical history  1.  Status post right hip arthroplasty, 2014  2.  Removal of \"rope\" under right breast  3.  Status post sinus surgery approximately 8 years ago x 2, [facility], August, 2024    Medications:  Current Outpatient Medications   Medication Sig Dispense Refill     acetaminophen-codeine (TYLENOL #3) 300-30 MG tablet PO TID prn 90 tablet 0     Acetylcysteine (NAC PO) Take 1 capsule by mouth 2 times daily. (750mg)       arformoterol (BROVANA) 15 MCG/2ML NEBU neb solution Take 2 mLs (15 mcg) by nebulization 2 times daily 120 mL 3     BETHKIS 300 MG/4ML nebulizer solution Take 4 mLs (300 mg) by nebulization 2 times daily. 28 days on, 28 days off 224 mL 1     budesonide (PULMICORT) 0.5 MG/2ML neb solution Squirt entire vial into previously made amanda med saline bottle, mix, and irrigate each nostril until entire bottle empty.  Do this twice daily. 120 mL 1     cetirizine (ZYRTEC) 10 MG tablet Take 10 mg by mouth daily.       COMPOUNDED NON-CONTROLLED SUBSTANCE (CMPD RX) - PHARMACY TO MIX COMPOUNDED MEDICATION Irrigate each nasal cavity with 120mL of tobramycin solution 1-2 times daily. 52992 mL 3     COMPOUNDED NON-CONTROLLED SUBSTANCE (CMPD RX) - PHARMACY TO MIX COMPOUNDED " MEDICATION Open Tobramycin capsule and empty contents into 240 ml of nasal saline mixture. Rinse each nasal cavity with 120 ml of mixture daily. 30 capsule 1     dornase stefan (PULMOZYME) 2.5 MG/2.5ML neb solution USE 1 VIAL IN NEBULIZER DAILY AS NEEDED 75 mL 3     elexacaftor-tezacaftor-ivacaftor & ivacaftor (TRIKAFTA) 100-50-75 & 150 MG tablet pack Take 1 orange tablet daily. (Skip blue) Swallow whole with fat-containing food. 84 tablet 3     mometasone (NASONEX) 50 MCG/ACT nasal spray Spray 2 sprays into both nostrils daily 17 g 5     sodium chloride (OCEAN) 0.65 % nasal spray Spray 1 spray into both nostrils daily as needed for congestion.       zolpidem (AMBIEN) 5 MG tablet Take 5 mg by mouth nightly as needed for sleep.       desonide (DESOWEN) 0.05 % external ointment APPLY 5 TIMES A DAY TO ACTIVE RASH ON LIPS AND FACE UNTIL RESOLVED (Patient not taking: Reported on 7/29/2024) 60 g 1     dexlansoprazole (DEXILANT) 30 MG CPDR CR capsule Take 1 capsule (30 mg) by mouth daily (Patient not taking: Reported on 8/5/2024) 30 capsule 1     fluconazole (DIFLUCAN) 100 MG tablet TAKE 1 TABLET BY MOUTH ONCE DAILY FOR 3 DAYS MAY  REPEAT  A  SECOND  COURSE  IF  NEEDED. 6 tablet 0     ipratropium - albuterol 0.5 mg/2.5 mg/3 mL (DUONEB) 0.5-2.5 (3) MG/3ML neb solution Inhale 3 mLs into the lungs (Patient not taking: Reported on 10/7/2024)       No current facility-administered medications for this visit.   N acetylcysteine po bid     Allergies: Reports history of seasonal allergies. Duration of quinolones kept to a minimum out of concern levofloxacin may have caused tendon injury.    Family history: Patient has a sister with cystic fibrosis.  Possible their father had manifestations of CF.    Social history: Lifelong non-smoker.  Previously lived in [place], [place] and was initially followed in a CF center there.  She subsequently moved to [place] and lived there for several years.  She moved to [facility] a few years  "ago.  She was an athlete growing up.  She previously has worked as a .    Review of systems: Constitutional: Currently no fever or chills.  Musculoskeletal: Ankle injury sustained late summer is improving.  Skin: She was recently diagnosed with seborrheic dermatitis involving her face and is on topical medications.  She may need antifungal for this.  Unclear if this is related to starting Trikafta.    Physical examination  /84   Pulse (!) 128   Ht 1.68 m (5' 6.14\")   Wt 123.1 kg (271 lb 6.2 oz)   SpO2 97%   BMI 43.62 kg/m  ,   General: Alert appropriate pleasant in no apparent distress speaking in full sentences.  HEENT: Sclera anicteric.  No overt nasal discharge.  Oropharynx is moist without lesion or exudate.    Chest: Clear to auscultation bilaterally with good air movement and normal chest wall excursion.  Cardiovascular: S1-S2 with a regular rate and rhythm.  Distant heart sounds.  No lower extremity edema.  Abdomen: Soft, nondistended, nontender, bowel sounds present  Musculoskeletal: No digital clubbing.  Wearing a boot over her right foot and distal lower extremity.    Previously reviewed studies:  1.  Sinus culture August 6, 2024 with stenotrophomonas and 2 strains of Pseudomonas..  2.  2023 chest CT scan available for review.  There are some more prominent bronchiectasis in the lateral right lung.  There is bronchiectasis bilaterally but largely without any thickening of the airways and without mucous plugging.    New studies:  1.  Pulmonary function test from today personally reviewed.  FEV1 2.46 L (94% predicted), FVC 2.98 L (91% predicted).  Values are within normal limits.  They are down slightly and at the lower end of her baseline.  2.  Most recent sinus culture October, 2024 had stenotrophomonas.  Her respiratory culture from October, 2024 had normal hoang only.  3.  Liver function tests from today within normal limits.      Impression and plan  1.  Cystic fibrosis lung " disease: Recent upper respiratory infection may account for PFTs still at the lower end of her baseline but deferring antibiotics given resolving symptoms.  Will follow-up on today's culture.  She is using Brovana and vest therapy.  Will consider resumption of budesonide nebs as well.  She will resume physical activity to augment airway clearance once recovered from right lower extremity fractures.    2.  Chronic cystic fibrosis related sinus disease: Status post functional endoscopic sinus surgery in August and did well postoperatively.  Early in trial of modulation therapy but appears to be having a beneficial effect on burden of sinus symptoms even on reduced dose.    3.  CFTR modulation therapy: Patient feels her most recent sinus and lower respiratory flare  resolved much more quickly than previous and possible this is related to being on Trikafta at reduced dose of 1 orange pill per day.  Consensus is to continue her on this dose.  She will need repeat liver function tests in approximately 3 months.    4.  Unwanted weight gain: Unclear to what extent this is associated with starting Trikafta, as she associates initiation of therapy with increased craving for carbohydrates versus ongoing reduced activity related to her ankle injury.  She anticipates being able to resume more physical activity soon.  If excess weight remains a concern, favor referral to a weight loss management program.    5.  Possible obstructive sleep apnea: Patient reports sleeping better since starting Trikafta, possibly due to improved sinus congestion.  However, she is at significant risk for obstructive sleep apnea.  She expressed concerns about developing sinus and lower respiratory infections with use of a CPAP mask and reassured her the mask should not pose significant risk.  She is hoping to have a home sleep study.    Will plan on patient following up in 3 months    Total visit time today 40 minutes.  This is exclusive of time  interpreting pulmonary function test.    Bryce Fowler MD          Again, thank you for allowing me to participate in the care of your patient.        Sincerely,        Bryce Fowler MD    Electronically signed

## 2025-01-07 ENCOUNTER — TELEPHONE (OUTPATIENT)
Dept: PULMONOLOGY | Facility: CLINIC | Age: 55
End: 2025-01-07
Payer: COMMERCIAL

## 2025-01-07 NOTE — PROGRESS NOTES
Pulmonary Staff Initial Consult    CC: The patient is here for follow-up of cystic fibrosis.    Interval history: At the patient's last clinic visit in October, she elected to undergo a trial of low-dose Trikafta.  She recalls having headache from this for the first month but not recently.  She also felt it transiently affected her sleep adversely but this has resolved.  In addition she has had some pruritus but this also resolved for the past month.  Her main concern in terms of side effects is that she has increased appetite, in particular new craving for carbohydrates such as bread and pasta.  Her stools are a bit looser than baseline and perhaps stickier as well.  She has not yet been able to resume regular physical activity due to her ankle injury.  She notes unwanted weight gain.  She does feel the lower dose of Trikafta is helping her sinuses although she was recently advised to take cetirizine daily.  She developed a URI 10 days ago and felt it cleared much more readily from both her sinuses and lower respiratory tract than usual.  She reports being on Bactrim for 10 days.  Her secretions never became as thick and purulent as typical.  Currently she feels her respiratory symptoms are near baseline.  She has been using nebs and vest.  She also takes shots of oxygen boost spray.  She reports wheezing intermittently emanating from her left upper lung field since starting Trikafta.    Past medical history  1.  Cystic fibrosis mutation analysis: Genotype delta F508/D110H, sweat chloride c/w CF  2.  Cystic fibrosis related sinus disease.  Outside records indicate history of associated mycetoma.  3.  Cystic fibrosis lung disease: Chest CT March, 2023 makes reference to right upper lobe bronchiectasis with smaller degree of bronchiectasis in the lingula and left lower lobe.  Patient reports lower espiratory cultures historically normal.  4.  Depression (details unavailable)    Past surgical history  1.  Status post  "right hip arthroplasty, 2014  2.  Removal of \"rope\" under right breast  3.  Status post sinus surgery approximately 8 years ago x 2, [facility], August, 2024    Medications:  Current Outpatient Medications   Medication Sig Dispense Refill    acetaminophen-codeine (TYLENOL #3) 300-30 MG tablet PO TID prn 90 tablet 0    Acetylcysteine (NAC PO) Take 1 capsule by mouth 2 times daily. (750mg)      arformoterol (BROVANA) 15 MCG/2ML NEBU neb solution Take 2 mLs (15 mcg) by nebulization 2 times daily 120 mL 3    BETHKIS 300 MG/4ML nebulizer solution Take 4 mLs (300 mg) by nebulization 2 times daily. 28 days on, 28 days off 224 mL 1    budesonide (PULMICORT) 0.5 MG/2ML neb solution Squirt entire vial into previously made amanda med saline bottle, mix, and irrigate each nostril until entire bottle empty.  Do this twice daily. 120 mL 1    cetirizine (ZYRTEC) 10 MG tablet Take 10 mg by mouth daily.      COMPOUNDED NON-CONTROLLED SUBSTANCE (CMPD RX) - PHARMACY TO MIX COMPOUNDED MEDICATION Irrigate each nasal cavity with 120mL of tobramycin solution 1-2 times daily. 49444 mL 3    COMPOUNDED NON-CONTROLLED SUBSTANCE (CMPD RX) - PHARMACY TO MIX COMPOUNDED MEDICATION Open Tobramycin capsule and empty contents into 240 ml of nasal saline mixture. Rinse each nasal cavity with 120 ml of mixture daily. 30 capsule 1    dornase stefan (PULMOZYME) 2.5 MG/2.5ML neb solution USE 1 VIAL IN NEBULIZER DAILY AS NEEDED 75 mL 3    elexacaftor-tezacaftor-ivacaftor & ivacaftor (TRIKAFTA) 100-50-75 & 150 MG tablet pack Take 1 orange tablet daily. (Skip blue) Swallow whole with fat-containing food. 84 tablet 3    mometasone (NASONEX) 50 MCG/ACT nasal spray Spray 2 sprays into both nostrils daily 17 g 5    sodium chloride (OCEAN) 0.65 % nasal spray Spray 1 spray into both nostrils daily as needed for congestion.      zolpidem (AMBIEN) 5 MG tablet Take 5 mg by mouth nightly as needed for sleep.      desonide (DESOWEN) 0.05 % external ointment " "APPLY 5 TIMES A DAY TO ACTIVE RASH ON LIPS AND FACE UNTIL RESOLVED (Patient not taking: Reported on 7/29/2024) 60 g 1    dexlansoprazole (DEXILANT) 30 MG CPDR CR capsule Take 1 capsule (30 mg) by mouth daily (Patient not taking: Reported on 8/5/2024) 30 capsule 1    fluconazole (DIFLUCAN) 100 MG tablet TAKE 1 TABLET BY MOUTH ONCE DAILY FOR 3 DAYS MAY  REPEAT  A  SECOND  COURSE  IF  NEEDED. 6 tablet 0    ipratropium - albuterol 0.5 mg/2.5 mg/3 mL (DUONEB) 0.5-2.5 (3) MG/3ML neb solution Inhale 3 mLs into the lungs (Patient not taking: Reported on 10/7/2024)       No current facility-administered medications for this visit.   N acetylcysteine po bid     Allergies: Reports history of seasonal allergies. Duration of quinolones kept to a minimum out of concern levofloxacin may have caused tendon injury.    Family history: Patient has a sister with cystic fibrosis.  Possible their father had manifestations of CF.    Social history: Lifelong non-smoker.  Previously lived in [place], [place] and was initially followed in a CF center there.  She subsequently moved to [place] and lived there for several years.  She moved to [facility] a few years ago.  She was an athlete growing up.  She previously has worked as a .    Review of systems: Constitutional: Currently no fever or chills.  Musculoskeletal: Ankle injury sustained late summer is improving.  Skin: She was recently diagnosed with seborrheic dermatitis involving her face and is on topical medications.  She may need antifungal for this.  Unclear if this is related to starting Trikafta.    Physical examination  /84   Pulse (!) 128   Ht 1.68 m (5' 6.14\")   Wt 123.1 kg (271 lb 6.2 oz)   SpO2 97%   BMI 43.62 kg/m  ,   General: Alert appropriate pleasant in no apparent distress speaking in full sentences.  HEENT: Sclera anicteric.  No overt nasal discharge.  Oropharynx is moist without lesion or exudate.    Chest: Clear to auscultation " bilaterally with good air movement and normal chest wall excursion.  Cardiovascular: S1-S2 with a regular rate and rhythm.  Distant heart sounds.  No lower extremity edema.  Abdomen: Soft, nondistended, nontender, bowel sounds present  Musculoskeletal: No digital clubbing.  Wearing a boot over her right foot and distal lower extremity.    Previously reviewed studies:  1.  Sinus culture August 6, 2024 with stenotrophomonas and 2 strains of Pseudomonas..  2.  2023 chest CT scan available for review.  There are some more prominent bronchiectasis in the lateral right lung.  There is bronchiectasis bilaterally but largely without any thickening of the airways and without mucous plugging.    New studies:  1.  Pulmonary function test from today personally reviewed.  FEV1 2.46 L (94% predicted), FVC 2.98 L (91% predicted).  Values are within normal limits.  They are down slightly and at the lower end of her baseline.  2.  Most recent sinus culture October, 2024 had stenotrophomonas.  Her respiratory culture from October, 2024 had normal hoang only.  3.  Liver function tests from today within normal limits.      Impression and plan  1.  Cystic fibrosis lung disease: Recent upper respiratory infection may account for PFTs still at the lower end of her baseline but deferring antibiotics given resolving symptoms.  Will follow-up on today's culture.  She is using Brovana and vest therapy.  Will consider resumption of budesonide nebs as well.  She will resume physical activity to augment airway clearance once recovered from right lower extremity fractures.    2.  Chronic cystic fibrosis related sinus disease: Status post functional endoscopic sinus surgery in August and did well postoperatively.  Early in trial of modulation therapy but appears to be having a beneficial effect on burden of sinus symptoms even on reduced dose.    3.  CFTR modulation therapy: Patient feels her most recent sinus and lower respiratory flare  resolved  much more quickly than previous and possible this is related to being on Trikafta at reduced dose of 1 orange pill per day.  Consensus is to continue her on this dose.  She will need repeat liver function tests in approximately 3 months.    4.  Unwanted weight gain: Unclear to what extent this is associated with starting Trikafta, as she associates initiation of therapy with increased craving for carbohydrates versus ongoing reduced activity related to her ankle injury.  She anticipates being able to resume more physical activity soon.  If excess weight remains a concern, favor referral to a weight loss management program.    5.  Possible obstructive sleep apnea: Patient reports sleeping better since starting Trikafta, possibly due to improved sinus congestion.  However, she is at significant risk for obstructive sleep apnea.  She expressed concerns about developing sinus and lower respiratory infections with use of a CPAP mask and reassured her the mask should not pose significant risk.  She is hoping to have a home sleep study.    Will plan on patient following up in 3 months    Total visit time today 40 minutes.  This is exclusive of time interpreting pulmonary function test.    Bryce Fowler MD

## 2025-01-07 NOTE — TELEPHONE ENCOUNTER
Patient Contacted for the patient to call back and schedule the following:    Appointment type: Return CF  Provider: Janeth  Return date: 4/6/2025  Specialty phone number: [phone]  Additional appointment(s) needed: cf loop, labs  Additonal Notes: na

## 2025-01-09 LAB
BACTERIA SPEC CULT: ABNORMAL

## 2025-01-11 ENCOUNTER — MYC MEDICAL ADVICE (OUTPATIENT)
Dept: PULMONOLOGY | Facility: CLINIC | Age: 55
End: 2025-01-11
Payer: COMMERCIAL

## 2025-01-11 LAB
BACTERIA SPEC CULT: ABNORMAL

## 2025-01-13 DIAGNOSIS — E84.9 CYSTIC FIBROSIS (H): Primary | ICD-10-CM

## 2025-01-21 DIAGNOSIS — E84.9 CYSTIC FIBROSIS (H): ICD-10-CM

## 2025-01-21 DIAGNOSIS — A49.8 PSEUDOMONAS AERUGINOSA INFECTION: ICD-10-CM

## 2025-01-21 RX ORDER — TOBRAMYCIN 300 MG/4ML
300 SOLUTION RESPIRATORY (INHALATION) 2 TIMES DAILY
Qty: 224 ML | Refills: 2 | Status: SHIPPED | OUTPATIENT
Start: 2025-01-21

## 2025-01-21 RX ORDER — TOBRAMYCIN 300 MG/4ML
300 SOLUTION RESPIRATORY (INHALATION) 2 TIMES DAILY
Qty: 224 ML | Refills: 1 | OUTPATIENT
Start: 2025-01-21 | End: 2025-01-21

## 2025-01-27 ENCOUNTER — MYC MEDICAL ADVICE (OUTPATIENT)
Dept: OTOLARYNGOLOGY | Facility: CLINIC | Age: 55
End: 2025-01-27
Payer: COMMERCIAL

## 2025-01-30 ENCOUNTER — MYC MEDICAL ADVICE (OUTPATIENT)
Dept: OTOLARYNGOLOGY | Facility: CLINIC | Age: 55
End: 2025-01-30

## 2025-01-30 ENCOUNTER — OFFICE VISIT (OUTPATIENT)
Dept: OTOLARYNGOLOGY | Facility: CLINIC | Age: 55
End: 2025-01-30
Payer: COMMERCIAL

## 2025-01-30 VITALS
DIASTOLIC BLOOD PRESSURE: 86 MMHG | BODY MASS INDEX: 41.67 KG/M2 | SYSTOLIC BLOOD PRESSURE: 169 MMHG | OXYGEN SATURATION: 95 % | WEIGHT: 265.5 LBS | HEIGHT: 67 IN | HEART RATE: 85 BPM

## 2025-01-30 DIAGNOSIS — Z98.890 HISTORY OF ENDOSCOPIC SINUS SURGERY: ICD-10-CM

## 2025-01-30 DIAGNOSIS — J33.9 NASAL POLYPOSIS: ICD-10-CM

## 2025-01-30 DIAGNOSIS — J32.4 CHRONIC PANSINUSITIS: ICD-10-CM

## 2025-01-30 DIAGNOSIS — E84.9 CYSTIC FIBROSIS (H): ICD-10-CM

## 2025-01-30 PROCEDURE — 87186 SC STD MICRODIL/AGAR DIL: CPT | Performed by: STUDENT IN AN ORGANIZED HEALTH CARE EDUCATION/TRAINING PROGRAM

## 2025-01-30 PROCEDURE — 87075 CULTR BACTERIA EXCEPT BLOOD: CPT | Performed by: STUDENT IN AN ORGANIZED HEALTH CARE EDUCATION/TRAINING PROGRAM

## 2025-01-30 PROCEDURE — 99000 SPECIMEN HANDLING OFFICE-LAB: CPT | Performed by: PATHOLOGY

## 2025-01-30 PROCEDURE — 87077 CULTURE AEROBIC IDENTIFY: CPT | Performed by: STUDENT IN AN ORGANIZED HEALTH CARE EDUCATION/TRAINING PROGRAM

## 2025-01-30 RX ORDER — TOBRAMYCIN 40 MG/ML
INJECTION INTRAMUSCULAR; INTRAVENOUS
COMMUNITY
Start: 2025-01-27

## 2025-01-30 RX ORDER — SULFAMETHOXAZOLE AND TRIMETHOPRIM 800; 160 MG/1; MG/1
1 TABLET ORAL
COMMUNITY

## 2025-01-30 RX ORDER — LEVOFLOXACIN 750 MG/1
750 TABLET, FILM COATED ORAL
COMMUNITY
Start: 2023-03-28

## 2025-01-30 RX ORDER — PREDNISONE 10 MG/1
10 TABLET ORAL
COMMUNITY

## 2025-01-30 ASSESSMENT — PAIN SCALES - GENERAL: PAINLEVEL_OUTOF10: NO PAIN (0)

## 2025-01-30 NOTE — NURSING NOTE
"Chief Complaint   Patient presents with    RECHECK   Blood pressure (!) 169/86, pulse 85, height 1.702 m (5' 7\"), weight 120.4 kg (265 lb 8 oz), SpO2 95%. Magen Guerrero, EMT    "

## 2025-01-30 NOTE — LETTER
1/30/2025       RE: Annie Garcia  [address]  [facility] [address]     Dear Colleague,    Thank you for referring your patient, Annie Garcia, to the [facility] [place] at [facility]. Please see a copy of my visit note below.      [facility]  Return Visit  Encounter date:   January 30, 2025    Chief Complaint:   Follow up     ID: s/p stealth assisted bilateral revision maxillary antrostomy, total ethmoidectomy, sphenoidotomy, frontal sinusotomy 8/6/24.     Interval History:   Annie Garcia is a 54 year old female who presents for follow up s/p stealth assisted bilateral revision maxillary antrostomy, total ethmoidectomy, sphenoidotomy, frontal sinusotomy 8/6/24.    8/28/24: She states that she took her ciprofloxacin and has been taking Prednisone for the past 4 days. She states that yesterday she started to have relief with Prednisone. Taste has not yet returned. She is continuing with Ta sinus rinses once daily. She states that she has a lot of nasal dryness and crusting that she cannot remove at times. Hard for her to sleep when mucous gets bad.     10/31/24:  Has been feeling more nasal congestion and has noticed some crusting and drainage down the back of her throat.  Started Trikafta about 2 weeks ago.  Also notices a scratchy sensation in the back of her throat.  Overall other than those doing quite well     1/30/25: States that overall her sinuses are in a good place. Had a throat swab a few weeks ago that grew psudomonas. Last sinus culture grew Staph epidermidis.  She had some crusting and put bactrim ointment in her nose for 7-10 days and the crusting went away. Has been trailing manuca honey spray and throat rinse.  She endorses a dry nose and has been using Vaseline. Overall, she feels that the Trikafta seems to help her symptoms though it has been hard to say since she  "started it after surgery. Has had 20 lb weight gain which she is concerned could be from the Trikafta. Getting a sleep study.         [place] [facility]  s/p stealth assisted bilateral revision maxillary antrostomy, total ethmoidectomy, sphenoidotomy, frontal sinusotomy 8/6/24.     Review of systems: A 14-point review of systems has been conducted and is negative for any notable symptoms, except as dictated in the history of present illness.     Physical Exam:  Vital signs: BP (!) 169/86 (BP Location: Right arm, Patient Position: Sitting, Cuff Size: Adult Large)   Pulse 85   Ht 1.702 m (5' 7\")   Wt 120.4 kg (265 lb 8 oz)   SpO2 95%   BMI 41.58 kg/m     General Appearance: No acute distress, appropriate demeanor, conversant  Eyes: moist conjunctivae; EOMI; pupils symmetric; visual acuity grossly intact; no proptosis  Head: normocephalic; overall symmetric appearance without deformity  Face: overall symmetric without deformity  Ears: Normal appearance of external ear  Nose: No external deformity  Oral Cavity/oropharynx: Normal appearance of mucosa  Neck: no palpable lymphadenopathy; thyroid without palpable nodules  Lungs: symmetric chest rise; no wheezing  CV: Good distal perfusion; normal hear rate  Extremities: No deformity  Neurologic Exam: Cranial nerves II-XII are grossly intact      Procedure Note  Procedure performed: Rigid nasal endoscopy  Indication: To evaluate for sinonasal pathology not visualized on routine anterior rhinoscopy  Anesthesia: 4% topical lidocaine with 0.05 % oxymetazoline  Description of procedure: A 30 degree, 3 mm rigid endoscope was inserted into bilateral nasal cavities and the nasal valves, nasal cavity, middle meatus, sphenoethmoid recess, nasopharynx were evaluated for evidence of obstruction, edema, purulence, polyps and/or mass/lesion.     [place] Endoscopic Scoring System  Endoscopic observation Right Left   Polyps in middle meatus (0 = absent, 1 = restricted to " middle meatus, 2 = Beyond middle meatus) 0 0   Discharge (0 = absent, 1 = thin and clear, 2 = thick, purulent) 0 0   Edema (0 = absent, 1 = mild-moderate, 2 = moderate-severe) 0 0   Crusting (0 = absent, 1 = mild-moderate, 2 = moderate-severe) 1 0   Scarring (0= absent, 1 = mild-moderate, 2 = moderate-severe) 0 0   Total 1 0     Findings  RT: paranasal sinus widely patent without evidence of infection or crusting.  LT: maxillary sinus open without crust or infection There was some crusting along the posterior ethmoids which was completely cleaned out.      The patient tolerated the procedure well without complication.     Laboratory Review:  Anaerobic Bacterial Culture Routine 10/31/24  1+ Cutibacterium (Propionibacterium) avidum    Respiratory Aerobic Bacterial Culture 10/31/24  1+ Staphylococcus epidermidis    Assessment/Medical Decision Making:  nAnie Garcia is a 54 year old female who presents for follow up s/p stealth assisted bilateral revision maxillary antrostomy, total ethmoidectomy, sphenoidotomy, frontal sinusotomy 8/6/24.     Her physical exam looks very good, there is some crusting but significantly less compared to previous exams. We did a culture in office today. Overall, I am very satisfied with how her sinuses looked. We will continue the rinses, alternating between antibiotic and coconut rinses. She can follow up with me in three months.     Plan:  Continue sinus rinses (Alternate between antibiotic and regular/coconut rinses). Fine for her to trial honey spray as well.   Follow up with me in 3 months    Scribe Disclosure:   By signing my name below, I, Naima Martines, attest that this documentation has been prepared under the direction and in the presence of Inocencio Peña MD.  - Electronically Signed: Naima Martines 01/30/25     Provider Disclosure:  I agree with above History, Review of Systems, Physical exam and Plan.  I have reviewed the content of the documentation and have edited  it as needed. I have personally performed the services documented here and the documentation accurately represents those services and the decisions I have made.      Electronically signed by:    Inocencio Peña MD    [facility]  Rhinology, Endoscopic Skull Base Surgery  [facility]  Department of Otolaryngology - Head & Neck Surgery    ~~~~~~~~~~~~~~~~~~~~~~~~~~~~~~~~~~~~~~~~~~~~~~~~~~~~~~~~~~~~~~~~~~~~~~~~~~~~~~~~~~~~~~~~~~~~~~~~~~~~~~~~~~~~~~~~~~~~~~~~~~~~~~~~~~~~~~~    Past Medical History:   Diagnosis Date     Cystic fibrosis (H)      Depression      Tear of right acetabular labrum         Past Surgical History:   Procedure Laterality Date     ANKLE SURGERY Right 07/27/2019    foot/ankle     AS HYSTEROSCOPY, SURGICAL; W/ ENDOMETRIAL ABLATION, ANY METHOD  2008    [facility]. Thermal balloon.     BREAST SURGERY       ENDOSCOPIC SINUS SURGERY N/A 09/05/2018    Procedure: ENDOSCOPIC SINUS SURGERY;;  Surgeon: Francoise Gonzalez MD;  [facility]: [facility]     ENDOSCOPY UPPER, COLONOSCOPY, COMBINED N/A 11/18/2016    Procedure: COMBINED ENDOSCOPY UPPER, COLONOSCOPY;  Surgeon: Levi Hinkle MD;  [facility]: [facility]     ESOPHAGOSCOPY, GASTROSCOPY, DUODENOSCOPY (EGD), COMBINED  01/10/2014    Procedure: COMBINED ESOPHAGOSCOPY, GASTROSCOPY, DUODENOSCOPY (EGD);  UPPER ENDOSCOPY with Biopsy;  Surgeon: Levi Hinkle MD;  Location: [facility]     EXAM UNDER ANESTHESIA NOSE N/A 09/05/2018    Procedure: EXAM UNDER ANESTHESIA NOSE;  SINUS EXAM UNDER ANESTHESIA, Endoscopic Sinus Surgery;  Surgeon: Francoise Gonzalez MD;  Location: [facility]     OPTICAL TRACKING SYSTEM ENDOSCOPIC SINUS SURGERY Bilateral 8/6/2024    Procedure: Stealth assisted bilateral revision maxillary antrostomy, total ethmoidectomy, sphenoidotomy, frontal sinusotomy;  Surgeon: Inocencio Peña MD;  [facility]     ORTHOPEDIC SURGERY  1994    left knee arthroscopy     ORTHOPEDIC SURGERY  1994    right shoulder      ORTHOPEDIC SURGERY  2014    left hip replacement     REMOVE HARDWARE FOOT Right 01/27/2020    Procedure: REMOVAL, HARDWARE RIGHT FOOT;  Surgeon: Bryan Cid DPM;  [facility]: [facility]     SEPTOPLASTY, ENDOSCOPIC SINUS SURGERY, COMBINED Left 02/21/2018    Procedure: COMBINED SEPTOPLASTY, ENDOSCOPIC SINUS SURGERY;  LEFT ENDOSCOPIC SINUS SURGERY, SEPTOPLASTY, BILATERAL TURBINATE REDUCTION, PROPEL IMPLANTS;  Surgeon: Francoise Gonzalez MD;  [facility]: [facility]     TURBINOPLASTY Bilateral 02/21/2018    Procedure: TURBINOPLASTY;;  Surgeon: Francoise Gonzalez MD;  [facility]: [facility]        Family History   Problem Relation Age of Onset     Diabetes Mother      Hypertension Mother      Thyroid Disease Mother      Thyroid Disease Sister      Dementia Sister         Social History     Socioeconomic History     Marital status: Single   Tobacco Use     Smoking status: Never     Passive exposure: Never     Smokeless tobacco: Never   Vaping Use     Vaping status: Never Used   Substance and Sexual Activity     Alcohol use: Yes     Comment: rare     Drug use: No     Social Drivers of Health     Financial Resource Strain: Low Risk  (11/11/2024)    Received from [facility]    Overall Financial Resource Strain (CARDIA)      Difficulty of Paying Living Expenses: Not hard at all   Food Insecurity: No Food Insecurity (11/11/2024)    Received from [facility]    Hunger Vital Sign      Worried About Running Out of Food in the Last Year: Never true      Ran Out of Food in the Last Year: Never true   Transportation Needs: No Transportation Needs (11/11/2024)    Received from [facility]    PRAPARE - Transportation      Lack of Transportation (Medical): No      Lack of Transportation (Non-Medical): No   Physical Activity: Inactive (11/11/2024)    Received from [facility]    Exercise Vital Sign      Days of Exercise per Week: 0 days      Minutes of Exercise per Session: 0 min   Stress: No Stress  Concern Present (11/11/2024)    Received from [facility]    [place] of Occupational [employer] - Occupational Stress Questionnaire      Feeling of Stress : Only a little   Social Connections: Moderately Integrated (11/11/2024)    Received from [facility]    Social Connection and Isolation Panel [NHANES]      Frequency of Communication with Friends and Family: Once a week      Frequency of Social Gatherings with Friends and Family: Three times a week      Attends Denominational Services: 1 to 4 times per year      Active Member of Clubs or Organizations: Yes      Attends Club or Organization Meetings: 1 to 4 times per year      Marital Status:    Interpersonal Safety: Not At Risk (10/24/2023)    Received from [facility], [facility]    Humiliation, Afraid, Rape, and Kick questionnaire      Fear of Current or Ex-Partner: No      Emotionally Abused: No      Physically Abused: No      Sexually Abused: No   Housing Stability: Low Risk  (11/11/2024)    Received from [facility]    Housing Stability Vital Sign      Unable to Pay for Housing in the Last Year: No      Number of Times Moved in the Last Year: 0      Homeless in the Last Year: No          Again, thank you for allowing me to participate in the care of your patient.      Sincerely,    Inocencio Peña MD

## 2025-01-30 NOTE — PROGRESS NOTES
[facility]  Return Visit  Encounter date:   January 30, 2025    Chief Complaint:   Follow up     ID: s/p stealth assisted bilateral revision maxillary antrostomy, total ethmoidectomy, sphenoidotomy, frontal sinusotomy 8/6/24.     Interval History:   Annie Garcia is a 54 year old female who presents for follow up s/p stealth assisted bilateral revision maxillary antrostomy, total ethmoidectomy, sphenoidotomy, frontal sinusotomy 8/6/24.    8/28/24: She states that she took her ciprofloxacin and has been taking Prednisone for the past 4 days. She states that yesterday she started to have relief with Prednisone. Taste has not yet returned. She is continuing with Ta sinus rinses once daily. She states that she has a lot of nasal dryness and crusting that she cannot remove at times. Hard for her to sleep when mucous gets bad.     10/31/24:  Has been feeling more nasal congestion and has noticed some crusting and drainage down the back of her throat.  Started Trikafta about 2 weeks ago.  Also notices a scratchy sensation in the back of her throat.  Overall other than those doing quite well     1/30/25: States that overall her sinuses are in a good place. Had a throat swab a few weeks ago that grew psudomonas. Last sinus culture grew Staph epidermidis.  She had some crusting and put bactrim ointment in her nose for 7-10 days and the crusting went away. Has been trailing manuca honey spray and throat rinse.  She endorses a dry nose and has been using Vaseline. Overall, she feels that the Trikafta seems to help her symptoms though it has been hard to say since she started it after surgery. Has had 20 lb weight gain which she is concerned could be from the Trikafta. Getting a sleep study.         [place] Operative History  s/p stealth assisted bilateral revision maxillary antrostomy, total ethmoidectomy, sphenoidotomy, frontal sinusotomy 8/6/24.     Review of systems: A 14-point review of systems  "has been conducted and is negative for any notable symptoms, except as dictated in the history of present illness.     Physical Exam:  Vital signs: BP (!) 169/86 (BP Location: Right arm, Patient Position: Sitting, Cuff Size: Adult Large)   Pulse 85   Ht 1.702 m (5' 7\")   Wt 120.4 kg (265 lb 8 oz)   SpO2 95%   BMI 41.58 kg/m     General Appearance: No acute distress, appropriate demeanor, conversant  Eyes: moist conjunctivae; EOMI; pupils symmetric; visual acuity grossly intact; no proptosis  Head: normocephalic; overall symmetric appearance without deformity  Face: overall symmetric without deformity  Ears: Normal appearance of external ear  Nose: No external deformity  Oral Cavity/oropharynx: Normal appearance of mucosa  Neck: no palpable lymphadenopathy; thyroid without palpable nodules  Lungs: symmetric chest rise; no wheezing  CV: Good distal perfusion; normal hear rate  Extremities: No deformity  Neurologic Exam: Cranial nerves II-XII are grossly intact      Procedure Note  Procedure performed: Rigid nasal endoscopy  Indication: To evaluate for sinonasal pathology not visualized on routine anterior rhinoscopy  Anesthesia: 4% topical lidocaine with 0.05 % oxymetazoline  Description of procedure: A 30 degree, 3 mm rigid endoscope was inserted into bilateral nasal cavities and the nasal valves, nasal cavity, middle meatus, sphenoethmoid recess, nasopharynx were evaluated for evidence of obstruction, edema, purulence, polyps and/or mass/lesion.     [place] Endoscopic Scoring System  Endoscopic observation Right Left   Polyps in middle meatus (0 = absent, 1 = restricted to middle meatus, 2 = Beyond middle meatus) 0 0   Discharge (0 = absent, 1 = thin and clear, 2 = thick, purulent) 0 0   Edema (0 = absent, 1 = mild-moderate, 2 = moderate-severe) 0 0   Crusting (0 = absent, 1 = mild-moderate, 2 = moderate-severe) 1 0   Scarring (0= absent, 1 = mild-moderate, 2 = moderate-severe) 0 0   Total 1 0 "     Findings  RT: paranasal sinus widely patent without evidence of infection or crusting.  LT: maxillary sinus open without crust or infection There was some crusting along the posterior ethmoids which was completely cleaned out.      The patient tolerated the procedure well without complication.     Laboratory Review:  Anaerobic Bacterial Culture Routine 10/31/24  1+ Cutibacterium (Propionibacterium) avidum    Respiratory Aerobic Bacterial Culture 10/31/24  1+ Staphylococcus epidermidis    Assessment/Medical Decision Making:  Annie Garcia is a 54 year old female who presents for follow up s/p stealth assisted bilateral revision maxillary antrostomy, total ethmoidectomy, sphenoidotomy, frontal sinusotomy 8/6/24.     Her physical exam looks very good, there is some crusting but significantly less compared to previous exams. We did a culture in office today. Overall, I am very satisfied with how her sinuses looked. We will continue the rinses, alternating between antibiotic and coconut rinses. She can follow up with me in three months.     Plan:  Continue sinus rinses (Alternate between antibiotic and regular/coconut rinses). Fine for her to trial honey spray as well.   Follow up with me in 3 months    Scribe Disclosure:   By signing my name below, I, Naima Martines, attest that this documentation has been prepared under the direction and in the presence of Inocencio Peña MD.  - Electronically Signed: Naima Martines 01/30/25     Provider Disclosure:  I agree with above History, Review of Systems, Physical exam and Plan.  I have reviewed the content of the documentation and have edited it as needed. I have personally performed the services documented here and the documentation accurately represents those services and the decisions I have made.      Electronically signed by:    Inocencio Peña MD    [facility]  Rhinology, Endoscopic Skull Base Surgery  [facility]   Department of Otolaryngology - Head & Neck Surgery    ~~~~~~~~~~~~~~~~~~~~~~~~~~~~~~~~~~~~~~~~~~~~~~~~~~~~~~~~~~~~~~~~~~~~~~~~~~~~~~~~~~~~~~~~~~~~~~~~~~~~~~~~~~~~~~~~~~~~~~~~~~~~~~~~~~~~~~~    Past Medical History:   Diagnosis Date    Cystic fibrosis (H)     Depression     Tear of right acetabular labrum         Past Surgical History:   Procedure Laterality Date    ANKLE SURGERY Right 07/27/2019    foot/ankle    AS HYSTEROSCOPY, SURGICAL; W/ ENDOMETRIAL ABLATION, ANY METHOD  2008    [facility]. Thermal balloon.    BREAST SURGERY      ENDOSCOPIC SINUS SURGERY N/A 09/05/2018    Procedure: ENDOSCOPIC SINUS SURGERY;;  Surgeon: Francoise Gonzalez MD;  [facility]    ENDOSCOPY UPPER, COLONOSCOPY, COMBINED N/A 11/18/2016    Procedure: COMBINED ENDOSCOPY UPPER, COLONOSCOPY;  Surgeon: Levi Hinkle MD;  [facility]: [facility]    ESOPHAGOSCOPY, GASTROSCOPY, DUODENOSCOPY (EGD), COMBINED  01/10/2014    Procedure: COMBINED ESOPHAGOSCOPY, GASTROSCOPY, DUODENOSCOPY (EGD);  UPPER ENDOSCOPY with Biopsy;  Surgeon: Levi Hinkle MD;  [facility]    EXAM UNDER ANESTHESIA NOSE N/A 09/05/2018    Procedure: EXAM UNDER ANESTHESIA NOSE;  SINUS EXAM UNDER ANESTHESIA, Endoscopic Sinus Surgery;  Surgeon: Francoise Gonzalez MD;  [facility]    OPTICAL TRACKING SYSTEM ENDOSCOPIC SINUS SURGERY Bilateral 8/6/2024    Procedure: Stealth assisted bilateral revision maxillary antrostomy, total ethmoidectomy, sphenoidotomy, frontal sinusotomy;  Surgeon: Inocencio Peña MD;  Location:  OR    ORTHOPEDIC SURGERY  1994    left knee arthroscopy    ORTHOPEDIC SURGERY  1994    right shoulder    ORTHOPEDIC SURGERY  2014    left hip replacement    REMOVE HARDWARE FOOT Right 01/27/2020    Procedure: REMOVAL, HARDWARE RIGHT FOOT;  Surgeon: Bryan Cid DPM;  [facility]    SEPTOPLASTY, ENDOSCOPIC SINUS SURGERY, COMBINED Left 02/21/2018    Procedure: COMBINED SEPTOPLASTY, ENDOSCOPIC SINUS SURGERY;  LEFT ENDOSCOPIC SINUS SURGERY,  SEPTOPLASTY, BILATERAL TURBINATE REDUCTION, PROPEL IMPLANTS;  Surgeon: Francoise Gonzalez MD;  [facility]: [facility]    TURBINOPLASTY Bilateral 02/21/2018    Procedure: TURBINOPLASTY;;  Surgeon: Francoise Gonzalez MD;  [facility]: [facility]        Family History   Problem Relation Age of Onset    Diabetes Mother     Hypertension Mother     Thyroid Disease Mother     Thyroid Disease Sister     Dementia Sister         Social History     Socioeconomic History    Marital status: Single   Tobacco Use    Smoking status: Never     Passive exposure: Never    Smokeless tobacco: Never   Vaping Use    Vaping status: Never Used   Substance and Sexual Activity    Alcohol use: Yes     Comment: rare    Drug use: No     Social Drivers of Health     Financial Resource Strain: Low Risk  (11/11/2024)    Received from [facility]    Overall Financial Resource Strain (CARDIA)     Difficulty of Paying Living Expenses: Not hard at all   Food Insecurity: No Food Insecurity (11/11/2024)    Received from [facility]    Hunger Vital Sign     Worried About Running Out of Food in the Last Year: Never true     Ran Out of Food in the Last Year: Never true   Transportation Needs: No Transportation Needs (11/11/2024)    Received from [facility]    PRAPARE - Transportation     Lack of Transportation (Medical): No     Lack of Transportation (Non-Medical): No   Physical Activity: Inactive (11/11/2024)    Received from [facility]    Exercise Vital Sign     Days of Exercise per Week: 0 days     Minutes of Exercise per Session: 0 min   Stress: No Stress Concern Present (11/11/2024)    Received from [facility]    [place] of Occupational Health - Occupational Stress Questionnaire     Feeling of Stress : Only a little   Social Connections: Moderately Integrated (11/11/2024)    Received from [facility]    Social Connection and Isolation Panel [NHANES]     Frequency of Communication  with Friends and Family: Once a week     Frequency of Social Gatherings with Friends and Family: Three times a week     Attends [employer] Services: 1 to 4 times per year     Active Member of Clubs or Organizations: Yes     Attends Club or Organization Meetings: 1 to 4 times per year     Marital Status:    Interpersonal Safety: Not At Risk (10/24/2023)    Received from [facility], [facility]    Humiliation, Afraid, Rape, and Kick questionnaire     Fear of Current or Ex-Partner: No     Emotionally Abused: No     Physically Abused: No     Sexually Abused: No   Housing Stability: Low Risk  (11/11/2024)    Received from [facility]    Housing Stability Vital Sign     Unable to Pay for Housing in the Last Year: No     Number of Times Moved in the Last Year: 0     Homeless in the Last Year: No

## 2025-01-30 NOTE — TELEPHONE ENCOUNTER
Left Voicemail (1st Attempt) for the patient to call back and schedule the following:    Appointment type: Return [facility]  Provider: Dr. Peña  Return date: end of April  Specialty phone number: [phone]  Additional appointment(s) needed: No  Additonal Notes: 3 month follow up

## 2025-01-30 NOTE — PATIENT INSTRUCTIONS
You were seen in the ENT Clinic today by Dr. Peña. If you have any questions or concerns after your appointment, please contact us (see below)       2.   The following recommendations have been made based upon your appointment today:   -Cultures of your sinuses have been sent to the lab. We will follow up with you on results once Dr. Peña has had the opportunity to view them.      3.   Plan to return to the ENT clinic in 3 months.           How to Contact Us:  Send a [employer] message to your provider. Our team will respond to you via [employer]. Occasionally, we will need to call you to get further information.  For urgent matters (Monday-Friday), call the ENT Clinic: [phone] and speak with a call center team member - they will route your call appropriately.   If you'd like to speak directly with a nurse, please find our contact information below. We do our best to check voicemail frequently throughout the day, and will work to call you back within 1-2 days. For urgent matters, please use the general clinic phone numbers listed above.     Liza VILLAREAL RN  Direct: [phone]  Bee GARLAND LPN  Direct: [phone]         [facility]  Department of Otolaryngology

## 2025-02-04 LAB
BACTERIA SINUS CULT: ABNORMAL
BACTERIA SINUS CULT: ABNORMAL

## 2025-02-06 LAB — BACTERIA SINUS CULT: NORMAL

## 2025-02-09 ENCOUNTER — MYC MEDICAL ADVICE (OUTPATIENT)
Dept: PULMONOLOGY | Facility: CLINIC | Age: 55
End: 2025-02-09
Payer: COMMERCIAL

## 2025-02-09 ENCOUNTER — MYC MEDICAL ADVICE (OUTPATIENT)
Dept: OTOLARYNGOLOGY | Facility: CLINIC | Age: 55
End: 2025-02-09
Payer: COMMERCIAL

## 2025-02-17 ENCOUNTER — VIRTUAL VISIT (OUTPATIENT)
Dept: PHARMACY | Facility: CLINIC | Age: 55
End: 2025-02-17
Payer: COMMERCIAL

## 2025-02-17 DIAGNOSIS — B49 FUNGAL INFECTION: ICD-10-CM

## 2025-02-17 DIAGNOSIS — E84.9 CYSTIC FIBROSIS (H): Primary | ICD-10-CM

## 2025-02-17 DIAGNOSIS — Z78.9 TAKES DIETARY SUPPLEMENTS: ICD-10-CM

## 2025-02-17 DIAGNOSIS — R21 FACIAL RASH: ICD-10-CM

## 2025-02-17 DIAGNOSIS — G47.00 INSOMNIA, UNSPECIFIED TYPE: ICD-10-CM

## 2025-02-17 RX ORDER — PLANT STANOL ESTER 450 MG
2.5 TABLET ORAL DAILY
COMMUNITY

## 2025-02-17 RX ORDER — MULTIVIT WITH MINERALS/LUTEIN
1000 TABLET ORAL DAILY
COMMUNITY

## 2025-02-17 RX ORDER — LACTOBACILLUS RHAMNOSUS GG 10B CELL
1 CAPSULE ORAL DAILY
COMMUNITY

## 2025-02-17 RX ORDER — VIT C/HESPERIDIN/BIOFLAVONOIDS 500-100 MG
1 TABLET ORAL DAILY
COMMUNITY

## 2025-02-17 RX ORDER — CALCIUM CARBONATE 300MG(750)
1 TABLET,CHEWABLE ORAL EVERY OTHER DAY
COMMUNITY

## 2025-02-17 RX ORDER — MULTIVITAMIN WITH IRON
1 TABLET ORAL DAILY
COMMUNITY

## 2025-02-17 RX ORDER — IBUPROFEN 200 MG
1 CAPSULE ORAL DAILY
COMMUNITY

## 2025-02-17 NOTE — PROGRESS NOTES
Medication Therapy Management (MTM) Encounter    ASSESSMENT:                            Medication Adherence/Access: No issues identified.    CF/Sinusitis:   Annie to continue Trikafta at this time and monitor side effects until upcoming visit with Dr. Fowler. Trikafta labs due with next visit.  Patient is eligible for new modulator therapy, Alyftrek (ah-LIF-trek) (vanzacaftor/tezacaftor/deutivacaftor (lzf-WDB-lghrybc)). Patient is interested in learning more about this medication.   Reviewed the following:  Triple combination therapy, 2 pills once daily   Studies (RIDGELINE/SKYLINE) showed Alyftrek was non-inferior to Trikafta  May have slightly lower sweats, which some studies have correlated to better long-term clinical outcomes. This was NOT seen in the Alyftrek trials.  Similar side effect profile to Trikafta. LFT elevation was higher in Alyftrek.  New black box warning for DILI (also added to Trikafta)  Requires monthly hepatic panels x 6 months, then quarterly hepatic panel x 1 year, then annually thereafter if normal   Alyftrek is more expensive than Trikafta, insurance prior authorization would be completed to confirm coverage and cost    Fungus Infection:   Will submit request to [employer] to determine if Trikafta ingredients contain soy.     CF Nutrition:   Annie to consider visit with Karen, dietician or referral to weight management to discuss her concern with weight gain and bloating.      Insomnia  Stable.    Rash:  Stable     PLAN:                            1. Request submitted to [employer] to determine if Trikafta contains soy. Will follow-up via [employer].   2. Continue Trikafta - 1 orange daily until visit with Dr. Bryce Fowler MD to discuss plan going forward. Annie to monitor side effects and consider visit with WILMA Jones or weight management. Could consider Alyftrek in future, pending further discussion with Dr. Bryce Fowler MD.     Follow-up: CF visit and Trikafta labs in 3  months    SUBJECTIVE/OBJECTIVE:                          Annie Garcia is a 54 year old female seen for a follow-up visit.       Reason for visit: CF Medication Review.    Allergies/ADRs: Reviewed in chart  Past Medical History: Reviewed in chart  Tobacco: She reports that she has never smoked. She has never been exposed to tobacco smoke. She has never used smokeless tobacco.  Alcohol: none    Medication Access: Patient fills medication at [facility] and [facility] .     CF/Sinusitis:    Inhaled medications:              Bronchodilator: Brovana as needed (none recently)              Mucolytic: Pulmozyme 2.5 mg as needed (none recently)              Other:  Bethkis 300 mg twice daily (28 days on/off; off), tobramycin nasal rinse daily, Nasonex 2 sprays each nostril daily (not currently taking), nasal saline as needed (not currently taking), Pulmicort 0.5 mg nasal rinse daily (not currently using)  Oral medications:              CFTR modulator: Trikafta (modified dose; 1 orange daily)              Other: cetirizine as needed seasonally (none recently), Tylenol #3 as needed for cough (none recently) , fluconazole as needed (none recently)   Antibiotics (as needed): levofloxacin 750 mg daily x3 weeks (not currently taking), Bactrim twice daily x3 weeks (not currently taking)  Genotype: S910thi/D110H  Cultures (last growth): throat cultures and Sinus swabs grow Steno (8/6/24), PSA (5/30/24-8/6/24), and MRSA (5/30/24)  She stopped the Bethkis recently as she has been feeling better. She states that Tylenol #3 is one of the only things helpful for her coughs when she has them.  Since starting Trikafta, she has not noticed herself overheating like she has in the past. She was recently in [place] and didn't feel as fatigued from heat exhaustion as she did prior to Trikafta. She expresses concern about her weight gain and has been feeling more bloated since starting Trikafta, especially  when she takes with fat-containing food compared to taking on empty stomach. She is concerned that Trikafta contains soy which is causing her yeast infections and overgrowth. Her sinusitis has improved, but she is unsure if this is due to the recent sinus surgery or Trikafta.     She would like to monitor weight gain and sinusitis for next three months, but anticipates stopping Trikafta at her next CF appointment. She plans to discuss trialing Alyftrek during her visit with Dr. Fowler.     Fungus Infection:   Fluconazole 100 mg daily as needed  Finds effective when needed, reduces dose of Trikafta to 1 orange every other day when taking fluconazole.    CF Nutrition:   Patient is pancreatic sufficient and does not require enzyme supplementation  Bowel regimen: probiotic daily  Acid reducer: dexlansoprazole as needed (none recently)    Vitamins/Supplements: NAC 1000 mg one to two times daily, calcium citrate 950 mg daily, vitamin D3 5000 units daily, magnesium + vitamin D3 daily, magnesium 400 mg every other day, potassium 2.5 mEq daily, vitamin C 1000 mg daily, zinc 30 mg daily  Patient is not experiencing sign/symptoms of malabsorption.  Uses dexlansoprazole for heartburn related to antibiotic use which is helpful. No concerns today. Also noticed great benefit from starting oral NAC which she continues.      Insomnia   zolpidem 5 mg at bedtime   Patient reports no issues at this time.     Rash:  Desonide ointment as needed  Uses this intermittently for rash which has been helpful. No new concerns today.  ----------------  I spent 56 minutes with this patient today. All changes were made via collaborative practice agreement with Dr. Bryce Fowler MD.     A summary of these recommendations was sent via [employer]    Yolis Henley, PharmD   Cystic Fibrosis MTM Pharmacist  [facility]    Telemedicine Visit Details  The patient's medications can be safely assessed via a telemedicine  encounter.  Type of service:  Telephone visit  Originating Location (pt. Location): Home    Distant Location (provider location):  Off-site  Start Time:  10:39 AM  End Time:  11:35 AM     Medication Therapy Recommendations  No medication therapy recommendations to display

## 2025-02-21 NOTE — PATIENT INSTRUCTIONS
Recommendations from today's disease management visit:                                                      1. Request submitted to [email or web address] to determine if Trikafta contains soy. Will follow-up via [employer].   2. Continue Trikafta - 1 orange daily until visit with Dr. Bryce Fowler MD to discuss plan going forward, continue to monitor side effects. Plan to discuss Alyftrek at your visit with Dr. Bryce Fowler MD.     Follow-up: CF visit and Trikafta labs in 3 months      My Clinical Pharmacist's contact information:                                                      Please feel free to contact me with any questions or concerns you have.      Yolis Henley, PharmD   Cystic Fibrosis MTM Pharmacist  [facility]

## 2025-03-04 ENCOUNTER — MYC MEDICAL ADVICE (OUTPATIENT)
Dept: OTOLARYNGOLOGY | Facility: CLINIC | Age: 55
End: 2025-03-04
Payer: COMMERCIAL

## 2025-03-17 ENCOUNTER — MYC MEDICAL ADVICE (OUTPATIENT)
Dept: OTOLARYNGOLOGY | Facility: CLINIC | Age: 55
End: 2025-03-17
Payer: COMMERCIAL

## 2025-03-17 DIAGNOSIS — J32.4 CHRONIC PANSINUSITIS: Primary | ICD-10-CM

## 2025-03-18 RX ORDER — MONTELUKAST SODIUM 10 MG/1
10 TABLET ORAL AT BEDTIME
Qty: 90 TABLET | Refills: 0 | Status: SHIPPED | OUTPATIENT
Start: 2025-03-18 | End: 2025-06-16

## 2025-04-01 ENCOUNTER — MYC MEDICAL ADVICE (OUTPATIENT)
Dept: OTOLARYNGOLOGY | Facility: CLINIC | Age: 55
End: 2025-04-01
Payer: COMMERCIAL

## 2025-04-10 ENCOUNTER — PHARMACY VISIT (OUTPATIENT)
Dept: ADMINISTRATIVE | Facility: CLINIC | Age: 55
End: 2025-04-10
Payer: COMMERCIAL

## 2025-04-10 ENCOUNTER — MYC MEDICAL ADVICE (OUTPATIENT)
Dept: PHARMACY | Facility: CLINIC | Age: 55
End: 2025-04-10
Payer: COMMERCIAL

## 2025-04-10 NOTE — TELEPHONE ENCOUNTER
Sent [employer] message to check-in and determine if she would like me to call to discuss further or if she plans to wait to discuss at upcoming visit with MD Yolis Walters, PharmD   Cystic Fibrosis MTM Pharmacist  [facility] [place]

## 2025-04-10 NOTE — PROGRESS NOTES
Cystic Fibrosis Clinical Follow Up Assessment    I spoke with Annie today with questions regarding Trikafta. She is currently holding Trikafta due to elevated LFTs. She expressed interest in wanting to be able to resume at some point if she is able to once her labs are WNL. She stated she understands the benefit of Trikafta and did feel that it works. We discussed Trikafta half life. She asked if there was any other way to take Trikafta and still be able to get a benefit from the medication. Discussed that it might be worth asking provider if starting with one Orange tablet once weekly for one month and slowly tapering up to see how her body responds would be reasonalbe. For example, once weekly x 1 month, twice weekly x 1 month, three times weekly x 1 month, etc. She appreciated this info.     I will forward this on to the MTM pharmacists at the clinic so they are aware of what we discussed today.     TM will continue quarterly chart reviews to monitor if pt is able to resume Trikafta and will follow up if and when appropriate.     Francoise Blanton, Pharm.D.[facility]  [address] [address]  [email or web address]  [email or web address]  Office: [phone]

## 2025-04-11 ENCOUNTER — MYC MEDICAL ADVICE (OUTPATIENT)
Dept: OTOLARYNGOLOGY | Facility: CLINIC | Age: 55
End: 2025-04-11
Payer: COMMERCIAL

## 2025-04-25 ENCOUNTER — VIRTUAL VISIT (OUTPATIENT)
Dept: PHARMACY | Facility: CLINIC | Age: 55
End: 2025-04-25
Payer: COMMERCIAL

## 2025-04-25 DIAGNOSIS — E84.9 CYSTIC FIBROSIS (H): Primary | ICD-10-CM

## 2025-04-25 PROCEDURE — 99606 MTMS BY PHARM EST 15 MIN: CPT | Mod: 93 | Performed by: PHARMACIST

## 2025-04-25 NOTE — PROGRESS NOTES
Medication Therapy Management (MTM) Encounter    ASSESSMENT:                            Medication Adherence/Access: No issues identified.    CF/Sinusitis  Could consider slow dose titration of Trikafta to see how her body responds. One option would be 1 orange tablet once weekly x 1 month, 1 orange tablet twice weekly x 1 month, etc. to what she can tolerate. Would recommend monthly hepatic monitoring.     PLAN:                            1. Discuss slow titration of Trikafta and plan for weight management with Dr. Bryce Fowler MD and dietitian at upcoming visit.     Follow-up: as needed after visit with Dr. Fowler 5/5/25    SUBJECTIVE/OBJECTIVE:                          Annie Garcia is a 54 year old female seen for a follow-up visit.       Reason for visit: CF Medication Review.    Allergies/ADRs: Reviewed in chart  Past Medical History: Reviewed in chart  Tobacco: She reports that she has never smoked. She has never been exposed to tobacco smoke. She has never used smokeless tobacco.  Alcohol: rarely, up to 3 drinks in past 2 months. Prior to recently, she hadn't had a drink in 1.5 years.    Medication Adherence/Access: no issues reported.    CF/Sinusitis  CFTR modulator: currently off Trikafta  During a trip to [place], she reports Trikafta helped with not needing to consume salt while overheated, but she did experience a rough night of sleep, which is what typically tells her that her body is overheated.  After stopping Trikafta, she has noticed more drainage in the back of her throat and increased salt on her skin.   She continues vest/nebs 3-5x/week since stopping Trikafta.   In her discussion with ENT, she started montelukast 10 mg daily three weeks after stopping Trikafta, which was helpful for her but may have contributed to increased liver enzymes.   She has concerns with liver pain and LFT elevations, which she reports is not normal for her. Since stopping Trikafta, she has  experienced liver pain up to three times, which she reports is an improvement from previous. She also has concerns with weight gain and bloating caused by Trikafta.  She is interested in the possibility of taking a very low dose of Trikafta. She also wants to talk to Dr. Fowler about if she should tackle her weight gain first prior to restarting Trikafta. Annie is interested in talking to dietitian during upcoming visit, but doesn't want to focus the conversation on fat-containing food.  ----------------  I spent 17 minutes with this patient today. I offer these suggestions for consideration by Dr. Bryce Fowler MD.     A summary of these recommendations was sent via [email or web address].    Yolis Henley, PharmD   Cystic Fibrosis MT Pharmacist  [facility]    Telemedicine Visit Details  The patient's medications can be safely assessed via a telemedicine encounter.  Type of service:  Telephone visit  Originating Location (pt. Location): Home    Distant Location (provider location):  On-site  Start Time: 1:03 PM  End Time:  1:20 PM     Medication Therapy Recommendations  No medication therapy recommendations to display

## 2025-04-28 NOTE — PATIENT INSTRUCTIONS
Recommendations from today's MTM visit:                                                      1. Discuss slow titration of Trikafta and plan for weight management with Dr. Bryce Fowler MD and dietitian at upcoming visit.     Follow-up: as needed after visit with Dr. Fowler 5/5/25    My Clinical Pharmacist's contact information:                                                      Please feel free to contact me with any questions or concerns you have.      Yolis Henley, PharmD   Cystic Fibrosis MTM Pharmacist  [facility] [place]

## 2025-04-29 NOTE — PROGRESS NOTES
"  [facility]  Return Visit  Encounter date:   April 30, 2025    Chief Complaint:   Follow up     ID:  s/p stealth assisted bilateral revision maxillary antrostomy, total ethmoidectomy, sphenoidotomy, frontal sinusotomy 8/6/24.     Interval History:   Annie Garcia is a 54 year old female who presents for follow up  s/p stealth assisted bilateral revision maxillary antrostomy, total ethmoidectomy, sphenoidotomy, frontal sinusotomy 8/6/24.    10/31/24:  Has been feeling more nasal congestion and has noticed some crusting and drainage down the back of her throat.  Started Trikafta about 2 weeks ago.  Also notices a scratchy sensation in the back of her throat.  Overall other than those doing quite well      1/30/25: States that overall her sinuses are in a good place. Had a throat swab a few weeks ago that grew psudomonas. Last sinus culture grew Staph epidermidis.  She had some crusting and put bactrim ointment in her nose for 7-10 days and the crusting went away. Has been trailing manuca honey spray and throat rinse.  She endorses a dry nose and has been using Vaseline. Overall, she feels that the Trikafta seems to help her symptoms though it has been hard to say since she started it after surgery. Has had 20 lb weight gain which she is concerned could be from the Trikafta. Getting a sleep study.     4/30/25: Today, she reports that she feel that the Singular messed with her liver. So she came off of that medication. She is still doing the tobramycin sinus rinses. She states that Sunday night was the first night that she really was able to clean a lot of \"junk\" from her nose with a sinus rinse. She can definitely tell a difference in her symptoms. She states that she notices that if her nose is bad, she uses bactrim and it goes away. She continues to use Vaseline for moisture. She is still working with the pharmacist trying to find the right dosage for Trikafta.     Sino-Nasal Outcome Test (SNOT " - 22)  1. Need to Blow Nose: (Patient-Rptd) (P) Very mild  2. Nasal Blockage: (Patient-Rptd) (P) Very mild  3. Sneezing: (Patient-Rptd) (P) Very mild  4. Runny Nose: (Patient-Rptd) (P) None  5. Cough: (Patient-Rptd) (P) Very mild  6. Post-nasal discharge: (Patient-Rptd) (P) None  7. Thick nasal discharge: (Patient-Rptd) (P) None  8. Ear fullness: (Patient-Rptd) (P) None  9. Dizziness: (Patient-Rptd) (P) None  10. Ear Pain: (Patient-Rptd) (P) Very mild  11. Facial pain/pressure: (Patient-Rptd) (P) Very mild  12. Decreased Sense of Smell/Taste: (Patient-Rptd) (P) Very mild  13. Difficulty falling asleep: (Patient-Rptd) (P) Very mild  14. Wake up at night: (Patient-Rptd) (P) Very mild  15. Lack of a good night's sleep: (Patient-Rptd) (P) Mild or slight  16. Wake up tired: (Patient-Rptd) (P) Very mild  17. Fatigue: (Patient-Rptd) (P) Very mild  18. Reduced Productivity: (Patient-Rptd) (P) Very mild  19. Reduced Concentration: (Patient-Rptd) (P) Very mild  20. Frustrated/restless/irritable: (Patient-Rptd) (P) Very mild  21. Sad: (Patient-Rptd) (P) None  22. Embarrassed: (Patient-Rptd) (P) None  Total Score: (Patient-Rptd) (P) 16    [place] [facility]   s/p stealth assisted bilateral revision maxillary antrostomy, total ethmoidectomy, sphenoidotomy, frontal sinusotomy 8/6/24.     Review of systems: A 14-point review of systems has been conducted and is negative for any notable symptoms, except as dictated in the history of present illness.     Physical Exam:  Vital signs: There were no vitals taken for this visit.   General Appearance: No acute distress, appropriate demeanor, conversant  Eyes: moist conjunctivae; EOMI; pupils symmetric; visual acuity grossly intact; no proptosis  Head: normocephalic; overall symmetric appearance without deformity  Face: overall symmetric without deformity  Ears: Normal appearance of external ear  Nose: No external deformity  Oral Cavity/oropharynx: Normal appearance of  mucosa  Neck: no palpable lymphadenopathy; thyroid without palpable nodules  Lungs: symmetric chest rise; no wheezing  CV: Good distal perfusion; normal hear rate  Extremities: No deformity  Neurologic Exam: Cranial nerves II-XII are grossly intact    Procedure Note  Procedure performed: Rigid nasal endoscopy  Indication: To evaluate for sinonasal pathology not visualized on routine anterior rhinoscopy  Anesthesia: 4% topical lidocaine with 0.05 % oxymetazoline  Description of procedure: A 30 degree, 3 mm rigid endoscope was inserted into bilateral nasal cavities and the nasal valves, nasal cavity, middle meatus, sphenoethmoid recess, nasopharynx were evaluated for evidence of obstruction, edema, purulence, polyps and/or mass/lesion.     Frank[place]-Remy Endoscopic Scoring System  Endoscopic observation Right Left   Polyps in middle meatus (0 = absent, 1 = restricted to middle meatus, 2 = Beyond middle meatus) 0 0   Discharge (0 = absent, 1 = thin and clear, 2 = thick, purulent) 0 0   Edema (0 = absent, 1 = mild-moderate, 2 = moderate-severe) 0 0   Crusting (0 = absent, 1 = mild-moderate, 2 = moderate-severe) 1 1   Scarring (0= absent, 1 = mild-moderate, 2 = moderate-severe) 0 0   Total 1 1     Findings  RT: No significant crusting or evidence of infection.  LT: Minimal crusting along the floor of the maxillary sinus in the posterior ethmoid.    The patient tolerated the procedure well without complication.     Assessment/Medical Decision Making:  Annie Garcia is a 54 year old female who presents for follow up  s/p stealth assisted bilateral revision maxillary antrostomy, total ethmoidectomy, sphenoidotomy, frontal sinusotomy 8/6/24. Her physical exam looks great. There was some mucus that was suctioned. I have recommended that she continue with her sinus rinses. We discussed trying Manuka honey in the sinus rinses if she would like. We also discussed that overall, she is improved significantly since before  surgery and this is confirmed by her SNOT scores. She can follow up with me in 3-4 months.     Plan:  Continue sinus rinses  Consider trying Manuka honey in sinus rinses (1-2 teaspoons in regular sinus rinse)  Agree that if she can use the Trikafta or slowly titrate up I do think it would help her. Also fine for her to restart Singulair if she can  Follow up with me 3-4 months    Scribe Disclosure:   By signing my name below, I, Naima Conrad, attest that this documentation has been prepared under the direction and in the presence of Inocencio Peña MD.  - Electronically Signed: Naima Martines 04/30/25     Provider Disclosure:  I agree with above History, Review of Systems, Physical exam and Plan.  I have reviewed the content of the documentation and have edited it as needed. I have personally performed the services documented here and the documentation accurately represents those services and the decisions I have made.      Electronically signed by:    Inocencio Peña MD    [facility]  Rhinology, Endoscopic Skull Base Surgery  [facility]  Department of Otolaryngology - Head & Neck Surgery    ~~~~~~~~~~~~~~~~~~~~~~~~~~~~~~~~~~~~~~~~~~~~~~~~~~~~~~~~~~~~~~~~~~~~~~~~~~~~~~~~~~~~~~~~~~~~~~~~~~~~~~~~~~~~~~~~~~~~~~~~~~~~~~~~~~~~~~~    Past Medical History:   Diagnosis Date    Cystic fibrosis (H)     Depression     Tear of right acetabular labrum         Past Surgical History:   Procedure Laterality Date    ANKLE SURGERY Right 07/27/2019    foot/ankle    AS HYSTEROSCOPY, SURGICAL; W/ ENDOMETRIAL ABLATION, ANY METHOD  2008    [facility]. Thermal balloon.    BREAST SURGERY      ENDOSCOPIC SINUS SURGERY N/A 09/05/2018    Procedure: ENDOSCOPIC SINUS SURGERY;;  Surgeon: Francoise Gonzalez MD;  Location: [facility] OR    ENDOSCOPY UPPER, COLONOSCOPY, COMBINED N/A 11/18/2016    Procedure: COMBINED ENDOSCOPY UPPER, COLONOSCOPY;  Surgeon: Levi Hinkle MD;  Location: [facility] OR     ESOPHAGOSCOPY, GASTROSCOPY, DUODENOSCOPY (EGD), COMBINED  01/10/2014    Procedure: COMBINED ESOPHAGOSCOPY, GASTROSCOPY, DUODENOSCOPY (EGD);  UPPER ENDOSCOPY with Biopsy;  Surgeon: Levi Hinkle MD;  Location: [facility] OR    EXAM UNDER ANESTHESIA NOSE N/A 09/05/2018    Procedure: EXAM UNDER ANESTHESIA NOSE;  SINUS EXAM UNDER ANESTHESIA, Endoscopic Sinus Surgery;  Surgeon: Francoise Gonzalez MD;  Location: [facility]    OPTICAL TRACKING SYSTEM ENDOSCOPIC SINUS SURGERY Bilateral 8/6/2024    Procedure: Stealth assisted bilateral revision maxillary antrostomy, total ethmoidectomy, sphenoidotomy, frontal sinusotomy;  Surgeon: Inocencio Peña MD;  Location: [facility] OR    ORTHOPEDIC SURGERY  1994    left knee arthroscopy    ORTHOPEDIC SURGERY  1994    right shoulder    ORTHOPEDIC SURGERY  2014    left hip replacement    REMOVE HARDWARE FOOT Right 01/27/2020    Procedure: REMOVAL, HARDWARE RIGHT FOOT;  Surgeon: Bryan Cid DPM;  Location: [facility]    SEPTOPLASTY, ENDOSCOPIC SINUS SURGERY, COMBINED Left 02/21/2018    Procedure: COMBINED SEPTOPLASTY, ENDOSCOPIC SINUS SURGERY;  LEFT ENDOSCOPIC SINUS SURGERY, SEPTOPLASTY, BILATERAL TURBINATE REDUCTION, PROPEL IMPLANTS;  Surgeon: Francoise Gonzalez MD;  Location: [facility]    TURBINOPLASTY Bilateral 02/21/2018    Procedure: TURBINOPLASTY;;  Surgeon: Francoise Gonzalez MD;  Location: [facility] OR        Family History   Problem Relation Age of Onset    Diabetes Mother     Hypertension Mother     Thyroid Disease Mother     Thyroid Disease Sister     Dementia Sister         Social History     Socioeconomic History    Marital status: Single   Tobacco Use    Smoking status: Never     Passive exposure: Never    Smokeless tobacco: Never   Vaping Use    Vaping status: Never Used   Substance and Sexual Activity    Alcohol use: Yes     Comment: rare    Drug use: No     Social Drivers of Health     Financial Resource Strain: Low Risk  (11/11/2024)    Received from [facility]    Overall Financial  Resource Strain (CARDIA)     Difficulty of Paying Living Expenses: Not hard at all   Food Insecurity: No Food Insecurity (11/11/2024)    Received from     Hunger Vital Sign     Worried About Running Out of Food in the Last Year: Never true     Ran Out of Food in the Last Year: Never true   Transportation Needs: No Transportation Needs (11/11/2024)    Received from     PRAPARE - Transportation     Lack of Transportation (Medical): No     Lack of Transportation (Non-Medical): No   Physical Activity: Inactive (11/11/2024)    Received from     Exercise Vital Sign     Days of Exercise per Week: 0 days     Minutes of Exercise per Session: 0 min   Stress: No Stress Concern Present (11/11/2024)    Received from     [employer] - Occupational Stress Questionnaire     Feeling of Stress : Only a little   Social Connections: Moderately Integrated (11/11/2024)    Received from     Social Connection and Isolation Panel [NHANES]     Frequency of Communication with Friends and Family: Once a week     Frequency of Social Gatherings with Friends and Family: Three times a week     Attends Hoah[employer]ism Services: 1 to 4 times per year     Active Member of Clubs or Organizations: Yes     Attends Club or Organization Meetings: 1 to 4 times per year     Marital Status:    Interpersonal Safety: Not At Risk (10/24/2023)    Received from ,     Humiliation, Afraid, Rape, and Kick questionnaire     Fear of Current or Ex-Partner: No     Emotionally Abused: No     Physically Abused: No     Sexually Abused: No   Housing Stability: Low Risk  (11/11/2024)    Received from     Housing Stability Vital Sign     Unable to Pay for Housing in the Last Year: No     Number of Times Moved in the Last Year: 0     Homeless in the Last Year: No

## 2025-04-30 ENCOUNTER — OFFICE VISIT (OUTPATIENT)
Dept: OTOLARYNGOLOGY | Facility: CLINIC | Age: 55
End: 2025-04-30
Payer: COMMERCIAL

## 2025-04-30 VITALS
SYSTOLIC BLOOD PRESSURE: 133 MMHG | HEART RATE: 86 BPM | DIASTOLIC BLOOD PRESSURE: 99 MMHG | WEIGHT: 264.8 LBS | OXYGEN SATURATION: 98 % | BODY MASS INDEX: 41.47 KG/M2

## 2025-04-30 DIAGNOSIS — J32.4 CHRONIC PANSINUSITIS: Primary | ICD-10-CM

## 2025-04-30 DIAGNOSIS — E84.9 CYSTIC FIBROSIS (H): ICD-10-CM

## 2025-04-30 RX ORDER — DOXYCYCLINE HYCLATE 100 MG/1
100 TABLET, DELAYED RELEASE ORAL
COMMUNITY

## 2025-04-30 RX ORDER — TOBRAMYCIN 40 MG/ML
INJECTION INTRAMUSCULAR; INTRAVENOUS
COMMUNITY
Start: 2025-04-10

## 2025-04-30 RX ORDER — CIPROFLOXACIN 750 MG/1
750 TABLET, FILM COATED ORAL
COMMUNITY
Start: 2024-08-15

## 2025-04-30 ASSESSMENT — PAIN SCALES - GENERAL: PAINLEVEL_OUTOF10: NO PAIN (0)

## 2025-04-30 NOTE — PATIENT INSTRUCTIONS
You were seen in the ENT Clinic today by Dr. Peña. If you have any questions or concerns after your appointment, please contact us (see below)       2.   The following recommendations have been made based upon your appointment today:  We have sent a new prescription with refills of the tobramycin to the compounding pharmacy.      3.   Plan to return to the ENT clinic in 3-4 months.           How to Contact Us:  Send a [employer] message to your provider. Our team will respond to you via [employer]. Occasionally, we will need to call you to get further information.  For urgent matters (Monday-Friday), call the ENT Clinic: [phone] and speak with a call center team member - they will route your call appropriately.   If you'd like to speak directly with a nurse, please find our contact information below. We do our best to check voicemail frequently throughout the day, and will work to call you back within 1-2 days. For urgent matters, please use the general clinic phone numbers listed above.     Liza VILLAREAL RN  Direct: [phone]  Bee GARLAND LPN  Direct: [phone]         [facility]  Department of Otolaryngology

## 2025-04-30 NOTE — LETTER
"4/30/2025       RE: Annie Garcia  [address]  [place] [address]     Dear Colleague,    Thank you for referring your patient, Annie Garcia, to the [facility] E[facility] [place] at [facility]. Please see a copy of my visit note below.      [facility]  Return Visit  Encounter date:   April 30, 2025    Chief Complaint:   Follow up     ID:  s/p stealth assisted bilateral revision maxillary antrostomy, total ethmoidectomy, sphenoidotomy, frontal sinusotomy 8/6/24.     Interval History:   Annie Garcia is a 54 year old female who presents for follow up  s/p stealth assisted bilateral revision maxillary antrostomy, total ethmoidectomy, sphenoidotomy, frontal sinusotomy 8/6/24.    10/31/24:  Has been feeling more nasal congestion and has noticed some crusting and drainage down the back of her throat.  Started Trikafta about 2 weeks ago.  Also notices a scratchy sensation in the back of her throat.  Overall other than those doing quite well      1/30/25: States that overall her sinuses are in a good place. Had a throat swab a few weeks ago that grew psudomonas. Last sinus culture grew Staph epidermidis.  She had some crusting and put bactrim ointment in her nose for 7-10 days and the crusting went away. Has been trailing manuca honey spray and throat rinse.  She endorses a dry nose and has been using Vaseline. Overall, she feels that the Trikafta seems to help her symptoms though it has been hard to say since she started it after surgery. Has had 20 lb weight gain which she is concerned could be from the Trikafta. Getting a sleep study.     4/30/25: Today, she reports that she feel that the Singular messed with her liver. So she came off of that medication. She is still doing the tobramycin sinus rinses. She states that Sunday night was the first night that she really was able to clean a lot of \"junk\" " from her nose with a sinus rinse. She can definitely tell a difference in her symptoms. She states that she notices that if her nose is bad, she uses bactrim and it goes away. She continues to use Vaseline for moisture. She is still working with the pharmacist trying to find the right dosage for Trikafta.     Sino-Nasal Outcome Test (SNOT - 22)  1. Need to Blow Nose: (Patient-Rptd) (P) Very mild  2. Nasal Blockage: (Patient-Rptd) (P) Very mild  3. Sneezing: (Patient-Rptd) (P) Very mild  4. Runny Nose: (Patient-Rptd) (P) None  5. Cough: (Patient-Rptd) (P) Very mild  6. Post-nasal discharge: (Patient-Rptd) (P) None  7. Thick nasal discharge: (Patient-Rptd) (P) None  8. Ear fullness: (Patient-Rptd) (P) None  9. Dizziness: (Patient-Rptd) (P) None  10. Ear Pain: (Patient-Rptd) (P) Very mild  11. Facial pain/pressure: (Patient-Rptd) (P) Very mild  12. Decreased Sense of Smell/Taste: (Patient-Rptd) (P) Very mild  13. Difficulty falling asleep: (Patient-Rptd) (P) Very mild  14. Wake up at night: (Patient-Rptd) (P) Very mild  15. Lack of a good night's sleep: (Patient-Rptd) (P) Mild or slight  16. Wake up tired: (Patient-Rptd) (P) Very mild  17. Fatigue: (Patient-Rptd) (P) Very mild  18. Reduced Productivity: (Patient-Rptd) (P) Very mild  19. Reduced Concentration: (Patient-Rptd) (P) Very mild  20. Frustrated/restless/irritable: (Patient-Rptd) (P) Very mild  21. Sad: (Patient-Rptd) (P) None  22. Embarrassed: (Patient-Rptd) (P) None  Total Score: (Patient-Rptd) (P) 16    [place] [facility]   s/p stealth assisted bilateral revision maxillary antrostomy, total ethmoidectomy, sphenoidotomy, frontal sinusotomy 8/6/24.     Review of systems: A 14-point review of systems has been conducted and is negative for any notable symptoms, except as dictated in the history of present illness.     Physical Exam:  Vital signs: There were no vitals taken for this visit.   General Appearance: No acute distress, appropriate demeanor,  conversant  Eyes: moist conjunctivae; EOMI; pupils symmetric; visual acuity grossly intact; no proptosis  Head: normocephalic; overall symmetric appearance without deformity  Face: overall symmetric without deformity  Ears: Normal appearance of external ear  Nose: No external deformity  Oral Cavity/oropharynx: Normal appearance of mucosa  Neck: no palpable lymphadenopathy; thyroid without palpable nodules  Lungs: symmetric chest rise; no wheezing  CV: Good distal perfusion; normal hear rate  Extremities: No deformity  Neurologic Exam: Cranial nerves II-XII are grossly intact    Procedure Note  Procedure performed: Rigid nasal endoscopy  Indication: To evaluate for sinonasal pathology not visualized on routine anterior rhinoscopy  Anesthesia: 4% topical lidocaine with 0.05 % oxymetazoline  Description of procedure: A 30 degree, 3 mm rigid endoscope was inserted into bilateral nasal cavities and the nasal valves, nasal cavity, middle meatus, sphenoethmoid recess, nasopharynx were evaluated for evidence of obstruction, edema, purulence, polyps and/or mass/lesion.     [place]-Remy Endoscopic Scoring System  Endoscopic observation Right Left   Polyps in middle meatus (0 = absent, 1 = restricted to middle meatus, 2 = Beyond middle meatus) 0 0   Discharge (0 = absent, 1 = thin and clear, 2 = thick, purulent) 0 0   Edema (0 = absent, 1 = mild-moderate, 2 = moderate-severe) 0 0   Crusting (0 = absent, 1 = mild-moderate, 2 = moderate-severe) 1 1   Scarring (0= absent, 1 = mild-moderate, 2 = moderate-severe) 0 0   Total 1 1     Findings  RT: No significant crusting or evidence of infection.  LT: Minimal crusting along the floor of the maxillary sinus in the posterior ethmoid.    The patient tolerated the procedure well without complication.     Assessment/Medical Decision Making:  Annie Garcia is a 54 year old female who presents for follow up  s/p stealth assisted bilateral revision maxillary antrostomy, total  ethmoidectomy, sphenoidotomy, frontal sinusotomy 8/6/24. Her physical exam looks great. There was some mucus that was suctioned. I have recommended that she continue with her sinus rinses. We discussed trying Manuka honey in the sinus rinses if she would like. We also discussed that overall, she is improved significantly since before surgery and this is confirmed by her SNOT scores. She can follow up with me in 3-4 months.     Plan:  Continue sinus rinses  Consider trying Manuka honey in sinus rinses (1-2 teaspoons in regular sinus rinse)  Agree that if she can use the Trikafta or slowly titrate up I do think it would help her. Also fine for her to restart Singulair if she can  Follow up with me 3-4 months    Scribe Disclosure:   By signing my name below, I, Naima Conrad, attest that this documentation has been prepared under the direction and in the presence of Inocencio Peña MD.  - Electronically Signed: Naima Martines 04/30/25     Provider Disclosure:  I agree with above History, Review of Systems, Physical exam and Plan.  I have reviewed the content of the documentation and have edited it as needed. I have personally performed the services documented here and the documentation accurately represents those services and the decisions I have made.      Electronically signed by:    Inocencio Peña MD    [facility]  Rhinology, Endoscopic Skull Base Surgery  [facility]  Department of Otolaryngology - Head & Neck Surgery    ~~~~~~~~~~~~~~~~~~~~~~~~~~~~~~~~~~~~~~~~~~~~~~~~~~~~~~~~~~~~~~~~~~~~~~~~~~~~~~~~~~~~~~~~~~~~~~~~~~~~~~~~~~~~~~~~~~~~~~~~~~~~~~~~~~~~~~~    Past Medical History:   Diagnosis Date     Cystic fibrosis (H)      Depression      Tear of right acetabular labrum         Past Surgical History:   Procedure Laterality Date     ANKLE SURGERY Right 07/27/2019    foot/ankle     AS HYSTEROSCOPY, SURGICAL; W/ ENDOMETRIAL ABLATION, ANY METHOD  2008    [facility]-  [place]. Thermal balloon.     BREAST SURGERY       ENDOSCOPIC SINUS SURGERY N/A 09/05/2018    Procedure: ENDOSCOPIC SINUS SURGERY;;  Surgeon: Francoies Gonzalez MD;  Location: [facility] OR     ENDOSCOPY UPPER, COLONOSCOPY, COMBINED N/A 11/18/2016    Procedure: COMBINED ENDOSCOPY UPPER, COLONOSCOPY;  Surgeon: Levi Hinkle MD;  Location: [facility] OR     ESOPHAGOSCOPY, GASTROSCOPY, DUODENOSCOPY (EGD), COMBINED  01/10/2014    Procedure: COMBINED ESOPHAGOSCOPY, GASTROSCOPY, DUODENOSCOPY (EGD);  UPPER ENDOSCOPY with Biopsy;  Surgeon: Levi Hinkle MD;  Location: [facility]     EXAM UNDER ANESTHESIA NOSE N/A 09/05/2018    Procedure: EXAM UNDER ANESTHESIA NOSE;  SINUS EXAM UNDER ANESTHESIA, Endoscopic Sinus Surgery;  Surgeon: Francoise Gonzalez MD;  Location: [facility]     OPTICAL TRACKING SYSTEM ENDOSCOPIC SINUS SURGERY Bilateral 8/6/2024    Procedure: Stealth assisted bilateral revision maxillary antrostomy, total ethmoidectomy, sphenoidotomy, frontal sinusotomy;  Surgeon: Inocencio Peña MD;  Location: [facility] OR     ORTHOPEDIC SURGERY  1994    left knee arthroscopy     ORTHOPEDIC SURGERY  1994    right shoulder     ORTHOPEDIC SURGERY  2014    left hip replacement     REMOVE HARDWARE FOOT Right 01/27/2020    Procedure: REMOVAL, HARDWARE RIGHT FOOT;  Surgeon: Bryan Cid DPM;  Location: [facility]     SEPTOPLASTY, ENDOSCOPIC SINUS SURGERY, COMBINED Left 02/21/2018    Procedure: COMBINED SEPTOPLASTY, ENDOSCOPIC SINUS SURGERY;  LEFT ENDOSCOPIC SINUS SURGERY, SEPTOPLASTY, BILATERAL TURBINATE REDUCTION, PROPEL IMPLANTS;  Surgeon: Francoise Gonzalez MD;  Location: [facility]     TURBINOPLASTY Bilateral 02/21/2018    Procedure: TURBINOPLASTY;;  Surgeon: Francoise Gonzalez MD;  Location: [facility]        Family History   Problem Relation Age of Onset     Diabetes Mother      Hypertension Mother      Thyroid Disease Mother      Thyroid Disease Sister      Dementia Sister         Social History     Socioeconomic History     Marital status: Single    Tobacco Use     Smoking status: Never     Passive exposure: Never     Smokeless tobacco: Never   Vaping Use     Vaping status: Never Used   Substance and Sexual Activity     Alcohol use: Yes     Comment: rare     Drug use: No     Social Drivers of Health     Financial Resource Strain: Low Risk  (11/11/2024)    Received from [facility]    Overall Financial Resource Strain (CARDIA)      Difficulty of Paying Living Expenses: Not hard at all   Food Insecurity: No Food Insecurity (11/11/2024)    Received from [facility]    Hunger Vital Sign      Worried About Running Out of Food in the Last Year: Never true      Ran Out of Food in the Last Year: Never true   Transportation Needs: No Transportation Needs (11/11/2024)    Received from [facility]    PRAPARE - Transportation      Lack of Transportation (Medical): No      Lack of Transportation (Non-Medical): No   Physical Activity: Inactive (11/11/2024)    Received from [facility]    Exercise Vital Sign      Days of Exercise per Week: 0 days      Minutes of Exercise per Session: 0 min   Stress: No Stress Concern Present (11/11/2024)    Received from [facility]    [employer] - Occupational Stress Questionnaire      Feeling of Stress : Only a little   Social Connections: Moderately Integrated (11/11/2024)    Received from [facility]    Social Connection and Isolation Panel [NHANES]      Frequency of Communication with Friends and Family: Once a week      Frequency of Social Gatherings with Friends and Family: Three times a week      Attends [employer]ism Services: 1 to 4 times per year      Active Member of Clubs or Organizations: Yes      Attends Club or Organization Meetings: 1 to 4 times per year      Marital Status:    Interpersonal Safety: Not At Risk (10/24/2023)    Received from [facility], [facility]    Humiliation, Afraid, Rape, and Kick  questionnaire      Fear of Current or Ex-Partner: No      Emotionally Abused: No      Physically Abused: No      Sexually Abused: No   Housing Stability: Low Risk  (11/11/2024)    Received from     Housing Stability Vital Sign      Unable to Pay for Housing in the Last Year: No      Number of Times Moved in the Last Year: 0      Homeless in the Last Year: No          Again, thank you for allowing me to participate in the care of your patient.      Sincerely,    Inocencio Peña MD

## 2025-04-30 NOTE — NURSING NOTE
Chief Complaint   Patient presents with    RECHECK   Blood pressure (!) 133/99, pulse 86, weight 120.1 kg (264 lb 12.8 oz), SpO2 98%. Magen Guerrero, EMT

## 2025-05-05 ENCOUNTER — LAB (OUTPATIENT)
Dept: LAB | Facility: CLINIC | Age: 55
End: 2025-05-05
Attending: INTERNAL MEDICINE
Payer: COMMERCIAL

## 2025-05-05 ENCOUNTER — OFFICE VISIT (OUTPATIENT)
Dept: PULMONOLOGY | Facility: CLINIC | Age: 55
End: 2025-05-05
Attending: INTERNAL MEDICINE
Payer: COMMERCIAL

## 2025-05-05 VITALS
OXYGEN SATURATION: 98 % | DIASTOLIC BLOOD PRESSURE: 80 MMHG | HEIGHT: 66 IN | WEIGHT: 262.13 LBS | SYSTOLIC BLOOD PRESSURE: 141 MMHG | BODY MASS INDEX: 42.13 KG/M2 | HEART RATE: 89 BPM

## 2025-05-05 DIAGNOSIS — E84.9 CYSTIC FIBROSIS (H): ICD-10-CM

## 2025-05-05 LAB
ALBUMIN SERPL BCG-MCNC: 4.4 G/DL (ref 3.5–5.2)
ALP SERPL-CCNC: 92 U/L (ref 40–150)
ALT SERPL W P-5'-P-CCNC: 42 U/L (ref 0–50)
AST SERPL W P-5'-P-CCNC: 32 U/L (ref 0–45)
BILIRUB DIRECT SERPL-MCNC: 0.12 MG/DL (ref 0–0.3)
BILIRUB SERPL-MCNC: 0.2 MG/DL
CK SERPL-CCNC: 52 U/L (ref 26–192)
EXPTIME-PRE: 8.52 SEC
FEF2575-%PRED-PRE: 110 %
FEF2575-PRE: 2.69 L/SEC
FEF2575-PRED: 2.44 L/SEC
FEFMAX-%PRED-PRE: 124 %
FEFMAX-PRE: 8.5 L/SEC
FEFMAX-PRED: 6.83 L/SEC
FEV1-%PRED-PRE: 94 %
FEV1-PRE: 2.46 L
FEV1FEV6-PRE: 83 %
FEV1FEV6-PRED: 81 %
FEV1FVC-PRE: 82 %
FEV1FVC-PRED: 80 %
FIFMAX-PRE: 5.38 L/SEC
FVC-%PRED-PRE: 92 %
FVC-PRE: 3.01 L
FVC-PRED: 3.26 L
PROT SERPL-MCNC: 7.1 G/DL (ref 6.4–8.3)

## 2025-05-05 PROCEDURE — 1126F AMNT PAIN NOTED NONE PRSNT: CPT | Performed by: INTERNAL MEDICINE

## 2025-05-05 PROCEDURE — 80076 HEPATIC FUNCTION PANEL: CPT | Performed by: PATHOLOGY

## 2025-05-05 PROCEDURE — 99215 OFFICE O/P EST HI 40 MIN: CPT | Mod: 25 | Performed by: INTERNAL MEDICINE

## 2025-05-05 PROCEDURE — 82550 ASSAY OF CK (CPK): CPT | Performed by: PATHOLOGY

## 2025-05-05 PROCEDURE — 3079F DIAST BP 80-89 MM HG: CPT | Performed by: INTERNAL MEDICINE

## 2025-05-05 PROCEDURE — 36415 COLL VENOUS BLD VENIPUNCTURE: CPT | Performed by: PATHOLOGY

## 2025-05-05 PROCEDURE — 87070 CULTURE OTHR SPECIMN AEROBIC: CPT | Performed by: INTERNAL MEDICINE

## 2025-05-05 PROCEDURE — 3077F SYST BP >= 140 MM HG: CPT | Performed by: INTERNAL MEDICINE

## 2025-05-05 PROCEDURE — G0463 HOSPITAL OUTPT CLINIC VISIT: HCPCS | Performed by: INTERNAL MEDICINE

## 2025-05-05 PROCEDURE — 94375 RESPIRATORY FLOW VOLUME LOOP: CPT | Performed by: INTERNAL MEDICINE

## 2025-05-05 RX ORDER — LANOLIN ALCOHOL/MO/W.PET/CERES
1000 CREAM (GRAM) TOPICAL DAILY
COMMUNITY

## 2025-05-05 ASSESSMENT — PAIN SCALES - GENERAL: PAINLEVEL_OUTOF10: NO PAIN (0)

## 2025-05-05 NOTE — LETTER
"5/5/2025      Annie Garcia  [address]  [facility] [address]      Dear Colleague,    Thank you for referring your patient, Annie Garcia, to the [facility] FOR LUNG SCIENCE AND [facility]. Please see a copy of my visit note below.    Pulmonary Staff Initial Consult    CC: The patient is here for follow-up of cystic fibrosis.    Interval history: Since the patient's last clinic visit in January, she discontinued low-dose Trikafta (previously taking 1 orange tab daily) out of concern that it was causing severe abdominal pain with LFT elevation.  Her transaminases were higher than previous but still within normal limits.  Since then, she underwent a trial of montelukast and subsequently discontinued this due to mild elevation of ALT above normal.  She reports overall doing well from a lower respiratory standpoint.  She has not been on any antibiotics for her lungs since last visit.  She is using vest with nebs typically 3 times per week.  She also has been enrolled in pulmonary rehabilitation.  She reports also doing well recently from a sinus standpoint.    Past medical history  1.  Cystic fibrosis mutation analysis: Genotype delta F508/D110H, sweat chloride c/w CF  2.  Cystic fibrosis related sinus disease.  Outside records indicate history of associated mycetoma.  3.  Cystic fibrosis lung disease: Chest CT March, 2023 makes reference to right upper lobe bronchiectasis with smaller degree of bronchiectasis in the lingula and left lower lobe.  Patient reports lower espiratory cultures historically normal.  4.  Depression (details unavailable)    Past surgical history  1.  Status post right hip arthroplasty, 2014  2.  Removal of \"rope\" under right breast  3.  Status post sinus surgery approximately 8 years ago x 2, [facility], August, 2024    Medications:  Current Outpatient Medications   Medication Sig Dispense Refill     acetaminophen-codeine " (TYLENOL #3) 300-30 MG tablet PO TID prn 90 tablet 0     Acetylcysteine (NAC PO) Take 1 capsule by mouth. 1-2 times daily (1000 mg)       arformoterol (BROVANA) 15 MCG/2ML NEBU neb solution Take 2 mLs (15 mcg) by nebulization 2 times daily 120 mL 3     BETHKIS 300 MG/4ML nebulizer solution Take 4 mLs (300 mg) by nebulization 2 times daily. 28 days on, 28 days off 224 mL 2     budesonide (PULMICORT) 0.5 MG/2ML neb solution Squirt entire vial into previously made amanda med saline bottle, mix, and irrigate each nostril until entire bottle empty.  Do this twice daily. 120 mL 1     calcium citrate (CITRACAL) 950 (200 Ca) MG tablet Take 1 tablet by mouth daily. (600 mg)       cetirizine (ZYRTEC) 10 MG tablet Take 10 mg by mouth daily.       cholecalciferol (VITAMIN D3) 125 mcg (5000 units) capsule Take 125 mcg by mouth daily.       COMPOUNDED NON-CONTROLLED SUBSTANCE (CMPD RX) - PHARMACY TO MIX COMPOUNDED MEDICATION Irrigate each nasal cavity with 120mL of tobramycin solution 1-2 times daily. 41716 mL 3     COMPOUNDED NON-CONTROLLED SUBSTANCE (CMPD RX) - PHARMACY TO MIX COMPOUNDED MEDICATION Open Tobramycin capsule and empty contents into 240 ml of nasal saline mixture. Rinse each nasal cavity with 120 ml of mixture daily. 30 capsule 1     cyanocobalamin (VITAMIN B-12) 1000 MCG tablet Take 1,000 mcg by mouth daily.       desonide (DESOWEN) 0.05 % external ointment APPLY 5 TIMES A DAY TO ACTIVE RASH ON LIPS AND FACE UNTIL RESOLVED 60 g 1     dornase stefan (PULMOZYME) 2.5 MG/2.5ML neb solution USE 1 VIAL IN NEBULIZER DAILY AS NEEDED 75 mL 3     fluconazole (DIFLUCAN) 100 MG tablet TAKE 1 TABLET BY MOUTH ONCE DAILY FOR 3 DAYS MAY  REPEAT  A  SECOND  COURSE  IF  NEEDED. 6 tablet 0     lactobacillus rhamnosus, GG, (CULTURELL) capsule Take 1 capsule by mouth daily. Brand: Nature Bounty       levofloxacin (LEVAQUIN) 750 MG tablet 750 mg.       magnesium 250 MG tablet Take 1 tablet by mouth daily. Contains vitamin D3        Magnesium 400 MG TABS Take 1 tablet by mouth every other day.       mometasone (NASONEX) 50 MCG/ACT nasal spray Spray 2 sprays into both nostrils daily 17 g 5     potassium gluconate 2.5 MEQ tablet Take 2.5 mEq by mouth daily.       sodium chloride (OCEAN) 0.65 % nasal spray Spray 1 spray into both nostrils daily as needed for congestion.       sulfamethoxazole-trimethoprim (BACTRIM DS) 800-160 MG tablet Take 1 tablet by mouth 2 times daily. as needed       tobramycin (NEBCIN) 1.2 GM/30ML injection        vitamin C (ASCORBIC ACID) 1000 MG TABS Take 1,000 mg by mouth daily.       Zinc 30 MG TABS Take 1 tablet by mouth daily.       zolpidem (AMBIEN) 5 MG tablet Take 5 mg by mouth nightly as needed for sleep.       ciprofloxacin (CIPRO) 750 MG tablet Take 750 mg by mouth. (Patient not taking: Reported on 5/5/2025)       dexlansoprazole (DEXILANT) 30 MG CPDR CR capsule Take 1 capsule (30 mg) by mouth daily (Patient not taking: Reported on 5/5/2025) 30 capsule 1     Doxycycline Hyclate 100 MG TBEC Take 100 mg by mouth. (Patient not taking: Reported on 5/5/2025)       elexacaftor-tezacaftor-ivacaftor & ivacaftor (TRIKAFTA) 100-50-75 & 150 MG tablet pack Take 1 orange tablet daily. (Skip blue) Swallow whole with fat-containing food. (Patient not taking: Reported on 5/5/2025) 84 tablet 3     montelukast (SINGULAIR) 10 MG tablet Take 1 tablet (10 mg) by mouth at bedtime. (Patient not taking: Reported on 5/5/2025) 90 tablet 0     predniSONE (DELTASONE) 10 MG tablet Take 10 mg by mouth. (Patient not taking: Reported on 5/5/2025)       No current facility-administered medications for this visit.   N acetylcysteine po bid     Allergies: Reports history of seasonal allergies. Duration of quinolones kept to a minimum out of concern levofloxacin may have caused tendon injury.    Family history: Patient has a sister with cystic fibrosis.  Possible their father had manifestations of CF.    Social history: Lifelong non-smoker.   "Previously lived in [place], [place] and was initially followed in a CF center there.  She subsequently moved to [place] and lived there for several years.  She moved to [facility] a few years ago.  She was an athlete growing up.  She previously has worked as a .    Review of systems: Constitutional: Currently no fever or chills.  She is intentionally trying to lose weight.  GI: Right upper quadrant abdominal pain overall improving.  Musculoskeletal: Ankle injury sustained late summer is improving.      Physical examination  BP (!) 141/80 (BP Location: Right arm, Patient Position: Chair, Cuff Size: Adult Large)   Pulse 89   Ht 1.68 m (5' 6.14\")   Wt 118.9 kg (262 lb 2 oz)   SpO2 98%   BMI 42.13 kg/m  ,   General: Alert appropriate pleasant in no apparent distress speaking in full sentences.  HEENT: Sclera anicteric.  No overt nasal discharge.  Oropharynx is moist without lesion or exudate.    Chest: Clear to auscultation bilaterally with good air movement and normal chest wall excursion.  Cardiovascular: S1-S2 with a regular rate and rhythm.  Distant heart sounds.  No lower extremity edema.  Abdomen: Soft, nondistended, nontender including over the right upper quadrant, bowel sounds present      Previously reviewed studies:  1.  Sinus culture August 6, 2024 with stenotrophomonas and 2 strains of Pseudomonas..  2.  2023 chest CT scan available for review.  There are some more prominent bronchiectasis in the lateral right lung.  There is bronchiectasis bilaterally but largely without any thickening of the airways and without mucous plugging.    New studies:  1.  Pulmonary function test from today personally reviewed.  FEV1 2.46 L (94% predicted), FVC 3.01 L (92% predicted).  No significant interval change.  She remains at the lower end of her baseline.  2.  Most recent lower respiratory culture from January, 2025 had 2 strains of Pseudomonas.  She had a sinus culture January, 2025 which grew " staph epi and stenotrophomonas.  3.  Liver function tests from today within normal limits.  Outside labs reviewed and she has had mild elevation in her ALT in February and April with most recent value April 28 at 42 with upper limit of normal in that lab 34      Impression and plan  1.  Cystic fibrosis lung disease: Good symptom control since last clinic visit with stability in PFTs.  Congratulated her on initiating some prophylactic airway clearance.  She is using Brovana and vest therapy and will continue goal of at least 3 times per week and use twice daily if symptomatic.  Is participating in pulmonary rehabilitation and the increased activity may be helping with mobilizing her secretions.    2.  Chronic cystic fibrosis related sinus disease: Status post functional endoscopic sinus surgery in August, 2024 and did well postoperatively.  Appears to still be doing well despite being off of modulation therapy for over 2 months.  She is working with her outside providers to determine whether to resume montelukast.    3.  CFTR modulation therapy: As noted above, the patient has been off of Trikafta since February.  I shared my opinion that I do not think Trikafta would have any role in any abdominal pain or changes in her LFTs at this point.  The patient is interested in eventually resuming modulation therapy beginning with a very low dose as she feels it helps with heat tolerance in the summer with concern for excess salt loss with sweating, as well as potential benefit for her sinuses.  We did discuss the option of using salt tabs along with pushing fluids in the summer.  Consensus is to defer resuming modulation therapy until all concerns about her liver function tests and abdominal pain have been sorted out and she has no plans to add other medications.  She also states that her health runs in 5-year cycles and with last year being more difficult, she anticipates having better health in the next few years.    4.   Unwanted weight gain: Unclear to what extent this is associated with starting Trikafta, as she associates initiation of therapy with increased craving for carbohydrates versus ongoing reduced activity related to her ankle injury.  Weight trending toward the better as she has resumed increased physical activity.  She would like additional nutritional counseling and will ask our nutritionist to contact her later this week.    5.  Possible obstructive sleep apnea: Patient reports sleeping better since starting Trikafta, possibly due to improved sinus congestion.  However, she is at significant risk for obstructive sleep apnea.  She expressed concerns about developing sinus and lower respiratory infections with use of a CPAP mask and reassured her the mask should not pose significant risk.  She is hoping to have a home sleep study.  Not addressed today.    6.  Abnormal LFTs and abdominal pain: I shared my concern that her severe abdominal pain is unlikely to be related to the modest elevations in her ALT.  The patient is working with her primary care provider to evaluate this further.  They are considering imaging of her liver.    The patient's follow-up will be contingent on clinical course.  She will contact our clinic when she is on a stable medication regimen and has completed evaluation for her abdominal pain and abnormal liver function test, and is at the point she would like to revisit the possibility of resuming modulation therapy.    Total visit time today 40 minutes.  This is exclusive of time interpreting pulmonary function test.    Bryce Fowler MD          Again, thank you for allowing me to participate in the care of your patient.        Sincerely,        Bryce Fowler MD    Electronically signed

## 2025-05-05 NOTE — NURSING NOTE
Chief Complaint   Patient presents with    Follow Up     Return CF - 3 mo        Vitals were taken, medications reconciled.    Tucker Jean Baptiste, Clinic Assistant   3:30 PM

## 2025-05-05 NOTE — PROGRESS NOTES
"Pulmonary Staff Initial Consult    CC: The patient is here for follow-up of cystic fibrosis.    Interval history: Since the patient's last clinic visit in January, she discontinued low-dose Trikafta (previously taking 1 orange tab daily) out of concern that it was causing severe abdominal pain with LFT elevation.  Her transaminases were higher than previous but still within normal limits.  Since then, she underwent a trial of montelukast and subsequently discontinued this due to mild elevation of ALT above normal.  She reports overall doing well from a lower respiratory standpoint.  She has not been on any antibiotics for her lungs since last visit.  She is using vest with nebs typically 3 times per week.  She also has been enrolled in pulmonary rehabilitation.  She reports also doing well recently from a sinus standpoint.    Past medical history  1.  Cystic fibrosis mutation analysis: Genotype delta F508/D110H, sweat chloride c/w CF  2.  Cystic fibrosis related sinus disease.  Outside records indicate history of associated mycetoma.  3.  Cystic fibrosis lung disease: Chest CT March, 2023 makes reference to right upper lobe bronchiectasis with smaller degree of bronchiectasis in the lingula and left lower lobe.  Patient reports lower espiratory cultures historically normal.  4.  Depression (details unavailable)    Past surgical history  1.  Status post right hip arthroplasty, 2014  2.  Removal of \"rope\" under right breast  3.  Status post sinus surgery approximately 8 years ago x 2, [facility], August, 2024    Medications:  Current Outpatient Medications   Medication Sig Dispense Refill    acetaminophen-codeine (TYLENOL #3) 300-30 MG tablet PO TID prn 90 tablet 0    Acetylcysteine (NAC PO) Take 1 capsule by mouth. 1-2 times daily (1000 mg)      arformoterol (BROVANA) 15 MCG/2ML NEBU neb solution Take 2 mLs (15 mcg) by nebulization 2 times daily 120 mL 3    BETHKIS 300 MG/4ML nebulizer solution Take 4 " mLs (300 mg) by nebulization 2 times daily. 28 days on, 28 days off 224 mL 2    budesonide (PULMICORT) 0.5 MG/2ML neb solution Squirt entire vial into previously made amanda med saline bottle, mix, and irrigate each nostril until entire bottle empty.  Do this twice daily. 120 mL 1    calcium citrate (CITRACAL) 950 (200 Ca) MG tablet Take 1 tablet by mouth daily. (600 mg)      cetirizine (ZYRTEC) 10 MG tablet Take 10 mg by mouth daily.      cholecalciferol (VITAMIN D3) 125 mcg (5000 units) capsule Take 125 mcg by mouth daily.      COMPOUNDED NON-CONTROLLED SUBSTANCE (CMPD RX) - PHARMACY TO MIX COMPOUNDED MEDICATION Irrigate each nasal cavity with 120mL of tobramycin solution 1-2 times daily. 72019 mL 3    COMPOUNDED NON-CONTROLLED SUBSTANCE (CMPD RX) - PHARMACY TO MIX COMPOUNDED MEDICATION Open Tobramycin capsule and empty contents into 240 ml of nasal saline mixture. Rinse each nasal cavity with 120 ml of mixture daily. 30 capsule 1    cyanocobalamin (VITAMIN B-12) 1000 MCG tablet Take 1,000 mcg by mouth daily.      desonide (DESOWEN) 0.05 % external ointment APPLY 5 TIMES A DAY TO ACTIVE RASH ON LIPS AND FACE UNTIL RESOLVED 60 g 1    dornase stefan (PULMOZYME) 2.5 MG/2.5ML neb solution USE 1 VIAL IN NEBULIZER DAILY AS NEEDED 75 mL 3    fluconazole (DIFLUCAN) 100 MG tablet TAKE 1 TABLET BY MOUTH ONCE DAILY FOR 3 DAYS MAY  REPEAT  A  SECOND  COURSE  IF  NEEDED. 6 tablet 0    lactobacillus rhamnosus, GG, (CULTURELL) capsule Take 1 capsule by mouth daily. Brand: Nature Bounty      levofloxacin (LEVAQUIN) 750 MG tablet 750 mg.      magnesium 250 MG tablet Take 1 tablet by mouth daily. Contains vitamin D3      Magnesium 400 MG TABS Take 1 tablet by mouth every other day.      mometasone (NASONEX) 50 MCG/ACT nasal spray Spray 2 sprays into both nostrils daily 17 g 5    potassium gluconate 2.5 MEQ tablet Take 2.5 mEq by mouth daily.      sodium chloride (OCEAN) 0.65 % nasal spray Spray 1 spray into both nostrils daily as  needed for congestion.      sulfamethoxazole-trimethoprim (BACTRIM DS) 800-160 MG tablet Take 1 tablet by mouth 2 times daily. as needed      tobramycin (NEBCIN) 1.2 GM/30ML injection       vitamin C (ASCORBIC ACID) 1000 MG TABS Take 1,000 mg by mouth daily.      Zinc 30 MG TABS Take 1 tablet by mouth daily.      zolpidem (AMBIEN) 5 MG tablet Take 5 mg by mouth nightly as needed for sleep.      ciprofloxacin (CIPRO) 750 MG tablet Take 750 mg by mouth. (Patient not taking: Reported on 5/5/2025)      dexlansoprazole (DEXILANT) 30 MG CPDR CR capsule Take 1 capsule (30 mg) by mouth daily (Patient not taking: Reported on 5/5/2025) 30 capsule 1    Doxycycline Hyclate 100 MG TBEC Take 100 mg by mouth. (Patient not taking: Reported on 5/5/2025)      elexacaftor-tezacaftor-ivacaftor & ivacaftor (TRIKAFTA) 100-50-75 & 150 MG tablet pack Take 1 orange tablet daily. (Skip blue) Swallow whole with fat-containing food. (Patient not taking: Reported on 5/5/2025) 84 tablet 3    montelukast (SINGULAIR) 10 MG tablet Take 1 tablet (10 mg) by mouth at bedtime. (Patient not taking: Reported on 5/5/2025) 90 tablet 0    predniSONE (DELTASONE) 10 MG tablet Take 10 mg by mouth. (Patient not taking: Reported on 5/5/2025)       No current facility-administered medications for this visit.   N acetylcysteine po bid     Allergies: Reports history of seasonal allergies. Duration of quinolones kept to a minimum out of concern levofloxacin may have caused tendon injury.    Family history: Patient has a sister with cystic fibrosis.  Possible their father had manifestations of CF.    Social history: Lifelong non-smoker.  Previously lived in [place], [place] and was initially followed in a CF center there.  She subsequently moved to [place] and lived there for several years.  She moved to [facility] a few years ago.  She was an athlete growing up.  She previously has worked as a .    Review of systems: Constitutional: Currently no  "fever or chills.  She is intentionally trying to lose weight.  GI: Right upper quadrant abdominal pain overall improving.  Musculoskeletal: Ankle injury sustained late summer is improving.      Physical examination  BP (!) 141/80 (BP Location: Right arm, Patient Position: Chair, Cuff Size: Adult Large)   Pulse 89   Ht 1.68 m (5' 6.14\")   Wt 118.9 kg (262 lb 2 oz)   SpO2 98%   BMI 42.13 kg/m  ,   General: Alert appropriate pleasant in no apparent distress speaking in full sentences.  HEENT: Sclera anicteric.  No overt nasal discharge.  Oropharynx is moist without lesion or exudate.    Chest: Clear to auscultation bilaterally with good air movement and normal chest wall excursion.  Cardiovascular: S1-S2 with a regular rate and rhythm.  Distant heart sounds.  No lower extremity edema.  Abdomen: Soft, nondistended, nontender including over the right upper quadrant, bowel sounds present      Previously reviewed studies:  1.  Sinus culture August 6, 2024 with stenotrophomonas and 2 strains of Pseudomonas..  2.  2023 chest CT scan available for review.  There are some more prominent bronchiectasis in the lateral right lung.  There is bronchiectasis bilaterally but largely without any thickening of the airways and without mucous plugging.    New studies:  1.  Pulmonary function test from today personally reviewed.  FEV1 2.46 L (94% predicted), FVC 3.01 L (92% predicted).  No significant interval change.  She remains at the lower end of her baseline.  2.  Most recent lower respiratory culture from January, 2025 had 2 strains of Pseudomonas.  She had a sinus culture January, 2025 which grew staph epi and stenotrophomonas.  3.  Liver function tests from today within normal limits.  Outside labs reviewed and she has had mild elevation in her ALT in February and April with most recent value April 28 at 42 with upper limit of normal in that lab 34      Impression and plan  1.  Cystic fibrosis lung disease: Good symptom " control since last clinic visit with stability in PFTs.  Congratulated her on initiating some prophylactic airway clearance.  She is using Brovana and vest therapy and will continue goal of at least 3 times per week and use twice daily if symptomatic.  Is participating in pulmonary rehabilitation and the increased activity may be helping with mobilizing her secretions.    2.  Chronic cystic fibrosis related sinus disease: Status post functional endoscopic sinus surgery in August, 2024 and did well postoperatively.  Appears to still be doing well despite being off of modulation therapy for over 2 months.  She is working with her outside providers to determine whether to resume montelukast.    3.  CFTR modulation therapy: As noted above, the patient has been off of Trikafta since February.  I shared my opinion that I do not think Trikafta would have any role in any abdominal pain or changes in her LFTs at this point.  The patient is interested in eventually resuming modulation therapy beginning with a very low dose as she feels it helps with heat tolerance in the summer with concern for excess salt loss with sweating, as well as potential benefit for her sinuses.  We did discuss the option of using salt tabs along with pushing fluids in the summer.  Consensus is to defer resuming modulation therapy until all concerns about her liver function tests and abdominal pain have been sorted out and she has no plans to add other medications.  She also states that her health runs in 5-year cycles and with last year being more difficult, she anticipates having better health in the next few years.    4.  Unwanted weight gain: Unclear to what extent this is associated with starting Trikafta, as she associates initiation of therapy with increased craving for carbohydrates versus ongoing reduced activity related to her ankle injury.  Weight trending toward the better as she has resumed increased physical activity.  She would like  additional nutritional counseling and will ask our nutritionist to contact her later this week.    5.  Possible obstructive sleep apnea: Patient reports sleeping better since starting Trikafta, possibly due to improved sinus congestion.  However, she is at significant risk for obstructive sleep apnea.  She expressed concerns about developing sinus and lower respiratory infections with use of a CPAP mask and reassured her the mask should not pose significant risk.  She is hoping to have a home sleep study.  Not addressed today.    6.  Abnormal LFTs and abdominal pain: I shared my concern that her severe abdominal pain is unlikely to be related to the modest elevations in her ALT.  The patient is working with her primary care provider to evaluate this further.  They are considering imaging of her liver.    The patient's follow-up will be contingent on clinical course.  She will contact our clinic when she is on a stable medication regimen and has completed evaluation for her abdominal pain and abnormal liver function test, and is at the point she would like to revisit the possibility of resuming modulation therapy.    Total visit time today 40 minutes.  This is exclusive of time interpreting pulmonary function test.    Bryce Fowler MD

## 2025-05-08 LAB — BACTERIA SPEC CULT: NORMAL

## 2025-05-10 LAB — BACTERIA SPEC CULT: NORMAL

## 2025-05-29 ENCOUNTER — MYC MEDICAL ADVICE (OUTPATIENT)
Dept: PULMONOLOGY | Facility: CLINIC | Age: 55
End: 2025-05-29
Payer: COMMERCIAL

## 2025-06-04 NOTE — TELEPHONE ENCOUNTER
"Per Dr. Fowler--\"MRI was normal. Nothing to address.\"    RN sent message back to patient to inform Dr. Fowler reviewed and no additional changes to be made.      ARNAV Santos  "

## 2025-06-05 ENCOUNTER — TELEPHONE (OUTPATIENT)
Dept: NUTRITION | Facility: CLINIC | Age: 55
End: 2025-06-05
Payer: COMMERCIAL

## 2025-06-05 NOTE — PROGRESS NOTES
Nutrition Note - CF RD Contact    Asked by pt's CF provider, Dr. Fowler, to contact patient via phone following recent CF center appt in May. Patient reports she is now working with the weight management team at [facility] which includes RD support.  She has been approved for and plans to start Wegovy soon, will likely do injections on Tuesdays per pt. She would like the CF team staff to coordinate care with her weight management team going forward.      Interventions/Recommendations:     1) Contact information provided to pt via [place] for her to utilize with MWM team at [facility].      2) Spent time discussing GLP-1 RA use with patient.  We reviewed that there are no specific guidelines for nutrition care of CF patients taking these medications, however our clinic has gained some experience with other patients using these therapies to assist weight loss. Verbalized the importance of protein intake, fluid/electrolyte management as per usual CF recommendations.  Should GI or other symptoms prohibit adequate fluid or nutrient intake, will work together with MWM team to assist patient.       Karen Yanez MS, RDN, LD, CACFD   Cystic Fibrosis/CF Lung Transplant Dietitian      -Available Mon-Thurs 8 AM-4:30 PM. Contact via [employer] or Epic Inbasket.      -On Fridays please contact CF dietitians Gwen Carolina or Bee Wilks. On weekends/holidays contact coverage dietitian via [employer] (inpatient use only).

## 2025-07-30 NOTE — PROGRESS NOTES
"  [facility]  Return Visit  Encounter date:   July 30, 2025    Chief Complaint:   Follow up     ID: s/p stealth assisted bilateral revision maxillary antrostomy, total ethmoidectomy, sphenoidotomy, frontal sinusotomy 8/6/24.     Interval History:   Annie Garcia is a 55 year old female who presents for follow up s/p stealth assisted bilateral revision maxillary antrostomy, total ethmoidectomy, sphenoidotomy, frontal sinusotomy 8/6/24.     1/30/25: States that overall her sinuses are in a good place. Had a throat swab a few weeks ago that grew psudomonas. Last sinus culture grew Staph epidermidis.  She had some crusting and put bactrim ointment in her nose for 7-10 days and the crusting went away. Has been trailing manuca honey spray and throat rinse.  She endorses a dry nose and has been using Vaseline. Overall, she feels that the Trikafta seems to help her symptoms though it has been hard to say since she started it after surgery. Has had 20 lb weight gain which she is concerned could be from the Trikafta. Getting a sleep study.      4/30/25: She reports that she feel that the Singular messed with her liver. So she came off of that medication. She is still doing the tobramycin sinus rinses. She states that Sunday night was the first night that she really was able to clean a lot of \"junk\" from her nose with a sinus rinse. She can definitely tell a difference in her symptoms. She states that she notices that if her nose is bad, she uses bactrim and it goes away. She continues to use Vaseline for moisture. She is still working with the pharmacist trying to find the right dosage for Trikafta.     7/31/25: Today, the patient reports that she is doing well. Her left side feels good, she feels that her right side is plugged up. She is afraid to use the honey rinses but continues to do other sinus rinses. Feels that in the last week has had worse symptoms.     Sino-Nasal Outcome Test (SNOT - 22)  1. " Need to Blow Nose: (Patient-Rptd) (P) Very mild  2. Nasal Blockage: (Patient-Rptd) (P) Very mild  3. Sneezing: (Patient-Rptd) (P) Very mild  4. Runny Nose: (Patient-Rptd) (P) None  5. Cough: (Patient-Rptd) (P) None  6. Post-nasal discharge: (Patient-Rptd) (P) None  7. Thick nasal discharge: (Patient-Rptd) (P) None  8. Ear fullness: (Patient-Rptd) (P) None  9. Dizziness: (Patient-Rptd) (P) None  10. Ear Pain: (Patient-Rptd) (P) None  11. Facial pain/pressure: (Patient-Rptd) (P) None  12. Decreased Sense of Smell/Taste: (Patient-Rptd) (P) None  13. Difficulty falling asleep: (Patient-Rptd) (P) Very mild  14. Wake up at night: (Patient-Rptd) (P) Mild or slight  15. Lack of a good night's sleep: (Patient-Rptd) (P) Very mild  16. Wake up tired: (Patient-Rptd) (P) Very mild  17. Fatigue: (Patient-Rptd) (P) Very mild  18. Reduced Productivity: (Patient-Rptd) (P) None  19. Reduced Concentration: (Patient-Rptd) (P) Very mild  20. Frustrated/restless/irritable: (Patient-Rptd) (P) None  21. Sad: (Patient-Rptd) (P) None  22. Embarrassed: (Patient-Rptd) (P) None  Total Score: (Patient-Rptd) (P) 10    [place] [facility]  s/p stealth assisted bilateral revision maxillary antrostomy, total ethmoidectomy, sphenoidotomy, frontal sinusotomy 8/6/24.     Review of systems: A 14-point review of systems has been conducted and is negative for any notable symptoms, except as dictated in the history of present illness.     Physical Exam:  Vital signs: There were no vitals taken for this visit.   General Appearance: No acute distress, appropriate demeanor, conversant  Eyes: moist conjunctivae; EOMI; pupils symmetric; visual acuity grossly intact; no proptosis  Head: normocephalic; overall symmetric appearance without deformity  Face: overall symmetric without deformity  Ears: Normal appearance of external ear  Nose: No external deformity  Oral Cavity/oropharynx: Normal appearance of mucosa  Neck: no palpable lymphadenopathy; thyroid  without palpable nodules  Lungs: symmetric chest rise; no wheezing  CV: Good distal perfusion; normal hear rate  Extremities: No deformity  Neurologic Exam: Cranial nerves II-XII are grossly intact      Procedure Note  Procedure performed: Rigid nasal endoscopy  Indication: To evaluate for sinonasal pathology not visualized on routine anterior rhinoscopy  Anesthesia: 4% topical lidocaine with 0.05 % oxymetazoline  Description of procedure: After obtaining consent for the procedure from the patient, the sinonasal cavity was sprayed with topical anesthetic. A rigid 30-degree nasal endoscope was used to first used to visualize the nasal cavity, the inferior and middle turbinates, and the nasopharynx on the left. A second pass was then made to visualize the middle meatus, the superior meatus and sphenoethmoid recess. Finally, the scope was turned 90-degrees and used to visualize the olfactory cleft and frontal outflow tract. A similar approach was used for the contralateral side. They tolerated the procedure well without difficulty.      [place] Endoscopic Scoring System  Endoscopic observation Right Left   Polyps in middle meatus (0 = absent, 1 = restricted to middle meatus, 2 = Beyond middle meatus) 0 0   Discharge (0 = absent, 1 = thin and clear, 2 = thick, purulent) 0 0   Edema (0 = absent, 1 = mild-moderate, 2 = moderate-severe) 0 1   Crusting (0 = absent, 1 = mild-moderate, 2 = moderate-severe) 1 2   Scarring (0= absent, 1 = mild-moderate, 2 = moderate-severe) 0 0   Total 1 3     Findings  RT/LT: Increase in crusting in the middle meatus with some localized inflammation around this. No evidence of active infection.       The patient tolerated the procedure well without complication.     Assessment/Medical Decision Making:  Annie Garcia is a 55 year old female who presents for follow up s/p stealth assisted bilateral revision maxillary antrostomy, total ethmoidectomy, sphenoidotomy, frontal  sinusotomy 8/6/24. Overall, she is doing much better than where she started. Upon exam, some crust and clot was debrided from the nasal cavity using suction. I do think she's have a little exacerbation possibly due to air quality. Discussed increasing rinses for the next few weeks.     Plan:  Continue sinus rinses, would go back to daily for a few weeks  Follow up with me in late Oct or early Nov.     Scribe Disclosure:   By signing my name below, I, Naima Conrad, attest that this documentation has been prepared under the direction and in the presence of Inocencio Peña MD.  - Electronically Signed: Naima Martines 07/30/25     Electronically signed by:    Inocencio Peña MD    [facility]  Rhinology, Endoscopic Skull Base Surgery  [facility]  Department of Otolaryngology - Head & Neck Surgery    ~~~~~~~~~~~~~~~~~~~~~~~~~~~~~~~~~~~~~~~~~~~~~~~~~~~~~~~~~~~~~~~~~~~~~~~~~~~~~~~~~~~~~~~~~~~~~~~~~~~~~~~~~~~~~~~~~~~~~~~~~~~~~~~~~~~~~~~    Past Medical History:   Diagnosis Date    Cystic fibrosis (H)     Depression     Tear of right acetabular labrum         Past Surgical History:   Procedure Laterality Date    ANKLE SURGERY Right 07/27/2019    foot/ankle    AS HYSTEROSCOPY, SURGICAL; W/ ENDOMETRIAL ABLATION, ANY METHOD  2008    [facility]. Thermal balloon.    BREAST SURGERY      ENDOSCOPIC SINUS SURGERY N/A 09/05/2018    Procedure: ENDOSCOPIC SINUS SURGERY;;  Surgeon: Francoise Gonzalez MD;  Location: [facility] OR    ENDOSCOPY UPPER, COLONOSCOPY, COMBINED N/A 11/18/2016    Procedure: COMBINED ENDOSCOPY UPPER, COLONOSCOPY;  Surgeon: Levi Hinkle MD;  Location: [facility] OR    ESOPHAGOSCOPY, GASTROSCOPY, DUODENOSCOPY (EGD), COMBINED  01/10/2014    Procedure: COMBINED ESOPHAGOSCOPY, GASTROSCOPY, DUODENOSCOPY (EGD);  UPPER ENDOSCOPY with Biopsy;  Surgeon: Levi Hinkle MD;  Location: [facility]    EXAM UNDER ANESTHESIA NOSE N/A 09/05/2018    Procedure: EXAM UNDER ANESTHESIA NOSE;   SINUS EXAM UNDER ANESTHESIA, Endoscopic Sinus Surgery;  Surgeon: Francoise Gonzalez MD;  Location: [facility] OR    OPTICAL TRACKING SYSTEM ENDOSCOPIC SINUS SURGERY Bilateral 8/6/2024    Procedure: Stealth assisted bilateral revision maxillary antrostomy, total ethmoidectomy, sphenoidotomy, frontal sinusotomy;  Surgeon: Inocencio Peña MD;  Location: [facility] OR    ORTHOPEDIC SURGERY  1994    left knee arthroscopy    ORTHOPEDIC SURGERY  1994    right shoulder    ORTHOPEDIC SURGERY  2014    left hip replacement    REMOVE HARDWARE FOOT Right 01/27/2020    Procedure: REMOVAL, HARDWARE RIGHT FOOT;  Surgeon: Bryan Cid DPM;  Location: [facility]    SEPTOPLASTY, ENDOSCOPIC SINUS SURGERY, COMBINED Left 02/21/2018    Procedure: COMBINED SEPTOPLASTY, ENDOSCOPIC SINUS SURGERY;  LEFT ENDOSCOPIC SINUS SURGERY, SEPTOPLASTY, BILATERAL TURBINATE REDUCTION, PROPEL IMPLANTS;  Surgeon: Francoise Gonzalez MD;  Location: [facility]    TURBINOPLASTY Bilateral 02/21/2018    Procedure: TURBINOPLASTY;;  Surgeon: Francoise Gonzalez MD;  Location: [facility]        Family History   Problem Relation Age of Onset    Diabetes Mother     Hypertension Mother     Thyroid Disease Mother     Thyroid Disease Sister     Dementia Sister         Social History     Socioeconomic History    Marital status: Single   Tobacco Use    Smoking status: Never     Passive exposure: Never    Smokeless tobacco: Never   Vaping Use    Vaping status: Never Used   Substance and Sexual Activity    Alcohol use: Yes     Comment: rare    Drug use: No     Social Drivers of Health     Financial Resource Strain: Low Risk  (11/11/2024)    Received from [facility]    Overall Financial Resource Strain (CARDIA)     Difficulty of Paying Living Expenses: Not hard at all   Food Insecurity: No Food Insecurity (11/11/2024)    Received from [facility]    Hunger Vital Sign     Worried About Running Out of Food in the Last Year: Never true     Ran Out of Food in the Last Year:  Never true   Transportation Needs: No Transportation Needs (11/11/2024)    Received from [facility]    PRAPARE - Transportation     Lack of Transportation (Medical): No     Lack of Transportation (Non-Medical): No   Physical Activity: Inactive (11/11/2024)    Received from [facility]    Exercise Vital Sign     Days of Exercise per Week: 0 days     Minutes of Exercise per Session: 0 min   Stress: No Stress Concern Present (11/11/2024)    Received from [facility]    [employer] - Occupational Stress Questionnaire     Feeling of Stress : Only a little   Social Connections: Moderately Integrated (11/11/2024)    Received from [facility]    Social Connection and Isolation Panel [NHANES]     Frequency of Communication with Friends and Family: Once a week     Frequency of Social Gatherings with Friends and Family: Three times a week     Attends Yarsani Services: 1 to 4 times per year     Active Member of Clubs or Organizations: Yes     Attends Club or Organization Meetings: 1 to 4 times per year     Marital Status:    Interpersonal Safety: Not At Risk (10/24/2023)    Received from [facility]    Humiliation, Afraid, Rape, and Kick questionnaire     Fear of Current or Ex-Partner: No     Emotionally Abused: No     Physically Abused: No     Sexually Abused: No   Housing Stability: Low Risk  (11/11/2024)    Received from [facility]    Housing Stability Vital Sign     Unable to Pay for Housing in the Last Year: No     Number of Times Moved in the Last Year: 0     Homeless in the Last Year: No

## 2025-07-31 ENCOUNTER — OFFICE VISIT (OUTPATIENT)
Dept: OTOLARYNGOLOGY | Facility: CLINIC | Age: 55
End: 2025-07-31
Attending: STUDENT IN AN ORGANIZED HEALTH CARE EDUCATION/TRAINING PROGRAM
Payer: COMMERCIAL

## 2025-07-31 VITALS
OXYGEN SATURATION: 96 % | SYSTOLIC BLOOD PRESSURE: 132 MMHG | HEART RATE: 69 BPM | BODY MASS INDEX: 40.45 KG/M2 | WEIGHT: 251.7 LBS | DIASTOLIC BLOOD PRESSURE: 79 MMHG | HEIGHT: 66 IN

## 2025-07-31 DIAGNOSIS — J32.4 CHRONIC PANSINUSITIS: ICD-10-CM

## 2025-07-31 DIAGNOSIS — E84.9 CYSTIC FIBROSIS (H): ICD-10-CM

## 2025-07-31 ASSESSMENT — PAIN SCALES - GENERAL: PAINLEVEL_OUTOF10: NO PAIN (0)

## 2025-07-31 NOTE — LETTER
"7/31/2025       RE: Annie Garcia  [address]  [facility] [address]     Dear Colleague,    Thank you for referring your patient, Annie Garcia, to the [facility] [place] [facility]. Please see a copy of my visit note below.      [facility]  Return Visit  Encounter date:   July 30, 2025    Chief Complaint:   Follow up     ID: s/p stealth assisted bilateral revision maxillary antrostomy, total ethmoidectomy, sphenoidotomy, frontal sinusotomy 8/6/24.     Interval History:   Annie Garcia is a 55 year old female who presents for follow up s/p stealth assisted bilateral revision maxillary antrostomy, total ethmoidectomy, sphenoidotomy, frontal sinusotomy 8/6/24.     1/30/25: States that overall her sinuses are in a good place. Had a throat swab a few weeks ago that grew psudomonas. Last sinus culture grew Staph epidermidis.  She had some crusting and put bactrim ointment in her nose for 7-10 days and the crusting went away. Has been trailing manuca honey spray and throat rinse.  She endorses a dry nose and has been using Vaseline. Overall, she feels that the Trikafta seems to help her symptoms though it has been hard to say since she started it after surgery. Has had 20 lb weight gain which she is concerned could be from the Trikafta. Getting a sleep study.      4/30/25: She reports that she feel that the Singular messed with her liver. So she came off of that medication. She is still doing the tobramycin sinus rinses. She states that Sunday night was the first night that she really was able to clean a lot of \"junk\" from her nose with a sinus rinse. She can definitely tell a difference in her symptoms. She states that she notices that if her nose is bad, she uses bactrim and it goes away. She continues to use Vaseline for moisture. She is still working with the pharmacist trying to find " the right dosage for Trikafta.     7/31/25: Today, the patient reports that she is doing well. Her left side feels good, she feels that her right side is plugged up. She is afraid to use the honey rinses but continues to do other sinus rinses. Feels that in the last week has had worse symptoms.     Sino-Nasal Outcome Test (SNOT - 22)  1. Need to Blow Nose: (Patient-Rptd) (P) Very mild  2. Nasal Blockage: (Patient-Rptd) (P) Very mild  3. Sneezing: (Patient-Rptd) (P) Very mild  4. Runny Nose: (Patient-Rptd) (P) None  5. Cough: (Patient-Rptd) (P) None  6. Post-nasal discharge: (Patient-Rptd) (P) None  7. Thick nasal discharge: (Patient-Rptd) (P) None  8. Ear fullness: (Patient-Rptd) (P) None  9. Dizziness: (Patient-Rptd) (P) None  10. Ear Pain: (Patient-Rptd) (P) None  11. Facial pain/pressure: (Patient-Rptd) (P) None  12. Decreased Sense of Smell/Taste: (Patient-Rptd) (P) None  13. Difficulty falling asleep: (Patient-Rptd) (P) Very mild  14. Wake up at night: (Patient-Rptd) (P) Mild or slight  15. Lack of a good night's sleep: (Patient-Rptd) (P) Very mild  16. Wake up tired: (Patient-Rptd) (P) Very mild  17. Fatigue: (Patient-Rptd) (P) Very mild  18. Reduced Productivity: (Patient-Rptd) (P) None  19. Reduced Concentration: (Patient-Rptd) (P) Very mild  20. Frustrated/restless/irritable: (Patient-Rptd) (P) None  21. Sad: (Patient-Rptd) (P) None  22. Embarrassed: (Patient-Rptd) (P) None  Total Score: (Patient-Rptd) (P) 10    [place] [facility]  s/p stealth assisted bilateral revision maxillary antrostomy, total ethmoidectomy, sphenoidotomy, frontal sinusotomy 8/6/24.     Review of systems: A 14-point review of systems has been conducted and is negative for any notable symptoms, except as dictated in the history of present illness.     Physical Exam:  Vital signs: There were no vitals taken for this visit.   General Appearance: No acute distress, appropriate demeanor, conversant  Eyes: moist conjunctivae; EOMI;  pupils symmetric; visual acuity grossly intact; no proptosis  Head: normocephalic; overall symmetric appearance without deformity  Face: overall symmetric without deformity  Ears: Normal appearance of external ear  Nose: No external deformity  Oral Cavity/oropharynx: Normal appearance of mucosa  Neck: no palpable lymphadenopathy; thyroid without palpable nodules  Lungs: symmetric chest rise; no wheezing  CV: Good distal perfusion; normal hear rate  Extremities: No deformity  Neurologic Exam: Cranial nerves II-XII are grossly intact      Procedure Note  Procedure performed: Rigid nasal endoscopy  Indication: To evaluate for sinonasal pathology not visualized on routine anterior rhinoscopy  Anesthesia: 4% topical lidocaine with 0.05 % oxymetazoline  Description of procedure: After obtaining consent for the procedure from the patient, the sinonasal cavity was sprayed with topical anesthetic. A rigid 30-degree nasal endoscope was used to first used to visualize the nasal cavity, the inferior and middle turbinates, and the nasopharynx on the left. A second pass was then made to visualize the middle meatus, the superior meatus and sphenoethmoid recess. Finally, the scope was turned 90-degrees and used to visualize the olfactory cleft and frontal outflow tract. A similar approach was used for the contralateral side. They tolerated the procedure well without difficulty.      Cecilia-Remy Endoscopic Scoring System  Endoscopic observation Right Left   Polyps in middle meatus (0 = absent, 1 = restricted to middle meatus, 2 = Beyond middle meatus) 0 0   Discharge (0 = absent, 1 = thin and clear, 2 = thick, purulent) 0 0   Edema (0 = absent, 1 = mild-moderate, 2 = moderate-severe) 0 1   Crusting (0 = absent, 1 = mild-moderate, 2 = moderate-severe) 1 2   Scarring (0= absent, 1 = mild-moderate, 2 = moderate-severe) 0 0   Total 1 3     Findings  RT/LT: Increase in crusting in the middle meatus with some localized inflammation  around this. No evidence of active infection.       The patient tolerated the procedure well without complication.     Assessment/Medical Decision Making:  Annie Garcia is a 55 year old female who presents for follow up s/p stealth assisted bilateral revision maxillary antrostomy, total ethmoidectomy, sphenoidotomy, frontal sinusotomy 8/6/24. Overall, she is doing much better than where she started. Upon exam, some crust and clot was debrided from the nasal cavity using suction. I do think she's have a little exacerbation possibly due to air quality. Discussed increasing rinses for the next few weeks.     Plan:  Continue sinus rinses, would go back to daily for a few weeks  Follow up with me in late Oct or early Nov.     Scribe Disclosure:   By signing my name below, I, Naima Conrad, attest that this documentation has been prepared under the direction and in the presence of Inocencio Peña MD.  - Electronically Signed: Naima Martines 07/30/25     Electronically signed by:    Inocencio Peña MD    [facility]  Rhinology, Endoscopic Skull Base Surgery  [facility]  Department of Otolaryngology - Head & Neck Surgery    ~~~~~~~~~~~~~~~~~~~~~~~~~~~~~~~~~~~~~~~~~~~~~~~~~~~~~~~~~~~~~~~~~~~~~~~~~~~~~~~~~~~~~~~~~~~~~~~~~~~~~~~~~~~~~~~~~~~~~~~~~~~~~~~~~~~~~~~    Past Medical History:   Diagnosis Date     Cystic fibrosis (H)      Depression      Tear of right acetabular labrum         Past Surgical History:   Procedure Laterality Date     ANKLE SURGERY Right 07/27/2019    foot/ankle     AS HYSTEROSCOPY, SURGICAL; W/ ENDOMETRIAL ABLATION, ANY METHOD  2008    [facility]. Thermal balloon.     BREAST SURGERY       ENDOSCOPIC SINUS SURGERY N/A 09/05/2018    Procedure: ENDOSCOPIC SINUS SURGERY;;  Surgeon: Francoise Gonzalez MD;  Location: [facility] OR     ENDOSCOPY UPPER, COLONOSCOPY, COMBINED N/A 11/18/2016    Procedure: COMBINED ENDOSCOPY UPPER, COLONOSCOPY;  Surgeon:  Levi Hinkle MD;  Location: [facility]     ESOPHAGOSCOPY, GASTROSCOPY, DUODENOSCOPY (EGD), COMBINED  01/10/2014    Procedure: COMBINED ESOPHAGOSCOPY, GASTROSCOPY, DUODENOSCOPY (EGD);  UPPER ENDOSCOPY with Biopsy;  Surgeon: Levi Hinkle MD;  Location: [facility]     EXAM UNDER ANESTHESIA NOSE N/A 09/05/2018    Procedure: EXAM UNDER ANESTHESIA NOSE;  SINUS EXAM UNDER ANESTHESIA, Endoscopic Sinus Surgery;  Surgeon: Francoise Gonzalez MD;  Location: [facility]     OPTICAL TRACKING SYSTEM ENDOSCOPIC SINUS SURGERY Bilateral 8/6/2024    Procedure: Stealth assisted bilateral revision maxillary antrostomy, total ethmoidectomy, sphenoidotomy, frontal sinusotomy;  Surgeon: Inocencio Peña MD;  Location: [facility] OR     ORTHOPEDIC SURGERY  1994    left knee arthroscopy     ORTHOPEDIC SURGERY  1994    right shoulder     ORTHOPEDIC SURGERY  2014    left hip replacement     REMOVE HARDWARE FOOT Right 01/27/2020    Procedure: REMOVAL, HARDWARE RIGHT FOOT;  Surgeon: Bryan Cid DPM;  Location: [facility]     SEPTOPLASTY, ENDOSCOPIC SINUS SURGERY, COMBINED Left 02/21/2018    Procedure: COMBINED SEPTOPLASTY, ENDOSCOPIC SINUS SURGERY;  LEFT ENDOSCOPIC SINUS SURGERY, SEPTOPLASTY, BILATERAL TURBINATE REDUCTION, PROPEL IMPLANTS;  Surgeon: Francoise Gonzalez MD;  Location: [facility]     TURBINOPLASTY Bilateral 02/21/2018    Procedure: TURBINOPLASTY;;  Surgeon: Francoise Gonzalez MD;  Location: [facility]        Family History   Problem Relation Age of Onset     Diabetes Mother      Hypertension Mother      Thyroid Disease Mother      Thyroid Disease Sister      Dementia Sister         Social History     Socioeconomic History     Marital status: Single   Tobacco Use     Smoking status: Never     Passive exposure: Never     Smokeless tobacco: Never   Vaping Use     Vaping status: Never Used   Substance and Sexual Activity     Alcohol use: Yes     Comment: rare     Drug use: No     Social Drivers of Health     Financial Resource Strain: Low Risk  (11/11/2024)     Received from [facility]    Overall Financial Resource Strain (CARDIA)      Difficulty of Paying Living Expenses: Not hard at all   Food Insecurity: No Food Insecurity (11/11/2024)    Received from [facility]    Hunger Vital Sign      Worried About Running Out of Food in the Last Year: Never true      Ran Out of Food in the Last Year: Never true   Transportation Needs: No Transportation Needs (11/11/2024)    Received from [facility]    PRAPARE - Transportation      Lack of Transportation (Medical): No      Lack of Transportation (Non-Medical): No   Physical Activity: Inactive (11/11/2024)    Received from [facility]    Exercise Vital Sign      Days of Exercise per Week: 0 days      Minutes of Exercise per Session: 0 min   Stress: No Stress Concern Present (11/11/2024)    Received from [facility]    [employer] - Occupational Stress Questionnaire      Feeling of Stress : Only a little   Social Connections: Moderately Integrated (11/11/2024)    Received from [facility]    Social Connection and Isolation Panel [NHANES]      Frequency of Communication with Friends and Family: Once a week      Frequency of Social Gatherings with Friends and Family: Three times a week      Attends Nondenominational Services: 1 to 4 times per year      Active Member of Clubs or Organizations: Yes      Attends Club or Organization Meetings: 1 to 4 times per year      Marital Status:    Interpersonal Safety: Not At Risk (10/24/2023)    Received from [facility]    Humiliation, Afraid, Rape, and Kick questionnaire      Fear of Current or Ex-Partner: No      Emotionally Abused: No      Physically Abused: No      Sexually Abused: No   Housing Stability: Low Risk  (11/11/2024)    Received from [facility]    Housing Stability Vital Sign      Unable to Pay for Housing in the Last Year: No      Number of Times Moved in the  Last Year: 0      Homeless in the Last Year: No          Again, thank you for allowing me to participate in the care of your patient.      Sincerely,    Inocencio Peña MD

## 2025-07-31 NOTE — PATIENT INSTRUCTIONS
You were seen in the ENT Clinic today by Dr. Peña. If you have any questions or concerns after your appointment, please contact us (see below)       2.   Plan to return to the ENT clinic in late October, early November.           How to Contact Us:  Send a Tanium message to your provider. Our team will respond to you via Tanium. Occasionally, we will need to call you to get further information.  For urgent matters (Monday-Friday), call the ENT Clinic: [phone] and speak with a call center team member - they will route your call appropriately.   If you'd like to speak directly with a nurse, please find our contact information below. We do our best to check voicemail frequently throughout the day, and will work to call you back within 1-2 days. For urgent matters, please use the general clinic phone numbers listed above.     Liza VILLAREAL RN  Direct: [phone]  Bee GARLAND LPN  Direct: [phone]    [facility]  Department of Otolaryngology

## 2025-08-04 DIAGNOSIS — E84.9 CYSTIC FIBROSIS (H): ICD-10-CM

## 2025-08-05 RX ORDER — ARFORMOTEROL TARTRATE 15 UG/2ML
15 SOLUTION RESPIRATORY (INHALATION) 2 TIMES DAILY
Qty: 120 ML | Refills: 3 | Status: SHIPPED | OUTPATIENT
Start: 2025-08-05

## 2025-08-21 ENCOUNTER — MYC MEDICAL ADVICE (OUTPATIENT)
Dept: OTOLARYNGOLOGY | Facility: CLINIC | Age: 55
End: 2025-08-21
Payer: COMMERCIAL

## (undated) DEVICE — PACK NEURO MINOR UMMC SNE32MNMU4

## (undated) DEVICE — LIGHT HANDLE COVER

## (undated) DEVICE — DRSG-KERLIX ROLL 4.5 X 4.1YD

## (undated) DEVICE — DRAPE-STERI 45X60CM #1010

## (undated) DEVICE — LINEN TOWEL PACK X5 5464

## (undated) DEVICE — BLADE-SCALPEL #15

## (undated) DEVICE — GLOVE BIOGEL PI MICRO SZ 7.5 48575

## (undated) DEVICE — IRRIGATION-H2O 1000ML

## (undated) DEVICE — TUBING SUCTION MEDI-VAC SOFT 3/16"X20' N520A

## (undated) DEVICE — CUFF-DISP STERILE 18IN SINGLE BLADDER

## (undated) DEVICE — SYR 30ML LL W/O NDL 302832

## (undated) DEVICE — GLV-6.5 PROTEXIS PI CLASSIC LF/PF

## (undated) DEVICE — TUBING-IRRIG. SYSTEM CLEARVISION

## (undated) DEVICE — APPLICATOR-CHLORAPREP 26ML TINTED CHG 2%+ 70% IPA-SURGICAL

## (undated) DEVICE — NDL-25G 1-1/2" NON-SAFETY

## (undated) DEVICE — ANTIFOG SOLUTION W/FOAM PAD CF-1002

## (undated) DEVICE — TRACKER-PATIENT ENT NIPT

## (undated) DEVICE — LINEN TOWEL PACK X6 WHITE 5487

## (undated) DEVICE — SOL-NACL 0.9% 1000ML

## (undated) DEVICE — SCD SLEEVE-KNEE REG.

## (undated) DEVICE — BLADE-FUSION ROTATABLE QUADCUT 4.3MM X 13CM

## (undated) DEVICE — DRSG-NEURO SPONGE 1/2" X 3"

## (undated) DEVICE — BLADE SHAVER SINUS 3.5MM RAD 40 ROTATE 1883506HRE

## (undated) DEVICE — DRAPE-EXTREMITY SHEET

## (undated) DEVICE — CANISTER-SUCTION 2000CC

## (undated) DEVICE — TRACKER ENT OTS INSTRUMENT FUSION 9733533

## (undated) DEVICE — SYRINGE-10CC LUER LOCK

## (undated) DEVICE — IRRIGATION-NACL 1000ML

## (undated) DEVICE — SYR 03ML LL W/O NDL 309657

## (undated) DEVICE — POUCH-INSTRUMENT 2 COMP. 7 X 11IN

## (undated) DEVICE — BDG-ELASTIC 4 INCH

## (undated) DEVICE — MARKER-SKIN REG

## (undated) DEVICE — PACK-ENT-CUSTOM

## (undated) DEVICE — CAUTERY PENCIL-SMOKE EVACUATION

## (undated) DEVICE — GOWN-SURG XL LVL 3 REINFORCED

## (undated) DEVICE — Device

## (undated) DEVICE — DRAPE POUCH INSTRUMENT 1018

## (undated) DEVICE — BDG-ESMARK 6 INCH X 9 FT

## (undated) DEVICE — BLADE SHAVER SERRATED 4MM ROTATE 1884002HRE

## (undated) DEVICE — LABEL-STERILE PREPRINTED FOR OR

## (undated) DEVICE — BLANKET-BAIR LOWER EXTREMITY

## (undated) DEVICE — DRAPE-U DRAPE SPLIT SHEET 72" X 122"

## (undated) DEVICE — TRACKER-INSTRUMENT ENT NAV

## (undated) DEVICE — SUCTION MANIFOLD NEPTUNE 2 SYS 4 PORT 0702-020-000

## (undated) DEVICE — DRSG-XEROFORM 1X8

## (undated) DEVICE — NDL-SPINAL 25G X 3.5IN QUINCKE

## (undated) DEVICE — TUBING-IRRIGATOR STRAIGHTSHOT FUSION

## (undated) DEVICE — SUTURE-PROLENE 3-0 PS-1 8663G

## (undated) DEVICE — SUTURE-VICRYL 3-0 CT-1 J944H

## (undated) DEVICE — SPONGE COTTONOID 1/2X3" 80-1407

## (undated) DEVICE — RELIEVA SPINPLUS BALLOON SYSTEM

## (undated) DEVICE — ESU SUCTION CAUTERY 10FR FOOT CONTROL E2505-10FR

## (undated) DEVICE — ADAPTER-SPECIMEN TRAP

## (undated) DEVICE — CAUTERY-MEGADYNE TIP

## (undated) DEVICE — DRAPE FLUID WARMING 52"X66" ORS-301

## (undated) DEVICE — CAUTERY PAD-POLYHESIVE II ADULT

## (undated) DEVICE — TUBING-SUCTION 20FT

## (undated) DEVICE — TEST TUBE W/SCREW CAP 17361

## (undated) DEVICE — SUTURE-VICRYL 4-0 P-3 J494G

## (undated) DEVICE — SUTURE-CHROMIC GUT 4-0 RB-1 U203H

## (undated) DEVICE — CAUTERY PENCIL-W/SUCTION 8FR HANDSWITCHING

## (undated) DEVICE — NDL-BLUNT FILL 18G 1.5"

## (undated) DEVICE — INSTRUMENT WIPE-VISIWIPE

## (undated) DEVICE — SENSOR-OXISENSOR II ADULT

## (undated) DEVICE — NDL BLUNT FILL 18GA 1 1/2" 305064

## (undated) DEVICE — EYE FLUORESCEIN OPHTHALMIC STRIP FLO-GLO 1272111

## (undated) DEVICE — SYRINGE-10CC FINGER

## (undated) DEVICE — COBLATION WAND-TURBINATE REDUCT. PTR

## (undated) DEVICE — ENDO SHEATH STORZ SHARPSITE ENDOSCRUB 0DEG 4MM 1912000

## (undated) DEVICE — COVER-TABLE SHEET

## (undated) DEVICE — TUBE-LUKI-MUCOUS SPEC. TRAP

## (undated) DEVICE — SOL NACL 0.9% IRRIG 1000ML BOTTLE 07138-09

## (undated) DEVICE — TUBING SUCTION 10'X3/16" N510

## (undated) DEVICE — BLADE SINUS TRICUT STR 4MMX13CM FUSION ROTATE W/TRACKING

## (undated) DEVICE — ENDO SHEATH STORZ SHARPSITE ENDOSCRUB 30DEG 4MM 1912010

## (undated) DEVICE — PACK-SET UP-CUSTOM

## (undated) DEVICE — DRSG-SPONGE STERILE 4 X 4

## (undated) DEVICE — BIN-ENT BIN

## (undated) DEVICE — DRSG-ABDOMINAL 5 X 9

## (undated) DEVICE — SUTURE-ETHILON 4-0 P-3/P-13

## (undated) DEVICE — NDL-27G X 1 1/4" NON-SAFETY

## (undated) DEVICE — SYR 10ML LL W/O NDL 302995

## (undated) DEVICE — INFLATION DEVICE-SINUS BALLOON CATH

## (undated) DEVICE — SUCTION TUBE-YANKAUR

## (undated) DEVICE — CAST PADDING-STERILE 4"

## (undated) DEVICE — ESU GROUND PAD ADULT W/CORD E7507

## (undated) RX ORDER — FENTANYL CITRATE 50 UG/ML
INJECTION, SOLUTION INTRAMUSCULAR; INTRAVENOUS
Status: DISPENSED
Start: 2018-02-21

## (undated) RX ORDER — HYDROMORPHONE HYDROCHLORIDE 1 MG/ML
INJECTION, SOLUTION INTRAMUSCULAR; INTRAVENOUS; SUBCUTANEOUS
Status: DISPENSED
Start: 2024-08-06

## (undated) RX ORDER — KETOROLAC TROMETHAMINE 30 MG/ML
INJECTION, SOLUTION INTRAMUSCULAR; INTRAVENOUS
Status: DISPENSED
Start: 2020-01-27

## (undated) RX ORDER — SODIUM CHLORIDE FOR INHALATION 10 %
VIAL, NEBULIZER (ML) INHALATION
Status: DISPENSED
Start: 2024-07-29

## (undated) RX ORDER — PROPOFOL 10 MG/ML
INJECTION, EMULSION INTRAVENOUS
Status: DISPENSED
Start: 2018-02-21

## (undated) RX ORDER — FENTANYL CITRATE 50 UG/ML
INJECTION, SOLUTION INTRAMUSCULAR; INTRAVENOUS
Status: DISPENSED
Start: 2018-09-05

## (undated) RX ORDER — KETAMINE HCL IN NACL, ISO-OSM 100MG/10ML
SYRINGE (ML) INJECTION
Status: DISPENSED
Start: 2020-01-27

## (undated) RX ORDER — ONDANSETRON 2 MG/ML
INJECTION INTRAMUSCULAR; INTRAVENOUS
Status: DISPENSED
Start: 2018-02-21

## (undated) RX ORDER — LIDOCAINE HYDROCHLORIDE 20 MG/ML
INJECTION, SOLUTION EPIDURAL; INFILTRATION; INTRACAUDAL; PERINEURAL
Status: DISPENSED
Start: 2019-05-20

## (undated) RX ORDER — DEXAMETHASONE SODIUM PHOSPHATE 10 MG/ML
INJECTION, SOLUTION INTRAMUSCULAR; INTRAVENOUS
Status: DISPENSED
Start: 2018-02-21

## (undated) RX ORDER — DEXAMETHASONE SODIUM PHOSPHATE 10 MG/ML
INJECTION, SOLUTION INTRAMUSCULAR; INTRAVENOUS
Status: DISPENSED
Start: 2018-09-05

## (undated) RX ORDER — PROPOFOL 10 MG/ML
INJECTION, EMULSION INTRAVENOUS
Status: DISPENSED
Start: 2018-09-05

## (undated) RX ORDER — EPINEPHRINE NASAL SOLUTION 1 MG/ML
SOLUTION NASAL
Status: DISPENSED
Start: 2024-08-06

## (undated) RX ORDER — LIDOCAINE HYDROCHLORIDE 20 MG/ML
INJECTION, SOLUTION EPIDURAL; INFILTRATION; INTRACAUDAL; PERINEURAL
Status: DISPENSED
Start: 2020-01-27

## (undated) RX ORDER — PROPOFOL 10 MG/ML
INJECTION, EMULSION INTRAVENOUS
Status: DISPENSED
Start: 2020-01-27

## (undated) RX ORDER — ONDANSETRON 2 MG/ML
INJECTION INTRAMUSCULAR; INTRAVENOUS
Status: DISPENSED
Start: 2024-08-06

## (undated) RX ORDER — FENTANYL CITRATE 50 UG/ML
INJECTION, SOLUTION INTRAMUSCULAR; INTRAVENOUS
Status: DISPENSED
Start: 2024-08-06

## (undated) RX ORDER — ONDANSETRON 2 MG/ML
INJECTION INTRAMUSCULAR; INTRAVENOUS
Status: DISPENSED
Start: 2018-09-05

## (undated) RX ORDER — LIDOCAINE HYDROCHLORIDE 20 MG/ML
INJECTION, SOLUTION EPIDURAL; INFILTRATION; INTRACAUDAL; PERINEURAL
Status: DISPENSED
Start: 2018-09-05

## (undated) RX ORDER — OXYMETAZOLINE HYDROCHLORIDE 0.05 G/100ML
SPRAY NASAL
Status: DISPENSED
Start: 2024-08-06

## (undated) RX ORDER — LIDOCAINE HYDROCHLORIDE 20 MG/ML
INJECTION, SOLUTION EPIDURAL; INFILTRATION; INTRACAUDAL; PERINEURAL
Status: DISPENSED
Start: 2018-02-21

## (undated) RX ORDER — FENTANYL CITRATE 50 UG/ML
INJECTION, SOLUTION INTRAMUSCULAR; INTRAVENOUS
Status: DISPENSED
Start: 2020-01-27

## (undated) RX ORDER — ALBUTEROL SULFATE 0.83 MG/ML
SOLUTION RESPIRATORY (INHALATION)
Status: DISPENSED
Start: 2024-07-29

## (undated) RX ORDER — ACETAMINOPHEN 325 MG/1
TABLET ORAL
Status: DISPENSED
Start: 2024-08-06

## (undated) RX ORDER — ROCURONIUM BROMIDE 50 MG/5 ML
SYRINGE (ML) INTRAVENOUS
Status: DISPENSED
Start: 2018-02-21

## (undated) RX ORDER — DEXAMETHASONE SODIUM PHOSPHATE 10 MG/ML
INJECTION, SOLUTION INTRAMUSCULAR; INTRAVENOUS
Status: DISPENSED
Start: 2020-01-27

## (undated) RX ORDER — PROPOFOL 10 MG/ML
INJECTION, EMULSION INTRAVENOUS
Status: DISPENSED
Start: 2019-05-20

## (undated) RX ORDER — VANCOMYCIN HYDROCHLORIDE 1 G/20ML
INJECTION, POWDER, LYOPHILIZED, FOR SOLUTION INTRAVENOUS
Status: DISPENSED
Start: 2024-08-06

## (undated) RX ORDER — ONDANSETRON 2 MG/ML
INJECTION INTRAMUSCULAR; INTRAVENOUS
Status: DISPENSED
Start: 2020-01-27